# Patient Record
Sex: FEMALE | Race: WHITE | NOT HISPANIC OR LATINO | Employment: OTHER | URBAN - METROPOLITAN AREA
[De-identification: names, ages, dates, MRNs, and addresses within clinical notes are randomized per-mention and may not be internally consistent; named-entity substitution may affect disease eponyms.]

---

## 2017-02-12 DIAGNOSIS — E78.5 HYPERLIPIDEMIA: ICD-10-CM

## 2017-02-13 RX ORDER — SIMVASTATIN 10 MG/1
TABLET, FILM COATED ORAL
Qty: 90 TABLET | Refills: 3 | Status: SHIPPED | OUTPATIENT
Start: 2017-02-13 | End: 2018-01-29 | Stop reason: SDUPTHER

## 2017-02-17 ENCOUNTER — HOSPITAL ENCOUNTER (OUTPATIENT)
Dept: RADIOLOGY | Facility: HOSPITAL | Age: 77
Discharge: HOME | End: 2017-02-17
Attending: FAMILY MEDICINE
Payer: COMMERCIAL

## 2017-02-17 DIAGNOSIS — Z12.31 ENCOUNTER FOR SCREENING MAMMOGRAM FOR MALIGNANT NEOPLASM OF BREAST: ICD-10-CM

## 2017-02-17 PROCEDURE — 77067 SCR MAMMO BI INCL CAD: CPT | Mod: 26,,, | Performed by: RADIOLOGY

## 2017-02-17 PROCEDURE — 77067 SCR MAMMO BI INCL CAD: CPT | Mod: TC

## 2017-02-17 PROCEDURE — 77063 BREAST TOMOSYNTHESIS BI: CPT | Mod: 26,,, | Performed by: RADIOLOGY

## 2017-02-20 DIAGNOSIS — I10 ESSENTIAL HYPERTENSION: ICD-10-CM

## 2017-02-20 RX ORDER — LISINOPRIL 40 MG/1
TABLET ORAL
Qty: 90 TABLET | Refills: 3 | Status: SHIPPED | OUTPATIENT
Start: 2017-02-20 | End: 2018-04-23 | Stop reason: SDUPTHER

## 2017-02-20 RX ORDER — HYDROCHLOROTHIAZIDE 25 MG/1
TABLET ORAL
Qty: 90 TABLET | Refills: 3 | Status: SHIPPED | OUTPATIENT
Start: 2017-02-20 | End: 2018-04-23 | Stop reason: SDUPTHER

## 2017-02-20 RX ORDER — NIFEDIPINE 60 MG/1
TABLET, EXTENDED RELEASE ORAL
Qty: 90 TABLET | Refills: 3 | Status: ON HOLD | OUTPATIENT
Start: 2017-02-20 | End: 2018-04-14 | Stop reason: HOSPADM

## 2017-03-30 ENCOUNTER — TELEPHONE (OUTPATIENT)
Dept: FAMILY MEDICINE | Facility: CLINIC | Age: 77
End: 2017-03-30

## 2017-03-30 NOTE — TELEPHONE ENCOUNTER
Scheduled appt for preop clearance for back surgery on 4/6/17 and may need CT scan to rule out an anuerysm? Scheduled for tomorrow at 3 PM. Pt voiced understanding for appt. CLC

## 2017-03-30 NOTE — TELEPHONE ENCOUNTER
----- Message from Luis Jansen sent at 3/30/2017 11:52 AM CDT -----  Contact: self   [phone]  Patient needs an earlier appointment for clearance to rule out poss aneurysm. Patient will need a Ctscan. Patient is schedule for surgery 04/06/2017.  Thanks!

## 2017-03-31 ENCOUNTER — HOSPITAL ENCOUNTER (OUTPATIENT)
Dept: RADIOLOGY | Facility: HOSPITAL | Age: 77
Discharge: HOME | End: 2017-03-31
Attending: FAMILY MEDICINE
Payer: MEDICARE

## 2017-03-31 ENCOUNTER — OFFICE VISIT (OUTPATIENT)
Dept: FAMILY MEDICINE | Facility: CLINIC | Age: 77
End: 2017-03-31
Payer: MEDICARE

## 2017-03-31 VITALS
DIASTOLIC BLOOD PRESSURE: 70 MMHG | TEMPERATURE: 99 F | WEIGHT: 219.81 LBS | BODY MASS INDEX: 36.62 KG/M2 | HEIGHT: 65 IN | HEART RATE: 80 BPM | SYSTOLIC BLOOD PRESSURE: 140 MMHG

## 2017-03-31 DIAGNOSIS — M47.816 SPONDYLOSIS OF LUMBAR REGION WITHOUT MYELOPATHY OR RADICULOPATHY: ICD-10-CM

## 2017-03-31 DIAGNOSIS — R93.89 ABNORMAL CHEST X-RAY: ICD-10-CM

## 2017-03-31 DIAGNOSIS — I77.819 ECTATIC AORTA: ICD-10-CM

## 2017-03-31 DIAGNOSIS — E78.5 HYPERLIPIDEMIA, UNSPECIFIED HYPERLIPIDEMIA TYPE: ICD-10-CM

## 2017-03-31 DIAGNOSIS — Z01.818 PREOP EXAMINATION: Primary | ICD-10-CM

## 2017-03-31 DIAGNOSIS — I10 ESSENTIAL HYPERTENSION: ICD-10-CM

## 2017-03-31 PROCEDURE — 71250 CT THORAX DX C-: CPT | Mod: TC,PO

## 2017-03-31 PROCEDURE — 99214 OFFICE O/P EST MOD 30 MIN: CPT | Mod: S$GLB,,, | Performed by: FAMILY MEDICINE

## 2017-03-31 PROCEDURE — 71250 CT THORAX DX C-: CPT | Mod: 26,,, | Performed by: RADIOLOGY

## 2017-03-31 PROCEDURE — 99999 PR PBB SHADOW E&M-EST. PATIENT-LVL III: CPT | Mod: PBBFAC,,, | Performed by: FAMILY MEDICINE

## 2017-03-31 NOTE — PROGRESS NOTES
Subjective:       Patient ID: Shandra Velez is a 76 y.o. female.    Chief Complaint: Pre-op Exam (Back Surg Dr Marin Malloy, 4/6/17)    HPI Comments: Here today to f/u on chronic health issues  Here for preop clearanc for upcoming L4-L5 laminectomy with Dr. Malloy.  She is overall feeling well.  Recent chest xray had concern for possible aortic anerysm, and CT chest was recommended.  HTN - toleratign lisinopril 40mg daily, HCTZ 25mg daily and Adalat 60mg daily  HLD - tolerating Zocor 10mg daily  Allergies: taking Zyrtec 10mg daily    Past Medical History:    Hypertension                                                  Pulmonary nodule                                              Smoker                                                        Osteopenia                                                    Hypertension                                                  Hypercholesterolemia                                          Breast cancer                                                 Lumbar spondylosis                                            Past Surgical History:    MASTECTOMY                                       1985            Comment:right    CHOLECYSTECTOMY                                                APPENDECTOMY                                                   UMBILICAL HERNIA REPAIR                                        TUBAL LIGATION                                                 BREAST RECONSTRUCTION                                            Comment:right    Allergies:   -- No Known Drug Allergies     Social History    Marital Status:              Spouse Name:                       Years of Education:                 Number of children: 3             Occupational History  Occupation          Employer            Comment               retired marketing *                         Social History Main Topics    Smoking Status: Current Every Day Smoker        Packs/Day: 0.50  Years: 53          Types: Cigarettes    Smokeless Status: Never Used                        Alcohol Use: Yes             Drug Use: No              Sexual Activity: Not Currently        Other Topics            Concern    None on file    Social History Narrative    Lives alone      Hobbies: skyler      From [place]    Current Outpatient Prescriptions on File Prior to Visit:  aspirin 81 mg Tab, Every day, Disp: , Rfl:   cetirizine (ZYRTEC) 10 MG tablet, Every day, Disp: , Rfl:   DOCOSAHEXANOIC ACID/EPA (FISH OIL ORAL), 1,000 mg. Twice a day, Disp: , Rfl:   hydrochlorothiazide (HYDRODIURIL) 25 MG tablet, TAKE 1 TABLET ONCE DAILY, Disp: 90 tablet, Rfl: 3  lisinopril (PRINIVIL,ZESTRIL) 40 MG tablet, TAKE 1 TABLET ONCE DAILY, Disp: 90 tablet, Rfl: 3  naproxen (NAPROSYN) 500 MG tablet, Take 1 tablet (500 mg total) by mouth 2 (two) times daily with meals., Disp: 30 tablet, Rfl: 2  nifedipine (ADALAT CC) 60 MG TbSR, TAKE 1 TABLET ONCE DAILY, Disp: 90 tablet, Rfl: 3  simvastatin (ZOCOR) 10 MG tablet, TAKE 1 TABLET EVERY EVENING, Disp: 90 tablet, Rfl: 3  calcium citrate-vitamin D3 315-250 mg-unit Tab, Take by mouth., Disp: , Rfl:   temazepam (RESTORIL) 15 mg Cap, One tablet At bedtime as needed for insomnia, Disp: 90 capsule, Rfl: 1    No current facility-administered medications on file prior to visit.         Review of patient's family history indicates:    Cancer                         Sister                      Comment: uterine    Diabetes                       Brother                       Hyperlipidemia   Pertinent negatives include no chest pain or shortness of breath.     Review of Systems   Constitutional: Negative for appetite change, chills, fever and unexpected weight change.   HENT: Negative for sore throat and trouble swallowing.    Eyes: Negative for pain and visual disturbance.   Respiratory: Negative for cough, shortness of breath and wheezing.    Cardiovascular: Negative for chest pain and palpitations.  "  Gastrointestinal: Negative for abdominal pain, blood in stool, diarrhea, nausea and vomiting.   Genitourinary: Negative for difficulty urinating, dysuria and hematuria.   Musculoskeletal: Positive for back pain (intermittent left flank pain). Negative for arthralgias, gait problem and neck pain.   Skin: Negative for rash and wound.   Neurological: Positive for tremors (left hand). Negative for dizziness, weakness, numbness and headaches.   Hematological: Negative for adenopathy.   Psychiatric/Behavioral: Negative for dysphoric mood.       Objective:       BP (!) 140/70 (BP Location: Left arm, Patient Position: Sitting, BP Method: Manual)  Pulse 80  Temp 98.7 °F (37.1 °C) (Oral)   Ht 5' 5" (1.651 m)  Wt 99.7 kg (219 lb 12.8 oz)  BMI 36.58 kg/m2    Physical Exam   Constitutional: She is oriented to person, place, and time. She appears well-developed and well-nourished.   HENT:   Head: Normocephalic.   Mouth/Throat: Oropharynx is clear and moist. No oropharyngeal exudate or posterior oropharyngeal erythema.   Eyes: Conjunctivae and EOM are normal. Pupils are equal, round, and reactive to light.   Neck: Normal range of motion. Neck supple. No thyromegaly present.   Cardiovascular: Normal rate, regular rhythm, S1 normal, S2 normal, normal heart sounds and intact distal pulses.  Exam reveals no gallop and no friction rub.    No murmur heard.  Pulmonary/Chest: Effort normal and breath sounds normal. She has no wheezes. She has no rales.   Abdominal: Normal appearance.   Musculoskeletal:        Lumbar back: She exhibits decreased range of motion and pain. She exhibits no tenderness and no deformity.        Right lower leg: She exhibits no edema.        Left lower leg: She exhibits no edema.   Lymphadenopathy:     She has no cervical adenopathy.   Neurological: She is alert and oriented to person, place, and time. No cranial nerve deficit. Gait normal.   Skin: Skin is warm and intact. No rash noted.   Psychiatric: She " has a normal mood and affect.       Outside CXR and EKG and labs reviewed    CT chest today:  Atherosclerotic changes are noted at the aortic arch    Assessment:       1. Preop examination    2. Hyperlipidemia, unspecified hyperlipidemia type    3. Essential hypertension    4. Abnormal chest x-ray    5. Ectatic aorta    6. Spondylosis of lumbar region without myelopathy or radiculopathy        Plan:       Preop examination    Hyperlipidemia, unspecified hyperlipidemia type    Essential hypertension    Abnormal chest x-ray  -     CT Chest Without Contrast; Future; Expected date: 3/31/17    Ectatic aorta  -     CT Chest Without Contrast; Future; Expected date: 3/31/17    Spondylosis of lumbar region without myelopathy or radiculopathy          Medically cleared to proceed with back surgery  continue present meds  Counseled on basic back stretching and lifting precautions  Counseled on regular exercise, maintenance of a healthy weight, balanced diet rich in fruits/vegetables and lean protein, and avoidance of unhealthy habits like smoking and excessive alcohol intake.  F/u annually or PRN

## 2017-04-03 ENCOUNTER — TELEPHONE (OUTPATIENT)
Dept: FAMILY MEDICINE | Facility: CLINIC | Age: 77
End: 2017-04-03

## 2017-04-03 NOTE — TELEPHONE ENCOUNTER
----- Message from Alberto Guzman sent at 4/3/2017  8:58 AM CDT -----  Contact: pt  Pt is requesting results from her CT Scan  Call Back#[phone]  Thanks

## 2017-07-20 ENCOUNTER — OFFICE VISIT (OUTPATIENT)
Dept: FAMILY MEDICINE | Facility: CLINIC | Age: 77
End: 2017-07-20
Payer: MEDICARE

## 2017-07-20 VITALS
BODY MASS INDEX: 36.48 KG/M2 | SYSTOLIC BLOOD PRESSURE: 120 MMHG | HEIGHT: 65 IN | WEIGHT: 218.94 LBS | DIASTOLIC BLOOD PRESSURE: 80 MMHG | TEMPERATURE: 98 F | HEART RATE: 85 BPM | RESPIRATION RATE: 18 BRPM

## 2017-07-20 DIAGNOSIS — M76.899 TENDONITIS OF KNEE: ICD-10-CM

## 2017-07-20 DIAGNOSIS — M54.41 ACUTE RIGHT-SIDED LOW BACK PAIN WITH RIGHT-SIDED SCIATICA: Primary | ICD-10-CM

## 2017-07-20 DIAGNOSIS — M25.551 PAIN OF RIGHT HIP JOINT: ICD-10-CM

## 2017-07-20 DIAGNOSIS — M76.31 IT BAND SYNDROME, RIGHT: ICD-10-CM

## 2017-07-20 PROCEDURE — 1125F AMNT PAIN NOTED PAIN PRSNT: CPT | Mod: S$GLB,,, | Performed by: NURSE PRACTITIONER

## 2017-07-20 PROCEDURE — 99213 OFFICE O/P EST LOW 20 MIN: CPT | Mod: S$GLB,,, | Performed by: NURSE PRACTITIONER

## 2017-07-20 PROCEDURE — 1159F MED LIST DOCD IN RCRD: CPT | Mod: S$GLB,,, | Performed by: NURSE PRACTITIONER

## 2017-07-20 PROCEDURE — 99999 PR PBB SHADOW E&M-EST. PATIENT-LVL IV: CPT | Mod: PBBFAC,,, | Performed by: NURSE PRACTITIONER

## 2017-07-20 RX ORDER — METHYLPREDNISOLONE 4 MG/1
TABLET ORAL
Qty: 1 PACKAGE | Refills: 0 | Status: SHIPPED | OUTPATIENT
Start: 2017-07-20 | End: 2017-08-10

## 2017-07-20 NOTE — PROGRESS NOTES
Subjective:       Patient ID: Shandra Velez is a 76 y.o. female.    Chief Complaint: Leg Pain (right leg/knee pain that radiates to back of buttocks and down calf. Pain for a month. )    Xray knee - ortho - Dr nichols   Blood work - that were normal -   Thought it was tendonitis- and told her to take advil, OTC meds   Went there 2 weeks ago - still with pain in knee and upper leg     Had lumbar surgery may 2017 lumbar laminectomy L4-5 - got immediate relief   Stops with advil and motrin relief     Back Pain   This is a new problem. The current episode started in the past 7 days. The problem occurs every several days. The problem has been rapidly worsening since onset. The pain is present in the lumbar spine. The quality of the pain is described as aching. The pain radiates to the right thigh and right knee. The pain is at a severity of 7/10. The pain is moderate. The pain is the same all the time. The symptoms are aggravated by sitting. Associated symptoms include leg pain. Pertinent negatives include no abdominal pain, bladder incontinence, bowel incontinence, chest pain, dysuria, fever, headaches, numbness, paresis, paresthesias, pelvic pain, perianal numbness, tingling, weakness or weight loss. She has tried NSAIDs for the symptoms. The treatment provided no relief.     Vitals:    07/20/17 1347   BP: 120/80   Pulse: 85   Resp: 18   Temp: 97.8 °F (36.6 °C)     Review of Systems   Constitutional: Negative.  Negative for diaphoresis, fatigue, fever and weight loss.   HENT: Negative.    Eyes: Negative.    Respiratory: Negative.  Negative for cough, shortness of breath and wheezing.    Cardiovascular: Negative.  Negative for chest pain.   Gastrointestinal: Negative.  Negative for abdominal pain, bowel incontinence, diarrhea and nausea.   Endocrine: Negative.    Genitourinary: Negative.  Negative for bladder incontinence, dysuria, hematuria and pelvic pain.   Musculoskeletal: Positive for arthralgias, back pain,  gait problem and myalgias. Negative for joint swelling.   Skin: Negative.  Negative for color change and rash.   Allergic/Immunologic: Negative.    Neurological: Negative for tingling, speech difficulty, weakness, numbness, headaches and paresthesias.   Hematological: Negative.    Psychiatric/Behavioral: Negative.        Past Medical History:   Diagnosis Date    Breast cancer     Hypercholesterolemia     Hypertension     Hypertension     Lumbar spondylosis     Osteopenia     Pulmonary nodule     Smoker      Objective:      Physical Exam   Constitutional: She is oriented to person, place, and time. She appears well-developed and well-nourished.   HENT:   Head: Normocephalic and atraumatic.   Mouth/Throat: Oropharynx is clear and moist.   Eyes: Conjunctivae and EOM are normal. Pupils are equal, round, and reactive to light.   Neck: Neck supple.   Cardiovascular: Normal rate, regular rhythm and normal heart sounds.  Exam reveals no friction rub.    No murmur heard.  Pulmonary/Chest: Effort normal.   Abdominal: Soft. Bowel sounds are normal.   Musculoskeletal:        Lumbar back: She exhibits decreased range of motion, tenderness, pain and spasm. She exhibits no bony tenderness.        Right upper leg: She exhibits tenderness. She exhibits no edema.        Legs:  Neurological: She is alert and oriented to person, place, and time.   Skin: Skin is warm and dry.   Psychiatric: She has a normal mood and affect. Her behavior is normal.   Nursing note and vitals reviewed.      Assessment:       1. Acute right-sided low back pain with right-sided sciatica    2. Tendonitis of knee    3. Pain of right hip joint    4. IT band syndrome, right        Plan:       Acute right-sided low back pain with right-sided sciatica  -     methylPREDNISolone (MEDROL DOSEPACK) 4 mg tablet; use as directed  Dispense: 1 Package; Refill: 0    Tendonitis of knee  -     methylPREDNISolone (MEDROL DOSEPACK) 4 mg tablet; use as directed   Dispense: 1 Package; Refill: 0    Pain of right hip joint    IT band syndrome, right            Discussed would recommend therapy and stretching and heat -   She will try the medrol dose pack and call back if she wants to add physical therapy

## 2017-08-14 ENCOUNTER — PATIENT OUTREACH (OUTPATIENT)
Dept: ADMINISTRATIVE | Facility: HOSPITAL | Age: 77
End: 2017-08-14

## 2017-08-18 DIAGNOSIS — Z12.11 COLON CANCER SCREENING: ICD-10-CM

## 2017-08-29 ENCOUNTER — OFFICE VISIT (OUTPATIENT)
Dept: FAMILY MEDICINE | Facility: CLINIC | Age: 77
End: 2017-08-29
Payer: MEDICARE

## 2017-08-29 VITALS
OXYGEN SATURATION: 93 % | TEMPERATURE: 97 F | HEART RATE: 88 BPM | BODY MASS INDEX: 36.73 KG/M2 | RESPIRATION RATE: 20 BRPM | DIASTOLIC BLOOD PRESSURE: 70 MMHG | WEIGHT: 220.44 LBS | HEIGHT: 65 IN | SYSTOLIC BLOOD PRESSURE: 126 MMHG

## 2017-08-29 DIAGNOSIS — Z00.00 PREVENTATIVE HEALTH CARE: Primary | ICD-10-CM

## 2017-08-29 DIAGNOSIS — M54.31 SCIATICA, RIGHT SIDE: ICD-10-CM

## 2017-08-29 DIAGNOSIS — G47.00 INSOMNIA, UNSPECIFIED TYPE: ICD-10-CM

## 2017-08-29 DIAGNOSIS — E78.5 HYPERLIPIDEMIA, UNSPECIFIED HYPERLIPIDEMIA TYPE: ICD-10-CM

## 2017-08-29 DIAGNOSIS — R25.1 TREMOR: ICD-10-CM

## 2017-08-29 PROCEDURE — 99999 PR PBB SHADOW E&M-EST. PATIENT-LVL III: CPT | Mod: PBBFAC,,, | Performed by: FAMILY MEDICINE

## 2017-08-29 PROCEDURE — 99397 PER PM REEVAL EST PAT 65+ YR: CPT | Mod: S$GLB,,, | Performed by: FAMILY MEDICINE

## 2017-08-29 RX ORDER — TEMAZEPAM 15 MG/1
CAPSULE ORAL
Qty: 90 CAPSULE | Refills: 1 | Status: SHIPPED | OUTPATIENT
Start: 2017-08-29 | End: 2019-11-06 | Stop reason: SDUPTHER

## 2017-08-29 NOTE — PROGRESS NOTES
Subjective:       Patient ID: Shandra Velez is a 76 y.o. female.    Chief Complaint: Preventative Health Care (Physical)    Here today for annual exam to f/u on chronic health issues    She is overall feeling well.  C/o 2 month h/o persistent right buttock pain with radiation to the right lateral knee.  It is worse in the AM and improves as the day progresses.  steroid pack helped some.  Symptoms have been unchanged    HTN - tolerating lisinopril 40mg daily, HCTZ 25mg daily and Adalat 60mg daily  HLD - tolerating Zocor 10mg daily  Allergies: taking Zyrtec 10mg daily    C/o persistent left-hand tremor that is worse with writing.    Past Medical History:    Hypertension                                                  Pulmonary nodule                                              Smoker                                                        Osteopenia                                                    Hypertension                                                  Hypercholesterolemia                                          Breast cancer                                                 Lumbar spondylosis                                            Past Surgical History:    MASTECTOMY                                       1985            Comment:right    CHOLECYSTECTOMY                                                APPENDECTOMY                                                   UMBILICAL HERNIA REPAIR                                        TUBAL LIGATION                                                 BREAST RECONSTRUCTION                                            Comment:right    Allergies:   -- No Known Drug Allergies     Social History    Marital Status:              Spouse Name:                       Years of Education:                 Number of children: 3             Occupational History  Occupation          Employer            Comment               retired marketing *                         Social History  Main Topics    Smoking Status: Current Every Day Smoker        Packs/Day: 0.50  Years: 53         Types: Cigarettes    Smokeless Status: Never Used                        Alcohol Use: Yes             Drug Use: No              Sexual Activity: Not Currently        Other Topics            Concern    None on file    Social History Narrative    Lives alone      Hobbies: skyler      From [place]    Current Outpatient Prescriptions on File Prior to Visit:  aspirin 81 mg Tab, Every day, Disp: , Rfl:   cetirizine (ZYRTEC) 10 MG tablet, Every day, Disp: , Rfl:   DOCOSAHEXANOIC ACID/EPA (FISH OIL ORAL), 1,000 mg. Twice a day, Disp: , Rfl:   hydrochlorothiazide (HYDRODIURIL) 25 MG tablet, TAKE 1 TABLET ONCE DAILY, Disp: 90 tablet, Rfl: 3  lisinopril (PRINIVIL,ZESTRIL) 40 MG tablet, TAKE 1 TABLET ONCE DAILY, Disp: 90 tablet, Rfl: 3  naproxen (NAPROSYN) 500 MG tablet, Take 1 tablet (500 mg total) by mouth 2 (two) times daily with meals., Disp: 30 tablet, Rfl: 2  nifedipine (ADALAT CC) 60 MG TbSR, TAKE 1 TABLET ONCE DAILY, Disp: 90 tablet, Rfl: 3  simvastatin (ZOCOR) 10 MG tablet, TAKE 1 TABLET EVERY EVENING, Disp: 90 tablet, Rfl: 3  calcium citrate-vitamin D3 315-250 mg-unit Tab, Take by mouth., Disp: , Rfl:   temazepam (RESTORIL) 15 mg Cap, One tablet At bedtime as needed for insomnia, Disp: 90 capsule, Rfl: 1    No current facility-administered medications on file prior to visit.         Review of patient's family history indicates:    Cancer                         Sister                      Comment: uterine    Diabetes                       Brother                         Hyperlipidemia   Pertinent negatives include no chest pain or shortness of breath.     Review of Systems   Constitutional: Negative for appetite change, chills, fever and unexpected weight change.   HENT: Negative for sore throat and trouble swallowing.    Eyes: Negative for pain and visual disturbance.   Respiratory: Negative for cough, shortness of  "breath and wheezing.    Cardiovascular: Negative for chest pain and palpitations.   Gastrointestinal: Negative for abdominal pain, blood in stool, diarrhea, nausea and vomiting.   Genitourinary: Negative for difficulty urinating, dysuria and hematuria.   Musculoskeletal: Positive for back pain (intermittent left flank pain). Negative for arthralgias, gait problem and neck pain.   Skin: Negative for rash and wound.   Neurological: Positive for tremors (left hand). Negative for dizziness, weakness, numbness and headaches.   Hematological: Negative for adenopathy.   Psychiatric/Behavioral: Negative for dysphoric mood.       Objective:       /70   Pulse 88   Temp 97.3 °F (36.3 °C) (Oral)   Resp 20   Ht 5' 5" (1.651 m)   Wt 100 kg (220 lb 7.4 oz)   SpO2 (!) 93%   BMI 36.69 kg/m²     Physical Exam   Constitutional: She is oriented to person, place, and time. She appears well-developed and well-nourished.   HENT:   Head: Normocephalic.   Mouth/Throat: Oropharynx is clear and moist. No oropharyngeal exudate or posterior oropharyngeal erythema.   Eyes: Conjunctivae and EOM are normal. Pupils are equal, round, and reactive to light.   Neck: Normal range of motion. Neck supple. No thyromegaly present.   Cardiovascular: Normal rate, regular rhythm, S1 normal, S2 normal, normal heart sounds and intact distal pulses.  Exam reveals no gallop and no friction rub.    No murmur heard.  Pulmonary/Chest: Effort normal and breath sounds normal. She has no wheezes. She has no rales.   Abdominal: Normal appearance.   Musculoskeletal:        Lumbar back: She exhibits pain. She exhibits normal range of motion, no tenderness and no deformity.        Back:         Right lower leg: She exhibits no edema.        Left lower leg: She exhibits no edema.   Lymphadenopathy:     She has no cervical adenopathy.   Neurological: She is alert and oriented to person, place, and time. She displays tremor. No cranial nerve deficit. Gait normal. "   Skin: Skin is warm and intact. No rash noted.   Psychiatric: She has a normal mood and affect.       Results for orders placed or performed in visit on 06/26/17   Sedimentation rate, manual   Result Value Ref Range    Sed Rate 8 0 - 29 mm/Hr   C-reactive protein   Result Value Ref Range    CRP 0.90 0.00 - 0.90 mg/dL         Assessment:       1. Preventative health care    2. Insomnia, unspecified type    3. Hyperlipidemia, unspecified hyperlipidemia type    4. Sciatica, right side    5. Tremor        Plan:       Preventative health care  -     Comprehensive metabolic panel; Future; Expected date: 08/29/2017  -     Lipid panel; Future; Expected date: 08/29/2017    Insomnia, unspecified type  -     temazepam (RESTORIL) 15 mg Cap; One tablet At bedtime as needed for insomnia  Dispense: 90 capsule; Refill: 1    Hyperlipidemia, unspecified hyperlipidemia type    Sciatica, right side  -     Ambulatory Referral to Physical/Occupational Therapy    Tremor             continue present meds  Counseled on basic back stretching and lifting precautions  Reassurance regarding essential tremor  Counseled on regular exercise, maintenance of a healthy weight, balanced diet rich in fruits/vegetables and lean protein, and avoidance of unhealthy habits like smoking and excessive alcohol intake.  F/u annually or PRN

## 2017-09-11 ENCOUNTER — LAB VISIT (OUTPATIENT)
Dept: LAB | Facility: HOSPITAL | Age: 77
End: 2017-09-11
Attending: FAMILY MEDICINE
Payer: MEDICARE

## 2017-09-11 DIAGNOSIS — Z00.00 PREVENTATIVE HEALTH CARE: ICD-10-CM

## 2017-09-11 LAB
ALBUMIN SERPL BCP-MCNC: 3.5 G/DL
ALP SERPL-CCNC: 68 U/L
ALT SERPL W/O P-5'-P-CCNC: 11 U/L
ANION GAP SERPL CALC-SCNC: 9 MMOL/L
AST SERPL-CCNC: 20 U/L
BILIRUB SERPL-MCNC: 0.4 MG/DL
BUN SERPL-MCNC: 25 MG/DL
CALCIUM SERPL-MCNC: 9.6 MG/DL
CHLORIDE SERPL-SCNC: 106 MMOL/L
CHOLEST SERPL-MCNC: 208 MG/DL
CHOLEST/HDLC SERPL: 4.4 {RATIO}
CO2 SERPL-SCNC: 29 MMOL/L
CREAT SERPL-MCNC: 1.1 MG/DL
EST. GFR  (AFRICAN AMERICAN): 57.2 ML/MIN/1.73 M^2
EST. GFR  (NON AFRICAN AMERICAN): 49.6 ML/MIN/1.73 M^2
GLUCOSE SERPL-MCNC: 103 MG/DL
HDLC SERPL-MCNC: 47 MG/DL
HDLC SERPL: 22.6 %
LDLC SERPL CALC-MCNC: 127 MG/DL
NONHDLC SERPL-MCNC: 161 MG/DL
POTASSIUM SERPL-SCNC: 4.1 MMOL/L
PROT SERPL-MCNC: 7 G/DL
SODIUM SERPL-SCNC: 144 MMOL/L
TRIGL SERPL-MCNC: 170 MG/DL

## 2017-09-11 PROCEDURE — 80053 COMPREHEN METABOLIC PANEL: CPT

## 2017-09-11 PROCEDURE — 36415 COLL VENOUS BLD VENIPUNCTURE: CPT | Mod: PO

## 2017-09-11 PROCEDURE — 80061 LIPID PANEL: CPT

## 2017-10-16 ENCOUNTER — OFFICE VISIT (OUTPATIENT)
Dept: SPINE | Facility: CLINIC | Age: 77
End: 2017-10-16
Payer: MEDICARE

## 2017-10-16 VITALS
BODY MASS INDEX: 35.19 KG/M2 | SYSTOLIC BLOOD PRESSURE: 141 MMHG | HEIGHT: 66 IN | DIASTOLIC BLOOD PRESSURE: 86 MMHG | WEIGHT: 218.94 LBS | HEART RATE: 75 BPM

## 2017-10-16 DIAGNOSIS — Z98.890 HISTORY OF LUMBAR SURGERY: Primary | ICD-10-CM

## 2017-10-16 DIAGNOSIS — M54.41 ACUTE RIGHT-SIDED LOW BACK PAIN WITH RIGHT-SIDED SCIATICA: ICD-10-CM

## 2017-10-16 PROCEDURE — 99999 PR PBB SHADOW E&M-EST. PATIENT-LVL IV: CPT | Mod: PBBFAC,,, | Performed by: PHYSICIAN ASSISTANT

## 2017-10-16 PROCEDURE — 99203 OFFICE O/P NEW LOW 30 MIN: CPT | Mod: S$GLB,,, | Performed by: PHYSICIAN ASSISTANT

## 2017-10-17 RX ORDER — DIAZEPAM 10 MG/1
TABLET ORAL
Qty: 1 TABLET | Refills: 0 | Status: SHIPPED | OUTPATIENT
Start: 2017-10-17 | End: 2017-11-15

## 2017-10-17 NOTE — PROGRESS NOTES
Neurosurgery History & Physical    Patient ID: Shandra Velez is a 77 y.o. female.    Chief Complaint   Patient presents with    Low-back Pain    Sciatica     right side       Review of Systems   Constitutional: Negative for activity change, appetite change, chills, fever and unexpected weight change.   HENT: Negative for tinnitus, trouble swallowing and voice change.    Respiratory: Negative for apnea, cough, chest tightness and shortness of breath.    Cardiovascular: Negative for chest pain and palpitations.   Gastrointestinal: Negative for constipation, diarrhea, nausea and vomiting.   Genitourinary: Negative for difficulty urinating, dysuria, frequency and urgency.   Musculoskeletal: Positive for arthralgias and back pain. Negative for gait problem, neck pain and neck stiffness.   Skin: Negative for wound.   Neurological: Positive for numbness. Negative for dizziness, tremors, seizures, facial asymmetry, speech difficulty, weakness, light-headedness and headaches.   Psychiatric/Behavioral: Negative for confusion and decreased concentration.       Past Medical History:   Diagnosis Date    Breast cancer     Hypercholesterolemia     Hypertension     Hypertension     Lumbar spondylosis     Osteopenia     Pulmonary nodule     Smoker      Social History     Social History    Marital status:      Spouse name: N/A    Number of children: 3    Years of education: N/A     Occupational History    retired  for [employer]      Social History Main Topics    Smoking status: Current Every Day Smoker     Packs/day: 0.50     Years: 53.00     Types: Cigarettes    Smokeless tobacco: Never Used    Alcohol use Yes    Drug use: No    Sexual activity: Not Currently     Other Topics Concern    Not on file     Social History Narrative    Lives alone        Hobbies: karthikeyanino        From [place]             Family History   Problem Relation Age of Onset    Cancer Sister      uterine    Diabetes  "Brother     Cancer Sister      liver     Review of patient's allergies indicates:   Allergen Reactions    No known drug allergies        Current Outpatient Prescriptions:     aspirin 81 mg Tab, Every day, Disp: , Rfl:     cetirizine (ZYRTEC) 10 MG tablet, Every day, Disp: , Rfl:     DOCOSAHEXANOIC ACID/EPA (FISH OIL ORAL), 1,000 mg. Twice a day, Disp: , Rfl:     hydrochlorothiazide (HYDRODIURIL) 25 MG tablet, TAKE 1 TABLET ONCE DAILY, Disp: 90 tablet, Rfl: 3    lisinopril (PRINIVIL,ZESTRIL) 40 MG tablet, TAKE 1 TABLET ONCE DAILY, Disp: 90 tablet, Rfl: 3    nifedipine (ADALAT CC) 60 MG TbSR, TAKE 1 TABLET ONCE DAILY, Disp: 90 tablet, Rfl: 3    simvastatin (ZOCOR) 10 MG tablet, TAKE 1 TABLET EVERY EVENING, Disp: 90 tablet, Rfl: 3    temazepam (RESTORIL) 15 mg Cap, One tablet At bedtime as needed for insomnia, Disp: 90 capsule, Rfl: 1    diazePAM (VALIUM) 10 MG Tab, Take 1 tablet by mouth 30 minutes prior to MRI.  Must have someone drive you to and from the facility., Disp: 1 tablet, Rfl: 0    Vitals:    10/16/17 1134   BP: (!) 141/86   BP Location: Left arm   Patient Position: Sitting   BP Method: Medium (Automatic)   Pulse: 75   Weight: 99.3 kg (218 lb 14.7 oz)   Height: 5' 6" (1.676 m)       Physical Exam   Constitutional: She is oriented to person, place, and time. She appears well-developed and well-nourished.   HENT:   Head: Normocephalic and atraumatic.   Eyes: Pupils are equal, round, and reactive to light.   Neck: Normal range of motion. Neck supple.   Cardiovascular: Normal rate.    Pulmonary/Chest: Effort normal.   Musculoskeletal: Normal range of motion. She exhibits no edema.   Neurological: She is alert and oriented to person, place, and time. She has a normal Finger-Nose-Finger Test, a normal Heel to Shin Test, a normal Romberg Test and a normal Tandem Gait Test. Gait normal.   Reflex Scores:       Tricep reflexes are 2+ on the right side and 2+ on the left side.       Bicep reflexes are 2+ on " the right side and 2+ on the left side.       Brachioradialis reflexes are 2+ on the right side and 2+ on the left side.       Patellar reflexes are 2+ on the right side and 2+ on the left side.       Achilles reflexes are 2+ on the right side and 2+ on the left side.  Skin: Skin is warm, dry and intact.   Psychiatric: She has a normal mood and affect. Her speech is normal and behavior is normal. Judgment and thought content normal.   Nursing note and vitals reviewed.      Neurologic Exam     Mental Status   Oriented to person, place, and time.   Oriented to person.   Oriented to place.   Oriented to time.   Follows 3 step commands.   Attention: normal. Concentration: normal.   Speech: speech is normal   Level of consciousness: alert  Knowledge: consistent with education.   Able to name object. Able to read. Able to repeat. Able to write. Normal comprehension.     Cranial Nerves     CN II   Visual acuity: normal  Right visual field deficit: none  Left visual field deficit: none     CN III, IV, VI   Pupils are equal, round, and reactive to light.  Right pupil: Size: 3 mm. Shape: regular. Reactivity: brisk. Consensual response: intact.   Left pupil: Size: 3 mm. Shape: regular. Reactivity: brisk. Consensual response: intact.   CN III: no CN III palsy  CN VI: no CN VI palsy  Nystagmus: none   Diplopia: none  Ophthalmoparesis: none  Conjugate gaze: present    CN V   Right facial sensation deficit: none  Left facial sensation deficit: none    CN VII   Right facial weakness: none  Left facial weakness: none    CN VIII   Hearing: intact    CN IX, X   CN IX normal.   CN X normal.     CN XI   Right sternocleidomastoid strength: normal  Left sternocleidomastoid strength: normal  Right trapezius strength: normal  Left trapezius strength: normal    CN XII   Fasciculations: absent  Tongue deviation: none    Motor Exam   Muscle bulk: normal  Overall muscle tone: normal  Right arm pronator drift: absent  Left arm pronator drift:  absent    Strength   Right neck flexion: 5/5  Left neck flexion: 5/5  Right neck extension: 5/5  Left neck extension: 5/5  Right deltoid: 5/5  Left deltoid: 5/5  Right biceps: 5/5  Left biceps: 5/5  Right triceps: 5/5  Left triceps: 5/5  Right wrist flexion: 5/5  Left wrist flexion: 5/5  Right wrist extension: 5/5  Left wrist extension: 5/5  Right interossei: 5/5  Left interossei: 5/5  Right abdominals: 5/5  Left abdominals: 5/5  Right iliopsoas: 5/5  Left iliopsoas: 5/5  Right quadriceps: 5/5  Left quadriceps: 5/5  Right hamstrin/5  Left hamstrin/5  Right glutei: 5/5  Left glutei: 5/5  Right anterior tibial: 5/5  Left anterior tibial: 5/5  Right posterior tibial: 5/5  Left posterior tibial: 5/5  Right peroneal: 5/5  Left peroneal: 5/5  Right gastroc: 5/5  Left gastroc: 5/5    Sensory Exam   Right arm light touch: normal  Left arm light touch: normal  Right leg light touch: normal  Left leg light touch: normal  Right arm vibration: normal  Left arm vibration: normal  Right arm pinprick: normal  Left arm pinprick: normal    Gait, Coordination, and Reflexes     Gait  Gait: normal    Coordination   Romberg: negative  Finger to nose coordination: normal  Heel to shin coordination: normal  Tandem walking coordination: normal    Tremor   Resting tremor: absent  Intention tremor: absent  Action tremor: absent    Reflexes   Right brachioradialis: 2+  Left brachioradialis: 2+  Right biceps: 2+  Left biceps: 2+  Right triceps: 2+  Left triceps: 2+  Right patellar: 2+  Left patellar: 2+  Right achilles: 2+  Left achilles: 2+  Right Melendez: absent  Left Melendez: absent  Right ankle clonus: absent  Left ankle clonus: absent      Provider dictation:  77 year old with history of lumbar surgery is self referred for a second opinion and further evaluation of back pain.  She has knee arthralgia with right knee arthoplasty 10 years ago.  She was recently evaluated by orthopedics who found no significant right knee pathology.   She underwent May 2017 lumbar surgery by Dr. Malloy with some improvement of lowe back pain.  She does continue to have some lower back pain.  The symptoms that bring her in today is 4-6 week onset of right sided lower back radiating to the right buttock and posterolateral right thigh to the knee.  She has tingling in the right lateral leg to the foot.  She has tried ibuprofen and PT, but has not had SILVERIO after the surgical intervention.  Oswestry score: 35%.  PHQ:  1.    She is neurologically intact on exam.    I have reviewed a pre-operative MRI from 6-4-15 demonstrating multilevel degenerative changes.  The most significant changes are at L4/5 with severe central canal narrowing.      She has not had any post-operative imaging.    Ms. Velez has had lumbar surgery for L4/5 lumbar stenosis with improved, but continued lower back pain and acute onset right lower back and right leg pain in an L5 type pattern.  We will start evaluating her by obtaining an MRI and xrays of the lumbar spine.  She has requested valium to help with anxiety associated with the MRI.  Follow up in clinic after imaging is complete.      Visit Diagnosis:  History of lumbar surgery  -     MRI Lumbar Spine Without Contrast; Future; Expected date: 10/16/2017  -     X-Ray Lumbar Complete With Flex And Ext; Future; Expected date: 10/16/2017  -     diazePAM (VALIUM) 10 MG Tab; Take 1 tablet by mouth 30 minutes prior to MRI.  Must have someone drive you to and from the facility.  Dispense: 1 tablet; Refill: 0    Acute right-sided low back pain with right-sided sciatica  -     MRI Lumbar Spine Without Contrast; Future; Expected date: 10/16/2017  -     X-Ray Lumbar Complete With Flex And Ext; Future; Expected date: 10/16/2017  -     diazePAM (VALIUM) 10 MG Tab; Take 1 tablet by mouth 30 minutes prior to MRI.  Must have someone drive you to and from the facility.  Dispense: 1 tablet; Refill: 0        Total time spent counseling greater than fifty percent  of total visit time.  Counseling included discussion regarding imaging findings, diagnosis possibilities, treatment options, risks and benefits.   The patient had many questions regarding the options and long-term effects.

## 2017-10-19 ENCOUNTER — HOSPITAL ENCOUNTER (OUTPATIENT)
Dept: RADIOLOGY | Facility: HOSPITAL | Age: 77
Discharge: HOME | End: 2017-10-19
Attending: PHYSICIAN ASSISTANT
Payer: MEDICARE

## 2017-10-19 DIAGNOSIS — Z98.890 HISTORY OF LUMBAR SURGERY: ICD-10-CM

## 2017-10-19 DIAGNOSIS — M54.41 ACUTE RIGHT-SIDED LOW BACK PAIN WITH RIGHT-SIDED SCIATICA: ICD-10-CM

## 2017-10-19 PROCEDURE — 72114 X-RAY EXAM L-S SPINE BENDING: CPT | Mod: TC,PO

## 2017-10-19 PROCEDURE — 72148 MRI LUMBAR SPINE W/O DYE: CPT | Mod: TC,PO

## 2017-10-19 PROCEDURE — 72148 MRI LUMBAR SPINE W/O DYE: CPT | Mod: 26,,, | Performed by: RADIOLOGY

## 2017-10-19 PROCEDURE — 72114 X-RAY EXAM L-S SPINE BENDING: CPT | Mod: 26,,, | Performed by: RADIOLOGY

## 2017-10-23 ENCOUNTER — OFFICE VISIT (OUTPATIENT)
Dept: SPINE | Facility: CLINIC | Age: 77
End: 2017-10-23
Payer: MEDICARE

## 2017-10-23 VITALS
BODY MASS INDEX: 35.47 KG/M2 | SYSTOLIC BLOOD PRESSURE: 173 MMHG | HEIGHT: 66 IN | WEIGHT: 220.69 LBS | DIASTOLIC BLOOD PRESSURE: 95 MMHG | HEART RATE: 74 BPM

## 2017-10-23 DIAGNOSIS — M48.062 SPINAL STENOSIS OF LUMBAR REGION WITH NEUROGENIC CLAUDICATION: ICD-10-CM

## 2017-10-23 DIAGNOSIS — M54.41 ACUTE RIGHT-SIDED LOW BACK PAIN WITH RIGHT-SIDED SCIATICA: ICD-10-CM

## 2017-10-23 DIAGNOSIS — Z98.890 HISTORY OF LUMBAR SURGERY: Primary | ICD-10-CM

## 2017-10-23 PROCEDURE — 99214 OFFICE O/P EST MOD 30 MIN: CPT | Mod: S$GLB,,, | Performed by: PHYSICIAN ASSISTANT

## 2017-10-23 PROCEDURE — 99999 PR PBB SHADOW E&M-EST. PATIENT-LVL III: CPT | Mod: PBBFAC,,, | Performed by: PHYSICIAN ASSISTANT

## 2017-10-23 NOTE — PROGRESS NOTES
Neurosurgery History & Physical    Patient ID: Shandra Velez is a 77 y.o. female.    Chief Complaint   Patient presents with    Low-back Pain    Leg Pain     right       Review of Systems   Constitutional: Negative for activity change, appetite change, chills, fever and unexpected weight change.   HENT: Negative for tinnitus, trouble swallowing and voice change.    Respiratory: Negative for apnea, cough, chest tightness and shortness of breath.    Cardiovascular: Negative for chest pain and palpitations.   Gastrointestinal: Negative for constipation, diarrhea, nausea and vomiting.   Genitourinary: Negative for difficulty urinating, dysuria, frequency and urgency.   Musculoskeletal: Positive for arthralgias and back pain. Negative for gait problem, neck pain and neck stiffness.   Skin: Negative for wound.   Neurological: Positive for numbness. Negative for dizziness, tremors, seizures, facial asymmetry, speech difficulty, weakness, light-headedness and headaches.   Psychiatric/Behavioral: Negative for confusion and decreased concentration.       Past Medical History:   Diagnosis Date    Breast cancer     Hypercholesterolemia     Hypertension     Hypertension     Lumbar spondylosis     Osteopenia     Pulmonary nodule     Smoker      Social History     Social History    Marital status:      Spouse name: N/A    Number of children: 3    Years of education: N/A     Occupational History    retired  for [employer]      Social History Main Topics    Smoking status: Current Every Day Smoker     Packs/day: 0.50     Years: 53.00     Types: Cigarettes    Smokeless tobacco: Never Used    Alcohol use Yes    Drug use: No    Sexual activity: Not Currently     Other Topics Concern    Not on file     Social History Narrative    Lives alone        Hobbies: skyler        From [place]             Family History   Problem Relation Age of Onset    Cancer Sister      uterine    Diabetes Brother   "   Cancer Sister      liver     Review of patient's allergies indicates:   Allergen Reactions    No known drug allergies        Current Outpatient Prescriptions:     aspirin 81 mg Tab, Every day, Disp: , Rfl:     cetirizine (ZYRTEC) 10 MG tablet, Every day, Disp: , Rfl:     diazePAM (VALIUM) 10 MG Tab, Take 1 tablet by mouth 30 minutes prior to MRI.  Must have someone drive you to and from the facility., Disp: 1 tablet, Rfl: 0    DOCOSAHEXANOIC ACID/EPA (FISH OIL ORAL), 1,000 mg. Twice a day, Disp: , Rfl:     hydrochlorothiazide (HYDRODIURIL) 25 MG tablet, TAKE 1 TABLET ONCE DAILY, Disp: 90 tablet, Rfl: 3    lisinopril (PRINIVIL,ZESTRIL) 40 MG tablet, TAKE 1 TABLET ONCE DAILY, Disp: 90 tablet, Rfl: 3    nifedipine (ADALAT CC) 60 MG TbSR, TAKE 1 TABLET ONCE DAILY, Disp: 90 tablet, Rfl: 3    simvastatin (ZOCOR) 10 MG tablet, TAKE 1 TABLET EVERY EVENING, Disp: 90 tablet, Rfl: 3    temazepam (RESTORIL) 15 mg Cap, One tablet At bedtime as needed for insomnia, Disp: 90 capsule, Rfl: 1    Vitals:    10/23/17 1259   BP: (!) 173/95   BP Location: Left arm   Patient Position: Sitting   BP Method: Medium (Automatic)   Pulse: 74   Weight: 100.1 kg (220 lb 10.9 oz)   Height: 5' 6" (1.676 m)       Physical Exam   Constitutional: She is oriented to person, place, and time. She appears well-developed and well-nourished.   HENT:   Head: Normocephalic and atraumatic.   Eyes: Pupils are equal, round, and reactive to light.   Neck: Normal range of motion. Neck supple.   Cardiovascular: Normal rate.    Pulmonary/Chest: Effort normal.   Musculoskeletal: Normal range of motion. She exhibits no edema.   Neurological: She is alert and oriented to person, place, and time. She has a normal Finger-Nose-Finger Test, a normal Heel to Shin Test, a normal Romberg Test and a normal Tandem Gait Test. Gait normal.   Reflex Scores:       Tricep reflexes are 2+ on the right side and 2+ on the left side.       Bicep reflexes are 2+ on the " right side and 2+ on the left side.       Brachioradialis reflexes are 2+ on the right side and 2+ on the left side.       Patellar reflexes are 2+ on the right side and 2+ on the left side.       Achilles reflexes are 2+ on the right side and 2+ on the left side.  Skin: Skin is warm, dry and intact.   Psychiatric: She has a normal mood and affect. Her speech is normal and behavior is normal. Judgment and thought content normal.   Nursing note and vitals reviewed.      Neurologic Exam     Mental Status   Oriented to person, place, and time.   Oriented to person.   Oriented to place.   Oriented to time.   Follows 3 step commands.   Attention: normal. Concentration: normal.   Speech: speech is normal   Level of consciousness: alert  Knowledge: consistent with education.   Able to name object. Able to read. Able to repeat. Able to write. Normal comprehension.     Cranial Nerves     CN II   Visual acuity: normal  Right visual field deficit: none  Left visual field deficit: none     CN III, IV, VI   Pupils are equal, round, and reactive to light.  Right pupil: Size: 3 mm. Shape: regular. Reactivity: brisk. Consensual response: intact.   Left pupil: Size: 3 mm. Shape: regular. Reactivity: brisk. Consensual response: intact.   CN III: no CN III palsy  CN VI: no CN VI palsy  Nystagmus: none   Diplopia: none  Ophthalmoparesis: none  Conjugate gaze: present    CN V   Right facial sensation deficit: none  Left facial sensation deficit: none    CN VII   Right facial weakness: none  Left facial weakness: none    CN VIII   Hearing: intact    CN IX, X   CN IX normal.   CN X normal.     CN XI   Right sternocleidomastoid strength: normal  Left sternocleidomastoid strength: normal  Right trapezius strength: normal  Left trapezius strength: normal    CN XII   Fasciculations: absent  Tongue deviation: none    Motor Exam   Muscle bulk: normal  Overall muscle tone: normal  Right arm pronator drift: absent  Left arm pronator drift:  absent    Strength   Right neck flexion: 5/5  Left neck flexion: 5/5  Right neck extension: 5/5  Left neck extension: 5/5  Right deltoid: 5/5  Left deltoid: 5/5  Right biceps: 5/5  Left biceps: 5/5  Right triceps: 5/5  Left triceps: 5/5  Right wrist flexion: 5/5  Left wrist flexion: 5/5  Right wrist extension: 5/5  Left wrist extension: 5/5  Right interossei: 5/5  Left interossei: 5/5  Right abdominals: 5/5  Left abdominals: 5/5  Right iliopsoas: 5/5  Left iliopsoas: 5/5  Right quadriceps: 5/5  Left quadriceps: 5/5  Right hamstrin/5  Left hamstrin/5  Right glutei: 5/5  Left glutei: 5/5  Right anterior tibial: 5/5  Left anterior tibial: 5/5  Right posterior tibial: 5/5  Left posterior tibial: 5/5  Right peroneal: 5/5  Left peroneal: 5/5  Right gastroc: 5/5  Left gastroc: 5/5    Sensory Exam   Right arm light touch: normal  Left arm light touch: normal  Right leg light touch: normal  Left leg light touch: normal  Right arm vibration: normal  Left arm vibration: normal  Right arm pinprick: normal  Left arm pinprick: normal    Gait, Coordination, and Reflexes     Gait  Gait: normal    Coordination   Romberg: negative  Finger to nose coordination: normal  Heel to shin coordination: normal  Tandem walking coordination: normal    Tremor   Resting tremor: absent  Intention tremor: absent  Action tremor: absent    Reflexes   Right brachioradialis: 2+  Left brachioradialis: 2+  Right biceps: 2+  Left biceps: 2+  Right triceps: 2+  Left triceps: 2+  Right patellar: 2+  Left patellar: 2+  Right achilles: 2+  Left achilles: 2+  Right Melendez: absent  Left Melendez: absent  Right ankle clonus: absent  Left ankle clonus: absent      Provider dictation:  77 year old with history of lumbar surgery is self referred for a second opinion and further evaluation of back pain.  She has knee arthralgia with right knee arthoplasty 10 years ago.  She was recently evaluated by orthopedics who found no significant right knee pathology.   She underwent May 2017 left L4/5 laminectomy by Dr. Malloy with some improvement of lower back pain; she has no leg pain prior to surgery.  She does continue to have some lower back pain.  The symptoms that bring her in today is 6 week onset of right sided lower back radiating to the right buttock and posterolateral right thigh to the knee.  She has tingling in the right lateral leg to the foot.  Symptoms are present with walking/ standing and resolve with sitting.  She has tried ibuprofen and PT, but has not had SILVERIO after the surgical intervention.  Oswestry score: 35%.  PHQ:  1.    She is neurologically intact on exam.    I have reviewed a recent MRI lumbar spine and lumbar xrays demonstrating lumbar dextroscoliosis.  The most significant finding is L4 anterolisthesis on L5 with  Minimal movement on flexion only.  There is a left laminectomy defect from her surgery.  In comparing pre and post op films it appears that there is increased right facet effusion on post operative imaging than pre-operative.  She still has moderate-severe lumbar stenosis at L4/5.  There is a left paracentral disk at L5/S1.    Ms. Velez has had left L4/5 laminectomy with improved, but continued lower back pain and acute onset right lower back and right leg pain in an L5 type pattern.  She is experiencing right L5 neurogenic claudication in the setting of L4/5 lumbar spondylosis/ stenosis.  PT has not helped.  I have referred her to pain management to try SILVERIO.  If no improvement, she will call to schedule to see Dr. Miller in clinic to discuss further surgical consideration at L4/5 of decompression and possibly fusion as she has facet inflammation and L4/5 spondylolisthesis.    Visit Diagnosis:  History of lumbar surgery    Acute right-sided low back pain with right-sided sciatica    Spinal stenosis of lumbar region with neurogenic claudication        Total time spent counseling greater than fifty percent of total visit time.  Counseling  included discussion regarding imaging findings, diagnosis possibilities, treatment options, risks and benefits.   The patient had many questions regarding the options and long-term effects.

## 2017-10-27 ENCOUNTER — TELEPHONE (OUTPATIENT)
Dept: PAIN MEDICINE | Facility: CLINIC | Age: 77
End: 2017-10-27

## 2017-10-27 NOTE — TELEPHONE ENCOUNTER
----- Message from Ronnell Baeza MD sent at 10/23/2017  4:08 PM CDT -----  Regarding: FW: L4/5 injection  Please set up right L4/5 TFESI    ----- Message -----  From: Lucita Gonzalez PA-C  Sent: 10/23/2017   3:58 PM  To: Ronnell Baeza MD  Subject: RE: L4/5 injection                               Transforaminal.      Christa.    Lucita  ----- Message -----  From: Ronnell Baeza MD  Sent: 10/23/2017   3:41 PM  To: Lucita Gonzalez PA-C  Subject: RE: L4/5 injection                               Yes,  Transforaminal or intralaminar? I find that people best tolerate transforaminal at that level or intralaminar from the level just below.  john  ----- Message -----  From: Lucita Gonzalez PA-C  Sent: 10/23/2017   3:22 PM  To: Ronnell Baeza MD  Subject: L4/5 injection                                   Dr. Baeza  -     Can you arrange for her to have an injection at L4/5 to the right for lumbar stenosis with with L5 radiculopathy/ neurogenic claudication?    Lucita Gonzalez

## 2017-10-30 DIAGNOSIS — M54.16 LUMBAR RADICULOPATHY: Primary | ICD-10-CM

## 2017-10-30 RX ORDER — ALPRAZOLAM 0.5 MG/1
1 TABLET, ORALLY DISINTEGRATING ORAL ONCE AS NEEDED
Status: CANCELLED | OUTPATIENT
Start: 2017-11-17 | End: 2017-11-17

## 2017-10-30 RX ORDER — LIDOCAINE HYDROCHLORIDE 10 MG/ML
1 INJECTION, SOLUTION EPIDURAL; INFILTRATION; INTRACAUDAL; PERINEURAL ONCE
Status: DISCONTINUED | OUTPATIENT
Start: 2017-10-30 | End: 2018-04-14 | Stop reason: HOSPADM

## 2017-11-14 DIAGNOSIS — M51.36 DDD (DEGENERATIVE DISC DISEASE), LUMBAR: Primary | ICD-10-CM

## 2017-11-17 ENCOUNTER — HOSPITAL ENCOUNTER (OUTPATIENT)
Facility: HOSPITAL | Age: 77
Discharge: HOME | End: 2017-11-17
Attending: ANESTHESIOLOGY | Admitting: ANESTHESIOLOGY
Payer: MEDICARE

## 2017-11-17 ENCOUNTER — HOSPITAL ENCOUNTER (OUTPATIENT)
Dept: RADIOLOGY | Facility: HOSPITAL | Age: 77
Discharge: HOME | End: 2017-11-17
Attending: ANESTHESIOLOGY | Admitting: ANESTHESIOLOGY
Payer: MEDICARE

## 2017-11-17 ENCOUNTER — SURGERY (OUTPATIENT)
Age: 77
End: 2017-11-17

## 2017-11-17 VITALS
BODY MASS INDEX: 32.14 KG/M2 | DIASTOLIC BLOOD PRESSURE: 70 MMHG | HEIGHT: 66 IN | OXYGEN SATURATION: 100 % | HEART RATE: 75 BPM | RESPIRATION RATE: 16 BRPM | SYSTOLIC BLOOD PRESSURE: 110 MMHG | TEMPERATURE: 98 F | WEIGHT: 200 LBS

## 2017-11-17 DIAGNOSIS — M51.36 DDD (DEGENERATIVE DISC DISEASE), LUMBAR: ICD-10-CM

## 2017-11-17 DIAGNOSIS — M54.16 LUMBAR RADICULOPATHY: Primary | ICD-10-CM

## 2017-11-17 PROCEDURE — 64483 NJX AA&/STRD TFRM EPI L/S 1: CPT | Mod: RT,,, | Performed by: ANESTHESIOLOGY

## 2017-11-17 PROCEDURE — 76000 FLUOROSCOPY <1 HR PHYS/QHP: CPT | Mod: TC,PO

## 2017-11-17 PROCEDURE — 25000003 PHARM REV CODE 250: Mod: PO | Performed by: ANESTHESIOLOGY

## 2017-11-17 PROCEDURE — 25500020 PHARM REV CODE 255: Mod: PO | Performed by: ANESTHESIOLOGY

## 2017-11-17 PROCEDURE — 64483 NJX AA&/STRD TFRM EPI L/S 1: CPT | Mod: PO | Performed by: ANESTHESIOLOGY

## 2017-11-17 PROCEDURE — 63600175 PHARM REV CODE 636 W HCPCS: Mod: PO | Performed by: ANESTHESIOLOGY

## 2017-11-17 RX ORDER — LIDOCAINE HYDROCHLORIDE 10 MG/ML
INJECTION, SOLUTION EPIDURAL; INFILTRATION; INTRACAUDAL; PERINEURAL
Status: DISCONTINUED | OUTPATIENT
Start: 2017-11-17 | End: 2017-11-17 | Stop reason: HOSPADM

## 2017-11-17 RX ORDER — BUPIVACAINE HYDROCHLORIDE 2.5 MG/ML
INJECTION, SOLUTION EPIDURAL; INFILTRATION; INTRACAUDAL
Status: DISCONTINUED | OUTPATIENT
Start: 2017-11-17 | End: 2017-11-17 | Stop reason: HOSPADM

## 2017-11-17 RX ORDER — ALPRAZOLAM 0.5 MG/1
1 TABLET, ORALLY DISINTEGRATING ORAL ONCE AS NEEDED
Status: COMPLETED | OUTPATIENT
Start: 2017-11-17 | End: 2017-11-17

## 2017-11-17 RX ORDER — METHYLPREDNISOLONE ACETATE 40 MG/ML
INJECTION, SUSPENSION INTRA-ARTICULAR; INTRALESIONAL; INTRAMUSCULAR; SOFT TISSUE
Status: DISCONTINUED | OUTPATIENT
Start: 2017-11-17 | End: 2017-11-17 | Stop reason: HOSPADM

## 2017-11-17 RX ADMIN — BUPIVACAINE HYDROCHLORIDE 1 ML: 2.5 INJECTION, SOLUTION EPIDURAL; INFILTRATION; INTRACAUDAL; PERINEURAL at 01:11

## 2017-11-17 RX ADMIN — IOHEXOL 3 ML: 300 INJECTION, SOLUTION INTRAVENOUS at 01:11

## 2017-11-17 RX ADMIN — LIDOCAINE HYDROCHLORIDE 10 ML: 10 INJECTION, SOLUTION EPIDURAL; INFILTRATION; INTRACAUDAL; PERINEURAL at 01:11

## 2017-11-17 RX ADMIN — ALPRAZOLAM 0.5 MG: 0.5 TABLET, ORALLY DISINTEGRATING ORAL at 12:11

## 2017-11-17 RX ADMIN — METHYLPREDNISOLONE ACETATE 40 MG: 40 INJECTION, SUSPENSION INTRA-ARTICULAR; INTRALESIONAL; INTRAMUSCULAR; SOFT TISSUE at 01:11

## 2017-11-17 NOTE — H&P (VIEW-ONLY)
Neurosurgery History & Physical    Patient ID: Shandra Velez is a 77 y.o. female.    Chief Complaint   Patient presents with    Low-back Pain    Leg Pain     right       Review of Systems   Constitutional: Negative for activity change, appetite change, chills, fever and unexpected weight change.   HENT: Negative for tinnitus, trouble swallowing and voice change.    Respiratory: Negative for apnea, cough, chest tightness and shortness of breath.    Cardiovascular: Negative for chest pain and palpitations.   Gastrointestinal: Negative for constipation, diarrhea, nausea and vomiting.   Genitourinary: Negative for difficulty urinating, dysuria, frequency and urgency.   Musculoskeletal: Positive for arthralgias and back pain. Negative for gait problem, neck pain and neck stiffness.   Skin: Negative for wound.   Neurological: Positive for numbness. Negative for dizziness, tremors, seizures, facial asymmetry, speech difficulty, weakness, light-headedness and headaches.   Psychiatric/Behavioral: Negative for confusion and decreased concentration.       Past Medical History:   Diagnosis Date    Breast cancer     Hypercholesterolemia     Hypertension     Hypertension     Lumbar spondylosis     Osteopenia     Pulmonary nodule     Smoker      Social History     Social History    Marital status:      Spouse name: N/A    Number of children: 3    Years of education: N/A     Occupational History    retired  for [employer]      Social History Main Topics    Smoking status: Current Every Day Smoker     Packs/day: 0.50     Years: 53.00     Types: Cigarettes    Smokeless tobacco: Never Used    Alcohol use Yes    Drug use: No    Sexual activity: Not Currently     Other Topics Concern    Not on file     Social History Narrative    Lives alone        Hobbies: skyler        From [place]             Family History   Problem Relation Age of Onset    Cancer Sister      uterine    Diabetes Brother   "   Cancer Sister      liver     Review of patient's allergies indicates:   Allergen Reactions    No known drug allergies        Current Outpatient Prescriptions:     aspirin 81 mg Tab, Every day, Disp: , Rfl:     cetirizine (ZYRTEC) 10 MG tablet, Every day, Disp: , Rfl:     diazePAM (VALIUM) 10 MG Tab, Take 1 tablet by mouth 30 minutes prior to MRI.  Must have someone drive you to and from the facility., Disp: 1 tablet, Rfl: 0    DOCOSAHEXANOIC ACID/EPA (FISH OIL ORAL), 1,000 mg. Twice a day, Disp: , Rfl:     hydrochlorothiazide (HYDRODIURIL) 25 MG tablet, TAKE 1 TABLET ONCE DAILY, Disp: 90 tablet, Rfl: 3    lisinopril (PRINIVIL,ZESTRIL) 40 MG tablet, TAKE 1 TABLET ONCE DAILY, Disp: 90 tablet, Rfl: 3    nifedipine (ADALAT CC) 60 MG TbSR, TAKE 1 TABLET ONCE DAILY, Disp: 90 tablet, Rfl: 3    simvastatin (ZOCOR) 10 MG tablet, TAKE 1 TABLET EVERY EVENING, Disp: 90 tablet, Rfl: 3    temazepam (RESTORIL) 15 mg Cap, One tablet At bedtime as needed for insomnia, Disp: 90 capsule, Rfl: 1    Vitals:    10/23/17 1259   BP: (!) 173/95   BP Location: Left arm   Patient Position: Sitting   BP Method: Medium (Automatic)   Pulse: 74   Weight: 100.1 kg (220 lb 10.9 oz)   Height: 5' 6" (1.676 m)       Physical Exam   Constitutional: She is oriented to person, place, and time. She appears well-developed and well-nourished.   HENT:   Head: Normocephalic and atraumatic.   Eyes: Pupils are equal, round, and reactive to light.   Neck: Normal range of motion. Neck supple.   Cardiovascular: Normal rate.    Pulmonary/Chest: Effort normal.   Musculoskeletal: Normal range of motion. She exhibits no edema.   Neurological: She is alert and oriented to person, place, and time. She has a normal Finger-Nose-Finger Test, a normal Heel to Shin Test, a normal Romberg Test and a normal Tandem Gait Test. Gait normal.   Reflex Scores:       Tricep reflexes are 2+ on the right side and 2+ on the left side.       Bicep reflexes are 2+ on the " right side and 2+ on the left side.       Brachioradialis reflexes are 2+ on the right side and 2+ on the left side.       Patellar reflexes are 2+ on the right side and 2+ on the left side.       Achilles reflexes are 2+ on the right side and 2+ on the left side.  Skin: Skin is warm, dry and intact.   Psychiatric: She has a normal mood and affect. Her speech is normal and behavior is normal. Judgment and thought content normal.   Nursing note and vitals reviewed.      Neurologic Exam     Mental Status   Oriented to person, place, and time.   Oriented to person.   Oriented to place.   Oriented to time.   Follows 3 step commands.   Attention: normal. Concentration: normal.   Speech: speech is normal   Level of consciousness: alert  Knowledge: consistent with education.   Able to name object. Able to read. Able to repeat. Able to write. Normal comprehension.     Cranial Nerves     CN II   Visual acuity: normal  Right visual field deficit: none  Left visual field deficit: none     CN III, IV, VI   Pupils are equal, round, and reactive to light.  Right pupil: Size: 3 mm. Shape: regular. Reactivity: brisk. Consensual response: intact.   Left pupil: Size: 3 mm. Shape: regular. Reactivity: brisk. Consensual response: intact.   CN III: no CN III palsy  CN VI: no CN VI palsy  Nystagmus: none   Diplopia: none  Ophthalmoparesis: none  Conjugate gaze: present    CN V   Right facial sensation deficit: none  Left facial sensation deficit: none    CN VII   Right facial weakness: none  Left facial weakness: none    CN VIII   Hearing: intact    CN IX, X   CN IX normal.   CN X normal.     CN XI   Right sternocleidomastoid strength: normal  Left sternocleidomastoid strength: normal  Right trapezius strength: normal  Left trapezius strength: normal    CN XII   Fasciculations: absent  Tongue deviation: none    Motor Exam   Muscle bulk: normal  Overall muscle tone: normal  Right arm pronator drift: absent  Left arm pronator drift:  absent    Strength   Right neck flexion: 5/5  Left neck flexion: 5/5  Right neck extension: 5/5  Left neck extension: 5/5  Right deltoid: 5/5  Left deltoid: 5/5  Right biceps: 5/5  Left biceps: 5/5  Right triceps: 5/5  Left triceps: 5/5  Right wrist flexion: 5/5  Left wrist flexion: 5/5  Right wrist extension: 5/5  Left wrist extension: 5/5  Right interossei: 5/5  Left interossei: 5/5  Right abdominals: 5/5  Left abdominals: 5/5  Right iliopsoas: 5/5  Left iliopsoas: 5/5  Right quadriceps: 5/5  Left quadriceps: 5/5  Right hamstrin/5  Left hamstrin/5  Right glutei: 5/5  Left glutei: 5/5  Right anterior tibial: 5/5  Left anterior tibial: 5/5  Right posterior tibial: 5/5  Left posterior tibial: 5/5  Right peroneal: 5/5  Left peroneal: 5/5  Right gastroc: 5/5  Left gastroc: 5/5    Sensory Exam   Right arm light touch: normal  Left arm light touch: normal  Right leg light touch: normal  Left leg light touch: normal  Right arm vibration: normal  Left arm vibration: normal  Right arm pinprick: normal  Left arm pinprick: normal    Gait, Coordination, and Reflexes     Gait  Gait: normal    Coordination   Romberg: negative  Finger to nose coordination: normal  Heel to shin coordination: normal  Tandem walking coordination: normal    Tremor   Resting tremor: absent  Intention tremor: absent  Action tremor: absent    Reflexes   Right brachioradialis: 2+  Left brachioradialis: 2+  Right biceps: 2+  Left biceps: 2+  Right triceps: 2+  Left triceps: 2+  Right patellar: 2+  Left patellar: 2+  Right achilles: 2+  Left achilles: 2+  Right Melendez: absent  Left Melendez: absent  Right ankle clonus: absent  Left ankle clonus: absent      Provider dictation:  77 year old with history of lumbar surgery is self referred for a second opinion and further evaluation of back pain.  She has knee arthralgia with right knee arthoplasty 10 years ago.  She was recently evaluated by orthopedics who found no significant right knee pathology.   She underwent May 2017 left L4/5 laminectomy by Dr. Malloy with some improvement of lower back pain; she has no leg pain prior to surgery.  She does continue to have some lower back pain.  The symptoms that bring her in today is 6 week onset of right sided lower back radiating to the right buttock and posterolateral right thigh to the knee.  She has tingling in the right lateral leg to the foot.  Symptoms are present with walking/ standing and resolve with sitting.  She has tried ibuprofen and PT, but has not had SILVERIO after the surgical intervention.  Oswestry score: 35%.  PHQ:  1.    She is neurologically intact on exam.    I have reviewed a recent MRI lumbar spine and lumbar xrays demonstrating lumbar dextroscoliosis.  The most significant finding is L4 anterolisthesis on L5 with  Minimal movement on flexion only.  There is a left laminectomy defect from her surgery.  In comparing pre and post op films it appears that there is increased right facet effusion on post operative imaging than pre-operative.  She still has moderate-severe lumbar stenosis at L4/5.  There is a left paracentral disk at L5/S1.    Ms. Velez has had left L4/5 laminectomy with improved, but continued lower back pain and acute onset right lower back and right leg pain in an L5 type pattern.  She is experiencing right L5 neurogenic claudication in the setting of L4/5 lumbar spondylosis/ stenosis.  PT has not helped.  I have referred her to pain management to try SILVERIO.  If no improvement, she will call to schedule to see Dr. Miller in clinic to discuss further surgical consideration at L4/5 of decompression and possibly fusion as she has facet inflammation and L4/5 spondylolisthesis.    Visit Diagnosis:  History of lumbar surgery    Acute right-sided low back pain with right-sided sciatica    Spinal stenosis of lumbar region with neurogenic claudication        Total time spent counseling greater than fifty percent of total visit time.  Counseling  included discussion regarding imaging findings, diagnosis possibilities, treatment options, risks and benefits.   The patient had many questions regarding the options and long-term effects.

## 2017-11-17 NOTE — DISCHARGE SUMMARY
[facility]  Discharge Note  Short Stay    Admit Date: 11/17/2017    Discharge Date: 11/17/2017    Attending Physician: Ronnell Baeza MD     Discharge Provider: Ronnell Baeza    Diagnoses:  Active Hospital Problems    Diagnosis  POA    *Lumbar radiculopathy [M54.16]  Yes      Resolved Hospital Problems    Diagnosis Date Resolved POA   No resolved problems to display.       Discharged Condition: good    Final Diagnoses: Lumbar radiculopathy [M54.16]    Disposition: Home or Self Care    Hospital Course: no complications, uneventful    Outcome of Hospitalization, Treatment, Procedure, or Surgery:  Patient was admitted for outpatient procedure. The patient underwent procedure without complications and are discharged home    Follow up/Patient Instructions:  Follow up as scheduled/Patient has received instructions and follow up date    Medications:  Continue previous medications      Discharge Procedure Orders  Diet general     Activity as tolerated     Call MD for:  temperature >100.4     Call MD for:  severe uncontrolled pain     Call MD for:  redness, tenderness, or signs of infection (pain, swelling, redness, odor or green/yellow discharge around incision site)     Call MD for:  severe persistent headache     No dressing needed           Discharge Procedure Orders (must include Diet, Follow-up, Activity):    Discharge Procedure Orders (must include Diet, Follow-up, Activity)  Diet general     Activity as tolerated     Call MD for:  temperature >100.4     Call MD for:  severe uncontrolled pain     Call MD for:  redness, tenderness, or signs of infection (pain, swelling, redness, odor or green/yellow discharge around incision site)     Call MD for:  severe persistent headache     No dressing needed

## 2017-11-17 NOTE — OP NOTE
PROCEDURE DATE: 11/17/2017    PROCEDURE: Right L4/5 transforaminal epidural steroid injection under fluoroscopy    DIAGNOSIS: Lumbar  Radiculopathy    Post op diagnosis: Same    PHYSICIAN: Ronnell Baeza MD    MEDICATIONS INJECTED:  Methylprednisolone 40mg (0.5ml) and 1.5ml 0.25% bupivicaine at each nerve root.     LOCAL ANESTHETIC INJECTED:  Lidocaine 1%. 4 ml per site.    SEDATION MEDICATIONS: none    ESTIMATED BLOOD LOSS:  none    COMPLICATIONS:  none    TECHNIQUE:   A time-out was taken to identify patient and procedure side prior to starting the procedure. The patient was placed in a prone position, prepped and draped in the usual sterile fashion using ChloraPrep and sterile towels.  The area to be injected was determined under fluoroscopic guidance in AP and oblique view.  Local anesthetic was given by raising a wheal and going down to the hub of a 25-gauge 1.5 inch needle.  In oblique view, a 5 inch 22-gauge bent-tip spinal needle was introduced towards 6 oclock position of the pedicle of each above named nerve root level.  The needle was walked medially then hinged into the neural foramen and position was confirmed in AP and lateral views.  Omnipaque contrast dye was injected to confirm appropriate placement and that there was no vascular uptake.  After negative aspiration for blood or CSF, the medication was then injected. This was performed at the right L4/5 level(s). The patient tolerated the procedure well.    The patient was monitored after the procedure.  Patient was given post procedure and discharge instructions to follow at home. The patient was discharged in a stable condition.

## 2017-11-22 ENCOUNTER — TELEPHONE (OUTPATIENT)
Dept: PAIN MEDICINE | Facility: CLINIC | Age: 77
End: 2017-11-22

## 2017-11-22 NOTE — TELEPHONE ENCOUNTER
----- Message from Luis Jansen sent at 11/22/2017 10:45 AM CST -----  Contact: self  [phone]  Patient needs to need an earlier appointment to see the doctor for 2 week follow up.   The next available appointment is January 2018. Thanks!

## 2017-12-18 ENCOUNTER — TELEPHONE (OUTPATIENT)
Dept: PAIN MEDICINE | Facility: CLINIC | Age: 77
End: 2017-12-18

## 2017-12-18 ENCOUNTER — OFFICE VISIT (OUTPATIENT)
Dept: PAIN MEDICINE | Facility: CLINIC | Age: 77
End: 2017-12-18
Payer: MEDICARE

## 2017-12-18 VITALS
TEMPERATURE: 98 F | SYSTOLIC BLOOD PRESSURE: 132 MMHG | HEART RATE: 80 BPM | RESPIRATION RATE: 18 BRPM | DIASTOLIC BLOOD PRESSURE: 74 MMHG | BODY MASS INDEX: 34.96 KG/M2 | WEIGHT: 216.63 LBS

## 2017-12-18 DIAGNOSIS — M54.16 LUMBAR RADICULOPATHY: Primary | ICD-10-CM

## 2017-12-18 DIAGNOSIS — M51.36 DDD (DEGENERATIVE DISC DISEASE), LUMBAR: ICD-10-CM

## 2017-12-18 PROCEDURE — 99999 PR PBB SHADOW E&M-EST. PATIENT-LVL IV: CPT | Mod: PBBFAC,,, | Performed by: PHYSICIAN ASSISTANT

## 2017-12-18 PROCEDURE — 99204 OFFICE O/P NEW MOD 45 MIN: CPT | Mod: S$GLB,,, | Performed by: PHYSICIAN ASSISTANT

## 2017-12-18 RX ORDER — ALPRAZOLAM 0.5 MG/1
1 TABLET, ORALLY DISINTEGRATING ORAL ONCE AS NEEDED
Status: CANCELLED | OUTPATIENT
Start: 2018-01-08 | End: 2018-01-08

## 2017-12-18 NOTE — PROGRESS NOTES
This note was completed with dictation software and grammatical errors may exist.    CC:  Low back and right leg pain    HPI: The patient is a 77-year-old female with past medical history significant for hypertension and hyperlipidemia who presents in referral from Lucita Gonzalez PA-C for low back and right leg pain.  She is status post right L4/5 transforaminal epidural steroid injection on 11/17/17 with mild relief lasting a week, now reporting 0% relief.  She complains of right low back pain radiating to the right buttock, right lateral thigh and lateral shin.  She describes this as aching, burning and tingling.  It is worse with standing, bending and walking.  She has relief with rest, sitting and laying down.  She denies weakness or numbness.  She reports bladder incontinence only if she laughs or coughs.    Pain intervention history: She underwent left L4/5 laminectomy for severe canal stenosis with Dr. Malloy in May 2017.  She is status post right L4/5 transforaminal epidural steroid injection on 11/17/17 with mild relief lasting a week, now reporting 0% relief.     ROS:  The patient reports back pain only.  Balance of review of systems is negative.    Past Medical History:   Diagnosis Date    Breast cancer     Hypercholesterolemia     Hypertension     Hypertension     Lumbar spondylosis     Osteopenia     Pulmonary nodule     Smoker        Past Surgical History:   Procedure Laterality Date    APPENDECTOMY      BREAST RECONSTRUCTION      right    CHOLECYSTECTOMY      LUMBAR LAMINECTOMY      MASTECTOMY  1985    right    TOTAL KNEE ARTHROPLASTY Right     TUBAL LIGATION      UMBILICAL HERNIA REPAIR         Social History     Social History    Marital status:      Spouse name: N/A    Number of children: 3    Years of education: N/A     Occupational History    retired  for [employer]      Social History Main Topics    Smoking status: Current Every Day Smoker     Packs/day:  0.50     Years: 53.00     Types: Cigarettes    Smokeless tobacco: Never Used    Alcohol use No    Drug use: No    Sexual activity: Not Currently     Other Topics Concern    None     Social History Narrative    Lives alone        Hobbies: skyler        From [place]                 Medications/Allergies: See med card    Vitals:    12/18/17 1106   BP: 132/74   Pulse: 80   Resp: 18   Temp: 97.8 °F (36.6 °C)   TempSrc: Oral   Weight: 98.2 kg (216 lb 9.6 oz)   PainSc:   6   PainLoc: Back         Physical exam:  Gen: A and O x3, pleasant, well-groomed  Skin: No rashes or obvious lesions  HEENT: PERRLA, no obvious deformities on ears or in canals. Trachea midline.  CVS: Regular rate and rhythm, normal palpable pulses.  Resp:No increased work of breathing, symmetrical chest rise.  Abdomen: Soft, NT/ND.  Musculoskeletal: Able to heel walk, toe walk. No antalgic gait.     Neuro:  Lower extremities: 5/5 strength bilaterally  Reflexes: Patellar 2+ left side, 0+ right side, Achilles 2+ bilaterally.  Sensory:  Intact and symmetrical to light touch and pinprick in L2-S1 dermatomes bilaterally.    Lumbar spine:  Lumbar spine: ROM is moderately limited with flexion, extension and  oblique extension with increased right low back pain during extension and right oblique extension.  Flexion causes increased right lateral thigh pain.  Clifton's test causes no increased pain on either side.    Supine straight leg raise causes right lateral thigh pain.  Internal and external rotation of the hip causes no increased pain on either side.  Myofascial exam: No tenderness to palpation across lumbar paraspinous muscles.      Imaging:  10/19/17 MRI lumbar spine  On the sagittal images, there is apparent right paracentral disc extrusion with cephalad extension at the T11-12 level.  Axial images were not obtained through this level.  There is also a proximal right foraminal disc protrusion in addition to facet joint arthropathy and  ligamentum flavum thickening contributing to at least moderate right foraminal stenosis.  This level is suboptimally evaluated.  T12-L1: There is no spinal canal or foraminal stenosis.  L1-2: There is no spinal canal or foraminal stenosis.  L2-3: There is disc space narrowing and trace retrolisthesis.  There is left greater than right facet joint arthropathy with ligamentum flavum thickening.  There is a diffuse disc bulge with superimposed moderately broad left paracentral and foraminal disc protrusion.  There is also a very small left paracentral disc extrusion with slight caudad extension.  There is moderate crowding of the left lateral recess and moderate left foraminal stenosis.  There is mild prominent dorsal epidural fat.  AP measurement of the dural sac is approximately 9 mm, mild spinal stenosis.  L3-4: There is a mild-to-moderate diffuse disc bulge with moderate facet joint arthropathy and ligamentum flavum thickening.  There are bilateral, left greater than right, facet joint effusions.  There is mildly prominent dorsal epidural fat.  There is borderline to mild spinal stenosis and mild bilateral superior foraminal recess stenosis.  L4-5:There is grade 1 spondylolisthesis with 2.5 mm anterolisthesis of L4 on L5.  There is marked facet joint arthropathy with right-sided ligamentum flavum thickening and moderately large bilateral facet joint effusions.  There are postoperative changes of left laminectomy.  There is unroofing of a moderate diffuse disc bulge.  There is moderate central spinal stenosis with AP measurement of the dural sac measuring 7.7 mm.  There is mild crowding of the left lateral recess.  There is mild foraminal stenosis.  L5-S1: There is disc space narrowing.  There is moderate to marked facet joint arthropathy with ligamentum flavum thickening.  There is a moderate diffuse disc bulge with osteophytic ridging and superimposed broad central and left paracentral disc protrusion with annular  fissure.  This results in flattening of the ventral dural sac as well as compression of the left S1 nerve root.  There is no significant spinal stenosis.  There is mild to moderate bilateral foraminal stenosis.      Assessment: The patient is a 77-year-old female with past medical history significant for hypertension and hyperlipidemia who presents in referral from Lucita Gonzalez PA-C for low back and right leg pain.    1. Lumbar radiculopathy  Place in Outpatient    Vital signs    Activity as tolerated    Verify informed consent    Notify physician     Notify physician     Notify physician (specify)    Diet NPO    alprazolam ODT dissolvable tablet 1 mg    Case Request Operating Room: INJECTION-STEROID-EPIDURAL-CAUDAL   2. DDD (degenerative disc disease), lumbar           Plan:  1.  Unfortunately the patient did not have lasting relief following the right L4/5 transforaminal epidural steroid injection.  We discussed trying a different approach and I will schedule her for a caudal SILVERIO to see if this provides her with any relief.  We discussed following up with neurosurgery for more definitive treatment if she is not having lasting results.  2.  Follow-up in 4 weeks post procedure or sooner as needed.    Greater than 50% of this 30 minute visit was spent on counseling the patient.

## 2017-12-22 DIAGNOSIS — M51.36 DDD (DEGENERATIVE DISC DISEASE), LUMBAR: Primary | ICD-10-CM

## 2018-01-08 ENCOUNTER — HOSPITAL ENCOUNTER (OUTPATIENT)
Dept: RADIOLOGY | Facility: HOSPITAL | Age: 78
Discharge: HOME | End: 2018-01-08
Attending: ANESTHESIOLOGY
Payer: MEDICARE

## 2018-01-08 ENCOUNTER — SURGERY (OUTPATIENT)
Age: 78
End: 2018-01-08

## 2018-01-08 ENCOUNTER — HOSPITAL ENCOUNTER (OUTPATIENT)
Facility: HOSPITAL | Age: 78
Discharge: HOME | End: 2018-01-08
Attending: ANESTHESIOLOGY | Admitting: ANESTHESIOLOGY
Payer: MEDICARE

## 2018-01-08 DIAGNOSIS — M54.16 LUMBAR RADICULOPATHY: Primary | ICD-10-CM

## 2018-01-08 DIAGNOSIS — M51.36 DDD (DEGENERATIVE DISC DISEASE), LUMBAR: ICD-10-CM

## 2018-01-08 PROCEDURE — 62323 NJX INTERLAMINAR LMBR/SAC: CPT | Mod: PO | Performed by: ANESTHESIOLOGY

## 2018-01-08 PROCEDURE — 63600175 PHARM REV CODE 636 W HCPCS: Mod: PO | Performed by: ANESTHESIOLOGY

## 2018-01-08 PROCEDURE — A4216 STERILE WATER/SALINE, 10 ML: HCPCS | Mod: PO | Performed by: ANESTHESIOLOGY

## 2018-01-08 PROCEDURE — 25000003 PHARM REV CODE 250: Mod: PO | Performed by: ANESTHESIOLOGY

## 2018-01-08 PROCEDURE — 62323 NJX INTERLAMINAR LMBR/SAC: CPT | Mod: ,,, | Performed by: ANESTHESIOLOGY

## 2018-01-08 PROCEDURE — 25500020 PHARM REV CODE 255: Mod: PO | Performed by: ANESTHESIOLOGY

## 2018-01-08 PROCEDURE — 76000 FLUOROSCOPY <1 HR PHYS/QHP: CPT | Mod: TC,PO

## 2018-01-08 PROCEDURE — 62322 NJX INTERLAMINAR LMBR/SAC: CPT | Mod: PO | Performed by: ANESTHESIOLOGY

## 2018-01-08 RX ORDER — SODIUM CHLORIDE 9 MG/ML
INJECTION, SOLUTION INTRAMUSCULAR; INTRAVENOUS; SUBCUTANEOUS
Status: DISCONTINUED | OUTPATIENT
Start: 2018-01-08 | End: 2018-01-08 | Stop reason: HOSPADM

## 2018-01-08 RX ORDER — LIDOCAINE HYDROCHLORIDE 10 MG/ML
INJECTION, SOLUTION EPIDURAL; INFILTRATION; INTRACAUDAL; PERINEURAL
Status: DISCONTINUED | OUTPATIENT
Start: 2018-01-08 | End: 2018-01-08 | Stop reason: HOSPADM

## 2018-01-08 RX ORDER — ALPRAZOLAM 0.5 MG/1
1 TABLET, ORALLY DISINTEGRATING ORAL ONCE AS NEEDED
Status: COMPLETED | OUTPATIENT
Start: 2018-01-08 | End: 2018-01-08

## 2018-01-08 RX ORDER — METHYLPREDNISOLONE ACETATE 80 MG/ML
INJECTION, SUSPENSION INTRA-ARTICULAR; INTRALESIONAL; INTRAMUSCULAR; SOFT TISSUE
Status: DISCONTINUED | OUTPATIENT
Start: 2018-01-08 | End: 2018-01-08 | Stop reason: HOSPADM

## 2018-01-08 RX ADMIN — LIDOCAINE HYDROCHLORIDE 2 ML: 10 INJECTION, SOLUTION EPIDURAL; INFILTRATION; INTRACAUDAL; PERINEURAL at 02:01

## 2018-01-08 RX ADMIN — SODIUM CHLORIDE 4 ML: 9 INJECTION INTRAMUSCULAR; INTRAVENOUS; SUBCUTANEOUS at 02:01

## 2018-01-08 RX ADMIN — METHYLPREDNISOLONE ACETATE 80 MG: 80 INJECTION, SUSPENSION INTRA-ARTICULAR; INTRALESIONAL; INTRAMUSCULAR; SOFT TISSUE at 02:01

## 2018-01-08 RX ADMIN — ALPRAZOLAM 1 MG: 0.5 TABLET, ORALLY DISINTEGRATING ORAL at 01:01

## 2018-01-08 RX ADMIN — IOHEXOL 1 ML: 300 INJECTION, SOLUTION INTRAVENOUS at 02:01

## 2018-01-08 NOTE — H&P (VIEW-ONLY)
This note was completed with dictation software and grammatical errors may exist.    CC:  Low back and right leg pain    HPI: The patient is a 77-year-old female with past medical history significant for hypertension and hyperlipidemia who presents in referral from Lucita Gonzalez PA-C for low back and right leg pain.  She is status post right L4/5 transforaminal epidural steroid injection on 11/17/17 with mild relief lasting a week, now reporting 0% relief.  She complains of right low back pain radiating to the right buttock, right lateral thigh and lateral shin.  She describes this as aching, burning and tingling.  It is worse with standing, bending and walking.  She has relief with rest, sitting and laying down.  She denies weakness or numbness.  She reports bladder incontinence only if she laughs or coughs.    Pain intervention history: She underwent left L4/5 laminectomy for severe canal stenosis with Dr. Malloy in May 2017.  She is status post right L4/5 transforaminal epidural steroid injection on 11/17/17 with mild relief lasting a week, now reporting 0% relief.     ROS:  The patient reports back pain only.  Balance of review of systems is negative.    Past Medical History:   Diagnosis Date    Breast cancer     Hypercholesterolemia     Hypertension     Hypertension     Lumbar spondylosis     Osteopenia     Pulmonary nodule     Smoker        Past Surgical History:   Procedure Laterality Date    APPENDECTOMY      BREAST RECONSTRUCTION      right    CHOLECYSTECTOMY      LUMBAR LAMINECTOMY      MASTECTOMY  1985    right    TOTAL KNEE ARTHROPLASTY Right     TUBAL LIGATION      UMBILICAL HERNIA REPAIR         Social History     Social History    Marital status:      Spouse name: N/A    Number of children: 3    Years of education: N/A     Occupational History    retired  for [employer]      Social History Main Topics    Smoking status: Current Every Day Smoker     Packs/day:  0.50     Years: 53.00     Types: Cigarettes    Smokeless tobacco: Never Used    Alcohol use No    Drug use: No    Sexual activity: Not Currently     Other Topics Concern    None     Social History Narrative    Lives alone        Hobbies: skyler        From [place]                 Medications/Allergies: See med card    Vitals:    12/18/17 1106   BP: 132/74   Pulse: 80   Resp: 18   Temp: 97.8 °F (36.6 °C)   TempSrc: Oral   Weight: 98.2 kg (216 lb 9.6 oz)   PainSc:   6   PainLoc: Back         Physical exam:  Gen: A and O x3, pleasant, well-groomed  Skin: No rashes or obvious lesions  HEENT: PERRLA, no obvious deformities on ears or in canals. Trachea midline.  CVS: Regular rate and rhythm, normal palpable pulses.  Resp:No increased work of breathing, symmetrical chest rise.  Abdomen: Soft, NT/ND.  Musculoskeletal: Able to heel walk, toe walk. No antalgic gait.     Neuro:  Lower extremities: 5/5 strength bilaterally  Reflexes: Patellar 2+ left side, 0+ right side, Achilles 2+ bilaterally.  Sensory:  Intact and symmetrical to light touch and pinprick in L2-S1 dermatomes bilaterally.    Lumbar spine:  Lumbar spine: ROM is moderately limited with flexion, extension and  oblique extension with increased right low back pain during extension and right oblique extension.  Flexion causes increased right lateral thigh pain.  Clifton's test causes no increased pain on either side.    Supine straight leg raise causes right lateral thigh pain.  Internal and external rotation of the hip causes no increased pain on either side.  Myofascial exam: No tenderness to palpation across lumbar paraspinous muscles.      Imaging:  10/19/17 MRI lumbar spine  On the sagittal images, there is apparent right paracentral disc extrusion with cephalad extension at the T11-12 level.  Axial images were not obtained through this level.  There is also a proximal right foraminal disc protrusion in addition to facet joint arthropathy and  ligamentum flavum thickening contributing to at least moderate right foraminal stenosis.  This level is suboptimally evaluated.  T12-L1: There is no spinal canal or foraminal stenosis.  L1-2: There is no spinal canal or foraminal stenosis.  L2-3: There is disc space narrowing and trace retrolisthesis.  There is left greater than right facet joint arthropathy with ligamentum flavum thickening.  There is a diffuse disc bulge with superimposed moderately broad left paracentral and foraminal disc protrusion.  There is also a very small left paracentral disc extrusion with slight caudad extension.  There is moderate crowding of the left lateral recess and moderate left foraminal stenosis.  There is mild prominent dorsal epidural fat.  AP measurement of the dural sac is approximately 9 mm, mild spinal stenosis.  L3-4: There is a mild-to-moderate diffuse disc bulge with moderate facet joint arthropathy and ligamentum flavum thickening.  There are bilateral, left greater than right, facet joint effusions.  There is mildly prominent dorsal epidural fat.  There is borderline to mild spinal stenosis and mild bilateral superior foraminal recess stenosis.  L4-5:There is grade 1 spondylolisthesis with 2.5 mm anterolisthesis of L4 on L5.  There is marked facet joint arthropathy with right-sided ligamentum flavum thickening and moderately large bilateral facet joint effusions.  There are postoperative changes of left laminectomy.  There is unroofing of a moderate diffuse disc bulge.  There is moderate central spinal stenosis with AP measurement of the dural sac measuring 7.7 mm.  There is mild crowding of the left lateral recess.  There is mild foraminal stenosis.  L5-S1: There is disc space narrowing.  There is moderate to marked facet joint arthropathy with ligamentum flavum thickening.  There is a moderate diffuse disc bulge with osteophytic ridging and superimposed broad central and left paracentral disc protrusion with annular  fissure.  This results in flattening of the ventral dural sac as well as compression of the left S1 nerve root.  There is no significant spinal stenosis.  There is mild to moderate bilateral foraminal stenosis.      Assessment: The patient is a 77-year-old female with past medical history significant for hypertension and hyperlipidemia who presents in referral from Lucita Gonzalez PA-C for low back and right leg pain.    1. Lumbar radiculopathy  Place in Outpatient    Vital signs    Activity as tolerated    Verify informed consent    Notify physician     Notify physician     Notify physician (specify)    Diet NPO    alprazolam ODT dissolvable tablet 1 mg    Case Request Operating Room: INJECTION-STEROID-EPIDURAL-CAUDAL   2. DDD (degenerative disc disease), lumbar           Plan:  1.  Unfortunately the patient did not have lasting relief following the right L4/5 transforaminal epidural steroid injection.  We discussed trying a different approach and I will schedule her for a caudal SILVERIO to see if this provides her with any relief.  We discussed following up with neurosurgery for more definitive treatment if she is not having lasting results.  2.  Follow-up in 4 weeks post procedure or sooner as needed.    Greater than 50% of this 30 minute visit was spent on counseling the patient.

## 2018-01-08 NOTE — OP NOTE
PROCEDURE DATE: 1/8/2018    PROCEDURE:  Caudal epidural steroid injection under fluoroscopy.    Diagnosis: Lumbar radiculopathy    Post Op diagnosis: Same    PHYSICIAN: Ronnell Baeza M.D.    MEDICATIONS INJECTED:  80 mg of methylprednisone and 4 ml of sterile, preservative-free NaCl.    LOCAL ANESTHETIC GIVEN:  Lidocaine 1%, 4 ml total    SEDATION MEDICATIONS: none    ESTIMATED BLOOD LOSS:  none    COMPLICATIONS:  none    TECHNIQUE:   After the patient was placed in prone position, the patient was prepped and draped in the usual sterile fashion using ChloraPrep and sterile towels.  Appropriate anatomic landmarks were determined by identifying the sacral hiatus in the lateral fluoroscopic view.  Local anesthetic was given via a 25g 1.5 inch needle by raising a wheal and infiltrating down to the periosteum.  A 3.5 inch 22 gauge needle was introduced thru the sacral hiatus.  Omnipaque was injected to confirm placement in the appropriate area and that there was no vascular uptake.  The medication was then injected slowly.  The patient tolerated the procedure well.    The patient was monitored after the procedure.  Patient was given post procedure and discharge instructions to follow at home.  The patient was discharged in a stable condition

## 2018-01-08 NOTE — DISCHARGE SUMMARY
[facility]  Discharge Note  Short Stay    Admit Date: 1/8/2018    Discharge Date: 1/8/2018    Attending Physician: Ronnell Baeza MD     Discharge Provider: Ronnell Baeza    Diagnoses:  Active Hospital Problems    Diagnosis  POA    *Lumbar radiculopathy [M54.16]  Yes      Resolved Hospital Problems    Diagnosis Date Resolved POA   No resolved problems to display.       Discharged Condition: good    Final Diagnoses: Lumbar radiculopathy [M54.16]    Disposition: Home or Self Care    Hospital Course: no complications, uneventful    Outcome of Hospitalization, Treatment, Procedure, or Surgery:  Patient was admitted for outpatient procedure. The patient underwent procedure without complications and are discharged home    Follow up/Patient Instructions:  Follow up as scheduled/Patient has received instructions and follow up date    Medications:  Continue previous medications      Discharge Procedure Orders  Call MD for:  temperature >100.4     Call MD for:  severe uncontrolled pain     Call MD for:  redness, tenderness, or signs of infection (pain, swelling, redness, odor or green/yellow discharge around incision site)     Call MD for:  severe persistent headache     No dressing needed           Discharge Procedure Orders (must include Diet, Follow-up, Activity):    Discharge Procedure Orders (must include Diet, Follow-up, Activity)  Call MD for:  temperature >100.4     Call MD for:  severe uncontrolled pain     Call MD for:  redness, tenderness, or signs of infection (pain, swelling, redness, odor or green/yellow discharge around incision site)     Call MD for:  severe persistent headache     No dressing needed

## 2018-01-08 NOTE — DISCHARGE INSTRUCTIONS
Recovery After Procedural Sedation (Adult)  You have been given medicine by vein to make you sleep during your surgery. This may have included both a pain medicine and sleeping medicine. Most of the effects have worn off. But you may still have some drowsiness for the next 6 to 8 hours.  Home care  Follow these guidelines when you get home:  · For the next 8 hours, you should be watched by a responsible adult. This person should make sure your condition is not getting worse.  · Don't drink any alcohol for the next 24 hours.  · Don't drive, operate dangerous machinery, or make important business or personal decisions during the next 24 hours.  Note: Your healthcare provider may tell you not to take any medicine by mouth for pain or sleep in the next 4 hours. These medicines may react with the medicines you were given in the hospital. This could cause a much stronger response than usual.  Follow-up care  Follow up with your healthcare provider if you are not alert and back to your usual level of activity within 12 hours.  When to seek medical advice  Call your healthcare provider right away if any of these occur:  · Drowsiness gets worse  · Weakness or dizziness gets worse  · Repeated vomiting  · You can't be awakened   Date Last Reviewed: 10/18/2016  © 8507-6787 [employer]. [address] [place] [address], [place] [address]. All rights reserved. This information is not intended as a substitute for professional medical care. Always follow your healthcare professional's instructions.    Home care instructions  Apply ice pack to the injection site for 20 minutes periods for the first 24 hrs for soreness/discomfort at injection site DO NOT USE HEAT FOR 24 HOURS  Keep site clean and dry for 24 hours, remove bandaid when desired  Do not drive until tomorrow  Take care when walking after a lumbar injection  Avoid strenuous activities for 2 days  Make take 2 weeks to feel the full effects   Resume home medication as  prescribed today  Resume Aspirin, Plavix, or Coumadin the day after the procedure unless otherwise instructed.    SEE IMMEDIATE MEDICAL HELP FOR:  Severe increase in your usual pain or appearance of new pain  Prolonged or increasing weakness or numbness in the legs or arms  Drainage, redness, active bleeding, or increased swelling at the injection site  Temperature over 100.0 degrees F.  Headache that increases when your head is upright and decreases when you lie flat    CALL 911 OR GO DIRECTLY TO EMERGENCY DEPARTMENT FOR:  Shortness of breath, chest pain, or problems breathing

## 2018-01-09 VITALS
DIASTOLIC BLOOD PRESSURE: 65 MMHG | HEART RATE: 70 BPM | BODY MASS INDEX: 32.14 KG/M2 | SYSTOLIC BLOOD PRESSURE: 121 MMHG | HEIGHT: 66 IN | TEMPERATURE: 98 F | OXYGEN SATURATION: 99 % | RESPIRATION RATE: 18 BRPM | WEIGHT: 200 LBS

## 2018-01-29 ENCOUNTER — OFFICE VISIT (OUTPATIENT)
Dept: PAIN MEDICINE | Facility: CLINIC | Age: 78
End: 2018-01-29
Payer: MEDICARE

## 2018-01-29 VITALS
HEART RATE: 76 BPM | SYSTOLIC BLOOD PRESSURE: 130 MMHG | TEMPERATURE: 98 F | RESPIRATION RATE: 20 BRPM | DIASTOLIC BLOOD PRESSURE: 82 MMHG | BODY MASS INDEX: 34.64 KG/M2 | WEIGHT: 214.63 LBS

## 2018-01-29 DIAGNOSIS — M54.16 LUMBAR RADICULOPATHY: Primary | ICD-10-CM

## 2018-01-29 DIAGNOSIS — M51.36 DDD (DEGENERATIVE DISC DISEASE), LUMBAR: ICD-10-CM

## 2018-01-29 DIAGNOSIS — E78.5 HYPERLIPIDEMIA: ICD-10-CM

## 2018-01-29 PROCEDURE — 99999 PR PBB SHADOW E&M-EST. PATIENT-LVL III: CPT | Mod: PBBFAC,,, | Performed by: PHYSICIAN ASSISTANT

## 2018-01-29 PROCEDURE — 99213 OFFICE O/P EST LOW 20 MIN: CPT | Mod: S$GLB,,, | Performed by: PHYSICIAN ASSISTANT

## 2018-01-29 PROCEDURE — 1125F AMNT PAIN NOTED PAIN PRSNT: CPT | Mod: S$GLB,,, | Performed by: PHYSICIAN ASSISTANT

## 2018-01-29 PROCEDURE — 1159F MED LIST DOCD IN RCRD: CPT | Mod: S$GLB,,, | Performed by: PHYSICIAN ASSISTANT

## 2018-01-29 PROCEDURE — 3008F BODY MASS INDEX DOCD: CPT | Mod: S$GLB,,, | Performed by: PHYSICIAN ASSISTANT

## 2018-01-29 RX ORDER — SIMVASTATIN 10 MG/1
TABLET, FILM COATED ORAL
Qty: 90 TABLET | Refills: 3 | Status: SHIPPED | OUTPATIENT
Start: 2018-01-29 | End: 2018-04-23 | Stop reason: SDUPTHER

## 2018-01-30 NOTE — PROGRESS NOTES
This note was completed with dictation software and grammatical errors may exist.    CC:  Low back and right leg pain    HPI: The patient is a 77-year-old female with past medical history significant for hypertension and hyperlipidemia who presents in referral from Lucita Gonzalez PA-C for low back and right leg pain.  She is status post caudal epidural steroid injection on 1/8/18 with 40% relief.  She continues to have pain in the right low back that radiates right buttock, lateral thigh and right lateral shin.  She describes this as burning, aching, worse with standing and walking.  She has relief with sitting and laying down.  She has some stress bladder incontinence.  She denies weakness, numbness or bowel incontinence.    Pain intervention history: She underwent left L4/5 laminectomy for severe canal stenosis with Dr. Malloy in May 2017.  She is status post right L4/5 transforaminal epidural steroid injection on 11/17/17 with mild relief lasting a week, now reporting 0% relief.   She is status post caudal epidural steroid injection on 1/8/18 with 40% relief.     ROS:  The patient reports back pain only.  Balance of review of systems is negative.    Past Medical History:   Diagnosis Date    Breast cancer     Hypercholesterolemia     Hypertension     Hypertension     Lumbar spondylosis     Osteopenia     Pulmonary nodule     Smoker        Past Surgical History:   Procedure Laterality Date    APPENDECTOMY      BREAST RECONSTRUCTION      right    CHOLECYSTECTOMY      LUMBAR LAMINECTOMY      MASTECTOMY  1985    right    TOTAL KNEE ARTHROPLASTY Right     TUBAL LIGATION      UMBILICAL HERNIA REPAIR         Social History     Social History    Marital status:      Spouse name: N/A    Number of children: 3    Years of education: N/A     Occupational History    retired  for [employer]      Social History Main Topics    Smoking status: Current Every Day Smoker     Packs/day: 0.50      Years: 53.00     Types: Cigarettes    Smokeless tobacco: Never Used    Alcohol use No    Drug use: No    Sexual activity: Not Currently     Other Topics Concern    None     Social History Narrative    Lives alone        Hobbies: skyler        From [place]                 Medications/Allergies: See med card    Vitals:    01/29/18 1020   BP: 130/82   Pulse: 76   Resp: 20   Temp: 97.8 °F (36.6 °C)   TempSrc: Oral   Weight: 97.3 kg (214 lb 9.9 oz)   PainSc:   4   PainLoc: Back         Physical exam:  Gen: A and O x3, pleasant, well-groomed  Skin: No rashes or obvious lesions  HEENT: PERRLA, no obvious deformities on ears or in canals. Trachea midline.  CVS: Regular rate and rhythm, normal palpable pulses.  Resp:No increased work of breathing, symmetrical chest rise.  Abdomen: Soft, NT/ND.  Musculoskeletal: Able to heel walk, toe walk. No antalgic gait.     Neuro:  Lower extremities: 5/5 strength bilaterally  Reflexes: Patellar 2+ left side, 0+ right side, Achilles 2+ bilaterally.  Sensory:  Intact and symmetrical to light touch and pinprick in L2-S1 dermatomes bilaterally.    Lumbar spine:  Lumbar spine: ROM is moderately limited with flexion, extension and  oblique extension with increased right low back pain during extension and right oblique extension.  Flexion causes increased right lateral thigh pain.  Clifton's test causes no increased pain on either side.    Supine straight leg raise causes right lateral thigh pain.  Internal and external rotation of the hip causes no increased pain on either side.  Myofascial exam: No tenderness to palpation across lumbar paraspinous muscles.      Imaging:  10/19/17 MRI lumbar spine  On the sagittal images, there is apparent right paracentral disc extrusion with cephalad extension at the T11-12 level.  Axial images were not obtained through this level.  There is also a proximal right foraminal disc protrusion in addition to facet joint arthropathy and ligamentum flavum  thickening contributing to at least moderate right foraminal stenosis.  This level is suboptimally evaluated.  T12-L1: There is no spinal canal or foraminal stenosis.  L1-2: There is no spinal canal or foraminal stenosis.  L2-3: There is disc space narrowing and trace retrolisthesis.  There is left greater than right facet joint arthropathy with ligamentum flavum thickening.  There is a diffuse disc bulge with superimposed moderately broad left paracentral and foraminal disc protrusion.  There is also a very small left paracentral disc extrusion with slight caudad extension.  There is moderate crowding of the left lateral recess and moderate left foraminal stenosis.  There is mild prominent dorsal epidural fat.  AP measurement of the dural sac is approximately 9 mm, mild spinal stenosis.  L3-4: There is a mild-to-moderate diffuse disc bulge with moderate facet joint arthropathy and ligamentum flavum thickening.  There are bilateral, left greater than right, facet joint effusions.  There is mildly prominent dorsal epidural fat.  There is borderline to mild spinal stenosis and mild bilateral superior foraminal recess stenosis.  L4-5:There is grade 1 spondylolisthesis with 2.5 mm anterolisthesis of L4 on L5.  There is marked facet joint arthropathy with right-sided ligamentum flavum thickening and moderately large bilateral facet joint effusions.  There are postoperative changes of left laminectomy.  There is unroofing of a moderate diffuse disc bulge.  There is moderate central spinal stenosis with AP measurement of the dural sac measuring 7.7 mm.  There is mild crowding of the left lateral recess.  There is mild foraminal stenosis.  L5-S1: There is disc space narrowing.  There is moderate to marked facet joint arthropathy with ligamentum flavum thickening.  There is a moderate diffuse disc bulge with osteophytic ridging and superimposed broad central and left paracentral disc protrusion with annular fissure.  This  results in flattening of the ventral dural sac as well as compression of the left S1 nerve root.  There is no significant spinal stenosis.  There is mild to moderate bilateral foraminal stenosis.      Assessment: The patient is a 77-year-old female with past medical history significant for hypertension and hyperlipidemia who presents in referral from Lucita Gonzalez PA-C for low back and right leg pain.    1. Lumbar radiculopathy     2. DDD (degenerative disc disease), lumbar           Plan:  1.  She did well following the caudal SILVERIO with some relief but states that she would rather follow-up with neurosurgery and have this fixed.  She is going to make a follow-up appointment with Dr. Miller to discuss surgical options and follow-up here as needed.    Greater than 50% of this 15 minute visit was spent on counseling the patient.

## 2018-02-15 ENCOUNTER — OFFICE VISIT (OUTPATIENT)
Dept: NEUROSURGERY | Facility: CLINIC | Age: 78
End: 2018-02-15
Payer: MEDICARE

## 2018-02-15 VITALS
HEIGHT: 66 IN | HEART RATE: 82 BPM | WEIGHT: 214 LBS | BODY MASS INDEX: 34.39 KG/M2 | DIASTOLIC BLOOD PRESSURE: 71 MMHG | SYSTOLIC BLOOD PRESSURE: 139 MMHG

## 2018-02-15 DIAGNOSIS — M48.061 SPINAL STENOSIS OF LUMBAR REGION WITHOUT NEUROGENIC CLAUDICATION: ICD-10-CM

## 2018-02-15 DIAGNOSIS — M47.816 ARTHRITIS OF FACET JOINT OF LUMBAR SPINE: ICD-10-CM

## 2018-02-15 DIAGNOSIS — M85.80 OSTEOPENIA, UNSPECIFIED LOCATION: Primary | ICD-10-CM

## 2018-02-15 DIAGNOSIS — M54.16 LUMBAR RADICULOPATHY: ICD-10-CM

## 2018-02-15 PROCEDURE — 1125F AMNT PAIN NOTED PAIN PRSNT: CPT | Mod: S$GLB,,, | Performed by: NEUROLOGICAL SURGERY

## 2018-02-15 PROCEDURE — 3008F BODY MASS INDEX DOCD: CPT | Mod: S$GLB,,, | Performed by: NEUROLOGICAL SURGERY

## 2018-02-15 PROCEDURE — 99999 PR PBB SHADOW E&M-EST. PATIENT-LVL III: CPT | Mod: PBBFAC,,, | Performed by: NEUROLOGICAL SURGERY

## 2018-02-15 PROCEDURE — 1159F MED LIST DOCD IN RCRD: CPT | Mod: S$GLB,,, | Performed by: NEUROLOGICAL SURGERY

## 2018-02-15 PROCEDURE — 99214 OFFICE O/P EST MOD 30 MIN: CPT | Mod: S$GLB,,, | Performed by: NEUROLOGICAL SURGERY

## 2018-02-15 NOTE — LETTER
February 15, 2018      Lucita Gonzalez PA-C  [address] [facility] [address]  [address]nd [address]  [facility] [address]           [place] [facility]  [address] [facility] [address] [place] [address]  Phone: [phone]  Fax: [phone]          Patient: Shandra Velez   MR Number: [id number]   YOB: 1940   Date of Visit: 2/15/2018       Dear Lucita Gonzalez:    Thank you for referring Shandra Velez to me for evaluation. Attached you will find relevant portions of my assessment and plan of care.    If you have questions, please do not hesitate to call me. I look forward to following Shandra Velez along with you.    Sincerely,    Alton Miller MD    Enclosure  CC:  No Recipients    If you would like to receive this communication electronically, please contact [email or web address] or [phone] to request more information on [employer] access.    For providers and/or their staff who would like to refer a patient to [facility], please contact us through our one-stop-shop provider referral line, [facility], at [phone].    If you feel you have received this communication in error or would no longer like to receive these types of communications, please e-mail [email or web address]

## 2018-02-15 NOTE — PROGRESS NOTES
Neurosurgery Outpatient Follow Up    Patient ID: Shandra Velez is a 77 y.o. female.    Chief Complaint   Patient presents with    Lumbar Spine Pain (L-Spine)     Patient has a long hx of low back pain. She had a left sided L4/5 laminectomy. States she has severe RLE to her calf. Denies bladder or bowel dysfunction. Pain is increased by walking and slightly alleviated with NSAIDS. She did not do PT post-operatively but has done injections without improvement.            Review of Systems   Constitutional: Negative for activity change, appetite change, chills, fever and unexpected weight change.   HENT: Negative for tinnitus, trouble swallowing and voice change.    Respiratory: Negative for apnea, cough, chest tightness and shortness of breath.    Cardiovascular: Negative for chest pain and palpitations.   Gastrointestinal: Negative for constipation, diarrhea, nausea and vomiting.   Genitourinary: Negative for difficulty urinating, dysuria, frequency and urgency.   Musculoskeletal: Positive for arthralgias, back pain and gait problem. Negative for neck pain and neck stiffness.   Skin: Negative for wound.   Neurological: Negative for dizziness, tremors, seizures, facial asymmetry, speech difficulty, weakness, light-headedness, numbness and headaches.   Psychiatric/Behavioral: Negative for confusion and decreased concentration.       Past Medical History:   Diagnosis Date    Breast cancer     Hypercholesterolemia     Hypertension     Hypertension     Lumbar spondylosis     Osteopenia     Pulmonary nodule     Smoker      Social History     Social History    Marital status:      Spouse name: N/A    Number of children: 3    Years of education: N/A     Occupational History    retired  for [employer]      Social History Main Topics    Smoking status: Current Every Day Smoker     Packs/day: 0.50     Years: 53.00     Types: Cigarettes    Smokeless tobacco: Never Used    Alcohol use No    Drug  "use: No    Sexual activity: Not Currently     Other Topics Concern    Not on file     Social History Narrative    Lives alone        Hobbies: skyler        From [place]             Family History   Problem Relation Age of Onset    Cancer Sister      uterine    Diabetes Brother     Cancer Sister      liver     Review of patient's allergies indicates:   Allergen Reactions    No known drug allergies        Current Outpatient Prescriptions:     aspirin 81 mg Tab, Every day, Disp: , Rfl:     cetirizine (ZYRTEC) 10 MG tablet, Every day, Disp: , Rfl:     DOCOSAHEXANOIC ACID/EPA (FISH OIL ORAL), 1,000 mg. Twice a day, Disp: , Rfl:     hydrochlorothiazide (HYDRODIURIL) 25 MG tablet, TAKE 1 TABLET ONCE DAILY, Disp: 90 tablet, Rfl: 3    lisinopril (PRINIVIL,ZESTRIL) 40 MG tablet, TAKE 1 TABLET ONCE DAILY, Disp: 90 tablet, Rfl: 3    multivitamin capsule, Take 1 capsule by mouth once daily., Disp: , Rfl:     nifedipine (ADALAT CC) 60 MG TbSR, TAKE 1 TABLET ONCE DAILY, Disp: 90 tablet, Rfl: 3    simvastatin (ZOCOR) 10 MG tablet, TAKE 1 TABLET EVERY EVENING, Disp: 90 tablet, Rfl: 3    temazepam (RESTORIL) 15 mg Cap, One tablet At bedtime as needed for insomnia, Disp: 90 capsule, Rfl: 1    Current Facility-Administered Medications:     lidocaine (PF) 10 mg/ml (1%) injection 10 mg, 1 mL, Intradermal, Once, Ronnell Baeza MD  Blood pressure 139/71, pulse 82, height 5' 6" (1.676 m), weight 97.1 kg (214 lb).      Neurologic Exam     Mental Status   Oriented to person, place, and time.   Speech: speech is normal     Cranial Nerves     CN III, IV, VI   Pupils are equal, round, and reactive to light.  Extraocular motions are normal.     Motor Exam     Strength   Strength 5/5 throughout.     Gait, Coordination, and Reflexes     Reflexes   Right brachioradialis: 1+  Left brachioradialis: 1+  Right biceps: 1+  Left biceps: 1+  Right triceps: 1+  Left triceps: 1+  Left patellar: 1+  Right achilles: 1+  Left achilles: " 1+      Physical Exam   Constitutional: She is oriented to person, place, and time. She appears well-developed and well-nourished.   HENT:   Head: Normocephalic and atraumatic.   Eyes: EOM are normal. Pupils are equal, round, and reactive to light.   Neck: Normal range of motion. Neck supple.   Cardiovascular: Normal rate and intact distal pulses.    Pulmonary/Chest: Effort normal. No respiratory distress.   Abdominal: Soft. She exhibits no distension.   Musculoskeletal: Normal range of motion. She exhibits no edema or deformity.   Neurological: She is oriented to person, place, and time. She has normal strength. She displays no atrophy, no tremor and normal reflexes. A sensory deficit is present. No cranial nerve deficit. She exhibits normal muscle tone. She displays no seizure activity. Coordination and gait normal. GCS eye subscore is 4. GCS verbal subscore is 5. GCS motor subscore is 6.   Reflex Scores:       Tricep reflexes are 1+ on the right side and 1+ on the left side.       Bicep reflexes are 1+ on the right side and 1+ on the left side.       Brachioradialis reflexes are 1+ on the right side and 1+ on the left side.       Patellar reflexes are 1+ on the left side.       Achilles reflexes are 1+ on the right side and 1+ on the left side.  Skin: Skin is warm and dry.   Psychiatric: She has a normal mood and affect. Her speech is normal and behavior is normal. Judgment and thought content normal.   Nursing note and vitals reviewed.      Provider dictation:  The patient is a 78-year-old  female with a previous L4/5 left hemilaminectomy by Dr Malloy, and recurrent low back pain radiating the right leg. Her back pain did not improved post-op but her left leg improved. She has had several SILVERIO, to which she failed to respond, and she has tried PT without success. She underwent a right knee replacement and has residual tenderness.      She reports severe low back pain, radiating down her thighs and into  the mid-calves worse on the right. She has no lorenza weakness or bladder retention. She complains of axial pain interfering with her sleeping and severe low back pain with walking or standing, radiating to both legs. The pain leg pain is alleviated by rest, sitting, and bending forward. The axial pain is not alleviated by narcotics. Her pain increases throughout the day with activity.     Her Oswestry Disability Index score is 28% and her PHQ is 6.     On examination she is neurologically intact, except for hypoesthesia in L4 distributions bilaterally. She pain-limited reduction in lumbar ROM particularly with extension. She has 5/5 strength, likely pain limited, proximally weaker than distally. Her coordination and station appear intact. Her gait is antalgic with a stooped posture in lumbar flexion.    I have reviewed her lumbar MRI which demonstrates persistent disease at L4/5.. There is severe  facet hypertrophy with lateral recess and foramina stenosis. The facet joints are agepe and fluid filled at this level and the laminotomy defect is too small to provide any neural decompression.There is a minimal disc hernation.     In conclusion this female with chronic back pain and a previous decompression has typical findings of lumbar stenosis with with axial pain from facet arthropathy and right sided L4 radiculopathy.I have had an ample discussion with patient regarding the assessment and we have agreed that she will undergo a lumbar decompression and  fusion spanning L4 to L5. She has been counseled about weight loss and exercise in the preoperative period.  She wishes to have surgery in April after flu season.    Visit Diagnosis:  Osteopenia, unspecified location    Spinal stenosis of lumbar region without neurogenic claudication    Arthritis of facet joint of lumbar spine    Lumbar radiculopathy

## 2018-03-07 ENCOUNTER — TELEPHONE (OUTPATIENT)
Dept: FAMILY MEDICINE | Facility: CLINIC | Age: 78
End: 2018-03-07

## 2018-03-07 DIAGNOSIS — Z12.31 ENCOUNTER FOR SCREENING MAMMOGRAM FOR BREAST CANCER: Primary | ICD-10-CM

## 2018-03-07 NOTE — TELEPHONE ENCOUNTER
----- Message from Emily Ponce sent at 3/7/2018 10:46 AM CST -----  Please call patient in regards to her needing a mammo order & scheduling, [phone] (home)

## 2018-03-14 ENCOUNTER — HOSPITAL ENCOUNTER (OUTPATIENT)
Dept: RADIOLOGY | Facility: HOSPITAL | Age: 78
Discharge: HOME | End: 2018-03-14
Attending: FAMILY MEDICINE
Payer: MEDICARE

## 2018-03-14 DIAGNOSIS — Z12.31 ENCOUNTER FOR SCREENING MAMMOGRAM FOR BREAST CANCER: ICD-10-CM

## 2018-03-14 PROCEDURE — 77067 SCR MAMMO BI INCL CAD: CPT | Mod: TC,PO

## 2018-03-14 PROCEDURE — 77063 BREAST TOMOSYNTHESIS BI: CPT | Mod: 26,,, | Performed by: RADIOLOGY

## 2018-03-14 PROCEDURE — 77067 SCR MAMMO BI INCL CAD: CPT | Mod: 26,,, | Performed by: RADIOLOGY

## 2018-03-15 DIAGNOSIS — M47.816 ARTHRITIS OF FACET JOINT OF LUMBAR SPINE: ICD-10-CM

## 2018-03-15 DIAGNOSIS — M54.16 LUMBAR RADICULOPATHY: ICD-10-CM

## 2018-03-15 DIAGNOSIS — M48.061 SPINAL STENOSIS OF LUMBAR REGION WITHOUT NEUROGENIC CLAUDICATION: Primary | ICD-10-CM

## 2018-03-15 RX ORDER — LIDOCAINE HYDROCHLORIDE 10 MG/ML
1 INJECTION, SOLUTION EPIDURAL; INFILTRATION; INTRACAUDAL; PERINEURAL ONCE
Status: CANCELLED | OUTPATIENT
Start: 2018-03-15 | End: 2018-03-15

## 2018-03-15 RX ORDER — SODIUM CHLORIDE, SODIUM LACTATE, POTASSIUM CHLORIDE, CALCIUM CHLORIDE 600; 310; 30; 20 MG/100ML; MG/100ML; MG/100ML; MG/100ML
INJECTION, SOLUTION INTRAVENOUS CONTINUOUS
Status: CANCELLED | OUTPATIENT
Start: 2018-03-15

## 2018-03-16 ENCOUNTER — TELEPHONE (OUTPATIENT)
Dept: NEUROSURGERY | Facility: CLINIC | Age: 78
End: 2018-03-16

## 2018-03-16 NOTE — TELEPHONE ENCOUNTER
Scheduled patient's preop appt. Would you like to order labs or any other test before appt on 3/27/2018? Please advise.

## 2018-03-16 NOTE — TELEPHONE ENCOUNTER
Shandra Velez will be having surgery on 4/3/18 with Dr. Miller, Neurosurgeon, at [facility]. We are reaching out to obtain a surgical clearance from Dr. Chavez to ensure the safety of our patient. The surgery is a lumbar fusion and will be under general anesthesia. This procedure typically lasts approximately 2 hours. We request that the patient hold all anticoagulant/antiinflammatory medications for 5-7 days prior to surgery. Please let us know if our office can be of further assistance.

## 2018-03-27 ENCOUNTER — TELEPHONE (OUTPATIENT)
Dept: NEUROSURGERY | Facility: CLINIC | Age: 78
End: 2018-03-27

## 2018-03-27 ENCOUNTER — OFFICE VISIT (OUTPATIENT)
Dept: FAMILY MEDICINE | Facility: CLINIC | Age: 78
End: 2018-03-27
Payer: MEDICARE

## 2018-03-27 VITALS
SYSTOLIC BLOOD PRESSURE: 128 MMHG | WEIGHT: 216.06 LBS | HEART RATE: 76 BPM | OXYGEN SATURATION: 95 % | TEMPERATURE: 98 F | BODY MASS INDEX: 36 KG/M2 | HEIGHT: 65 IN | DIASTOLIC BLOOD PRESSURE: 78 MMHG

## 2018-03-27 DIAGNOSIS — M54.16 LUMBAR RADICULOPATHY: ICD-10-CM

## 2018-03-27 DIAGNOSIS — Z01.818 PREOP EXAMINATION: Primary | ICD-10-CM

## 2018-03-27 DIAGNOSIS — I10 ESSENTIAL HYPERTENSION: ICD-10-CM

## 2018-03-27 DIAGNOSIS — E78.5 HYPERLIPIDEMIA, UNSPECIFIED HYPERLIPIDEMIA TYPE: ICD-10-CM

## 2018-03-27 DIAGNOSIS — M47.816 ARTHRITIS OF FACET JOINT OF LUMBAR SPINE: ICD-10-CM

## 2018-03-27 PROCEDURE — 99999 PR PBB SHADOW E&M-EST. PATIENT-LVL III: CPT | Mod: PBBFAC,,, | Performed by: FAMILY MEDICINE

## 2018-03-27 PROCEDURE — 3078F DIAST BP <80 MM HG: CPT | Mod: CPTII,S$GLB,, | Performed by: FAMILY MEDICINE

## 2018-03-27 PROCEDURE — 99214 OFFICE O/P EST MOD 30 MIN: CPT | Mod: S$GLB,,, | Performed by: FAMILY MEDICINE

## 2018-03-27 PROCEDURE — 3074F SYST BP LT 130 MM HG: CPT | Mod: CPTII,S$GLB,, | Performed by: FAMILY MEDICINE

## 2018-03-27 NOTE — PROGRESS NOTES
Subjective:       Patient ID: Shandra Velez is a 77 y.o. female.    Chief Complaint: Pre-op, Back surgery on 4/3/2018    Here today for preop for lumbar decompression and fusion with Dr. Miller on 4/3/18.    She is overall feeling well.  Having persistent persistent right buttock pain with radiation to the right lateral knee.  Using ibuprofen as needed    HTN - tolerating lisinopril 40mg daily, HCTZ 25mg daily and Adalat 60mg daily  HLD - tolerating Zocor 10mg daily  Allergies: taking Zyrtec 10mg daily    Past Medical History:    Hypertension                                                  Pulmonary nodule                                              Smoker                                                        Osteopenia                                                    Hypertension                                                  Hypercholesterolemia                                          Breast cancer                                                 Lumbar spondylosis                                            Past Surgical History:    MASTECTOMY                                       1985            Comment:right    CHOLECYSTECTOMY                                                APPENDECTOMY                                                   UMBILICAL HERNIA REPAIR                                        TUBAL LIGATION                                                 BREAST RECONSTRUCTION                                            Comment:right    Allergies:   -- No Known Drug Allergies     Social History    Marital Status:              Spouse Name:                       Years of Education:                 Number of children: 3             Occupational History  Occupation          Employer            Comment               retired marketing *                         Social History Main Topics    Smoking Status: Current Every Day Smoker        Packs/Day: 0.50  Years: 53         Types: Cigarettes    Smokeless  Status: Never Used                        Alcohol Use: Yes             Drug Use: No              Sexual Activity: Not Currently        Other Topics            Concern    None on file    Social History Narrative    Lives alone      Hobbies: skyler      From [place]    Current Outpatient Prescriptions on File Prior to Visit:  aspirin 81 mg Tab, Every day, Disp: , Rfl:   cetirizine (ZYRTEC) 10 MG tablet, Every day, Disp: , Rfl:   DOCOSAHEXANOIC ACID/EPA (FISH OIL ORAL), 1,000 mg. Twice a day, Disp: , Rfl:   hydrochlorothiazide (HYDRODIURIL) 25 MG tablet, TAKE 1 TABLET ONCE DAILY, Disp: 90 tablet, Rfl: 3  lisinopril (PRINIVIL,ZESTRIL) 40 MG tablet, TAKE 1 TABLET ONCE DAILY, Disp: 90 tablet, Rfl: 3  naproxen (NAPROSYN) 500 MG tablet, Take 1 tablet (500 mg total) by mouth 2 (two) times daily with meals., Disp: 30 tablet, Rfl: 2  nifedipine (ADALAT CC) 60 MG TbSR, TAKE 1 TABLET ONCE DAILY, Disp: 90 tablet, Rfl: 3  simvastatin (ZOCOR) 10 MG tablet, TAKE 1 TABLET EVERY EVENING, Disp: 90 tablet, Rfl: 3  calcium citrate-vitamin D3 315-250 mg-unit Tab, Take by mouth., Disp: , Rfl:   temazepam (RESTORIL) 15 mg Cap, One tablet At bedtime as needed for insomnia, Disp: 90 capsule, Rfl: 1    No current facility-administered medications on file prior to visit.         Review of patient's family history indicates:    Cancer                         Sister                      Comment: uterine    Diabetes                       Brother                         Hyperlipidemia   Pertinent negatives include no chest pain or shortness of breath.     Review of Systems   Constitutional: Negative for appetite change, chills, fever and unexpected weight change.   HENT: Negative for sore throat and trouble swallowing.    Eyes: Negative for pain and visual disturbance.   Respiratory: Negative for cough, shortness of breath and wheezing.    Cardiovascular: Negative for chest pain and palpitations.   Gastrointestinal: Negative for abdominal  "pain, blood in stool, diarrhea, nausea and vomiting.   Genitourinary: Negative for difficulty urinating, dysuria and hematuria.   Musculoskeletal: Positive for back pain (intermittent left flank pain). Negative for arthralgias, gait problem and neck pain.   Skin: Negative for rash and wound.   Neurological: Positive for tremors (left hand). Negative for dizziness, weakness, numbness and headaches.   Hematological: Negative for adenopathy.   Psychiatric/Behavioral: Negative for dysphoric mood.       Objective:       /78   Pulse 76   Temp 97.7 °F (36.5 °C) (Oral)   Ht 5' 4.5" (1.638 m)   Wt 98 kg (216 lb 0.8 oz)   SpO2 95%   BMI 36.51 kg/m²     Physical Exam   Constitutional: She is oriented to person, place, and time. She appears well-developed and well-nourished.   HENT:   Head: Normocephalic.   Mouth/Throat: Oropharynx is clear and moist. No oropharyngeal exudate or posterior oropharyngeal erythema.   Eyes: Conjunctivae and EOM are normal. Pupils are equal, round, and reactive to light.   Neck: Normal range of motion. Neck supple. No thyromegaly present.   Cardiovascular: Normal rate, regular rhythm, S1 normal, S2 normal, normal heart sounds and intact distal pulses.  Exam reveals no gallop and no friction rub.    No murmur heard.  Pulmonary/Chest: Effort normal and breath sounds normal. She has no wheezes. She has no rales.   Abdominal: Normal appearance.   Musculoskeletal:        Lumbar back: She exhibits pain. She exhibits normal range of motion, no tenderness and no deformity.        Back:         Right lower leg: She exhibits no edema.        Left lower leg: She exhibits no edema.   Lymphadenopathy:     She has no cervical adenopathy.   Neurological: She is alert and oriented to person, place, and time. She displays tremor. No cranial nerve deficit. Gait normal.   Skin: Skin is warm and intact. No rash noted.   Psychiatric: She has a normal mood and affect.       Results for orders placed or " performed during the hospital encounter of 03/23/18   URINE CULTURE   Result Value Ref Range    Urine Culture, Routine No growth    APTT   Result Value Ref Range    aPTT 28.1 24.6 - 36.7 sec   Basic metabolic panel   Result Value Ref Range    Sodium 142 136 - 145 mmol/L    Potassium 3.6 3.5 - 5.1 mmol/L    Chloride 103 95 - 110 mmol/L    CO2 26 22 - 31 mmol/L    Glucose 98 70 - 110 mg/dL    BUN, Bld 32 (H) 7 - 18 mg/dL    Creatinine 1.14 0.50 - 1.40 mg/dL    Calcium 9.7 8.4 - 10.2 mg/dL    Anion Gap 13 8 - 16 mmol/L    eGFR if African American 54 (A) >60 mL/min/1.73 m^2    eGFR if non African American 47 (A) >60 mL/min/1.73 m^2   CBC auto differential   Result Value Ref Range    WBC 8.16 3.90 - 12.70 K/uL    RBC 4.73 4.00 - 5.40 M/uL    Hemoglobin 14.2 12.0 - 16.0 g/dL    Hematocrit 43.4 37.0 - 48.5 %    MCV 92 82 - 98 fL    MCH 30.0 27.0 - 31.0 pg    MCHC 32.7 32.0 - 36.0 g/dL    RDW 14.0 11.5 - 14.5 %    Platelets 164 150 - 350 K/uL    MPV 12.4 9.2 - 12.9 fL    Gran # (ANC) 5.7 1.8 - 7.7 K/uL    Lymph # 1.6 1.0 - 4.8 K/uL    Mono # 0.5 0.3 - 1.0 K/uL    Eos # 0.2 0.0 - 0.5 K/uL    Baso # 0.07 0.00 - 0.20 K/uL    nRBC 0 0 /100 WBC    Gran% 70.4 38.0 - 73.0 %    Lymph% 20.0 18.0 - 48.0 %    Mono% 6.1 4.0 - 15.0 %    Eosinophil% 2.6 0.0 - 8.0 %    Basophil% 0.9 0.0 - 1.9 %    Differential Method Automated    Protime-INR   Result Value Ref Range    PT 12.8 11.8 - 14.7 sec    INR 1.0    Urinalysis   Result Value Ref Range    Specimen UA Urine, Clean Catch     Color, UA Yellow Yellow, Straw, Caty    Appearance, UA Clear Clear    pH, UA 7.0 5.0 - 8.0    Specific Gravity, UA 1.015 1.005 - 1.030    Protein, UA Negative Negative    Glucose, UA Negative Negative    Ketones, UA Negative Negative    Bilirubin (UA) Negative Negative    Occult Blood UA Trace (A) Negative    Nitrite, UA Negative Negative    Urobilinogen, UA 0.2 <2.0 EU/dL    Leukocytes, UA 3+ (A) Negative   Hemoglobin A1c   Result Value Ref Range    Hemoglobin  A1C 5.7 (H) 0.0 - 5.6 %    Estimated Avg Glucose 117 68 - 131 mg/dL   RBC, UA   Result Value Ref Range    RBC, UA 7 (H) 0 - 4 /hpf   WBC, UA   Result Value Ref Range    WBC, UA 32 (H) 0 - 5 /hpf   Squamous Epithelial, UA   Result Value Ref Range    Squam Epithel, UA 2 /hpf   Hyaline Casts, UA   Result Value Ref Range    Hyaline Casts, UA 5 (A) 0 - 1 /lpf   Bacteria, UA   Result Value Ref Range    Bacteria, UA Negative None-Occ /hpf     Labs, CXR and EKG reviewed    Assessment:       1. Preop examination    2. Lumbar radiculopathy    3. Arthritis of facet joint of lumbar spine    4. Essential hypertension    5. Hyperlipidemia, unspecified hyperlipidemia type        Plan:       Preop examination    Lumbar radiculopathy    Arthritis of facet joint of lumbar spine    Essential hypertension    Hyperlipidemia, unspecified hyperlipidemia type           Medically cleared to proceed with back surgery  continue present meds  Counseled on basic back stretching and lifting precautions  Reassurance regarding essential tremor  Counseled on regular exercise, maintenance of a healthy weight, balanced diet rich in fruits/vegetables and lean protein, and avoidance of unhealthy habits like smoking and excessive alcohol intake.  F/u annually or PRN

## 2018-03-29 ENCOUNTER — TELEPHONE (OUTPATIENT)
Dept: NEUROSURGERY | Facility: CLINIC | Age: 78
End: 2018-03-29

## 2018-03-29 NOTE — TELEPHONE ENCOUNTER
Noted  ----- Message from Akil Chavez MD sent at 3/29/2018  5:36 AM CDT -----  Medically cleared to proceed with back surgery as planned.

## 2018-04-06 PROBLEM — E87.6 HYPOKALEMIA: Status: ACTIVE | Noted: 2018-04-06

## 2018-04-06 PROBLEM — E83.51 HYPOCALCEMIA: Status: ACTIVE | Noted: 2018-04-06

## 2018-04-09 PROBLEM — Z98.1 S/P LUMBAR SPINAL FUSION: Status: ACTIVE | Noted: 2018-04-09

## 2018-04-11 PROBLEM — I10 ESSENTIAL HYPERTENSION: Status: ACTIVE | Noted: 2018-04-11

## 2018-04-13 DIAGNOSIS — Z98.1 S/P LUMBAR SPINAL FUSION: ICD-10-CM

## 2018-04-18 ENCOUNTER — TELEPHONE (OUTPATIENT)
Dept: FAMILY MEDICINE | Facility: CLINIC | Age: 78
End: 2018-04-18

## 2018-04-18 NOTE — TELEPHONE ENCOUNTER
----- Message from RT Ju sent at 4/18/2018  8:49 AM CDT -----  Contact: pt    pt , requesting a [facility] F/U appt much sooner than the next available of 05/18/2018, thanks.

## 2018-04-23 ENCOUNTER — OFFICE VISIT (OUTPATIENT)
Dept: FAMILY MEDICINE | Facility: CLINIC | Age: 78
End: 2018-04-23
Payer: MEDICARE

## 2018-04-23 VITALS
WEIGHT: 211.63 LBS | HEART RATE: 80 BPM | SYSTOLIC BLOOD PRESSURE: 138 MMHG | BODY MASS INDEX: 36.13 KG/M2 | DIASTOLIC BLOOD PRESSURE: 92 MMHG | HEIGHT: 64 IN

## 2018-04-23 DIAGNOSIS — E78.5 HYPERLIPIDEMIA, UNSPECIFIED HYPERLIPIDEMIA TYPE: ICD-10-CM

## 2018-04-23 DIAGNOSIS — I10 ESSENTIAL HYPERTENSION: ICD-10-CM

## 2018-04-23 DIAGNOSIS — Z98.1 S/P LUMBAR SPINAL FUSION: Primary | ICD-10-CM

## 2018-04-23 PROCEDURE — 3080F DIAST BP >= 90 MM HG: CPT | Mod: CPTII,S$GLB,, | Performed by: FAMILY MEDICINE

## 2018-04-23 PROCEDURE — 99999 PR PBB SHADOW E&M-EST. PATIENT-LVL III: CPT | Mod: PBBFAC,,, | Performed by: FAMILY MEDICINE

## 2018-04-23 PROCEDURE — 3075F SYST BP GE 130 - 139MM HG: CPT | Mod: CPTII,S$GLB,, | Performed by: FAMILY MEDICINE

## 2018-04-23 PROCEDURE — 99214 OFFICE O/P EST MOD 30 MIN: CPT | Mod: S$GLB,,, | Performed by: FAMILY MEDICINE

## 2018-04-23 RX ORDER — SIMVASTATIN 10 MG/1
10 TABLET, FILM COATED ORAL NIGHTLY
Qty: 90 TABLET | Refills: 3 | Status: SHIPPED | OUTPATIENT
Start: 2018-04-23 | End: 2019-05-31 | Stop reason: SDUPTHER

## 2018-04-23 RX ORDER — LISINOPRIL 40 MG/1
40 TABLET ORAL DAILY
Qty: 90 TABLET | Refills: 3 | Status: SHIPPED | OUTPATIENT
Start: 2018-04-23 | End: 2019-06-13 | Stop reason: SDUPTHER

## 2018-04-23 RX ORDER — NIFEDIPINE 60 MG/1
60 TABLET, EXTENDED RELEASE ORAL DAILY
Qty: 90 TABLET | Refills: 3 | Status: SHIPPED | OUTPATIENT
Start: 2018-04-23 | End: 2019-05-31 | Stop reason: SDUPTHER

## 2018-04-23 RX ORDER — HYDROCHLOROTHIAZIDE 25 MG/1
25 TABLET ORAL DAILY
Qty: 90 TABLET | Refills: 3 | Status: SHIPPED | OUTPATIENT
Start: 2018-04-23 | End: 2019-05-31 | Stop reason: SDUPTHER

## 2018-04-23 NOTE — PROGRESS NOTES
Subjective:       Patient ID: Shandra Velez is a 77 y.o. female.    Chief Complaint: Follow-up (lumbar spinal fusion) and Fatigue    Here today for f/u after recent discharge from rehab following lumbar decompression and fusion with Dr. Miller on 4/3/18.    Admission Date: 4/9/2018  Hospital Length of Stay: 7 days  Discharge Date and Time:  04/16/2018 12:46 PM  Attending Physician: Bassem Skelton MD  Discharging Provider: Bassem Skelton MD  Primary Care Physician: Akil Chavez MD  Admit Diagnosis:  S/P lumbar spinal fusion (Z98.1)  Discharge Diagnosis:  Status post lumbar decompression fusion secondary to lumbar spinal stenosis.     Secondary Diagnosis:   Active Hospital Problems    Diagnosis   POA   *S/P lumbar spinal fusion (Z98.1)   Not Applicable   Essential hypertension (I10)   Yes   Hyperlipidemia (E78.5)   Yes     HPI: 78-year-old female with history of  lumbar spinal stenosis over the past few years failed  laser back decompression, and multiple conservative management including epidural steroid shots has been having neurogenic claudication with difficulty walking even a block without  bilateral lower extremity pain and rest. Patient finally decided to go ahead and have  posterior lumbar decompression fusion done date of surgery was 4/3/2018 by Dr. Miller.    Hospital Course: 77-year-old female with  history of chronic low back pain failed laser decompression surgery, has been going through epidural steroid shots with not much relief. And did undergo elective posterior lumbar decompression fusion done and has done extremely well. Patient's blood pressure where elevated had to adjust her medication. Patient was hypokalemic had to give some supplements. Pain control was better.  Therapy wise patient has done well and has been independent with feeding, grooming, upper extremity dressing, toileting, comprehension, expression, social interaction, problem solving, memory, modified independent with  bathing, lower extremity dressing, tub transfers, bed mobility, ambulation with the help of a rolling walker, going up and down stairs. Patient is discharged from today to continue on a home program exercises. Patient to have follow-up appointment with  PCP and the neurosurgeon a period of 2-4 weeks.    She is overall feeling well. She is ambulating with a cane and walker.  Using ibuprofen as needed  Procardia was lowered to 30mg daily and she is taking potassium 20meq daily.    HTN - tolerating lisinopril 40mg daily, HCTZ 25mg daily and procardia Xl 30mg daily  HLD - tolerating Zocor 10mg daily  Allergies: taking Zyrtec 10mg daily    Past Medical History:    Hypertension                                                  Pulmonary nodule                                              Smoker                                                        Osteopenia                                                    Hypertension                                                  Hypercholesterolemia                                          Breast cancer                                                 Lumbar spondylosis                                            Past Surgical History:    MASTECTOMY                                       1985            Comment:right    CHOLECYSTECTOMY                                                APPENDECTOMY                                                   UMBILICAL HERNIA REPAIR                                        TUBAL LIGATION                                                 BREAST RECONSTRUCTION                                            Comment:right    Allergies:   -- No Known Drug Allergies     Social History    Marital Status:              Spouse Name:                       Years of Education:                 Number of children: 3             Occupational History  Occupation          Employer            Comment               retired marketing *                         Social  History Main Topics    Smoking Status: Current Every Day Smoker        Packs/Day: 0.50  Years: 53         Types: Cigarettes    Smokeless Status: Never Used                        Alcohol Use: Yes             Drug Use: No              Sexual Activity: Not Currently        Other Topics            Concern    None on file    Social History Narrative    Lives alone      Hobbies: skyler      From [place]    Current Outpatient Prescriptions on File Prior to Visit:  acetaminophen (TYLENOL) 325 MG tablet, Take 2 tablets (650 mg total) by mouth every 4 (four) hours as needed (pain score 1-2/10)., Disp: , Rfl: 0  aspirin 81 mg Tab, Take 81 mg by mouth once. Every day, Disp: , Rfl:   cetirizine (ZYRTEC) 10 MG tablet, Take 10 mg by mouth once daily. Every day, Disp: , Rfl:   DOCOSAHEXANOIC ACID/EPA (FISH OIL ORAL), Take 1,000 mg by mouth once. Twice a day, Disp: , Rfl:   hydrochlorothiazide (HYDRODIURIL) 25 MG tablet, TAKE 1 TABLET ONCE DAILY, Disp: 90 tablet, Rfl: 3  hydrocodone-acetaminophen 5-325mg (NORCO) 5-325 mg per tablet, Take 1 tablet by mouth every 6 (six) hours as needed for Pain., Disp: 30 tablet, Rfl: 0  lisinopril (PRINIVIL,ZESTRIL) 40 MG tablet, TAKE 1 TABLET ONCE DAILY, Disp: 90 tablet, Rfl: 3  multivitamin capsule, Take 1 capsule by mouth once daily., Disp: , Rfl:   NIFEdipine (PROCARDIA-XL) 30 MG (OSM) 24 hr tablet, Take 1 tablet (30 mg total) by mouth once daily., Disp: 30 tablet, Rfl: 0  potassium chloride (MICRO-K) 10 MEQ CpSR, Take 2 capsules (20 mEq total) by mouth once daily., Disp: 30 capsule, Rfl: 0  simvastatin (ZOCOR) 10 MG tablet, TAKE 1 TABLET EVERY EVENING, Disp: 90 tablet, Rfl: 3  temazepam (RESTORIL) 15 mg Cap, One tablet At bedtime as needed for insomnia, Disp: 90 capsule, Rfl: 1    No current facility-administered medications on file prior to visit.             Review of patient's family history indicates:    Cancer                         Sister                      Comment: uterine     "Diabetes                       Brother                         Hyperlipidemia   Pertinent negatives include no chest pain or shortness of breath.     Review of Systems   Constitutional: Negative for appetite change, chills, fever and unexpected weight change.   HENT: Negative for sore throat and trouble swallowing.    Eyes: Negative for pain and visual disturbance.   Respiratory: Negative for cough, shortness of breath and wheezing.    Cardiovascular: Negative for chest pain and palpitations.   Gastrointestinal: Negative for abdominal pain, blood in stool, diarrhea, nausea and vomiting.   Genitourinary: Negative for difficulty urinating, dysuria and hematuria.   Musculoskeletal: Negative for arthralgias, back pain, gait problem and neck pain.   Skin: Negative for rash and wound.   Neurological: Positive for tremors (left hand). Negative for dizziness, weakness, numbness and headaches.   Hematological: Negative for adenopathy.   Psychiatric/Behavioral: Negative for dysphoric mood.       Objective:       BP (!) 138/92   Pulse 80   Ht 5' 4" (1.626 m)   Wt 96 kg (211 lb 10.3 oz)   BMI 36.33 kg/m²     Physical Exam   Constitutional: She is oriented to person, place, and time. She appears well-developed and well-nourished.   HENT:   Head: Normocephalic.   Mouth/Throat: Oropharynx is clear and moist. No oropharyngeal exudate or posterior oropharyngeal erythema.   Eyes: Conjunctivae and EOM are normal. Pupils are equal, round, and reactive to light.   Neck: Normal range of motion. Neck supple. No thyromegaly present.   Cardiovascular: Normal rate, regular rhythm, S1 normal, S2 normal, normal heart sounds and intact distal pulses.  Exam reveals no gallop and no friction rub.    No murmur heard.  Pulmonary/Chest: Effort normal and breath sounds normal. She has no wheezes. She has no rales.   Abdominal: Normal appearance.   Musculoskeletal:        Lumbar back: She exhibits normal range of motion, no tenderness, no " deformity and no pain.        Right lower leg: She exhibits no edema.        Left lower leg: She exhibits no edema.   Lymphadenopathy:     She has no cervical adenopathy.   Neurological: She is alert and oriented to person, place, and time. She displays tremor. No cranial nerve deficit. Gait normal.   Skin: Skin is warm and intact. No rash noted.   Psychiatric: She has a normal mood and affect.       Results for orders placed or performed during the hospital encounter of 04/09/18   CBC auto differential   Result Value Ref Range    WBC 7.44 3.90 - 12.70 K/uL    RBC 3.38 (L) 4.00 - 5.40 M/uL    Hemoglobin 9.8 (L) 12.0 - 16.0 g/dL    Hematocrit 30.9 (L) 37.0 - 48.5 %    MCV 91 82 - 98 fL    MCH 29.0 27.0 - 31.0 pg    MCHC 31.7 (L) 32.0 - 36.0 g/dL    RDW 13.6 11.5 - 14.5 %    Platelets 194 150 - 350 K/uL    MPV 10.7 9.2 - 12.9 fL    Gran # (ANC) 5.4 1.8 - 7.7 K/uL    Lymph # 1.1 1.0 - 4.8 K/uL    Mono # 0.7 0.3 - 1.0 K/uL    Eos # 0.3 0.0 - 0.5 K/uL    Baso # 0.03 0.00 - 0.20 K/uL    nRBC 0 0 /100 WBC    Gran% 72.2 38.0 - 73.0 %    Lymph% 15.1 (L) 18.0 - 48.0 %    Mono% 8.9 4.0 - 15.0 %    Eosinophil% 3.4 0.0 - 8.0 %    Basophil% 0.4 0.0 - 1.9 %    Differential Method Automated    Comprehensive metabolic panel   Result Value Ref Range    Sodium 139 136 - 145 mmol/L    Potassium 3.5 3.5 - 5.1 mmol/L    Chloride 102 95 - 110 mmol/L    CO2 28 22 - 31 mmol/L    Glucose 100 70 - 110 mg/dL    BUN, Bld 18 7 - 18 mg/dL    Creatinine 0.89 0.50 - 1.40 mg/dL    Calcium 8.7 8.4 - 10.2 mg/dL    Total Protein 5.8 (L) 6.0 - 8.4 g/dL    Albumin 3.1 (L) 3.5 - 5.2 g/dL    Total Bilirubin 0.4 0.2 - 1.3 mg/dL    Alkaline Phosphatase 73 38 - 145 U/L    AST 25 14 - 36 U/L    ALT 24 10 - 44 U/L    Anion Gap 9 8 - 16 mmol/L    eGFR if African American >60 >60 mL/min/1.73 m^2    eGFR if non African American >60 >60 mL/min/1.73 m^2   POCT glucose   Result Value Ref Range    POCT Glucose 130 (H) 70 - 110 mg/dL   POCT glucose   Result Value Ref  Range    POCT Glucose 115 (H) 70 - 110 mg/dL   POCT glucose   Result Value Ref Range    POCT Glucose 107 70 - 110 mg/dL   POCT glucose   Result Value Ref Range    POCT Glucose 105 70 - 110 mg/dL   POCT glucose   Result Value Ref Range    POCT Glucose 104 70 - 110 mg/dL   POCT glucose   Result Value Ref Range    POCT Glucose 100 70 - 110 mg/dL   POCT glucose   Result Value Ref Range    POCT Glucose 103 70 - 110 mg/dL     Hospital records reviewed    Assessment:       1. S/P lumbar spinal fusion    2. Essential hypertension    3. Hyperlipidemia, unspecified hyperlipidemia type        Plan:       S/P lumbar spinal fusion    Essential hypertension  -     Basic metabolic panel; Future; Expected date: 04/23/2018  -     NIFEdipine (ADALAT CC) 60 MG TbSR; Take 1 tablet (60 mg total) by mouth once daily.  Dispense: 90 tablet; Refill: 3  -     lisinopril (PRINIVIL,ZESTRIL) 40 MG tablet; Take 1 tablet (40 mg total) by mouth once daily.  Dispense: 90 tablet; Refill: 3  -     hydroCHLOROthiazide (HYDRODIURIL) 25 MG tablet; Take 1 tablet (25 mg total) by mouth once daily.  Dispense: 90 tablet; Refill: 3    Hyperlipidemia, unspecified hyperlipidemia type  -     simvastatin (ZOCOR) 10 MG tablet; Take 1 tablet (10 mg total) by mouth every evening.  Dispense: 90 tablet; Refill: 3          Stop potassium now that diet back to normal.  resume previous meds  F/u with neurosurgery as planned  Counseled on regular exercise, maintenance of a healthy weight, balanced diet rich in fruits/vegetables and lean protein, and avoidance of unhealthy habits like smoking and excessive alcohol intake.  F/u 1 month with labs or PRN

## 2018-05-02 ENCOUNTER — HOSPITAL ENCOUNTER (OUTPATIENT)
Dept: RADIOLOGY | Facility: HOSPITAL | Age: 78
Discharge: HOME | End: 2018-05-02
Attending: PHYSICIAN ASSISTANT
Payer: MEDICARE

## 2018-05-02 ENCOUNTER — OFFICE VISIT (OUTPATIENT)
Dept: NEUROSURGERY | Facility: CLINIC | Age: 78
End: 2018-05-02
Payer: MEDICARE

## 2018-05-02 VITALS
HEIGHT: 64 IN | SYSTOLIC BLOOD PRESSURE: 129 MMHG | DIASTOLIC BLOOD PRESSURE: 78 MMHG | WEIGHT: 209.69 LBS | HEART RATE: 100 BPM | BODY MASS INDEX: 35.8 KG/M2

## 2018-05-02 DIAGNOSIS — Z98.1 S/P LUMBAR SPINAL FUSION: Primary | ICD-10-CM

## 2018-05-02 DIAGNOSIS — M48.061 SPINAL STENOSIS OF LUMBAR REGION WITHOUT NEUROGENIC CLAUDICATION: ICD-10-CM

## 2018-05-02 DIAGNOSIS — Z98.1 S/P LUMBAR SPINAL FUSION: ICD-10-CM

## 2018-05-02 PROCEDURE — 72100 X-RAY EXAM L-S SPINE 2/3 VWS: CPT | Mod: TC,FY,PO

## 2018-05-02 PROCEDURE — 99024 POSTOP FOLLOW-UP VISIT: CPT | Mod: S$GLB,,, | Performed by: PHYSICIAN ASSISTANT

## 2018-05-02 PROCEDURE — 72100 X-RAY EXAM L-S SPINE 2/3 VWS: CPT | Mod: 26,,, | Performed by: RADIOLOGY

## 2018-05-02 PROCEDURE — 99999 PR PBB SHADOW E&M-EST. PATIENT-LVL III: CPT | Mod: PBBFAC,,, | Performed by: PHYSICIAN ASSISTANT

## 2018-05-02 NOTE — PROGRESS NOTES
Neurosurgery Outpatient Follow Up    Patient ID: Shandra Velez is a 77 y.o. female.    Chief Complaint   Patient presents with    Follow-up     4 weels post op L4-5 PSIF decompression           Review of Systems   Constitutional: Negative for activity change, appetite change, chills, fever and unexpected weight change.   HENT: Negative for hearing loss, tinnitus, trouble swallowing and voice change.    Eyes: Negative.  Negative for visual disturbance.   Respiratory: Negative for apnea, cough, chest tightness and shortness of breath.    Cardiovascular: Negative.  Negative for chest pain and palpitations.   Gastrointestinal: Negative.  Negative for abdominal pain, constipation, diarrhea, nausea and vomiting.   Genitourinary: Negative.  Negative for difficulty urinating, dysuria, frequency and urgency.   Musculoskeletal: Negative for back pain, gait problem, neck pain and neck stiffness.   Skin: Negative for wound.   Allergic/Immunologic: Negative for immunocompromised state.   Neurological: Negative for dizziness, tremors, seizures, facial asymmetry, speech difficulty, weakness, light-headedness, numbness and headaches.   Psychiatric/Behavioral: Negative for confusion and decreased concentration.       Past Medical History:   Diagnosis Date    Breast cancer 1985    right mastectomy    Encounter for blood transfusion     Hypercholesterolemia     Hypertension     Hypertension     Lumbar spondylosis     Osteopenia     Pulmonary nodule     Smoker      Social History     Social History    Marital status:      Spouse name: N/A    Number of children: 3    Years of education: N/A     Occupational History    retired  for [employer]      Social History Main Topics    Smoking status: Current Every Day Smoker     Packs/day: 0.50     Years: 53.00     Types: Cigarettes    Smokeless tobacco: Never Used    Alcohol use No    Drug use: No    Sexual activity: Not Currently     Other Topics Concern  "   Not on file     Social History Narrative    Lives alone        Hobbies: skyler        From [place]             Family History   Problem Relation Age of Onset    Cancer Sister      uterine    Diabetes Brother     Breast cancer Other 42    Alcohol abuse Sister      Review of patient's allergies indicates:   Allergen Reactions    No known drug allergies        Current Outpatient Prescriptions:     aspirin 81 mg Tab, Take 81 mg by mouth once. Every day, Disp: , Rfl:     cetirizine (ZYRTEC) 10 MG tablet, Take 10 mg by mouth once daily. Every day, Disp: , Rfl:     DOCOSAHEXANOIC ACID/EPA (FISH OIL ORAL), Take 1,000 mg by mouth once. Twice a day, Disp: , Rfl:     hydroCHLOROthiazide (HYDRODIURIL) 25 MG tablet, Take 1 tablet (25 mg total) by mouth once daily., Disp: 90 tablet, Rfl: 3    lisinopril (PRINIVIL,ZESTRIL) 40 MG tablet, Take 1 tablet (40 mg total) by mouth once daily., Disp: 90 tablet, Rfl: 3    multivitamin capsule, Take 1 capsule by mouth once daily., Disp: , Rfl:     NIFEdipine (ADALAT CC) 60 MG TbSR, Take 1 tablet (60 mg total) by mouth once daily., Disp: 90 tablet, Rfl: 3    simvastatin (ZOCOR) 10 MG tablet, Take 1 tablet (10 mg total) by mouth every evening., Disp: 90 tablet, Rfl: 3    temazepam (RESTORIL) 15 mg Cap, One tablet At bedtime as needed for insomnia, Disp: 90 capsule, Rfl: 1    acetaminophen (TYLENOL) 325 MG tablet, Take 2 tablets (650 mg total) by mouth every 4 (four) hours as needed (pain score 1-2/10)., Disp: , Rfl: 0    hydrocodone-acetaminophen 5-325mg (NORCO) 5-325 mg per tablet, Take 1 tablet by mouth every 6 (six) hours as needed for Pain., Disp: 30 tablet, Rfl: 0  Blood pressure 129/78, pulse 100, height 5' 4" (1.626 m), weight 95.1 kg (209 lb 10.5 oz).      Neurologic Exam     Mental Status   Oriented to person, place, and time.   Follows 3 step commands.   Attention: normal. Concentration: normal.   Speech: speech is normal   Level of consciousness: " alert  Knowledge: good.   Able to name object.     Cranial Nerves     CN II   Visual fields full to confrontation.   Visual acuity: normal  Right visual field deficit: none  Left visual field deficit: none     CN III, IV, VI   Pupils are equal, round, and reactive to light.  Extraocular motions are normal.   CN III: no CN III palsy  CN VI: no CN VI palsy    CN V   Facial sensation intact.   Right facial sensation deficit: none  Left facial sensation deficit: none    CN VII   Facial expression full, symmetric.   Right facial weakness: none  Left facial weakness: none    CN VIII   CN VIII normal.   Hearing: intact    CN IX, X   CN IX normal.   CN X normal.     CN XI   CN XI normal.     CN XII   CN XII normal.     Motor Exam   Muscle bulk: normal  Overall muscle tone: normal  Right arm tone: normal  Left arm tone: normal  Right arm pronator drift: absent  Left arm pronator drift: absent  Right leg tone: normal  Left leg tone: normal    Strength   Strength 5/5 throughout.   Right neck flexion: 5/5  Left neck flexion: 5/5  Right neck extension: 5/5  Left neck extension: 5/5  Right deltoid: 5/5  Left deltoid: 5/5  Right biceps: 5/5  Left biceps: 5/5  Right triceps: 5/5  Left triceps: 5/5  Right wrist flexion: 5/5  Left wrist flexion: 5/5  Right wrist extension: 5/5  Left wrist extension: 5/5  Right interossei: 5/5  Left interossei: 5/5  Right abdominals: 5/5  Left abdominals: 5/5  Right iliopsoas: 5/5  Left iliopsoas: 5/5  Right quadriceps: 5/5  Left quadriceps: 5/5  Right hamstrin/5  Left hamstrin/5  Right glutei: 5/5  Left glutei: 5/5  Right anterior tibial: 5/5  Left anterior tibial: 5/5  Right posterior tibial: 5/5  Left posterior tibial: 5/5  Right peroneal: 5/5  Left peroneal: 5/5  Right gastroc: 5/5  Left gastroc: 5/5    Sensory Exam   Light touch normal.   Right arm light touch: normal  Left arm light touch: normal  Right leg light touch: normal  Left leg light touch: normal  Vibration normal.     Gait,  Coordination, and Reflexes     Gait  Gait: normal    Coordination   Romberg: negative  Finger to nose coordination: normal  Heel to shin coordination: normal  Tandem walking coordination: normal    Tremor   Resting tremor: absent  Intention tremor: absent  Action tremor: absent    Reflexes   Right brachioradialis: 1+  Left brachioradialis: 1+  Right biceps: 1+  Left biceps: 1+  Right triceps: 1+  Left triceps: 1+  Right patellar: 0  Left patellar: 1+  Right achilles: 1+  Left achilles: 1+  Right : 2+  Left : 2+  Right plantar: normal  Left plantar: normal  Right Melendez: absent  Left Melendez: absent  Right ankle clonus: absent  Left ankle clonus: absent      Physical Exam   Constitutional: She is oriented to person, place, and time. She appears well-developed and well-nourished.   HENT:   Head: Normocephalic and atraumatic.   Eyes: EOM are normal. Pupils are equal, round, and reactive to light.   Neck: Normal range of motion. Neck supple.   Cardiovascular: Normal rate and intact distal pulses.    Pulmonary/Chest: Effort normal. No respiratory distress.   Abdominal: Soft. She exhibits no distension.   Musculoskeletal: Normal range of motion. She exhibits no edema or deformity.   Neurological: She is oriented to person, place, and time. She has normal strength. She displays no atrophy, no tremor and normal reflexes. A sensory deficit is present. No cranial nerve deficit. She exhibits normal muscle tone. She has a normal Finger-Nose-Finger Test, a normal Heel to Shin Test, a normal Romberg Test and a normal Tandem Gait Test. She displays no seizure activity. Gait normal. Coordination and gait normal. GCS eye subscore is 4. GCS verbal subscore is 5. GCS motor subscore is 6.   Reflex Scores:       Tricep reflexes are 1+ on the right side and 1+ on the left side.       Bicep reflexes are 1+ on the right side and 1+ on the left side.       Brachioradialis reflexes are 1+ on the right side and 1+ on the left side.        Patellar reflexes are 0 on the right side and 1+ on the left side.       Achilles reflexes are 1+ on the right side and 1+ on the left side.  Skin: Skin is warm and dry.   Psychiatric: She has a normal mood and affect. Her speech is normal and behavior is normal. Judgment and thought content normal.   Nursing note and vitals reviewed.      Provider dictation:  Ms. Velez presents today for a postoperative visit.  She is 4 weeks status post L4-S1 PSIS by Dr. Miller on April 3, 2018.  She reports that she is doing exceptionally well.  She is a 1 out of 10 pain today.  Leg pain has completely resolved.  She denies nausea vomiting fever or chills.  She denies bowel or bladder incontinence.  She has no numbness or tingling or weakness in the legs.  She does have some tightness in the posterior calves.  It is intermittent.  She is very happy with the results of her surgery.  She has been compliant with her back brace.    On physical examination the incision is clean dry and intact.  There is no evidence of infection.  She has good range of motion of the lumbar spine.  She has full range of motion of the lower extremity.  Her gait is normal.  Strength is intact.  She has 0 patellar reflex on the right 1+ on the left.  She has 0 Achilles reflexes bilaterally.  Sensation is maintained in all dermatomes.  There is no clonus.    X-rays reviewed today show no evidence of hardware failure or complication.  There is intact hardware from L4-S1.    At this time the patient has done exceptionally well she has no pain she is active.  I have recommended continuing the brace for activity.  She will follow-up with me at 12 weeks with x-rays.  Patient is very happy with her surgical outcome.    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

## 2018-05-08 ENCOUNTER — PATIENT OUTREACH (OUTPATIENT)
Dept: ADMINISTRATIVE | Facility: HOSPITAL | Age: 78
End: 2018-05-08

## 2018-05-08 NOTE — LETTER
May 8, 2018    Shandra Velez  [address] [place] [address]             [facility]  [address]  [place] [address]  Phone: [phone] Dear Ms. Velez:    [facility] is committed to your overall health.  To help you get the most out of each of your visits, we will review your information to make sure you are up to date on all of your recommended tests and/or procedures.      Dr. Chavez   has found that your chart shows you may be due for the following:    Colon cancer screening    If you have had any of the above done at another facility, please bring the records or information with you so that your record at [facility] will be complete.  If you would like to schedule any of these, please contact me.    If you are currently taking medication, please bring it with you to your appointment for review.    If you have any questions or concerns, please don't hesitate to call.    Sincerely,    Pily Oscar  Clinical Care Coordinator  [facility] [address] [facility] [address]  [place] [address]  Phone: [phone]   Fax: [phone]

## 2018-05-08 NOTE — PROGRESS NOTES
Health Maintenance Due   Topic Date Due    Colonoscopy  10/15/1992     Pre-visit outreach via mail

## 2018-05-21 ENCOUNTER — LAB VISIT (OUTPATIENT)
Dept: LAB | Facility: HOSPITAL | Age: 78
End: 2018-05-21
Attending: FAMILY MEDICINE
Payer: MEDICARE

## 2018-05-21 DIAGNOSIS — I10 ESSENTIAL HYPERTENSION: ICD-10-CM

## 2018-05-21 LAB
ANION GAP SERPL CALC-SCNC: 9 MMOL/L
BUN SERPL-MCNC: 35 MG/DL
CALCIUM SERPL-MCNC: 10.3 MG/DL
CHLORIDE SERPL-SCNC: 104 MMOL/L
CO2 SERPL-SCNC: 24 MMOL/L
CREAT SERPL-MCNC: 1.2 MG/DL
EST. GFR  (AFRICAN AMERICAN): 51.1 ML/MIN/1.73 M^2
EST. GFR  (NON AFRICAN AMERICAN): 44.3 ML/MIN/1.73 M^2
GLUCOSE SERPL-MCNC: 114 MG/DL
POTASSIUM SERPL-SCNC: 3.7 MMOL/L
SODIUM SERPL-SCNC: 137 MMOL/L

## 2018-05-21 PROCEDURE — 80048 BASIC METABOLIC PNL TOTAL CA: CPT

## 2018-05-21 PROCEDURE — 36415 COLL VENOUS BLD VENIPUNCTURE: CPT | Mod: PO

## 2018-05-22 ENCOUNTER — OFFICE VISIT (OUTPATIENT)
Dept: FAMILY MEDICINE | Facility: CLINIC | Age: 78
End: 2018-05-22
Payer: MEDICARE

## 2018-05-22 VITALS
OXYGEN SATURATION: 96 % | DIASTOLIC BLOOD PRESSURE: 64 MMHG | SYSTOLIC BLOOD PRESSURE: 114 MMHG | BODY MASS INDEX: 35.68 KG/M2 | HEIGHT: 64 IN | WEIGHT: 209 LBS | HEART RATE: 85 BPM | RESPIRATION RATE: 18 BRPM

## 2018-05-22 DIAGNOSIS — I10 ESSENTIAL HYPERTENSION: Primary | ICD-10-CM

## 2018-05-22 PROCEDURE — 99213 OFFICE O/P EST LOW 20 MIN: CPT | Mod: S$GLB,,, | Performed by: FAMILY MEDICINE

## 2018-05-22 PROCEDURE — 3074F SYST BP LT 130 MM HG: CPT | Mod: CPTII,S$GLB,, | Performed by: FAMILY MEDICINE

## 2018-05-22 PROCEDURE — 3078F DIAST BP <80 MM HG: CPT | Mod: CPTII,S$GLB,, | Performed by: FAMILY MEDICINE

## 2018-05-22 PROCEDURE — 99999 PR PBB SHADOW E&M-EST. PATIENT-LVL III: CPT | Mod: PBBFAC,,, | Performed by: FAMILY MEDICINE

## 2018-05-22 NOTE — PROGRESS NOTES
Subjective:       Patient ID: Shandra Velez is a 77 y.o. female.    Chief Complaint: Follow-up (4 weeks)    Here today for f/u on HTn and low potassium    She is overall feeling well.   Using ibuprofen as needed  Stopped potassium at last appt  HTN - tolerating lisinopril 40mg daily, HCTZ 25mg daily and procardia Xl 60mg daily  HLD - tolerating Zocor 10mg daily  Allergies: taking Zyrtec 10mg daily    Past Medical History:    Hypertension                                                  Pulmonary nodule                                              Smoker                                                        Osteopenia                                                    Hypertension                                                  Hypercholesterolemia                                          Breast cancer                                                 Lumbar spondylosis                                            Past Surgical History:    MASTECTOMY                                       1985            Comment:right    CHOLECYSTECTOMY                                                APPENDECTOMY                                                   UMBILICAL HERNIA REPAIR                                        TUBAL LIGATION                                                 BREAST RECONSTRUCTION                                            Comment:right    Allergies:   -- No Known Drug Allergies     Social History    Marital Status:              Spouse Name:                       Years of Education:                 Number of children: 3             Occupational History  Occupation          Employer            Comment               retired marketing *                         Social History Main Topics    Smoking Status: Current Every Day Smoker        Packs/Day: 0.50  Years: 53         Types: Cigarettes    Smokeless Status: Never Used                        Alcohol Use: Yes             Drug Use: No              Sexual  Activity: Not Currently        Other Topics            Concern    None on file    Social History Narrative    Lives alone      Hobbies: skyler      From [place]    Current Outpatient Prescriptions on File Prior to Visit:  acetaminophen (TYLENOL) 325 MG tablet, Take 2 tablets (650 mg total) by mouth every 4 (four) hours as needed (pain score 1-2/10)., Disp: , Rfl: 0  aspirin 81 mg Tab, Take 81 mg by mouth once. Every day, Disp: , Rfl:   cetirizine (ZYRTEC) 10 MG tablet, Take 10 mg by mouth once daily. Every day, Disp: , Rfl:   DOCOSAHEXANOIC ACID/EPA (FISH OIL ORAL), Take 1,000 mg by mouth once. Twice a day, Disp: , Rfl:   hydroCHLOROthiazide (HYDRODIURIL) 25 MG tablet, Take 1 tablet (25 mg total) by mouth once daily., Disp: 90 tablet, Rfl: 3  hydrocodone-acetaminophen 5-325mg (NORCO) 5-325 mg per tablet, Take 1 tablet by mouth every 6 (six) hours as needed for Pain., Disp: 30 tablet, Rfl: 0  lisinopril (PRINIVIL,ZESTRIL) 40 MG tablet, Take 1 tablet (40 mg total) by mouth once daily., Disp: 90 tablet, Rfl: 3  multivitamin capsule, Take 1 capsule by mouth once daily., Disp: , Rfl:   NIFEdipine (ADALAT CC) 60 MG TbSR, Take 1 tablet (60 mg total) by mouth once daily., Disp: 90 tablet, Rfl: 3  simvastatin (ZOCOR) 10 MG tablet, Take 1 tablet (10 mg total) by mouth every evening., Disp: 90 tablet, Rfl: 3  temazepam (RESTORIL) 15 mg Cap, One tablet At bedtime as needed for insomnia, Disp: 90 capsule, Rfl: 1    No current facility-administered medications on file prior to visit.           Review of patient's family history indicates:    Cancer                         Sister                      Comment: uterine    Diabetes                       Brother                         Hyperlipidemia   Pertinent negatives include no chest pain or shortness of breath.     Review of Systems   Constitutional: Negative for appetite change, chills, fever and unexpected weight change.   HENT: Negative for sore throat and trouble  "swallowing.    Eyes: Negative for pain and visual disturbance.   Respiratory: Negative for cough, shortness of breath and wheezing.    Cardiovascular: Negative for chest pain and palpitations.   Gastrointestinal: Negative for abdominal pain, blood in stool, diarrhea, nausea and vomiting.   Genitourinary: Negative for difficulty urinating, dysuria and hematuria.   Musculoskeletal: Negative for arthralgias, back pain, gait problem and neck pain.   Skin: Negative for rash and wound.   Neurological: Positive for tremors (left hand). Negative for dizziness, weakness, numbness and headaches.   Hematological: Negative for adenopathy.   Psychiatric/Behavioral: Negative for dysphoric mood.       Objective:       /64   Pulse 85   Resp 18   Ht 5' 4" (1.626 m)   Wt 94.8 kg (208 lb 15.9 oz)   SpO2 96%   BMI 35.87 kg/m²     Physical Exam   Constitutional: She is oriented to person, place, and time. She appears well-developed and well-nourished.   HENT:   Head: Normocephalic.   Mouth/Throat: Oropharynx is clear and moist. No oropharyngeal exudate or posterior oropharyngeal erythema.   Eyes: Conjunctivae and EOM are normal. Pupils are equal, round, and reactive to light.   Neck: Normal range of motion. Neck supple. No thyromegaly present.   Cardiovascular: Normal rate, regular rhythm, S1 normal, S2 normal, normal heart sounds and intact distal pulses.  Exam reveals no gallop and no friction rub.    No murmur heard.  Pulmonary/Chest: Effort normal and breath sounds normal. She has no wheezes. She has no rales.   Abdominal: Normal appearance.   Musculoskeletal:        Lumbar back: She exhibits normal range of motion, no tenderness, no deformity and no pain.        Right lower leg: She exhibits no edema.        Left lower leg: She exhibits no edema.   Lymphadenopathy:     She has no cervical adenopathy.   Neurological: She is alert and oriented to person, place, and time. She displays tremor. No cranial nerve deficit. Gait " normal.   Skin: Skin is warm and intact. No rash noted.   Psychiatric: She has a normal mood and affect.       Results for orders placed or performed in visit on 05/21/18   Basic metabolic panel   Result Value Ref Range    Sodium 137 136 - 145 mmol/L    Potassium 3.7 3.5 - 5.1 mmol/L    Chloride 104 95 - 110 mmol/L    CO2 24 23 - 29 mmol/L    Glucose 114 (H) 70 - 110 mg/dL    BUN, Bld 35 (H) 8 - 23 mg/dL    Creatinine 1.2 0.5 - 1.4 mg/dL    Calcium 10.3 8.7 - 10.5 mg/dL    Anion Gap 9 8 - 16 mmol/L    eGFR if African American 51.1 (A) >60 mL/min/1.73 m^2    eGFR if non  44.3 (A) >60 mL/min/1.73 m^2         Assessment:       1. Essential hypertension        Plan:       Essential hypertension  -     Comprehensive metabolic panel; Future; Expected date: 11/18/2018  -     Lipid panel; Future; Expected date: 11/18/2018          Overall doing well  BP controlled  Continue present meds  Counseled on regular exercise, maintenance of a healthy weight, balanced diet rich in fruits/vegetables and lean protein, and avoidance of unhealthy habits like smoking and excessive alcohol intake.  F/u 6 month with labs or PRN

## 2018-06-27 ENCOUNTER — OFFICE VISIT (OUTPATIENT)
Dept: NEUROSURGERY | Facility: CLINIC | Age: 78
End: 2018-06-27
Payer: MEDICARE

## 2018-06-27 ENCOUNTER — HOSPITAL ENCOUNTER (OUTPATIENT)
Dept: RADIOLOGY | Facility: HOSPITAL | Age: 78
Discharge: HOME | End: 2018-06-27
Attending: PHYSICIAN ASSISTANT
Payer: MEDICARE

## 2018-06-27 VITALS
HEART RATE: 78 BPM | DIASTOLIC BLOOD PRESSURE: 70 MMHG | HEIGHT: 64 IN | WEIGHT: 208.56 LBS | BODY MASS INDEX: 35.61 KG/M2 | SYSTOLIC BLOOD PRESSURE: 132 MMHG

## 2018-06-27 DIAGNOSIS — M48.061 SPINAL STENOSIS OF LUMBAR REGION WITHOUT NEUROGENIC CLAUDICATION: ICD-10-CM

## 2018-06-27 DIAGNOSIS — Z98.1 S/P LUMBAR SPINAL FUSION: Primary | ICD-10-CM

## 2018-06-27 DIAGNOSIS — Z98.1 S/P LUMBAR SPINAL FUSION: ICD-10-CM

## 2018-06-27 PROCEDURE — 99024 POSTOP FOLLOW-UP VISIT: CPT | Mod: S$GLB,,, | Performed by: PHYSICIAN ASSISTANT

## 2018-06-27 PROCEDURE — 72114 X-RAY EXAM L-S SPINE BENDING: CPT | Mod: 26,,, | Performed by: RADIOLOGY

## 2018-06-27 PROCEDURE — 72114 X-RAY EXAM L-S SPINE BENDING: CPT | Mod: TC,FY,PO

## 2018-06-27 PROCEDURE — 99999 PR PBB SHADOW E&M-EST. PATIENT-LVL III: CPT | Mod: PBBFAC,,, | Performed by: PHYSICIAN ASSISTANT

## 2018-06-27 NOTE — PROGRESS NOTES
Neurosurgery Outpatient Follow Up    Patient ID: Shandra Velez is a 77 y.o. female.    Chief Complaint   Patient presents with    Post-op Evaluation     3 months s/p L4-5 PSIF. Patient states she has had a complete resolution of both back and lower extremity pain.            Review of Systems   Constitutional: Negative for activity change, appetite change, chills, fever and unexpected weight change.   HENT: Negative for hearing loss, tinnitus, trouble swallowing and voice change.    Eyes: Negative.  Negative for visual disturbance.   Respiratory: Negative for apnea, cough, chest tightness and shortness of breath.    Cardiovascular: Negative.  Negative for chest pain and palpitations.   Gastrointestinal: Negative.  Negative for abdominal pain, constipation, diarrhea, nausea and vomiting.   Genitourinary: Negative.  Negative for difficulty urinating, dysuria, frequency and urgency.   Musculoskeletal: Negative for back pain, gait problem, neck pain and neck stiffness.   Skin: Negative for wound.   Allergic/Immunologic: Negative for immunocompromised state.   Neurological: Negative for dizziness, tremors, seizures, facial asymmetry, speech difficulty, weakness, light-headedness, numbness and headaches.   Psychiatric/Behavioral: Negative for confusion and decreased concentration.       Past Medical History:   Diagnosis Date    Breast cancer 1985    right mastectomy    Encounter for blood transfusion     Hypercholesterolemia     Hypertension     Hypertension     Lumbar spondylosis     Osteopenia     Pulmonary nodule     Smoker      Social History     Social History    Marital status:      Spouse name: N/A    Number of children: 3    Years of education: N/A     Occupational History    retired  for [employer]      Social History Main Topics    Smoking status: Current Every Day Smoker     Packs/day: 0.50     Years: 53.00     Types: Cigarettes    Smokeless tobacco: Never Used    Alcohol use  "No    Drug use: No    Sexual activity: Not Currently     Other Topics Concern    Not on file     Social History Narrative    Lives alone        Hobbies: skyler        From [place]             Family History   Problem Relation Age of Onset    Cancer Sister         uterine    Diabetes Brother     Breast cancer Other 42    Alcohol abuse Sister      Review of patient's allergies indicates:   Allergen Reactions    No known drug allergies        Current Outpatient Prescriptions:     acetaminophen (TYLENOL) 325 MG tablet, Take 2 tablets (650 mg total) by mouth every 4 (four) hours as needed (pain score 1-2/10)., Disp: , Rfl: 0    aspirin 81 mg Tab, Take 81 mg by mouth once. Every day, Disp: , Rfl:     cetirizine (ZYRTEC) 10 MG tablet, Take 10 mg by mouth once daily. Every day, Disp: , Rfl:     DOCOSAHEXANOIC ACID/EPA (FISH OIL ORAL), Take 1,000 mg by mouth once. Twice a day, Disp: , Rfl:     hydroCHLOROthiazide (HYDRODIURIL) 25 MG tablet, Take 1 tablet (25 mg total) by mouth once daily., Disp: 90 tablet, Rfl: 3    lisinopril (PRINIVIL,ZESTRIL) 40 MG tablet, Take 1 tablet (40 mg total) by mouth once daily., Disp: 90 tablet, Rfl: 3    multivitamin capsule, Take 1 capsule by mouth once daily., Disp: , Rfl:     NIFEdipine (ADALAT CC) 60 MG TbSR, Take 1 tablet (60 mg total) by mouth once daily., Disp: 90 tablet, Rfl: 3    simvastatin (ZOCOR) 10 MG tablet, Take 1 tablet (10 mg total) by mouth every evening., Disp: 90 tablet, Rfl: 3    temazepam (RESTORIL) 15 mg Cap, One tablet At bedtime as needed for insomnia, Disp: 90 capsule, Rfl: 1  Blood pressure 132/70, pulse 78, height 5' 4" (1.626 m), weight 94.6 kg (208 lb 8.9 oz).      Neurologic Exam     Mental Status   Oriented to person, place, and time.   Follows 3 step commands.   Attention: normal. Concentration: normal.   Speech: speech is normal   Level of consciousness: alert  Knowledge: good.   Able to name object.     Cranial Nerves     CN II   Visual " fields full to confrontation.   Visual acuity: normal  Right visual field deficit: none  Left visual field deficit: none     CN III, IV, VI   Pupils are equal, round, and reactive to light.  Extraocular motions are normal.   CN III: no CN III palsy  CN VI: no CN VI palsy    CN V   Facial sensation intact.   Right facial sensation deficit: none  Left facial sensation deficit: none    CN VII   Facial expression full, symmetric.   Right facial weakness: none  Left facial weakness: none    CN VIII   CN VIII normal.   Hearing: intact    CN IX, X   CN IX normal.   CN X normal.     CN XI   CN XI normal.     CN XII   CN XII normal.     Motor Exam   Muscle bulk: normal  Overall muscle tone: normal  Right arm tone: normal  Left arm tone: normal  Right arm pronator drift: absent  Left arm pronator drift: absent  Right leg tone: normal  Left leg tone: normal    Strength   Strength 5/5 throughout.   Right neck flexion: 5/5  Left neck flexion: 5/5  Right neck extension: 5/5  Left neck extension: 5/5  Right deltoid: 5/5  Left deltoid: 5/5  Right biceps: 5/5  Left biceps: 5/5  Right triceps: 5/5  Left triceps: 5/5  Right wrist flexion: 5/5  Left wrist flexion: 5/5  Right wrist extension: 5/5  Left wrist extension: 5/5  Right interossei: 5/5  Left interossei: 5/5  Right abdominals: 5/5  Left abdominals: 5/5  Right iliopsoas: 5/5  Left iliopsoas: 5/5  Right quadriceps: 5/5  Left quadriceps: 5/5  Right hamstrin/5  Left hamstrin/5  Right glutei: 5/5  Left glutei: 5/5  Right anterior tibial: 5/5  Left anterior tibial: 5/5  Right posterior tibial: 5/5  Left posterior tibial: 5/5  Right peroneal: 5/5  Left peroneal: 5/5  Right gastroc: 5/5  Left gastroc: 5/5    Sensory Exam   Light touch normal.   Right arm light touch: normal  Left arm light touch: normal  Right leg light touch: normal  Left leg light touch: normal  Vibration normal.     Gait, Coordination, and Reflexes     Gait  Gait: normal    Coordination   Romberg:  negative  Finger to nose coordination: normal  Heel to shin coordination: normal  Tandem walking coordination: normal    Tremor   Resting tremor: absent  Intention tremor: absent  Action tremor: absent    Reflexes   Right brachioradialis: 1+  Left brachioradialis: 1+  Right biceps: 1+  Left biceps: 1+  Right triceps: 1+  Left triceps: 1+  Right patellar: 0  Left patellar: 1+  Right achilles: 1+  Left achilles: 1+  Right : 2+  Left : 2+  Right plantar: normal  Left plantar: normal  Right Melendez: absent  Left Melendez: absent  Right ankle clonus: absent  Left ankle clonus: absent      Physical Exam   Constitutional: She is oriented to person, place, and time. She appears well-developed and well-nourished.   HENT:   Head: Normocephalic and atraumatic.   Eyes: EOM are normal. Pupils are equal, round, and reactive to light.   Neck: Normal range of motion. Neck supple.   Cardiovascular: Normal rate and intact distal pulses.    Pulmonary/Chest: Effort normal. No respiratory distress.   Abdominal: Soft. She exhibits no distension.   Musculoskeletal: Normal range of motion. She exhibits no edema or deformity.   Neurological: She is oriented to person, place, and time. She has normal strength. She displays no atrophy, no tremor and normal reflexes. A sensory deficit is present. No cranial nerve deficit. She exhibits normal muscle tone. She has a normal Finger-Nose-Finger Test, a normal Heel to Shin Test, a normal Romberg Test and a normal Tandem Gait Test. She displays no seizure activity. Gait normal. Coordination and gait normal. GCS eye subscore is 4. GCS verbal subscore is 5. GCS motor subscore is 6.   Reflex Scores:       Tricep reflexes are 1+ on the right side and 1+ on the left side.       Bicep reflexes are 1+ on the right side and 1+ on the left side.       Brachioradialis reflexes are 1+ on the right side and 1+ on the left side.       Patellar reflexes are 0 on the right side and 1+ on the left side.        Achilles reflexes are 1+ on the right side and 1+ on the left side.  Skin: Skin is warm and dry.   Psychiatric: She has a normal mood and affect. Her speech is normal and behavior is normal. Judgment and thought content normal.   Nursing note and vitals reviewed.      Provider dictation:  Ms. Velez presents today for a postoperative visit.  She is 12 weeks status post L4-S1 PSIS by Dr. Miller on April 3, 2018.  She reports that she has no back pain at all in that she is doing exceptionally well.  The left leg pain that she was having preoperatively has completely resolved.  She denies any pain at all today.  She reports full strength in her lower extremities.  She denies weakness.  She denies bowel or bladder dysfunction.  Preoperative Oswestry score 28%, PHQ 6 , postoperative Oswestry score is 6 and PHQ is 1    On physical examination the incision is clean dry and intact.  There is no evidence of infection.  She has good range of motion of the lumbar spine.  She has full range of motion of the lower extremity.  Her gait is normal.  Strength is intact.  She has 0 patellar reflex on the right 1+ on the left.  She has 0 Achilles reflexes bilaterally.  Sensation is maintained in all dermatomes.  There is no clonus.    X-rays reviewed today show no evidence of hardware failure or complication.  There is intact hardware from L4-S1.  There is evidence of early osseous fusion.    The patient has done exceptionally well postoperatively.  She has no pain and has significant improvement in her quality of life.  She has no residual radicular symptoms.  At this point she may follow up as needed.  We discussed healthy back precautions indefinitely.  She wishes to express her gratitude Dr. Miller and the staff for her speedy recovery and restoring her quality of life.    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

## 2018-08-14 ENCOUNTER — OFFICE VISIT (OUTPATIENT)
Dept: SURGERY | Facility: CLINIC | Age: 78
End: 2018-08-14
Payer: MEDICARE

## 2018-08-14 DIAGNOSIS — Z80.3 FAMILY HISTORY OF BREAST CANCER: ICD-10-CM

## 2018-08-14 DIAGNOSIS — Z71.83 ENCOUNTER FOR NONPROCREATIVE GENETIC COUNSELING: Primary | ICD-10-CM

## 2018-08-14 DIAGNOSIS — Z85.3 PERSONAL HISTORY OF BREAST CANCER: ICD-10-CM

## 2018-08-14 DIAGNOSIS — Z80.41 FAMILY HISTORY OF OVARIAN CANCER: ICD-10-CM

## 2018-08-14 PROCEDURE — 99203 OFFICE O/P NEW LOW 30 MIN: CPT | Mod: S$GLB,,, | Performed by: NURSE PRACTITIONER

## 2018-08-14 NOTE — PROGRESS NOTES
Mrs Velez presents for genetic counseling, previously seen by me in 2012 with negative BRACAnalysis. She presents with her daughter, Nancy Carpio who was also seen by me. She has a personal history of breast cancer diagnosed at age 45. Her family history includes a sister with ovarian cancer at 50, brother with melanoma at 64 niece with breast cancer at 34, and another sister with liver cancer.       We reviewed her medical and family history and discussed the genetics of breast cancer, cancer risks associated with a hereditary predisposition to cancer, and the benefits, risks, and limitations of genetic testing according to current NCCN guidelines.  Discussed sporadic verses family clustering verses hereditary cancer. The patients history raises the possibility of a genetic susceptibility to breast cancer. She tested negative for BRCA1/2 mutations in 2012. However, additional cancer susceptibility genes are now available for testing and this was discussed with her and her daughter today.     Possible results of genetic testing: positive result would mean that a mutation known to be associated with a higher risk of cancer was identified; negative result would mean that no mutation known to increase the risk of cancer was identified; variant of uncertain significance (VUS) in which a genetic change was identified in a gene, but it is unclear if this change causes an increase in cancer risk normally associated with mutations in the gene. There is an estimated 1-2% chance of a reported variant of uncertain significance in BRCA1 and BRCA2 and up to a 30% with newer genes included in multigene panels. Due to the clinical uncertainty of this type of change, VUSs are not used to make medical management decisions for a patient. Finally, I advised the patient that her medical management may change based on these results and may include increased surveillance, use of chemoprevention, or prophylactic surgery. A tailored  cancer prevention plan and implications of the patients test results for relatives will be reviewed once results are available.        Discussed the cost of testing, various labs which can conduct this additional genetic testing and potential insurance coverage. She desires to proceed with testing, expressed her understanding of the information discussed today and provided informed consent for testing.            RECOMMENDATIONS:                                                                1. Updated myRisk through [employer], results expected in approximately 2-3 weeks.   2. Post test counseling will be provided once results are available with healthcare management per results, including testing of any additional family members if pathogenic mutation is identified.    Time in counseling today 30 min, total time 30 min (entire time spent in face to face counseling)

## 2018-08-24 DIAGNOSIS — Z12.11 COLON CANCER SCREENING: ICD-10-CM

## 2018-09-05 ENCOUNTER — TELEPHONE (OUTPATIENT)
Dept: SURGERY | Facility: CLINIC | Age: 78
End: 2018-09-05

## 2018-09-05 NOTE — TELEPHONE ENCOUNTER
Contacted the patient regarding genetic DNA sample.  Stated to the patient that we need to collect another DNA (Blood) due to lack of DNA in 1st sample submitted.  The patient stated she will contact our office to schedule appointment for recollection of DNA sample.  Number to [facility] and my direct line to my desk given to patient.  The patient voiced understanding of the numbers.

## 2018-09-05 NOTE — TELEPHONE ENCOUNTER
Returned the patient call.  The patient is scheduled to be seen on Tuesday 9/11/18 at [facility] for DNA Sample re-collection.  The patient voiced understanding of appointment date, time, and location.  Reminder letter mailed to the patient.

## 2018-09-05 NOTE — TELEPHONE ENCOUNTER
----- Message from Sharri Baeza sent at 9/5/2018 12:02 PM CDT -----  Patient Returning Call from [facility]    Who Left Message for Patient:Betty Gonzalez , Please return the phone call to the pt, she says that she is sorry she missed your call  Communication Preference:[phone]  Additional Information:

## 2018-09-05 NOTE — TELEPHONE ENCOUNTER
----- Message from Lucita Hanks sent at 9/5/2018 12:20 PM CDT -----  Contact: Pt.Self   Pt. States she is returning call from nurse  please call Pt. Back @ [phone] Thank You

## 2018-09-05 NOTE — TELEPHONE ENCOUNTER
Returned the patient call regarding the message below.  The patient did not answer, message left for the patient to contact our office.

## 2018-09-06 ENCOUNTER — TELEPHONE (OUTPATIENT)
Dept: SURGERY | Facility: CLINIC | Age: 78
End: 2018-09-06

## 2018-09-06 NOTE — TELEPHONE ENCOUNTER
Contacted the patient regarding voicemail I received from the patient to reschedule genetic testing re-collection appointment.  The patient is rescheduled to be seen on Tuesday 9/18/18 at 1:30 pm.  The patient voiced understanding of appointment date, time ,aan location.  Reminder letter mailed to the patient.

## 2018-09-18 ENCOUNTER — CLINICAL SUPPORT (OUTPATIENT)
Dept: SURGERY | Facility: CLINIC | Age: 78
End: 2018-09-18
Payer: MEDICARE

## 2018-09-18 ENCOUNTER — LAB VISIT (OUTPATIENT)
Dept: LAB | Facility: OTHER | Age: 78
End: 2018-09-18
Payer: MEDICARE

## 2018-09-18 DIAGNOSIS — Z80.3 FAMILY HISTORY OF BREAST CANCER: ICD-10-CM

## 2018-09-18 DIAGNOSIS — Z85.3 PERSONAL HISTORY OF BREAST CANCER: Primary | ICD-10-CM

## 2018-09-18 DIAGNOSIS — Z80.41 FAMILY HISTORY OF OVARIAN CANCER: ICD-10-CM

## 2018-09-18 DIAGNOSIS — Z85.3 PERSONAL HISTORY OF BREAST CANCER: ICD-10-CM

## 2018-09-18 PROCEDURE — 36415 COLL VENOUS BLD VENIPUNCTURE: CPT

## 2018-09-18 NOTE — PROGRESS NOTES
Patient presented for DNA recollection, blood sample will be collected by the lab and sent to [facility] for updated myRisk

## 2018-09-26 ENCOUNTER — DOCUMENTATION ONLY (OUTPATIENT)
Dept: SURGERY | Facility: CLINIC | Age: 78
End: 2018-09-26

## 2018-09-26 NOTE — PROGRESS NOTES
Results received from [facility], negative updated myRisk, patient was phoned and results discussed.

## 2018-09-27 LAB — INTEGRATED BRAC ANALYSIS: NORMAL

## 2018-10-16 ENCOUNTER — IMMUNIZATION (OUTPATIENT)
Dept: FAMILY MEDICINE | Facility: CLINIC | Age: 78
End: 2018-10-16
Payer: MEDICARE

## 2018-10-16 PROCEDURE — 90662 IIV NO PRSV INCREASED AG IM: CPT | Mod: PBBFAC,PO

## 2018-11-20 ENCOUNTER — LAB VISIT (OUTPATIENT)
Dept: LAB | Facility: HOSPITAL | Age: 78
End: 2018-11-20
Attending: FAMILY MEDICINE
Payer: MEDICARE

## 2018-11-20 DIAGNOSIS — I10 ESSENTIAL HYPERTENSION: ICD-10-CM

## 2018-11-20 LAB
ALBUMIN SERPL BCP-MCNC: 3.5 G/DL
ALP SERPL-CCNC: 68 U/L
ALT SERPL W/O P-5'-P-CCNC: 7 U/L
ANION GAP SERPL CALC-SCNC: 9 MMOL/L
AST SERPL-CCNC: 21 U/L
BILIRUB SERPL-MCNC: 0.4 MG/DL
BUN SERPL-MCNC: 27 MG/DL
CALCIUM SERPL-MCNC: 9.7 MG/DL
CHLORIDE SERPL-SCNC: 106 MMOL/L
CHOLEST SERPL-MCNC: 170 MG/DL
CHOLEST/HDLC SERPL: 3.2 {RATIO}
CO2 SERPL-SCNC: 25 MMOL/L
CREAT SERPL-MCNC: 1 MG/DL
EST. GFR  (AFRICAN AMERICAN): >60 ML/MIN/1.73 M^2
EST. GFR  (NON AFRICAN AMERICAN): 54.9 ML/MIN/1.73 M^2
GLUCOSE SERPL-MCNC: 110 MG/DL
HDLC SERPL-MCNC: 53 MG/DL
HDLC SERPL: 31.2 %
LDLC SERPL CALC-MCNC: 86.6 MG/DL
NONHDLC SERPL-MCNC: 117 MG/DL
POTASSIUM SERPL-SCNC: 3.7 MMOL/L
PROT SERPL-MCNC: 7 G/DL
SODIUM SERPL-SCNC: 140 MMOL/L
TRIGL SERPL-MCNC: 152 MG/DL

## 2018-11-20 PROCEDURE — 80061 LIPID PANEL: CPT

## 2018-11-20 PROCEDURE — 80053 COMPREHEN METABOLIC PANEL: CPT

## 2018-11-20 PROCEDURE — 36415 COLL VENOUS BLD VENIPUNCTURE: CPT | Mod: PO

## 2018-11-27 ENCOUNTER — OFFICE VISIT (OUTPATIENT)
Dept: FAMILY MEDICINE | Facility: CLINIC | Age: 78
End: 2018-11-27
Payer: MEDICARE

## 2018-11-27 VITALS
OXYGEN SATURATION: 95 % | DIASTOLIC BLOOD PRESSURE: 70 MMHG | BODY MASS INDEX: 35.8 KG/M2 | SYSTOLIC BLOOD PRESSURE: 110 MMHG | WEIGHT: 209.69 LBS | HEART RATE: 77 BPM | HEIGHT: 64 IN | TEMPERATURE: 98 F

## 2018-11-27 DIAGNOSIS — E78.5 HYPERLIPIDEMIA, UNSPECIFIED HYPERLIPIDEMIA TYPE: ICD-10-CM

## 2018-11-27 DIAGNOSIS — Z00.00 PREVENTATIVE HEALTH CARE: Primary | ICD-10-CM

## 2018-11-27 DIAGNOSIS — I10 ESSENTIAL HYPERTENSION: ICD-10-CM

## 2018-11-27 PROCEDURE — 3078F DIAST BP <80 MM HG: CPT | Mod: CPTII,S$GLB,, | Performed by: FAMILY MEDICINE

## 2018-11-27 PROCEDURE — 99999 PR PBB SHADOW E&M-EST. PATIENT-LVL IV: CPT | Mod: PBBFAC,,, | Performed by: FAMILY MEDICINE

## 2018-11-27 PROCEDURE — 99397 PER PM REEVAL EST PAT 65+ YR: CPT | Mod: S$GLB,,, | Performed by: FAMILY MEDICINE

## 2018-11-27 PROCEDURE — 3074F SYST BP LT 130 MM HG: CPT | Mod: CPTII,S$GLB,, | Performed by: FAMILY MEDICINE

## 2018-11-27 NOTE — PROGRESS NOTES
Subjective:       Patient ID: Shandra Velez is a 78 y.o. female.    Chief Complaint: Annual Exam    Here today for annual exam and f/u on HTN     She is overall feeling well.    HTN - tolerating lisinopril 40mg daily, HCTZ 25mg daily and nifedipine Xl 60mg daily  HLD - tolerating Zocor 10mg daily  Allergies: taking Zyrtec 10mg daily  Insomnia - using restoril 15mg rarely     Past Medical History:    Hypertension                                                  Pulmonary nodule                                              Smoker                                                        Osteopenia                                                    Hypertension                                                  Hypercholesterolemia                                          Breast cancer                                                 Lumbar spondylosis                                            Past Surgical History:    MASTECTOMY                                       1985            Comment:right    CHOLECYSTECTOMY                                                APPENDECTOMY                                                   UMBILICAL HERNIA REPAIR                                        TUBAL LIGATION                                                 BREAST RECONSTRUCTION                                            Comment:right    Allergies:   -- No Known Drug Allergies     Social History    Marital Status:              Spouse Name:                       Years of Education:                 Number of children: 3             Occupational History  Occupation          Employer            Comment               retired marketing *                         Social History Main Topics    Smoking Status: Current Every Day Smoker        Packs/Day: 0.50  Years: 53         Types: Cigarettes    Smokeless Status: Never Used                        Alcohol Use: Yes             Drug Use: No              Sexual Activity: Not Currently         Other Topics            Concern    None on file    Social History Narrative    Lives alone      Hobbies: skyler      From [place]    Current Outpatient Medications on File Prior to Visit:  acetaminophen (TYLENOL) 325 MG tablet, Take 2 tablets (650 mg total) by mouth every 4 (four) hours as needed (pain score 1-2/10)., Disp: , Rfl: 0  aspirin 81 mg Tab, Take 81 mg by mouth once. Every day, Disp: , Rfl:   cetirizine (ZYRTEC) 10 MG tablet, Take 10 mg by mouth once daily. Every day, Disp: , Rfl:   DOCOSAHEXANOIC ACID/EPA (FISH OIL ORAL), Take 1,000 mg by mouth once. Twice a day, Disp: , Rfl:   hydroCHLOROthiazide (HYDRODIURIL) 25 MG tablet, Take 1 tablet (25 mg total) by mouth once daily., Disp: 90 tablet, Rfl: 3  lisinopril (PRINIVIL,ZESTRIL) 40 MG tablet, Take 1 tablet (40 mg total) by mouth once daily., Disp: 90 tablet, Rfl: 3  multivitamin capsule, Take 1 capsule by mouth once daily., Disp: , Rfl:   NIFEdipine (ADALAT CC) 60 MG TbSR, Take 1 tablet (60 mg total) by mouth once daily., Disp: 90 tablet, Rfl: 3  simvastatin (ZOCOR) 10 MG tablet, Take 1 tablet (10 mg total) by mouth every evening., Disp: 90 tablet, Rfl: 3  temazepam (RESTORIL) 15 mg Cap, One tablet At bedtime as needed for insomnia, Disp: 90 capsule, Rfl: 1    No current facility-administered medications on file prior to visit.     Review of patient's family history indicates:    Cancer                         Sister                      Comment: uterine    Diabetes                       Brother                         Hyperlipidemia   Pertinent negatives include no chest pain or shortness of breath.     Review of Systems   Constitutional: Negative for appetite change, chills, fever and unexpected weight change.   HENT: Negative for sore throat and trouble swallowing.    Eyes: Negative for pain and visual disturbance.   Respiratory: Negative for cough, shortness of breath and wheezing.    Cardiovascular: Negative for chest pain and palpitations.  "  Gastrointestinal: Negative for abdominal pain, blood in stool, diarrhea, nausea and vomiting.   Genitourinary: Negative for difficulty urinating, dysuria and hematuria.   Musculoskeletal: Negative for arthralgias, back pain, gait problem and neck pain.   Skin: Negative for rash and wound.   Neurological: Positive for tremors (left hand). Negative for dizziness, weakness, numbness and headaches.   Hematological: Negative for adenopathy.   Psychiatric/Behavioral: Negative for dysphoric mood.       Objective:       /70   Pulse 77   Temp 97.7 °F (36.5 °C) (Oral)   Ht 5' 4" (1.626 m)   Wt 95.1 kg (209 lb 10.5 oz)   SpO2 95%   BMI 35.99 kg/m²     Physical Exam   Constitutional: She is oriented to person, place, and time. She appears well-developed and well-nourished.   HENT:   Head: Normocephalic.   Mouth/Throat: Oropharynx is clear and moist. No oropharyngeal exudate or posterior oropharyngeal erythema.   Eyes: Conjunctivae and EOM are normal. Pupils are equal, round, and reactive to light.   Neck: Normal range of motion. Neck supple. No thyromegaly present.   Cardiovascular: Normal rate, regular rhythm, S1 normal, S2 normal, normal heart sounds and intact distal pulses. Exam reveals no gallop and no friction rub.   No murmur heard.  Pulmonary/Chest: Effort normal and breath sounds normal. She has no wheezes. She has no rales.   Abdominal: Soft. Normal appearance and bowel sounds are normal.   Musculoskeletal:        Lumbar back: She exhibits normal range of motion, no tenderness, no deformity and no pain.        Right lower leg: She exhibits no edema.        Left lower leg: She exhibits no edema.   Lymphadenopathy:     She has no cervical adenopathy.   Neurological: She is alert and oriented to person, place, and time. She displays tremor. No cranial nerve deficit. Gait normal.   Skin: Skin is warm and intact. No rash noted.   Psychiatric: She has a normal mood and affect.       Results for orders placed " or performed in visit on 11/20/18   Comprehensive metabolic panel   Result Value Ref Range    Sodium 140 136 - 145 mmol/L    Potassium 3.7 3.5 - 5.1 mmol/L    Chloride 106 95 - 110 mmol/L    CO2 25 23 - 29 mmol/L    Glucose 110 70 - 110 mg/dL    BUN, Bld 27 (H) 8 - 23 mg/dL    Creatinine 1.0 0.5 - 1.4 mg/dL    Calcium 9.7 8.7 - 10.5 mg/dL    Total Protein 7.0 6.0 - 8.4 g/dL    Albumin 3.5 3.5 - 5.2 g/dL    Total Bilirubin 0.4 0.1 - 1.0 mg/dL    Alkaline Phosphatase 68 55 - 135 U/L    AST 21 10 - 40 U/L    ALT 7 (L) 10 - 44 U/L    Anion Gap 9 8 - 16 mmol/L    eGFR if African American >60.0 >60 mL/min/1.73 m^2    eGFR if non African American 54.9 (A) >60 mL/min/1.73 m^2   Lipid panel   Result Value Ref Range    Cholesterol 170 120 - 199 mg/dL    Triglycerides 152 (H) 30 - 150 mg/dL    HDL 53 40 - 75 mg/dL    LDL Cholesterol 86.6 63.0 - 159.0 mg/dL    HDL/Chol Ratio 31.2 20.0 - 50.0 %    Total Cholesterol/HDL Ratio 3.2 2.0 - 5.0    Non-HDL Cholesterol 117 mg/dL         Assessment:       1. Preventative health care    2. Essential hypertension    3. Hyperlipidemia, unspecified hyperlipidemia type        Plan:       Preventative health care    Essential hypertension    Hyperlipidemia, unspecified hyperlipidemia type          Overall doing well  BP controlled  Continue present meds  Counseled on regular exercise, maintenance of a healthy weight, balanced diet rich in fruits/vegetables and lean protein, and avoidance of unhealthy habits like smoking and excessive alcohol intake.  F/u 12 month with labs or PRN

## 2019-02-20 DIAGNOSIS — E78.5 HYPERLIPIDEMIA: ICD-10-CM

## 2019-02-21 RX ORDER — SIMVASTATIN 10 MG/1
TABLET, FILM COATED ORAL
Qty: 90 TABLET | Refills: 3 | OUTPATIENT
Start: 2019-02-21

## 2019-02-21 NOTE — PROGRESS NOTES
Refill Authorization Note     is requesting a refill authorization.    Brief assessment and rationale for refill: Quick DC; rts (4/19)  Name and strength of medication: simvastatin (ZOCOR) 10 MG tablet       Medication Therapy Plan: last escripted to [facility] on 04/23/18 for 12 month supply; refill too soon quick DC    Medication reconciliation completed: No                         Comments:

## 2019-03-20 ENCOUNTER — HOSPITAL ENCOUNTER (OUTPATIENT)
Dept: RADIOLOGY | Facility: HOSPITAL | Age: 79
Discharge: HOME | End: 2019-03-20
Attending: FAMILY MEDICINE
Payer: MEDICARE

## 2019-03-20 VITALS — HEIGHT: 64 IN | BODY MASS INDEX: 35.8 KG/M2 | WEIGHT: 209.69 LBS

## 2019-03-20 DIAGNOSIS — Z12.39 ENCOUNTER FOR SPECIAL SCREENING EXAMINATION FOR NEOPLASM OF BREAST: ICD-10-CM

## 2019-03-20 PROCEDURE — 77063 BREAST TOMOSYNTHESIS BI: CPT | Mod: 26,,, | Performed by: RADIOLOGY

## 2019-03-20 PROCEDURE — 77067 MAMMO DIGITAL SCREENING LEFT WITH TOMOSYNTHESIS_CAD: ICD-10-PCS | Mod: 26,,, | Performed by: RADIOLOGY

## 2019-03-20 PROCEDURE — 77063 MAMMO DIGITAL SCREENING LEFT WITH TOMOSYNTHESIS_CAD: ICD-10-PCS | Mod: 26,,, | Performed by: RADIOLOGY

## 2019-03-20 PROCEDURE — 77067 SCR MAMMO BI INCL CAD: CPT | Mod: TC,PO

## 2019-03-20 PROCEDURE — 77067 SCR MAMMO BI INCL CAD: CPT | Mod: 26,,, | Performed by: RADIOLOGY

## 2019-05-31 DIAGNOSIS — E78.5 HYPERLIPIDEMIA, UNSPECIFIED HYPERLIPIDEMIA TYPE: ICD-10-CM

## 2019-05-31 DIAGNOSIS — I10 ESSENTIAL HYPERTENSION: ICD-10-CM

## 2019-05-31 RX ORDER — SIMVASTATIN 10 MG/1
TABLET, FILM COATED ORAL
Qty: 90 TABLET | Refills: 3 | Status: SHIPPED | OUTPATIENT
Start: 2019-05-31 | End: 2020-04-30

## 2019-05-31 RX ORDER — HYDROCHLOROTHIAZIDE 25 MG/1
TABLET ORAL
Qty: 90 TABLET | Refills: 3 | Status: SHIPPED | OUTPATIENT
Start: 2019-05-31 | End: 2020-04-30

## 2019-05-31 RX ORDER — NIFEDIPINE 60 MG/1
TABLET, EXTENDED RELEASE ORAL
Qty: 90 TABLET | Refills: 3 | Status: SHIPPED | OUTPATIENT
Start: 2019-05-31 | End: 2020-05-04

## 2019-06-13 DIAGNOSIS — I10 ESSENTIAL HYPERTENSION: ICD-10-CM

## 2019-06-13 RX ORDER — LISINOPRIL 40 MG/1
40 TABLET ORAL DAILY
Qty: 90 TABLET | Refills: 3 | Status: SHIPPED | OUTPATIENT
Start: 2019-06-13 | End: 2020-04-30

## 2019-06-13 NOTE — TELEPHONE ENCOUNTER
"----- Message from Ja Morley sent at 6/13/2019  8:14 AM CDT -----  Contact: Alisa with [facility]  Type:  RX Refill Request    Who Called:  Alisa  Refill or New Rx:  refill  RX Name and Strength:  lisinopril (PRINIVIL,ZESTRIL) 40 MG tablet  How is the patient currently taking it? (ex. 1XDay):  1 x day  Is this a 30 day or 90 day RX:  90  Preferred Pharmacy with phone number:      [facility] - [place], [place] - [address] [facility]  [address]  [facility] [address]  Phone: [phone] Fax: [phone]    Local or Mail Order:    Ordering Provider:    Shawn Call Back Number:  [phone]  Additional Information:  [facility] has faxed a refill request for this recently but could not confirm the date sent. Pt "should be out or almost out".    Reference number: [id number]    "

## 2019-06-25 ENCOUNTER — OFFICE VISIT (OUTPATIENT)
Dept: FAMILY MEDICINE | Facility: CLINIC | Age: 79
End: 2019-06-25
Payer: MEDICARE

## 2019-06-25 VITALS
BODY MASS INDEX: 36.84 KG/M2 | TEMPERATURE: 98 F | OXYGEN SATURATION: 94 % | HEART RATE: 78 BPM | HEIGHT: 64 IN | WEIGHT: 215.81 LBS | DIASTOLIC BLOOD PRESSURE: 76 MMHG | SYSTOLIC BLOOD PRESSURE: 118 MMHG

## 2019-06-25 DIAGNOSIS — R60.0 PEDAL EDEMA: Primary | ICD-10-CM

## 2019-06-25 DIAGNOSIS — E78.5 HYPERLIPIDEMIA, UNSPECIFIED HYPERLIPIDEMIA TYPE: ICD-10-CM

## 2019-06-25 PROCEDURE — 1101F PT FALLS ASSESS-DOCD LE1/YR: CPT | Mod: CPTII,S$GLB,, | Performed by: FAMILY MEDICINE

## 2019-06-25 PROCEDURE — 99999 PR PBB SHADOW E&M-EST. PATIENT-LVL IV: ICD-10-PCS | Mod: PBBFAC,,, | Performed by: FAMILY MEDICINE

## 2019-06-25 PROCEDURE — 99999 PR PBB SHADOW E&M-EST. PATIENT-LVL IV: CPT | Mod: PBBFAC,,, | Performed by: FAMILY MEDICINE

## 2019-06-25 PROCEDURE — 99213 PR OFFICE/OUTPT VISIT, EST, LEVL III, 20-29 MIN: ICD-10-PCS | Mod: S$GLB,,, | Performed by: FAMILY MEDICINE

## 2019-06-25 PROCEDURE — 3078F PR MOST RECENT DIASTOLIC BLOOD PRESSURE < 80 MM HG: ICD-10-PCS | Mod: CPTII,S$GLB,, | Performed by: FAMILY MEDICINE

## 2019-06-25 PROCEDURE — 3074F SYST BP LT 130 MM HG: CPT | Mod: CPTII,S$GLB,, | Performed by: FAMILY MEDICINE

## 2019-06-25 PROCEDURE — 3074F PR MOST RECENT SYSTOLIC BLOOD PRESSURE < 130 MM HG: ICD-10-PCS | Mod: CPTII,S$GLB,, | Performed by: FAMILY MEDICINE

## 2019-06-25 PROCEDURE — 99213 OFFICE O/P EST LOW 20 MIN: CPT | Mod: S$GLB,,, | Performed by: FAMILY MEDICINE

## 2019-06-25 PROCEDURE — 1101F PR PT FALLS ASSESS DOC 0-1 FALLS W/OUT INJ PAST YR: ICD-10-PCS | Mod: CPTII,S$GLB,, | Performed by: FAMILY MEDICINE

## 2019-06-25 PROCEDURE — 3078F DIAST BP <80 MM HG: CPT | Mod: CPTII,S$GLB,, | Performed by: FAMILY MEDICINE

## 2019-06-25 NOTE — PROGRESS NOTES
Subjective:       Patient ID: Shandra Velez is a 78 y.o. female.    Chief Complaint: Edema (Ankles)    C/o some swelling in bilateral feet for the last month.  Worse at the end of the day.  No SOB or weight gain.  Similar symptoms last summer.  Symptoms worse in heat  Elevation helps  No swelling in AM.  Shoes tight at night.      Past Medical History:   Diagnosis Date    Breast cancer 1985    right mastectomy    Encounter for blood transfusion     Hypercholesterolemia     Hypertension     Hypertension     Lumbar spondylosis     Osteopenia     Pulmonary nodule     Smoker        Past Surgical History:   Procedure Laterality Date    APPENDECTOMY      BREAST RECONSTRUCTION  1990    right    CATARACT EXTRACTION W/  INTRAOCULAR LENS IMPLANT Bilateral     CHOLECYSTECTOMY      SILVERIO-TRANSFORAMINAL L4/5 Right 11/17/2017    [facility]    EYE SURGERY      FUSION-POSTERIOR LUMBAR INTERBODY FUSION  L4-5 PSIF Decompression N/A 4/3/2018    [facility] OR    INJECTION-STEROID-EPIDURAL-CAUDAL N/A 1/8/2018    [facility] OR    JOINT REPLACEMENT  2008    right knee     LUMBAR LAMINECTOMY      MASTECTOMY Right 1985    TOTAL KNEE ARTHROPLASTY Right     TUBAL LIGATION      UMBILICAL HERNIA REPAIR         Review of patient's allergies indicates:   Allergen Reactions    No known drug allergies        Social History     Socioeconomic History    Marital status:      Spouse name: Not on file    Number of children: 3    Years of education: Not on file    Highest education level: Not on file   Occupational History    Occupation: retired  for [employer]   Social Needs    Financial resource strain: Not on file    Food insecurity:     Worry: Not on file     Inability: Not on file    Transportation needs:     Medical: Not on file     Non-medical: Not on file   Tobacco Use    Smoking status: Current  Every Day Smoker     Packs/day: 0.50     Years: 53.00     Pack years: 26.50     Types: Cigarettes    Smokeless tobacco: Never Used   Substance and Sexual Activity    Alcohol use: No    Drug use: No    Sexual activity: Not Currently   Lifestyle    Physical activity:     Days per week: Not on file     Minutes per session: Not on file    Stress: Not on file   Relationships    Social connections:     Talks on phone: Not on file     Gets together: Not on file     Attends Advent service: Not on file     Active member of club or organization: Not on file     Attends meetings of clubs or organizations: Not on file     Relationship status: Not on file   Other Topics Concern    Not on file   Social History Narrative    Lives alone        Hobbies: skyler        From [place]           Current Outpatient Medications on File Prior to Visit   Medication Sig Dispense Refill    acetaminophen (TYLENOL) 325 MG tablet Take 2 tablets (650 mg total) by mouth every 4 (four) hours as needed (pain score 1-2/10).  0    aspirin 81 mg Tab Take 81 mg by mouth once. Every day      cetirizine (ZYRTEC) 10 MG tablet Take 10 mg by mouth once daily. Every day      DOCOSAHEXANOIC ACID/EPA (FISH OIL ORAL) Take 1,000 mg by mouth once. Twice a day      hydroCHLOROthiazide (HYDRODIURIL) 25 MG tablet TAKE 1 TABLET ONCE DAILY 90 tablet 3    lisinopril (PRINIVIL,ZESTRIL) 40 MG tablet Take 1 tablet (40 mg total) by mouth once daily. 90 tablet 3    multivitamin capsule Take 1 capsule by mouth once daily.      NIFEdipine (ADALAT CC) 60 MG TbSR TAKE 1 TABLET ONCE DAILY 90 tablet 3    simvastatin (ZOCOR) 10 MG tablet TAKE 1 TABLET EVERY EVENING 90 tablet 3    temazepam (RESTORIL) 15 mg Cap One tablet At bedtime as needed for insomnia 90 capsule 1     No current facility-administered medications on file prior to visit.        Family History   Problem Relation Age of Onset    Cancer Sister         uterine    Diabetes Brother     Breast  "cancer Other 42    Alcohol abuse Sister        Review of Systems   Constitutional: Negative for appetite change, chills, fever and unexpected weight change.   HENT: Negative for sore throat and trouble swallowing.    Eyes: Negative for pain and visual disturbance.   Respiratory: Negative for cough, shortness of breath and wheezing.    Cardiovascular: Positive for leg swelling. Negative for chest pain and palpitations.   Gastrointestinal: Negative for abdominal pain, blood in stool, diarrhea, nausea and vomiting.   Genitourinary: Negative for difficulty urinating, dysuria and hematuria.   Musculoskeletal: Negative for arthralgias, gait problem and neck pain.   Skin: Negative for rash and wound.   Neurological: Negative for dizziness, weakness, numbness and headaches.   Hematological: Negative for adenopathy.   Psychiatric/Behavioral: Negative for dysphoric mood.       Objective:      /76   Pulse 78   Temp 97.6 °F (36.4 °C)   Ht 5' 4" (1.626 m)   Wt 97.9 kg (215 lb 13.3 oz)   SpO2 (!) 94%   BMI 37.05 kg/m²   Physical Exam   Constitutional: She is oriented to person, place, and time. She appears well-developed and well-nourished.   HENT:   Head: Normocephalic.   Mouth/Throat: Oropharynx is clear and moist. No oropharyngeal exudate or posterior oropharyngeal erythema.   Eyes: Pupils are equal, round, and reactive to light. Conjunctivae and EOM are normal.   Neck: Normal range of motion. Neck supple. No thyromegaly present.   Cardiovascular: Normal rate, regular rhythm, S1 normal, S2 normal, normal heart sounds and intact distal pulses. Exam reveals no gallop and no friction rub.   No murmur heard.  Pulmonary/Chest: Effort normal and breath sounds normal. She has no wheezes. She has no rales.   Abdominal: Normal appearance.   Musculoskeletal:        Right lower leg: She exhibits no edema.        Left lower leg: She exhibits no edema.   1+ bilateral pedal edema   Lymphadenopathy:     She has no cervical " adenopathy.   Neurological: She is alert and oriented to person, place, and time. No cranial nerve deficit. Gait normal.   Skin: Skin is warm and intact. No rash noted.   Psychiatric: She has a normal mood and affect.       Assessment:       1. Pedal edema    2. Hyperlipidemia, unspecified hyperlipidemia type        Plan:       Pedal edema    Hyperlipidemia, unspecified hyperlipidemia type  -     Comprehensive metabolic panel; Future; Expected date: 12/22/2019  -     Lipid panel; Future; Expected date: 12/22/2019      reassurance  increse HCTZ for 3 days  Elevated legs  Work on weight loss  Counseled on regular exercise, maintenance of a healthy weight, balanced diet rich in fruits/vegetables and lean protein, and avoidance of unhealthy habits like smoking and excessive alcohol intake.

## 2019-11-06 ENCOUNTER — OFFICE VISIT (OUTPATIENT)
Dept: FAMILY MEDICINE | Facility: CLINIC | Age: 79
End: 2019-11-06
Payer: MEDICARE

## 2019-11-06 VITALS
BODY MASS INDEX: 36.7 KG/M2 | SYSTOLIC BLOOD PRESSURE: 132 MMHG | DIASTOLIC BLOOD PRESSURE: 66 MMHG | WEIGHT: 214.94 LBS | OXYGEN SATURATION: 98 % | HEIGHT: 64 IN | HEART RATE: 82 BPM | TEMPERATURE: 98 F

## 2019-11-06 DIAGNOSIS — G47.00 INSOMNIA, UNSPECIFIED TYPE: ICD-10-CM

## 2019-11-06 DIAGNOSIS — E66.01 MORBID (SEVERE) OBESITY DUE TO EXCESS CALORIES: ICD-10-CM

## 2019-11-06 DIAGNOSIS — M46.96 UNSPECIFIED INFLAMMATORY SPONDYLOPATHY, LUMBAR REGION: ICD-10-CM

## 2019-11-06 DIAGNOSIS — I77.819 ECTATIC AORTA: ICD-10-CM

## 2019-11-06 DIAGNOSIS — L82.1 SEBORRHEIC KERATOSES: ICD-10-CM

## 2019-11-06 DIAGNOSIS — D49.89: Primary | ICD-10-CM

## 2019-11-06 PROCEDURE — 3075F PR MOST RECENT SYSTOLIC BLOOD PRESS GE 130-139MM HG: ICD-10-PCS | Mod: CPTII,S$GLB,, | Performed by: FAMILY MEDICINE

## 2019-11-06 PROCEDURE — 3078F PR MOST RECENT DIASTOLIC BLOOD PRESSURE < 80 MM HG: ICD-10-PCS | Mod: CPTII,S$GLB,, | Performed by: FAMILY MEDICINE

## 2019-11-06 PROCEDURE — 90662 FLU VACCINE - HIGH DOSE (65+) PRESERVATIVE FREE IM: ICD-10-PCS | Mod: S$GLB,,, | Performed by: FAMILY MEDICINE

## 2019-11-06 PROCEDURE — 99999 PR PBB SHADOW E&M-EST. PATIENT-LVL IV: CPT | Mod: PBBFAC,,, | Performed by: FAMILY MEDICINE

## 2019-11-06 PROCEDURE — G0008 FLU VACCINE - HIGH DOSE (65+) PRESERVATIVE FREE IM: ICD-10-PCS | Mod: S$GLB,,, | Performed by: FAMILY MEDICINE

## 2019-11-06 PROCEDURE — 99999 PR PBB SHADOW E&M-EST. PATIENT-LVL IV: ICD-10-PCS | Mod: PBBFAC,,, | Performed by: FAMILY MEDICINE

## 2019-11-06 PROCEDURE — 3078F DIAST BP <80 MM HG: CPT | Mod: CPTII,S$GLB,, | Performed by: FAMILY MEDICINE

## 2019-11-06 PROCEDURE — G0008 ADMIN INFLUENZA VIRUS VAC: HCPCS | Mod: S$GLB,,, | Performed by: FAMILY MEDICINE

## 2019-11-06 PROCEDURE — 3075F SYST BP GE 130 - 139MM HG: CPT | Mod: CPTII,S$GLB,, | Performed by: FAMILY MEDICINE

## 2019-11-06 PROCEDURE — 1101F PT FALLS ASSESS-DOCD LE1/YR: CPT | Mod: CPTII,S$GLB,, | Performed by: FAMILY MEDICINE

## 2019-11-06 PROCEDURE — 1101F PR PT FALLS ASSESS DOC 0-1 FALLS W/OUT INJ PAST YR: ICD-10-PCS | Mod: CPTII,S$GLB,, | Performed by: FAMILY MEDICINE

## 2019-11-06 PROCEDURE — 99213 PR OFFICE/OUTPT VISIT, EST, LEVL III, 20-29 MIN: ICD-10-PCS | Mod: 25,S$GLB,, | Performed by: FAMILY MEDICINE

## 2019-11-06 PROCEDURE — 90662 IIV NO PRSV INCREASED AG IM: CPT | Mod: S$GLB,,, | Performed by: FAMILY MEDICINE

## 2019-11-06 PROCEDURE — 99213 OFFICE O/P EST LOW 20 MIN: CPT | Mod: 25,S$GLB,, | Performed by: FAMILY MEDICINE

## 2019-11-06 RX ORDER — TEMAZEPAM 15 MG/1
CAPSULE ORAL
Qty: 90 CAPSULE | Refills: 1 | Status: SHIPPED | OUTPATIENT
Start: 2019-11-06 | End: 2020-12-10

## 2019-11-06 RX ORDER — IBUPROFEN 600 MG/1
600 TABLET ORAL EVERY 6 HOURS PRN
Qty: 90 TABLET | Refills: 1 | Status: SHIPPED | OUTPATIENT
Start: 2019-11-06 | End: 2020-04-30

## 2019-11-06 NOTE — PROGRESS NOTES
Subjective:       Patient ID: Shandra Velez is a 79 y.o. female.    Chief Complaint: Mole (dark spot on back)    C/o itchy dark spot that she noticed a few days ago.      Past Medical History:   Diagnosis Date    Breast cancer 1985    right mastectomy    Encounter for blood transfusion     Hypercholesterolemia     Hypertension     Hypertension     Lumbar spondylosis     Osteopenia     Pulmonary nodule     Smoker        Past Surgical History:   Procedure Laterality Date    APPENDECTOMY      BREAST RECONSTRUCTION  1990    right    CATARACT EXTRACTION W/  INTRAOCULAR LENS IMPLANT Bilateral     CHOLECYSTECTOMY      EYE SURGERY      JOINT REPLACEMENT  2008    right knee     LUMBAR LAMINECTOMY      MASTECTOMY Right 1985    TOTAL KNEE ARTHROPLASTY Right     TUBAL LIGATION      UMBILICAL HERNIA REPAIR         Review of patient's allergies indicates:   Allergen Reactions    No known drug allergies        Social History     Socioeconomic History    Marital status:      Spouse name: Not on file    Number of children: 3    Years of education: Not on file    Highest education level: Not on file   Occupational History    Occupation: retired  for [employer]   Social Needs    Financial resource strain: Not on file    Food insecurity:     Worry: Not on file     Inability: Not on file    Transportation needs:     Medical: Not on file     Non-medical: Not on file   Tobacco Use    Smoking status: Current Every Day Smoker     Packs/day: 0.50     Years: 53.00     Pack years: 26.50     Types: Cigarettes    Smokeless tobacco: Never Used   Substance and Sexual Activity    Alcohol use: No    Drug use: No    Sexual activity: Not Currently   Lifestyle    Physical activity:     Days per week: Not on file     Minutes per session: Not on file    Stress: Not on file   Relationships    Social connections:     Talks on phone: Not on file     Gets together: Not on file     Attends Christian  service: Not on file     Active member of club or organization: Not on file     Attends meetings of clubs or organizations: Not on file     Relationship status: Not on file   Other Topics Concern    Are you pregnant or think you may be? Not Asked    Breast-feeding Not Asked   Social History Narrative    Lives alone        Hobbies: skyler        From [place]           Current Outpatient Medications on File Prior to Visit   Medication Sig Dispense Refill    acetaminophen (TYLENOL) 325 MG tablet Take 2 tablets (650 mg total) by mouth every 4 (four) hours as needed (pain score 1-2/10).  0    aspirin 81 mg Tab Take 81 mg by mouth once. Every day      cetirizine (ZYRTEC) 10 MG tablet Take 10 mg by mouth once daily. Every day      DOCOSAHEXANOIC ACID/EPA (FISH OIL ORAL) Take 1,000 mg by mouth once. Twice a day      hydroCHLOROthiazide (HYDRODIURIL) 25 MG tablet TAKE 1 TABLET ONCE DAILY 90 tablet 3    lisinopril (PRINIVIL,ZESTRIL) 40 MG tablet Take 1 tablet (40 mg total) by mouth once daily. 90 tablet 3    multivitamin capsule Take 1 capsule by mouth once daily.      NIFEdipine (ADALAT CC) 60 MG TbSR TAKE 1 TABLET ONCE DAILY 90 tablet 3    simvastatin (ZOCOR) 10 MG tablet TAKE 1 TABLET EVERY EVENING 90 tablet 3     No current facility-administered medications on file prior to visit.        Family History   Problem Relation Age of Onset    Cancer Sister         uterine    Diabetes Brother     Breast cancer Other 42    Alcohol abuse Sister        Review of Systems   Constitutional: Negative for appetite change, chills, fever and unexpected weight change.   HENT: Negative for sore throat and trouble swallowing.    Eyes: Negative for pain and visual disturbance.   Respiratory: Negative for cough, shortness of breath and wheezing.    Cardiovascular: Negative for chest pain and palpitations.   Gastrointestinal: Negative for abdominal pain, blood in stool, diarrhea, nausea and vomiting.   Genitourinary:  "Negative for difficulty urinating, dysuria and hematuria.   Musculoskeletal: Negative for arthralgias, gait problem and neck pain.   Skin: Negative for rash and wound.   Neurological: Negative for dizziness, weakness, numbness and headaches.   Hematological: Negative for adenopathy.   Psychiatric/Behavioral: Negative for dysphoric mood.       Objective:      /66 (BP Location: Left arm, Patient Position: Sitting)   Pulse 82   Temp 97.7 °F (36.5 °C) (Oral)   Ht 5' 4" (1.626 m)   Wt 97.5 kg (214 lb 15.2 oz)   SpO2 98%   BMI 36.90 kg/m²   Physical Exam   Constitutional: She is oriented to person, place, and time. She appears well-developed and well-nourished.   HENT:   Head: Normocephalic.   Mouth/Throat: Oropharynx is clear and moist. No oropharyngeal exudate or posterior oropharyngeal erythema.   Eyes: Pupils are equal, round, and reactive to light. Conjunctivae and EOM are normal.   Neck: Normal range of motion. Neck supple. No thyromegaly present.   Cardiovascular: Normal rate, regular rhythm, S1 normal, S2 normal, normal heart sounds and intact distal pulses. Exam reveals no gallop and no friction rub.   No murmur heard.  Pulmonary/Chest: Effort normal and breath sounds normal. She has no wheezes. She has no rales.   Abdominal: Normal appearance.   Musculoskeletal:        Right lower leg: She exhibits no edema.        Left lower leg: She exhibits no edema.   Lymphadenopathy:     She has no cervical adenopathy.   Neurological: She is alert and oriented to person, place, and time. No cranial nerve deficit. Gait normal.   Skin: Skin is warm and intact. No rash noted.   Psychiatric: She has a normal mood and affect.         1.5cm SK on back  Multiple small other SKs on back              Assessment:       1. Neoplasm of right upper arm    2. Seborrheic keratoses    3. Insomnia, unspecified type    4. Morbid (severe) obesity due to excess calories    5. Unspecified inflammatory spondylopathy, lumbar region  "   6. Ectatic aorta        Plan:       Neoplasm of right upper arm  -     Ambulatory referral to Dermatology    Seborrheic keratoses    Insomnia, unspecified type  -     temazepam (RESTORIL) 15 mg Cap; One tablet At bedtime as needed for insomnia  Dispense: 90 capsule; Refill: 1    Morbid (severe) obesity due to excess calories    Unspecified inflammatory spondylopathy, lumbar region    Ectatic aorta    Other orders  -     ibuprofen (ADVIL,MOTRIN) 600 MG tablet; Take 1 tablet (600 mg total) by mouth every 6 (six) hours as needed for Pain.  Dispense: 90 tablet; Refill: 1  -     Influenza - High Dose (65+) (PF) (IM)        Low back lesion benign  Will refer to derm due to suspicious lesion on right arm

## 2019-11-07 ENCOUNTER — INITIAL CONSULT (OUTPATIENT)
Dept: DERMATOLOGY | Facility: CLINIC | Age: 79
End: 2019-11-07
Payer: MEDICARE

## 2019-11-07 VITALS — HEIGHT: 64 IN | RESPIRATION RATE: 18 BRPM | BODY MASS INDEX: 36.7 KG/M2 | WEIGHT: 214.94 LBS

## 2019-11-07 DIAGNOSIS — Z12.83 SCREENING EXAM FOR SKIN CANCER: ICD-10-CM

## 2019-11-07 DIAGNOSIS — L82.1 SEBORRHEIC KERATOSES: ICD-10-CM

## 2019-11-07 DIAGNOSIS — D48.9 NEOPLASM OF UNCERTAIN BEHAVIOR: Primary | ICD-10-CM

## 2019-11-07 DIAGNOSIS — D22.9 MULTIPLE BENIGN NEVI: ICD-10-CM

## 2019-11-07 DIAGNOSIS — L81.4 LENTIGINES: ICD-10-CM

## 2019-11-07 DIAGNOSIS — D18.01 ANGIOMA OF SKIN: ICD-10-CM

## 2019-11-07 PROCEDURE — 99999 PR PBB SHADOW E&M-EST. PATIENT-LVL III: CPT | Mod: PBBFAC,,, | Performed by: DERMATOLOGY

## 2019-11-07 PROCEDURE — 88342 IMHCHEM/IMCYTCHM 1ST ANTB: CPT | Mod: 26,,, | Performed by: PATHOLOGY

## 2019-11-07 PROCEDURE — 11102 PR TANGENTIAL BIOPSY, SKIN, SINGLE LESION: ICD-10-PCS | Mod: S$GLB,,, | Performed by: DERMATOLOGY

## 2019-11-07 PROCEDURE — 99202 PR OFFICE/OUTPT VISIT, NEW, LEVL II, 15-29 MIN: ICD-10-PCS | Mod: 25,S$GLB,, | Performed by: DERMATOLOGY

## 2019-11-07 PROCEDURE — 88341 PR IHC OR ICC EACH ADD'L SINGLE ANTIBODY  STAINPR: ICD-10-PCS | Mod: 26,,, | Performed by: PATHOLOGY

## 2019-11-07 PROCEDURE — 88305 TISSUE EXAM BY PATHOLOGIST: CPT | Performed by: PATHOLOGY

## 2019-11-07 PROCEDURE — 1101F PR PT FALLS ASSESS DOC 0-1 FALLS W/OUT INJ PAST YR: ICD-10-PCS | Mod: CPTII,S$GLB,, | Performed by: DERMATOLOGY

## 2019-11-07 PROCEDURE — 99202 OFFICE O/P NEW SF 15 MIN: CPT | Mod: 25,S$GLB,, | Performed by: DERMATOLOGY

## 2019-11-07 PROCEDURE — 1101F PT FALLS ASSESS-DOCD LE1/YR: CPT | Mod: CPTII,S$GLB,, | Performed by: DERMATOLOGY

## 2019-11-07 PROCEDURE — 88305 TISSUE SPECIMEN TO PATHOLOGY, DERMATOLOGY: ICD-10-PCS | Mod: 26,,, | Performed by: PATHOLOGY

## 2019-11-07 PROCEDURE — 99999 PR PBB SHADOW E&M-EST. PATIENT-LVL III: ICD-10-PCS | Mod: PBBFAC,,, | Performed by: DERMATOLOGY

## 2019-11-07 PROCEDURE — 88341 IMHCHEM/IMCYTCHM EA ADD ANTB: CPT | Mod: 26,,, | Performed by: PATHOLOGY

## 2019-11-07 PROCEDURE — 88342 TISSUE SPECIMEN TO PATHOLOGY, DERMATOLOGY: ICD-10-PCS | Mod: 26,,, | Performed by: PATHOLOGY

## 2019-11-07 PROCEDURE — 11102 TANGNTL BX SKIN SINGLE LES: CPT | Mod: S$GLB,,, | Performed by: DERMATOLOGY

## 2019-11-07 NOTE — LETTER
November 7, 2019      Akil Chavez MD  [address] [facility] [address] [place] [address]           [facility]  [address] [place] [address]  Phone: [phone]  Fax: [phone]          Patient: Shandra Velez   MR Number: [id number]   YOB: 1940   Date of Visit: 11/7/2019       Dear Dr. Akil Chavez:    Thank you for referring Shandra Velez to me for evaluation. Attached you will find relevant portions of my assessment and plan of care.    If you have questions, please do not hesitate to call me. I look forward to following Shandra Velez along with you.    Sincerely,    Jeana Arreguin MD    Enclosure  CC:  No Recipients    If you would like to receive this communication electronically, please contact [email or web address] or [phone] to request more information on [employer] access.    For providers and/or their staff who would like to refer a patient to [facility], please contact us through our one-stop-shop provider referral line, [facility], at [phone].    If you feel you have received this communication in error or would no longer like to receive these types of communications, please e-mail [email or web address]

## 2019-11-07 NOTE — PROGRESS NOTES
Subjective:       Patient ID:  Shandra Velez is a 79 y.o. female who presents for   Chief Complaint   Patient presents with    Lesion     Present for initial visit.    C/o lesion to R upper arm x unknown timeframe (lesion noted by PCP yesterday). The patient denies any change, including change in color, increase in size, or spontaneous bleeding, associated with this lesion.  Referred by PCP to have lesion examined. Not treating.     No phx of NMSC  No fhx of melanoma    Past Medical History:  1985: Breast cancer      Comment:  right mastectomy  No date: Encounter for blood transfusion  No date: Hypercholesterolemia  No date: Hypertension  No date: Hypertension  No date: Lumbar spondylosis  No date: Osteopenia  No date: Pulmonary nodule  No date: Smoker          Review of Systems   Constitutional: Negative for fever, chills and fatigue.   HENT: Negative for sore throat.    Respiratory: Negative for cough.    Gastrointestinal: Negative for nausea and vomiting.   Skin: Positive for activity-related sunscreen use. Negative for itching, rash, dry skin, daily sunscreen use and wears hat.        Objective:    Physical Exam   Constitutional: She appears well-developed and well-nourished. No distress.   Neurological: She is alert and oriented to person, place, and time. She is not disoriented.   Psychiatric: She has a normal mood and affect.   Skin:   Areas Examined (abnormalities noted in diagram):   Scalp / Hair Palpated and Inspected  Head / Face Inspection Performed  Neck Inspection Performed  Chest / Axilla Inspection Performed  Abdomen Inspection Performed  Back Inspection Performed  RUE Inspected  LUE Inspection Performed                       Diagram Legend     Erythematous scaling macule/papule c/w actinic keratosis       Vascular papule c/w angioma      Pigmented verrucoid papule/plaque c/w seborrheic keratosis      Yellow umbilicated papule c/w sebaceous hyperplasia      Irregularly shaped tan macule c/w  lentigo     1-2 mm smooth white papules consistent with Milia      Movable subcutaneous cyst with punctum c/w epidermal inclusion cyst      Subcutaneous movable cyst c/w pilar cyst      Firm pink to brown papule c/w dermatofibroma      Pedunculated fleshy papule(s) c/w skin tag(s)      Evenly pigmented macule c/w junctional nevus     Mildly variegated pigmented, slightly irregular-bordered macule c/w mildly atypical nevus      Flesh colored to evenly pigmented papule c/w intradermal nevus       Pink pearly papule/plaque c/w basal cell carcinoma      Erythematous hyperkeratotic cursted plaque c/w SCC      Surgical scar with no sign of skin cancer recurrence      Open and closed comedones      Inflammatory papules and pustules      Verrucoid papule consistent consistent with wart     Erythematous eczematous patches and plaques     Dystrophic onycholytic nail with subungual debris c/w onychomycosis     Umbilicated papule    Erythematous-base heme-crusted tan verrucoid plaque consistent with inflamed seborrheic keratosis     Erythematous Silvery Scaling Plaque c/w Psoriasis     See annotation              Assessment / Plan:      Pathology Orders:     Normal Orders This Visit    Tissue Specimen To Pathology, Dermatology     Questions:    Directional Terms:  Other(comment)    Clinical Information:  SK vs atypical nevus    Specific Site:  Right posterior upper arm        Neoplasm of uncertain behavior  -     Tissue Specimen To Pathology, Dermatology    Shave biopsy procedure note:    Shave biopsy performed after verbal consent including risk of infection, scar, recurrence, need for additional treatment of site. Area prepped with alcohol, anesthetized with approximately 1.0cc of 1% lidocaine with epinephrine. Lesional tissue shaved with razor blade. Hemostasis achieved with application of aluminum chloride followed by hyfrecation. No complications. Dressing applied. Wound care explained.        Seborrheic keratoses  These  are benign inherited growths without a malignant potential. Reassurance given to patient. No treatment is necessary.       Multiple benign nevi  upper body skin examination performed today including at least 6 points as noted in physical examination. No lesions suspicious for malignancy noted.  Reassurance provided.  Instructed patient to observe lesion(s) for changes and follow up in clinic if changes are noted. Discussed ABCDE's of moles and brochure provided.      Lentigines  This is a benign hyperpigmented sun induced lesion. Daily sun protection will reduce the number of new lesions. Treatment of these benign lesions are considered cosmetic.      Angioma of skin  This is a benign vascular lesion. Reassurance given. No treatment required.     Screening exam for skin cancer  Upper body skin examination performed today including at least 6 points as noted in physical examination. Suspicious lesions noted.             Follow up in about 1 year (around 11/7/2020) for skin check.     ADDENDUM:  1. Skin, right posterior upper arm, shave biopsy:  - Malignant melanoma, invasive  - Histologic type: Superficial spreading melanoma  - Breslow depth: 0.4 mm.  - Matthias's level: III  - Ulceration: Not identified  - Peripheral margins: Negative  - Deep margin: Negative  - Mitotic index: <1/mm2.  - Lymphovascular invasion: Not identified  - Perineural invasion: Not identified  - Tumor-infiltrating lymphocytes: Present, brisk  - Tumor regression: Not identified  - Growth Phase: Radial  - Pathologic staging: pT1aNx  MICROSCOPIC DESCRIPTION: The sections show an apparent shave biopsy of skin extending to the level of the  deep reticular dermis. Within the epidermis is an atypical melanocytic proliferation consisting of variably sized,  haphazardly arranged nests and interspersed single cells that extend in focal contiguous fashion along the  dermoepidermal junction. Pagetoid spread is noted. The melanocytes are large, epithelioid,  and cytologically  atypical with pleomorphic nuclei. There is a dermal component to this melanocytic proliferation that consists of  nests of similarly atypical melanocytes. Mart 1 and HMB 45 immunohistochemical stains were examined with  appropriately reactive controls and used in evaluation of this lesion.  Diagnosed by: Tsering Angel M.D.    Recommend excision with 1cm margin with me. Will mail pt pre op information  Pt with a history of breast cancer (BRCA negative)   Denies family history of pancreatic cancer

## 2019-11-22 ENCOUNTER — TELEPHONE (OUTPATIENT)
Dept: DERMATOLOGY | Facility: CLINIC | Age: 79
End: 2019-11-22

## 2019-11-22 NOTE — TELEPHONE ENCOUNTER
----- Message from Jeana Arreguin MD sent at 11/14/2019  4:47 PM CST -----  Pt inform of diagnosis. Please schedule excision with me in 4 weeks. Please send below pre-op information to patient via mail.     Surgery Information:  One week before surgery:  Unless a doctor has prescribed them, stop aspirin and ibuprofen-containing or similar medicines (Advil, Motrin, Aleve, Orudis, Naproxen, Ketoprofen, etc.).  These medicines can cause an increase in bleeding during and after surgery and should be avoided for the week prior to surgery UNLESS a physician has instructed you to take these.  If a physician instructed you to take these, please continue, knowing that you may have a slightly higher risk of bleeding during the procedure.  It would also be helpful to discontinue vitamin or herbal supplements, as many of these (including Vitamin E, omega 3 fatty acids, and others) can also thin your blood and lead to more bleeding.  Alcoholic beverages can have the same effect.   You should continue all prescribed medications. If you are on Plavix or Coumadin, or other prescription blood-thinners, please make sure we are aware.    Antibiotics:  If you were prescribed pre-op antibiotics, please start them as directed. If you believe you need pre-op antibiotics and were not prescribed them, please call us at [phone].    Day before surgery  Cleanse, bathe or shampoo the area to be operated on prior to coming to the office.   Do not apply makeup or hairspray if surgery is to be done on the face or scalp. Try not to wear anything white.    The day of surgery:  If possible, bring someone with you.  Please arrive 15 minutes before your scheduled appointment time. We strive to stay on schedule. However, surgical procedures can sometimes take longer than expected, so you may wish to bring something to read if we are running late.  Wear comfortable clothing.  Eat normally the day of your surgery. You do not need to fast.  Take  your regular medications unless directed otherwise.  Expect to have a large pressure dressing placed over your surgical site. This should remain in place for 24-48 hours. If your surgery involves the scalp it is possible that you may have a dressing wrapped around your entire head (to achieve pressure to the surgical wound).  If possible, take the remainder of the day off work to minimize post-operative bleeding.  Following your procedure (while you have stitches in place, usually 2 weeks):  Avoid activities that may place strain on your incision. This is to help prevent bleeding, pulling of sutures, and poor wound healing. If you are in doubt about what is acceptable, please call us before your appointment or clarify with the doctor before you leave.  Use an ice pack to help prevent swelling and discomfort.  You may also take acetaminophen (Tylenol) if you wish.  You will be given written instructions on how to care for your incision.  No repeated lifting or bending.  No activity that raises your heart rate such as exercise, excessive stair climbing, lawn mowing, leaf raking, exercise walks, etc.  For surgery on the head and neck, no activity that repetitively puts your head below heart level such as golfing, bowling, gardening, etc.  No swimming while stitches are in place (usually 2 weeks).    Wound care supplies you may wish to purchase before your procedure:  Acetaminophen (Tylenol) ·  Vaseline petroleum jelly  Q-tips  Ice pack  Non-stick bandages (for example, Telfa)  Bandage tape  These items can be purchased at any store that stocks medical supplies (e.g., [employer]).     Call the clinic at [phone] should you develop a fever, rash, or other illness prior to the scheduled surgery. Please call no later than the day before your surgery and ask to speak to a nurse.   We hope that this information helps you to be well-prepared for your surgery. We look forward to seeing you in the near future.  Thank you!

## 2019-12-09 ENCOUNTER — LAB VISIT (OUTPATIENT)
Dept: LAB | Facility: HOSPITAL | Age: 79
End: 2019-12-09
Attending: FAMILY MEDICINE
Payer: MEDICARE

## 2019-12-09 DIAGNOSIS — E78.5 HYPERLIPIDEMIA, UNSPECIFIED HYPERLIPIDEMIA TYPE: ICD-10-CM

## 2019-12-09 LAB
ALBUMIN SERPL BCP-MCNC: 3.6 G/DL (ref 3.5–5.2)
ALP SERPL-CCNC: 69 U/L (ref 55–135)
ALT SERPL W/O P-5'-P-CCNC: 8 U/L (ref 10–44)
ANION GAP SERPL CALC-SCNC: 11 MMOL/L (ref 8–16)
AST SERPL-CCNC: 20 U/L (ref 10–40)
BILIRUB SERPL-MCNC: 0.4 MG/DL (ref 0.1–1)
BUN SERPL-MCNC: 28 MG/DL (ref 8–23)
CALCIUM SERPL-MCNC: 10 MG/DL (ref 8.7–10.5)
CHLORIDE SERPL-SCNC: 107 MMOL/L (ref 95–110)
CHOLEST SERPL-MCNC: 195 MG/DL (ref 120–199)
CHOLEST/HDLC SERPL: 3.7 {RATIO} (ref 2–5)
CO2 SERPL-SCNC: 24 MMOL/L (ref 23–29)
CREAT SERPL-MCNC: 1.2 MG/DL (ref 0.5–1.4)
EST. GFR  (AFRICAN AMERICAN): 50.4 ML/MIN/1.73 M^2
EST. GFR  (NON AFRICAN AMERICAN): 43.7 ML/MIN/1.73 M^2
GLUCOSE SERPL-MCNC: 101 MG/DL (ref 70–110)
HDLC SERPL-MCNC: 53 MG/DL (ref 40–75)
HDLC SERPL: 27.2 % (ref 20–50)
LDLC SERPL CALC-MCNC: 112.8 MG/DL (ref 63–159)
NONHDLC SERPL-MCNC: 142 MG/DL
POTASSIUM SERPL-SCNC: 3.9 MMOL/L (ref 3.5–5.1)
PROT SERPL-MCNC: 7.1 G/DL (ref 6–8.4)
SODIUM SERPL-SCNC: 142 MMOL/L (ref 136–145)
TRIGL SERPL-MCNC: 146 MG/DL (ref 30–150)

## 2019-12-09 PROCEDURE — 80061 LIPID PANEL: CPT

## 2019-12-09 PROCEDURE — 80053 COMPREHEN METABOLIC PANEL: CPT

## 2019-12-09 PROCEDURE — 36415 COLL VENOUS BLD VENIPUNCTURE: CPT | Mod: PO

## 2019-12-16 ENCOUNTER — OFFICE VISIT (OUTPATIENT)
Dept: FAMILY MEDICINE | Facility: CLINIC | Age: 79
End: 2019-12-16
Payer: MEDICARE

## 2019-12-16 VITALS
WEIGHT: 213.88 LBS | TEMPERATURE: 98 F | HEART RATE: 73 BPM | DIASTOLIC BLOOD PRESSURE: 80 MMHG | BODY MASS INDEX: 36.51 KG/M2 | HEIGHT: 64 IN | OXYGEN SATURATION: 96 % | SYSTOLIC BLOOD PRESSURE: 128 MMHG

## 2019-12-16 DIAGNOSIS — I10 ESSENTIAL HYPERTENSION: ICD-10-CM

## 2019-12-16 DIAGNOSIS — Z78.0 MENOPAUSE: ICD-10-CM

## 2019-12-16 DIAGNOSIS — E78.5 HYPERLIPIDEMIA, UNSPECIFIED HYPERLIPIDEMIA TYPE: ICD-10-CM

## 2019-12-16 DIAGNOSIS — Z00.00 PREVENTATIVE HEALTH CARE: Primary | ICD-10-CM

## 2019-12-16 PROCEDURE — 99397 PR PREVENTIVE VISIT,EST,65 & OVER: ICD-10-PCS | Mod: S$GLB,,, | Performed by: FAMILY MEDICINE

## 2019-12-16 PROCEDURE — 99397 PER PM REEVAL EST PAT 65+ YR: CPT | Mod: S$GLB,,, | Performed by: FAMILY MEDICINE

## 2019-12-16 PROCEDURE — 99999 PR PBB SHADOW E&M-EST. PATIENT-LVL IV: ICD-10-PCS | Mod: PBBFAC,,, | Performed by: FAMILY MEDICINE

## 2019-12-16 PROCEDURE — 99999 PR PBB SHADOW E&M-EST. PATIENT-LVL IV: CPT | Mod: PBBFAC,,, | Performed by: FAMILY MEDICINE

## 2019-12-16 NOTE — PROGRESS NOTES
Subjective:       Patient ID: Shandra Velez is a 79 y.o. female.    Chief Complaint: Follow-up    Here today for annual exam and f/u on HTN     She is overall feeling well.    HTN - tolerating lisinopril 40mg daily, HCTZ 25mg daily and nifedipine Xl 60mg daily  HLD - tolerating Zocor 10mg daily  Allergies: taking Zyrtec 10mg daily  Insomnia - using restoril 15mg rarely at bedtime    Past Medical History:    Hypertension                                                  Pulmonary nodule                                              Smoker                                                        Osteopenia                                                    Hypertension                                                  Hypercholesterolemia                                          Breast cancer                                                 Lumbar spondylosis                                            Past Surgical History:    MASTECTOMY                                       1985            Comment:right    CHOLECYSTECTOMY                                                APPENDECTOMY                                                   UMBILICAL HERNIA REPAIR                                        TUBAL LIGATION                                                 BREAST RECONSTRUCTION                                            Comment:right    Allergies:   -- No Known Drug Allergies     Social History    Marital Status:              Spouse Name:                       Years of Education:                 Number of children: 3             Occupational History  Occupation          Employer            Comment               retired marketing *                         Social History Main Topics    Smoking Status: Current Every Day Smoker        Packs/Day: 0.50  Years: 53         Types: Cigarettes    Smokeless Status: Never Used                        Alcohol Use: Yes             Drug Use: No              Sexual Activity: Not  Currently        Other Topics            Concern    None on file    Social History Narrative    Lives alone      Hobbies: skyler      From [place]    Current Outpatient Medications on File Prior to Visit:  acetaminophen (TYLENOL) 325 MG tablet, Take 2 tablets (650 mg total) by mouth every 4 (four) hours as needed (pain score 1-2/10)., Disp: , Rfl: 0  aspirin 81 mg Tab, Take 81 mg by mouth once. Every day, Disp: , Rfl:   cetirizine (ZYRTEC) 10 MG tablet, Take 10 mg by mouth once daily. Every day, Disp: , Rfl:   DOCOSAHEXANOIC ACID/EPA (FISH OIL ORAL), Take 1,000 mg by mouth once. Twice a day, Disp: , Rfl:   hydroCHLOROthiazide (HYDRODIURIL) 25 MG tablet, TAKE 1 TABLET ONCE DAILY, Disp: 90 tablet, Rfl: 3  ibuprofen (ADVIL,MOTRIN) 600 MG tablet, Take 1 tablet (600 mg total) by mouth every 6 (six) hours as needed for Pain., Disp: 90 tablet, Rfl: 1  lisinopril (PRINIVIL,ZESTRIL) 40 MG tablet, Take 1 tablet (40 mg total) by mouth once daily., Disp: 90 tablet, Rfl: 3  multivitamin capsule, Take 1 capsule by mouth once daily., Disp: , Rfl:   NIFEdipine (ADALAT CC) 60 MG TbSR, TAKE 1 TABLET ONCE DAILY, Disp: 90 tablet, Rfl: 3  simvastatin (ZOCOR) 10 MG tablet, TAKE 1 TABLET EVERY EVENING, Disp: 90 tablet, Rfl: 3  temazepam (RESTORIL) 15 mg Cap, One tablet At bedtime as needed for insomnia, Disp: 90 capsule, Rfl: 1    No current facility-administered medications on file prior to visit.     Review of patient's family history indicates:    Cancer                         Sister                      Comment: uterine    Diabetes                       Brother                       Hyperlipidemia   Pertinent negatives include no chest pain or shortness of breath.   Follow-up   Pertinent negatives include no abdominal pain, arthralgias, chest pain, chills, coughing, fever, headaches, nausea, neck pain, numbness, rash, sore throat, vomiting or weakness.     Review of Systems   Constitutional: Negative for appetite change, chills,  "fever and unexpected weight change.   HENT: Negative for sore throat and trouble swallowing.    Eyes: Negative for pain and visual disturbance.   Respiratory: Negative for cough, shortness of breath and wheezing.    Cardiovascular: Negative for chest pain and palpitations.   Gastrointestinal: Negative for abdominal pain, blood in stool, diarrhea, nausea and vomiting.   Genitourinary: Negative for difficulty urinating, dysuria and hematuria.   Musculoskeletal: Negative for arthralgias, back pain, gait problem and neck pain.   Skin: Negative for rash and wound.   Neurological: Positive for tremors (left hand). Negative for dizziness, weakness, numbness and headaches.   Hematological: Negative for adenopathy.   Psychiatric/Behavioral: Negative for dysphoric mood.       Objective:       /80 (BP Location: Left arm, Patient Position: Sitting)   Pulse 73   Temp 97.7 °F (36.5 °C) (Oral)   Ht 5' 4" (1.626 m)   Wt 97 kg (213 lb 13.5 oz)   SpO2 96%   BMI 36.71 kg/m²     Physical Exam   Constitutional: She is oriented to person, place, and time. She appears well-developed and well-nourished.   HENT:   Head: Normocephalic.   Mouth/Throat: Oropharynx is clear and moist. No oropharyngeal exudate or posterior oropharyngeal erythema.   Eyes: Pupils are equal, round, and reactive to light. Conjunctivae and EOM are normal.   Neck: Normal range of motion. Neck supple. No thyromegaly present.   Cardiovascular: Normal rate, regular rhythm, S1 normal, S2 normal, normal heart sounds and intact distal pulses. Exam reveals no gallop and no friction rub.   No murmur heard.  Pulmonary/Chest: Effort normal and breath sounds normal. She has no wheezes. She has no rales.   Abdominal: Soft. Normal appearance and bowel sounds are normal.   Musculoskeletal:        Lumbar back: She exhibits normal range of motion, no tenderness, no deformity and no pain.        Right lower leg: She exhibits no edema.        Left lower leg: She exhibits no " edema.   Lymphadenopathy:     She has no cervical adenopathy.   Neurological: She is alert and oriented to person, place, and time. She displays tremor. No cranial nerve deficit. Gait normal.   Skin: Skin is warm and intact. No rash noted.   Psychiatric: She has a normal mood and affect.       Results for orders placed or performed in visit on 12/09/19   Comprehensive metabolic panel   Result Value Ref Range    Sodium 142 136 - 145 mmol/L    Potassium 3.9 3.5 - 5.1 mmol/L    Chloride 107 95 - 110 mmol/L    CO2 24 23 - 29 mmol/L    Glucose 101 70 - 110 mg/dL    BUN, Bld 28 (H) 8 - 23 mg/dL    Creatinine 1.2 0.5 - 1.4 mg/dL    Calcium 10.0 8.7 - 10.5 mg/dL    Total Protein 7.1 6.0 - 8.4 g/dL    Albumin 3.6 3.5 - 5.2 g/dL    Total Bilirubin 0.4 0.1 - 1.0 mg/dL    Alkaline Phosphatase 69 55 - 135 U/L    AST 20 10 - 40 U/L    ALT 8 (L) 10 - 44 U/L    Anion Gap 11 8 - 16 mmol/L    eGFR if African American 50.4 (A) >60 mL/min/1.73 m^2    eGFR if non  43.7 (A) >60 mL/min/1.73 m^2   Lipid panel   Result Value Ref Range    Cholesterol 195 120 - 199 mg/dL    Triglycerides 146 30 - 150 mg/dL    HDL 53 40 - 75 mg/dL    LDL Cholesterol 112.8 63.0 - 159.0 mg/dL    Hdl/Cholesterol Ratio 27.2 20.0 - 50.0 %    Total Cholesterol/HDL Ratio 3.7 2.0 - 5.0    Non-HDL Cholesterol 142 mg/dL         Assessment:       1. Preventative health care    2. Menopause    3. Essential hypertension    4. Hyperlipidemia, unspecified hyperlipidemia type        Plan:       Preventative health care    Menopause  -     DXA Bone Density Spine And Hip; Future; Expected date: 12/16/2019    Essential hypertension    Hyperlipidemia, unspecified hyperlipidemia type          Overall doing well  F/u with dermatology as planned  BP controlled  Continue present meds  Counseled on regular exercise, maintenance of a healthy weight, balanced diet rich in fruits/vegetables and lean protein, and avoidance of unhealthy habits like smoking and excessive  alcohol intake.  F/u 12 month with labs or PRN

## 2020-01-02 ENCOUNTER — PROCEDURE VISIT (OUTPATIENT)
Dept: DERMATOLOGY | Facility: CLINIC | Age: 80
End: 2020-01-02
Payer: MEDICARE

## 2020-01-02 DIAGNOSIS — C43.61 MALIGNANT MELANOMA OF RIGHT UPPER EXTREMITY: Primary | ICD-10-CM

## 2020-01-02 PROCEDURE — 88305 TISSUE EXAM BY PATHOLOGIST: CPT | Performed by: PATHOLOGY

## 2020-01-02 PROCEDURE — 11603 EXC TR-EXT MAL+MARG 2.1-3 CM: CPT | Mod: S$GLB,,, | Performed by: DERMATOLOGY

## 2020-01-02 PROCEDURE — 11603 PR EXC SKIN MALIG 2.1-3 CM TRUNK,ARM,LEG: ICD-10-PCS | Mod: S$GLB,,, | Performed by: DERMATOLOGY

## 2020-01-02 PROCEDURE — 88305 TISSUE EXAM BY PATHOLOGIST: CPT | Mod: 26,,, | Performed by: PATHOLOGY

## 2020-01-02 PROCEDURE — 88305 TISSUE EXAM BY PATHOLOGIST: ICD-10-PCS | Mod: 26,,, | Performed by: PATHOLOGY

## 2020-01-02 PROCEDURE — 12032 PR LAYR CLOS WND TRUNK,ARM,LEG 2.6-7.5 CM: ICD-10-PCS | Mod: 59,S$GLB,, | Performed by: DERMATOLOGY

## 2020-01-02 PROCEDURE — 99499 UNLISTED E&M SERVICE: CPT | Mod: S$GLB,,, | Performed by: DERMATOLOGY

## 2020-01-02 PROCEDURE — 99499 NO LOS: ICD-10-PCS | Mod: S$GLB,,, | Performed by: DERMATOLOGY

## 2020-01-02 PROCEDURE — 12032 INTMD RPR S/A/T/EXT 2.6-7.5: CPT | Mod: 59,S$GLB,, | Performed by: DERMATOLOGY

## 2020-01-02 NOTE — PROGRESS NOTES
PREOPERATIVE DIAGNOSIS: invasive melanoma  LOCATION: right posterior upper arm    POSTOPERATIVE DIAGNOSIS: Same.    OPERATION PERFORMED: Excision with intermediate repair.    SURGEON(S):   Jeana Arreguin MD    ANESTHESIA: Local.    INDICATIONS FOR PROCEDURE: This patient presents with the lesion noted above. History of melanoma.  Excision with 1cm margins with complex repair is indicated. Risks of pain, bleeding, scarring, infection, and wound dehiscence have been discussed and written informed consent obtained.    A timeout was completed, verifying correct patient, procedure, supplies, site, positioning, and implant(s) or special equipment.    PROCEDURE AND FINDINGS: The patient was placed on the operating room table. The lesion site was outlined and shown to the patient, who agreed that this was the correct site. The area was anesthetized with 1% lidocaine with epinephrine, washed with Exidine, rinsed with saline and draped with sterile towels. The scar measuring 0.9 cm in greatest diameter was excised with 1cm margins to the underlying fascia. The wound edges were extensively undermined. Meticulous hemostasis was obtained. Subcutaneous, dermal and epidermal closure was performed using 3-0 vicryl and 4-0 Ethilon sutures. The final wound length was 7.2 cm. A pressure dressing was applied. A wound care and pain management handout with emergency phone numbers was discussed and given to the patient. The patient was discharged ambulatory with stable vital signs.    ESTIMATED BLOOD LOSS: Minimal    COMPLICATIONS: None.

## 2020-01-13 ENCOUNTER — HOSPITAL ENCOUNTER (OUTPATIENT)
Dept: RADIOLOGY | Facility: HOSPITAL | Age: 80
Discharge: HOME | End: 2020-01-13
Attending: FAMILY MEDICINE
Payer: MEDICARE

## 2020-01-13 DIAGNOSIS — Z78.0 MENOPAUSE: ICD-10-CM

## 2020-01-13 PROCEDURE — 77080 DXA BONE DENSITY AXIAL: CPT | Mod: 26,,, | Performed by: RADIOLOGY

## 2020-01-13 PROCEDURE — 77080 DEXA BONE DENSITY SPINE HIP: ICD-10-PCS | Mod: 26,,, | Performed by: RADIOLOGY

## 2020-01-13 PROCEDURE — 77080 DXA BONE DENSITY AXIAL: CPT | Mod: TC,PO

## 2020-01-16 ENCOUNTER — CLINICAL SUPPORT (OUTPATIENT)
Dept: DERMATOLOGY | Facility: CLINIC | Age: 80
End: 2020-01-16
Payer: MEDICARE

## 2020-01-16 ENCOUNTER — TELEPHONE (OUTPATIENT)
Dept: DERMATOLOGY | Facility: CLINIC | Age: 80
End: 2020-01-16

## 2020-01-16 DIAGNOSIS — Z48.02 VISIT FOR SUTURE REMOVAL: Primary | ICD-10-CM

## 2020-01-16 LAB
FINAL PATHOLOGIC DIAGNOSIS: NORMAL
GROSS: NORMAL
MICROSCOPIC EXAM: NORMAL

## 2020-01-16 PROCEDURE — 99024 POSTOP FOLLOW-UP VISIT: CPT | Mod: S$GLB,,, | Performed by: DERMATOLOGY

## 2020-01-16 PROCEDURE — 99024 PR POST-OP FOLLOW-UP VISIT: ICD-10-PCS | Mod: S$GLB,,, | Performed by: DERMATOLOGY

## 2020-01-16 NOTE — TELEPHONE ENCOUNTER
Spoke w/ pt. Informed pt about results and recommendations per provider. pt verbalized understanding.    appt 5/1/20

## 2020-01-16 NOTE — TELEPHONE ENCOUNTER
----- Message from Jeana Arreguin MD sent at 1/16/2020 11:25 AM CST -----  Please call pt and inform her surgery results showed a scar. No residual skin cancer was seen. Keep f/u as previously discussed.     Thanks  TLP    Skin, right posterior upper arm, excision:   -SCAR (POST-SURGICAL)   -NO RESIDUAL MALIGNANT MELANOMA IDENTIFIED   -ANGIOLIPOMA, INCIDENTAL   Comment: Interpreted by: Poncho Lara M.D., Signed on 01/16/2020 at 10:51

## 2020-03-13 ENCOUNTER — TELEPHONE (OUTPATIENT)
Dept: FAMILY MEDICINE | Facility: CLINIC | Age: 80
End: 2020-03-13

## 2020-03-13 NOTE — TELEPHONE ENCOUNTER
----- Message from Luther Cortes sent at 3/12/2020  5:44 PM CDT -----  Contact: Pt [phone]  Pt states she need a call back    Pt states its private    Please advise pt at [phone]

## 2020-03-13 NOTE — TELEPHONE ENCOUNTER
----- Message from Viviana Hearn sent at 3/13/2020  4:21 PM CDT -----  Contact: Patient  Type:  Patient Returning Call    Who Called:  Patient  Who Left Message for Patient:  Belia  Does the patient know what this is regarding?:    Best Call Back Number:  [phone]  Additional Information:

## 2020-03-13 NOTE — TELEPHONE ENCOUNTER
Pt booked flight for next week, canceled it. Airline does not want to give money back without a doctor note stating unable to fly due to medical conditions. Please advise.

## 2020-03-16 NOTE — TELEPHONE ENCOUNTER
I understand her frustration, but she will have to work that out with the airline. She does not have any qualifying health conditions that would preclude air travel.

## 2020-04-17 ENCOUNTER — OFFICE VISIT (OUTPATIENT)
Dept: FAMILY MEDICINE | Facility: CLINIC | Age: 80
End: 2020-04-17
Attending: FAMILY MEDICINE
Payer: MEDICARE

## 2020-04-17 ENCOUNTER — PATIENT MESSAGE (OUTPATIENT)
Dept: FAMILY MEDICINE | Facility: CLINIC | Age: 80
End: 2020-04-17

## 2020-04-17 ENCOUNTER — TELEPHONE (OUTPATIENT)
Dept: FAMILY MEDICINE | Facility: CLINIC | Age: 80
End: 2020-04-17

## 2020-04-17 DIAGNOSIS — M54.32 SCIATICA OF LEFT SIDE: Primary | ICD-10-CM

## 2020-04-17 PROCEDURE — 1159F MED LIST DOCD IN RCRD: CPT | Mod: ,,, | Performed by: FAMILY MEDICINE

## 2020-04-17 PROCEDURE — 1101F PR PT FALLS ASSESS DOC 0-1 FALLS W/OUT INJ PAST YR: ICD-10-PCS | Mod: CPTII,,, | Performed by: FAMILY MEDICINE

## 2020-04-17 PROCEDURE — 99213 OFFICE O/P EST LOW 20 MIN: CPT | Mod: 95,,, | Performed by: FAMILY MEDICINE

## 2020-04-17 PROCEDURE — 1159F PR MEDICATION LIST DOCUMENTED IN MEDICAL RECORD: ICD-10-PCS | Mod: ,,, | Performed by: FAMILY MEDICINE

## 2020-04-17 PROCEDURE — 1101F PT FALLS ASSESS-DOCD LE1/YR: CPT | Mod: CPTII,,, | Performed by: FAMILY MEDICINE

## 2020-04-17 PROCEDURE — 99213 PR OFFICE/OUTPT VISIT, EST, LEVL III, 20-29 MIN: ICD-10-PCS | Mod: 95,,, | Performed by: FAMILY MEDICINE

## 2020-04-17 RX ORDER — METHYLPREDNISOLONE 4 MG/1
TABLET ORAL
Qty: 1 PACKAGE | Refills: 0 | Status: SHIPPED | OUTPATIENT
Start: 2020-04-17 | End: 2020-05-08

## 2020-04-17 NOTE — PROGRESS NOTES
Subjective:       Patient ID: Shandra Velez is a 79 y.o. female.    Chief Complaint: No chief complaint on file.    The patient location is: [place], [place]  The chief complaint leading to consultation is: sciatica  Visit type: TELE AUDIOVISUAL  Total time spent with patient: 10 minutes  Each patient to whom he or she provides medical services by telemedicine is:  (1) informed of the relationship between the physician and patient and the respective role of any other health care provider with respect to management of the patient; and (2) notified that he or she may decline to receive medical services by telemedicine and may withdraw from such care at any time.    Notes: This pt is new to me.   The pt reports scrubbed her back porch about 2 weeks ago.  The following day she awoke with pain in her left buttock that burns down her left leg.  She has been taking ibuprofen and ASA --she gets only partial relief.  She has no weakness in the left leg.  She has a hx of this many years ago.  Her pain is worst at night.    Review of Systems   Constitutional: Negative for activity change, appetite change, fatigue and unexpected weight change.   Eyes: Negative for visual disturbance.   Respiratory: Negative for cough, chest tightness and shortness of breath.    Cardiovascular: Negative for chest pain, palpitations and leg swelling.   Gastrointestinal: Negative for abdominal pain, constipation, diarrhea, nausea and vomiting.   Endocrine: Negative for cold intolerance, heat intolerance and polyuria.   Genitourinary: Negative for decreased urine volume and dysuria.   Musculoskeletal: Positive for back pain. Negative for arthralgias.   Skin: Negative for rash.   Neurological: Negative for weakness, numbness and headaches.       Objective:      Physical Exam   Constitutional: She is oriented to person, place, and time. She appears well-developed and well-nourished. No distress.   Pulmonary/Chest: Effort normal.   Neurological: She  is alert and oriented to person, place, and time.   Psychiatric: She has a normal mood and affect. Her behavior is normal. Judgment and thought content normal.       Assessment:       1. Sciatica of left side        Plan:       Diagnoses and all orders for this visit:    Sciatica of left side  -     methylPREDNISolone (MEDROL DOSEPACK) 4 mg tablet; use as directed      During this visit, I reviewed the pt's history, medications, allergies, and problem list.    Pt to notify office in 3 days of her symptoms.  She may eventually require PT when feasible after shelter in place order is relaxed.

## 2020-04-17 NOTE — TELEPHONE ENCOUNTER
C/o sciatic pain, left buttock radiated into left knee, burning that started 2 weeks ago. Pain getting worse.    Virtual visit scheduled with Dr. Barnes

## 2020-04-17 NOTE — TELEPHONE ENCOUNTER
----- Message from Olga Samayoa MA sent at 4/17/2020 11:10 AM CDT -----  Contact: patient  Type: Needs Medical Advice  Who Called:  Shandra Escobar Call Back Number:   Additional Information: patient states she is having lower back pain that radiates down her left leg and is causing left knee pain.  She feels this is sciatic pain. Please call to advise

## 2020-04-20 ENCOUNTER — PATIENT MESSAGE (OUTPATIENT)
Dept: FAMILY MEDICINE | Facility: CLINIC | Age: 80
End: 2020-04-20

## 2020-04-21 ENCOUNTER — PATIENT MESSAGE (OUTPATIENT)
Dept: FAMILY MEDICINE | Facility: CLINIC | Age: 80
End: 2020-04-21

## 2020-04-21 ENCOUNTER — OFFICE VISIT (OUTPATIENT)
Dept: FAMILY MEDICINE | Facility: CLINIC | Age: 80
End: 2020-04-21
Payer: MEDICARE

## 2020-04-21 DIAGNOSIS — M54.32 SCIATICA, LEFT SIDE: Primary | ICD-10-CM

## 2020-04-21 PROCEDURE — 1159F PR MEDICATION LIST DOCUMENTED IN MEDICAL RECORD: ICD-10-PCS | Mod: ,,, | Performed by: FAMILY MEDICINE

## 2020-04-21 PROCEDURE — 99213 OFFICE O/P EST LOW 20 MIN: CPT | Mod: 95,,, | Performed by: FAMILY MEDICINE

## 2020-04-21 PROCEDURE — 99213 PR OFFICE/OUTPT VISIT, EST, LEVL III, 20-29 MIN: ICD-10-PCS | Mod: 95,,, | Performed by: FAMILY MEDICINE

## 2020-04-21 PROCEDURE — 1101F PT FALLS ASSESS-DOCD LE1/YR: CPT | Mod: CPTII,,, | Performed by: FAMILY MEDICINE

## 2020-04-21 PROCEDURE — 1101F PR PT FALLS ASSESS DOC 0-1 FALLS W/OUT INJ PAST YR: ICD-10-PCS | Mod: CPTII,,, | Performed by: FAMILY MEDICINE

## 2020-04-21 PROCEDURE — 1159F MED LIST DOCD IN RCRD: CPT | Mod: ,,, | Performed by: FAMILY MEDICINE

## 2020-04-21 RX ORDER — HYDROCODONE BITARTRATE AND ACETAMINOPHEN 5; 325 MG/1; MG/1
1 TABLET ORAL EVERY 6 HOURS PRN
Qty: 28 TABLET | Refills: 0 | Status: SHIPPED | OUTPATIENT
Start: 2020-04-21 | End: 2020-04-29 | Stop reason: SDUPTHER

## 2020-04-21 RX ORDER — GABAPENTIN 300 MG/1
300 CAPSULE ORAL 3 TIMES DAILY
Qty: 30 CAPSULE | Refills: 2 | Status: SHIPPED | OUTPATIENT
Start: 2020-04-21 | End: 2020-06-09 | Stop reason: SDUPTHER

## 2020-04-21 NOTE — PROGRESS NOTES
The patient location is: home  The chief complaint leading to consultation is: sciatica  Visit type: audiovisual  Total time spent with patient: 15 minutes  Each patient to whom he or she provides medical services by telemedicine is:  (1) informed of the relationship between the physician and patient and the respective role of any other health care provider with respect to management of the patient; and (2) notified that he or she may decline to receive medical services by telemedicine and may withdraw from such care at any time.    C/o persistent left sciatic pain  It is in the left buttock and radiates into the left knee  Pain is 10/10, burning  Thinks it may have been aggravated by housework.  She is taking a Medrol pack after  No comfortable position    Past Medical History:   Diagnosis Date    Breast cancer 1985    right mastectomy    Encounter for blood transfusion     Hypercholesterolemia     Hypertension     Hypertension     Lumbar spondylosis     Osteopenia     Pulmonary nodule     Smoker        Past Surgical History:   Procedure Laterality Date    APPENDECTOMY      BREAST RECONSTRUCTION  1990    right    CATARACT EXTRACTION W/  INTRAOCULAR LENS IMPLANT Bilateral     CHOLECYSTECTOMY      EYE SURGERY      JOINT REPLACEMENT  2008    right knee     LUMBAR LAMINECTOMY      MASTECTOMY Right 1985    TOTAL KNEE ARTHROPLASTY Right     TUBAL LIGATION      UMBILICAL HERNIA REPAIR         Review of patient's allergies indicates:   Allergen Reactions    No known drug allergies        Social History     Socioeconomic History    Marital status:      Spouse name: Not on file    Number of children: 3    Years of education: Not on file    Highest education level: Not on file   Occupational History    Occupation: retired  for [employer]   Social Needs    Financial resource strain: Not on file    Food insecurity:     Worry: Not on file     Inability: Not on file    Transportation  needs:     Medical: Not on file     Non-medical: Not on file   Tobacco Use    Smoking status: Current Every Day Smoker     Packs/day: 0.50     Years: 53.00     Pack years: 26.50     Types: Cigarettes    Smokeless tobacco: Never Used   Substance and Sexual Activity    Alcohol use: No    Drug use: No    Sexual activity: Not Currently   Lifestyle    Physical activity:     Days per week: Not on file     Minutes per session: Not on file    Stress: Not on file   Relationships    Social connections:     Talks on phone: Not on file     Gets together: Not on file     Attends [employer] service: Not on file     Active member of club or organization: Not on file     Attends meetings of clubs or organizations: Not on file     Relationship status: Not on file   Other Topics Concern    Are you pregnant or think you may be? Not Asked    Breast-feeding Not Asked   Social History Narrative    Lives alone        Hobbies: skyler        From [place]           Current Outpatient Medications on File Prior to Visit   Medication Sig Dispense Refill    acetaminophen (TYLENOL) 325 MG tablet Take 2 tablets (650 mg total) by mouth every 4 (four) hours as needed (pain score 1-2/10).  0    aspirin 81 mg Tab Take 81 mg by mouth once. Every day      cetirizine (ZYRTEC) 10 MG tablet Take 10 mg by mouth once daily. Every day      DOCOSAHEXANOIC ACID/EPA (FISH OIL ORAL) Take 1,000 mg by mouth once. Twice a day      hydroCHLOROthiazide (HYDRODIURIL) 25 MG tablet TAKE 1 TABLET ONCE DAILY 90 tablet 3    ibuprofen (ADVIL,MOTRIN) 600 MG tablet Take 1 tablet (600 mg total) by mouth every 6 (six) hours as needed for Pain. 90 tablet 1    lisinopril (PRINIVIL,ZESTRIL) 40 MG tablet Take 1 tablet (40 mg total) by mouth once daily. 90 tablet 3    methylPREDNISolone (MEDROL DOSEPACK) 4 mg tablet use as directed 1 Package 0    multivitamin capsule Take 1 capsule by mouth once daily.      NIFEdipine (ADALAT CC) 60 MG TbSR TAKE 1 TABLET ONCE  DAILY 90 tablet 3    simvastatin (ZOCOR) 10 MG tablet TAKE 1 TABLET EVERY EVENING 90 tablet 3    temazepam (RESTORIL) 15 mg Cap One tablet At bedtime as needed for insomnia 90 capsule 1     No current facility-administered medications on file prior to visit.        Family History   Problem Relation Age of Onset    Cancer Sister         uterine    Diabetes Brother     Breast cancer Other 44    Alcohol abuse Sister         Review of Systems   Constitutional: Negative for chills and fever.   Respiratory: Negative for shortness of breath.    Cardiovascular: Negative for chest pain.   Musculoskeletal: Positive for back pain.   Neurological: Positive for sensory change. Negative for dizziness, weakness and headaches.       Physical Exam   Constitutional: She is oriented to person, place, and time. She appears well-developed and well-nourished.   HENT:   Head: Normocephalic and atraumatic.   Eyes: Pupils are equal, round, and reactive to light. EOM are normal.   Neck: Normal range of motion.   Pulmonary/Chest: Effort normal.   Neurological: She is alert and oriented to person, place, and time.   Psychiatric: She has a normal mood and affect. Her behavior is normal. Judgment and thought content normal.        Sciatica, left side  -     gabapentin (NEURONTIN) 300 MG capsule; Take 1 capsule (300 mg total) by mouth 3 (three) times daily.  Dispense: 30 capsule; Refill: 2  -     HYDROcodone-acetaminophen (NORCO) 5-325 mg per tablet; Take 1 tablet by mouth every 6 (six) hours as needed for Pain.  Dispense: 28 tablet; Refill: 0       Discussed sciatic stretches twice a day  Finish medrol pack  Gabapentin QHS  Hydrocdone every 6 hours PRN pain  F/u PRN  Consider MRI is symptoms persist or developing any weakness

## 2020-04-25 ENCOUNTER — PATIENT MESSAGE (OUTPATIENT)
Dept: FAMILY MEDICINE | Facility: CLINIC | Age: 80
End: 2020-04-25

## 2020-04-25 DIAGNOSIS — M51.36 DDD (DEGENERATIVE DISC DISEASE), LUMBAR: Primary | ICD-10-CM

## 2020-04-25 DIAGNOSIS — M54.32 LEFT SIDED SCIATICA: ICD-10-CM

## 2020-04-29 ENCOUNTER — PATIENT MESSAGE (OUTPATIENT)
Dept: FAMILY MEDICINE | Facility: CLINIC | Age: 80
End: 2020-04-29

## 2020-04-29 DIAGNOSIS — M54.32 SCIATICA, LEFT SIDE: ICD-10-CM

## 2020-04-29 DIAGNOSIS — E78.5 HYPERLIPIDEMIA, UNSPECIFIED HYPERLIPIDEMIA TYPE: ICD-10-CM

## 2020-04-29 DIAGNOSIS — I10 ESSENTIAL HYPERTENSION: ICD-10-CM

## 2020-04-29 RX ORDER — HYDROCODONE BITARTRATE AND ACETAMINOPHEN 5; 325 MG/1; MG/1
1 TABLET ORAL EVERY 6 HOURS PRN
Qty: 28 TABLET | Refills: 0 | Status: SHIPPED | OUTPATIENT
Start: 2020-04-29 | End: 2020-05-07 | Stop reason: SDUPTHER

## 2020-04-29 NOTE — TELEPHONE ENCOUNTER
Spoke to pt. Pt states she was seen last week for sciatica and states that she was prescribed gabapentin and hydrocodone, but states that she is still having a lot of pain and would like to know what to do. She is also requesting a refill of hydrocodone. Please advise.

## 2020-04-29 NOTE — TELEPHONE ENCOUNTER
----- Message from Fransisca Krueger sent at 4/29/2020 11:29 AM CDT -----     Type:  RX Refill Request    Who Called: pt  Refill   RX Name and Strength: oxycodone  5 mg  How is the patient currently taking it? (ex. 1XDay):    3  Times  Daily Is this a 30 day or 90 day RX:  28  day  Preferred Pharmacy with phone number:    [address] [address]  Phone: [phone] Fax: [phone]  best Call Back Number:   [phone]  Additional Information:   Please call   Pt  Back   Pt has  Questions  also

## 2020-04-30 ENCOUNTER — PATIENT MESSAGE (OUTPATIENT)
Dept: FAMILY MEDICINE | Facility: CLINIC | Age: 80
End: 2020-04-30

## 2020-04-30 DIAGNOSIS — I10 ESSENTIAL HYPERTENSION: ICD-10-CM

## 2020-04-30 RX ORDER — HYDROCHLOROTHIAZIDE 25 MG/1
TABLET ORAL
Qty: 90 TABLET | Refills: 3 | Status: SHIPPED | OUTPATIENT
Start: 2020-04-30 | End: 2021-01-12 | Stop reason: SDUPTHER

## 2020-04-30 RX ORDER — HYDROCODONE BITARTRATE AND ACETAMINOPHEN 5; 325 MG/1; MG/1
1 TABLET ORAL EVERY 6 HOURS PRN
Qty: 28 TABLET | Refills: 0 | Status: CANCELLED | OUTPATIENT
Start: 2020-04-30 | End: 2020-05-07

## 2020-04-30 RX ORDER — IBUPROFEN 600 MG/1
TABLET ORAL
Qty: 90 TABLET | Refills: 1 | Status: SHIPPED | OUTPATIENT
Start: 2020-04-30 | End: 2020-05-22

## 2020-04-30 RX ORDER — SIMVASTATIN 10 MG/1
TABLET, FILM COATED ORAL
Qty: 90 TABLET | Refills: 3 | Status: SHIPPED | OUTPATIENT
Start: 2020-04-30 | End: 2021-01-12 | Stop reason: SDUPTHER

## 2020-04-30 RX ORDER — LISINOPRIL 40 MG/1
TABLET ORAL
Qty: 90 TABLET | Refills: 3 | Status: SHIPPED | OUTPATIENT
Start: 2020-04-30 | End: 2021-01-12 | Stop reason: SDUPTHER

## 2020-04-30 NOTE — PROGRESS NOTES
Refill Routing Note    Medication(s) are not appropriate for processing by [facility]:       Outside of protocol           Medication reconciliation completed: No      Appointments [id number] or future 3m with PCP    Date Provider   Last Visit   4/21/2020 Akil Chavez MD   Next Visit   Visit date not found Akil Chavez MD     Automatic Epic Protocol Generated Data:    Requested Prescriptions   Pending Prescriptions Disp Refills    ibuprofen (ADVIL,MOTRIN) 600 MG tablet [Pharmacy Med Name: IBUPROFEN TAB 600MG] 90 tablet 1     Sig: TAKE 1 TABLET EVERY 6 HOURSAS NEEDED FOR PAIN       NSAIDs Protocol Failed - 4/29/2020  7:08 PM        Failed - HGB greater than 10 or HCT greater than 30 in past 12 months        Passed - Patient not currently pregnant        Passed - No positive pregnancy test in past 12 months         Passed - Serum Creatinine less than 1.4 on file in the past 12 months     Lab Results   Component Value Date    CREATININE 1.2 12/09/2019    CREATININE 1.0 11/20/2018    CREATININE 1.2 05/21/2018     Lab Results   Component Value Date    EGFRNONAA 43.7 (A) 12/09/2019    EGFRNONAA 54.9 (A) 11/20/2018    EGFRNONAA 44.3 (A) 05/21/2018               Passed - Visit with authorizing provider in past 12 months or upcoming 90 days        Passed - AST in past 12 months      Lab Results   Component Value Date    AST 20 12/09/2019    AST 21 11/20/2018    AST 25 04/14/2018              Passed - Serum Potassium less than 5.2 on file in the past 12 months     Lab Results   Component Value Date    K 3.9 12/09/2019    K 3.7 11/20/2018    K 3.7 05/21/2018                  Passed - Blood Pressure below 139/89 on file in past 12 months      BP Readings from Last 3 Encounters:   12/16/19 128/80   11/06/19 132/66   06/25/19 118/76             Passed - ALT less than 95 in past 12 months     Lab Results   Component Value Date    ALT 8 (L) 12/09/2019    ALT 7 (L) 11/20/2018    ALT 24 04/14/2018                  Note composed:8:12 PM 04/29/2020

## 2020-05-04 ENCOUNTER — TELEPHONE (OUTPATIENT)
Dept: NEUROSURGERY | Facility: CLINIC | Age: 80
End: 2020-05-04

## 2020-05-04 RX ORDER — NIFEDIPINE 60 MG/1
TABLET, EXTENDED RELEASE ORAL
Qty: 90 TABLET | Refills: 3 | Status: SHIPPED | OUTPATIENT
Start: 2020-05-04 | End: 2021-01-12 | Stop reason: SDUPTHER

## 2020-05-04 NOTE — TELEPHONE ENCOUNTER
Refill Authorization Note    is requesting a refill authorization.    Brief assessment and rationale for refill: APPROVE: prr               Medication reconciliation completed: No                         Comments:      Requested Prescriptions   Signed Prescriptions Disp Refills    NIFEdipine (ADALAT CC) 60 MG TbSR 90 tablet 3     Sig: TAKE 1 TABLET ONCE DAILY       Cardiovascular:  Calcium Channel Blockers Passed - 5/1/2020  8:27 AM        Passed - Patient is at least 18 years old        Passed - Last BP in normal range within 360 days.     BP Readings from Last 3 Encounters:   12/16/19 128/80   11/06/19 132/66   06/25/19 118/76              Passed - Office visit in past 12 months or future 90 days.     Recent Outpatient Visits            1 week ago Sciatica, left side    [facility] Akil Chavez MD    2 weeks ago Sciatica of left side    [facility] MIKKI Barnes MD    4 months ago Preventative health care    [facility] Akil Chavez MD    6 months ago Neoplasm of right upper arm    [facility] Akil Chavez MD    10 months ago Pedal edema    [facility] Akil Chavez MD          Future Appointments              In 2 days [facility] MRI[facility]    In 1 month [facility]    In 7 months Akil Chavez MD [facility]                 Appointments  past 12m or future 3m with PCP    Date Provider   Last Visit   4/21/2020 Akil Chavez MD   Next Visit   Visit date not found Akil Chavez MD   ED visits in past 90 days: [unfilled]     Note composed:11:40 AM 05/04/2020

## 2020-05-04 NOTE — TELEPHONE ENCOUNTER
Patient is s/p lumbar fusion with Dr. Miller 4/3/2018. She called today saying she has had burning pain to the left buttocks that travels down the left leg. This has been ongoing for 3 weeks and has been worsening. She was given some exercises as well as pain medication from PCP with no relief. She is scheduled for a repeat MRI on Wednesday morning. She is asking about seeing one of the other surgeons. I advised her that I would put in a reminder for you to review her updated MRI and let me know what is recommended for her based on those results. She was ok with this and said she will wait to hear back from us later this week. Thanks!

## 2020-05-05 ENCOUNTER — PATIENT MESSAGE (OUTPATIENT)
Dept: ADMINISTRATIVE | Facility: HOSPITAL | Age: 80
End: 2020-05-05

## 2020-05-06 ENCOUNTER — PATIENT MESSAGE (OUTPATIENT)
Dept: FAMILY MEDICINE | Facility: CLINIC | Age: 80
End: 2020-05-06

## 2020-05-06 ENCOUNTER — HOSPITAL ENCOUNTER (OUTPATIENT)
Dept: RADIOLOGY | Facility: HOSPITAL | Age: 80
Discharge: HOME | End: 2020-05-06
Attending: FAMILY MEDICINE
Payer: MEDICARE

## 2020-05-06 DIAGNOSIS — M54.32 SCIATICA, LEFT SIDE: ICD-10-CM

## 2020-05-06 DIAGNOSIS — M54.32 LEFT SIDED SCIATICA: ICD-10-CM

## 2020-05-06 DIAGNOSIS — M51.36 DDD (DEGENERATIVE DISC DISEASE), LUMBAR: ICD-10-CM

## 2020-05-06 PROCEDURE — 72148 MRI LUMBAR SPINE W/O DYE: CPT | Mod: TC,PO

## 2020-05-06 PROCEDURE — 72148 MRI LUMBAR SPINE W/O DYE: CPT | Mod: 26,,, | Performed by: RADIOLOGY

## 2020-05-06 PROCEDURE — 72148 MRI LUMBAR SPINE WITHOUT CONTRAST: ICD-10-PCS | Mod: 26,,, | Performed by: RADIOLOGY

## 2020-05-07 ENCOUNTER — TELEPHONE (OUTPATIENT)
Dept: PAIN MEDICINE | Facility: CLINIC | Age: 80
End: 2020-05-07

## 2020-05-07 DIAGNOSIS — R59.0 ABDOMINAL LYMPHADENOPATHY: ICD-10-CM

## 2020-05-07 DIAGNOSIS — M54.16 LUMBAR RADICULOPATHY: Primary | ICD-10-CM

## 2020-05-07 DIAGNOSIS — Z11.9 SCREENING EXAMINATION FOR INFECTIOUS DISEASE: Primary | ICD-10-CM

## 2020-05-07 RX ORDER — ALPRAZOLAM 0.5 MG/1
1 TABLET, ORALLY DISINTEGRATING ORAL ONCE AS NEEDED
Status: CANCELLED | OUTPATIENT
Start: 2020-05-14 | End: 2031-10-10

## 2020-05-07 RX ORDER — HYDROCODONE BITARTRATE AND ACETAMINOPHEN 5; 325 MG/1; MG/1
1 TABLET ORAL EVERY 6 HOURS PRN
Qty: 28 TABLET | Refills: 0 | Status: SHIPPED | OUTPATIENT
Start: 2020-05-07 | End: 2020-05-14

## 2020-05-07 NOTE — TELEPHONE ENCOUNTER
MRI showed mainly at L3-4 there is a broad left paracentral disc protrusion in addition to a superimposed left-sided disc extrusion. This is the source of her pain.  She has already been contacted by pain management and has an injection scheduled.

## 2020-05-08 ENCOUNTER — PATIENT MESSAGE (OUTPATIENT)
Dept: FAMILY MEDICINE | Facility: CLINIC | Age: 80
End: 2020-05-08

## 2020-05-11 ENCOUNTER — PATIENT MESSAGE (OUTPATIENT)
Dept: FAMILY MEDICINE | Facility: CLINIC | Age: 80
End: 2020-05-11

## 2020-05-12 ENCOUNTER — OFFICE VISIT (OUTPATIENT)
Dept: FAMILY MEDICINE | Facility: CLINIC | Age: 80
End: 2020-05-12
Payer: MEDICARE

## 2020-05-12 ENCOUNTER — TELEPHONE (OUTPATIENT)
Dept: NEUROSURGERY | Facility: CLINIC | Age: 80
End: 2020-05-12

## 2020-05-12 ENCOUNTER — PATIENT MESSAGE (OUTPATIENT)
Dept: FAMILY MEDICINE | Facility: CLINIC | Age: 80
End: 2020-05-12

## 2020-05-12 ENCOUNTER — TELEPHONE (OUTPATIENT)
Dept: FAMILY MEDICINE | Facility: CLINIC | Age: 80
End: 2020-05-12

## 2020-05-12 ENCOUNTER — LAB VISIT (OUTPATIENT)
Dept: FAMILY MEDICINE | Facility: CLINIC | Age: 80
End: 2020-05-12
Payer: COMMERCIAL

## 2020-05-12 DIAGNOSIS — N28.89 RENAL MASS, RIGHT: ICD-10-CM

## 2020-05-12 DIAGNOSIS — R59.0 ABDOMINAL LYMPHADENOPATHY: Primary | ICD-10-CM

## 2020-05-12 DIAGNOSIS — R59.0 INGUINAL LYMPHADENOPATHY: ICD-10-CM

## 2020-05-12 DIAGNOSIS — Z11.9 SCREENING EXAMINATION FOR INFECTIOUS DISEASE: ICD-10-CM

## 2020-05-12 PROCEDURE — 99213 OFFICE O/P EST LOW 20 MIN: CPT | Mod: 95,,, | Performed by: FAMILY MEDICINE

## 2020-05-12 PROCEDURE — U0002 COVID-19 LAB TEST NON-CDC: HCPCS

## 2020-05-12 PROCEDURE — 99213 PR OFFICE/OUTPT VISIT, EST, LEVL III, 20-29 MIN: ICD-10-PCS | Mod: 95,,, | Performed by: FAMILY MEDICINE

## 2020-05-12 NOTE — TELEPHONE ENCOUNTER
----- Message from Josy Bergman PA-C sent at 5/11/2020  2:13 PM CDT -----  Hi Dr. Chavez,    This nice lady is s/p lumbar fusion with us. We are trying to manage her pain conservatively, however, she may need extension of her fusion. However, on imaging, she had a concern for retroperitoneal lymphadenopathy. I ordered a CT ab/pelvis as recommended by the radiologists. Looks like she may have a renal mass/possible lymphoma based on the report.     Can we refer her to you for continued workup. She had an abnormal GFR and so I don't want to continue giving her contrast if not necessary. Typically Heme/Onc will not see patients without formal pathology. I will explain the report to the patient and inform her of continued workup with you.     Thank you!  EDMAR Rivera

## 2020-05-12 NOTE — PROGRESS NOTES
The patient location is: home  The chief complaint leading to consultation is: abnormal CT  Visit type: Virtual visit with synchronous audio and video  Total time spent with patient: 15 minutes  Each patient to whom he or she provides medical services by telemedicine is:  (1) informed of the relationship between the physician and patient and the respective role of any other health care provider with respect to management of the patient; and (2) notified that he or she may decline to receive medical services by telemedicine and may withdraw from such care at any time.    HPI:    Here to discuss recent CT results.  It showed some intrabdominal lymph nodes and right renal mass.  Actively working with pain management with upcoming SILVERIO    Past Medical History:   Diagnosis Date    Breast cancer 1985    right mastectomy    Encounter for blood transfusion     Hypercholesterolemia     Hypertension     Hypertension     Lumbar spondylosis     Osteopenia     Pulmonary nodule     Smoker        Past Surgical History:   Procedure Laterality Date    APPENDECTOMY      BREAST RECONSTRUCTION  1990    right    CATARACT EXTRACTION W/  INTRAOCULAR LENS IMPLANT Bilateral     CHOLECYSTECTOMY      EYE SURGERY      JOINT REPLACEMENT  2008    right knee     LUMBAR LAMINECTOMY      MASTECTOMY Right 1985    TOTAL KNEE ARTHROPLASTY Right     TRANSFORAMINAL EPIDURAL INJECTION OF STEROID Left 5/14/2020    Procedure: Injection,steroid,epidural,transforaminal approach, l3/4;  Surgeon: Ronnell Baeza MD;  Location: [facility];  Service: Pain Management;  Laterality: Left;    TUBAL LIGATION      UMBILICAL HERNIA REPAIR         Review of patient's allergies indicates:   Allergen Reactions    No known drug allergies        Social History     Socioeconomic History    Marital status:      Spouse name: Not on file    Number of children: 3    Years of education: Not on file    Highest education level: Not on file    Occupational History    Occupation: retired  for [employer]   Social Needs    Financial resource strain: Not on file    Food insecurity:     Worry: Not on file     Inability: Not on file    Transportation needs:     Medical: Not on file     Non-medical: Not on file   Tobacco Use    Smoking status: Current Every Day Smoker     Packs/day: 1.00     Years: 53.00     Pack years: 53.00     Types: Cigarettes    Smokeless tobacco: Never Used   Substance and Sexual Activity    Alcohol use: No    Drug use: No    Sexual activity: Not Currently   Lifestyle    Physical activity:     Days per week: Not on file     Minutes per session: Not on file    Stress: Not on file   Relationships    Social connections:     Talks on phone: Not on file     Gets together: Not on file     Attends Sikh service: Not on file     Active member of club or organization: Not on file     Attends meetings of clubs or organizations: Not on file     Relationship status: Not on file   Other Topics Concern    Are you pregnant or think you may be? Not Asked    Breast-feeding Not Asked   Social History Narrative    Lives alone        Hobbies: skyler        From [place]           Current Outpatient Medications on File Prior to Visit   Medication Sig Dispense Refill    acetaminophen (TYLENOL) 325 MG tablet Take 2 tablets (650 mg total) by mouth every 4 (four) hours as needed (pain score 1-2/10).  0    aspirin 81 mg Tab Take 81 mg by mouth once. Every day      cetirizine (ZYRTEC) 10 MG tablet Take 10 mg by mouth once daily. Every day      DOCOSAHEXANOIC ACID/EPA (FISH OIL ORAL) Take 1,000 mg by mouth once. Twice a day      gabapentin (NEURONTIN) 300 MG capsule Take 1 capsule (300 mg total) by mouth 3 (three) times daily. 30 capsule 2    hydroCHLOROthiazide (HYDRODIURIL) 25 MG tablet TAKE 1 TABLET ONCE DAILY 90 tablet 3    ibuprofen (ADVIL,MOTRIN) 600 MG tablet TAKE 1 TABLET EVERY 6 HOURSAS NEEDED FOR PAIN 90 tablet 1     lisinopriL (PRINIVIL,ZESTRIL) 40 MG tablet TAKE 1 TABLET ONCE DAILY 90 tablet 3    multivitamin capsule Take 1 capsule by mouth once daily.      NIFEdipine (ADALAT CC) 60 MG TbSR TAKE 1 TABLET ONCE DAILY 90 tablet 3    simvastatin (ZOCOR) 10 MG tablet TAKE 1 TABLET EVERY EVENING 90 tablet 3    temazepam (RESTORIL) 15 mg Cap One tablet At bedtime as needed for insomnia 90 capsule 1     No current facility-administered medications on file prior to visit.        Family History   Problem Relation Age of Onset    Cancer Sister         uterine    Diabetes Brother     Breast cancer Other 42    Alcohol abuse Sister         Review of Systems   HENT: Negative for hearing loss.    Eyes: Negative for discharge.   Respiratory: Negative for wheezing.    Cardiovascular: Negative for chest pain and palpitations.   Gastrointestinal: Negative for blood in stool, constipation, diarrhea and vomiting.   Genitourinary: Negative for dysuria and hematuria.   Musculoskeletal: Negative for neck pain.   Neurological: Negative for weakness and headaches.   Endo/Heme/Allergies: Negative for polydipsia.       Physical Exam   Constitutional: She is oriented to person, place, and time. She appears well-developed and well-nourished.   HENT:   Head: Normocephalic and atraumatic.   Eyes: Pupils are equal, round, and reactive to light. EOM are normal.   Neck: Neck supple.   Pulmonary/Chest: Effort normal.   Neurological: She is alert and oriented to person, place, and time.   Psychiatric: She has a normal mood and affect. Her behavior is normal. Judgment and thought content normal.        CT abd/pelvis reviewed    Abdominal lymphadenopathy    Inguinal lymphadenopathy  -     Ambulatory referral/consult to Interventional Radiology; Future; Expected date: 05/19/2020    Renal mass, right     suspect likely lyphoma or renal cancer  Most accessible site for biopsy seems inguinal lymph node  Will refer to interventional radiology for arrangement of  guided inguinal lymph node biopsy    Answers for HPI/ROS submitted by the patient on 5/12/2020   activity change: No  unexpected weight change: No  rhinorrhea: No  trouble swallowing: No  visual disturbance: No  chest tightness: No  polyuria: No  difficulty urinating: No  menstrual problem: No  joint swelling: No  arthralgias: No  confusion: No  dysphoric mood: No

## 2020-05-12 NOTE — TELEPHONE ENCOUNTER
----- Message from Chasity Meza LPN sent at 5/7/2020 10:30 AM CDT -----  Please call patient with CT Abd results. Thanks!

## 2020-05-13 ENCOUNTER — PATIENT MESSAGE (OUTPATIENT)
Dept: FAMILY MEDICINE | Facility: CLINIC | Age: 80
End: 2020-05-13

## 2020-05-13 DIAGNOSIS — M54.16 LUMBAR RADICULOPATHY: Primary | ICD-10-CM

## 2020-05-13 DIAGNOSIS — R59.0 ABDOMINAL LYMPHADENOPATHY: ICD-10-CM

## 2020-05-13 LAB — SARS-COV-2 RNA RESP QL NAA+PROBE: NOT DETECTED

## 2020-05-14 ENCOUNTER — HOSPITAL ENCOUNTER (OUTPATIENT)
Facility: HOSPITAL | Age: 80
Discharge: HOME | End: 2020-05-14
Attending: ANESTHESIOLOGY | Admitting: ANESTHESIOLOGY
Payer: MEDICARE

## 2020-05-14 ENCOUNTER — HOSPITAL ENCOUNTER (OUTPATIENT)
Dept: RADIOLOGY | Facility: HOSPITAL | Age: 80
Discharge: HOME | End: 2020-05-14
Attending: ANESTHESIOLOGY | Admitting: ANESTHESIOLOGY
Payer: MEDICARE

## 2020-05-14 VITALS
HEART RATE: 83 BPM | DIASTOLIC BLOOD PRESSURE: 75 MMHG | TEMPERATURE: 97 F | RESPIRATION RATE: 16 BRPM | SYSTOLIC BLOOD PRESSURE: 123 MMHG | OXYGEN SATURATION: 95 %

## 2020-05-14 DIAGNOSIS — M54.16 LUMBAR RADICULOPATHY: Primary | ICD-10-CM

## 2020-05-14 DIAGNOSIS — M54.16 LUMBAR RADICULOPATHY: ICD-10-CM

## 2020-05-14 PROCEDURE — 64483 NJX AA&/STRD TFRM EPI L/S 1: CPT | Mod: PO | Performed by: ANESTHESIOLOGY

## 2020-05-14 PROCEDURE — 25500020 PHARM REV CODE 255: Mod: PO | Performed by: ANESTHESIOLOGY

## 2020-05-14 PROCEDURE — 76000 FLUOROSCOPY <1 HR PHYS/QHP: CPT | Mod: TC,PO

## 2020-05-14 PROCEDURE — 64483 NJX AA&/STRD TFRM EPI L/S 1: CPT | Mod: LT,,, | Performed by: ANESTHESIOLOGY

## 2020-05-14 PROCEDURE — 25000003 PHARM REV CODE 250: Mod: PO | Performed by: ANESTHESIOLOGY

## 2020-05-14 PROCEDURE — 64483 PR EPIDURAL INJ, ANES/STEROID, TRANSFORAMINAL, LUMB/SACR, SNGL LEVL: ICD-10-PCS | Mod: LT,,, | Performed by: ANESTHESIOLOGY

## 2020-05-14 PROCEDURE — 63600175 PHARM REV CODE 636 W HCPCS: Mod: PO | Performed by: ANESTHESIOLOGY

## 2020-05-14 RX ORDER — ALPRAZOLAM 0.5 MG/1
1 TABLET, ORALLY DISINTEGRATING ORAL ONCE AS NEEDED
Status: COMPLETED | OUTPATIENT
Start: 2020-05-14 | End: 2020-05-14

## 2020-05-14 RX ORDER — BUPIVACAINE HYDROCHLORIDE 2.5 MG/ML
INJECTION, SOLUTION EPIDURAL; INFILTRATION; INTRACAUDAL
Status: DISCONTINUED | OUTPATIENT
Start: 2020-05-14 | End: 2020-05-14 | Stop reason: HOSPADM

## 2020-05-14 RX ORDER — LIDOCAINE HYDROCHLORIDE 10 MG/ML
INJECTION, SOLUTION EPIDURAL; INFILTRATION; INTRACAUDAL; PERINEURAL
Status: DISCONTINUED | OUTPATIENT
Start: 2020-05-14 | End: 2020-05-14 | Stop reason: HOSPADM

## 2020-05-14 RX ORDER — METHYLPREDNISOLONE ACETATE 40 MG/ML
INJECTION, SUSPENSION INTRA-ARTICULAR; INTRALESIONAL; INTRAMUSCULAR; SOFT TISSUE
Status: DISCONTINUED | OUTPATIENT
Start: 2020-05-14 | End: 2020-05-14 | Stop reason: HOSPADM

## 2020-05-14 RX ADMIN — ALPRAZOLAM 0.5 MG: 0.5 TABLET, ORALLY DISINTEGRATING ORAL at 01:05

## 2020-05-14 NOTE — DISCHARGE SUMMARY
[facility]  Discharge Note  Short Stay    Admit Date: 5/14/2020    Discharge Date: 5/14/2020    Attending Physician: Ronnell Baeza MD     Discharge Provider: Ronnell Baeza    Diagnoses:  Active Hospital Problems    Diagnosis  POA    *Lumbar radiculopathy [M54.16]  Yes      Resolved Hospital Problems   No resolved problems to display.       Discharged Condition: good    Final Diagnoses: Lumbar radiculopathy [M54.16]    Disposition: Home or Self Care    Hospital Course: no complications, uneventful    Outcome of Hospitalization, Treatment, Procedure, or Surgery:  Patient was admitted for outpatient procedure. The patient underwent procedure without complications and are discharged home    Follow up/Patient Instructions:  Follow up as scheduled in Pain Management clinic in 3-4 weeks/Patient has received instructions and follow up date and time    Medications:  Continue previous medications    Discharge Procedure Orders   Call MD for:  temperature >100.4     Call MD for:  severe uncontrolled pain     Call MD for:  redness, tenderness, or signs of infection (pain, swelling, redness, odor or green/yellow discharge around incision site)     Call MD for:  severe persistent headache     No dressing needed         Discharge Procedure Orders (must include Diet, Follow-up, Activity):   Discharge Procedure Orders (must include Diet, Follow-up, Activity)   Call MD for:  temperature >100.4     Call MD for:  severe uncontrolled pain     Call MD for:  redness, tenderness, or signs of infection (pain, swelling, redness, odor or green/yellow discharge around incision site)     Call MD for:  severe persistent headache     No dressing needed

## 2020-05-14 NOTE — DISCHARGE INSTRUCTIONS
Home Care Instructions    Apply ice pack to injection site for 20 minute periods for the first 24 hours for soreness/discomfort at injection site  DO NOT USE HEAT FOR 24 HOURS  Keep site clean and dry for 24 hours. If Band-Aid present remove when desired.  Do not drive until tomorrow  Take care when walking after a lumbar injection    STEROIDS or RADIOFREQUENCY  Make take 10-14 days for full affects  Avoid strenuous exercises for 2 days    BLOCKS  Resume regular activities today  Pain office will call in next 2 days  Resume home medications as prescribes today  Resume Aspirin, Plavix or Coumadin the day after the procedure unless otherwise intructed    SEE IMMEDIATE MEDICAL HELP FOR:  Severe increase in your usual pain or appearance of new pain  Prolonged or increasing weakness or numbness in the legs or arms  Drainage, redness, active bleeding, or increased swelling at the injection site  Temperature over 100.0 degrees F.  Headache that increases when your head is upright and decrease when you lie flat    CALL 911 OR GO DIRECTLY TO EMERGENCY DEPARTMENT FOR:  Shortness of breath, chest pain, or problems breathing

## 2020-05-14 NOTE — H&P
CC: Back pain    HPI: The patient is a 80yo woman with a history of lumbar radiculopathy here for left L3/4 TFESI. There are no major changes in history and physical from 4/21/20 by Dr. Chavez.    Past Medical History:   Diagnosis Date    Breast cancer 1985    right mastectomy    Encounter for blood transfusion     Hypercholesterolemia     Hypertension     Hypertension     Lumbar spondylosis     Osteopenia     Pulmonary nodule     Smoker        Past Surgical History:   Procedure Laterality Date    APPENDECTOMY      BREAST RECONSTRUCTION  1990    right    CATARACT EXTRACTION W/  INTRAOCULAR LENS IMPLANT Bilateral     CHOLECYSTECTOMY      EYE SURGERY      JOINT REPLACEMENT  2008    right knee     LUMBAR LAMINECTOMY      MASTECTOMY Right 1985    TOTAL KNEE ARTHROPLASTY Right     TUBAL LIGATION      UMBILICAL HERNIA REPAIR         Family History   Problem Relation Age of Onset    Cancer Sister         uterine    Diabetes Brother     Breast cancer Other 44    Alcohol abuse Sister        Social History     Socioeconomic History    Marital status:      Spouse name: Not on file    Number of children: 3    Years of education: Not on file    Highest education level: Not on file   Occupational History    Occupation: retired  for [employer]   Social Needs    Financial resource strain: Not on file    Food insecurity:     Worry: Not on file     Inability: Not on file    Transportation needs:     Medical: Not on file     Non-medical: Not on file   Tobacco Use    Smoking status: Current Every Day Smoker     Packs/day: 1.00     Years: 53.00     Pack years: 53.00     Types: Cigarettes    Smokeless tobacco: Never Used   Substance and Sexual Activity    Alcohol use: No    Drug use: No    Sexual activity: Not Currently   Lifestyle    Physical activity:     Days per week: Not on file     Minutes per session: Not on file    Stress: Not on file   Relationships    Social  connections:     Talks on phone: Not on file     Gets together: Not on file     Attends Quaker service: Not on file     Active member of club or organization: Not on file     Attends meetings of clubs or organizations: Not on file     Relationship status: Not on file   Other Topics Concern    Are you pregnant or think you may be? Not Asked    Breast-feeding Not Asked   Social History Narrative    Lives alone        Hobbies: skyler        From [place]           Current Facility-Administered Medications   Medication Dose Route Frequency Provider Last Rate Last Dose    alprazolam ODT dissolvable tablet 1 mg  1 mg Oral Once PRN Ronnell Baeza MD           Review of patient's allergies indicates:   Allergen Reactions    No known drug allergies        Vitals:    05/14/20 1308   BP: 131/77   Pulse: 85   Resp: 18   Temp: 96.8 °F (36 °C)   TempSrc: Skin   SpO2: 97%       REVIEW OF SYSTEMS:     GENERAL: No weight loss, malaise or fevers.  HEENT:  No recent changes in vision or hearing  NECK: Negative for lumps, no difficulty with swallowing.  RESPIRATORY: Negative for cough, wheezing or shortness of breath, patient denies any recent URI.  CARDIOVASCULAR: Negative for chest pain, leg swelling or palpitations.  GI: Negative for abdominal discomfort, blood in stools or black stools or change in bowel habits.  MUSCULOSKELETAL: See HPI.  SKIN: Negative for lesions, rash, and itching.  PSYCH: No suicidal or homicidal ideations, no current mood disturbances.  HEMATOLOGY/LYMPHOLOGY: Negative for prolonged bleeding, bruising easily or swollen nodes. Patient is not currently taking any anti-coagulants  ENDO: No history of diabetes or thyroid dysfunction  NEURO: No history of syncope, paralysis, seizures or tremors.All other reviewed and negative other than HPI.    Physical exam:  Gen: A and O x3, pleasant, well-groomed  Skin: No rashes or obvious lesions  HEENT: PERRLA, no obvious deformities on ears or in canals. No  thyroid masses, trachea midline, no palpable lymph nodes in neck, axilla.  CVS: Regular rate and rhythm, normal S1 and S2, no murmurs.  Resp: Clear to auscultation bilaterally.  Abdomen: Soft, NT/ND, normal bowel sounds present.  Musculoskeletal/Neuro: Moving all extremities    Assessment:  Lumbar radiculopathy  -     Case Request Operating Room: Injection,steroid,epidural,transforaminal approach, l3/4  -     Place in Outpatient; Standing  -     Diet NPO; Standing  -     alprazolam ODT dissolvable tablet 1 mg  -     Notify physician ; Standing  -     Notify physician ; Standing  -     Notify physician (specify); Standing  -     Verify informed consent; Standing  -     Vital signs; Standing  -     Place sequential compression device; Standing    Other orders  -     Progressive Mobility Protocol (mobilize patient to their highest level of functioning at least twice daily); Standing  -     IP VTE HIGH RISK PATIENT; Standing

## 2020-05-14 NOTE — OP NOTE
PROCEDURE DATE: 5/14/2020    PROCEDURE: Left L3/4 transforaminal epidural steroid injection under fluoroscopy    DIAGNOSIS: Lumbar  Radiculopathy    Post op diagnosis: Same    PHYSICIAN: Ronnell Baeza MD    MEDICATIONS INJECTED:  Methylprednisolone 40mg (0.5ml) and 1.5ml 0.25% bupivicaine at each nerve root.     LOCAL ANESTHETIC INJECTED:  Lidocaine 1%. 4 ml per site.    SEDATION MEDICATIONS: none    ESTIMATED BLOOD LOSS:  none    COMPLICATIONS:  none    TECHNIQUE:   A time-out was taken to identify patient and procedure side prior to starting the procedure. The patient was placed in a prone position, prepped and draped in the usual sterile fashion using ChloraPrep and sterile towels.  The area to be injected was determined under fluoroscopic guidance in AP and oblique view.  Local anesthetic was given by raising a wheal and going down to the hub of a 25-gauge 1.5 inch needle.  In oblique view, a 3.5 inch 22-gauge bent-tip spinal needle was introduced towards 6 oclock position of the pedicle of each above named nerve root level.  The needle was walked medially then hinged into the neural foramen and position was confirmed in AP and lateral views.  Omnipaque contrast dye was injected to confirm appropriate placement and that there was no vascular uptake.  After negative aspiration for blood or CSF, the medication was then injected. This was performed at the left L3/4 level(s). The patient tolerated the procedure well.    The patient was monitored after the procedure.  Patient was given post procedure and discharge instructions to follow at home. The patient was discharged in a stable condition.

## 2020-05-18 ENCOUNTER — PATIENT MESSAGE (OUTPATIENT)
Dept: FAMILY MEDICINE | Facility: CLINIC | Age: 80
End: 2020-05-18

## 2020-05-19 ENCOUNTER — PATIENT MESSAGE (OUTPATIENT)
Dept: FAMILY MEDICINE | Facility: CLINIC | Age: 80
End: 2020-05-19

## 2020-05-19 NOTE — TELEPHONE ENCOUNTER
Please handle in Dr. Chavez absence?  Spoke with patient and then Debbi at [facility] ([phone]). They need an actual order for CT Needle placement and Covid screening

## 2020-05-19 NOTE — TELEPHONE ENCOUNTER
----- Message from Zhang Carpio sent at 5/19/2020 10:14 AM CDT -----  Contact: [facility], nancy Mercedes want to speak with a nurse regarding putting some orders in please call back at [phone]

## 2020-05-20 ENCOUNTER — TELEPHONE (OUTPATIENT)
Dept: FAMILY MEDICINE | Facility: CLINIC | Age: 80
End: 2020-05-20

## 2020-05-20 ENCOUNTER — PATIENT MESSAGE (OUTPATIENT)
Dept: FAMILY MEDICINE | Facility: CLINIC | Age: 80
End: 2020-05-20

## 2020-05-20 NOTE — TELEPHONE ENCOUNTER
Spoke with nancy at [facility] in  Radiology. She stated she has referral but needs an order of a CT guided needle biopsy for bilateral inguinal lymph nodes. Please advise.

## 2020-05-20 NOTE — TELEPHONE ENCOUNTER
Please let her know that I'm trying to contact Dr. Chavez and Radiology to make sure I order the correct test.

## 2020-05-20 NOTE — TELEPHONE ENCOUNTER
Notified patient. Verbalized understanding. She stated she needed another covid test 3 days prior to the procedure.

## 2020-05-20 NOTE — TELEPHONE ENCOUNTER
Spoke with Mago at [facility] radiology notified her of new orders. She stated she will call patient to schedule.

## 2020-05-22 ENCOUNTER — LAB VISIT (OUTPATIENT)
Dept: FAMILY MEDICINE | Facility: CLINIC | Age: 80
End: 2020-05-22
Payer: MEDICARE

## 2020-05-22 DIAGNOSIS — R59.0 ABDOMINAL LYMPHADENOPATHY: ICD-10-CM

## 2020-05-22 PROCEDURE — U0003 INFECTIOUS AGENT DETECTION BY NUCLEIC ACID (DNA OR RNA); SEVERE ACUTE RESPIRATORY SYNDROME CORONAVIRUS 2 (SARS-COV-2) (CORONAVIRUS DISEASE [COVID-19]), AMPLIFIED PROBE TECHNIQUE, MAKING USE OF HIGH THROUGHPUT TECHNOLOGIES AS DESCRIBED BY CMS-2020-01-R: HCPCS

## 2020-05-23 LAB — SARS-COV-2 RNA RESP QL NAA+PROBE: NOT DETECTED

## 2020-05-25 ENCOUNTER — TELEPHONE (OUTPATIENT)
Dept: FAMILY MEDICINE | Facility: CLINIC | Age: 80
End: 2020-05-25

## 2020-05-25 PROBLEM — R59.0 ABDOMINAL LYMPHADENOPATHY: Status: ACTIVE | Noted: 2020-05-25

## 2020-05-25 NOTE — TELEPHONE ENCOUNTER
----- Message from Ny Zavala sent at 5/25/2020  2:47 PM CDT -----  Type:  Patient Returning Call    Who Called:  Patient  Who Left Message for Patient:  n/a  Does the patient know what this is regarding?:  Covid 19 test results  Best Call Back Number:  [phone] (home) or [phone]

## 2020-05-29 ENCOUNTER — TELEPHONE (OUTPATIENT)
Dept: FAMILY MEDICINE | Facility: CLINIC | Age: 80
End: 2020-05-29

## 2020-05-29 ENCOUNTER — PATIENT MESSAGE (OUTPATIENT)
Dept: FAMILY MEDICINE | Facility: CLINIC | Age: 80
End: 2020-05-29

## 2020-05-29 DIAGNOSIS — C82.90 FOLLICULAR LYMPHOMA, UNSPECIFIED FOLLICULAR LYMPHOMA TYPE, UNSPECIFIED BODY REGION: Primary | ICD-10-CM

## 2020-05-29 NOTE — TELEPHONE ENCOUNTER
Spoke with pt schedule appointment with Oncologist for Monday. Pt verbalized understanding phone call ended .

## 2020-05-29 NOTE — TELEPHONE ENCOUNTER
Lymph node biopsy confirmed FOLLICULAR LYMPHOMA. Discussed results with patient.  Please arrange appt with oncology, Dr. Montero.

## 2020-05-29 NOTE — TELEPHONE ENCOUNTER
"Spoke with pt informed her that pcp doesn't have availably today. Pt  states " my lab results doesn't look good and I really need to see him this is concerning cancer." Offered pt a visit with another provider pt declined. Verbalized understanding informed pt I will get a message to Dr. Chavez and will contact him once I hear back. Pt verbalized understanding phone call ended.  "

## 2020-05-29 NOTE — TELEPHONE ENCOUNTER
"Pt is requesting to speak with you today regarding test results. She states " My results doesn't look good and I can't sit through the weekend not knowing what they truly mean. This is about cancer and I really would like to speak with Dr. Chavez." Pt is okay with you calling her over the phone when you have time today at [phone]. Please advise.  "

## 2020-06-04 ENCOUNTER — OFFICE VISIT (OUTPATIENT)
Dept: HEMATOLOGY/ONCOLOGY | Facility: CLINIC | Age: 80
End: 2020-06-04
Payer: MEDICARE

## 2020-06-04 VITALS
DIASTOLIC BLOOD PRESSURE: 78 MMHG | BODY MASS INDEX: 34.24 KG/M2 | RESPIRATION RATE: 17 BRPM | HEIGHT: 65 IN | WEIGHT: 205.5 LBS | SYSTOLIC BLOOD PRESSURE: 148 MMHG | HEART RATE: 81 BPM | TEMPERATURE: 97 F | OXYGEN SATURATION: 97 %

## 2020-06-04 DIAGNOSIS — C82.90 FOLLICULAR LYMPHOMA, UNSPECIFIED FOLLICULAR LYMPHOMA TYPE, UNSPECIFIED BODY REGION: Primary | ICD-10-CM

## 2020-06-04 DIAGNOSIS — N28.89 LEFT RENAL MASS: Primary | ICD-10-CM

## 2020-06-04 DIAGNOSIS — C82.90 FOLLICULAR LYMPHOMA, UNSPECIFIED FOLLICULAR LYMPHOMA TYPE, UNSPECIFIED BODY REGION: ICD-10-CM

## 2020-06-04 DIAGNOSIS — Z03.818 ENCNTR FOR OBS FOR SUSP EXPSR TO OTH BIOLG AGENTS RULED OUT: Primary | ICD-10-CM

## 2020-06-04 PROCEDURE — 99999 PR PBB SHADOW E&M-EST. PATIENT-LVL IV: CPT | Mod: PBBFAC,,, | Performed by: INTERNAL MEDICINE

## 2020-06-04 PROCEDURE — 99205 OFFICE O/P NEW HI 60 MIN: CPT | Mod: S$GLB,,, | Performed by: INTERNAL MEDICINE

## 2020-06-04 PROCEDURE — 1159F PR MEDICATION LIST DOCUMENTED IN MEDICAL RECORD: ICD-10-PCS | Mod: S$GLB,,, | Performed by: INTERNAL MEDICINE

## 2020-06-04 PROCEDURE — 3078F PR MOST RECENT DIASTOLIC BLOOD PRESSURE < 80 MM HG: ICD-10-PCS | Mod: CPTII,S$GLB,, | Performed by: INTERNAL MEDICINE

## 2020-06-04 PROCEDURE — 99999 PR PBB SHADOW E&M-EST. PATIENT-LVL IV: ICD-10-PCS | Mod: PBBFAC,,, | Performed by: INTERNAL MEDICINE

## 2020-06-04 PROCEDURE — 1159F MED LIST DOCD IN RCRD: CPT | Mod: S$GLB,,, | Performed by: INTERNAL MEDICINE

## 2020-06-04 PROCEDURE — 1101F PR PT FALLS ASSESS DOC 0-1 FALLS W/OUT INJ PAST YR: ICD-10-PCS | Mod: CPTII,S$GLB,, | Performed by: INTERNAL MEDICINE

## 2020-06-04 PROCEDURE — 3077F SYST BP >= 140 MM HG: CPT | Mod: CPTII,S$GLB,, | Performed by: INTERNAL MEDICINE

## 2020-06-04 PROCEDURE — 3077F PR MOST RECENT SYSTOLIC BLOOD PRESSURE >= 140 MM HG: ICD-10-PCS | Mod: CPTII,S$GLB,, | Performed by: INTERNAL MEDICINE

## 2020-06-04 PROCEDURE — 99205 PR OFFICE/OUTPT VISIT, NEW, LEVL V, 60-74 MIN: ICD-10-PCS | Mod: S$GLB,,, | Performed by: INTERNAL MEDICINE

## 2020-06-04 PROCEDURE — 1101F PT FALLS ASSESS-DOCD LE1/YR: CPT | Mod: CPTII,S$GLB,, | Performed by: INTERNAL MEDICINE

## 2020-06-04 PROCEDURE — 1126F PR PAIN SEVERITY QUANTIFIED, NO PAIN PRESENT: ICD-10-PCS | Mod: S$GLB,,, | Performed by: INTERNAL MEDICINE

## 2020-06-04 PROCEDURE — 3078F DIAST BP <80 MM HG: CPT | Mod: CPTII,S$GLB,, | Performed by: INTERNAL MEDICINE

## 2020-06-04 PROCEDURE — 1126F AMNT PAIN NOTED NONE PRSNT: CPT | Mod: S$GLB,,, | Performed by: INTERNAL MEDICINE

## 2020-06-04 NOTE — Clinical Note
LDH, beta 2 microglobulin, vitamin-D level, CT PET, bilateral bone marrow aspiration and biopsy with flow, cytogenetics, and lab 1990 at earliest convenience.Return to clinic to review results.

## 2020-06-04 NOTE — LETTER
June 4, 2020        Akil Chavez MD  [address] [facility] [address]  [facility] [address]             [facility] [place] [id number] [facility] [address]  [facility] [address]  Phone: [phone]  Fax: [phone]   Patient: Shandra Velez   MR Number: [id number]   YOB: 1940   Date of Visit: 6/4/2020       Dear Dr. Chavez:    Thank you for referring Shandra Velez to me for evaluation of follicular lymphoma and complex left renal mass. Below are the relevant portions of my assessment and plan of care.  If you have questions, please do not hesitate to call me. I look forward to following Shandra along with you.    Sincerely,      MD JUAN CARLOS Medina MD Katherine B. Alleyne, PA-C Nathan J. Harrison, MD

## 2020-06-04 NOTE — PROGRESS NOTES
Chief complaint:  Follicular lymphoma    History of present illness:  The patient is a 79-year-old white female who presents in consultation from Dr. Chavez regarding newly diagnosed non-Hodgkin's lymphoma.  Patient was in her usual state of health but developed severe/debilitating low pain and lumbar radiculopathy after scrubbing her screened porch.  An MRI was obtained and intra-abdominal lymphadenopathy as well as a complex left renal was noted.  Patient subsequently underwent dedicated CT scan of the abdomen pelvis as well as image guided needle biopsy of a right inguinal lymph node.  Patient was found have grade 1 follicular lymphoma.  Consultation requested regarding and management.    Clinically, patient is not experience difficulty with fevers, chills, painful lymphadenopathy, or unexplained weight loss.  No other complaints for pertinent findings on a 14 point review systems.    Past medical history:  1.  Hypertension  2.  Hyperlipidemia  3.  History of breast carcinoma-status post right-sided mastectomy  4.  Morbid obesity  5.  Osteopenia  6.  Lumbar spinal stenosis/sciatica-status post SILVERIO  7.  Pulmonary nodule  8.  Status post cataract repair  9.  Status post lumbar laminectomy  10.  Status post total knee arthroplasty  11.  Status post umbilical hernia repair  12.  Status post tubal ligation  13.  Status post cholecystectomy  14.  Status post appendectomy    Allergies:  None known    Medications:  1.  Tylenol 650 mg every 4 hr as needed  2.  Aspirin 81 mg daily  3.  Zyrtec 10 mg daily  4.  Fish oil 1 g daily  5.  Neurontin 300 mg 3 times daily  6.  HydroDIURIL 25 mg daily  7.  Lisinopril 40 mg daily  8.  Adalat 60 mg daily  9.  Zocor 10 mg daily  10.  Restoril 15 mg as needed for insomnia    Family/social history:  One pack per day smoker.  No alcohol use.  Patient is cared for 2 adult sons.  Family history remarkable for breast and uterine cancer as well as diabetes mellitus.    Physical  examination:  Well-developed, obese, elderly, white female, in no acute distress, who has a weight of 205.5 lb  VITAL SIGNS: Documented  and reviewed this visit.  HEENT: Normocephalic, atraumatic. Oral mucosa pink and moist. Lips without   lesions. Tongue midline. Oropharynx clear. Nonicteric sclerae.   NECK: Supple, no adenopathy. No carotid bruits, thyromegaly or thyroid nodule.   HEART: Regular rate and rhythm without murmur, gallop or rub.   LUNGS: Clear to auscultation bilaterally. Normal respiratory effort.   ABDOMEN: Soft, nontender, nondistended with positive normoactive bowel sounds,   no hepatosplenomegaly.   EXTREMITIES: No cyanosis, clubbing or edema. Distal pulses are intact.   AXILLAE AND GROIN: No palpable pathologic lymphadenopathy is appreciated.   SKIN: Intact/turgor normal   NEUROLOGIC: Cranial nerves II-XII grossly intact. Motor: Good muscle bulk and   tone. Strength/sensory 5/5 throughout. Gait stable.     Laboratory:  A recent study was obtained 05/25/2020.  White count 10.9, hemoglobin 13.9, hematocrit 42.2, platelets 166.  Prothrombin time 12.3, INR 1.0, partial thromboplastin time 26.5.  Sodium 142, potassium 3.9, chloride 107, CO2 24, BUN 28, creatinine 1.2, glucose 101, calcium 10, liver function test within normal limits, GFR is 43.7.    CT abdomen/pelvis:  1. Lymphadenopathy throughout the abdomen and pelvis may be due to lymphoma or metastatic disease.  This was not present on the MR lumbar spine study from 2015.  2. Complex cortical lesion on the anterior right mid kidney is indeterminate but concerning for neoplasm such as renal cell carcinoma.  Consider dedicated renal mass CT or MRI (with and without contrast) for further evaluation.  3. Small infrarenal abdominal aortic aneurysm.  4. Findings suggestive of a cystocele.  5. Fat containing left periumbilical ventral wall hernia.    Surgical pathology:  Obtained from right inguinal lymph node via core biopsy on 05/25/2020.  LYMPH  NODE, RIGHT INGUINAL, NEEDLE CORE BIOPSY:  --FOLLICULAR LYMPHOMA (CD19+, CD20+, CD22+, PAX5+, CD10+, BCL6+, CD23+   [PARTIAL/SUBSET], KAPPA+), GRADE 1     TO 2.  --NO MORPHOLOGIC EVIDENCE OF METASTATIC MALIGNANCY.    Impression:  1.  Grade 1 follicular non-Hodgkin's lymphoma.  2.  Complex left renal mass.    Plan:  1.  Obtain additional lab to include LDH, beta 2, vitamin-D level.  2.  Total body CT PET scan.  3.  Bilateral bone marrow aspiration biopsy with flow, cytogenetics, and FISH panel.  4.  Informed consent for bone marrow aspiration and biopsy was obtained during the course of today's office evaluation.  5.  Return to clinic in earliest convenience to review results.    This note was created using voice recognition software and may contain grammatical errors.

## 2020-06-05 ENCOUNTER — PATIENT OUTREACH (OUTPATIENT)
Dept: ADMINISTRATIVE | Facility: OTHER | Age: 80
End: 2020-06-05

## 2020-06-09 ENCOUNTER — OFFICE VISIT (OUTPATIENT)
Dept: NEUROSURGERY | Facility: CLINIC | Age: 80
End: 2020-06-09
Payer: MEDICARE

## 2020-06-09 VITALS
HEIGHT: 65 IN | SYSTOLIC BLOOD PRESSURE: 126 MMHG | BODY MASS INDEX: 34.24 KG/M2 | DIASTOLIC BLOOD PRESSURE: 86 MMHG | WEIGHT: 205.5 LBS | TEMPERATURE: 98 F

## 2020-06-09 DIAGNOSIS — M54.32 SCIATICA, LEFT SIDE: ICD-10-CM

## 2020-06-09 DIAGNOSIS — M54.16 LUMBAR RADICULOPATHY: Primary | ICD-10-CM

## 2020-06-09 PROCEDURE — 1126F PR PAIN SEVERITY QUANTIFIED, NO PAIN PRESENT: ICD-10-PCS | Mod: S$GLB,,, | Performed by: NEUROLOGICAL SURGERY

## 2020-06-09 PROCEDURE — 3079F DIAST BP 80-89 MM HG: CPT | Mod: CPTII,S$GLB,, | Performed by: NEUROLOGICAL SURGERY

## 2020-06-09 PROCEDURE — 1159F MED LIST DOCD IN RCRD: CPT | Mod: S$GLB,,, | Performed by: NEUROLOGICAL SURGERY

## 2020-06-09 PROCEDURE — 99214 PR OFFICE/OUTPT VISIT, EST, LEVL IV, 30-39 MIN: ICD-10-PCS | Mod: S$GLB,,, | Performed by: NEUROLOGICAL SURGERY

## 2020-06-09 PROCEDURE — 3079F PR MOST RECENT DIASTOLIC BLOOD PRESSURE 80-89 MM HG: ICD-10-PCS | Mod: CPTII,S$GLB,, | Performed by: NEUROLOGICAL SURGERY

## 2020-06-09 PROCEDURE — 99214 OFFICE O/P EST MOD 30 MIN: CPT | Mod: S$GLB,,, | Performed by: NEUROLOGICAL SURGERY

## 2020-06-09 PROCEDURE — 3074F PR MOST RECENT SYSTOLIC BLOOD PRESSURE < 130 MM HG: ICD-10-PCS | Mod: CPTII,S$GLB,, | Performed by: NEUROLOGICAL SURGERY

## 2020-06-09 PROCEDURE — 3074F SYST BP LT 130 MM HG: CPT | Mod: CPTII,S$GLB,, | Performed by: NEUROLOGICAL SURGERY

## 2020-06-09 PROCEDURE — 1126F AMNT PAIN NOTED NONE PRSNT: CPT | Mod: S$GLB,,, | Performed by: NEUROLOGICAL SURGERY

## 2020-06-09 PROCEDURE — 99999 PR PBB SHADOW E&M-EST. PATIENT-LVL III: ICD-10-PCS | Mod: PBBFAC,,, | Performed by: NEUROLOGICAL SURGERY

## 2020-06-09 PROCEDURE — 99999 PR PBB SHADOW E&M-EST. PATIENT-LVL III: CPT | Mod: PBBFAC,,, | Performed by: NEUROLOGICAL SURGERY

## 2020-06-09 PROCEDURE — 1101F PT FALLS ASSESS-DOCD LE1/YR: CPT | Mod: CPTII,S$GLB,, | Performed by: NEUROLOGICAL SURGERY

## 2020-06-09 PROCEDURE — 1159F PR MEDICATION LIST DOCUMENTED IN MEDICAL RECORD: ICD-10-PCS | Mod: S$GLB,,, | Performed by: NEUROLOGICAL SURGERY

## 2020-06-09 PROCEDURE — 1101F PR PT FALLS ASSESS DOC 0-1 FALLS W/OUT INJ PAST YR: ICD-10-PCS | Mod: CPTII,S$GLB,, | Performed by: NEUROLOGICAL SURGERY

## 2020-06-09 RX ORDER — GABAPENTIN 300 MG/1
300 CAPSULE ORAL 2 TIMES DAILY
Qty: 60 CAPSULE | Refills: 2 | Status: SHIPPED | OUTPATIENT
Start: 2020-06-09 | End: 2020-08-05

## 2020-06-09 NOTE — PROGRESS NOTES
Neurosurgery History and Physical    Patient ID: Shandra Velez is a 79 y.o. female.    Chief Complaint   Patient presents with    Lumbar Spine Pain (L-Spine)     Patient here for 1 month follow-up SILVERIO with Dr. Baeza. Patient reports great pain relief since SILVERIO.       Review of Systems   Constitutional: Positive for activity change.   HENT: Negative.    Eyes: Negative.    Respiratory: Negative.    Cardiovascular: Negative.    Gastrointestinal: Negative.    Endocrine: Negative.    Genitourinary: Negative.    Musculoskeletal: Positive for back pain. Negative for gait problem.   Skin: Negative.    Allergic/Immunologic: Negative.    Neurological: Positive for weakness. Negative for numbness.   Hematological: Negative.    Psychiatric/Behavioral: Negative.        Past Medical History:   Diagnosis Date    Breast cancer 1985    right mastectomy    Encounter for blood transfusion     Hypercholesterolemia     Hypertension     Hypertension     Lumbar spondylosis     Osteopenia     Pulmonary nodule     Smoker     Current      Social History     Socioeconomic History    Marital status:      Spouse name: Not on file    Number of children: 3    Years of education: Not on file    Highest education level: Not on file   Occupational History    Occupation: retired  for [employer]   Social Needs    Financial resource strain: Not on file    Food insecurity:     Worry: Not on file     Inability: Not on file    Transportation needs:     Medical: Not on file     Non-medical: Not on file   Tobacco Use    Smoking status: Current Every Day Smoker     Packs/day: 1.00     Years: 53.00     Pack years: 53.00     Types: Cigarettes    Smokeless tobacco: Never Used   Substance and Sexual Activity    Alcohol use: No    Drug use: No    Sexual activity: Not Currently   Lifestyle    Physical activity:     Days per week: Not on file     Minutes per session: Not on file    Stress: Not on file   Relationships  "   Social connections:     Talks on phone: Not on file     Gets together: Not on file     Attends Catholic service: Not on file     Active member of club or organization: Not on file     Attends meetings of clubs or organizations: Not on file     Relationship status: Not on file   Other Topics Concern    Are you pregnant or think you may be? Not Asked    Breast-feeding Not Asked   Social History Narrative    Lives alone        Hobbies: skyler        From [place]         Family History   Problem Relation Age of Onset    Cancer Sister         uterine    Diabetes Brother     Cancer Sister         Uterine cancer     Breast cancer Other 44     Review of patient's allergies indicates:   Allergen Reactions    No known drug allergies        Current Outpatient Medications:     acetaminophen (TYLENOL) 325 MG tablet, Take 2 tablets (650 mg total) by mouth every 4 (four) hours as needed (pain score 1-2/10)., Disp: , Rfl: 0    aspirin 81 mg Tab, Take 81 mg by mouth once. Every day, Disp: , Rfl:     cetirizine (ZYRTEC) 10 MG tablet, Take 10 mg by mouth once daily. Every day, Disp: , Rfl:     DOCOSAHEXANOIC ACID/EPA (FISH OIL ORAL), Take 1,000 mg by mouth once. Twice a day, Disp: , Rfl:     gabapentin (NEURONTIN) 300 MG capsule, Take 1 capsule (300 mg total) by mouth 2 (two) times daily., Disp: 60 capsule, Rfl: 2    hydroCHLOROthiazide (HYDRODIURIL) 25 MG tablet, TAKE 1 TABLET ONCE DAILY, Disp: 90 tablet, Rfl: 3    lisinopriL (PRINIVIL,ZESTRIL) 40 MG tablet, TAKE 1 TABLET ONCE DAILY, Disp: 90 tablet, Rfl: 3    NIFEdipine (ADALAT CC) 60 MG TbSR, TAKE 1 TABLET ONCE DAILY, Disp: 90 tablet, Rfl: 3    simvastatin (ZOCOR) 10 MG tablet, TAKE 1 TABLET EVERY EVENING, Disp: 90 tablet, Rfl: 3    temazepam (RESTORIL) 15 mg Cap, One tablet At bedtime as needed for insomnia, Disp: 90 capsule, Rfl: 1  Blood pressure 126/86, temperature 97.8 °F (36.6 °C), height 5' 5" (1.651 m), weight 93.2 kg (205 lb 7.5 " oz).      Neurologic Exam     Mental Status   Oriented to person, place, and time.   Attention: normal. Concentration: normal.   Speech: speech is normal   Level of consciousness: alert  Knowledge: good.     Cranial Nerves     CN II   Visual acuity: normal    CN III, IV, VI   Pupils are equal, round, and reactive to light.  Extraocular motions are normal.     CN V   Facial sensation intact.     CN VII   Facial expression full, symmetric.     CN VIII   Hearing: intact    CN IX, X   Palate: symmetric    CN XI   CN XI normal.     CN XII   CN XII normal.     Motor Exam   Muscle bulk: normal  Overall muscle tone: normal  Right arm pronator drift: absent  Left arm pronator drift: absent    Strength   Right deltoid: 5/5  Left deltoid: 5/5  Right biceps: 5/5  Left biceps: 5/5  Right triceps: 5/5  Left triceps: 5/5  Right wrist flexion: 5/5  Left wrist flexion: 5/5  Right wrist extension: 5/5  Left wrist extension: 5/5  Right interossei: 5/5  Left interossei: 5/5  Right iliopsoas: 5/5  Left iliopsoas: 4/5  Right quadriceps: 5/5  Left quadriceps: 5/5  Right hamstrin/5  Left hamstrin/5  Right anterior tibial: 5/5  Left anterior tibial: 5/5  Right posterior tibial: 5/5  Left posterior tibial: 5/5  Right peroneal: 5/5  Left peroneal: 5/5  Right gastroc: 5/5  Left gastroc: 5/5    Sensory Exam   Light touch normal.     Gait, Coordination, and Reflexes     Gait  Gait: normal    Coordination   Romberg: negative  Finger to nose coordination: normal    Tremor   Resting tremor: absent    Reflexes   Right brachioradialis: 2+  Left brachioradialis: 2+  Right biceps: 2+  Left biceps: 2+  Right triceps: 2+  Left triceps: 2+  Right patellar: 2+  Left patellar: 2+  Right achilles: 1+  Left achilles: 1+  Right plantar: normal  Left plantar: normal  Right Melendez: absent  Left Melendez: absent  Right ankle clonus: absent  Left ankle clonus: absent      Physical Exam   Constitutional: She is oriented to person, place, and time.   Eyes:  "Pupils are equal, round, and reactive to light. EOM are normal.   Neurological: She is oriented to person, place, and time. She has a normal Finger-Nose-Finger Test and a normal Romberg Test. Gait normal.   Reflex Scores:       Tricep reflexes are 2+ on the right side and 2+ on the left side.       Bicep reflexes are 2+ on the right side and 2+ on the left side.       Brachioradialis reflexes are 2+ on the right side and 2+ on the left side.       Patellar reflexes are 2+ on the right side and 2+ on the left side.       Achilles reflexes are 1+ on the right side and 1+ on the left side.  Psychiatric: Her speech is normal.       Vital Signs  Temp: 97.8 °F (36.6 °C)  BP: 126/86  Pain Score: 0-No pain  Height and Weight  Height: 5' 5" (165.1 cm)  Weight: 93.2 kg (205 lb 7.5 oz)  BSA (Calculated - sq m): 2.07 sq meters  BMI (Calculated): 34.2  Weight in (lb) to have BMI = 25: 149.9]    Provider dictation:  I reviewed the imaging.  There is a large left L3-L4 extruded disc herniation.    The patient experienced sudden onset severe low back pain radiating to her left hip and lateral and anterior thigh approximately 2 months ago.  This started after she cleaned her porch aggressively.  He subsequently underwent a SILVERIO with excellent relief of her pain.  She no longer has leg pain.  She continues to have low back pain after walking for some time.  She states that she has been walking increasingly longer distances every day.  She does report some weakness with left hip flexion.  Overall her symptoms are dramatically improved.  She also was recently diagnosed with lymphoma and is currently under going additional workup with Oncology.    On exam, she has 4/5 left hip flexion weakness.  Her gait is normal.  She does not appear in any distress.  She has no numbness.  Reflexes are normal.    Given her significant improvement with conservative measures, non operative management remains preferred at this point.  The patient does " understand that her left hip flexion weakness is secondary to nerve root dysfunction at L3-L4 and I did discuss the option of surgical decompression with her.  However, given her other health issues and her minimal impairment at this point with ongoing improvement I do feel that a conservative strategy is preferable.  I offered physical therapy but at this point the patient would like to take care of her new lymphoma diagnosis first and she will call the office if she wishes to schedule physical therapy.  I did stress that should her weakness worsen or her leg pain return, she should call the office for further evaluation.  However, this point the patient is not interested in any surgery.  I did prescribed gabapentin which she states was prescribed by her primary care physician and helped but she ran out.    Visit Diagnosis:  Lumbar radiculopathy    Sciatica, left side  -     gabapentin (NEURONTIN) 300 MG capsule; Take 1 capsule (300 mg total) by mouth 2 (two) times daily.  Dispense: 60 capsule; Refill: 2

## 2020-06-11 ENCOUNTER — OFFICE VISIT (OUTPATIENT)
Dept: PAIN MEDICINE | Facility: CLINIC | Age: 80
End: 2020-06-11
Payer: MEDICARE

## 2020-06-11 VITALS
WEIGHT: 208.31 LBS | TEMPERATURE: 98 F | HEART RATE: 79 BPM | OXYGEN SATURATION: 95 % | DIASTOLIC BLOOD PRESSURE: 74 MMHG | SYSTOLIC BLOOD PRESSURE: 127 MMHG | RESPIRATION RATE: 18 BRPM | BODY MASS INDEX: 34.67 KG/M2

## 2020-06-11 DIAGNOSIS — Z98.1 S/P LUMBAR SPINAL FUSION: ICD-10-CM

## 2020-06-11 DIAGNOSIS — M48.061 SPINAL STENOSIS OF LUMBAR REGION WITHOUT NEUROGENIC CLAUDICATION: Primary | ICD-10-CM

## 2020-06-11 DIAGNOSIS — M51.26 HERNIATION OF LUMBAR INTERVERTEBRAL DISC: ICD-10-CM

## 2020-06-11 PROCEDURE — 1101F PR PT FALLS ASSESS DOC 0-1 FALLS W/OUT INJ PAST YR: ICD-10-PCS | Mod: CPTII,S$GLB,, | Performed by: ANESTHESIOLOGY

## 2020-06-11 PROCEDURE — 99213 OFFICE O/P EST LOW 20 MIN: CPT | Mod: S$GLB,,, | Performed by: ANESTHESIOLOGY

## 2020-06-11 PROCEDURE — 3078F PR MOST RECENT DIASTOLIC BLOOD PRESSURE < 80 MM HG: ICD-10-PCS | Mod: CPTII,S$GLB,, | Performed by: ANESTHESIOLOGY

## 2020-06-11 PROCEDURE — 99213 PR OFFICE/OUTPT VISIT, EST, LEVL III, 20-29 MIN: ICD-10-PCS | Mod: S$GLB,,, | Performed by: ANESTHESIOLOGY

## 2020-06-11 PROCEDURE — 1159F PR MEDICATION LIST DOCUMENTED IN MEDICAL RECORD: ICD-10-PCS | Mod: S$GLB,,, | Performed by: ANESTHESIOLOGY

## 2020-06-11 PROCEDURE — 3074F SYST BP LT 130 MM HG: CPT | Mod: CPTII,S$GLB,, | Performed by: ANESTHESIOLOGY

## 2020-06-11 PROCEDURE — 99999 PR PBB SHADOW E&M-EST. PATIENT-LVL IV: CPT | Mod: PBBFAC,,, | Performed by: ANESTHESIOLOGY

## 2020-06-11 PROCEDURE — 3074F PR MOST RECENT SYSTOLIC BLOOD PRESSURE < 130 MM HG: ICD-10-PCS | Mod: CPTII,S$GLB,, | Performed by: ANESTHESIOLOGY

## 2020-06-11 PROCEDURE — 1159F MED LIST DOCD IN RCRD: CPT | Mod: S$GLB,,, | Performed by: ANESTHESIOLOGY

## 2020-06-11 PROCEDURE — 1126F AMNT PAIN NOTED NONE PRSNT: CPT | Mod: S$GLB,,, | Performed by: ANESTHESIOLOGY

## 2020-06-11 PROCEDURE — 99999 PR PBB SHADOW E&M-EST. PATIENT-LVL IV: ICD-10-PCS | Mod: PBBFAC,,, | Performed by: ANESTHESIOLOGY

## 2020-06-11 PROCEDURE — 3078F DIAST BP <80 MM HG: CPT | Mod: CPTII,S$GLB,, | Performed by: ANESTHESIOLOGY

## 2020-06-11 PROCEDURE — 1126F PR PAIN SEVERITY QUANTIFIED, NO PAIN PRESENT: ICD-10-PCS | Mod: S$GLB,,, | Performed by: ANESTHESIOLOGY

## 2020-06-11 PROCEDURE — 1101F PT FALLS ASSESS-DOCD LE1/YR: CPT | Mod: CPTII,S$GLB,, | Performed by: ANESTHESIOLOGY

## 2020-06-11 NOTE — LETTER
June 11, 2020      Josy Bergman PA-C  [address] [facility] [address]           [facility] [facility]  [address] [place] [address]  Phone: [phone]  Fax: [phone]          Patient: Shandra Velez   MR Number: [id number]   YOB: 1940   Date of Visit: 6/11/2020       Dear Josy Bergman:    Thank you for referring Shandra Velez to me for evaluation. Attached you will find relevant portions of my assessment and plan of care.    If you have questions, please do not hesitate to call me. I look forward to following Shandra Velez along with you.    Sincerely,    Ronnell Baeza MD    Enclosure  CC:  No Recipients    If you would like to receive this communication electronically, please contact [email or web address] or [phone] to request more information on [employer] [facility] access.    For providers and/or their staff who would like to refer a patient to [facility], please contact us through our one-stop-shop provider referral line, [facility], at [phone].    If you feel you have received this communication in error or would no longer like to receive these types of communications, please e-mail [email or web address]

## 2020-06-11 NOTE — PROGRESS NOTES
This note was completed with dictation software and grammatical errors may exist.    CC:  Low back and right leg pain    HPI: The patient is a 79-year-old female with past medical history significant for hypertension and hyperlipidemia who presents in referral from Lucita Gonzalez PA-C for low back and right leg pain.  Since I had last seen the patient several years ago, she had gone to see Dr. Miller and underwent an L4 through S1 posterior fusion and was doing very well.  She began having pain this last spring in the left buttock and throughout the leg, a new MRI was performed that showed some narrowing above the fusion at L3/4 and so she was referred for an epidural steroid injection. She is status post left L3/4 transforaminal injection on 05/14/2020 with almost complete relief of her pain.  She still reports having some mild weakness in the left leg.  She states that if she walks long distance she starts to get back pain, sits down it goes away and she is able to start walking again.  She is trying to walk on a regular basis.  During the interim, her MRI had also noted intra-abdominal lymphadenopathy and a left renal mass.  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  She recently has seen Dr. Monetro regarding this.  She is going to be undergoing a PET scan in the future, she has no other major symptoms related to this.    Pain intervention history: She underwent left L4/5 laminectomy for severe canal stenosis with Dr. Malloy in May 2017.  She is status post right L4/5 transforaminal epidural steroid injection on 11/17/17 with mild relief lasting a week, now reporting 0% relief.   She is status post caudal epidural steroid injection on 1/8/18 with 40% relief. She is status post left L3/4 transforaminal injection on 05/14/2020 with almost complete relief of her pain.     ROS:  The patient reports back pain only.  Balance of review of systems is  negative.    Past Medical History:   Diagnosis Date    Breast cancer 1985    right mastectomy    Encounter for blood transfusion     Hypercholesterolemia     Hypertension     Hypertension     Lumbar spondylosis     Osteopenia     Pulmonary nodule     Smoker     Current        Past Surgical History:   Procedure Laterality Date    APPENDECTOMY      BREAST RECONSTRUCTION  1990    right    CATARACT EXTRACTION W/  INTRAOCULAR LENS IMPLANT Bilateral     CHOLECYSTECTOMY      EYE SURGERY      JOINT REPLACEMENT  2008    right knee     LUMBAR LAMINECTOMY      MASTECTOMY Right 1985    NEEDLE LOCALIZATION N/A 5/25/2020    Procedure: NEEDLE LOCALIZATION - lymph node bx;  Surgeon: Steve Weaver MD;  Location: [facility];  Service: Interventional Radiology;  Laterality: N/A;    TOTAL KNEE ARTHROPLASTY Right     TRANSFORAMINAL EPIDURAL INJECTION OF STEROID Left 5/14/2020    Procedure: Injection,steroid,epidural,transforaminal approach, l3/4;  Surgeon: Ronnell Baeza MD;  Location: [facility];  Service: Pain Management;  Laterality: Left;    TUBAL LIGATION      UMBILICAL HERNIA REPAIR         Social History     Socioeconomic History    Marital status:      Spouse name: Not on file    Number of children: 3    Years of education: Not on file    Highest education level: Not on file   Occupational History    Occupation: retired  for [employer]   Social Needs    Financial resource strain: Not on file    Food insecurity:     Worry: Not on file     Inability: Not on file    Transportation needs:     Medical: Not on file     Non-medical: Not on file   Tobacco Use    Smoking status: Current Every Day Smoker     Packs/day: 1.00     Years: 53.00     Pack years: 53.00     Types: Cigarettes    Smokeless tobacco: Never Used   Substance and Sexual Activity    Alcohol use: No    Drug use: No    Sexual activity: Not Currently   Lifestyle    Physical activity:     Days per week: Not on file      Minutes per session: Not on file    Stress: Not on file   Relationships    Social connections:     Talks on phone: Not on file     Gets together: Not on file     Attends Sabianism service: Not on file     Active member of club or organization: Not on file     Attends meetings of clubs or organizations: Not on file     Relationship status: Not on file   Other Topics Concern    Are you pregnant or think you may be? Not Asked    Breast-feeding Not Asked   Social History Narrative    Lives alone        Hobbies: skyler        From [place]             Medications/Allergies: See med card    Vitals:    20 1009   BP: 127/74   Pulse: 79   Resp: 18   Temp: 97.9 °F (36.6 °C)   TempSrc: Temporal   SpO2: 95%   Weight: 94.5 kg (208 lb 5.4 oz)   PainSc: 0-No pain         Physical exam:  Gen: A and O x3, pleasant, well-groomed  Skin: No rashes or obvious lesions  HEENT: PERRLA, no obvious deformities on ears or in canals. Trachea midline.  CVS: Regular rate and rhythm, normal palpable pulses.  Resp:No increased work of breathing, symmetrical chest rise.  Abdomen: Soft, NT/ND.  Musculoskeletal: Able to heel walk, toe walk. No antalgic gait.     Neuro:  Lower extremities: 5/5 strength bilaterally, 4/5 left hip flexion  Reflexes: Patellar 2+ left side, 0+ right side, Achilles 2+ bilaterally.  Sensory:  Intact and symmetrical to light touch and pinprick in L2-S1 dermatomes bilaterally.    Lumbar spine:  Lumbar spine: ROM is moderately limited with flexion, extension and  oblique extension with mild low back pain with extension.  Clifton's test causes no increased pain on either side.    Supine straight leg raise causes right lateral thigh pain.  Internal and external rotation of the hip causes no increased pain on either side.  Myofascial exam: No tenderness to palpation across lumbar paraspinous muscles.      Imagin20 MRI L-spine:  There is extensive multilevel degenerative change in addition to postsurgical  changes.  There are postsurgical changes of posterior instrumented fusion and decompression of L4 through S1.  There is some degree of disc space narrowing, disc bulge with osteophytic ridging with a without superimposed disc protrusion or disc extrusion in addition to facet joint arthropathy.  Also, there is 5 mm anterolisthesis of L4 on L5 which is without definite change compared to plain films dated 06/27/2018 but has progressed compared to prior preoperative MRI.  The pertinent findings are summarized below.  2. There is retroperitoneal lymphadenopathy which is incompletely included and evaluated.  Further evaluation with CT abdomen and pelvis is recommended.  3. At the L3-4 level there is a disc bulge with superimposed left-sided disc extrusion with cephalad extension contributing to severe left lateral recess and foraminal stenosis, severe spinal stenosis and mild right foraminal stenosis.  The findings have progressed.  4. At the T11-12 level there is moderate-to-marked right foraminal stenosis without spinal stenosis.  5. At the L2-3 level there has been interval progression of degenerative change resulting in moderate left lateral recess stenosis and severe left foraminal stenosis.  6. At the postoperative L4-5 level there is mild-to-moderate right foraminal stenosis without spinal stenosis status post decompression.  7. At the L5-S1 level there is mild crowding of the left lateral recess with mild interval improvement.  There is moderate left and mild-to-moderate right foraminal stenosis without spinal stenosis.        Assessment: The patient is a 79-year-old female with past medical history significant for hypertension and hyperlipidemia who presents in referral from Lucita Gonzalez PA-C for low back and right leg pain.    1. Spinal stenosis of lumbar region without neurogenic claudication     2. Herniation of lumbar intervertebral disc  Ambulatory referral/consult to Pain Clinic   3. S/P lumbar spinal  fusion           Plan:  1.  Fortunately she is doing very well now after the left L3/4 epidural steroid injection.  We discussed that she still has stenosis at that level and if this pain should worsen in the future we could always repeat the injection.  We discussed that if her weakness worsens she is going to need to return to see Dr. Trinh.  For now she will follow up with me as needed.  We discussed exercise, walking.

## 2020-06-12 ENCOUNTER — ANESTHESIA EVENT (OUTPATIENT)
Dept: SURGERY | Facility: HOSPITAL | Age: 80
End: 2020-06-12
Payer: MEDICARE

## 2020-06-13 ENCOUNTER — LAB VISIT (OUTPATIENT)
Dept: FAMILY MEDICINE | Facility: CLINIC | Age: 80
End: 2020-06-13
Payer: MEDICARE

## 2020-06-13 DIAGNOSIS — Z03.818 ENCNTR FOR OBS FOR SUSP EXPSR TO OTH BIOLG AGENTS RULED OUT: ICD-10-CM

## 2020-06-13 PROCEDURE — U0003 INFECTIOUS AGENT DETECTION BY NUCLEIC ACID (DNA OR RNA); SEVERE ACUTE RESPIRATORY SYNDROME CORONAVIRUS 2 (SARS-COV-2) (CORONAVIRUS DISEASE [COVID-19]), AMPLIFIED PROBE TECHNIQUE, MAKING USE OF HIGH THROUGHPUT TECHNOLOGIES AS DESCRIBED BY CMS-2020-01-R: HCPCS

## 2020-06-15 ENCOUNTER — ANESTHESIA (OUTPATIENT)
Dept: SURGERY | Facility: HOSPITAL | Age: 80
End: 2020-06-15
Payer: MEDICARE

## 2020-06-15 ENCOUNTER — TELEPHONE (OUTPATIENT)
Dept: SURGERY | Facility: HOSPITAL | Age: 80
End: 2020-06-15

## 2020-06-15 LAB — SARS-COV-2 RNA RESP QL NAA+PROBE: NOT DETECTED

## 2020-06-15 NOTE — H&P
Chief complaint:  Follicular lymphoma     History of present illness:  The patient is a 79-year-old white female who presents in consultation from Dr. Chavez regarding newly diagnosed non-Hodgkin's lymphoma.  Patient was in her usual state of health but developed severe/debilitating low pain and lumbar radiculopathy after scrubbing her screened porch.  An MRI was obtained and intra-abdominal lymphadenopathy as well as a complex left renal was noted.  Patient subsequently underwent dedicated CT scan of the abdomen pelvis as well as image guided needle biopsy of a right inguinal lymph node.  Patient was found have grade 1 follicular lymphoma.  Consultation requested regarding and management.     Clinically, patient is not experience difficulty with fevers, chills, painful lymphadenopathy, or unexplained weight loss.  No other complaints for pertinent findings on a 14 point review systems.     Past medical history:  1.  Hypertension  2.  Hyperlipidemia  3.  History of breast carcinoma-status post right-sided mastectomy  4.  Morbid obesity  5.  Osteopenia  6.  Lumbar spinal stenosis/sciatica-status post SILVERIO  7.  Pulmonary nodule  8.  Status post cataract repair  9.  Status post lumbar laminectomy  10.  Status post total knee arthroplasty  11.  Status post umbilical hernia repair  12.  Status post tubal ligation  13.  Status post cholecystectomy  14.  Status post appendectomy     Allergies:  None known     Medications:  1.  Tylenol 650 mg every 4 hr as needed  2.  Aspirin 81 mg daily  3.  Zyrtec 10 mg daily  4.  Fish oil 1 g daily  5.  Neurontin 300 mg 3 times daily  6.  HydroDIURIL 25 mg daily  7.  Lisinopril 40 mg daily  8.  Adalat 60 mg daily  9.  Zocor 10 mg daily  10.  Restoril 15 mg as needed for insomnia     Family/social history:  One pack per day smoker.  No alcohol use.  Patient is cared for 2 adult sons.  Family history remarkable for breast and uterine cancer as well as diabetes mellitus.     Physical  examination:  Well-developed, obese, elderly, white female, in no acute distress, who has a weight of 205.5 lb  VITAL SIGNS: Documented  and reviewed this visit.  HEENT: Normocephalic, atraumatic. Oral mucosa pink and moist. Lips without   lesions. Tongue midline. Oropharynx clear. Nonicteric sclerae.   NECK: Supple, no adenopathy. No carotid bruits, thyromegaly or thyroid nodule.   HEART: Regular rate and rhythm without murmur, gallop or rub.   LUNGS: Clear to auscultation bilaterally. Normal respiratory effort.   ABDOMEN: Soft, nontender, nondistended with positive normoactive bowel sounds,   no hepatosplenomegaly.   EXTREMITIES: No cyanosis, clubbing or edema. Distal pulses are intact.   AXILLAE AND GROIN: No palpable pathologic lymphadenopathy is appreciated.   SKIN: Intact/turgor normal   NEUROLOGIC: Cranial nerves II-XII grossly intact. Motor: Good muscle bulk and   tone. Strength/sensory 5/5 throughout. Gait stable.      Laboratory:  A recent study was obtained 05/25/2020.  White count 10.9, hemoglobin 13.9, hematocrit 42.2, platelets 166.  Prothrombin time 12.3, INR 1.0, partial thromboplastin time 26.5.  Sodium 142, potassium 3.9, chloride 107, CO2 24, BUN 28, creatinine 1.2, glucose 101, calcium 10, liver function test within normal limits, GFR is 43.7.     CT abdomen/pelvis:  1. Lymphadenopathy throughout the abdomen and pelvis may be due to lymphoma or metastatic disease.  This was not present on the MR lumbar spine study from 2015.  2. Complex cortical lesion on the anterior right mid kidney is indeterminate but concerning for neoplasm such as renal cell carcinoma.  Consider dedicated renal mass CT or MRI (with and without contrast) for further evaluation.  3. Small infrarenal abdominal aortic aneurysm.  4. Findings suggestive of a cystocele.  5. Fat containing left periumbilical ventral wall hernia.     Surgical pathology:  Obtained from right inguinal lymph node via core biopsy on 05/25/2020.  LYMPH  NODE, RIGHT INGUINAL, NEEDLE CORE BIOPSY:  --FOLLICULAR LYMPHOMA (CD19+, CD20+, CD22+, PAX5+, CD10+, BCL6+, CD23+   [PARTIAL/SUBSET], KAPPA+), GRADE 1     TO 2.  --NO MORPHOLOGIC EVIDENCE OF METASTATIC MALIGNANCY.     Impression:  1.  Grade 1 follicular non-Hodgkin's lymphoma.  2.  Complex left renal mass.     Plan:  1.  Obtain additional lab to include LDH, beta 2, vitamin-D level.  2.  Total body CT PET scan.  3.  Bilateral bone marrow aspiration biopsy with flow, cytogenetics, and FISH panel.  4.  Informed consent for bone marrow aspiration and biopsy was obtained during the course of today's office evaluation.  5.  Return to clinic in earliest convenience to review results.     This note was created using voice recognition software and may contain grammatical errors.          Electronically signed by Rod Montero MD at 6/4/2020 10:30 AM     Office Visit on 6/4/2020  There have been no significant changes to the patient's history or physical examination since my evaluation as above.

## 2020-06-15 NOTE — TELEPHONE ENCOUNTER
Patient would like to reschedule procedure with Dr Montero. Patient covid swab results not back and patient does not want to wait. Please contact patient.

## 2020-06-16 ENCOUNTER — TELEPHONE (OUTPATIENT)
Dept: HEMATOLOGY/ONCOLOGY | Facility: CLINIC | Age: 80
End: 2020-06-16

## 2020-06-16 NOTE — TELEPHONE ENCOUNTER
Returned call to patient, patient anxious to reschedule 6/22/20 bone marrow biopsy. On 6/22/20 Covid 19 screening test was not resulted when she arrived at [facility]. Patient was upset that she was not notified prior to arriving at [facility] at 630 am that the test was not resulted. Patient did not want to wait for the result that day.   I explained to patient that I will need to coordinate rescheduling the procedure with [facility] time availability, Dr. Montero's clinic schedule and her personal schedule. Patient stated that she is available any day, any time. I will get patient rescheduled and call her with appts, patient voiced understanding and appreciation

## 2020-06-16 NOTE — TELEPHONE ENCOUNTER
----- Message from Odalys Antoine sent at 6/16/2020  8:27 AM CDT -----  Regarding: bone marrow procedure  Type: Needs Medical Advice  Who Called:  patient  Best Call Back Number: [phone]  Additional Information: Patient states she was at facility at 6:30 yesterday morning and there was some sort of mix up and she could got get her bone marrow procedure.  Patient is asking for this to be rescheduled and to speak with the nurse.  Please call to advise.  Thanks!

## 2020-06-19 ENCOUNTER — TELEPHONE (OUTPATIENT)
Dept: HEMATOLOGY/ONCOLOGY | Facility: CLINIC | Age: 80
End: 2020-06-19

## 2020-06-19 DIAGNOSIS — Z03.818 ENCNTR FOR OBS FOR SUSP EXPSR TO OTH BIOLG AGENTS RULED OUT: Primary | ICD-10-CM

## 2020-06-19 NOTE — TELEPHONE ENCOUNTER
Spoke with patient, rescheduled bone marrow biopsy orginally scheduled for 6/15/20 to Wednesday 6/24/20 at 7 am for procedure. Patient understands that pre op nurse will call with the exact time of arrival  Covid screening scheduled for Monday 6/22/20 and lab scheduled for 6/22/20.  Patient has PET on 7/7/20 and f/u on 7/8/20.  Patient has all paperwork from previously.  Patient voiced understanding and appreciation

## 2020-06-22 ENCOUNTER — LAB VISIT (OUTPATIENT)
Dept: LAB | Facility: HOSPITAL | Age: 80
End: 2020-06-22
Attending: INTERNAL MEDICINE
Payer: MEDICARE

## 2020-06-22 DIAGNOSIS — C82.90 FOLLICULAR LYMPHOMA, UNSPECIFIED FOLLICULAR LYMPHOMA TYPE, UNSPECIFIED BODY REGION: ICD-10-CM

## 2020-06-22 DIAGNOSIS — N28.89 LEFT RENAL MASS: ICD-10-CM

## 2020-06-22 LAB
25(OH)D3+25(OH)D2 SERPL-MCNC: 34 NG/ML (ref 30–96)
B2 MICROGLOB SERPL-MCNC: 5.3 UG/ML (ref 0–2.5)
LDH SERPL L TO P-CCNC: 612 U/L (ref 313–618)

## 2020-06-22 PROCEDURE — 36415 COLL VENOUS BLD VENIPUNCTURE: CPT | Mod: PN

## 2020-06-22 PROCEDURE — 82306 VITAMIN D 25 HYDROXY: CPT

## 2020-06-22 PROCEDURE — 83615 LACTATE (LD) (LDH) ENZYME: CPT

## 2020-06-22 PROCEDURE — 83615 LACTATE (LD) (LDH) ENZYME: CPT | Mod: PN

## 2020-06-22 PROCEDURE — 82306 VITAMIN D 25 HYDROXY: CPT | Mod: PN

## 2020-06-22 PROCEDURE — 82232 ASSAY OF BETA-2 PROTEIN: CPT

## 2020-06-24 ENCOUNTER — HOSPITAL ENCOUNTER (OUTPATIENT)
Facility: HOSPITAL | Age: 80
Discharge: HOME | End: 2020-06-24
Attending: INTERNAL MEDICINE | Admitting: INTERNAL MEDICINE
Payer: MEDICARE

## 2020-06-24 DIAGNOSIS — C85.90 NHL (NON-HODGKIN'S LYMPHOMA): ICD-10-CM

## 2020-06-24 DIAGNOSIS — C82.00 FOLLICULAR LYMPHOMA GRADE I, UNSPECIFIED BODY REGION: Primary | ICD-10-CM

## 2020-06-24 PROCEDURE — 88311 DECALCIFY TISSUE: CPT | Mod: 26,,, | Performed by: PATHOLOGY

## 2020-06-24 PROCEDURE — 36000704 HC OR TIME LEV I 1ST 15 MIN: Mod: PO | Performed by: INTERNAL MEDICINE

## 2020-06-24 PROCEDURE — 88311 PR  DECALCIFY TISSUE: ICD-10-PCS | Mod: 26,,, | Performed by: PATHOLOGY

## 2020-06-24 PROCEDURE — 38222 PR BONE MARROW BIOPSY(IES) W/ASPIRATION(S); DIAGNOSTIC: ICD-10-PCS | Mod: 50,,, | Performed by: INTERNAL MEDICINE

## 2020-06-24 PROCEDURE — 88305 TISSUE EXAM BY PATHOLOGIST: CPT | Mod: 59 | Performed by: PATHOLOGY

## 2020-06-24 PROCEDURE — 36000705 HC OR TIME LEV I EA ADD 15 MIN: Mod: PO | Performed by: INTERNAL MEDICINE

## 2020-06-24 PROCEDURE — 88305 TISSUE EXAM BY PATHOLOGIST: CPT | Mod: 26,,, | Performed by: PATHOLOGY

## 2020-06-24 PROCEDURE — 88342 IMHCHEM/IMCYTCHM 1ST ANTB: CPT | Mod: 26,59,, | Performed by: PATHOLOGY

## 2020-06-24 PROCEDURE — 88185 FLOWCYTOMETRY/TC ADD-ON: CPT | Performed by: PATHOLOGY

## 2020-06-24 PROCEDURE — 88341 PR IHC OR ICC EACH ADD'L SINGLE ANTIBODY  STAINPR: ICD-10-PCS | Mod: 26,59,, | Performed by: PATHOLOGY

## 2020-06-24 PROCEDURE — D9220A PRA ANESTHESIA: Mod: CRNA,,, | Performed by: NURSE ANESTHETIST, CERTIFIED REGISTERED

## 2020-06-24 PROCEDURE — D9220A PRA ANESTHESIA: Mod: ANES,,, | Performed by: ANESTHESIOLOGY

## 2020-06-24 PROCEDURE — 63600175 PHARM REV CODE 636 W HCPCS: Mod: PO | Performed by: NURSE ANESTHETIST, CERTIFIED REGISTERED

## 2020-06-24 PROCEDURE — 37000008 HC ANESTHESIA 1ST 15 MINUTES: Mod: PO | Performed by: INTERNAL MEDICINE

## 2020-06-24 PROCEDURE — 88184 FLOWCYTOMETRY/ TC 1 MARKER: CPT | Performed by: PATHOLOGY

## 2020-06-24 PROCEDURE — 88342 CHG IMMUNOCYTOCHEMISTRY: ICD-10-PCS | Mod: 26,59,, | Performed by: PATHOLOGY

## 2020-06-24 PROCEDURE — 88311 DECALCIFY TISSUE: CPT | Performed by: PATHOLOGY

## 2020-06-24 PROCEDURE — 88341 IMHCHEM/IMCYTCHM EA ADD ANTB: CPT | Mod: 59 | Performed by: PATHOLOGY

## 2020-06-24 PROCEDURE — 88237 TISSUE CULTURE BONE MARROW: CPT

## 2020-06-24 PROCEDURE — 88341 IMHCHEM/IMCYTCHM EA ADD ANTB: CPT | Mod: 26,59,, | Performed by: PATHOLOGY

## 2020-06-24 PROCEDURE — 88342 IMHCHEM/IMCYTCHM 1ST ANTB: CPT | Mod: 59 | Performed by: PATHOLOGY

## 2020-06-24 PROCEDURE — 85097 PR  BONE MARROW,SMEAR INTERPRETATION: ICD-10-PCS | Mod: ,,, | Performed by: PATHOLOGY

## 2020-06-24 PROCEDURE — 25000003 PHARM REV CODE 250: Mod: PO | Performed by: NURSE ANESTHETIST, CERTIFIED REGISTERED

## 2020-06-24 PROCEDURE — 88313 PR  SPECIAL STAINS,GROUP II: ICD-10-PCS | Mod: 26,,, | Performed by: PATHOLOGY

## 2020-06-24 PROCEDURE — 88313 SPECIAL STAINS GROUP 2: CPT | Mod: 26,,, | Performed by: PATHOLOGY

## 2020-06-24 PROCEDURE — 88305 TISSUE EXAM BY PATHOLOGIST: ICD-10-PCS | Mod: 26,,, | Performed by: PATHOLOGY

## 2020-06-24 PROCEDURE — 38222 DX BONE MARROW BX & ASPIR: CPT | Mod: 50,,, | Performed by: INTERNAL MEDICINE

## 2020-06-24 PROCEDURE — D9220A PRA ANESTHESIA: ICD-10-PCS | Mod: ANES,,, | Performed by: ANESTHESIOLOGY

## 2020-06-24 PROCEDURE — 63600175 PHARM REV CODE 636 W HCPCS: Mod: PO | Performed by: ANESTHESIOLOGY

## 2020-06-24 PROCEDURE — 25000003 PHARM REV CODE 250: Mod: PO | Performed by: INTERNAL MEDICINE

## 2020-06-24 PROCEDURE — 71000015 HC POSTOP RECOV 1ST HR: Mod: PO | Performed by: INTERNAL MEDICINE

## 2020-06-24 PROCEDURE — 88189 PR  FLOWCYTOMETRY/READ, 16 & > MARKERS: ICD-10-PCS | Mod: ,,, | Performed by: PATHOLOGY

## 2020-06-24 PROCEDURE — 88313 SPECIAL STAINS GROUP 2: CPT | Mod: 59 | Performed by: PATHOLOGY

## 2020-06-24 PROCEDURE — 71000033 HC RECOVERY, INTIAL HOUR: Mod: PO | Performed by: INTERNAL MEDICINE

## 2020-06-24 PROCEDURE — 37000009 HC ANESTHESIA EA ADD 15 MINS: Mod: PO | Performed by: INTERNAL MEDICINE

## 2020-06-24 PROCEDURE — D9220A PRA ANESTHESIA: ICD-10-PCS | Mod: CRNA,,, | Performed by: NURSE ANESTHETIST, CERTIFIED REGISTERED

## 2020-06-24 PROCEDURE — 88189 FLOWCYTOMETRY/READ 16 & >: CPT | Mod: ,,, | Performed by: PATHOLOGY

## 2020-06-24 PROCEDURE — 85097 BONE MARROW INTERPRETATION: CPT | Mod: ,,, | Performed by: PATHOLOGY

## 2020-06-24 RX ORDER — LIDOCAINE HYDROCHLORIDE 10 MG/ML
1 INJECTION, SOLUTION EPIDURAL; INFILTRATION; INTRACAUDAL; PERINEURAL ONCE
Status: DISCONTINUED | OUTPATIENT
Start: 2020-06-24 | End: 2020-06-24 | Stop reason: HOSPADM

## 2020-06-24 RX ORDER — KETAMINE HYDROCHLORIDE 100 MG/ML
INJECTION, SOLUTION INTRAMUSCULAR; INTRAVENOUS
Status: DISCONTINUED | OUTPATIENT
Start: 2020-06-24 | End: 2020-06-24

## 2020-06-24 RX ORDER — LIDOCAINE HYDROCHLORIDE 20 MG/ML
INJECTION INTRAVENOUS
Status: DISCONTINUED | OUTPATIENT
Start: 2020-06-24 | End: 2020-06-24

## 2020-06-24 RX ORDER — PROPOFOL 10 MG/ML
VIAL (ML) INTRAVENOUS
Status: DISCONTINUED | OUTPATIENT
Start: 2020-06-24 | End: 2020-06-24

## 2020-06-24 RX ORDER — PROPOFOL 10 MG/ML
VIAL (ML) INTRAVENOUS CONTINUOUS PRN
Status: DISCONTINUED | OUTPATIENT
Start: 2020-06-24 | End: 2020-06-24

## 2020-06-24 RX ORDER — OXYCODONE HYDROCHLORIDE 5 MG/1
5 TABLET ORAL
Status: CANCELLED | OUTPATIENT
Start: 2020-06-24

## 2020-06-24 RX ORDER — FENTANYL CITRATE 50 UG/ML
25 INJECTION, SOLUTION INTRAMUSCULAR; INTRAVENOUS EVERY 5 MIN PRN
Status: CANCELLED | OUTPATIENT
Start: 2020-06-24

## 2020-06-24 RX ORDER — LIDOCAINE HYDROCHLORIDE 10 MG/ML
1 INJECTION, SOLUTION EPIDURAL; INFILTRATION; INTRACAUDAL; PERINEURAL ONCE
Status: DISCONTINUED | OUTPATIENT
Start: 2020-06-24 | End: 2020-06-24

## 2020-06-24 RX ORDER — SODIUM CHLORIDE, SODIUM LACTATE, POTASSIUM CHLORIDE, CALCIUM CHLORIDE 600; 310; 30; 20 MG/100ML; MG/100ML; MG/100ML; MG/100ML
INJECTION, SOLUTION INTRAVENOUS CONTINUOUS
Status: DISCONTINUED | OUTPATIENT
Start: 2020-06-24 | End: 2020-06-24 | Stop reason: HOSPADM

## 2020-06-24 RX ORDER — MIDAZOLAM HYDROCHLORIDE 1 MG/ML
INJECTION, SOLUTION INTRAMUSCULAR; INTRAVENOUS
Status: DISCONTINUED | OUTPATIENT
Start: 2020-06-24 | End: 2020-06-24

## 2020-06-24 RX ORDER — SODIUM CHLORIDE, SODIUM LACTATE, POTASSIUM CHLORIDE, CALCIUM CHLORIDE 600; 310; 30; 20 MG/100ML; MG/100ML; MG/100ML; MG/100ML
INJECTION, SOLUTION INTRAVENOUS CONTINUOUS
Status: DISCONTINUED | OUTPATIENT
Start: 2020-06-24 | End: 2020-06-24

## 2020-06-24 RX ORDER — LIDOCAINE HYDROCHLORIDE 10 MG/ML
INJECTION, SOLUTION EPIDURAL; INFILTRATION; INTRACAUDAL; PERINEURAL
Status: DISCONTINUED | OUTPATIENT
Start: 2020-06-24 | End: 2020-06-24 | Stop reason: HOSPADM

## 2020-06-24 RX ADMIN — LIDOCAINE HYDROCHLORIDE 100 MG: 20 INJECTION, SOLUTION INTRAVENOUS at 07:06

## 2020-06-24 RX ADMIN — SODIUM CHLORIDE, SODIUM LACTATE, POTASSIUM CHLORIDE, AND CALCIUM CHLORIDE: .6; .31; .03; .02 INJECTION, SOLUTION INTRAVENOUS at 06:06

## 2020-06-24 RX ADMIN — KETAMINE HYDROCHLORIDE 10 MG: 100 INJECTION, SOLUTION, CONCENTRATE INTRAMUSCULAR; INTRAVENOUS at 07:06

## 2020-06-24 RX ADMIN — PROPOFOL 20 MG: 10 INJECTION, EMULSION INTRAVENOUS at 07:06

## 2020-06-24 RX ADMIN — MIDAZOLAM HYDROCHLORIDE 1 MG: 1 INJECTION, SOLUTION INTRAMUSCULAR; INTRAVENOUS at 06:06

## 2020-06-24 RX ADMIN — PROPOFOL 100 MCG/KG/MIN: 10 INJECTION, EMULSION INTRAVENOUS at 07:06

## 2020-06-24 NOTE — ANESTHESIA POSTPROCEDURE EVALUATION
Anesthesia Post Evaluation    Patient: Shandra Velez    Procedure(s) Performed: Procedure(s) (LRB):  Biopsy-bone marrow (Bilateral)    Final Anesthesia Type: general    Patient location during evaluation: PACU  Patient participation: Yes- Able to Participate  Level of consciousness: sedated and awake  Post-procedure vital signs: reviewed and stable  Pain management: adequate  Airway patency: patent    PONV status at discharge: No PONV  Anesthetic complications: no      Cardiovascular status: blood pressure returned to baseline  Respiratory status: spontaneous ventilation  Hydration status: euvolemic  Follow-up not needed.          Vitals Value Taken Time   BP 95/61 06/24/20 0730   Temp 36.4 °C (97.5 °F) 06/24/20 0730   Pulse 76 06/24/20 0730   Resp 17 06/24/20 0730   SpO2 95 % 06/24/20 0730         No case tracking events are documented in the log.      Pain/Roem Score: No data recorded

## 2020-06-24 NOTE — ANESTHESIA PREPROCEDURE EVALUATION
06/24/2020  Shandra Velez is a 79 y.o., female.    Anesthesia Evaluation    I have reviewed the Patient Summary Reports.    I have reviewed the Nursing Notes. I have reviewed the NPO Status.   I have reviewed the Medications.     Review of Systems  Social:  Smoker    Hematology/Oncology:        Oncology Comments: lymphoma   Cardiovascular:   Hypertension    Pulmonary:  Pulmonary Normal    Renal/:   Renal mass   Hepatic/GI:  Hepatic/GI Normal    Musculoskeletal:   Arthritis     Neurological:  Neurology Normal    Endocrine:  Endocrine Normal        Physical Exam  General:  Obesity    Airway/Jaw/Neck:  Airway Findings: Mouth Opening: Normal Tongue: Normal  General Airway Assessment: Adult  Mallampati: III  Improves to II with phonation.      Dental:  Dental Findings: Upper partial dentures, Lower partial dentures   Chest/Lungs:  Chest/Lungs Findings: Clear to auscultation, Normal Respiratory Rate         Mental Status:  Mental Status Findings:  Cooperative, Alert and Oriented         Anesthesia Plan  Type of Anesthesia, risks & benefits discussed:  Anesthesia Type:  general  Patient's Preference:   Intra-op Monitoring Plan: standard ASA monitors  Intra-op Monitoring Plan Comments:   Post Op Pain Control Plan:   Post Op Pain Control Plan Comments:   Induction:   IV  Beta Blocker:  Patient is not currently on a Beta-Blocker (No further documentation required).       Informed Consent: Patient understands risks and agrees with Anesthesia plan.  Questions answered. Anesthesia consent signed with patient.  ASA Score: 2     Day of Surgery Review of History & Physical:            Ready For Surgery From Anesthesia Perspective.

## 2020-06-24 NOTE — H&P
Chief complaint:  Follicular lymphoma     History of present illness:  The patient is a 79-year-old white female who presents in consultation from Dr. Chavez regarding newly diagnosed non-Hodgkin's lymphoma.  Patient was in her usual state of health but developed severe/debilitating low pain and lumbar radiculopathy after scrubbing her screened porch.  An MRI was obtained and intra-abdominal lymphadenopathy as well as a complex left renal was noted.  Patient subsequently underwent dedicated CT scan of the abdomen pelvis as well as image guided needle biopsy of a right inguinal lymph node.  Patient was found have grade 1 follicular lymphoma.  Consultation requested regarding and management.     Clinically, patient is not experience difficulty with fevers, chills, painful lymphadenopathy, or unexplained weight loss.  No other complaints for pertinent findings on a 14 point review systems.     Past medical history:  1.  Hypertension  2.  Hyperlipidemia  3.  History of breast carcinoma-status post right-sided mastectomy  4.  Morbid obesity  5.  Osteopenia  6.  Lumbar spinal stenosis/sciatica-status post SILVERIO  7.  Pulmonary nodule  8.  Status post cataract repair  9.  Status post lumbar laminectomy  10.  Status post total knee arthroplasty  11.  Status post umbilical hernia repair  12.  Status post tubal ligation  13.  Status post cholecystectomy  14.  Status post appendectomy     Allergies:  None known     Medications:  1.  Tylenol 650 mg every 4 hr as needed  2.  Aspirin 81 mg daily  3.  Zyrtec 10 mg daily  4.  Fish oil 1 g daily  5.  Neurontin 300 mg 3 times daily  6.  HydroDIURIL 25 mg daily  7.  Lisinopril 40 mg daily  8.  Adalat 60 mg daily  9.  Zocor 10 mg daily  10.  Restoril 15 mg as needed for insomnia     Family/social history:  One pack per day smoker.  No alcohol use.  Patient is cared for 2 adult sons.  Family history remarkable for breast and uterine cancer as well as diabetes mellitus.     Physical  examination:  Well-developed, obese, elderly, white female, in no acute distress, who has a weight of 205.5 lb  VITAL SIGNS: Documented  and reviewed this visit.  HEENT: Normocephalic, atraumatic. Oral mucosa pink and moist. Lips without   lesions. Tongue midline. Oropharynx clear. Nonicteric sclerae.   NECK: Supple, no adenopathy. No carotid bruits, thyromegaly or thyroid nodule.   HEART: Regular rate and rhythm without murmur, gallop or rub.   LUNGS: Clear to auscultation bilaterally. Normal respiratory effort.   ABDOMEN: Soft, nontender, nondistended with positive normoactive bowel sounds,   no hepatosplenomegaly.   EXTREMITIES: No cyanosis, clubbing or edema. Distal pulses are intact.   AXILLAE AND GROIN: No palpable pathologic lymphadenopathy is appreciated.   SKIN: Intact/turgor normal   NEUROLOGIC: Cranial nerves II-XII grossly intact. Motor: Good muscle bulk and   tone. Strength/sensory 5/5 throughout. Gait stable.      Laboratory:  A recent study was obtained 05/25/2020.  White count 10.9, hemoglobin 13.9, hematocrit 42.2, platelets 166.  Prothrombin time 12.3, INR 1.0, partial thromboplastin time 26.5.  Sodium 142, potassium 3.9, chloride 107, CO2 24, BUN 28, creatinine 1.2, glucose 101, calcium 10, liver function test within normal limits, GFR is 43.7.     CT abdomen/pelvis:  1. Lymphadenopathy throughout the abdomen and pelvis may be due to lymphoma or metastatic disease.  This was not present on the MR lumbar spine study from 2015.  2. Complex cortical lesion on the anterior right mid kidney is indeterminate but concerning for neoplasm such as renal cell carcinoma.  Consider dedicated renal mass CT or MRI (with and without contrast) for further evaluation.  3. Small infrarenal abdominal aortic aneurysm.  4. Findings suggestive of a cystocele.  5. Fat containing left periumbilical ventral wall hernia.     Surgical pathology:  Obtained from right inguinal lymph node via core biopsy on 05/25/2020.  LYMPH  NODE, RIGHT INGUINAL, NEEDLE CORE BIOPSY:  --FOLLICULAR LYMPHOMA (CD19+, CD20+, CD22+, PAX5+, CD10+, BCL6+, CD23+   [PARTIAL/SUBSET], KAPPA+), GRADE 1     TO 2.  --NO MORPHOLOGIC EVIDENCE OF METASTATIC MALIGNANCY.     Impression:  1.  Grade 1 follicular non-Hodgkin's lymphoma.  2.  Complex left renal mass.     Plan:  1.  Obtain additional lab to include LDH, beta 2, vitamin-D level.  2.  Total body CT PET scan.  3.  Bilateral bone marrow aspiration biopsy with flow, cytogenetics, and FISH panel.  4.  Informed consent for bone marrow aspiration and biopsy was obtained during the course of today's office evaluation.  5.  Return to clinic in earliest convenience to review results.     This note was created using voice recognition software and may contain grammatical errors.          Electronically signed by Rod Montero MD at 6/4/2020 10:30 AM  There have been no significant changes to the patient's history or physical examination as detailed in my note above.

## 2020-06-24 NOTE — DISCHARGE INSTRUCTIONS
BONE MARROW ASPIRATION    DO'S   Minimal activity and light meals for 24 hours.   Keep operative site clean and dry for 24 hours.   May remove dressing then shower in 24 hours.   May take Tylenol for discomfort.    Resume all home medications.    DON'T   No driving for 24 hours or while taking narcotic pain medication.     CALL PHYSICIAN FOR:   Bleeding or any signs of infection (redness, draining pus or warm to touch).   Fever greater than 101.   Persistent pain not relieved by pain medications.    RETURN APPOINTMENT AS INSTRUCTED.  CALL [phone]  TO CONTACT DOCTOR

## 2020-06-24 NOTE — PROCEDURES
BILATERAL BONE MARROW ASPIRATION AND BIOPSY    PREOP DIAGNOSIS:  FOLLICULAR NON-HODGKIN'S LYMPHOMA    POSTOP DIAGNOSIS:  SAME    SURGEON:  DIANA    SUMMARY:  Time-out was obtained in the room.  After obtaining informed consent, the patient was taken to the Ambulatory Surgical Care unit, placed under MAC, prepped and draped in the usual sterile fashion, and anesthetized with 1% xylocaine.  Jam Shidi needles were subsequently advanced into the left and then the right posterior, superior, iliac crests.  Aspirates, biopsies, and separate aliquot for flow and cytogenetic analysis as well as FISH panel were obtained on the 1st passes.  The needles were withdrawn and the wounds dressed with 4x4s and Elastoplast applied as a pressure dressing.  The patient tolerated the procedure well.  There were no immediate complications.  The patient transported to recovery in stable condition.  Estimated blood loss is less than 10 cc.    This note was created using voice recognition software and may contain grammatical errors.

## 2020-06-24 NOTE — DISCHARGE SUMMARY
The patient is a 79-year-old white female well known to me for follicular non-Hodgkin's lymphoma presented to the Ambulatory Surgical Care unit for staging, bilateral, bone marrow aspiration and biopsy.  The procedure was carried out per protocol without complication and the patient recovered without event.  She is discharged home in stable condition with final diagnosis of follicular non-Hodgkin's lymphoma and is to keep previously scheduled office follow-up to review results of this test and further plan care.    This note was created using voice recognition software and may contain grammatical errors.

## 2020-06-24 NOTE — TRANSFER OF CARE
"Anesthesia Transfer of Care Note    Patient: Shandra Velez    Procedure(s) Performed: Procedure(s) (LRB):  Biopsy-bone marrow (Bilateral)    Patient location: PACU    Anesthesia Type: MAC    Transport from OR: Transported from OR on room air with adequate spontaneous ventilation    Post pain: adequate analgesia    Post assessment: no apparent anesthetic complications    Post vital signs: stable    Level of consciousness: awake    Nausea/Vomiting: no nausea/vomiting    Complications: none    Transfer of care protocol was followed      Last vitals:   Visit Vitals  /70 (BP Location: Left arm, Patient Position: Lying)   Pulse 84   Temp 36.4 °C (97.5 °F) (Skin)   Resp 16   Ht 5' 5" (1.651 m)   Wt 95.3 kg (210 lb)   SpO2 95%   Breastfeeding No   BMI 34.95 kg/m²     "

## 2020-06-25 VITALS
TEMPERATURE: 98 F | WEIGHT: 210 LBS | DIASTOLIC BLOOD PRESSURE: 62 MMHG | SYSTOLIC BLOOD PRESSURE: 104 MMHG | OXYGEN SATURATION: 96 % | HEART RATE: 74 BPM | HEIGHT: 65 IN | BODY MASS INDEX: 34.99 KG/M2 | RESPIRATION RATE: 17 BRPM

## 2020-06-29 LAB
BODY SITE - BONE MARROW: NORMAL
CLINICAL DIAGNOSIS - BONE MARROW: NORMAL
FLOW CYTOMETRY ANTIBODIES ANALYZED - BONE MARROW: NORMAL
FLOW CYTOMETRY COMMENT - BONE MARROW: NORMAL
FLOW CYTOMETRY INTERPRETATION - BONE MARROW: NORMAL

## 2020-07-07 ENCOUNTER — HOSPITAL ENCOUNTER (OUTPATIENT)
Dept: RADIOLOGY | Facility: HOSPITAL | Age: 80
Discharge: HOME | End: 2020-07-07
Attending: INTERNAL MEDICINE
Payer: MEDICARE

## 2020-07-07 DIAGNOSIS — C82.90 FOLLICULAR LYMPHOMA, UNSPECIFIED FOLLICULAR LYMPHOMA TYPE, UNSPECIFIED BODY REGION: ICD-10-CM

## 2020-07-07 DIAGNOSIS — N28.89 LEFT RENAL MASS: ICD-10-CM

## 2020-07-07 LAB
CHROM BANDING METHOD: NORMAL
CHROMOSOME ANALYSIS BM ADDITIONAL INFORMATION: NORMAL
CHROMOSOME ANALYSIS BM RELEASED BY: NORMAL
CHROMOSOME ANALYSIS BM RESULT SUMMARY: NORMAL
CLINICAL CYTOGENETICIST REVIEW: NORMAL
KARYOTYP MAR: NORMAL
REASON FOR REFERRAL (NARRATIVE): NORMAL
REF LAB TEST METHOD: NORMAL
SPECIMEN SOURCE: NORMAL
SPECIMEN: NORMAL

## 2020-07-07 PROCEDURE — A9552 F18 FDG: HCPCS | Mod: PO

## 2020-07-07 PROCEDURE — 78815 NM PET CT ROUTINE: ICD-10-PCS | Mod: 26,PI,, | Performed by: RADIOLOGY

## 2020-07-07 PROCEDURE — 78815 PET IMAGE W/CT SKULL-THIGH: CPT | Mod: 26,PI,, | Performed by: RADIOLOGY

## 2020-07-07 PROCEDURE — 78815 PET IMAGE W/CT SKULL-THIGH: CPT | Mod: TC,PO

## 2020-07-08 ENCOUNTER — OFFICE VISIT (OUTPATIENT)
Dept: HEMATOLOGY/ONCOLOGY | Facility: CLINIC | Age: 80
End: 2020-07-08
Payer: MEDICARE

## 2020-07-08 VITALS
HEART RATE: 96 BPM | BODY MASS INDEX: 34.85 KG/M2 | TEMPERATURE: 98 F | OXYGEN SATURATION: 96 % | SYSTOLIC BLOOD PRESSURE: 125 MMHG | WEIGHT: 209.44 LBS | DIASTOLIC BLOOD PRESSURE: 77 MMHG | RESPIRATION RATE: 20 BRPM

## 2020-07-08 DIAGNOSIS — C82.90 FOLLICULAR LYMPHOMA, UNSPECIFIED FOLLICULAR LYMPHOMA TYPE, UNSPECIFIED BODY REGION: Primary | ICD-10-CM

## 2020-07-08 PROCEDURE — 99999 PR PBB SHADOW E&M-EST. PATIENT-LVL IV: CPT | Mod: PBBFAC,,, | Performed by: INTERNAL MEDICINE

## 2020-07-08 PROCEDURE — 3078F DIAST BP <80 MM HG: CPT | Mod: CPTII,S$GLB,, | Performed by: INTERNAL MEDICINE

## 2020-07-08 PROCEDURE — 1126F AMNT PAIN NOTED NONE PRSNT: CPT | Mod: S$GLB,,, | Performed by: INTERNAL MEDICINE

## 2020-07-08 PROCEDURE — 99215 OFFICE O/P EST HI 40 MIN: CPT | Mod: S$GLB,,, | Performed by: INTERNAL MEDICINE

## 2020-07-08 PROCEDURE — 1101F PR PT FALLS ASSESS DOC 0-1 FALLS W/OUT INJ PAST YR: ICD-10-PCS | Mod: CPTII,S$GLB,, | Performed by: INTERNAL MEDICINE

## 2020-07-08 PROCEDURE — 3074F SYST BP LT 130 MM HG: CPT | Mod: CPTII,S$GLB,, | Performed by: INTERNAL MEDICINE

## 2020-07-08 PROCEDURE — 99999 PR PBB SHADOW E&M-EST. PATIENT-LVL IV: ICD-10-PCS | Mod: PBBFAC,,, | Performed by: INTERNAL MEDICINE

## 2020-07-08 PROCEDURE — 1101F PT FALLS ASSESS-DOCD LE1/YR: CPT | Mod: CPTII,S$GLB,, | Performed by: INTERNAL MEDICINE

## 2020-07-08 PROCEDURE — 1159F MED LIST DOCD IN RCRD: CPT | Mod: S$GLB,,, | Performed by: INTERNAL MEDICINE

## 2020-07-08 PROCEDURE — 1126F PR PAIN SEVERITY QUANTIFIED, NO PAIN PRESENT: ICD-10-PCS | Mod: S$GLB,,, | Performed by: INTERNAL MEDICINE

## 2020-07-08 PROCEDURE — 3078F PR MOST RECENT DIASTOLIC BLOOD PRESSURE < 80 MM HG: ICD-10-PCS | Mod: CPTII,S$GLB,, | Performed by: INTERNAL MEDICINE

## 2020-07-08 PROCEDURE — 1159F PR MEDICATION LIST DOCUMENTED IN MEDICAL RECORD: ICD-10-PCS | Mod: S$GLB,,, | Performed by: INTERNAL MEDICINE

## 2020-07-08 PROCEDURE — 99215 PR OFFICE/OUTPT VISIT, EST, LEVL V, 40-54 MIN: ICD-10-PCS | Mod: S$GLB,,, | Performed by: INTERNAL MEDICINE

## 2020-07-08 PROCEDURE — 3074F PR MOST RECENT SYSTOLIC BLOOD PRESSURE < 130 MM HG: ICD-10-PCS | Mod: CPTII,S$GLB,, | Performed by: INTERNAL MEDICINE

## 2020-07-08 NOTE — PROGRESS NOTES
History of present illness:  The patient is a 79-year-old white female who presents in consultation from Dr. Chavez regarding newly diagnosed non-Hodgkin's lymphoma.  Patient was in her usual state of health but developed severe/debilitating low pain and lumbar radiculopathy after scrubbing her screened porch.  An MRI was obtained and intra-abdominal lymphadenopathy as well as a complex left renal was noted.  Patient subsequently underwent dedicated CT scan of the abdomen pelvis as well as image guided needle biopsy of a right inguinal lymph node.  Patient was found have grade 1 follicular lymphoma.  Consultation requested regarding and management.    Clinically, patient is not experiencing difficulty with fevers, chills, painful lymphadenopathy, or unexplained weight loss.  She returns to clinic to review results of interval lab, imaging, as well as bone marrow aspiration and biopsy.  No other complaints for pertinent findings on a 14 point review systems.    Physical examination:  Well-developed, obese, elderly, white female, in no acute distress, who has a weight of 205.5 lb  VITAL SIGNS: Documented  and reviewed this visit.  HEENT: Normocephalic, atraumatic. Oral mucosa pink and moist. Lips without   lesions. Tongue midline. Oropharynx clear. Nonicteric sclerae.   NECK: Supple, no adenopathy. No carotid bruits, thyromegaly or thyroid nodule.   HEART: Regular rate and rhythm without murmur, gallop or rub.   LUNGS: Clear to auscultation bilaterally. Normal respiratory effort.   ABDOMEN: Soft, nontender, nondistended with positive normoactive bowel sounds,   no hepatosplenomegaly.   EXTREMITIES: No cyanosis, clubbing or edema. Distal pulses are intact.   AXILLAE AND GROIN: No palpable pathologic lymphadenopathy is appreciated.   SKIN: Intact/turgor normal   NEUROLOGIC: Cranial nerves II-XII grossly intact. Motor: Good muscle bulk and   tone. Strength/sensory 5/5 throughout. Gait stable.      Laboratory:  Vitamin-D level 34.  Beta 2 microglobulin 5.3.  .    CT PET:  Neck: There is extensive hypermetabolic adenopathy in the bilateral posterior cervical triangles and in the internal jugular chains at all stations throughout the neck.  Reference nodes include right posterior cervical triangle node series 3, image 24 1.6 x 1.6 cm, SUV max 8.9.  Left supraclavicular lymph node series 3, image 47, 1.4 x 2.1 cm, SUV max 5.37.  Chest: There is extensive hypermetabolic mediastinal, hilar and axillary adenopathy bilaterally.  There is left subpectoral and bilateral internal mammary adenopathy.  There are enlarged epiphrenic lymph nodes.  Reference measurements include the following:  Left axillary lymph node 2.6 x 2.4 cm series 3, image 72 SUV max 9.1.  Precarinal lymph node 2.2 x 2.5 cm series 3, image 66 SUV max 7.5.  There is a hypermetabolic node or subcutaneous nodule in the fat of the left superior chest wall superficial to the head of the clavicle measuring 1.6 x 1.4 cm series 3, image 51, with an SUV max of 4.62.  There is a new solid hypermetabolic lung nodule in the posterior right lung apex measuring 1.5 x 1.8 cm image 53, SUV max 6.82.  There are apically bulla.  There is a stable 7 mm right upper lobe lung nodule series 3, image 62, unchanged since 2017 and likely benign.  There are surgical clips in the right axilla.  The patient appears to be status post right mastectomy and flap reconstruction.  There is extensive calcific plaque in the aorta.  No pleural effusion.  There are enlarged, hypermetabolic subcutaneous nodules or lymph nodes in the soft tissues of the patient's upper arms on the margins of the field of view.  Abdomen/pelvis: There is diffusely increased uptake throughout the spleen compared to the liver, a sign of diffuse lymphomatous infiltration.  SUV max is 4.4 measured on PET image 110.   There are hypermetabolic portacaval, periportal lymph nodes.  There are extensive  hypermetabolic retroperitoneal lymph nodes in the abdomen as well as extensive bilateral hypermetabolic, enlarged common iliac, internal and external iliac and inguinal lymph nodes.  Reference measurements are as follows:  Left periaortic eri conglomerate series 2, image 144, 3.8 x 4.6 cm, previously 3.2 x 4.0 cm.  The SUV max is 12.0.  Left common iliac lymph node image 163 3.0 x 4.7 cm, previously 2.4 x 4.2 cm.  SUV max 11.9  Right inguinal lymph node image 191 4.6 x 2.1 cm, previously 3.8 x 1.7 cm when remeasured at the same level.  SUV max 11.8.  Left inguinal lymph node image 215 2.8 x 2.3 cm, previously 2.5 x 2.1 cm.  SUV max 9.25.  The 1.3 x 1.4 cm anterior midpole right renal mass evident on the previous contrast-enhanced CT is difficult to visualize well on the current noncontrast CT images, series 2, image 131.  There is no abnormal FDG uptake in this location, but FDG PET is relatively insensitive for the diagnosis of renal cell carcinoma, and further characterization with a CT urogram, MRI would be recommended.  Limited noncontrast CT images of the liver, adrenal glands, pancreas are grossly normal.  There is a 2.4 cm simple cyst of the left kidney at the midpole.  There is an infrarenal abdominal aortic aneurysm measuring 3.7 cm AP x 3.6 cm transverse.  Extensive atherosclerosis is noted.  There is a probable cystocele.  Uterus is atrophic.  Numerous colonic diverticula are present.  Fat containing left lower quadrant ventral hernia noted.  Postsurgical changes from breast reconstruction are present in the anterior abdominal wall.   Bones: There is no abnormal FDG uptake in the bones.  No suspicious lytic or sclerotic bone lesions are noted on the CT portion of the study.  There are extensive degenerative changes in the spine as well as postsurgical changes with hardware in the lumbar spine.    Surgical pathology:  Obtained from right inguinal lymph node via core biopsy on 05/25/2020.  LYMPH NODE,  RIGHT INGUINAL, NEEDLE CORE BIOPSY:  --FOLLICULAR LYMPHOMA (CD19+, CD20+, CD22+, PAX5+, CD10+, BCL6+, CD23+   [PARTIAL/SUBSET], KAPPA+), GRADE 1     TO 2.  --NO MORPHOLOGIC EVIDENCE OF METASTATIC MALIGNANCY.    Bone marrow aspiration and biopsy:  BONE MARROW ASPIRATE, CLOT, AND DECALCIFIED NEEDLE CORE BIOPSY x 2:   BILATERAL POSTEROSUPERIOR ILIAC CREST - POSITIVE INVOLVEMENT BY KAPPA LIGHT   CHAIN RESTRICTED LOW GRADE SMALL B-CELL LYMPHOMA WITH        LOW PROLIFERATION INDEX (20-30% by Ki-67)     - Hypercellular marrow (30-50% total cellularity) with about 5-15%   involvement by lymphoma     - Trilineage hematopoiesis with mildly left-shifted granulocytic   hyperplasia and mild erythroid hyperplasia     - Mildly decreased to adequate stainable histiocytic iron stores (2-3+)    BONE MARROW ASPIRATE ([id number]), RIGHT POSTEROSUPERIOR ILIAC CREST, FLOW   CYTOMETRY: - Kappa-light chain restricted, CD10-positive (variable, dim)   mature small B-cell population (7.8% of total cellularity) identified     - Reversed CD4:CD8 ratio (1:1.1) with no aberrant T cell antigen expression     - No increase in blasts   COMMENT: Please correlate the immunophenotyping results with morphologic,   clinical, and cytogenetic findings for final diagnosis.    ALL INTERPRETATION (BM) SEE BELOW    Comment: The result indicates approximately 10.5% of nuclei had an   IGH gene rearrangement which is consistent with the   IGH/BCL2 rearrangement identified in concurrent lymphoma   FISH studies (reported separately). The additional FISH   studies also demonstrated a BCL6 rearrangement.   The combination of BCL2 and BCL6 translocations is most   commonly associated with follicular lymphoma, diffuse large   B-cell lymphoma, and high-grade B-cell lymphoma (Swerdlow   et al., WHO Classification of Tumours of Haematopoietic and   Lymphoid Tissues, [employer]:[place], 2017).   Chromosome studies are normal (reported separately).   Clinical and pathologic  correlation is recommended.      Impression:  1.  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  2.  Complex left renal mass.    Plan:  1.  As patient is asymptomatic, I have recommended that we embark upon a course of expectant observation.  2.  I plan to re-evaluate her 3 months from now with interval CBC, CMP, LDH, beta 2, and vitamin-D level.  3.  I plan to reimage the patient 6 months from now earlier should her symptoms worsen/change.  4.  40 min of contact time was spent counseling the patient regarding her diagnosis, stage of disease, rationale for watch/wait strategy an indolent lymphoma, etcetera.  Reassurance provided.  Patient voices understanding wishes to proceed as above.    This note was created using voice recognition software and may contain grammatical errors.

## 2020-07-09 LAB
COMMENT: NORMAL
FINAL PATHOLOGIC DIAGNOSIS: NORMAL
GROSS: NORMAL
Lab: NORMAL
MICROSCOPIC EXAM: NORMAL
SUPPLEMENTAL DIAGNOSIS: NORMAL

## 2020-08-05 ENCOUNTER — OFFICE VISIT (OUTPATIENT)
Dept: FAMILY MEDICINE | Facility: CLINIC | Age: 80
End: 2020-08-05
Payer: MEDICARE

## 2020-08-05 ENCOUNTER — LAB VISIT (OUTPATIENT)
Dept: LAB | Facility: HOSPITAL | Age: 80
End: 2020-08-05
Attending: FAMILY MEDICINE
Payer: MEDICARE

## 2020-08-05 VITALS
DIASTOLIC BLOOD PRESSURE: 78 MMHG | SYSTOLIC BLOOD PRESSURE: 120 MMHG | BODY MASS INDEX: 33.83 KG/M2 | OXYGEN SATURATION: 97 % | HEART RATE: 79 BPM | TEMPERATURE: 98 F | WEIGHT: 203.06 LBS | HEIGHT: 65 IN

## 2020-08-05 DIAGNOSIS — C96.9 MALIGNANT NEOPLASM OF LYMPHOID, HEMATOPOIETIC AND RELATED TISSUE, UNSPECIFIED: ICD-10-CM

## 2020-08-05 DIAGNOSIS — R22.2 ABDOMINAL WALL MASS: ICD-10-CM

## 2020-08-05 DIAGNOSIS — C82.90 FOLLICULAR LYMPHOMA, UNSPECIFIED FOLLICULAR LYMPHOMA TYPE, UNSPECIFIED BODY REGION: Primary | ICD-10-CM

## 2020-08-05 DIAGNOSIS — R11.0 NAUSEA: ICD-10-CM

## 2020-08-05 DIAGNOSIS — N28.89 RENAL MASS, RIGHT: ICD-10-CM

## 2020-08-05 LAB
ALBUMIN SERPL BCP-MCNC: 3.9 G/DL (ref 3.5–5.2)
ALP SERPL-CCNC: 85 U/L (ref 55–135)
ALT SERPL W/O P-5'-P-CCNC: 8 U/L (ref 10–44)
ANION GAP SERPL CALC-SCNC: 9 MMOL/L (ref 8–16)
AST SERPL-CCNC: 26 U/L (ref 10–40)
BASOPHILS # BLD AUTO: 0.07 K/UL (ref 0–0.2)
BASOPHILS NFR BLD: 0.7 % (ref 0–1.9)
BILIRUB SERPL-MCNC: 0.3 MG/DL (ref 0.1–1)
BUN SERPL-MCNC: 50 MG/DL (ref 8–23)
CALCIUM SERPL-MCNC: 10.1 MG/DL (ref 8.7–10.5)
CHLORIDE SERPL-SCNC: 104 MMOL/L (ref 95–110)
CO2 SERPL-SCNC: 22 MMOL/L (ref 23–29)
CREAT SERPL-MCNC: 1.4 MG/DL (ref 0.5–1.4)
DIFFERENTIAL METHOD: ABNORMAL
EOSINOPHIL # BLD AUTO: 0.1 K/UL (ref 0–0.5)
EOSINOPHIL NFR BLD: 1.3 % (ref 0–8)
ERYTHROCYTE [DISTWIDTH] IN BLOOD BY AUTOMATED COUNT: 14.8 % (ref 11.5–14.5)
EST. GFR  (AFRICAN AMERICAN): 41.8 ML/MIN/1.73 M^2
EST. GFR  (NON AFRICAN AMERICAN): 36.3 ML/MIN/1.73 M^2
GLUCOSE SERPL-MCNC: 122 MG/DL (ref 70–110)
HCT VFR BLD AUTO: 42.4 % (ref 37–48.5)
HGB BLD-MCNC: 13.6 G/DL (ref 12–16)
IMM GRANULOCYTES # BLD AUTO: 0.05 K/UL (ref 0–0.04)
IMM GRANULOCYTES NFR BLD AUTO: 0.5 % (ref 0–0.5)
LYMPHOCYTES # BLD AUTO: 3.1 K/UL (ref 1–4.8)
LYMPHOCYTES NFR BLD: 31.5 % (ref 18–48)
MCH RBC QN AUTO: 29.4 PG (ref 27–31)
MCHC RBC AUTO-ENTMCNC: 32.1 G/DL (ref 32–36)
MCV RBC AUTO: 92 FL (ref 82–98)
MONOCYTES # BLD AUTO: 0.9 K/UL (ref 0.3–1)
MONOCYTES NFR BLD: 8.8 % (ref 4–15)
NEUTROPHILS # BLD AUTO: 5.7 K/UL (ref 1.8–7.7)
NEUTROPHILS NFR BLD: 57.2 % (ref 38–73)
NRBC BLD-RTO: 0 /100 WBC
PLATELET # BLD AUTO: 128 K/UL (ref 150–350)
PMV BLD AUTO: 12.1 FL (ref 9.2–12.9)
POTASSIUM SERPL-SCNC: 4.1 MMOL/L (ref 3.5–5.1)
PROT SERPL-MCNC: 7.6 G/DL (ref 6–8.4)
RBC # BLD AUTO: 4.62 M/UL (ref 4–5.4)
SODIUM SERPL-SCNC: 135 MMOL/L (ref 136–145)
WBC # BLD AUTO: 9.92 K/UL (ref 3.9–12.7)

## 2020-08-05 PROCEDURE — 1101F PR PT FALLS ASSESS DOC 0-1 FALLS W/OUT INJ PAST YR: ICD-10-PCS | Mod: CPTII,S$GLB,, | Performed by: FAMILY MEDICINE

## 2020-08-05 PROCEDURE — 85025 COMPLETE CBC W/AUTO DIFF WBC: CPT

## 2020-08-05 PROCEDURE — 3074F PR MOST RECENT SYSTOLIC BLOOD PRESSURE < 130 MM HG: ICD-10-PCS | Mod: CPTII,S$GLB,, | Performed by: FAMILY MEDICINE

## 2020-08-05 PROCEDURE — 99213 OFFICE O/P EST LOW 20 MIN: CPT | Mod: S$GLB,,, | Performed by: FAMILY MEDICINE

## 2020-08-05 PROCEDURE — 3074F SYST BP LT 130 MM HG: CPT | Mod: CPTII,S$GLB,, | Performed by: FAMILY MEDICINE

## 2020-08-05 PROCEDURE — 36415 COLL VENOUS BLD VENIPUNCTURE: CPT | Mod: PO

## 2020-08-05 PROCEDURE — 99999 PR PBB SHADOW E&M-EST. PATIENT-LVL IV: ICD-10-PCS | Mod: PBBFAC,,, | Performed by: FAMILY MEDICINE

## 2020-08-05 PROCEDURE — 1126F PR PAIN SEVERITY QUANTIFIED, NO PAIN PRESENT: ICD-10-PCS | Mod: S$GLB,,, | Performed by: FAMILY MEDICINE

## 2020-08-05 PROCEDURE — 3078F PR MOST RECENT DIASTOLIC BLOOD PRESSURE < 80 MM HG: ICD-10-PCS | Mod: CPTII,S$GLB,, | Performed by: FAMILY MEDICINE

## 2020-08-05 PROCEDURE — 3078F DIAST BP <80 MM HG: CPT | Mod: CPTII,S$GLB,, | Performed by: FAMILY MEDICINE

## 2020-08-05 PROCEDURE — 80053 COMPREHEN METABOLIC PANEL: CPT

## 2020-08-05 PROCEDURE — 1159F MED LIST DOCD IN RCRD: CPT | Mod: S$GLB,,, | Performed by: FAMILY MEDICINE

## 2020-08-05 PROCEDURE — 1101F PT FALLS ASSESS-DOCD LE1/YR: CPT | Mod: CPTII,S$GLB,, | Performed by: FAMILY MEDICINE

## 2020-08-05 PROCEDURE — 99213 PR OFFICE/OUTPT VISIT, EST, LEVL III, 20-29 MIN: ICD-10-PCS | Mod: S$GLB,,, | Performed by: FAMILY MEDICINE

## 2020-08-05 PROCEDURE — 1126F AMNT PAIN NOTED NONE PRSNT: CPT | Mod: S$GLB,,, | Performed by: FAMILY MEDICINE

## 2020-08-05 PROCEDURE — 99999 PR PBB SHADOW E&M-EST. PATIENT-LVL IV: CPT | Mod: PBBFAC,,, | Performed by: FAMILY MEDICINE

## 2020-08-05 PROCEDURE — 1159F PR MEDICATION LIST DOCUMENTED IN MEDICAL RECORD: ICD-10-PCS | Mod: S$GLB,,, | Performed by: FAMILY MEDICINE

## 2020-08-05 RX ORDER — ONDANSETRON HYDROCHLORIDE 8 MG/1
8 TABLET, FILM COATED ORAL EVERY 8 HOURS PRN
Qty: 30 TABLET | Refills: 1 | Status: SHIPPED | OUTPATIENT
Start: 2020-08-05 | End: 2020-11-06 | Stop reason: SDUPTHER

## 2020-08-05 RX ORDER — MULTIVITAMIN
1 TABLET ORAL DAILY
COMMUNITY

## 2020-08-05 NOTE — PROGRESS NOTES
Subjective:       Patient ID: Shandra Velez is a 79 y.o. female.    Chief Complaint: Fatigue (Stomach upset all the time)    C/o vague and constant abdominal discomfort over the last 3 weeks.  Feels nausea and reduced appetite.  Some blaoting.  having BM daily.  No vomiting.  Lost 10 pounds in the last month  Also with some palpable anterior abdomen nodules which are new    Trying IB Guard OTC after she thought she had some IBS    Non-Hodgkins lymphoma, renal mass - following with oncology  HTN - tolerating lisinopril 40mg daily, HCTZ 25mg daily and nifedipine Xl 60mg daily  HLD - tolerating Zocor 10mg daily  Allergies: taking Zyrtec 10mg daily  Insomnia - using restoril 15mg rarely at bedtime    Past Medical History:    Hypertension                                                  Pulmonary nodule                                              Smoker                                                        Osteopenia                                                    Hypertension                                                  Hypercholesterolemia                                          Breast cancer                                                 Lumbar spondylosis                                            Past Surgical History:    MASTECTOMY                                       1985            Comment:right    CHOLECYSTECTOMY                                                APPENDECTOMY                                                   UMBILICAL HERNIA REPAIR                                        TUBAL LIGATION                                                 BREAST RECONSTRUCTION                                            Comment:right    Allergies:   -- No Known Drug Allergies     Social History    Marital Status:              Spouse Name:                       Years of Education:                 Number of children: 3             Occupational History  Occupation          Employer            Comment                retired marketing *                         Social History Main Topics    Smoking Status: Current Every Day Smoker        Packs/Day: 0.50  Years: 53         Types: Cigarettes    Smokeless Status: Never Used                        Alcohol Use: Yes             Drug Use: No              Sexual Activity: Not Currently        Other Topics            Concern    None on file    Social History Narrative    Lives alone      Hobbies: skyler      From [place]    Current Outpatient Medications on File Prior to Visit:  acetaminophen (TYLENOL) 325 MG tablet, Take 2 tablets (650 mg total) by mouth every 4 (four) hours as needed (pain score 1-2/10)., Disp: , Rfl: 0  aspirin 81 mg Tab, Take 81 mg by mouth once. Every day, Disp: , Rfl:   cetirizine (ZYRTEC) 10 MG tablet, Take 10 mg by mouth once daily. Every day, Disp: , Rfl:   DOCOSAHEXANOIC ACID/EPA (FISH OIL ORAL), Take 1,000 mg by mouth once. Twice a day, Disp: , Rfl:   hydroCHLOROthiazide (HYDRODIURIL) 25 MG tablet, TAKE 1 TABLET ONCE DAILY, Disp: 90 tablet, Rfl: 3  ibuprofen (ADVIL,MOTRIN) 600 MG tablet, Take 1 tablet (600 mg total) by mouth every 6 (six) hours as needed for Pain., Disp: 90 tablet, Rfl: 1  lisinopril (PRINIVIL,ZESTRIL) 40 MG tablet, Take 1 tablet (40 mg total) by mouth once daily., Disp: 90 tablet, Rfl: 3  multivitamin capsule, Take 1 capsule by mouth once daily., Disp: , Rfl:   NIFEdipine (ADALAT CC) 60 MG TbSR, TAKE 1 TABLET ONCE DAILY, Disp: 90 tablet, Rfl: 3  simvastatin (ZOCOR) 10 MG tablet, TAKE 1 TABLET EVERY EVENING, Disp: 90 tablet, Rfl: 3  temazepam (RESTORIL) 15 mg Cap, One tablet At bedtime as needed for insomnia, Disp: 90 capsule, Rfl: 1    No current facility-administered medications on file prior to visit.     Review of patient's family history indicates:    Cancer                         Sister                      Comment: uterine    Diabetes                       Brother                       Follow-up  Associated symptoms include  "abdominal pain, fatigue and nausea. Pertinent negatives include no arthralgias, chest pain, chills, coughing, fever, headaches, neck pain, numbness, rash, sore throat, vomiting or weakness.   Hyperlipidemia  Pertinent negatives include no chest pain or shortness of breath.   Fatigue  Associated symptoms include abdominal pain, fatigue and nausea. Pertinent negatives include no arthralgias, chest pain, chills, coughing, fever, headaches, neck pain, numbness, rash, sore throat, vomiting or weakness.     Review of Systems   Constitutional: Positive for appetite change, fatigue and unexpected weight change. Negative for chills and fever.   HENT: Negative for sore throat and trouble swallowing.    Eyes: Negative for pain and visual disturbance.   Respiratory: Negative for cough, shortness of breath and wheezing.    Cardiovascular: Negative for chest pain and palpitations.   Gastrointestinal: Positive for abdominal distention, abdominal pain and nausea. Negative for blood in stool, diarrhea and vomiting.   Genitourinary: Negative for difficulty urinating, dysuria and hematuria.   Musculoskeletal: Negative for arthralgias, back pain, gait problem and neck pain.   Skin: Negative for rash and wound.   Neurological: Positive for tremors (left hand). Negative for dizziness, weakness, numbness and headaches.   Hematological: Negative for adenopathy.   Psychiatric/Behavioral: Negative for dysphoric mood.       Objective:       /78 (BP Location: Left arm, Patient Position: Sitting)   Pulse 79   Temp 97.7 °F (36.5 °C) (Oral)   Ht 5' 5" (1.651 m)   Wt 92.1 kg (203 lb 0.7 oz)   SpO2 97%   BMI 33.79 kg/m²     Physical Exam  Constitutional:       Appearance: Normal appearance. She is well-developed.   HENT:      Head: Normocephalic.      Mouth/Throat:      Pharynx: No oropharyngeal exudate or posterior oropharyngeal erythema.   Eyes:      Conjunctiva/sclera: Conjunctivae normal.      Pupils: Pupils are equal, round, and " reactive to light.   Neck:      Musculoskeletal: Normal range of motion and neck supple.      Thyroid: No thyromegaly.   Cardiovascular:      Rate and Rhythm: Normal rate and regular rhythm.      Heart sounds: Normal heart sounds, S1 normal and S2 normal. No murmur. No friction rub. No gallop.    Pulmonary:      Effort: Pulmonary effort is normal.      Breath sounds: Normal breath sounds. No wheezing or rales.   Abdominal:      General: Bowel sounds are normal. There is no distension.      Palpations: Abdomen is soft.      Tenderness: There is abdominal tenderness in the epigastric area.       Musculoskeletal:      Lumbar back: She exhibits normal range of motion, no tenderness, no deformity and no pain.      Right lower leg: No edema.      Left lower leg: No edema.   Lymphadenopathy:      Cervical: No cervical adenopathy.   Skin:     General: Skin is warm.      Findings: No rash.   Neurological:      Mental Status: She is alert and oriented to person, place, and time.      Cranial Nerves: No cranial nerve deficit.      Motor: Tremor present.      Gait: Gait normal.         Results for orders placed or performed during the hospital encounter of 06/24/20   Leukemia/Lymphoma Screen - Bone Marrow Left Posterior Iliac Crest   Result Value Ref Range    Clinical Diagnosis - Bone Marrow UNK     Body Site - Bone Marrow LPI     Bone Marrow Interpretation       BONE MARROW ASPIRATE ([id number]), RIGHT POSTEROSUPERIOR ILIAC CREST, FLOW  CYTOMETRY: -  Kappa-light chain restricted, CD10-positive (variable, dim)  mature small B-cell population (7.8% of total cellularity) identified    -  Reversed CD4:CD8 ratio (1:1.1) with no aberrant T cell antigen expression    -  No increase in blasts  COMMENT: Please correlate the immunophenotyping results with morphologic,  clinical, and cytogenetic findings for final diagnosis.      Bone Marrow Antibodies Analyzed       All analyzed: CD2, CD3, CD4, CD5, CD7, CD8, CD10, CD13, CD19, CD20,  CD34,  , KAPPA, LAMBDA,CD45 and 7AAD.      Bone Marrow Comment       ABNORMAL MATURE SMALL B-CELL POPULATION IDENTIFIED (7.8% of total cellularity)  Forward scatter: Low  Side scatter: Low  Positive: CD45, CD19, CD20 (bright), CD10 (variable, dim), sKappa  Negative: CD5, , sLambda, CD2, CD3, CD4, CD7, CD8  The following additional cell populations are identified:  81.9% Granulocytes  2.4% Monocytes  2.7% T cells, CD4:CD8 is 1:1.1. No loss of pan-T lymphocyte antigens detected.  2.3% Immature B cell precursors  1.8% Blasts  Viability by 7-AAD: 95.8%  The remaining events analyzed represent nonviable cells, non-hematolymphoid  cells, and debris .     Chromosome Analysis, Bone Marrow Left Posterior Iliac Crest   Result Value Ref Range    Chromosome analysis BM Result Summary See Interpretation     Interpretation SEE BELOW     Results 46,XX[20]     Reason for Referral SEE BELOW     Specimen Bone Marrow     Source Test Not Performed     Method Culture without mitogens     Banding Methods, BM Chromosome SEE BELOW     Chromosome analysis BM Additional Information Test Not Performed     Chromosome analysis BM Released By Flakito Mancini M.D.    Specimen to Pathology, Bone Marrow Aspiration/Biopsy   Result Value Ref Range    Final Pathologic Diagnosis       BONE MARROW ASPIRATE, CLOT, AND DECALCIFIED NEEDLE CORE BIOPSY x 2:  BILATERAL POSTEROSUPERIOR ILIAC CREST -  POSITIVE INVOLVEMENT BY KAPPA LIGHT  CHAIN RESTRICTED LOW GRADE SMALL B-CELL LYMPHOMA WITH       LOW PROLIFERATION INDEX (20-30% by Ki-67)    -  Hypercellular marrow (30-50% total cellularity) with about 5-15%  involvement by lymphoma    -  Trilineage hematopoiesis with mildly left-shifted granulocytic  hyperplasia and mild erythroid hyperplasia    -  Mildly decreased to adequate stainable histiocytic iron stores (2-3+)      Supplemental Diagnosis       B-Cell Lymphoma FISH Panel, Bone Marrow Aspirate  ([facility] ¿  [facility]  [place], [address] [place] [id number]; reported  7/7/20): POSITIVE.  nuc genaro(BCL6x2)(3'BCL6 sep 5'BCL6x1)[27/200],(IGH,BCL2)x4(IGH con  BCL2x2)[66/500]/(IGHx5,BCL2x4)  (IGH con BCL2x3)[9/500]  The result is abnormal and indicates approximately 14% of nuclei had IGH/BCL2  fusion and a BCL6 rearrangement. This combination of translocations is most  commonly associated with follicular lymphoma, diffuse large B-cell lymphoma,  and highgrade B-cell lymphoma (Swerdlow et al., WHO Classification of Tumours  of Haematopoietic and Lymphoid Tissues, [employer]:[place], 2017).  ALL FISH Panel, Bone Marrow Aspirate  ([facility] ¿ [facility], [address] [place] [address]; reported 7/7/20):  POSITIVE.  nuc genaro(RASM7a2)[25/200],(I1GX0r9)[18/200],(IGHx2)(3'IGH sep 5'IGHx1)[22/200]  The result indicates approximately 10.5% of nuclei had an IGH gene  rearrangement which i s consistent with the IGH/BCL2 rearrangement identified  in concurrent lymphoma FISH studies (reported separately). The additional  FISH studies also demonstrated a BCL6 rearrangement. The combination of BCL2  and BCL6 translocations is most commonly associated with follicular lymphoma,  diffuse large B-cell lymphoma, and high-grade B-cell lymphoma (Swerdlow et  al., WHO Classification of Tumours of Haematopoietic and Lymphoid Tissues,  [employer]:[place], 2017). Chromosome studies are normal (reported separately).  Clinical and pathologic correlation is recommended.  Chromosomal Analysis, Bone Marrow Aspirate  ([facility] ¿  [facility], [address] [place] [address]; reported  7/7/20): NORMAL 46,XX[20].  While no clonal abnormality was apparent, concurrent FISH studies  demonstrated IGH/BCL2 fusion and a BCL6 rearrangement (reported separately).  This supplemental report represents a summary of the ancillary test results.  Diagnosis remains unchanged. A c omplete report will be available in  "this  patient's electronic medical record.      Gross       1:   Container label:  Clinic number/AP number:  [id number] and "A  Iliac Right".  Specimen is received in formalin in a properly labeled container identified  with the patient's name and consists of an 1.2 x 1.0 x 0.3 cm aggregate of  soft dark red blood clot that is entirely submitted in cassette  [id number].  Grossed by EDMAR Mitchell ([facility]) cm  2:   Container label:  Clinic number/AP number:  [id number] and "A  Iliac Right".  Specimen is received in formalin in a container identified by the patient's  name and consists of an elongated brown-tan bone core segment that measures  2.9 cm in length and up to 0.2 cm in diameter.  The specimen is entirely  submitted in cassette [id number] for decalcification and then for  microscopic examination.  Grossed by EDMAR Mitchell ([facility]) cm  5:   Container label:  Clinic number/AP number:  [id number] and "Iliac Left".  Specimen is received in formalin in a properly labeled container identified  by the patient's name and consists of a 2.2  x 2.2 x 0.9 cm aggregate soft  dark blood clot that is entirely submitted in cassettes [id number] and  [id number].  Grossed by EDMAR Mitchell ([facility]) cm  6:   Container label:  Clinic number/AP number:  [id number] and "Iliac Left".  Specimen is received in formalin in a properly labeled container identified  by patient's name and consists of 2 elongated brown-gray bone core segments  that measure 0.7 and 1.5 cm in length and both measure up to 0 1 cm in  diameter.  Specimen is entirely submitted in cassette [id number].    Grossed by EDMAR Mitchell ([facility])       Microscopic Exam       BONE MARROW ADEQUACY:  Aspirate (6; 3 per laterality): Adequate with few cellular spicules  Clot (3: 1A and 5A, 5B): 1A adequate and 5A, 5B non-diagnostic (blood clot  only)  Biopsy (2; 1 per laterality): Adequate; 30-50% total cellularity (higher  cellularity in areas with " large lymphoid aggregates)  BONE MARROW ASPIRATE:  GRANULOPOIESIS: Increased number, mildly left-shifted maturation, no  significant dysplasia, no increased blasts, no Lisa rods  ERYTHROPOIESIS: Increased number, adequate maturation, minimal dysplasia  including irregular nuclear contours, binucleation, and rare karyorrhexis  MEGAKARYOPOIESIS: Present with no significant atypical features; increased  numbers of immature forms  OTHER CELL LINEAGES: No significant increase in plasma cells; 1% atypical  small lymphocytes with cleaved nucleus and rare mast cells seen; focal areas  of the aspirate show increased lymphocytes (likely correspond to lymphoid  aggregates seen in tissue sections) whereas other well-spread area s show an  adequate lymphocyte number  BONE MARROW ASPIRATE MANUAL DIFFERENTIAL (cell count = 200):  MYELOID SERIES  3% Blasts, 1% Promyelocytes, 9.5% Myelocytes, 9.5% Metamyelocytes, 5.5% Band  granulocytes, 10% Segmented granulocytes, 2% Eosinophils and precursors, 0.5%  Basophils and precursors  ERYTHROID SERIES  0.5% Proerythroblasts, 9% Basophilic erythroblasts, 23.5% Polychromatophilic  erythroblasts, 14% Orthochromatic erythroblasts  OTHER SERIES  1% Atypical lymphocytes (cleaved nuclei)  9.5% Lymphocytes and precursors (up to 35% lymphocytes in area of aggregated  lymphocytes)  1% Monocytes and precursors, 0.5% Plasmacytes and precursors  M:E Ratio: 1:1.1 = 0.9  Iron Stain: Stainable histiocytic iron stores are mildly decreased to  adequate (2-3+). No ring sideroblasts are identified.  BONE MARROW CLOT & CORE BIOPSY:  Tissue sections show hypercellular marrow  with multiple large lymphoid aggregates, located both paratrabecularly and  interstitially, and few with large size and  mildly infiltrative borders;  multiple similar-appearing lymphoid aggregates are also seen in the clot  section (1A) and aggregates are composed primarily of small/medium-sized  lymphocytes with angulated nuclei.  Granulocytic and erythroid precursors  appear increased in number with mildly left-shifted granulopoiesis and  adequate maturation of erythroid precursors which are predominantly present  as erythroid islands. Megakaryocytes appear adequate to mildly increased in  number and show no significant atypical features or clustering. No  significant increase in plasma cells or non-perivascular plasma cell clusters  are appreciated.  Tissue sections show no morphologic evidence of metastatic carcinoma  infiltration, granulomata, fibrosis, or necrosis. Bony trabeculae are  unremarkable.  Immunohistochemical study with stains for CD3, CD20, CD10, and BCL-2 was  performed on blocks 2A and 6A (core biopsy x 2) with appropriate controls for  increased sensitivity and cellular localization . Immunostain findings were  the same for both core biopsies: CD3 stains numerous scattered small T cells  and also some within the lymphoid aggregates. CD20 stains numerous scattered  B cells as well as the majority of cells within the lymphoid aggregates which  also co-stain with BCL-2. CD10 shows variable, weak staining within the  lymphoid aggregates.  CD5, CD23, BCL-6, and Ki-67 immunostains were also performed on block 2A with  appropriate controls. CD5 co-stains the CD3+ T cells only. CD23 stains  numerous cells and disrupted follicular dendritic meshworks within the  lymphoid aggregates. BCL-6 (nuclear) is positive in the cells within the  lymphoid aggregates. Ki-67 (nuclear) shows low proliferation index (20-30%)  within the lymphoid follicles.  Special stain for iron was performed on blocks 1A, 5A, 5B (clots) and 2A and  6A (core biopsy x 2) with appropriate controls and shows no stainable  histiocytic iron stores.      Comment       Corresponding flow cytometry (FLW-) detects 7.8% kappa light chain  restricted CD10 (variable, dim) mature B cells but no aberrant T-cell antigen  expression or increase in blasts. Cytogenetic and  any other additional  ancillary test results will be reported in a separate supplemental report.      Disclaimer       CD20 (L26) immunohistochemical staining (close L26, DAB detection method) is  performed on formalin-fixed (10% neutral buffered formalin), paraffin  embedded tissues sections. The prescence of an appropriately colored reaction  product within the target cells is indicative of positive reactivity.  Positive staining intensity should be assessed within the context of any  background staining of the negative reagent control. This test was developed  and performance characteristics determined by [facility], Section  of Anatomic Pathology. It has been cleared by the U.S. Food and Drug  Administration.  CD20 (L26) immunohistochemical staining (close L26, DAB detection method) is  performed on formalin-fixed (10% neutral buffered formalin), paraffin  embedded tissues sections. The prescence of an appropriately colored reaction  product within the target cells is indicative of positive reactivity.  Positive staining intensity should be assessed within the context of any  background  staining of the negative reagent control. This test was developed  and performance characteristics determined by [facility], Section  of Anatomic Pathology. It has been cleared by the U.S. Food and Drug  Administration.           Assessment:       1. Follicular lymphoma, unspecified follicular lymphoma type, unspecified body region    2. Malignant neoplasm of lymphoid, hematopoietic and related tissue, unspecified    3. Renal mass, right    4. Abdominal wall mass    5. Nausea        Plan:       Follicular lymphoma, unspecified follicular lymphoma type, unspecified body region    Malignant neoplasm of lymphoid, hematopoietic and related tissue, unspecified  -     MRI Abdomen-Pelvis w w/o Contrast (XPD); Future; Expected date: 08/05/2020  -     Comprehensive metabolic panel; Future; Expected date:  08/05/2020  -     CBC auto differential; Future; Expected date: 08/05/2020    Renal mass, right    Abdominal wall mass    Nausea  -     ondansetron (ZOFRAN) 8 MG tablet; Take 1 tablet (8 mg total) by mouth every 8 (eight) hours as needed for Nausea.  Dispense: 30 tablet; Refill: 1          Will get MRI abdomen and contact with results; eval for new abdominal masses, right renal mass and status of lymphoma  zofran for nausea for now  BP controlled  Continue present meds  Counseled on regular exercise, maintenance of a healthy weight, balanced diet rich in fruits/vegetables and lean protein, and avoidance of unhealthy habits like smoking and excessive alcohol intake.

## 2020-08-07 ENCOUNTER — TELEPHONE (OUTPATIENT)
Dept: FAMILY MEDICINE | Facility: CLINIC | Age: 80
End: 2020-08-07

## 2020-08-17 ENCOUNTER — HOSPITAL ENCOUNTER (OUTPATIENT)
Dept: RADIOLOGY | Facility: HOSPITAL | Age: 80
Discharge: HOME | End: 2020-08-17
Attending: FAMILY MEDICINE
Payer: MEDICARE

## 2020-08-17 DIAGNOSIS — C96.9 MALIGNANT NEOPLASM OF LYMPHOID, HEMATOPOIETIC AND RELATED TISSUE, UNSPECIFIED: ICD-10-CM

## 2020-08-17 PROCEDURE — 74181 MRI ABDOMEN W/O CONTRAST: CPT | Mod: 26,,, | Performed by: RADIOLOGY

## 2020-08-17 PROCEDURE — 74181 MRI ABDOMEN W/O CONTRAST: CPT | Mod: TC,PO

## 2020-08-17 PROCEDURE — 74181 MRI ABDOMEN WITHOUT CONTRAST: ICD-10-PCS | Mod: 26,,, | Performed by: RADIOLOGY

## 2020-08-19 ENCOUNTER — OFFICE VISIT (OUTPATIENT)
Dept: FAMILY MEDICINE | Facility: CLINIC | Age: 80
End: 2020-08-19
Payer: MEDICARE

## 2020-08-19 VITALS
HEART RATE: 85 BPM | SYSTOLIC BLOOD PRESSURE: 128 MMHG | WEIGHT: 203.5 LBS | TEMPERATURE: 98 F | DIASTOLIC BLOOD PRESSURE: 64 MMHG | BODY MASS INDEX: 33.86 KG/M2 | OXYGEN SATURATION: 95 %

## 2020-08-19 DIAGNOSIS — C85.93 NON-HODGKIN LYMPHOMA OF INTRA-ABDOMINAL LYMPH NODES, UNSPECIFIED NON-HODGKIN LYMPHOMA TYPE: Primary | ICD-10-CM

## 2020-08-19 DIAGNOSIS — I10 ESSENTIAL HYPERTENSION: ICD-10-CM

## 2020-08-19 PROCEDURE — 99213 OFFICE O/P EST LOW 20 MIN: CPT | Mod: S$GLB,,, | Performed by: FAMILY MEDICINE

## 2020-08-19 PROCEDURE — 3074F SYST BP LT 130 MM HG: CPT | Mod: CPTII,S$GLB,, | Performed by: FAMILY MEDICINE

## 2020-08-19 PROCEDURE — 3078F PR MOST RECENT DIASTOLIC BLOOD PRESSURE < 80 MM HG: ICD-10-PCS | Mod: CPTII,S$GLB,, | Performed by: FAMILY MEDICINE

## 2020-08-19 PROCEDURE — 1126F PR PAIN SEVERITY QUANTIFIED, NO PAIN PRESENT: ICD-10-PCS | Mod: S$GLB,,, | Performed by: FAMILY MEDICINE

## 2020-08-19 PROCEDURE — 1159F MED LIST DOCD IN RCRD: CPT | Mod: S$GLB,,, | Performed by: FAMILY MEDICINE

## 2020-08-19 PROCEDURE — 3078F DIAST BP <80 MM HG: CPT | Mod: CPTII,S$GLB,, | Performed by: FAMILY MEDICINE

## 2020-08-19 PROCEDURE — 99213 PR OFFICE/OUTPT VISIT, EST, LEVL III, 20-29 MIN: ICD-10-PCS | Mod: S$GLB,,, | Performed by: FAMILY MEDICINE

## 2020-08-19 PROCEDURE — 1101F PR PT FALLS ASSESS DOC 0-1 FALLS W/OUT INJ PAST YR: ICD-10-PCS | Mod: CPTII,S$GLB,, | Performed by: FAMILY MEDICINE

## 2020-08-19 PROCEDURE — 99999 PR PBB SHADOW E&M-EST. PATIENT-LVL IV: ICD-10-PCS | Mod: PBBFAC,,, | Performed by: FAMILY MEDICINE

## 2020-08-19 PROCEDURE — 1159F PR MEDICATION LIST DOCUMENTED IN MEDICAL RECORD: ICD-10-PCS | Mod: S$GLB,,, | Performed by: FAMILY MEDICINE

## 2020-08-19 PROCEDURE — 99999 PR PBB SHADOW E&M-EST. PATIENT-LVL IV: CPT | Mod: PBBFAC,,, | Performed by: FAMILY MEDICINE

## 2020-08-19 PROCEDURE — 3074F PR MOST RECENT SYSTOLIC BLOOD PRESSURE < 130 MM HG: ICD-10-PCS | Mod: CPTII,S$GLB,, | Performed by: FAMILY MEDICINE

## 2020-08-19 PROCEDURE — 1101F PT FALLS ASSESS-DOCD LE1/YR: CPT | Mod: CPTII,S$GLB,, | Performed by: FAMILY MEDICINE

## 2020-08-19 PROCEDURE — 1126F AMNT PAIN NOTED NONE PRSNT: CPT | Mod: S$GLB,,, | Performed by: FAMILY MEDICINE

## 2020-08-19 NOTE — PROGRESS NOTES
Subjective:       Patient ID: Shandra Velez is a 79 y.o. female.    Chief Complaint: Results (follow up after MRI)    vague and constant abdominal discomfort has resolved  Still with some intermittent nausea  having BM daily.  Weight stable  Also with some palpable anterior abdomen nodules which are new - therse appeat to be lipomas    Trying IB Guard OTC after she thought she had some IBS    Non-Hodgkins lymphoma, renal mass - following with oncology  HTN - tolerating lisinopril 40mg daily, HCTZ 25mg daily and nifedipine Xl 60mg daily  HLD - tolerating Zocor 10mg daily  Allergies: taking Zyrtec 10mg daily  Insomnia - using restoril 15mg  at bedtime    Past Medical History:    Hypertension                                                  Pulmonary nodule                                              Smoker                                                        Osteopenia                                                    Hypertension                                                  Hypercholesterolemia                                          Breast cancer                                                 Lumbar spondylosis                                            Past Surgical History:    MASTECTOMY                                       1985            Comment:right    CHOLECYSTECTOMY                                                APPENDECTOMY                                                   UMBILICAL HERNIA REPAIR                                        TUBAL LIGATION                                                 BREAST RECONSTRUCTION                                            Comment:right    Allergies:   -- No Known Drug Allergies     Social History    Marital Status:              Spouse Name:                       Years of Education:                 Number of children: 3             Occupational History  Occupation          Employer            Comment               retired marketing *                          Social History Main Topics    Smoking Status: Current Every Day Smoker        Packs/Day: 0.50  Years: 53         Types: Cigarettes    Smokeless Status: Never Used                        Alcohol Use: Yes             Drug Use: No              Sexual Activity: Not Currently        Other Topics            Concern    None on file    Social History Narrative    Lives alone      Hobbies: skyler      From [place]    Current Outpatient Medications on File Prior to Visit:  acetaminophen (TYLENOL) 325 MG tablet, Take 2 tablets (650 mg total) by mouth every 4 (four) hours as needed (pain score 1-2/10)., Disp: , Rfl: 0  aspirin 81 mg Tab, Take 81 mg by mouth once. Every day, Disp: , Rfl:   cetirizine (ZYRTEC) 10 MG tablet, Take 10 mg by mouth once daily. Every day, Disp: , Rfl:   DOCOSAHEXANOIC ACID/EPA (FISH OIL ORAL), Take 1,000 mg by mouth once. Twice a day, Disp: , Rfl:   hydroCHLOROthiazide (HYDRODIURIL) 25 MG tablet, TAKE 1 TABLET ONCE DAILY, Disp: 90 tablet, Rfl: 3  ibuprofen (ADVIL,MOTRIN) 600 MG tablet, Take 1 tablet (600 mg total) by mouth every 6 (six) hours as needed for Pain., Disp: 90 tablet, Rfl: 1  lisinopril (PRINIVIL,ZESTRIL) 40 MG tablet, Take 1 tablet (40 mg total) by mouth once daily., Disp: 90 tablet, Rfl: 3  multivitamin capsule, Take 1 capsule by mouth once daily., Disp: , Rfl:   NIFEdipine (ADALAT CC) 60 MG TbSR, TAKE 1 TABLET ONCE DAILY, Disp: 90 tablet, Rfl: 3  simvastatin (ZOCOR) 10 MG tablet, TAKE 1 TABLET EVERY EVENING, Disp: 90 tablet, Rfl: 3  temazepam (RESTORIL) 15 mg Cap, One tablet At bedtime as needed for insomnia, Disp: 90 capsule, Rfl: 1    No current facility-administered medications on file prior to visit.     Review of patient's family history indicates:    Cancer                         Sister                      Comment: uterine    Diabetes                       Brother                       Fatigue  Associated symptoms include abdominal pain, fatigue and nausea. Pertinent  negatives include no arthralgias, chest pain, chills, coughing, fever, headaches, neck pain, numbness, rash, sore throat, vomiting or weakness.   Follow-up  Associated symptoms include abdominal pain, fatigue and nausea. Pertinent negatives include no arthralgias, chest pain, chills, coughing, fever, headaches, neck pain, numbness, rash, sore throat, vomiting or weakness.   Hyperlipidemia  Pertinent negatives include no chest pain or shortness of breath.     Review of Systems   Constitutional: Positive for appetite change, fatigue and unexpected weight change. Negative for chills and fever.   HENT: Negative for sore throat and trouble swallowing.    Eyes: Negative for pain and visual disturbance.   Respiratory: Negative for cough, shortness of breath and wheezing.    Cardiovascular: Negative for chest pain and palpitations.   Gastrointestinal: Positive for abdominal distention, abdominal pain and nausea. Negative for blood in stool, diarrhea and vomiting.   Genitourinary: Negative for difficulty urinating, dysuria and hematuria.   Musculoskeletal: Negative for arthralgias, back pain, gait problem and neck pain.   Skin: Negative for rash and wound.   Neurological: Positive for tremors (left hand). Negative for dizziness, weakness, numbness and headaches.   Hematological: Negative for adenopathy.   Psychiatric/Behavioral: Negative for dysphoric mood.       Objective:       /64 (BP Location: Left arm, Patient Position: Sitting)   Pulse 85   Temp 98 °F (36.7 °C) (Oral)   Wt 92.3 kg (203 lb 7.8 oz)   SpO2 95%   BMI 33.86 kg/m²     Physical Exam  Constitutional:       Appearance: Normal appearance. She is well-developed.   HENT:      Head: Normocephalic.      Mouth/Throat:      Pharynx: No oropharyngeal exudate or posterior oropharyngeal erythema.   Eyes:      Conjunctiva/sclera: Conjunctivae normal.      Pupils: Pupils are equal, round, and reactive to light.   Neck:      Musculoskeletal: Normal range of  motion and neck supple.      Thyroid: No thyromegaly.   Cardiovascular:      Rate and Rhythm: Normal rate and regular rhythm.      Heart sounds: Normal heart sounds, S1 normal and S2 normal. No murmur. No friction rub. No gallop.    Pulmonary:      Effort: Pulmonary effort is normal.      Breath sounds: Normal breath sounds. No wheezing or rales.   Abdominal:      General: Bowel sounds are normal. There is no distension.      Palpations: Abdomen is soft.      Tenderness: There is abdominal tenderness in the epigastric area.       Musculoskeletal:      Lumbar back: She exhibits normal range of motion, no tenderness, no deformity and no pain.      Right lower leg: No edema.      Left lower leg: No edema.   Lymphadenopathy:      Cervical: No cervical adenopathy.   Skin:     General: Skin is warm.      Findings: No rash.   Neurological:      Mental Status: She is alert and oriented to person, place, and time.      Cranial Nerves: No cranial nerve deficit.      Motor: Tremor present.      Gait: Gait normal.         Results for orders placed or performed in visit on 08/05/20   Comprehensive metabolic panel   Result Value Ref Range    Sodium 135 (L) 136 - 145 mmol/L    Potassium 4.1 3.5 - 5.1 mmol/L    Chloride 104 95 - 110 mmol/L    CO2 22 (L) 23 - 29 mmol/L    Glucose 122 (H) 70 - 110 mg/dL    BUN, Bld 50 (H) 8 - 23 mg/dL    Creatinine 1.4 0.5 - 1.4 mg/dL    Calcium 10.1 8.7 - 10.5 mg/dL    Total Protein 7.6 6.0 - 8.4 g/dL    Albumin 3.9 3.5 - 5.2 g/dL    Total Bilirubin 0.3 0.1 - 1.0 mg/dL    Alkaline Phosphatase 85 55 - 135 U/L    AST 26 10 - 40 U/L    ALT 8 (L) 10 - 44 U/L    Anion Gap 9 8 - 16 mmol/L    eGFR if African American 41.8 (A) >60 mL/min/1.73 m^2    eGFR if non  36.3 (A) >60 mL/min/1.73 m^2   CBC auto differential   Result Value Ref Range    WBC 9.92 3.90 - 12.70 K/uL    RBC 4.62 4.00 - 5.40 M/uL    Hemoglobin 13.6 12.0 - 16.0 g/dL    Hematocrit 42.4 37.0 - 48.5 %    Mean Corpuscular Volume  92 82 - 98 fL    Mean Corpuscular Hemoglobin 29.4 27.0 - 31.0 pg    Mean Corpuscular Hemoglobin Conc 32.1 32.0 - 36.0 g/dL    RDW 14.8 (H) 11.5 - 14.5 %    Platelets 128 (L) 150 - 350 K/uL    MPV 12.1 9.2 - 12.9 fL    Immature Granulocytes 0.5 0.0 - 0.5 %    Gran # (ANC) 5.7 1.8 - 7.7 K/uL    Immature Grans (Abs) 0.05 (H) 0.00 - 0.04 K/uL    Lymph # 3.1 1.0 - 4.8 K/uL    Mono # 0.9 0.3 - 1.0 K/uL    Eos # 0.1 0.0 - 0.5 K/uL    Baso # 0.07 0.00 - 0.20 K/uL    nRBC 0 0 /100 WBC    Gran% 57.2 38.0 - 73.0 %    Lymph% 31.5 18.0 - 48.0 %    Mono% 8.8 4.0 - 15.0 %    Eosinophil% 1.3 0.0 - 8.0 %    Basophil% 0.7 0.0 - 1.9 %    Differential Method Automated      MRI reviewed    Assessment:       1. Non-Hodgkin lymphoma of intra-abdominal lymph nodes, unspecified non-Hodgkin lymphoma type    2. Essential hypertension        Plan:       Non-Hodgkin lymphoma of intra-abdominal lymph nodes, unspecified non-Hodgkin lymphoma type    Essential hypertension          MRI abdomen reviewed  Ressuarnce; no acute changes  BP controlled  Continue present meds  Counseled on regular exercise, maintenance of a healthy weight, balanced diet rich in fruits/vegetables and lean protein, and avoidance of unhealthy habits like smoking and excessive alcohol intake.

## 2020-09-09 ENCOUNTER — PATIENT MESSAGE (OUTPATIENT)
Dept: FAMILY MEDICINE | Facility: CLINIC | Age: 80
End: 2020-09-09

## 2020-09-21 ENCOUNTER — PATIENT MESSAGE (OUTPATIENT)
Dept: FAMILY MEDICINE | Facility: CLINIC | Age: 80
End: 2020-09-21

## 2020-09-23 ENCOUNTER — IMMUNIZATION (OUTPATIENT)
Dept: PHARMACY | Facility: CLINIC | Age: 80
End: 2020-09-23
Payer: MEDICARE

## 2020-10-02 ENCOUNTER — LAB VISIT (OUTPATIENT)
Dept: LAB | Facility: HOSPITAL | Age: 80
End: 2020-10-02
Attending: INTERNAL MEDICINE
Payer: MEDICARE

## 2020-10-02 DIAGNOSIS — C82.90 FOLLICULAR LYMPHOMA, UNSPECIFIED FOLLICULAR LYMPHOMA TYPE, UNSPECIFIED BODY REGION: ICD-10-CM

## 2020-10-02 LAB
25(OH)D3+25(OH)D2 SERPL-MCNC: 25 NG/ML (ref 30–96)
ALBUMIN SERPL BCP-MCNC: 3.8 G/DL (ref 3.5–5.2)
ALP SERPL-CCNC: 93 U/L (ref 38–145)
ALT SERPL W/O P-5'-P-CCNC: 10 U/L (ref 0–35)
ANION GAP SERPL CALC-SCNC: 6 MMOL/L (ref 8–16)
ANISOCYTOSIS BLD QL SMEAR: SLIGHT
AST SERPL-CCNC: 36 U/L (ref 14–36)
B2 MICROGLOB SERPL-MCNC: 8.8 UG/ML (ref 0–2.5)
BASOPHILS # BLD AUTO: 0.05 K/UL (ref 0–0.2)
BASOPHILS NFR BLD: 0.5 % (ref 0–1.9)
BILIRUB SERPL-MCNC: 0.5 MG/DL (ref 0.2–1.3)
BUN SERPL-MCNC: 30 MG/DL (ref 7–18)
CALCIUM SERPL-MCNC: 9.9 MG/DL (ref 8.4–10.2)
CHLORIDE SERPL-SCNC: 106 MMOL/L (ref 95–110)
CO2 SERPL-SCNC: 26 MMOL/L (ref 22–31)
CREAT SERPL-MCNC: 1.3 MG/DL (ref 0.5–1.4)
DIFFERENTIAL METHOD: ABNORMAL
EOSINOPHIL # BLD AUTO: 0.2 K/UL (ref 0–0.5)
EOSINOPHIL NFR BLD: 1.9 % (ref 0–8)
ERYTHROCYTE [DISTWIDTH] IN BLOOD BY AUTOMATED COUNT: 14.6 % (ref 11.5–14.5)
EST. GFR  (AFRICAN AMERICAN): 46 ML/MIN/1.73 M^2
EST. GFR  (NON AFRICAN AMERICAN): 40 ML/MIN/1.73 M^2
GLUCOSE SERPL-MCNC: 101 MG/DL (ref 70–110)
HCT VFR BLD AUTO: 39.2 % (ref 37–48.5)
HGB BLD-MCNC: 12.7 G/DL (ref 12–16)
IMM GRANULOCYTES # BLD AUTO: 0.12 K/UL (ref 0–0.04)
IMM GRANULOCYTES NFR BLD AUTO: 1.2 % (ref 0–0.5)
LDH SERPL L TO P-CCNC: 549 U/L (ref 313–618)
LYMPHOCYTES # BLD AUTO: 4.7 K/UL (ref 1–4.8)
LYMPHOCYTES NFR BLD: 47 % (ref 18–48)
MCH RBC QN AUTO: 29.3 PG (ref 27–31)
MCHC RBC AUTO-ENTMCNC: 32.4 G/DL (ref 32–36)
MCV RBC AUTO: 91 FL (ref 82–98)
MONOCYTES # BLD AUTO: 0.7 K/UL (ref 0.3–1)
MONOCYTES NFR BLD: 7.3 % (ref 4–15)
NEUTROPHILS # BLD AUTO: 4.2 K/UL (ref 1.8–7.7)
NEUTROPHILS NFR BLD: 42.1 % (ref 38–73)
NRBC BLD-RTO: 0 /100 WBC
PLATELET # BLD AUTO: 94 K/UL (ref 150–350)
PLATELET BLD QL SMEAR: ABNORMAL
PMV BLD AUTO: 11.5 FL (ref 9.2–12.9)
POTASSIUM SERPL-SCNC: 4.2 MMOL/L (ref 3.5–5.1)
PROT SERPL-MCNC: 7 G/DL (ref 6–8.4)
RBC # BLD AUTO: 4.33 M/UL (ref 4–5.4)
SODIUM SERPL-SCNC: 138 MMOL/L (ref 136–145)
WBC # BLD AUTO: 10.01 K/UL (ref 3.9–12.7)

## 2020-10-02 PROCEDURE — 80053 COMPREHEN METABOLIC PANEL: CPT

## 2020-10-02 PROCEDURE — 83615 LACTATE (LD) (LDH) ENZYME: CPT

## 2020-10-02 PROCEDURE — 82232 ASSAY OF BETA-2 PROTEIN: CPT

## 2020-10-02 PROCEDURE — 80053 COMPREHEN METABOLIC PANEL: CPT | Mod: PN

## 2020-10-02 PROCEDURE — 85025 COMPLETE CBC W/AUTO DIFF WBC: CPT

## 2020-10-02 PROCEDURE — 85025 COMPLETE CBC W/AUTO DIFF WBC: CPT | Mod: PN

## 2020-10-02 PROCEDURE — 82306 VITAMIN D 25 HYDROXY: CPT | Mod: PN

## 2020-10-02 PROCEDURE — 82306 VITAMIN D 25 HYDROXY: CPT

## 2020-10-02 PROCEDURE — 83615 LACTATE (LD) (LDH) ENZYME: CPT | Mod: PN

## 2020-10-02 PROCEDURE — 36415 COLL VENOUS BLD VENIPUNCTURE: CPT | Mod: PN

## 2020-10-07 ENCOUNTER — LAB VISIT (OUTPATIENT)
Dept: LAB | Facility: HOSPITAL | Age: 80
End: 2020-10-07
Attending: INTERNAL MEDICINE
Payer: MEDICARE

## 2020-10-07 ENCOUNTER — OFFICE VISIT (OUTPATIENT)
Dept: HEMATOLOGY/ONCOLOGY | Facility: CLINIC | Age: 80
End: 2020-10-07
Payer: MEDICARE

## 2020-10-07 VITALS
DIASTOLIC BLOOD PRESSURE: 63 MMHG | HEART RATE: 95 BPM | SYSTOLIC BLOOD PRESSURE: 98 MMHG | TEMPERATURE: 96 F | HEIGHT: 65 IN | RESPIRATION RATE: 18 BRPM | WEIGHT: 203.94 LBS | BODY MASS INDEX: 33.98 KG/M2 | OXYGEN SATURATION: 95 %

## 2020-10-07 DIAGNOSIS — C82.90 FOLLICULAR LYMPHOMA, UNSPECIFIED FOLLICULAR LYMPHOMA TYPE, UNSPECIFIED BODY REGION: ICD-10-CM

## 2020-10-07 DIAGNOSIS — C82.90 FOLLICULAR LYMPHOMA, UNSPECIFIED FOLLICULAR LYMPHOMA TYPE, UNSPECIFIED BODY REGION: Primary | ICD-10-CM

## 2020-10-07 DIAGNOSIS — R61 NIGHT SWEAT: ICD-10-CM

## 2020-10-07 DIAGNOSIS — R63.0 LOSS OF APPETITE: ICD-10-CM

## 2020-10-07 DIAGNOSIS — R60.0 PEDAL EDEMA: ICD-10-CM

## 2020-10-07 DIAGNOSIS — N28.89 LEFT RENAL MASS: ICD-10-CM

## 2020-10-07 DIAGNOSIS — R53.83 FATIGUE, UNSPECIFIED TYPE: ICD-10-CM

## 2020-10-07 LAB
NT-PROBNP: 736 PG/ML (ref 5–1800)
T4 FREE SP9 P CHAL SERPL-SCNC: 1.43 NG/DL (ref 0.78–2.19)
TSH SERPL DL<=0.005 MIU/L-ACNC: 5.33 UIU/ML (ref 0.4–4)

## 2020-10-07 PROCEDURE — 84443 ASSAY THYROID STIM HORMONE: CPT | Mod: PN

## 2020-10-07 PROCEDURE — 1126F AMNT PAIN NOTED NONE PRSNT: CPT | Mod: S$GLB,,, | Performed by: INTERNAL MEDICINE

## 2020-10-07 PROCEDURE — 36415 COLL VENOUS BLD VENIPUNCTURE: CPT | Mod: PN

## 2020-10-07 PROCEDURE — 1126F PR PAIN SEVERITY QUANTIFIED, NO PAIN PRESENT: ICD-10-PCS | Mod: S$GLB,,, | Performed by: INTERNAL MEDICINE

## 2020-10-07 PROCEDURE — 84439 ASSAY OF FREE THYROXINE: CPT | Mod: PN

## 2020-10-07 PROCEDURE — 99999 PR PBB SHADOW E&M-EST. PATIENT-LVL IV: CPT | Mod: PBBFAC,,, | Performed by: INTERNAL MEDICINE

## 2020-10-07 PROCEDURE — 83880 ASSAY OF NATRIURETIC PEPTIDE: CPT

## 2020-10-07 PROCEDURE — 1159F PR MEDICATION LIST DOCUMENTED IN MEDICAL RECORD: ICD-10-PCS | Mod: S$GLB,,, | Performed by: INTERNAL MEDICINE

## 2020-10-07 PROCEDURE — 1159F MED LIST DOCD IN RCRD: CPT | Mod: S$GLB,,, | Performed by: INTERNAL MEDICINE

## 2020-10-07 PROCEDURE — 83880 ASSAY OF NATRIURETIC PEPTIDE: CPT | Mod: PN

## 2020-10-07 PROCEDURE — 3078F DIAST BP <80 MM HG: CPT | Mod: CPTII,S$GLB,, | Performed by: INTERNAL MEDICINE

## 2020-10-07 PROCEDURE — 84443 ASSAY THYROID STIM HORMONE: CPT

## 2020-10-07 PROCEDURE — 3074F SYST BP LT 130 MM HG: CPT | Mod: CPTII,S$GLB,, | Performed by: INTERNAL MEDICINE

## 2020-10-07 PROCEDURE — 1101F PR PT FALLS ASSESS DOC 0-1 FALLS W/OUT INJ PAST YR: ICD-10-PCS | Mod: CPTII,S$GLB,, | Performed by: INTERNAL MEDICINE

## 2020-10-07 PROCEDURE — 84439 ASSAY OF FREE THYROXINE: CPT

## 2020-10-07 PROCEDURE — 3078F PR MOST RECENT DIASTOLIC BLOOD PRESSURE < 80 MM HG: ICD-10-PCS | Mod: CPTII,S$GLB,, | Performed by: INTERNAL MEDICINE

## 2020-10-07 PROCEDURE — 99999 PR PBB SHADOW E&M-EST. PATIENT-LVL IV: ICD-10-PCS | Mod: PBBFAC,,, | Performed by: INTERNAL MEDICINE

## 2020-10-07 PROCEDURE — 99215 PR OFFICE/OUTPT VISIT, EST, LEVL V, 40-54 MIN: ICD-10-PCS | Mod: S$GLB,,, | Performed by: INTERNAL MEDICINE

## 2020-10-07 PROCEDURE — 1101F PT FALLS ASSESS-DOCD LE1/YR: CPT | Mod: CPTII,S$GLB,, | Performed by: INTERNAL MEDICINE

## 2020-10-07 PROCEDURE — 3074F PR MOST RECENT SYSTOLIC BLOOD PRESSURE < 130 MM HG: ICD-10-PCS | Mod: CPTII,S$GLB,, | Performed by: INTERNAL MEDICINE

## 2020-10-07 PROCEDURE — 99215 OFFICE O/P EST HI 40 MIN: CPT | Mod: S$GLB,,, | Performed by: INTERNAL MEDICINE

## 2020-10-07 NOTE — PROGRESS NOTES
History of present illness:  The patient is a 79-year-old white female who presents in consultation from Dr. Chavez regarding newly diagnosed non-Hodgkin's lymphoma.  Patient was in her usual state of health but developed severe/debilitating low pain and lumbar radiculopathy after scrubbing her screened porch.  An MRI was obtained and intra-abdominal lymphadenopathy as well as a complex left renal was noted.  Patient subsequently underwent dedicated CT scan of the abdomen pelvis as well as image guided needle biopsy of a right inguinal lymph node.  Patient was found have grade 1 follicular lymphoma.  As the patient was asymptomatic, she was placed on a watch wait strategy.    Patient returns to clinic for a 3 month re-evaluation.  Patient has been feeling poorly the last 2 months.  She began to experience dyspepsia and loss appetite.  She discontinued her vitamin-D supplement without improvement in symptoms.  She reports increase in night sweats/hot flashes.  This occurs no less than 5 times per night and causes her to awaken sleep.  She has episodes of the daytime as well, but these are less frequent.  Patient's weight is relatively stable.  She denies painful lymphadenopathy.  Patient is experiencing some central back pain but denies trauma or any palpable lesion in the area.  No other complaints for pertinent findings on a 14 point review systems.    Physical examination:  Well-developed, obese, elderly, white female, in no acute distress, who has a weight of 203.5 lb (decreased by 2 lb)  VITAL SIGNS: Documented  and reviewed this visit.  HEENT: Normocephalic, atraumatic. Oral mucosa pink and moist. Lips without   lesions. Tongue midline. Oropharynx clear. Nonicteric sclerae.   NECK: Supple, no adenopathy. No carotid bruits, thyromegaly or thyroid nodule.   HEART: Regular rate and rhythm without murmur, gallop or rub.   LUNGS:  Mild, bibasilar crackles bilaterally. Normal respiratory effort.   ABDOMEN: Soft,  nontender, nondistended with positive normoactive bowel sounds,   no hepatosplenomegaly.   EXTREMITIES: No cyanosis or clubbing appreciated.  Patient has 1+, bilateral ankle edema.  Distal pulses are intact.   AXILLAE AND GROIN:  Small, palpable left inguinal lymphadenopathy is appreciated.   SKIN: Intact/turgor normal   NEUROLOGIC: Cranial nerves II-XII grossly intact. Motor: Good muscle bulk and   tone. Strength/sensory 5/5 throughout. Gait stable.     Laboratory:  White count 10, hemoglobin 12.7, hematocrit 39.2, platelets 94, absolute neutrophil count 4200.  Sodium 138, potassium 4.2, chloride 106, CO2 26, BUN 30, creatinine 1.3, glucose 101, calcium 9.9, liver function test within normal limits, LDH is 549, GFR is 40.  Vitamin-D is depressed at 25.  Beta 2 microglobulin has risen to 8.8.    Impression:  1.  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  2.  Complex left renal mass.  3.  Vitamin-D deficiency.  4.  Worsening thrombocytopenia.  5.  Increasing hot flashes/night sweats.  6.  Dyspepsia/loss of appetite.  7.  Fatigue/malaise.  8.  Bilateral ankle edema.    Plan:  1.  Obtain additional laboratory to include TSH, free T4, and BNP.  2.  Obtain echocardiogram.  3.  Resume vitamin-D supplementation.  4.  Obtain CT PET scan for restaging of lymphoma.  5.  Return to clinic in earliest convenience to review results.    This note was created using voice recognition software and may contain grammatical errors.

## 2020-10-12 ENCOUNTER — TELEPHONE (OUTPATIENT)
Dept: HEMATOLOGY/ONCOLOGY | Facility: CLINIC | Age: 80
End: 2020-10-12

## 2020-10-12 ENCOUNTER — PATIENT MESSAGE (OUTPATIENT)
Dept: HEMATOLOGY/ONCOLOGY | Facility: CLINIC | Age: 80
End: 2020-10-12

## 2020-10-12 NOTE — TELEPHONE ENCOUNTER
Called patient per her request. Patient stated last time she  Had a PET scan she was very nervous and anxious when she got back to her car. Wanted to know if there was anything she can take for anxiety. Suggested Benadryl 25 mg 1 hour prior. Patient stated her son will be driving her so drowsiness from the Benadryl will not be a problem

## 2020-10-13 ENCOUNTER — HOSPITAL ENCOUNTER (OUTPATIENT)
Dept: RADIOLOGY | Facility: HOSPITAL | Age: 80
Discharge: HOME | End: 2020-10-13
Attending: INTERNAL MEDICINE
Payer: MEDICARE

## 2020-10-13 DIAGNOSIS — C82.90 FOLLICULAR LYMPHOMA, UNSPECIFIED FOLLICULAR LYMPHOMA TYPE, UNSPECIFIED BODY REGION: ICD-10-CM

## 2020-10-13 DIAGNOSIS — R60.0 PEDAL EDEMA: ICD-10-CM

## 2020-10-13 DIAGNOSIS — R63.0 LOSS OF APPETITE: ICD-10-CM

## 2020-10-13 DIAGNOSIS — N28.89 LEFT RENAL MASS: ICD-10-CM

## 2020-10-13 DIAGNOSIS — R53.83 FATIGUE, UNSPECIFIED TYPE: ICD-10-CM

## 2020-10-13 DIAGNOSIS — R61 NIGHT SWEAT: ICD-10-CM

## 2020-10-13 PROCEDURE — 78815 PET IMAGE W/CT SKULL-THIGH: CPT | Mod: TC,PO

## 2020-10-13 PROCEDURE — A9552 F18 FDG: HCPCS | Mod: PO

## 2020-10-13 PROCEDURE — 78815 NM PET CT ROUTINE: ICD-10-PCS | Mod: 26,PS,, | Performed by: RADIOLOGY

## 2020-10-13 PROCEDURE — 78815 PET IMAGE W/CT SKULL-THIGH: CPT | Mod: 26,PS,, | Performed by: RADIOLOGY

## 2020-10-14 ENCOUNTER — CLINICAL SUPPORT (OUTPATIENT)
Dept: CARDIOLOGY | Facility: CLINIC | Age: 80
End: 2020-10-14
Attending: INTERNAL MEDICINE
Payer: MEDICARE

## 2020-10-14 VITALS — HEIGHT: 65 IN | WEIGHT: 203 LBS | BODY MASS INDEX: 33.82 KG/M2

## 2020-10-14 DIAGNOSIS — R63.0 LOSS OF APPETITE: ICD-10-CM

## 2020-10-14 DIAGNOSIS — C82.90 FOLLICULAR LYMPHOMA, UNSPECIFIED FOLLICULAR LYMPHOMA TYPE, UNSPECIFIED BODY REGION: ICD-10-CM

## 2020-10-14 DIAGNOSIS — R60.0 PEDAL EDEMA: ICD-10-CM

## 2020-10-14 DIAGNOSIS — R61 NIGHT SWEAT: ICD-10-CM

## 2020-10-14 DIAGNOSIS — N28.89 LEFT RENAL MASS: ICD-10-CM

## 2020-10-14 DIAGNOSIS — R53.83 FATIGUE, UNSPECIFIED TYPE: ICD-10-CM

## 2020-10-14 PROCEDURE — 93356 ECHO (CUPID ONLY): ICD-10-PCS | Mod: S$GLB,,, | Performed by: INTERNAL MEDICINE

## 2020-10-14 PROCEDURE — 99999 PR PBB SHADOW E&M-EST. PATIENT-LVL I: ICD-10-PCS | Mod: PBBFAC,,,

## 2020-10-14 PROCEDURE — 93306 ECHO (CUPID ONLY): ICD-10-PCS | Mod: S$GLB,,, | Performed by: INTERNAL MEDICINE

## 2020-10-14 PROCEDURE — 93356 MYOCRD STRAIN IMG SPCKL TRCK: CPT | Mod: S$GLB,,, | Performed by: INTERNAL MEDICINE

## 2020-10-14 PROCEDURE — 99999 PR PBB SHADOW E&M-EST. PATIENT-LVL I: CPT | Mod: PBBFAC,,,

## 2020-10-14 PROCEDURE — 93306 TTE W/DOPPLER COMPLETE: CPT | Mod: S$GLB,,, | Performed by: INTERNAL MEDICINE

## 2020-10-15 LAB
ASCENDING AORTA: 3.16 CM
AV INDEX (PROSTH): 0.69
AV MEAN GRADIENT: 6 MMHG
AV PEAK GRADIENT: 12 MMHG
AV VALVE AREA: 2.43 CM2
AV VELOCITY RATIO: 0.65
BSA FOR ECHO PROCEDURE: 2.05 M2
CV ECHO LV RWT: 0.5 CM
DOP CALC AO PEAK VEL: 1.76 M/S
DOP CALC AO VTI: 35.84 CM
DOP CALC LVOT AREA: 3.5 CM2
DOP CALC LVOT DIAMETER: 2.11 CM
DOP CALC LVOT PEAK VEL: 1.15 M/S
DOP CALC LVOT STROKE VOLUME: 86.99 CM3
DOP CALCLVOT PEAK VEL VTI: 24.89 CM
E WAVE DECELERATION TIME: 240.44 MSEC
E/A RATIO: 0.8
E/E' RATIO: 13.2 M/S
ECHO LV POSTERIOR WALL: 1.12 CM (ref 0.6–1.1)
FRACTIONAL SHORTENING: 38 % (ref 28–44)
INTERVENTRICULAR SEPTUM: 1.12 CM (ref 0.6–1.1)
IVRT: 72.31 MSEC
LA MAJOR: 4.13 CM
LA MINOR: 4.38 CM
LA WIDTH: 4.39 CM
LEFT ATRIUM SIZE: 3.56 CM
LEFT ATRIUM VOLUME INDEX: 28.4 ML/M2
LEFT ATRIUM VOLUME: 56.48 CM3
LEFT INTERNAL DIMENSION IN SYSTOLE: 2.79 CM (ref 2.1–4)
LEFT VENTRICLE DIASTOLIC VOLUME INDEX: 45.92 ML/M2
LEFT VENTRICLE DIASTOLIC VOLUME: 91.41 ML
LEFT VENTRICLE MASS INDEX: 90 G/M2
LEFT VENTRICLE SYSTOLIC VOLUME INDEX: 14.7 ML/M2
LEFT VENTRICLE SYSTOLIC VOLUME: 29.34 ML
LEFT VENTRICULAR INTERNAL DIMENSION IN DIASTOLE: 4.48 CM (ref 3.5–6)
LEFT VENTRICULAR MASS: 178.27 G
LV LATERAL E/E' RATIO: 12.38 M/S
LV SEPTAL E/E' RATIO: 14.14 M/S
MV PEAK A VEL: 1.24 M/S
MV PEAK E VEL: 0.99 M/S
PISA MRMAX VEL: 0.05 M/S
PISA TR MAX VEL: 3.07 M/S
PULM VEIN S/D RATIO: 1.61
PV PEAK D VEL: 0.41 M/S
PV PEAK S VEL: 0.66 M/S
RA MAJOR: 4.31 CM
RA PRESSURE: 3 MMHG
RA WIDTH: 3.2 CM
RIGHT VENTRICULAR END-DIASTOLIC DIMENSION: 3.06 CM
SINUS: 2.75 CM
STJ: 2.24 CM
TDI LATERAL: 0.08 M/S
TDI SEPTAL: 0.07 M/S
TDI: 0.08 M/S
TR MAX PG: 38 MMHG
TRICUSPID ANNULAR PLANE SYSTOLIC EXCURSION: 2.48 CM
TV REST PULMONARY ARTERY PRESSURE: 41 MMHG

## 2020-10-19 ENCOUNTER — PATIENT MESSAGE (OUTPATIENT)
Dept: HEMATOLOGY/ONCOLOGY | Facility: CLINIC | Age: 80
End: 2020-10-19

## 2020-10-19 ENCOUNTER — TELEPHONE (OUTPATIENT)
Dept: HEMATOLOGY/ONCOLOGY | Facility: CLINIC | Age: 80
End: 2020-10-19

## 2020-10-19 NOTE — TELEPHONE ENCOUNTER
Called patient, Dr. Montero  Called out this afternoon on a family emergency. I will call patient to reschedule as soon as I know he will be back in the office.  Patient voiced understanding and appreciation

## 2020-10-20 ENCOUNTER — PATIENT MESSAGE (OUTPATIENT)
Dept: HEMATOLOGY/ONCOLOGY | Facility: CLINIC | Age: 80
End: 2020-10-20

## 2020-10-20 ENCOUNTER — OFFICE VISIT (OUTPATIENT)
Dept: HEMATOLOGY/ONCOLOGY | Facility: CLINIC | Age: 80
End: 2020-10-20
Payer: MEDICARE

## 2020-10-20 VITALS
BODY MASS INDEX: 31.99 KG/M2 | HEART RATE: 85 BPM | TEMPERATURE: 97 F | RESPIRATION RATE: 18 BRPM | SYSTOLIC BLOOD PRESSURE: 155 MMHG | HEIGHT: 65 IN | DIASTOLIC BLOOD PRESSURE: 74 MMHG | OXYGEN SATURATION: 96 % | WEIGHT: 192 LBS

## 2020-10-20 DIAGNOSIS — E55.9 VITAMIN D DEFICIENCY: ICD-10-CM

## 2020-10-20 DIAGNOSIS — R53.83 FATIGUE, UNSPECIFIED TYPE: ICD-10-CM

## 2020-10-20 DIAGNOSIS — R61 NIGHT SWEAT: ICD-10-CM

## 2020-10-20 DIAGNOSIS — C82.90 FOLLICULAR LYMPHOMA, UNSPECIFIED FOLLICULAR LYMPHOMA TYPE, UNSPECIFIED BODY REGION: Primary | ICD-10-CM

## 2020-10-20 DIAGNOSIS — N28.89 LEFT RENAL MASS: ICD-10-CM

## 2020-10-20 DIAGNOSIS — R63.0 LOSS OF APPETITE: ICD-10-CM

## 2020-10-20 PROCEDURE — 3078F DIAST BP <80 MM HG: CPT | Mod: CPTII,S$GLB,, | Performed by: INTERNAL MEDICINE

## 2020-10-20 PROCEDURE — 1159F MED LIST DOCD IN RCRD: CPT | Mod: S$GLB,,, | Performed by: INTERNAL MEDICINE

## 2020-10-20 PROCEDURE — 1101F PT FALLS ASSESS-DOCD LE1/YR: CPT | Mod: CPTII,S$GLB,, | Performed by: INTERNAL MEDICINE

## 2020-10-20 PROCEDURE — 99999 PR PBB SHADOW E&M-EST. PATIENT-LVL IV: CPT | Mod: PBBFAC,,, | Performed by: INTERNAL MEDICINE

## 2020-10-20 PROCEDURE — 1159F PR MEDICATION LIST DOCUMENTED IN MEDICAL RECORD: ICD-10-PCS | Mod: S$GLB,,, | Performed by: INTERNAL MEDICINE

## 2020-10-20 PROCEDURE — 99215 PR OFFICE/OUTPT VISIT, EST, LEVL V, 40-54 MIN: ICD-10-PCS | Mod: S$GLB,,, | Performed by: INTERNAL MEDICINE

## 2020-10-20 PROCEDURE — 99999 PR PBB SHADOW E&M-EST. PATIENT-LVL IV: ICD-10-PCS | Mod: PBBFAC,,, | Performed by: INTERNAL MEDICINE

## 2020-10-20 PROCEDURE — 3077F PR MOST RECENT SYSTOLIC BLOOD PRESSURE >= 140 MM HG: ICD-10-PCS | Mod: CPTII,S$GLB,, | Performed by: INTERNAL MEDICINE

## 2020-10-20 PROCEDURE — 1101F PR PT FALLS ASSESS DOC 0-1 FALLS W/OUT INJ PAST YR: ICD-10-PCS | Mod: CPTII,S$GLB,, | Performed by: INTERNAL MEDICINE

## 2020-10-20 PROCEDURE — 99215 OFFICE O/P EST HI 40 MIN: CPT | Mod: S$GLB,,, | Performed by: INTERNAL MEDICINE

## 2020-10-20 PROCEDURE — 1126F AMNT PAIN NOTED NONE PRSNT: CPT | Mod: S$GLB,,, | Performed by: INTERNAL MEDICINE

## 2020-10-20 PROCEDURE — 3077F SYST BP >= 140 MM HG: CPT | Mod: CPTII,S$GLB,, | Performed by: INTERNAL MEDICINE

## 2020-10-20 PROCEDURE — 1126F PR PAIN SEVERITY QUANTIFIED, NO PAIN PRESENT: ICD-10-PCS | Mod: S$GLB,,, | Performed by: INTERNAL MEDICINE

## 2020-10-20 PROCEDURE — 3078F PR MOST RECENT DIASTOLIC BLOOD PRESSURE < 80 MM HG: ICD-10-PCS | Mod: CPTII,S$GLB,, | Performed by: INTERNAL MEDICINE

## 2020-10-20 RX ORDER — SODIUM CHLORIDE 0.9 % (FLUSH) 0.9 %
10 SYRINGE (ML) INJECTION
Status: CANCELLED | OUTPATIENT
Start: 2020-11-05

## 2020-10-20 RX ORDER — CHOLECALCIFEROL (VITAMIN D3) 1250 MCG
1 TABLET ORAL
Qty: 12 TABLET | Refills: 3 | Status: SHIPPED | OUTPATIENT
Start: 2020-10-20 | End: 2021-12-10 | Stop reason: SDUPTHER

## 2020-10-20 RX ORDER — SODIUM CHLORIDE 9 MG/ML
INJECTION, SOLUTION INTRAVENOUS ONCE
Status: CANCELLED
Start: 2020-11-05

## 2020-10-20 RX ORDER — PROCHLORPERAZINE MALEATE 10 MG
10 TABLET ORAL EVERY 6 HOURS PRN
Qty: 60 TABLET | Refills: 5 | Status: SHIPPED | OUTPATIENT
Start: 2020-10-20 | End: 2021-07-07 | Stop reason: CLARIF

## 2020-10-20 RX ORDER — MEPERIDINE HYDROCHLORIDE 50 MG/ML
25 INJECTION INTRAMUSCULAR; INTRAVENOUS; SUBCUTANEOUS EVERY 30 MIN PRN
Status: CANCELLED | OUTPATIENT
Start: 2020-11-05

## 2020-10-20 RX ORDER — FAMOTIDINE 10 MG/ML
20 INJECTION INTRAVENOUS
Status: CANCELLED | OUTPATIENT
Start: 2020-11-05

## 2020-10-20 RX ORDER — ACETAMINOPHEN 500 MG
1000 TABLET ORAL
Status: CANCELLED | OUTPATIENT
Start: 2020-11-05

## 2020-10-20 RX ORDER — HEPARIN 100 UNIT/ML
500 SYRINGE INTRAVENOUS
Status: CANCELLED | OUTPATIENT
Start: 2020-11-05

## 2020-10-20 NOTE — PLAN OF CARE
START OFF PATHWAY REGIMEN - Lymphoma and CLL            Rituximab-xxxx       Rituximab and hyaluronidase human (Rituxan Hycela)       Cyclophosphamide (Cytoxan)       Doxorubicin (Adriamycin)       Vincristine (Oncovin)       Prednisone           Additional Orders: Hepatitis B and C screening recommended prior to   initiation of rituximab. Patients must successfully receive one full dose of IV   rituximab prior to subcutaneous dosing. LVEF assessment prior to initiation of   doxorubicin and as clinically indicated. Max recommended cumulative dose of   doxorubicin = 450 mg/m2. Ref: Sheree M et al. Blood. 2004;104(3):626-633   and 516-641.    **Always confirm dose/schedule in your pharmacy ordering system**    Patient Characteristics:  Follicular Lymphoma, Grades 1, 2, and 3A, First Line, Stage III / IV,   Symptomatic or Bulky Disease  Disease Type: Follicular Lymphoma, Grade 1, 2, or 3A  Disease Type: Not Applicable  Disease Type: Not Applicable  [place] Stage: IV  Line of Therapy: First Line  Disease Characteristics: Symptomatic or Bulky Disease  Intent of Therapy:  Non-Curative / Palliative Intent, Discussed with Patient

## 2020-10-20 NOTE — PROGRESS NOTES
History of present illness:  The patient is a 79-year-old white female who presents in consultation from Dr. Chavez regarding newly diagnosed non-Hodgkin's lymphoma.  Patient was in her usual state of health but developed severe/debilitating low pain and lumbar radiculopathy after scrubbing her screened porch.  An MRI was obtained and intra-abdominal lymphadenopathy as well as a complex left renal was noted.  Patient subsequently underwent dedicated CT scan of the abdomen pelvis as well as image guided needle biopsy of a right inguinal lymph node.  Patient was found have grade 1 follicular lymphoma.  As the patient was asymptomatic, she was placed on a watch wait strategy.    Patient returns to clinic for a 3 month re-evaluation.  Patient has been feeling poorly the last 2 months.  She began to experience dyspepsia and loss appetite.  She discontinued her vitamin-D supplement without improvement in symptoms.  She reports increase in night sweats/hot flashes.  This occurs no less than 5 times per night and causes her to awaken sleep.  She has episodes of the daytime as well, but these are less frequent.  Patient's weight is relatively stable.  She denies painful lymphadenopathy.  Patient is experiencing some central back pain but denies trauma or any palpable lesion in the area.      Returns to review results of interval workup.  Sweats are increasing and patient continues to lose weight.  No other complaints for pertinent findings on a 14 point review systems.    Physical examination:  Well-developed, obese, elderly, white female, in no acute distress, who has a weight of 203.5 lb (decreased by 2 lb)  VITAL SIGNS: Documented  and reviewed this visit.  HEENT: Normocephalic, atraumatic. Oral mucosa pink and moist. Lips without   lesions. Tongue midline. Oropharynx clear. Nonicteric sclerae.   NECK: Supple, bilateral supraclavicular adenopathy right internal jugular chain lymphadenopathy.   HEART: Regular rate and  rhythm without murmur, gallop or rub.   LUNGS:  Mild, bibasilar crackles bilaterally. Normal respiratory effort.   ABDOMEN: Soft, nontender, nondistended with positive normoactive bowel sounds,   no hepatosplenomegaly.   EXTREMITIES: No cyanosis or clubbing appreciated.  Patient has 1+, bilateral ankle edema.  Distal pulses are intact.   AXILLAE AND GROIN:  Small, palpable left inguinal lymphadenopathy is appreciated.   SKIN: Intact/turgor normal.  Palpable subcutaneous nodules about the abdominal wall consistent with lymph node involvement.   NEUROLOGIC: Cranial nerves II-XII grossly intact. Motor: Good muscle bulk and   tone. Strength/sensory 5/5 throughout. Gait stable.     Laboratory:  White count 10, hemoglobin 12.7, hematocrit 39.2, platelets 94, absolute neutrophil count 4200.  Sodium 138, potassium 4.2, chloride 106, CO2 26, BUN 30, creatinine 1.3, glucose 101, calcium 9.9, liver function test within normal limits, LDH is 549, GFR is 40.  Vitamin-D is depressed at 25.  Beta 2 microglobulin has risen to 8.8.  .  TSH 5.330, free T4 1.43.    Echocardiogram:  Preserved left ventricular ejection fraction of 60%    CT PET:  Neck: Extensive hypermetabolic adenopathy is again noted throughout the neck in the internal jugular, submandibular, posterior cervical triangle eri basins.  All nodes have increased in size, and the level of FDG uptake has increased in some and decreased in others compared to the prior PET-CT examination.  Please note that the level of background activity in mediastinal blood pool is lower on this examination than the previous, which may influence the SUV values.     Previously reported reference lesions are as follows:     The right posterior cervical triangle lymph node on series 3, image 16 that previously had a short axis diameter of 16 mm currently has a short axis diameter of 18 mm.  The SUV max currently is 7.28, and previously was 8.9.     The left supraclavicular lymph node  on image 39 has a short axis diameter of 17 mm, previously 14 mm.  Current SUV max is 6.75, previously 5.37.     Chest: There is again extensive hypermetabolic mediastinal, hilar, axillary adenopathy.  There is again left subpectoral and bilateral internal mammary adenopathy as well as enlarged epiphrenic lymph nodes.     Left axillary lymph node measures 3.0 x 3.0 cm series 3, image 61, previously 2.6 x 2.4 cm.  Current SUV max is 8.1, previously 9.1.     Precarinal lymph node conglomerate measures 2.0 x 2.9 cm image 61, previously 2.2 x 2.5 cm.  Current SUV max is 6.81, was 7.5.     Subcutaneous nodule or node anterior to left sternoclavicular joint is unchanged measuring 16 x 14 mm series 3, image 45, SUV max 4.4, unchanged.     Mild aneurysmal dilatation of the thoracic aorta is noted with extensive calcific plaque, unchanged.  Coronary artery calcifications are noted.  There are no pleural effusions.     The nodule in the posterior right lung apex measures 18 x 20 mm on image 50, previously 15 x 18 mm.  SUV max is 7.3, previously 6.8.     8 mm right upper lobe nodule image 56 previously measured 7 mm.  No significant FDG uptake noted.  This has been stable on multiple prior exams and is likely benign.     There are emphysematous bulla in the lungs.  No new lung nodules are present.     Hypermetabolic nodule subcutaneous fat right upper arm again noted on the margin of the field of view, similar to prior exam.     Surgical clips noted right axilla, as well as postsurgical changes of possible mastectomy and flap reconstruction right breast.     Abdomen/pelvis: The spleen is again noted to be enlarged and demonstrates diffusely increased FDG uptake, SUV max 4.0, unchanged.     There is extensive hypermetabolic abdominal and pelvic adenopathy extending from the diaphragm into the inguinal eri basins.  Sizes of the lymph nodes have increased compared to the previous PET-CT.  Reference measurements include the  following:     Left inguinal lymph node 31 x 27 mm, previously 28 x 23 mm image 210.  SUV max 8.3, previously 9.2.     Right inguinal lymph node image 184 52 x 24 mm, previously 46 x 21 mm.  Current SUV max 8.67, previously 11.8.     Left common iliac eri conglomerate image 164, 57 x 38 mm, previously 47 x 30 mm.  SUV max 9.03, previously 11.9.     The individual lymph nodes in the retroperitoneum and have coalesced to form a mantle of retroperitoneal adenopathy encasing the aorta and IVC, measuring up to 10.1 cm in diameter on image 138 with an SUV max of 9.35.     There is diffuse aneurysmal dilatation of the aorta which is heavily calcified but unchanged.  Maximum diameter is 3.2 x 3.3 cm distally.     Urinary bladder unremarkable.  Numerous colonic diverticula again noted.  Gallbladder surgically absent.  Nonobstructing 7 mm left lower pole renal calculus noted.  2.1 cm left lateral midpole renal cyst noted.  Ill-defined area of decreased attenuation again noted at the anterior midpole of the right kidney image 125.  The level FDG uptake in this location is similar to normal renal cortex.  Possible renal mass has been described in this location on previous cross-sectional imaging.     Liver, adrenal glands, pancreas grossly normal.  Fat containing left lower quadrant ventral hernia again seen.     There is no ascites.     3.5 cm relatively hyperattenuating mass noted on the perineum to the right of the vaginal introitus without FDG uptake, image 217.  This may be a large Bartholin cyst.     Bones: Bone marrow activity appears slightly heterogeneous, but no discrete focus of increased activity is noted.  There are extensive degenerative changes in the spine.  In addition, there is evidence of previous discectomy and posterior fusion in the lumbar spine.    Impression:  1.  Grade 1 follicular non-Hodgkin's lymphoma - Stage Riley-progressive/symptomatic.  2.  Complex left renal mass.  3.  Vitamin-D deficiency.  4.   Worsening thrombocytopenia.  5.  Increasing hot flashes/night sweats.  6.  Dyspepsia/loss of appetite.  7.  Fatigue/malaise.  8.  Bilateral ankle edema.    Plan:  1.  Consult Dr. Pretty for implantation of MediPort catheter.  2.  Arrange consultations with financial counselor, , and dietitian.  3.  Start renal dose allopurinol 100 mg p.o. daily.  4.  Obtain hepatitis panel  5.  Have patient return to clinic to initiate systemic chemotherapy consisting of CVP-R with a planned duration of 6 months therapy.  6.  Patient will receive DP 10/0.25 and as needed Compazine for control acute and delayed emesis around the cycle of therapy.  7.  Patient will receive typical Tylenol/Benadryl/Pepcid/Demerol supportive medications around Rituxan administration.  8.  Patient is to return to clinic 3 weeks from initiation of therapy with interval CBC, CMP, LDH, magnesium, and beta 2 microglobulin and which time will proceed with cycle 2 therapy.  9.  Patient is to continue weekly cholecalciferol for correction of vitamin-D deficiency.  10.  40 min of contact time have been spent counseling the patient regarding progressive nature of her lymphoma, rationale for use of systemic therapy, antineoplastic/supportive care meds to be employed in therapy, aspects of their administration, potential side effects associated with therapy, interventions for such side effects, etcetera.  She voices understanding wishes to proceed as above.    This note was created using voice recognition software and may contain grammatical errors.

## 2020-10-20 NOTE — Clinical Note
Needs hepatitis panel.  Refer to Dr. nolasco for implantation of a MediPort catheter.  Arrange consultations with , dietitian, and financial counselor.  Schedule chemotherapy teaching  Start allopurinol 100 mg p.o. daily now.  Script has been written.  Return to clinic for cycle 1 of chemotherapy at earliest convenience.  I will re-evaluate the patient prior to cycle 2 with interval CBC, CMP, LDH, magnesium, and beta 2 microglobulin.

## 2020-10-21 ENCOUNTER — PATIENT MESSAGE (OUTPATIENT)
Dept: HEMATOLOGY/ONCOLOGY | Facility: CLINIC | Age: 80
End: 2020-10-21

## 2020-10-21 ENCOUNTER — LAB VISIT (OUTPATIENT)
Dept: LAB | Facility: HOSPITAL | Age: 80
End: 2020-10-21
Attending: INTERNAL MEDICINE
Payer: MEDICARE

## 2020-10-21 DIAGNOSIS — C82.90 FOLLICULAR LYMPHOMA, UNSPECIFIED FOLLICULAR LYMPHOMA TYPE, UNSPECIFIED BODY REGION: ICD-10-CM

## 2020-10-21 DIAGNOSIS — R63.0 LOSS OF APPETITE: ICD-10-CM

## 2020-10-21 DIAGNOSIS — R53.83 FATIGUE, UNSPECIFIED TYPE: ICD-10-CM

## 2020-10-21 DIAGNOSIS — R61 NIGHT SWEAT: ICD-10-CM

## 2020-10-21 DIAGNOSIS — N28.89 LEFT RENAL MASS: ICD-10-CM

## 2020-10-21 DIAGNOSIS — E55.9 VITAMIN D DEFICIENCY: ICD-10-CM

## 2020-10-21 LAB
HAV IGM SERPL QL IA: NEGATIVE
HBV CORE IGM SERPL QL IA: NEGATIVE
HBV SURFACE AG SERPL QL IA: NEGATIVE
HCV AB SERPL QL IA: NEGATIVE

## 2020-10-21 PROCEDURE — 80074 ACUTE HEPATITIS PANEL: CPT

## 2020-10-21 PROCEDURE — 36415 COLL VENOUS BLD VENIPUNCTURE: CPT | Mod: PN

## 2020-10-21 PROCEDURE — 80074 ACUTE HEPATITIS PANEL: CPT | Mod: PN

## 2020-10-22 ENCOUNTER — PATIENT MESSAGE (OUTPATIENT)
Dept: HEMATOLOGY/ONCOLOGY | Facility: CLINIC | Age: 80
End: 2020-10-22

## 2020-10-23 ENCOUNTER — TELEPHONE (OUTPATIENT)
Dept: SURGERY | Facility: CLINIC | Age: 80
End: 2020-10-23

## 2020-10-23 ENCOUNTER — TELEPHONE (OUTPATIENT)
Dept: HEMATOLOGY/ONCOLOGY | Facility: CLINIC | Age: 80
End: 2020-10-23

## 2020-10-23 DIAGNOSIS — C82.00 FOLLICULAR LYMPHOMA GRADE I, UNSPECIFIED BODY REGION: Primary | ICD-10-CM

## 2020-10-23 RX ORDER — OMEPRAZOLE 40 MG/1
40 CAPSULE, DELAYED RELEASE ORAL DAILY
Qty: 30 CAPSULE | Refills: 11 | Status: SHIPPED | OUTPATIENT
Start: 2020-10-23 | End: 2021-09-15 | Stop reason: SDUPTHER

## 2020-10-23 RX ORDER — ALLOPURINOL 100 MG/1
100 TABLET ORAL DAILY
Qty: 90 TABLET | Refills: 0 | Status: SHIPPED | OUTPATIENT
Start: 2020-10-23 | End: 2020-12-09

## 2020-10-23 NOTE — TELEPHONE ENCOUNTER
----- Message from Karli Granger sent at 10/23/2020  1:57 PM CDT -----  Contact: [phone]  Type:  Needs Medical Advice    Who Called: PT  Reason for Call: pt would like to know directions to the Dr Office and what building he is in  Would the patient rather a call back or a response via [facility]?  Call back  Best Call Back Number: [phone]

## 2020-10-23 NOTE — TELEPHONE ENCOUNTER
Spoke with Lynne, clarified Prednisone 50 mg, 2 tab daily on days 1-5 of chemotherapy, #10, refill 5    ----- Message from Darek Yeh sent at 10/23/2020  1:34 PM CDT -----  Type:  Pharmacy Calling to Clarify an RX    Name of Caller:  Jessica  Pharmacy Name:        [facility] [place] [id number] [facility] [address]  Phone: [phone] Fax: [phone]      Prescription Name:  Prednisone 50 MG  What do they need to clarify?:  Illegible and VOID written everywhere  Best Call Back Number:   [phone]  Additional Information:

## 2020-10-23 NOTE — TELEPHONE ENCOUNTER
"----- Message from Neha Caro RN sent at 10/23/2020 10:44 AM CDT -----  Regarding: port placemetn  Dr. Montero would like a port placed?  Could you call the patient to schedule?    Thank you for choosing [facility], Pamela "Kaye" ANGELES Caro, MSN, RN  [facility] RN Navigator  [email or web address]   [phone] (phone)  [phone] (fax)            "

## 2020-10-24 ENCOUNTER — PATIENT MESSAGE (OUTPATIENT)
Dept: PAIN MEDICINE | Facility: CLINIC | Age: 80
End: 2020-10-24

## 2020-10-26 ENCOUNTER — PATIENT MESSAGE (OUTPATIENT)
Dept: PAIN MEDICINE | Facility: CLINIC | Age: 80
End: 2020-10-26

## 2020-10-26 ENCOUNTER — PATIENT MESSAGE (OUTPATIENT)
Dept: HEMATOLOGY/ONCOLOGY | Facility: CLINIC | Age: 80
End: 2020-10-26

## 2020-10-26 DIAGNOSIS — C82.01 GRADE 1 FOLLICULAR LYMPHOMA OF LYMPH NODES OF NECK: Primary | ICD-10-CM

## 2020-10-27 ENCOUNTER — LAB VISIT (OUTPATIENT)
Dept: LAB | Facility: HOSPITAL | Age: 80
End: 2020-10-27
Attending: INTERNAL MEDICINE
Payer: MEDICARE

## 2020-10-27 ENCOUNTER — OFFICE VISIT (OUTPATIENT)
Dept: PAIN MEDICINE | Facility: CLINIC | Age: 80
End: 2020-10-27
Payer: MEDICARE

## 2020-10-27 VITALS
OXYGEN SATURATION: 96 % | TEMPERATURE: 98 F | DIASTOLIC BLOOD PRESSURE: 62 MMHG | RESPIRATION RATE: 20 BRPM | BODY MASS INDEX: 31.92 KG/M2 | HEART RATE: 80 BPM | SYSTOLIC BLOOD PRESSURE: 136 MMHG | WEIGHT: 191.81 LBS

## 2020-10-27 DIAGNOSIS — C82.90 FOLLICULAR LYMPHOMA, UNSPECIFIED FOLLICULAR LYMPHOMA TYPE, UNSPECIFIED BODY REGION: ICD-10-CM

## 2020-10-27 DIAGNOSIS — E55.9 VITAMIN D DEFICIENCY: ICD-10-CM

## 2020-10-27 DIAGNOSIS — N28.89 LEFT RENAL MASS: ICD-10-CM

## 2020-10-27 DIAGNOSIS — R63.0 LOSS OF APPETITE: ICD-10-CM

## 2020-10-27 DIAGNOSIS — R53.83 FATIGUE, UNSPECIFIED TYPE: ICD-10-CM

## 2020-10-27 DIAGNOSIS — M48.061 SPINAL STENOSIS OF LUMBAR REGION WITHOUT NEUROGENIC CLAUDICATION: ICD-10-CM

## 2020-10-27 DIAGNOSIS — M54.16 LUMBAR RADICULOPATHY: Primary | ICD-10-CM

## 2020-10-27 DIAGNOSIS — R61 NIGHT SWEAT: ICD-10-CM

## 2020-10-27 LAB
ANISOCYTOSIS BLD QL SMEAR: SLIGHT
BASOPHILS # BLD AUTO: 0.07 K/UL (ref 0–0.2)
BASOPHILS NFR BLD: 0.6 % (ref 0–1.9)
DIFFERENTIAL METHOD: ABNORMAL
EOSINOPHIL # BLD AUTO: 0.2 K/UL (ref 0–0.5)
EOSINOPHIL NFR BLD: 2 % (ref 0–8)
ERYTHROCYTE [DISTWIDTH] IN BLOOD BY AUTOMATED COUNT: 15.2 % (ref 11.5–14.5)
HCT VFR BLD AUTO: 35.8 % (ref 37–48.5)
HGB BLD-MCNC: 11.9 G/DL (ref 12–16)
IMM GRANULOCYTES # BLD AUTO: 0.18 K/UL (ref 0–0.04)
IMM GRANULOCYTES NFR BLD AUTO: 1.5 % (ref 0–0.5)
LYMPHOCYTES # BLD AUTO: 5.3 K/UL (ref 1–4.8)
LYMPHOCYTES NFR BLD: 45.1 % (ref 18–48)
MCH RBC QN AUTO: 29.8 PG (ref 27–31)
MCHC RBC AUTO-ENTMCNC: 33.2 G/DL (ref 32–36)
MCV RBC AUTO: 90 FL (ref 82–98)
MONOCYTES # BLD AUTO: 0.8 K/UL (ref 0.3–1)
MONOCYTES NFR BLD: 6.9 % (ref 4–15)
NEUTROPHILS # BLD AUTO: 5.2 K/UL (ref 1.8–7.7)
NEUTROPHILS NFR BLD: 43.9 % (ref 38–73)
NRBC BLD-RTO: 0 /100 WBC
PLATELET # BLD AUTO: 101 K/UL (ref 150–350)
PLATELET BLD QL SMEAR: ABNORMAL
PMV BLD AUTO: 10.6 FL (ref 9.2–12.9)
RBC # BLD AUTO: 4 M/UL (ref 4–5.4)
WBC # BLD AUTO: 11.84 K/UL (ref 3.9–12.7)

## 2020-10-27 PROCEDURE — 99999 PR PBB SHADOW E&M-EST. PATIENT-LVL IV: ICD-10-PCS | Mod: PBBFAC,,, | Performed by: PHYSICIAN ASSISTANT

## 2020-10-27 PROCEDURE — 3288F FALL RISK ASSESSMENT DOCD: CPT | Mod: CPTII,S$GLB,, | Performed by: PHYSICIAN ASSISTANT

## 2020-10-27 PROCEDURE — 99999 PR PBB SHADOW E&M-EST. PATIENT-LVL IV: CPT | Mod: PBBFAC,,, | Performed by: PHYSICIAN ASSISTANT

## 2020-10-27 PROCEDURE — 3075F SYST BP GE 130 - 139MM HG: CPT | Mod: CPTII,S$GLB,, | Performed by: PHYSICIAN ASSISTANT

## 2020-10-27 PROCEDURE — 1159F PR MEDICATION LIST DOCUMENTED IN MEDICAL RECORD: ICD-10-PCS | Mod: S$GLB,,, | Performed by: PHYSICIAN ASSISTANT

## 2020-10-27 PROCEDURE — 1125F PR PAIN SEVERITY QUANTIFIED, PAIN PRESENT: ICD-10-PCS | Mod: S$GLB,,, | Performed by: PHYSICIAN ASSISTANT

## 2020-10-27 PROCEDURE — 99214 PR OFFICE/OUTPT VISIT, EST, LEVL IV, 30-39 MIN: ICD-10-PCS | Mod: S$GLB,,, | Performed by: PHYSICIAN ASSISTANT

## 2020-10-27 PROCEDURE — 1100F PTFALLS ASSESS-DOCD GE2>/YR: CPT | Mod: CPTII,S$GLB,, | Performed by: PHYSICIAN ASSISTANT

## 2020-10-27 PROCEDURE — 3075F PR MOST RECENT SYSTOLIC BLOOD PRESS GE 130-139MM HG: ICD-10-PCS | Mod: CPTII,S$GLB,, | Performed by: PHYSICIAN ASSISTANT

## 2020-10-27 PROCEDURE — 3078F DIAST BP <80 MM HG: CPT | Mod: CPTII,S$GLB,, | Performed by: PHYSICIAN ASSISTANT

## 2020-10-27 PROCEDURE — 1125F AMNT PAIN NOTED PAIN PRSNT: CPT | Mod: S$GLB,,, | Performed by: PHYSICIAN ASSISTANT

## 2020-10-27 PROCEDURE — 1159F MED LIST DOCD IN RCRD: CPT | Mod: S$GLB,,, | Performed by: PHYSICIAN ASSISTANT

## 2020-10-27 PROCEDURE — 99214 OFFICE O/P EST MOD 30 MIN: CPT | Mod: S$GLB,,, | Performed by: PHYSICIAN ASSISTANT

## 2020-10-27 PROCEDURE — 3078F PR MOST RECENT DIASTOLIC BLOOD PRESSURE < 80 MM HG: ICD-10-PCS | Mod: CPTII,S$GLB,, | Performed by: PHYSICIAN ASSISTANT

## 2020-10-27 PROCEDURE — 1100F PR PT FALLS ASSESS DOC 2+ FALLS/FALL W/INJURY/YR: ICD-10-PCS | Mod: CPTII,S$GLB,, | Performed by: PHYSICIAN ASSISTANT

## 2020-10-27 PROCEDURE — 36415 COLL VENOUS BLD VENIPUNCTURE: CPT | Mod: PO

## 2020-10-27 PROCEDURE — 3288F PR FALLS RISK ASSESSMENT DOCUMENTED: ICD-10-PCS | Mod: CPTII,S$GLB,, | Performed by: PHYSICIAN ASSISTANT

## 2020-10-27 PROCEDURE — 85025 COMPLETE CBC W/AUTO DIFF WBC: CPT | Mod: PO

## 2020-10-28 ENCOUNTER — TELEPHONE (OUTPATIENT)
Dept: PAIN MEDICINE | Facility: CLINIC | Age: 80
End: 2020-10-28

## 2020-10-28 DIAGNOSIS — Z13.9 SCREENING PROCEDURE: ICD-10-CM

## 2020-10-28 DIAGNOSIS — M54.16 LUMBAR RADICULOPATHY: Primary | ICD-10-CM

## 2020-10-28 RX ORDER — ALPRAZOLAM 0.5 MG/1
1 TABLET, ORALLY DISINTEGRATING ORAL ONCE AS NEEDED
Status: CANCELLED | OUTPATIENT
Start: 2020-11-10 | End: 2032-04-07

## 2020-10-28 NOTE — TELEPHONE ENCOUNTER
Spoke with patient and the lumbar injection has been scheduled for 11/10. Pre-op instruction reviewed with patient.

## 2020-10-28 NOTE — TELEPHONE ENCOUNTER
Please let the patient know that I received her blood work and her platelets have improved to the point where we can schedule an injection for her.  I have reviewed her case with Dr. Baeza and please schedule a left L3/4 transforaminal epidural steroid injection as soon as possible.

## 2020-10-29 ENCOUNTER — PATIENT MESSAGE (OUTPATIENT)
Dept: HEMATOLOGY/ONCOLOGY | Facility: CLINIC | Age: 80
End: 2020-10-29

## 2020-10-29 ENCOUNTER — TELEPHONE (OUTPATIENT)
Dept: HEMATOLOGY/ONCOLOGY | Facility: CLINIC | Age: 80
End: 2020-10-29

## 2020-10-29 ENCOUNTER — OFFICE VISIT (OUTPATIENT)
Dept: SURGERY | Facility: CLINIC | Age: 80
End: 2020-10-29
Payer: MEDICARE

## 2020-10-29 ENCOUNTER — LAB VISIT (OUTPATIENT)
Dept: LAB | Facility: HOSPITAL | Age: 80
End: 2020-10-29
Attending: NURSE PRACTITIONER
Payer: MEDICARE

## 2020-10-29 VITALS
WEIGHT: 201.94 LBS | DIASTOLIC BLOOD PRESSURE: 65 MMHG | BODY MASS INDEX: 33.65 KG/M2 | SYSTOLIC BLOOD PRESSURE: 129 MMHG | HEIGHT: 65 IN | HEART RATE: 81 BPM | TEMPERATURE: 98 F

## 2020-10-29 DIAGNOSIS — Z01.818 PREOP EXAMINATION: ICD-10-CM

## 2020-10-29 DIAGNOSIS — C82.01 GRADE 1 FOLLICULAR LYMPHOMA OF LYMPH NODES OF NECK: Primary | ICD-10-CM

## 2020-10-29 DIAGNOSIS — C82.01 GRADE 1 FOLLICULAR LYMPHOMA OF LYMPH NODES OF NECK: ICD-10-CM

## 2020-10-29 PROCEDURE — 99213 PR OFFICE/OUTPT VISIT, EST, LEVL III, 20-29 MIN: ICD-10-PCS | Mod: S$GLB,,, | Performed by: SURGERY

## 2020-10-29 PROCEDURE — 99999 PR PBB SHADOW E&M-EST. PATIENT-LVL V: ICD-10-PCS | Mod: PBBFAC,,, | Performed by: SURGERY

## 2020-10-29 PROCEDURE — 99213 OFFICE O/P EST LOW 20 MIN: CPT | Mod: S$GLB,,, | Performed by: SURGERY

## 2020-10-29 PROCEDURE — 36415 COLL VENOUS BLD VENIPUNCTURE: CPT | Mod: PO

## 2020-10-29 PROCEDURE — 86704 HEP B CORE ANTIBODY TOTAL: CPT

## 2020-10-29 PROCEDURE — 3078F DIAST BP <80 MM HG: CPT | Mod: CPTII,S$GLB,, | Performed by: SURGERY

## 2020-10-29 PROCEDURE — 3074F PR MOST RECENT SYSTOLIC BLOOD PRESSURE < 130 MM HG: ICD-10-PCS | Mod: CPTII,S$GLB,, | Performed by: SURGERY

## 2020-10-29 PROCEDURE — 3078F PR MOST RECENT DIASTOLIC BLOOD PRESSURE < 80 MM HG: ICD-10-PCS | Mod: CPTII,S$GLB,, | Performed by: SURGERY

## 2020-10-29 PROCEDURE — 3074F SYST BP LT 130 MM HG: CPT | Mod: CPTII,S$GLB,, | Performed by: SURGERY

## 2020-10-29 PROCEDURE — 99999 PR PBB SHADOW E&M-EST. PATIENT-LVL V: CPT | Mod: PBBFAC,,, | Performed by: SURGERY

## 2020-10-29 RX ORDER — SODIUM CHLORIDE 9 MG/ML
INJECTION, SOLUTION INTRAVENOUS CONTINUOUS
Status: CANCELLED | OUTPATIENT
Start: 2020-10-29

## 2020-10-29 RX ORDER — ONDANSETRON 4 MG/1
8 TABLET, ORALLY DISINTEGRATING ORAL EVERY 8 HOURS PRN
Status: CANCELLED | OUTPATIENT
Start: 2020-10-29

## 2020-10-29 NOTE — H&P (VIEW-ONLY)
Subjective:       Patient ID: Shandra Velez is a 80 y.o. female.    Chief Complaint: Consult (Port placement)    HPI  Pleasant 79 yo F referred to me in consultation from Dr Montero for evaluation of port placement. Pt notes that she was recently diagnosed with lymphoma.  She will need chemotherapy to treat and has been advised to have port.  She notes history of R sided breast cancer and is s/p mastectomy.  Denies eri dissection.  Pt has never had dialysis.  Never had other surgery of her chest.  PT has PMhx significant for HTN.  No o  Review of Systems   Constitutional: Negative for activity change, appetite change, fever and unexpected weight change.   Respiratory: Negative for chest tightness, shortness of breath and wheezing.    Cardiovascular: Negative for chest pain.   Gastrointestinal: Negative for abdominal distention, abdominal pain, anal bleeding, blood in stool, constipation, diarrhea, nausea, rectal pain and vomiting.   Genitourinary: Negative for difficulty urinating, dysuria and frequency.   Integumentary:  Negative for wound.   Neurological: Negative for dizziness.   Hematological: Negative for adenopathy.   Psychiatric/Behavioral: Negative for agitation.         Objective:      Physical Exam  Vitals signs reviewed.   Constitutional:       Appearance: She is well-developed.   HENT:      Head: Normocephalic and atraumatic.   Eyes:      Pupils: Pupils are equal, round, and reactive to light.   Neck:      Musculoskeletal: Normal range of motion and neck supple.      Thyroid: No thyromegaly.      Trachea: No tracheal deviation.   Cardiovascular:      Rate and Rhythm: Normal rate and regular rhythm.   Pulmonary:      Effort: Pulmonary effort is normal. No respiratory distress.   Chest:      Chest wall: No tenderness.   Abdominal:      General: Bowel sounds are normal. There is no distension or abdominal bruit.      Palpations: Abdomen is soft. Abdomen is not rigid. There is no mass or pulsatile mass.       Tenderness: There is no abdominal tenderness. There is no guarding or rebound. Negative signs include Del Cid's sign and McBurney's sign.      Hernia: No hernia is present. There is no hernia in the ventral area.   Genitourinary:     Rectum: Normal.   Musculoskeletal: Normal range of motion.   Skin:     General: Skin is warm.      Findings: No erythema or rash.   Neurological:      Mental Status: She is alert and oriented to person, place, and time.         Assessment:       1. 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 Preop examination        Plan:         D/w pt.  I have explained to her the risks of port placement.  She understands risks and desires to proceed with port placement.  This has been scheduled for next week.  informed consent obtained.  S\

## 2020-10-29 NOTE — TELEPHONE ENCOUNTER
S/w pt re: need to reschedule teaching due to no power at the cancer center.  Verb agreement.  States she is getting port placed next Wednesday 11/4.

## 2020-10-29 NOTE — LETTER
October 30, 2020      Rod Montero MD  [address] [facility] [address]  [facility] [address]           [facility]  [address] [place] [address]  Phone: [phone]          Patient: Shandra Velez   MR Number: [id number]   YOB: 1940   Date of Visit: 10/29/2020       Dear Dr. Rod Montero:    Thank you for referring Shandra Velez to me for evaluation. Attached you will find relevant portions of my assessment and plan of care.    If you have questions, please do not hesitate to call me. I look forward to following Shandra Velez along with you.    Sincerely,    Kody Pretty MD    Enclosure  CC:  No Recipients    If you would like to receive this communication electronically, please contact [email or web address] or [phone] to request more information on "[employer]" Link access.    For providers and/or their staff who would like to refer a patient to [facility], please contact us through our one-stop-shop provider referral line, [facility] , at [phone].    If you feel you have received this communication in error or would no longer like to receive these types of communications, please e-mail [email or web address]

## 2020-10-30 ENCOUNTER — DOCUMENTATION ONLY (OUTPATIENT)
Dept: INFUSION THERAPY | Facility: HOSPITAL | Age: 80
End: 2020-10-30

## 2020-10-30 DIAGNOSIS — C82.90 FOLLICULAR LYMPHOMA, UNSPECIFIED FOLLICULAR LYMPHOMA TYPE, UNSPECIFIED BODY REGION: Primary | ICD-10-CM

## 2020-10-30 LAB — HBV CORE AB SERPL QL IA: NEGATIVE

## 2020-10-30 NOTE — PROGRESS NOTES
[12:48 PM] Hannah butts--can you put a new start on for wed 11/4?  she is getting her port that Am--MRN  [id number]  ?[12:48 PM] Hannah mcdonough  ?[12:51 PM] Hannah Michaels      or Thur 11/5  ?[12:57 PM] Hannah Michaels      I will let pt know at her teaching on Monday  ?[1:01 PM] Emmie Obrien      i can put her on for 830 on 11/5  ?[1:01 PM] Hannah Michaels      ok---thanks   ?[1:03 PM] Emmie gomez  ?[1:07 PM] Emmie mcdonough cycle 2 on the day before or after thanksgiving thanks  ?  [1:21 PM] Hannah Michaels      she can get C2D1 on Wednesday 11/25  ?[1:21 PM] Emmie silver

## 2020-10-30 NOTE — PROGRESS NOTES
This note was completed with dictation software and grammatical errors may exist.    CC:  Low back and left leg pain    HPI: The patient is a 80-year-old female with past medical history significant for hypertension and hyperlipidemia who presents in referral from Lucita Gonzalez PA-C for low back and right leg pain.  She returns in follow-up today with low back and left leg pain.  She describes sharp pain in the left low back radiating into the left lateral buttock and left posterior thigh.  This is worse with standing and improved with sitting.  She states that she has lymphoma and her oncologist as cleared her to have an injection.  She denies weakness or numbness, no bladder or bowel incontinence.    Pain intervention history: She underwent left L4/5 laminectomy for severe canal stenosis with Dr. Malloy in May 2017.  She is status post right L4/5 transforaminal epidural steroid injection on 11/17/17 with mild relief lasting a week, now reporting 0% relief.   She is status post caudal epidural steroid injection on 1/8/18 with 40% relief. She is status post left L3/4 transforaminal injection on 05/14/2020 with almost complete relief of her pain.     ROS:  The patient reports back pain only.  Balance of review of systems is negative.    Past Medical History:   Diagnosis Date    Breast cancer 1985    right mastectomy    Encounter for blood transfusion     Hypercholesterolemia     Hypertension     Hypertension     Lumbar spondylosis     Osteopenia     Pulmonary nodule     Smoker     Current        Past Surgical History:   Procedure Laterality Date    APPENDECTOMY      BONE MARROW BIOPSY Bilateral 6/24/2020    Procedure: Biopsy-bone marrow;  Surgeon: Rod Montero MD;  [facility];  Service: Oncology;  Laterality: Bilateral;    BREAST RECONSTRUCTION  1990    right    CATARACT EXTRACTION W/  INTRAOCULAR LENS IMPLANT Bilateral     CHOLECYSTECTOMY      EYE SURGERY      JOINT REPLACEMENT  2008     right knee     LUMBAR LAMINECTOMY      LYMPH NODE BIOPSY      MASTECTOMY Right 1985    NEEDLE LOCALIZATION N/A 5/25/2020    Procedure: NEEDLE LOCALIZATION - lymph node bx;  Surgeon: Steve Weaver MD;  Location[facility];  Service: Interventional Radiology;  Laterality: N/A;    TOTAL KNEE ARTHROPLASTY Right     TRANSFORAMINAL EPIDURAL INJECTION OF STEROID Left 5/14/2020    Procedure: Injection,steroid,epidural,transforaminal approach, l3/4;  Surgeon: Ronnell Baeza MD;  Location: [facility];  Service: Pain Management;  Laterality: Left;    TUBAL LIGATION      UMBILICAL HERNIA REPAIR         Social History     Socioeconomic History    Marital status:      Spouse name: Not on file    Number of children: 3    Years of education: Not on file    Highest education level: Not on file   Occupational History    Occupation: retired  for [employer]   Social Needs    Financial resource strain: Not on file    Food insecurity     Worry: Not on file     Inability: Not on file    Transportation needs     Medical: Not on file     Non-medical: Not on file   Tobacco Use    Smoking status: Current Every Day Smoker     Packs/day: 0.50     Years: 53.00     Pack years: 26.50     Types: Cigarettes    Smokeless tobacco: Never Used   Substance and Sexual Activity    Alcohol use: No    Drug use: No    Sexual activity: Not Currently   Lifestyle    Physical activity     Days per week: Not on file     Minutes per session: Not on file    Stress: Not on file   Relationships    Social connections     Talks on phone: Not on file     Gets together: Not on file     Attends Latter-day service: Not on file     Active member of club or organization: Not on file     Attends meetings of clubs or organizations: Not on file     Relationship status: Not on file   Other Topics Concern    Are you pregnant or think you may be? Not Asked    Breast-feeding Not Asked   Social History Narrative    Lives alone         Hobbies: skyler        From [place]             Medications/Allergies: See med card    Vitals:    10/27/20 0909   BP: 136/62   Pulse: 80   Resp: 20   Temp: 97.6 °F (36.4 °C)   TempSrc: Temporal   SpO2: 96%   Weight: 87 kg (191 lb 12.8 oz)   PainSc: 10-Worst pain ever   PainLoc: Back         Physical exam:  Gen: A and O x3, pleasant, well-groomed  Skin: No rashes or obvious lesions  HEENT: PERRLA, no obvious deformities on ears or in canals. Trachea midline.  CVS: Regular rate and rhythm, normal palpable pulses.  Resp:No increased work of breathing, symmetrical chest rise.  Abdomen: Soft, NT/ND.  Musculoskeletal:  Slow cautious gait.    Neuro:  Lower extremities: 5/5 strength bilaterally, 4/5 left hip flexion  Reflexes: Patellar 2+ left side, 0+ right side, Achilles 2+ bilaterally.  Sensory:  Intact and symmetrical to light touch and pinprick in L2-S1 dermatomes bilaterally.    Lumbar spine:  Lumbar spine: ROM is moderately limited with flexion, extension and  oblique extension with mild low back pain with extension.    Clifton's test causes no increased pain on either side.    Supine straight leg raise causes left posterior thigh pain.  Internal and external rotation of the hip causes no increased pain on either side.  Myofascial exam: No tenderness to palpation across lumbar paraspinous muscles.      Imagin20 MRI L-spine:  There is extensive multilevel degenerative change in addition to postsurgical changes.  There are postsurgical changes of posterior instrumented fusion and decompression of L4 through S1.  There is some degree of disc space narrowing, disc bulge with osteophytic ridging with a without superimposed disc protrusion or disc extrusion in addition to facet joint arthropathy.  Also, there is 5 mm anterolisthesis of L4 on L5 which is without definite change compared to plain films dated 2018 but has progressed compared to prior preoperative MRI.  The pertinent findings are summarized  below.  2. There is retroperitoneal lymphadenopathy which is incompletely included and evaluated.  Further evaluation with CT abdomen and pelvis is recommended.  3. At the L3-4 level there is a disc bulge with superimposed left-sided disc extrusion with cephalad extension contributing to severe left lateral recess and foraminal stenosis, severe spinal stenosis and mild right foraminal stenosis.  The findings have progressed.  4. At the T11-12 level there is moderate-to-marked right foraminal stenosis without spinal stenosis.  5. At the L2-3 level there has been interval progression of degenerative change resulting in moderate left lateral recess stenosis and severe left foraminal stenosis.  6. At the postoperative L4-5 level there is mild-to-moderate right foraminal stenosis without spinal stenosis status post decompression.  7. At the L5-S1 level there is mild crowding of the left lateral recess with mild interval improvement.  There is moderate left and mild-to-moderate right foraminal stenosis without spinal stenosis.        Assessment: The patient is a 80-year-old female with past medical history significant for hypertension and hyperlipidemia who presents in referral from Lucita Gonzalez PA-C for low back and right leg pain.    1. Lumbar radiculopathy     2. Spinal stenosis of lumbar region without neurogenic claudication           Plan:  1.  The patient would likely benefit from a left L3/4 transforaminal epidural steroid injection.  However she is a high risk patient with thrombocytopenia.  I am going to check her platelets and  review her case with Dr. Baeza.  We will contact her with the results.

## 2020-11-01 ENCOUNTER — LAB VISIT (OUTPATIENT)
Dept: URGENT CARE | Facility: CLINIC | Age: 80
End: 2020-11-01
Payer: MEDICARE

## 2020-11-01 DIAGNOSIS — Z01.818 PREOP EXAMINATION: ICD-10-CM

## 2020-11-01 PROCEDURE — U0003 INFECTIOUS AGENT DETECTION BY NUCLEIC ACID (DNA OR RNA); SEVERE ACUTE RESPIRATORY SYNDROME CORONAVIRUS 2 (SARS-COV-2) (CORONAVIRUS DISEASE [COVID-19]), AMPLIFIED PROBE TECHNIQUE, MAKING USE OF HIGH THROUGHPUT TECHNOLOGIES AS DESCRIBED BY CMS-2020-01-R: HCPCS

## 2020-11-01 NOTE — PROGRESS NOTES
Requests COVID testing for Pre Op. VSS, no symptoms. Will call with results.     Patient Instructions   Instructions for Patients Awaiting COVID-19 Test Results    You will either be called with your test result or it will be released to the patient portal.  If you have any questions about your test, please visit [email or web address] or call our COVID-19 information line at [phone].    Prevention steps for patients with confirmed or suspected COVID-19     Stay home except to get medical care.   Separate yourself from other people and animals in your home   Call ahead before visiting your doctor.   Wear a facemask.   Cover your coughs and sneezes.   Clean your hands often.   Avoid sharing personal household items.   Clean all high-touch surfaces every day.   Monitor your symptoms. Seek prompt medical attention if your illness is worsening (e.g., difficulty breathing). Before seeking care, call your healthcare provider.   If you have a medical emergency and need to call 911, notify the dispatch personnel that you have, or are being evaluated for COVID-19. If possible, put on a facemask before emergency medical services arrive.   Discontinuing home isolation. Call your provider about guidance to discontinue home isolation.    Recommended precautions for household members, intimate partners, and caregivers in a nonhealthcare setting of a patient with symptomatic laboratory-confirmed COVID-19 or a patient under investigation.  Household members, intimate partners, and caregivers in a nonhealthcare setting may have close contact with a person with symptomatic, laboratory-confirmed COVID-19 or a person under investigation. Close contacts should monitor their health; they should call their healthcare provider right away if they develop symptoms suggestive of COVID-19 (e.g., fever, cough, shortness of breath).    Close contacts should also follow these recommendations:   Make sure that you  understand and can help the patient follow their healthcare provider's instructions for medication(s) and care. You should help the patient with basic needs in the home and provide support for getting groceries, prescriptions, and other personal needs.   Monitor the patient's symptoms. If the patient is getting sicker, call his or her healthcare provider and tell them that the patient has laboratory-confirmed COVID-19. This will help the healthcare provider's office take steps to keep other people in the office or waiting room from getting infected. Ask the healthcare provider to call the local or [facility] health department for additional guidance. If the patient has a medical emergency and you need to call 911, notify the dispatch personnel that the patient has, or is being evaluated for COVID-19.   Household members should stay in another room or be  from the patient as much as possible. Household members should use a separate bedroom and bathroom, if available.   Prohibit visitors who do not have an essential need to be in the home.   Household members should care for any pets in the home. Do not handle pets or other animals while sick.   Make sure that shared spaces in the home have good air flow, such as by an air conditioner or an opened window, weather permitting.   Perform hand hygiene frequently. Wash your hands often with soap and water for at least 20 seconds or use an alcohol-based hand  that contains 60 to 95% alcohol, covering all surfaces of your hands and rubbing them together until they feel dry. Soap and water should be used preferentially if hands are visibly dirty.   Avoid touching your eyes, nose, and mouth with unwashed hands.   The patient should wear a facemask when you are around other people. If the patient is not able to wear a facemask (for example, because it causes trouble breathing), you, as the caregiver should wear a mask when you are in the same room as the  patient.   Wear a disposable facemask and gloves when you touch or have contact with the patient's blood, stool, or body fluids, such as saliva, sputum, nasal mucus, vomit, urine.  o Throw out disposable facemasks and gloves after using them. Do not reuse.  o When removing personal protective equipment, first remove and dispose of gloves. Then, immediately clean your hands with soap and water or alcohol-based hand . Next, remove and dispose of facemask, and immediately clean your hands again with soap and water or alcohol-based hand .   Avoid sharing household items with the patient. You should not share dishes, drinking glasses, cups, eating utensils, towels, bedding, or other items. After the patient uses these items, you should wash them thoroughly (see below Wash laundry thoroughly).   Clean all high-touch surfaces, such as counters, tabletops, doorknobs, bathroom fixtures, toilets, phones, keyboards, tablets, and bedside tables, every day. Also, clean any surfaces that may have blood, stool, or body fluids on them.   Use a household cleaning spray or wipe, according to the label instructions. Labels contain instructions for safe and effective use of the cleaning product including precautions you should take when applying the product, such as wearing gloves and making sure you have good ventilation during use of the product.   Wash laundry thoroughly.  o Immediately remove and wash clothes or bedding that have blood, stool, or body fluids on them.  o Wear disposable gloves while handling soiled items and keep soiled items away from your body. Clean your hands (with soap and water or an alcohol-based hand ) immediately after removing your gloves.  o Read and follow directions on labels of laundry or clothing items and detergent. In general, using a normal laundry detergent according to washing machine instructions and dry thoroughly using the warmest temperatures recommended on  the clothing label.   Place all used disposable gloves, facemasks, and other contaminated items in a lined container before disposing of them with other household waste. Clean your hands (with soap and water or an alcohol-based hand ) immediately after handling these items. Soap and water should be used preferentially if hands are visibly dirty.   Discuss any additional questions with your [facility] or local health department or healthcare provider. Check available hours when contacting your local health department.    For more information see CDC link below.      https://www.cdc.gov/coronavirus/2019-ncov/hcp/guidance-prevent-spread.html#precautions        Sources:  [facility], [place] Department of Health and [facility]        If not allergic,take tylenol (acetominophen) for fever control, chills, or body aches every 4 hours. Do not exceed 4000 mg/ day.If not allergic, take Motrin (Ibuprofen) every 4 hours for fever, chills, pain or inflammation. Do not exceed 2400 mg/day. You can alternate taking tylenol and motrin.    If you were prescribed a narcotic medication, do not drive or operate heavy equipment or machinery while taking these medications.  You must understand that you've received an Urgent Care treatment only and that you may be released before all your medical problems are known or treated. You, the patient, will arrange for follow up care as instructed.  Follow up with your PCP or specialty clinic as directed in the next 1-2 weeks if not improved or as needed.  You can call [phone] to schedule an appointment with the appropriate provider.  If your condition worsens we recommend that you receive another evaluation at the emergency room immediately or contact your primary medical clinics after hours call service to discuss your concerns.    Please return here or go to the Emergency Department for any concerns or worsening of condition.    If you have been referred to another provider and wish to  call to check on the status of your referral, please call [facility] at [phone]

## 2020-11-02 ENCOUNTER — CLINICAL SUPPORT (OUTPATIENT)
Dept: HEMATOLOGY/ONCOLOGY | Facility: CLINIC | Age: 80
End: 2020-11-02
Payer: MEDICARE

## 2020-11-02 ENCOUNTER — PATIENT MESSAGE (OUTPATIENT)
Dept: SURGERY | Facility: HOSPITAL | Age: 80
End: 2020-11-02

## 2020-11-02 ENCOUNTER — PATIENT MESSAGE (OUTPATIENT)
Dept: HEMATOLOGY/ONCOLOGY | Facility: CLINIC | Age: 80
End: 2020-11-02

## 2020-11-02 VITALS
OXYGEN SATURATION: 93 % | SYSTOLIC BLOOD PRESSURE: 112 MMHG | DIASTOLIC BLOOD PRESSURE: 66 MMHG | HEART RATE: 60 BPM | TEMPERATURE: 98 F | RESPIRATION RATE: 18 BRPM | HEIGHT: 65 IN | BODY MASS INDEX: 33.09 KG/M2 | WEIGHT: 198.63 LBS

## 2020-11-02 DIAGNOSIS — C82.01 GRADE 1 FOLLICULAR LYMPHOMA OF LYMPH NODES OF NECK: Primary | ICD-10-CM

## 2020-11-02 LAB — SARS-COV-2 RNA RESP QL NAA+PROBE: NOT DETECTED

## 2020-11-02 PROCEDURE — 99999 PR PBB SHADOW E&M-EST. PATIENT-LVL IV: ICD-10-PCS | Mod: PBBFAC,,, | Performed by: NURSE PRACTITIONER

## 2020-11-02 PROCEDURE — 99214 OFFICE O/P EST MOD 30 MIN: CPT | Mod: S$GLB,,, | Performed by: NURSE PRACTITIONER

## 2020-11-02 PROCEDURE — 99999 PR PBB SHADOW E&M-EST. PATIENT-LVL IV: CPT | Mod: PBBFAC,,, | Performed by: NURSE PRACTITIONER

## 2020-11-02 PROCEDURE — 99214 PR OFFICE/OUTPT VISIT, EST, LEVL IV, 30-39 MIN: ICD-10-PCS | Mod: S$GLB,,, | Performed by: NURSE PRACTITIONER

## 2020-11-02 NOTE — PROGRESS NOTES
"Subjective:       Patient ID: Shandra Velez is a 80 y.o. female.    Chief Complaint: class (EDUCATION WITH MONO)    HPI  This is a 80 year old  female known to Dr. Montero for a recent diagnosis of Grade 1 Follicular non-Hodgkin's lymphoma.  The patient presents to the clinic with her son for chemotherapy education.    Oncology History   Follicular lymphoma   10/7/2020 Initial Diagnosis    Follicular lymphoma     10/30/2020 -  Chemotherapy    Treatment Summary   Plan Name: OP CVP-R Q3W  Treatment Goal: Palliative  Status: Active  Start Date: 10/30/2020 (Planned)  End Date: 2/12/2021 (Planned)  Provider: Rod Montero MD  Chemotherapy: vinCRIStine (ONCOVIN) 2 mg in sodium chloride 0.9% 50 mL chemo infusion, 2 mg (100 % of original dose 2 mg), Intravenous, Clinic/HOD 1 time, 0 of 6 cycles  Dose modification: 2 mg (original dose 2 mg, Cycle 1)  cyclophosphamide (CYTOXAN) 750 mg/m2 = 1,500 mg in sodium chloride 0.9% 250 mL chemo infusion, 750 mg/m2 = 1,500 mg, Intravenous, Clinic/HOD 1 time, 0 of 6 cycles         Review of Systems   Constitutional: Positive for appetite change and diaphoresis.        Lymphadenopathy   All other systems reviewed and are negative.        Objective:       Vitals:    11/02/20 0853   BP: 112/66   BP Location: Right arm   Patient Position: Sitting   BP Method: Medium (Automatic)   Pulse: 60   Resp: 18   Temp: 97.5 °F (36.4 °C)   TempSrc: Temporal   SpO2: (!) 93%   Weight: 90.1 kg (198 lb 10.2 oz)   Height: 5' 5" (1.651 m)       Physical Exam  Vitals signs reviewed.   Constitutional:       Appearance: Normal appearance.   HENT:      Head: Normocephalic and atraumatic.      Nose: Nose normal.      Mouth/Throat:      Mouth: Mucous membranes are moist.      Pharynx: Oropharynx is clear.   Eyes:      Conjunctiva/sclera: Conjunctivae normal.      Pupils: Pupils are equal, round, and reactive to light.   Neck:      Musculoskeletal: Normal range of motion.   Cardiovascular:      Rate " and Rhythm: Normal rate and regular rhythm.      Pulses: Normal pulses.      Heart sounds: Normal heart sounds.   Pulmonary:      Effort: Pulmonary effort is normal.      Breath sounds: Normal breath sounds.   Abdominal:      General: Bowel sounds are normal.      Palpations: Abdomen is soft.   Musculoskeletal: Normal range of motion.   Lymphadenopathy:      Cervical: Cervical adenopathy present.   Skin:     General: Skin is warm and dry.      Capillary Refill: Capillary refill takes less than 2 seconds.   Neurological:      Mental Status: She is alert and oriented to person, place, and time.   Psychiatric:         Mood and Affect: Mood normal.         Behavior: Behavior normal.         Thought Content: Thought content normal.         Judgment: Judgment normal.         Assessment:       1. 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  Treatment plan of CVP-R every 3 weeks discussed with patient and son.  2.  Handouts provided from [email or web address] on chemotherapy agents.    3.  Discussed the mechanism of action, potential side effects of this treatment as well as ways to manage them at home. Some of these side effects include but not limited to fever, nausea, vomiting, decreased appetite, fatigue, weakness, cytopenias, myalgia/arthralgia, constipation, diarrhea, bleeding, headache, nail changes, taste change, hair thinning/loss, peripheral neuropathy, bladder irritability.  4.  Dietary modifications discussed.  Detail instructions to be provided by dietician.   5.  Chemotherapy portfolio supplied with contact information.   6.  Discussed follow-up with the physician for toxicity monitoring throughout treatment.    7.  Scripts were escribed previously (Prilosec, Allopurinol, Compazine, Cholecalciferol, Prednisone, Magic mouthwash) and explained for home use.    8.  The patient and son verbalized understanding. All questions were answered and an informed consent was obtained.      Assessment/plan reviewed and approved by Dr. Montero.

## 2020-11-03 ENCOUNTER — ANESTHESIA EVENT (OUTPATIENT)
Dept: SURGERY | Facility: HOSPITAL | Age: 80
End: 2020-11-03
Payer: MEDICARE

## 2020-11-03 ENCOUNTER — DOCUMENTATION ONLY (OUTPATIENT)
Dept: INFUSION THERAPY | Facility: HOSPITAL | Age: 80
End: 2020-11-03

## 2020-11-03 NOTE — PROGRESS NOTES
Pharmacy Treatment Plan Review    Patient  Shandra Velez, 80 y.o.  1940    Indication: Lymphoma     History:  -Grade 1 follicular non-Hodgkin's lymphoma - Stage Riley    Labs:  CBC  Lab Results   Component Value Date    WBC 11.84 10/27/2020    HGB 11.9 (L) 10/27/2020    HCT 35.8 (L) 10/27/2020    MCV 90 10/27/2020     (L) 10/27/2020       BMP  Lab Results   Component Value Date     10/02/2020    K 4.2 10/02/2020     10/02/2020    CO2 26 10/02/2020    BUN 30 (H) 10/02/2020    CREATININE 1.30 10/02/2020    CALCIUM 9.9 10/02/2020    ANIONGAP 6 (L) 10/02/2020    ESTGFRAFRICA 46 (A) 10/02/2020    EGFRNONAA 40 (A) 10/02/2020       LFTs  Lab Results   Component Value Date    ALT 10 10/02/2020    AST 36 10/02/2020    ALKPHOS 93 10/02/2020    BILITOT 0.5 10/02/2020       The patient is scheduled to start the following infusion:   OP CVP-R Q3W     21-day cycle for 6 cycles of:  OP CVP-R Q3W    Chemotherapy:   -Rituximab-pvvr 375 mg/m2 in sodium chloride 0.9% infusion (concentration 1 mg/ml) on day 1    -vinCRIStine (ONCOVIN) 1.4 mg/m2 in sodium chloride 0.9% 50 mL chemo infusion,intravenous, on day 1    -cyclophosphamide (CYTOXAN) 750 mg/m2  in sodium chloride 0.9% 250 mL chemo infusion, Intravenous, on day 1    -Prednisone 100 mg PO days 1-5    Premeds  -Acetaminophen 1000 mg PO  -Diphenhydramine 50 mg IVPB  -Famotidine 20 mg IV  -Palonosetron 0.25 mg/Dexamethasone 10 mg     The treatment plan per [facility] template     Gerard Mcclendon  PharmD

## 2020-11-04 ENCOUNTER — HOSPITAL ENCOUNTER (OUTPATIENT)
Dept: RADIOLOGY | Facility: HOSPITAL | Age: 80
Discharge: HOME | End: 2020-11-04
Attending: SURGERY | Admitting: SURGERY
Payer: MEDICARE

## 2020-11-04 ENCOUNTER — TELEPHONE (OUTPATIENT)
Dept: SURGERY | Facility: CLINIC | Age: 80
End: 2020-11-04

## 2020-11-04 ENCOUNTER — ANESTHESIA (OUTPATIENT)
Dept: SURGERY | Facility: HOSPITAL | Age: 80
End: 2020-11-04
Payer: MEDICARE

## 2020-11-04 ENCOUNTER — HOSPITAL ENCOUNTER (OUTPATIENT)
Facility: HOSPITAL | Age: 80
Discharge: HOME | End: 2020-11-04
Attending: SURGERY | Admitting: SURGERY
Payer: MEDICARE

## 2020-11-04 ENCOUNTER — HOSPITAL ENCOUNTER (OUTPATIENT)
Dept: RADIOLOGY | Facility: HOSPITAL | Age: 80
Discharge: HOME | End: 2020-11-04
Attending: SURGERY | Admitting: ANESTHESIOLOGY
Payer: MEDICARE

## 2020-11-04 VITALS
DIASTOLIC BLOOD PRESSURE: 74 MMHG | SYSTOLIC BLOOD PRESSURE: 125 MMHG | OXYGEN SATURATION: 95 % | WEIGHT: 185 LBS | RESPIRATION RATE: 18 BRPM | TEMPERATURE: 98 F | HEART RATE: 90 BPM | HEIGHT: 65 IN | BODY MASS INDEX: 30.82 KG/M2

## 2020-11-04 DIAGNOSIS — C82.01 GRADE 1 FOLLICULAR LYMPHOMA OF LYMPH NODES OF NECK: ICD-10-CM

## 2020-11-04 DIAGNOSIS — Z95.828 PORT-A-CATH IN PLACE: ICD-10-CM

## 2020-11-04 LAB
CREAT SERPL-MCNC: 1.2 MG/DL (ref 0.5–1.4)
EST. GFR  (AFRICAN AMERICAN): 50 ML/MIN/1.73 M^2
EST. GFR  (NON AFRICAN AMERICAN): 43 ML/MIN/1.73 M^2

## 2020-11-04 PROCEDURE — 37000008 HC ANESTHESIA 1ST 15 MINUTES: Mod: PO | Performed by: SURGERY

## 2020-11-04 PROCEDURE — 82565 ASSAY OF CREATININE: CPT | Mod: PO

## 2020-11-04 PROCEDURE — 71260 CT THORAX DX C+: CPT | Mod: TC,PO

## 2020-11-04 PROCEDURE — 36000707: Mod: PO | Performed by: SURGERY

## 2020-11-04 PROCEDURE — D9220A PRA ANESTHESIA: Mod: ANES,,, | Performed by: ANESTHESIOLOGY

## 2020-11-04 PROCEDURE — D9220A PRA ANESTHESIA: Mod: CRNA,,, | Performed by: NURSE ANESTHETIST, CERTIFIED REGISTERED

## 2020-11-04 PROCEDURE — 71000015 HC POSTOP RECOV 1ST HR: Mod: PO | Performed by: SURGERY

## 2020-11-04 PROCEDURE — 77001 FLUOROGUIDE FOR VEIN DEVICE: CPT | Mod: 26,,, | Performed by: SURGERY

## 2020-11-04 PROCEDURE — 71045 X-RAY EXAM CHEST 1 VIEW: CPT | Mod: 26,,, | Performed by: RADIOLOGY

## 2020-11-04 PROCEDURE — 71045 X-RAY EXAM CHEST 1 VIEW: CPT | Mod: TC,FY,PO

## 2020-11-04 PROCEDURE — 25500020 PHARM REV CODE 255: Mod: PO | Performed by: SURGERY

## 2020-11-04 PROCEDURE — 71045 XR CHEST AP PORTABLE: ICD-10-PCS | Mod: 26,,, | Performed by: RADIOLOGY

## 2020-11-04 PROCEDURE — 71260 CT THORAX DX C+: CPT | Mod: 26,,, | Performed by: RADIOLOGY

## 2020-11-04 PROCEDURE — 25000003 PHARM REV CODE 250: Mod: PO | Performed by: SURGERY

## 2020-11-04 PROCEDURE — 36415 COLL VENOUS BLD VENIPUNCTURE: CPT | Mod: PO

## 2020-11-04 PROCEDURE — 71000039 HC RECOVERY, EACH ADD'L HOUR: Mod: PO | Performed by: SURGERY

## 2020-11-04 PROCEDURE — C1788 PORT, INDWELLING, IMP: HCPCS | Mod: PO | Performed by: SURGERY

## 2020-11-04 PROCEDURE — 37000009 HC ANESTHESIA EA ADD 15 MINS: Mod: PO | Performed by: SURGERY

## 2020-11-04 PROCEDURE — 36000706: Mod: PO | Performed by: SURGERY

## 2020-11-04 PROCEDURE — 71260 CT CHEST WITH CONTRAST: ICD-10-PCS | Mod: 26,,, | Performed by: RADIOLOGY

## 2020-11-04 PROCEDURE — 63600175 PHARM REV CODE 636 W HCPCS: Mod: PO | Performed by: NURSE ANESTHETIST, CERTIFIED REGISTERED

## 2020-11-04 PROCEDURE — 76000 FLUOROSCOPY <1 HR PHYS/QHP: CPT | Mod: TC,PO

## 2020-11-04 PROCEDURE — 25000003 PHARM REV CODE 250: Mod: PO | Performed by: NURSE ANESTHETIST, CERTIFIED REGISTERED

## 2020-11-04 PROCEDURE — 71000033 HC RECOVERY, INTIAL HOUR: Mod: PO | Performed by: SURGERY

## 2020-11-04 PROCEDURE — 77001 CHG FLUOROGUIDE CNTRL VEN ACCESS,PLACE,REPLACE,REMOVE: ICD-10-PCS | Mod: 26,,, | Performed by: SURGERY

## 2020-11-04 PROCEDURE — 63600175 PHARM REV CODE 636 W HCPCS: Mod: PO | Performed by: SURGERY

## 2020-11-04 PROCEDURE — 36561 PR INSERT TUNNELED CV CATH WITH PORT: ICD-10-PCS | Mod: LT,,, | Performed by: SURGERY

## 2020-11-04 PROCEDURE — D9220A PRA ANESTHESIA: ICD-10-PCS | Mod: ANES,,, | Performed by: ANESTHESIOLOGY

## 2020-11-04 PROCEDURE — 27200651 HC AIRWAY, LMA: Mod: PO | Performed by: ANESTHESIOLOGY

## 2020-11-04 PROCEDURE — 36561 INSERT TUNNELED CV CATH: CPT | Mod: LT,,, | Performed by: SURGERY

## 2020-11-04 PROCEDURE — D9220A PRA ANESTHESIA: ICD-10-PCS | Mod: CRNA,,, | Performed by: NURSE ANESTHETIST, CERTIFIED REGISTERED

## 2020-11-04 DEVICE — KIT POWERPORT SINGLE 8FR: Type: IMPLANTABLE DEVICE | Site: CHEST | Status: FUNCTIONAL

## 2020-11-04 RX ORDER — SODIUM CHLORIDE 9 MG/ML
INJECTION, SOLUTION INTRAVENOUS CONTINUOUS
Status: DISCONTINUED | OUTPATIENT
Start: 2020-11-04 | End: 2020-11-04 | Stop reason: HOSPADM

## 2020-11-04 RX ORDER — CEFAZOLIN SODIUM 2 G/50ML
2 SOLUTION INTRAVENOUS
Status: COMPLETED | OUTPATIENT
Start: 2020-11-04 | End: 2020-11-04

## 2020-11-04 RX ORDER — KETOROLAC TROMETHAMINE 30 MG/ML
INJECTION, SOLUTION INTRAMUSCULAR; INTRAVENOUS
Status: DISCONTINUED | OUTPATIENT
Start: 2020-11-04 | End: 2020-11-04

## 2020-11-04 RX ORDER — LIDOCAINE HCL/PF 100 MG/5ML
SYRINGE (ML) INTRAVENOUS
Status: DISCONTINUED | OUTPATIENT
Start: 2020-11-04 | End: 2020-11-04

## 2020-11-04 RX ORDER — OXYCODONE HYDROCHLORIDE 5 MG/1
5 TABLET ORAL
Status: DISCONTINUED | OUTPATIENT
Start: 2020-11-04 | End: 2020-11-04 | Stop reason: HOSPADM

## 2020-11-04 RX ORDER — SODIUM CHLORIDE, SODIUM LACTATE, POTASSIUM CHLORIDE, CALCIUM CHLORIDE 600; 310; 30; 20 MG/100ML; MG/100ML; MG/100ML; MG/100ML
INJECTION, SOLUTION INTRAVENOUS CONTINUOUS
Status: DISCONTINUED | OUTPATIENT
Start: 2020-11-04 | End: 2020-11-04 | Stop reason: HOSPADM

## 2020-11-04 RX ORDER — PROPOFOL 10 MG/ML
VIAL (ML) INTRAVENOUS
Status: DISCONTINUED | OUTPATIENT
Start: 2020-11-04 | End: 2020-11-04

## 2020-11-04 RX ORDER — MIDAZOLAM HYDROCHLORIDE 1 MG/ML
INJECTION, SOLUTION INTRAMUSCULAR; INTRAVENOUS
Status: DISCONTINUED | OUTPATIENT
Start: 2020-11-04 | End: 2020-11-04

## 2020-11-04 RX ORDER — ONDANSETRON 8 MG/1
8 TABLET, ORALLY DISINTEGRATING ORAL EVERY 8 HOURS PRN
Status: DISCONTINUED | OUTPATIENT
Start: 2020-11-04 | End: 2020-11-04 | Stop reason: HOSPADM

## 2020-11-04 RX ORDER — ONDANSETRON 2 MG/ML
4 INJECTION INTRAMUSCULAR; INTRAVENOUS EVERY 12 HOURS PRN
Status: DISCONTINUED | OUTPATIENT
Start: 2020-11-04 | End: 2020-11-04 | Stop reason: HOSPADM

## 2020-11-04 RX ORDER — ONDANSETRON 2 MG/ML
4 INJECTION INTRAMUSCULAR; INTRAVENOUS DAILY PRN
Status: DISCONTINUED | OUTPATIENT
Start: 2020-11-04 | End: 2020-11-04 | Stop reason: HOSPADM

## 2020-11-04 RX ORDER — HYDROMORPHONE HYDROCHLORIDE 2 MG/ML
0.2 INJECTION, SOLUTION INTRAMUSCULAR; INTRAVENOUS; SUBCUTANEOUS EVERY 5 MIN PRN
Status: DISCONTINUED | OUTPATIENT
Start: 2020-11-04 | End: 2020-11-04 | Stop reason: HOSPADM

## 2020-11-04 RX ORDER — ONDANSETRON 2 MG/ML
INJECTION INTRAMUSCULAR; INTRAVENOUS
Status: DISCONTINUED | OUTPATIENT
Start: 2020-11-04 | End: 2020-11-04

## 2020-11-04 RX ORDER — EPHEDRINE SULFATE 50 MG/ML
INJECTION, SOLUTION INTRAVENOUS
Status: DISCONTINUED | OUTPATIENT
Start: 2020-11-04 | End: 2020-11-04

## 2020-11-04 RX ORDER — ACETAMINOPHEN 10 MG/ML
INJECTION, SOLUTION INTRAVENOUS
Status: DISCONTINUED | OUTPATIENT
Start: 2020-11-04 | End: 2020-11-04

## 2020-11-04 RX ORDER — PHENYLEPHRINE HYDROCHLORIDE 10 MG/ML
INJECTION INTRAVENOUS
Status: DISCONTINUED | OUTPATIENT
Start: 2020-11-04 | End: 2020-11-04

## 2020-11-04 RX ORDER — MEPERIDINE HYDROCHLORIDE 50 MG/ML
12.5 INJECTION INTRAMUSCULAR; INTRAVENOUS; SUBCUTANEOUS ONCE AS NEEDED
Status: DISCONTINUED | OUTPATIENT
Start: 2020-11-04 | End: 2020-11-04 | Stop reason: HOSPADM

## 2020-11-04 RX ORDER — HYDROCODONE BITARTRATE AND ACETAMINOPHEN 5; 325 MG/1; MG/1
1 TABLET ORAL EVERY 6 HOURS PRN
Qty: 20 TABLET | Refills: 0 | Status: SHIPPED | OUTPATIENT
Start: 2020-11-04 | End: 2021-03-31 | Stop reason: ALTCHOICE

## 2020-11-04 RX ORDER — FENTANYL CITRATE 50 UG/ML
INJECTION, SOLUTION INTRAMUSCULAR; INTRAVENOUS
Status: DISCONTINUED | OUTPATIENT
Start: 2020-11-04 | End: 2020-11-04

## 2020-11-04 RX ORDER — BUPIVACAINE HCL/EPINEPHRINE 0.25-.0005
VIAL (ML) INJECTION
Status: DISCONTINUED | OUTPATIENT
Start: 2020-11-04 | End: 2020-11-04 | Stop reason: HOSPADM

## 2020-11-04 RX ORDER — DIPHENHYDRAMINE HYDROCHLORIDE 50 MG/ML
25 INJECTION INTRAMUSCULAR; INTRAVENOUS EVERY 6 HOURS PRN
Status: DISCONTINUED | OUTPATIENT
Start: 2020-11-04 | End: 2020-11-04 | Stop reason: HOSPADM

## 2020-11-04 RX ORDER — KETAMINE HCL IN 0.9 % NACL 50 MG/5 ML
SYRINGE (ML) INTRAVENOUS
Status: DISCONTINUED | OUTPATIENT
Start: 2020-11-04 | End: 2020-11-04

## 2020-11-04 RX ORDER — FENTANYL CITRATE 50 UG/ML
25 INJECTION, SOLUTION INTRAMUSCULAR; INTRAVENOUS EVERY 5 MIN PRN
Status: DISCONTINUED | OUTPATIENT
Start: 2020-11-04 | End: 2020-11-04 | Stop reason: HOSPADM

## 2020-11-04 RX ORDER — DEXAMETHASONE SODIUM PHOSPHATE 4 MG/ML
INJECTION, SOLUTION INTRA-ARTICULAR; INTRALESIONAL; INTRAMUSCULAR; INTRAVENOUS; SOFT TISSUE
Status: DISCONTINUED | OUTPATIENT
Start: 2020-11-04 | End: 2020-11-04

## 2020-11-04 RX ADMIN — EPHEDRINE SULFATE 10 MG: 50 INJECTION INTRAVENOUS at 08:11

## 2020-11-04 RX ADMIN — PHENYLEPHRINE HYDROCHLORIDE 200 MCG: 10 INJECTION, SOLUTION INTRAMUSCULAR; INTRAVENOUS; SUBCUTANEOUS at 08:11

## 2020-11-04 RX ADMIN — MIDAZOLAM 1 MG: 1 INJECTION INTRAMUSCULAR; INTRAVENOUS at 08:11

## 2020-11-04 RX ADMIN — KETOROLAC TROMETHAMINE 30 MG: 30 INJECTION, SOLUTION INTRAMUSCULAR; INTRAVENOUS at 08:11

## 2020-11-04 RX ADMIN — CEFAZOLIN SODIUM 2 G: 2 SOLUTION INTRAVENOUS at 08:11

## 2020-11-04 RX ADMIN — IOHEXOL 75 ML: 350 INJECTION, SOLUTION INTRAVENOUS at 11:11

## 2020-11-04 RX ADMIN — PROPOFOL 50 MG: 10 INJECTION, EMULSION INTRAVENOUS at 08:11

## 2020-11-04 RX ADMIN — Medication 20 MG: at 08:11

## 2020-11-04 RX ADMIN — DEXAMETHASONE SODIUM PHOSPHATE 8 MG: 4 INJECTION, SOLUTION INTRAMUSCULAR; INTRAVENOUS at 08:11

## 2020-11-04 RX ADMIN — SODIUM CHLORIDE, SODIUM LACTATE, POTASSIUM CHLORIDE, AND CALCIUM CHLORIDE: .6; .31; .03; .02 INJECTION, SOLUTION INTRAVENOUS at 07:11

## 2020-11-04 RX ADMIN — ACETAMINOPHEN 1000 MG: 10 INJECTION, SOLUTION INTRAVENOUS at 08:11

## 2020-11-04 RX ADMIN — ONDANSETRON 8 MG: 8 TABLET, ORALLY DISINTEGRATING ORAL at 07:11

## 2020-11-04 RX ADMIN — PROPOFOL 150 MG: 10 INJECTION, EMULSION INTRAVENOUS at 08:11

## 2020-11-04 RX ADMIN — LIDOCAINE HYDROCHLORIDE 100 MG: 20 INJECTION PARENTERAL at 08:11

## 2020-11-04 RX ADMIN — ONDANSETRON 4 MG: 2 INJECTION, SOLUTION INTRAMUSCULAR; INTRAVENOUS at 08:11

## 2020-11-04 RX ADMIN — FENTANYL CITRATE 100 MCG: 50 INJECTION, SOLUTION INTRAMUSCULAR; INTRAVENOUS at 08:11

## 2020-11-04 NOTE — CARE UPDATE
CT scan normal. Patient sitting up in wheelchair in no apparent distress. Tolerating po without difficluty

## 2020-11-04 NOTE — TELEPHONE ENCOUNTER
----- Message from Shireen Fink sent at 11/4/2020 11:15 AM CST -----  Contact: son  Type: Needs Medical Advice  Who Called:  son-quigley    Best Call Back Number: [phone]    Additional Information: caller would like an update on his mother progress over at the surgery center on [address]

## 2020-11-04 NOTE — TELEPHONE ENCOUNTER
----- Message from Shireen Fink sent at 11/4/2020 11:15 AM CST -----  Contact: son  Type: Needs Medical Advice  Who Called:  son-quigley    Best Call Back Number: [phone]    Additional Information: caller would like an update on his mother progress over at the surgery center on [address]

## 2020-11-04 NOTE — CARE UPDATE
Labs ok. Patient transported to ct via wheelchair    1130 back from ct.tolerated well. Without complaints. In no apparent distress.Results pending.

## 2020-11-04 NOTE — OP NOTE
Date of surgery:  November 4, 2020    Staff surgeon:  Dr. Kody Pretty    Preoperative diagnosis:  Lymphoma    Postoperative diagnosis:  The same    Procedure:  Placement of left subclavian Port-A-Cath    Anesthesia:  General via LMA    Indication for procedure:  Pleasant 80-year-old female referred to my office for consideration of port placement.  She is scheduled for the procedure on the above-mentioned date.    Description of procedure:  Following signing of informed consent patient was taken to the operating room and placed in supine position.  SCDs were placed. Monitored anesthesia was administered without event.  Both arms were tucked and shoulder roll was placed. The patient was prepped and draped in standard fashion. An appropriate time-out procedure was then performed. Patient is placed in Trendelenburg. I obtained access to the L subclavian vein with one percutaneous stick.  Guidewire was placed and position confirmed with fluoroscopy.  Next I make an incision incorporating the insertion site into my incision. I next took the tunneling sheath and dilator passed over the guidewire under fluoroscopic visualization. I removed the guidewire and dilator.  I next passed the catheter through the tunneling sheath and removed the tunneling sheath.  I used fluoroscopy to position the tip of the catheter such that the proximal tip is at the confluence of the subclavian vein. I then cut the distal tip of the catheter.  I secured the catheter to the port. I secure the port down to the chest wall with 2 Vicryl suture. Skin incision closed with 3 0 Vicryl and 4 0 Monocryl.  Patient was awakened taken recovery room stable condition there were no immediate complications blood loss 10 cc.  Port was aspirated and flushed easily.  POrt is ok to use if CxR is without complication.

## 2020-11-04 NOTE — PROGRESS NOTES
Made aware of radiology results and d/w radiology.  Pt remains comfortable per d/w nursing.    GIven interpretation of cxr will proceed with ct scan of chest to evaluate

## 2020-11-04 NOTE — BRIEF OP NOTE
[facility]  Brief Operative Note    Surgery Date: 11/4/2020     Surgeon(s) and Role:     * Kody Pretty MD - Primary    Assisting Surgeon: None    Pre-op Diagnosis:  Grade 1 follicular lymphoma of lymph nodes of neck [C82.01]    Post-op Diagnosis:  Post-Op Diagnosis Codes:     * Grade 1 follicular lymphoma of lymph nodes of neck [C82.01]    Procedure(s) (LRB):  [id number] (Left)    Anesthesia: General/MAC    Description of the findings of the procedure(s): L subclavian port a cath placed with one percutaneous stick.     Estimated Blood Loss: 25 c c's       Specimens:   Specimen (12h ago, onward)    None            Discharge Note    OUTCOME: Patient tolerated treatment/procedure well without complication and is now ready for discharge.    DISPOSITION: Home or Self Care    FINAL DIAGNOSIS:  Grade 1 follicular lymphoma of lymph nodes of neck    FOLLOWUP: In clinic    DISCHARGE INSTRUCTIONS:    Discharge Procedure Orders   Diet general     Call MD for:  extreme fatigue     Call MD for:  persistent dizziness or light-headedness     Call MD for:  hives     Call MD for:  redness, tenderness, or signs of infection (pain, swelling, redness, odor or green/yellow discharge around incision site)     Call MD for:  difficulty breathing, headache or visual disturbances     Call MD for:  severe uncontrolled pain     Call MD for:  persistent nausea and vomiting     Call MD for:  temperature >100.4     Remove dressing in 48 hours     Activity as tolerated

## 2020-11-04 NOTE — ANESTHESIA POSTPROCEDURE EVALUATION
Anesthesia Post Evaluation    Patient: Shandra Velez    Procedure(s) Performed: Procedure(s) (LRB):  [id number] (Left)    Final Anesthesia Type: general    Patient location during evaluation: PACU  Patient participation: Yes- Able to Participate  Level of consciousness: sedated and awake  Post-procedure vital signs: reviewed and stable  Pain management: adequate  Airway patency: patent    PONV status at discharge: No PONV  Anesthetic complications: no      Cardiovascular status: blood pressure returned to baseline  Respiratory status: spontaneous ventilation  Hydration status: euvolemic  Follow-up not needed.          Vitals Value Taken Time   /56 11/04/20 0911   Temp 36.8 °C (98.2 °F) 11/04/20 0856   Pulse 80 11/04/20 0911   Resp 16 11/04/20 0911   SpO2 100 % 11/04/20 0911         No case tracking events are documented in the log.      Pain/Rome Score: Rome Score: 8 (11/4/2020  9:11 AM)

## 2020-11-04 NOTE — PLAN OF CARE
Patient awake, alert and oriented. Tolerating PO. Denies complaints of pain. Incision without redness swelling or drainage.Dressing clean, dry and intact Discharge instructions reviewed with patient. Patient verbalized understanding. Patient discharged home with family.

## 2020-11-04 NOTE — TELEPHONE ENCOUNTER
----- Message from Lavon Dotson LPN sent at 11/4/2020 11:38 AM CST -----  Contact: son    ----- Message -----  From: Shireen Fink  Sent: 11/4/2020  11:15 AM CST  To: Larry Khoury, [facility] Staff, #    Type: Needs Medical Advice  Who Called:  phil    Best Call Back Number: [phone]    Additional Information: caller would like an update on his mother progress over at the surgery center on [address]

## 2020-11-04 NOTE — TRANSFER OF CARE
"Anesthesia Transfer of Care Note    Patient: Shandra Velez    Procedure(s) Performed: Procedure(s) (LRB):  [id number]-CATH (Left)    Patient location: PACU    Anesthesia Type: general    Transport from OR: Transported from OR on room air with adequate spontaneous ventilation    Post pain: adequate analgesia    Post assessment: no apparent anesthetic complications and tolerated procedure well    Post vital signs: stable    Level of consciousness: sedated and responds to stimulation    Nausea/Vomiting: no nausea/vomiting    Complications: none    Transfer of care protocol was followed      Last vitals:   Visit Vitals  /67 (BP Location: Right arm, Patient Position: Lying)   Pulse 78   Temp 36.5 °C (97.7 °F) (Skin)   Resp 18   Ht 5' 5" (1.651 m)   Wt 83.9 kg (185 lb)   SpO2 96%   Breastfeeding No   BMI 30.79 kg/m²     "

## 2020-11-04 NOTE — CARE UPDATE
Ct and Creatinine ordered per dr Pretty. Labs drawn. Patient without complaints. BBS clear. Son notified of need for CT and delay in dc.

## 2020-11-04 NOTE — INTERVAL H&P NOTE
The patient has been examined and the H&P has been reviewed:    I concur with the findings and no changes have occurred since H&P was written.    Surgery risks, benefits and alternative options discussed and understood by patient/family.          Active Hospital Problems    Diagnosis  POA    Grade 1 follicular lymphoma of lymph nodes of neck [C82.01]  Yes      Resolved Hospital Problems   No resolved problems to display.

## 2020-11-04 NOTE — ANESTHESIA PREPROCEDURE EVALUATION
11/04/2020  Shandra Velez is a 80 y.o., female.    Anesthesia Evaluation    I have reviewed the Patient Summary Reports.    I have reviewed the Nursing Notes. I have reviewed the NPO Status.   I have reviewed the Medications.     Review of Systems  Social:  Smoker Smoking Status: Current Every Day Smoker - 26.5 pack years  Smokeless Tobacco Status: Never Used  Alcohol use: No  Drug use: No       Hematology/Oncology:        Hematology Comments: lymphoma   Cardiovascular:   Hypertension    Pulmonary:  Pulmonary Normal    Hepatic/GI:  Hepatic/GI Normal    Musculoskeletal:   Arthritis     Neurological:   Neuromuscular Disease,    Endocrine:  Endocrine Normal    Psych:  Psychiatric Normal                                                                                                                    11/04/2020  Shandra Velez is a 80 y.o., female.    Anesthesia Evaluation    I have reviewed the Patient Summary Reports.    I have reviewed the Nursing Notes. I have reviewed the NPO Status.   I have reviewed the Medications.     Review of Systems  Social:  Smoker    Hematology/Oncology:        Oncology Comments: lymphoma   Cardiovascular:   Hypertension    Pulmonary:  Pulmonary Normal    Renal/:   Renal mass   Hepatic/GI:  Hepatic/GI Normal    Musculoskeletal:   Arthritis     Neurological:  Neurology Normal    Endocrine:  Endocrine Normal        Physical Exam  General:  Obesity    Airway/Jaw/Neck:  Airway Findings: Mouth Opening: Normal Tongue: Normal  General Airway Assessment: Adult  Mallampati: III  Improves to II with phonation.      Dental:  Dental Findings: Upper partial dentures, Lower partial dentures   Chest/Lungs:  Chest/Lungs Findings: Clear to auscultation, Normal Respiratory Rate         Mental Status:  Mental Status Findings:  Cooperative, Alert and Oriented         Anesthesia Plan  Type of  Anesthesia, risks & benefits discussed:  Anesthesia Type:  general  Patient's Preference:   Intra-op Monitoring Plan: standard ASA monitors  Intra-op Monitoring Plan Comments:   Post Op Pain Control Plan:   Post Op Pain Control Plan Comments:   Induction:   IV  Beta Blocker:  Patient is not currently on a Beta-Blocker (No further documentation required).       Informed Consent: Patient understands risks and agrees with Anesthesia plan.  Questions answered. Anesthesia consent signed with patient.  ASA Score: 2     Day of Surgery Review of History & Physical:            Ready For Surgery From Anesthesia Perspective.         Physical Exam  General:  Obesity    Airway/Jaw/Neck:  Airway Findings: Mouth Opening: Normal Tongue: Normal  General Airway Assessment: Adult, Good  Mallampati: II  Improves to II with phonation.  TM Distance: 4-6 cm      Dental:  Dental Findings: In tact   Chest/Lungs:  Chest/Lungs Findings: Clear to auscultation, Normal Respiratory Rate     Heart/Vascular:  Heart Findings: Rate: Normal  Rhythm: Regular Rhythm  Sounds: Normal  Heart murmur: negative       Mental Status:  Mental Status Findings:  Cooperative, Alert and Oriented         Anesthesia Plan  Type of Anesthesia, risks & benefits discussed:  Anesthesia Type:  general  Patient's Preference:   Intra-op Monitoring Plan: standard ASA monitors  Intra-op Monitoring Plan Comments:   Post Op Pain Control Plan:   Post Op Pain Control Plan Comments:   Induction:   IV  Beta Blocker:  Patient is on a Beta-Blocker and has received one dose within the past 24 hours (No further documentation required).       Informed Consent: Patient understands risks and agrees with Anesthesia plan.  Questions answered. Anesthesia consent signed with patient.  ASA Score: 3     Day of Surgery Review of History & Physical: I have interviewed and examined the patient. I have reviewed the patient's H&P dated:  There are no significant changes.   H&P completed by  Anesthesiologist.       Ready For Surgery From Anesthesia Perspective.

## 2020-11-04 NOTE — DISCHARGE INSTRUCTIONS
WOUND CARE    After Surgery    DOS:   May shower in 24 hours   May remove outer dressing in 48 hours. Leave steri-strips in place. If steri-strips get wet, pat dry. If they fall off, do not worry.   Minimal activity for 48 hours.   Advance diet as tolerated.   Resume home medications as prescribed    DONT:   Do not soak in the tub   No driving for 24 hours or while taking narcotic pain medication   DO NOT TAKE ADDITIONAL TYLENOL/ACETAMINOPHEN WHILE TAKING NARCOTIC PAIN MEDICATION THAT CONTAINS TYLENOL/ACETAMINOPHEN.    CALL PHYSICIAN FOR:   Redness, swelling or bleeding.   Fever greater then 101.   Drainage from the incision site that appears infected.   Persistent pain not relieved by pain medication.    RETURN APPOINTMENT: Call to schedule appointment in 10-14 days    FOR EMERGENCIES: Contact your doctor at [phone]

## 2020-11-05 ENCOUNTER — DOCUMENTATION ONLY (OUTPATIENT)
Dept: INFUSION THERAPY | Facility: HOSPITAL | Age: 80
End: 2020-11-05

## 2020-11-05 ENCOUNTER — NUTRITION (OUTPATIENT)
Dept: INFUSION THERAPY | Facility: HOSPITAL | Age: 80
End: 2020-11-05
Attending: INTERNAL MEDICINE
Payer: MEDICARE

## 2020-11-05 VITALS
OXYGEN SATURATION: 96 % | TEMPERATURE: 34 F | BODY MASS INDEX: 33.38 KG/M2 | DIASTOLIC BLOOD PRESSURE: 77 MMHG | RESPIRATION RATE: 16 BRPM | HEART RATE: 73 BPM | SYSTOLIC BLOOD PRESSURE: 156 MMHG | WEIGHT: 200.38 LBS | HEIGHT: 65 IN

## 2020-11-05 DIAGNOSIS — R11.0 NAUSEA: ICD-10-CM

## 2020-11-05 DIAGNOSIS — C82.00 FOLLICULAR LYMPHOMA GRADE I, UNSPECIFIED BODY REGION: Primary | ICD-10-CM

## 2020-11-05 DIAGNOSIS — C82.01 GRADE 1 FOLLICULAR LYMPHOMA OF LYMPH NODES OF NECK: Primary | ICD-10-CM

## 2020-11-05 DIAGNOSIS — E86.0 DEHYDRATION: ICD-10-CM

## 2020-11-05 PROCEDURE — 96375 TX/PRO/DX INJ NEW DRUG ADDON: CPT | Mod: PN

## 2020-11-05 PROCEDURE — 25000003 PHARM REV CODE 250: Mod: PN | Performed by: INTERNAL MEDICINE

## 2020-11-05 PROCEDURE — 96413 CHEMO IV INFUSION 1 HR: CPT | Mod: PN

## 2020-11-05 PROCEDURE — 96376 TX/PRO/DX INJ SAME DRUG ADON: CPT | Mod: PN

## 2020-11-05 PROCEDURE — 96367 TX/PROPH/DG ADDL SEQ IV INF: CPT | Mod: PN

## 2020-11-05 PROCEDURE — 63600175 PHARM REV CODE 636 W HCPCS: Mod: PN | Performed by: INTERNAL MEDICINE

## 2020-11-05 PROCEDURE — 96415 CHEMO IV INFUSION ADDL HR: CPT | Mod: PN

## 2020-11-05 PROCEDURE — 96361 HYDRATE IV INFUSION ADD-ON: CPT | Mod: PN

## 2020-11-05 PROCEDURE — 96411 CHEMO IV PUSH ADDL DRUG: CPT | Mod: PN

## 2020-11-05 PROCEDURE — 96417 CHEMO IV INFUS EACH ADDL SEQ: CPT | Mod: PN

## 2020-11-05 PROCEDURE — A4216 STERILE WATER/SALINE, 10 ML: HCPCS | Mod: PN | Performed by: INTERNAL MEDICINE

## 2020-11-05 RX ORDER — HEPARIN 100 UNIT/ML
500 SYRINGE INTRAVENOUS
Status: DISCONTINUED | OUTPATIENT
Start: 2020-11-05 | End: 2020-11-05 | Stop reason: HOSPADM

## 2020-11-05 RX ORDER — SODIUM CHLORIDE 0.9 % (FLUSH) 0.9 %
10 SYRINGE (ML) INJECTION
Status: DISCONTINUED | OUTPATIENT
Start: 2020-11-05 | End: 2020-11-05 | Stop reason: HOSPADM

## 2020-11-05 RX ORDER — FAMOTIDINE 10 MG/ML
20 INJECTION INTRAVENOUS
Status: COMPLETED | OUTPATIENT
Start: 2020-11-05 | End: 2020-11-05

## 2020-11-05 RX ORDER — SODIUM CHLORIDE 9 MG/ML
INJECTION, SOLUTION INTRAVENOUS ONCE
Status: COMPLETED | OUTPATIENT
Start: 2020-11-05 | End: 2020-11-05

## 2020-11-05 RX ORDER — ACETAMINOPHEN 500 MG
1000 TABLET ORAL
Status: COMPLETED | OUTPATIENT
Start: 2020-11-05 | End: 2020-11-05

## 2020-11-05 RX ORDER — METHYLPREDNISOLONE SOD SUCC 125 MG
125 VIAL (EA) INJECTION ONCE AS NEEDED
Status: COMPLETED | OUTPATIENT
Start: 2020-11-05 | End: 2020-11-05

## 2020-11-05 RX ORDER — PREDNISONE 50 MG/1
100 TABLET ORAL DAILY
Qty: 10 TABLET | Refills: 5 | Status: SHIPPED | OUTPATIENT
Start: 2020-11-05 | End: 2020-11-10

## 2020-11-05 RX ORDER — ONDANSETRON 2 MG/ML
8 INJECTION INTRAMUSCULAR; INTRAVENOUS ONCE
Status: DISCONTINUED | OUTPATIENT
Start: 2020-11-05 | End: 2020-11-05 | Stop reason: HOSPADM

## 2020-11-05 RX ORDER — FAMOTIDINE 10 MG/ML
20 INJECTION INTRAVENOUS ONCE AS NEEDED
Status: COMPLETED | OUTPATIENT
Start: 2020-11-05 | End: 2020-11-05

## 2020-11-05 RX ORDER — MEPERIDINE HYDROCHLORIDE 25 MG/ML
25 INJECTION INTRAMUSCULAR; INTRAVENOUS; SUBCUTANEOUS EVERY 30 MIN PRN
Status: DISCONTINUED | OUTPATIENT
Start: 2020-11-05 | End: 2020-11-05 | Stop reason: HOSPADM

## 2020-11-05 RX ORDER — DIPHENHYDRAMINE HYDROCHLORIDE 50 MG/ML
50 INJECTION INTRAMUSCULAR; INTRAVENOUS ONCE AS NEEDED
Status: COMPLETED | OUTPATIENT
Start: 2020-11-05 | End: 2020-11-05

## 2020-11-05 RX ADMIN — SODIUM CHLORIDE: 9 INJECTION, SOLUTION INTRAVENOUS at 09:11

## 2020-11-05 RX ADMIN — FAMOTIDINE 20 MG: 10 INJECTION INTRAVENOUS at 11:11

## 2020-11-05 RX ADMIN — Medication 10 ML: at 08:11

## 2020-11-05 RX ADMIN — HEPARIN 500 UNITS: 100 SYRINGE at 08:11

## 2020-11-05 RX ADMIN — SODIUM CHLORIDE 2000 ML: 9 INJECTION, SOLUTION INTRAVENOUS at 12:11

## 2020-11-05 RX ADMIN — METHYLPREDNISOLONE SODIUM SUCCINATE 125 MG: 125 INJECTION, POWDER, FOR SOLUTION INTRAMUSCULAR; INTRAVENOUS at 12:11

## 2020-11-05 RX ADMIN — SODIUM CHLORIDE 700 MG: 0.9 INJECTION, SOLUTION INTRAVENOUS at 11:11

## 2020-11-05 RX ADMIN — ACETAMINOPHEN 1000 MG: 500 TABLET, FILM COATED ORAL at 11:11

## 2020-11-05 RX ADMIN — DEXAMETHASONE SODIUM PHOSPHATE: 4 INJECTION, SOLUTION INTRA-ARTICULAR; INTRALESIONAL; INTRAMUSCULAR; INTRAVENOUS; SOFT TISSUE at 06:11

## 2020-11-05 RX ADMIN — DIPHENHYDRAMINE HYDROCHLORIDE 50 MG: 50 INJECTION INTRAMUSCULAR; INTRAVENOUS at 12:11

## 2020-11-05 RX ADMIN — VINCRISTINE SULFATE 2 MG: 1 INJECTION, SOLUTION INTRAVENOUS at 06:11

## 2020-11-05 RX ADMIN — DIPHENHYDRAMINE HYDROCHLORIDE 50 MG: 50 INJECTION, SOLUTION INTRAMUSCULAR; INTRAVENOUS at 11:11

## 2020-11-05 RX ADMIN — FAMOTIDINE 20 MG: 10 INJECTION, SOLUTION INTRAVENOUS at 12:11

## 2020-11-05 NOTE — PROGRESS NOTES
Oncology Nutrition   Chemotherapy School Education  Shandra Velez   1940    Nutrition Education   This is a 80 y.o.female with a medical diagnosis of NHL, follicular lymphoma of lymph nodes of neck. Met with pt today during cycle one of infusion for new patient education. Reviewed role of Nutrition during cancer treatment. Educated on food safety and common nutrition impact symptoms associated with chemotherapy treatment. Reinforced the importance of good hydration.     Answered all nutrition related questions.     Patient provided with dietitian contact number and advised to call with questions or make future appointment if further intervention is needed. RD to follow up throughout tx.    YARA RIOS MA, RD, LDN  11/05/2020  2:40 PM

## 2020-11-05 NOTE — PROGRESS NOTES
"  SW met with pt to introduce self and explain role. Pt visibly anxious regarding start of tx but pleasant. Pt did ask SW about concerns regarding hair loss. SW explained wigs are available. Pt requesting SW "prepare" her for what to expect for the rest of tx. SW explained that each person's course is different but explained that the care team will be with her to help with any issues or needs she may be having. Pt lives at home alone since the passing of her spouse, Shaquille, but is supported by her children and grandchildren. Pt has 3 children, 9 grandchildren, and 9 great grandchildren. She lives [place]door to her brother and MONSTER who check on her often. She denies any DME or ADL needs at this time. SW discussed with pt her previous distress screen which was listed as a "6." Pt explains that that was when she didn't know what to expect but now that she is in the chair her distress is a "0." SW discussed how the uncertainty of treatment can be anxiety inducing but again encouraged pt to lean on care team. SW provided support to pt and encouraged her to reach out with any needs. IBAN to follow.     1430: Pt did experience reaction to chemo tx. SW sat with pt to confirm she was OK after initial fluids started. SW offered to call pt's son  But she declined. Pt verbalized feeling much better and discussed no further needs. SW to follow.   "

## 2020-11-05 NOTE — PROGRESS NOTES
Pt 15 minutes into ruxience infusion, c/o dizziness, hot, nauseated, denies SOB or CP.  BP 66/32 HR 75.  Called MD samano, spoke with Nancy, message relayed to Dr. Montero, ordered 2L fluid, 8mg zofran, give hypersensitivity kit.  BP improved to 90s/50s.  Dr. Montero came to chairside to eval patient, advised continue fluid until BP 130s then can rechallenge.  Pt and nurse aware and agreeable with plan.    1400 Called and spoke with Hannah, pt remains 120s systolic for last 45 minutes, per Dr. Montero ok to proceed with ruxience at half rate per protocol.

## 2020-11-06 DIAGNOSIS — R11.0 NAUSEA: ICD-10-CM

## 2020-11-06 DIAGNOSIS — T45.1X5A CINV (CHEMOTHERAPY-INDUCED NAUSEA AND VOMITING): Primary | ICD-10-CM

## 2020-11-06 DIAGNOSIS — R11.2 CINV (CHEMOTHERAPY-INDUCED NAUSEA AND VOMITING): Primary | ICD-10-CM

## 2020-11-06 RX ORDER — ONDANSETRON HYDROCHLORIDE 8 MG/1
8 TABLET, FILM COATED ORAL EVERY 8 HOURS PRN
Qty: 30 TABLET | Refills: 1 | Status: ON HOLD | OUTPATIENT
Start: 2020-11-06 | End: 2023-04-03 | Stop reason: HOSPADM

## 2020-11-06 NOTE — TELEPHONE ENCOUNTER
----- Message from Joslyn Neely sent at 11/6/2020  8:10 AM CST -----  Regarding: refill  Contact: [phone]  Type:  RX Refill Request    Who Called: pharmacy   Refill or New Rx:refill  RX Name and Strength:ondansetron (ZOFRAN) 8 MG tablet 30 tablet   Preferred Pharmacy with phone number:[address] [phone] (Phone)  [phone] (Fax)  Local or Mail Order:local   Ordering Provider:  Would the patient rather a call back or a response via [facility]? Call back   Best Call Back Number:[phone]  Additional Information: Pharmacy called to request that the RX be changed to a dissolvable tablet. Pharmacy call yesterday

## 2020-11-06 NOTE — PLAN OF CARE
See nurses note regarding Rituxan infusion reaction.  Pt tolerated remainder of Rituxan infusion well.  Reviewed management of side effects of treatment regimen.  Encouraged pt to increase oral fluid intake for the next several days as tolerated.  Reminded pt to take her Prednisone daily for the next 4 days and to take her Allopurinol.  Instructed to call MD with any problems.

## 2020-11-06 NOTE — NURSING
Pt came in for 1st R-CVP.  Pt given IVF's and pre-meds as ordered.  Rituxan started at 50cc/hr.  Approximately 14 minutes after starting Rituxan, pt c/o feeling warm and lightheaded.  Rituxan stopped immediately. B/P 67/39, HR 58, pulse ox 89%.  No SOB, rigors noted.  Oxygen per nasal cannula applied at 2L/min.  Hypersensitivity kit given as ordered and 2L NS at 999cc/hr started.  Pt's B/P slowly started to increase and pulse ox increased to 98% with oxygen.  Dr. Montero at chairside to exam pt.  Order received to resume Rituxan at 25cc/hr and then titrate as ordered as pt tolerates if B/P systolic gets to 120.   Rituxan restarted as ordered and pt tolerated the remainder of Rituxan.     Dressing (No Sutures): dry sterile dressing

## 2020-11-06 NOTE — TELEPHONE ENCOUNTER
----- Message from Joslyn Neely sent at 11/6/2020  8:13 AM CST -----  Regarding: Refill  Contact: [phone]  Type:  RX Refill Request    Who Called: pt  Refill or New Rx: Refill   RX Name and Strength:ondansetron (ZOFRAN) 8 MG tablet 30 tablet   How is the patient currently taking it? (ex. 1XDay):  Is this a 30 day or 90 day RX:  Preferred Pharmacy with phone number:[address] [phone] (Phone)  [phone] (Fax)  Local or Mail Order: local   Would the patient rather a call back or a response via [facility]? Call back   Best Call Back Number: pharmacy is calling to get a dissolvable.    Additional Information

## 2020-11-08 RX ORDER — ONDANSETRON 8 MG/1
8 TABLET, ORALLY DISINTEGRATING ORAL EVERY 8 HOURS PRN
Qty: 30 TABLET | Refills: 1 | Status: SHIPPED | OUTPATIENT
Start: 2020-11-08 | End: 2021-11-08

## 2020-11-11 ENCOUNTER — TELEPHONE (OUTPATIENT)
Dept: HEMATOLOGY/ONCOLOGY | Facility: CLINIC | Age: 80
End: 2020-11-11

## 2020-11-11 NOTE — TELEPHONE ENCOUNTER
Patient called complaining of sinus pain around eyes and headache.  No fever, drainage, or cough.  As per Dr. Montero, patient instructed to start taking Sudafed-D.  Also, encouraged patient to drink fluids, patient verbalized understanding.

## 2020-11-23 ENCOUNTER — LAB VISIT (OUTPATIENT)
Dept: LAB | Facility: HOSPITAL | Age: 80
End: 2020-11-23
Attending: PEDIATRICS
Payer: MEDICARE

## 2020-11-23 DIAGNOSIS — E55.9 VITAMIN D DEFICIENCY: ICD-10-CM

## 2020-11-23 DIAGNOSIS — R53.83 FATIGUE, UNSPECIFIED TYPE: ICD-10-CM

## 2020-11-23 DIAGNOSIS — R63.0 LOSS OF APPETITE: ICD-10-CM

## 2020-11-23 DIAGNOSIS — R61 NIGHT SWEAT: ICD-10-CM

## 2020-11-23 DIAGNOSIS — C82.01 GRADE 1 FOLLICULAR LYMPHOMA OF LYMPH NODES OF NECK: Primary | ICD-10-CM

## 2020-11-23 DIAGNOSIS — C82.90 FOLLICULAR LYMPHOMA, UNSPECIFIED FOLLICULAR LYMPHOMA TYPE, UNSPECIFIED BODY REGION: ICD-10-CM

## 2020-11-23 DIAGNOSIS — N28.89 LEFT RENAL MASS: ICD-10-CM

## 2020-11-23 LAB
ALBUMIN SERPL BCP-MCNC: 3.9 G/DL (ref 3.5–5.2)
ALP SERPL-CCNC: 94 U/L (ref 38–145)
ALT SERPL W/O P-5'-P-CCNC: 10 U/L (ref 0–35)
ANION GAP SERPL CALC-SCNC: 7 MMOL/L (ref 8–16)
AST SERPL-CCNC: 31 U/L (ref 14–36)
BASOPHILS # BLD AUTO: 0.03 K/UL (ref 0–0.2)
BASOPHILS NFR BLD: 0.6 % (ref 0–1.9)
BILIRUB SERPL-MCNC: 0.5 MG/DL (ref 0.2–1.3)
BUN SERPL-MCNC: 30 MG/DL (ref 7–18)
CALCIUM SERPL-MCNC: 9.5 MG/DL (ref 8.4–10.2)
CHLORIDE SERPL-SCNC: 105 MMOL/L (ref 95–110)
CO2 SERPL-SCNC: 25 MMOL/L (ref 22–31)
CREAT SERPL-MCNC: 1.27 MG/DL (ref 0.5–1.4)
DIFFERENTIAL METHOD: ABNORMAL
EOSINOPHIL # BLD AUTO: 0.1 K/UL (ref 0–0.5)
EOSINOPHIL NFR BLD: 2.7 % (ref 0–8)
ERYTHROCYTE [DISTWIDTH] IN BLOOD BY AUTOMATED COUNT: 14.8 % (ref 11.5–14.5)
EST. GFR  (AFRICAN AMERICAN): 47 ML/MIN/1.73 M^2
EST. GFR  (NON AFRICAN AMERICAN): 41 ML/MIN/1.73 M^2
GLUCOSE SERPL-MCNC: 107 MG/DL (ref 70–110)
HCT VFR BLD AUTO: 36 % (ref 37–48.5)
HGB BLD-MCNC: 11.8 G/DL (ref 12–16)
IMM GRANULOCYTES # BLD AUTO: 0.04 K/UL (ref 0–0.04)
IMM GRANULOCYTES NFR BLD AUTO: 0.8 % (ref 0–0.5)
LDH SERPL L TO P-CCNC: 514 U/L (ref 313–618)
LYMPHOCYTES # BLD AUTO: 1.8 K/UL (ref 1–4.8)
LYMPHOCYTES NFR BLD: 36.2 % (ref 18–48)
MAGNESIUM SERPL-MCNC: 1.3 MG/DL (ref 1.6–2.6)
MCH RBC QN AUTO: 29.5 PG (ref 27–31)
MCHC RBC AUTO-ENTMCNC: 32.8 G/DL (ref 32–36)
MCV RBC AUTO: 90 FL (ref 82–98)
MONOCYTES # BLD AUTO: 0.6 K/UL (ref 0.3–1)
MONOCYTES NFR BLD: 11.5 % (ref 4–15)
NEUTROPHILS # BLD AUTO: 2.3 K/UL (ref 1.8–7.7)
NEUTROPHILS NFR BLD: 48.2 % (ref 38–73)
NRBC BLD-RTO: 0 /100 WBC
PLATELET # BLD AUTO: 106 K/UL (ref 150–350)
PMV BLD AUTO: 10 FL (ref 9.2–12.9)
POTASSIUM SERPL-SCNC: 4.7 MMOL/L (ref 3.5–5.1)
PROT SERPL-MCNC: 6.7 G/DL (ref 6–8.4)
RBC # BLD AUTO: 4 M/UL (ref 4–5.4)
SODIUM SERPL-SCNC: 137 MMOL/L (ref 136–145)
WBC # BLD AUTO: 4.86 K/UL (ref 3.9–12.7)

## 2020-11-23 PROCEDURE — 85025 COMPLETE CBC W/AUTO DIFF WBC: CPT | Mod: PN

## 2020-11-23 PROCEDURE — 36415 COLL VENOUS BLD VENIPUNCTURE: CPT | Mod: PN

## 2020-11-23 PROCEDURE — 85025 COMPLETE CBC W/AUTO DIFF WBC: CPT

## 2020-11-23 PROCEDURE — 83735 ASSAY OF MAGNESIUM: CPT

## 2020-11-23 PROCEDURE — 80053 COMPREHEN METABOLIC PANEL: CPT

## 2020-11-23 PROCEDURE — 83735 ASSAY OF MAGNESIUM: CPT | Mod: PN

## 2020-11-23 PROCEDURE — 82232 ASSAY OF BETA-2 PROTEIN: CPT

## 2020-11-23 PROCEDURE — 83615 LACTATE (LD) (LDH) ENZYME: CPT | Mod: PN

## 2020-11-23 PROCEDURE — 83615 LACTATE (LD) (LDH) ENZYME: CPT

## 2020-11-23 PROCEDURE — 80053 COMPREHEN METABOLIC PANEL: CPT | Mod: PN

## 2020-11-23 RX ORDER — PREDNISONE 50 MG/1
100 TABLET ORAL DAILY
Qty: 10 TABLET | Refills: 5 | Status: SHIPPED | OUTPATIENT
Start: 2020-11-23 | End: 2021-07-07 | Stop reason: CLARIF

## 2020-11-24 ENCOUNTER — PATIENT MESSAGE (OUTPATIENT)
Dept: HEMATOLOGY/ONCOLOGY | Facility: CLINIC | Age: 80
End: 2020-11-24

## 2020-11-24 ENCOUNTER — OFFICE VISIT (OUTPATIENT)
Dept: HEMATOLOGY/ONCOLOGY | Facility: CLINIC | Age: 80
End: 2020-11-24
Payer: MEDICARE

## 2020-11-24 VITALS
HEIGHT: 65 IN | HEART RATE: 87 BPM | TEMPERATURE: 98 F | DIASTOLIC BLOOD PRESSURE: 91 MMHG | OXYGEN SATURATION: 99 % | BODY MASS INDEX: 33.38 KG/M2 | WEIGHT: 200.38 LBS | RESPIRATION RATE: 18 BRPM | SYSTOLIC BLOOD PRESSURE: 142 MMHG

## 2020-11-24 DIAGNOSIS — R61 NIGHT SWEAT: ICD-10-CM

## 2020-11-24 DIAGNOSIS — N28.89 LEFT RENAL MASS: ICD-10-CM

## 2020-11-24 DIAGNOSIS — E55.9 VITAMIN D DEFICIENCY: ICD-10-CM

## 2020-11-24 DIAGNOSIS — C82.01 GRADE 1 FOLLICULAR LYMPHOMA OF LYMPH NODES OF NECK: Primary | ICD-10-CM

## 2020-11-24 LAB — B2 MICROGLOB SERPL-MCNC: 7.4 UG/ML (ref 0–2.5)

## 2020-11-24 PROCEDURE — 1159F MED LIST DOCD IN RCRD: CPT | Mod: S$GLB,,, | Performed by: INTERNAL MEDICINE

## 2020-11-24 PROCEDURE — 3078F PR MOST RECENT DIASTOLIC BLOOD PRESSURE < 80 MM HG: ICD-10-PCS | Mod: CPTII,S$GLB,, | Performed by: INTERNAL MEDICINE

## 2020-11-24 PROCEDURE — 99999 PR PBB SHADOW E&M-EST. PATIENT-LVL III: ICD-10-PCS | Mod: PBBFAC,,, | Performed by: INTERNAL MEDICINE

## 2020-11-24 PROCEDURE — 99214 OFFICE O/P EST MOD 30 MIN: CPT | Mod: S$GLB,,, | Performed by: INTERNAL MEDICINE

## 2020-11-24 PROCEDURE — 1126F PR PAIN SEVERITY QUANTIFIED, NO PAIN PRESENT: ICD-10-PCS | Mod: S$GLB,,, | Performed by: INTERNAL MEDICINE

## 2020-11-24 PROCEDURE — 99214 PR OFFICE/OUTPT VISIT, EST, LEVL IV, 30-39 MIN: ICD-10-PCS | Mod: S$GLB,,, | Performed by: INTERNAL MEDICINE

## 2020-11-24 PROCEDURE — 3288F FALL RISK ASSESSMENT DOCD: CPT | Mod: CPTII,S$GLB,, | Performed by: INTERNAL MEDICINE

## 2020-11-24 PROCEDURE — 1101F PR PT FALLS ASSESS DOC 0-1 FALLS W/OUT INJ PAST YR: ICD-10-PCS | Mod: CPTII,S$GLB,, | Performed by: INTERNAL MEDICINE

## 2020-11-24 PROCEDURE — 3077F PR MOST RECENT SYSTOLIC BLOOD PRESSURE >= 140 MM HG: ICD-10-PCS | Mod: CPTII,S$GLB,, | Performed by: INTERNAL MEDICINE

## 2020-11-24 PROCEDURE — 3288F PR FALLS RISK ASSESSMENT DOCUMENTED: ICD-10-PCS | Mod: CPTII,S$GLB,, | Performed by: INTERNAL MEDICINE

## 2020-11-24 PROCEDURE — 99999 PR PBB SHADOW E&M-EST. PATIENT-LVL III: CPT | Mod: PBBFAC,,, | Performed by: INTERNAL MEDICINE

## 2020-11-24 PROCEDURE — 1159F PR MEDICATION LIST DOCUMENTED IN MEDICAL RECORD: ICD-10-PCS | Mod: S$GLB,,, | Performed by: INTERNAL MEDICINE

## 2020-11-24 PROCEDURE — 3077F SYST BP >= 140 MM HG: CPT | Mod: CPTII,S$GLB,, | Performed by: INTERNAL MEDICINE

## 2020-11-24 PROCEDURE — 1101F PT FALLS ASSESS-DOCD LE1/YR: CPT | Mod: CPTII,S$GLB,, | Performed by: INTERNAL MEDICINE

## 2020-11-24 PROCEDURE — 1126F AMNT PAIN NOTED NONE PRSNT: CPT | Mod: S$GLB,,, | Performed by: INTERNAL MEDICINE

## 2020-11-24 PROCEDURE — 3078F DIAST BP <80 MM HG: CPT | Mod: CPTII,S$GLB,, | Performed by: INTERNAL MEDICINE

## 2020-11-24 RX ORDER — SODIUM CHLORIDE 0.9 % (FLUSH) 0.9 %
10 SYRINGE (ML) INJECTION
Status: CANCELLED | OUTPATIENT
Start: 2020-11-25

## 2020-11-24 RX ORDER — ACETAMINOPHEN 500 MG
1000 TABLET ORAL
Status: CANCELLED | OUTPATIENT
Start: 2020-11-25

## 2020-11-24 RX ORDER — HEPARIN 100 UNIT/ML
500 SYRINGE INTRAVENOUS
Status: CANCELLED | OUTPATIENT
Start: 2020-11-25

## 2020-11-24 RX ORDER — FAMOTIDINE 10 MG/ML
20 INJECTION INTRAVENOUS ONCE AS NEEDED
Status: CANCELLED
Start: 2020-11-25

## 2020-11-24 RX ORDER — FAMOTIDINE 10 MG/ML
20 INJECTION INTRAVENOUS
Status: CANCELLED | OUTPATIENT
Start: 2020-11-25

## 2020-11-24 RX ORDER — METHYLPREDNISOLONE SOD SUCC 125 MG
125 VIAL (EA) INJECTION ONCE AS NEEDED
Status: CANCELLED | OUTPATIENT
Start: 2020-11-25

## 2020-11-24 RX ORDER — DIPHENHYDRAMINE HYDROCHLORIDE 50 MG/ML
50 INJECTION INTRAMUSCULAR; INTRAVENOUS ONCE AS NEEDED
Status: CANCELLED | OUTPATIENT
Start: 2020-11-25

## 2020-11-24 RX ORDER — MEPERIDINE HYDROCHLORIDE 50 MG/ML
25 INJECTION INTRAMUSCULAR; INTRAVENOUS; SUBCUTANEOUS EVERY 30 MIN PRN
Status: CANCELLED | OUTPATIENT
Start: 2020-11-25

## 2020-11-24 NOTE — PROGRESS NOTES
History of present illness:  The patient is a 80-year-old white female who presents in consultation from Dr. Chavez regarding newly diagnosed non-Hodgkin's lymphoma.  Patient was in her usual state of health but developed severe/debilitating low pain and lumbar radiculopathy after scrubbing her screened porch.  An MRI was obtained and intra-abdominal lymphadenopathy as well as a complex left renal was noted.  Patient subsequently underwent dedicated CT scan of the abdomen pelvis as well as image guided needle biopsy of a right inguinal lymph node.  Patient was found have grade 1 follicular lymphoma.  As the patient was asymptomatic, she was placed on a watch wait strategy.    Patient returns to clinic for a 3 month re-evaluation.  Patient has been feeling poorly the last 2 months.  She began to experience dyspepsia and loss appetite.  She discontinued her vitamin-D supplement without improvement in symptoms.  She reports increase in night sweats/hot flashes.  This occurs no less than 5 times per night and causes her to awaken sleep.  She has episodes of the daytime as well, but these are less frequent.  Patient's weight is relatively stable.  She denies painful lymphadenopathy.  Patient is experiencing some central back pain but denies trauma or any palpable lesion in the area.      Returns to clinic for re-evaluation prior 2nd cycle of CVP-R.  First cycle was complicated by Rituxan infusion reaction which resolved and not recurred upon rechallenge.  Patient is feeling much better.  Night sweats have ceased.  Hepatitis improved.  No other complaints for pertinent findings on a 14 point review systems.    Physical examination:  Well-developed, obese, elderly, white female, in no acute distress, who has a weight of 200.5 lb (decreased by 3 lb)  VITAL SIGNS: Documented  and reviewed this visit.  HEENT: Normocephalic, atraumatic. Oral mucosa pink and moist. Lips without   lesions. Tongue midline. Oropharynx clear.  Nonicteric sclerae.   NECK: Supple, bilateral supraclavicular adenopathy right internal jugular chain lymphadenopathy.   HEART: Regular rate and rhythm without murmur, gallop or rub.   LUNGS:  Mild, bibasilar crackles bilaterally. Normal respiratory effort.   ABDOMEN: Soft, nontender, nondistended with positive normoactive bowel sounds,   no hepatosplenomegaly.   EXTREMITIES: No cyanosis or clubbing appreciated.  Patient has 1+, bilateral ankle edema.  Distal pulses are intact.   AXILLAE AND GROIN:  Small, palpable left inguinal lymphadenopathy is appreciated.   SKIN: Intact/turgor normal.  Palpable subcutaneous nodules about the abdominal wall consistent with lymph node involvement.   NEUROLOGIC: Cranial nerves II-XII grossly intact. Motor: Good muscle bulk and   tone. Strength/sensory 5/5 throughout. Gait stable.     Laboratory:  White count 4.9, hemoglobin 11.8, hematocrit 36, platelets 106, absolute neutrophil count is 2300.  Sodium 137, potassium 4.7, chloride 105, CO2 25, BUN 30, creatinine 1.3, glucose 107, calcium 9.5, magnesium 1.3, liver function test within normal limits, , GFR 41.  Beta 2 microglobulin 7.4.    Impression:  1.  Grade 1 follicular non-Hodgkin's lymphoma - Stage Rilye-progressive/symptomatic.  2.  Complex left renal mass.  3.  Vitamin-D deficiency.  4.  Thrombocytopenia.  5.  Hypomagnesemia.    Plan:  1.  Proceed with cycle 2 of therapy as scheduled.  2.  Replace magnesium.  3.  Return to clinic in 3 weeks with CBC, CMP, LDH, magnesium, and beta 2.    This note was created using voice recognition software and may contain grammatical errors.

## 2020-11-25 ENCOUNTER — DOCUMENTATION ONLY (OUTPATIENT)
Dept: INFUSION THERAPY | Facility: HOSPITAL | Age: 80
End: 2020-11-25

## 2020-11-25 ENCOUNTER — INFUSION (OUTPATIENT)
Dept: INFUSION THERAPY | Facility: HOSPITAL | Age: 80
End: 2020-11-25
Attending: INTERNAL MEDICINE
Payer: MEDICARE

## 2020-11-25 ENCOUNTER — PATIENT MESSAGE (OUTPATIENT)
Dept: FAMILY MEDICINE | Facility: CLINIC | Age: 80
End: 2020-11-25

## 2020-11-25 VITALS
BODY MASS INDEX: 32.73 KG/M2 | HEIGHT: 65 IN | RESPIRATION RATE: 16 BRPM | WEIGHT: 196.44 LBS | SYSTOLIC BLOOD PRESSURE: 140 MMHG | DIASTOLIC BLOOD PRESSURE: 93 MMHG | TEMPERATURE: 97 F | HEART RATE: 88 BPM

## 2020-11-25 DIAGNOSIS — C82.00 FOLLICULAR LYMPHOMA GRADE I, UNSPECIFIED BODY REGION: Primary | ICD-10-CM

## 2020-11-25 PROCEDURE — 96417 CHEMO IV INFUS EACH ADDL SEQ: CPT | Mod: PN

## 2020-11-25 PROCEDURE — 96367 TX/PROPH/DG ADDL SEQ IV INF: CPT | Mod: PN

## 2020-11-25 PROCEDURE — 96375 TX/PRO/DX INJ NEW DRUG ADDON: CPT | Mod: PN

## 2020-11-25 PROCEDURE — A4216 STERILE WATER/SALINE, 10 ML: HCPCS | Mod: PN | Performed by: INTERNAL MEDICINE

## 2020-11-25 PROCEDURE — 63600175 PHARM REV CODE 636 W HCPCS: Mod: PN | Performed by: INTERNAL MEDICINE

## 2020-11-25 PROCEDURE — 96415 CHEMO IV INFUSION ADDL HR: CPT | Mod: PN

## 2020-11-25 PROCEDURE — 25000003 PHARM REV CODE 250: Mod: PN | Performed by: INTERNAL MEDICINE

## 2020-11-25 PROCEDURE — 96411 CHEMO IV PUSH ADDL DRUG: CPT | Mod: PN

## 2020-11-25 PROCEDURE — 96368 THER/DIAG CONCURRENT INF: CPT | Mod: PN

## 2020-11-25 PROCEDURE — 96413 CHEMO IV INFUSION 1 HR: CPT | Mod: PN

## 2020-11-25 RX ORDER — HEPARIN 100 UNIT/ML
500 SYRINGE INTRAVENOUS
Status: DISCONTINUED | OUTPATIENT
Start: 2020-11-25 | End: 2020-11-25 | Stop reason: HOSPADM

## 2020-11-25 RX ORDER — SODIUM CHLORIDE 0.9 % (FLUSH) 0.9 %
10 SYRINGE (ML) INJECTION
Status: DISCONTINUED | OUTPATIENT
Start: 2020-11-25 | End: 2020-11-25 | Stop reason: HOSPADM

## 2020-11-25 RX ORDER — ACETAMINOPHEN 500 MG
1000 TABLET ORAL
Status: COMPLETED | OUTPATIENT
Start: 2020-11-25 | End: 2020-11-25

## 2020-11-25 RX ORDER — PREDNISONE 50 MG/1
100 TABLET ORAL DAILY
Qty: 10 TABLET | Refills: 5 | Status: SHIPPED | OUTPATIENT
Start: 2020-11-25 | End: 2020-11-30

## 2020-11-25 RX ORDER — FAMOTIDINE 10 MG/ML
20 INJECTION INTRAVENOUS
Status: COMPLETED | OUTPATIENT
Start: 2020-11-25 | End: 2020-11-25

## 2020-11-25 RX ADMIN — SODIUM CHLORIDE 700 MG: 0.9 INJECTION, SOLUTION INTRAVENOUS at 10:11

## 2020-11-25 RX ADMIN — ACETAMINOPHEN 1000 MG: 500 TABLET, FILM COATED ORAL at 10:11

## 2020-11-25 RX ADMIN — SODIUM CHLORIDE: 9 INJECTION, SOLUTION INTRAVENOUS at 10:11

## 2020-11-25 RX ADMIN — DIPHENHYDRAMINE HYDROCHLORIDE 50 MG: 50 INJECTION, SOLUTION INTRAMUSCULAR; INTRAVENOUS at 10:11

## 2020-11-25 RX ADMIN — CYCLOPHOSPHAMIDE 1600 MG: 200 INJECTION, SOLUTION INTRAVENOUS at 02:11

## 2020-11-25 RX ADMIN — DEXAMETHASONE SODIUM PHOSPHATE: 4 INJECTION, SOLUTION INTRA-ARTICULAR; INTRALESIONAL; INTRAMUSCULAR; INTRAVENOUS; SOFT TISSUE at 01:11

## 2020-11-25 RX ADMIN — VINCRISTINE SULFATE 2 MG: 1 INJECTION, SOLUTION INTRAVENOUS at 01:11

## 2020-11-25 RX ADMIN — MAGNESIUM SULFATE HEPTAHYDRATE: 500 INJECTION, SOLUTION INTRAMUSCULAR; INTRAVENOUS at 10:11

## 2020-11-25 RX ADMIN — FAMOTIDINE 20 MG: 10 INJECTION INTRAVENOUS at 10:11

## 2020-11-25 RX ADMIN — HEPARIN 500 UNITS: 100 SYRINGE at 03:11

## 2020-11-25 RX ADMIN — Medication 10 ML: at 03:11

## 2020-11-25 NOTE — PROGRESS NOTES
SW met with pt to check in and assess for social service needs. Pt is requesting assistance with wigs as her previous wigs did not work for her. SW assisted with this and provided bag to bring home. Pt denies any additional needs and states she is looking forward to seeing her family tomorrow. SW provided support and encouraged pt to reach out with any needs. SW to follow.

## 2020-11-25 NOTE — PLAN OF CARE
Pt tolerated R-CVP well.  No s/s of infuson reaction noted.  IV Magnesium given as ordered. Instructed to call MD with any problems.

## 2020-12-08 ENCOUNTER — LAB VISIT (OUTPATIENT)
Dept: LAB | Facility: HOSPITAL | Age: 80
End: 2020-12-08
Attending: INTERNAL MEDICINE
Payer: MEDICARE

## 2020-12-08 DIAGNOSIS — E55.9 VITAMIN D DEFICIENCY: ICD-10-CM

## 2020-12-08 DIAGNOSIS — R61 NIGHT SWEAT: ICD-10-CM

## 2020-12-08 DIAGNOSIS — N28.89 LEFT RENAL MASS: ICD-10-CM

## 2020-12-08 DIAGNOSIS — C82.01 GRADE 1 FOLLICULAR LYMPHOMA OF LYMPH NODES OF NECK: ICD-10-CM

## 2020-12-08 LAB
ALBUMIN SERPL BCP-MCNC: 3.7 G/DL (ref 3.5–5.2)
ALP SERPL-CCNC: 79 U/L (ref 38–145)
ALT SERPL W/O P-5'-P-CCNC: 9 U/L (ref 0–35)
ANION GAP SERPL CALC-SCNC: 7 MMOL/L (ref 8–16)
AST SERPL-CCNC: 26 U/L (ref 14–36)
B2 MICROGLOB SERPL-MCNC: 4.2 UG/ML (ref 0–2.5)
BASOPHILS # BLD AUTO: 0.03 K/UL (ref 0–0.2)
BASOPHILS NFR BLD: 1.2 % (ref 0–1.9)
BILIRUB SERPL-MCNC: 0.3 MG/DL (ref 0.2–1.3)
CALCIUM SERPL-MCNC: 9.4 MG/DL (ref 8.4–10.2)
CHLORIDE SERPL-SCNC: 106 MMOL/L (ref 95–110)
CO2 SERPL-SCNC: 26 MMOL/L (ref 22–31)
CREAT SERPL-MCNC: 1.28 MG/DL (ref 0.5–1.4)
DIFFERENTIAL METHOD: ABNORMAL
EOSINOPHIL # BLD AUTO: 0.1 K/UL (ref 0–0.5)
EOSINOPHIL NFR BLD: 4.8 % (ref 0–8)
ERYTHROCYTE [DISTWIDTH] IN BLOOD BY AUTOMATED COUNT: 14.1 % (ref 11.5–14.5)
EST. GFR  (AFRICAN AMERICAN): 46 ML/MIN/1.73 M^2
EST. GFR  (NON AFRICAN AMERICAN): 40 ML/MIN/1.73 M^2
GLUCOSE SERPL-MCNC: 125 MG/DL (ref 70–110)
HCT VFR BLD AUTO: 35 % (ref 37–48.5)
HGB BLD-MCNC: 11.1 G/DL (ref 12–16)
IMM GRANULOCYTES # BLD AUTO: 0.02 K/UL (ref 0–0.04)
IMM GRANULOCYTES NFR BLD AUTO: 0.8 % (ref 0–0.5)
LDH SERPL L TO P-CCNC: 408 U/L (ref 313–618)
LYMPHOCYTES # BLD AUTO: 1.1 K/UL (ref 1–4.8)
LYMPHOCYTES NFR BLD: 44.6 % (ref 18–48)
MAGNESIUM SERPL-MCNC: 1.2 MG/DL (ref 1.6–2.6)
MCH RBC QN AUTO: 28.7 PG (ref 27–31)
MCHC RBC AUTO-ENTMCNC: 31.7 G/DL (ref 32–36)
MCV RBC AUTO: 90 FL (ref 82–98)
MONOCYTES # BLD AUTO: 0.6 K/UL (ref 0.3–1)
MONOCYTES NFR BLD: 22.5 % (ref 4–15)
NEUTROPHILS # BLD AUTO: 0.6 K/UL (ref 1.8–7.7)
NEUTROPHILS NFR BLD: 26.1 % (ref 38–73)
NRBC BLD-RTO: 0 /100 WBC
PLATELET # BLD AUTO: 144 K/UL (ref 150–350)
PMV BLD AUTO: 10.5 FL (ref 9.2–12.9)
POTASSIUM SERPL-SCNC: 4.3 MMOL/L (ref 3.5–5.1)
PROT SERPL-MCNC: 6.4 G/DL (ref 6–8.4)
RBC # BLD AUTO: 3.87 M/UL (ref 4–5.4)
SODIUM SERPL-SCNC: 139 MMOL/L (ref 136–145)
UUN UR-MCNC: 32 MG/DL (ref 7–18)
WBC # BLD AUTO: 2.49 K/UL (ref 3.9–12.7)

## 2020-12-08 PROCEDURE — 80053 COMPREHEN METABOLIC PANEL: CPT

## 2020-12-08 PROCEDURE — 83735 ASSAY OF MAGNESIUM: CPT

## 2020-12-08 PROCEDURE — 85025 COMPLETE CBC W/AUTO DIFF WBC: CPT

## 2020-12-08 PROCEDURE — 85025 COMPLETE CBC W/AUTO DIFF WBC: CPT | Mod: PN

## 2020-12-08 PROCEDURE — 83615 LACTATE (LD) (LDH) ENZYME: CPT

## 2020-12-08 PROCEDURE — 36415 COLL VENOUS BLD VENIPUNCTURE: CPT | Mod: PN

## 2020-12-08 PROCEDURE — 83735 ASSAY OF MAGNESIUM: CPT | Mod: PN

## 2020-12-08 PROCEDURE — 82232 ASSAY OF BETA-2 PROTEIN: CPT

## 2020-12-08 PROCEDURE — 83615 LACTATE (LD) (LDH) ENZYME: CPT | Mod: PN

## 2020-12-08 PROCEDURE — 80053 COMPREHEN METABOLIC PANEL: CPT | Mod: PN

## 2020-12-09 DIAGNOSIS — G47.00 INSOMNIA, UNSPECIFIED TYPE: ICD-10-CM

## 2020-12-09 NOTE — PROGRESS NOTES
Refill Routing Note   Medication(s) are not appropriate for processing by [facility] for the following reason(s):     - Outside of protocol  [facility] action(s):  Route        Medication reconciliation completed: No   Automatic Epic Generated Protocol Data:        Requested Prescriptions   Pending Prescriptions Disp Refills    temazepam (RESTORIL) 15 mg Cap [Pharmacy Med Name: TEMAZEPAM CAP 15MG] 90 capsule 1     Sig: TAKE 1 CAPSULE AT BEDTIME  AS NEEDED FOR INSOMNIA       [facility] Refill Protocol Failed - 12/9/2020 10:34 AM        Failed - Patient seen within 3 months     Last visit with Akil Chavez MD: 8/19/2020  Last visit in [facility]: 8/19/2020    Patient's next visit in [facility]: Visit date not found           Passed - Patient not pregnant        Passed - Med not refilled within 4 weeks              Appointments  past 12m or future 3m with PCP    Date Provider   Last Visit   8/19/2020 Akil Chavez MD   Next Visit   Visit date not found Akil Chavez MD   ED visits in past 90 days: 0        Note composed:5:01 PM 12/09/2020

## 2020-12-10 RX ORDER — TEMAZEPAM 15 MG/1
CAPSULE ORAL
Qty: 90 CAPSULE | Refills: 1 | Status: SHIPPED | OUTPATIENT
Start: 2020-12-10 | End: 2021-01-12 | Stop reason: SDUPTHER

## 2020-12-14 ENCOUNTER — DOCUMENTATION ONLY (OUTPATIENT)
Dept: INFUSION THERAPY | Facility: HOSPITAL | Age: 80
End: 2020-12-14

## 2020-12-14 ENCOUNTER — LAB VISIT (OUTPATIENT)
Dept: LAB | Facility: HOSPITAL | Age: 80
End: 2020-12-14
Attending: INTERNAL MEDICINE
Payer: MEDICARE

## 2020-12-14 ENCOUNTER — OFFICE VISIT (OUTPATIENT)
Dept: HEMATOLOGY/ONCOLOGY | Facility: CLINIC | Age: 80
End: 2020-12-14
Payer: MEDICARE

## 2020-12-14 VITALS
TEMPERATURE: 98 F | WEIGHT: 193.56 LBS | OXYGEN SATURATION: 99 % | HEIGHT: 65 IN | DIASTOLIC BLOOD PRESSURE: 75 MMHG | SYSTOLIC BLOOD PRESSURE: 127 MMHG | RESPIRATION RATE: 18 BRPM | BODY MASS INDEX: 32.25 KG/M2 | HEART RATE: 88 BPM

## 2020-12-14 DIAGNOSIS — C82.01 GRADE 1 FOLLICULAR LYMPHOMA OF LYMPH NODES OF NECK: ICD-10-CM

## 2020-12-14 DIAGNOSIS — E83.42 HYPOMAGNESEMIA: ICD-10-CM

## 2020-12-14 DIAGNOSIS — E55.9 VITAMIN D DEFICIENCY: ICD-10-CM

## 2020-12-14 DIAGNOSIS — C82.01 GRADE 1 FOLLICULAR LYMPHOMA OF LYMPH NODES OF NECK: Primary | ICD-10-CM

## 2020-12-14 DIAGNOSIS — N28.89 LEFT RENAL MASS: ICD-10-CM

## 2020-12-14 LAB
BASOPHILS # BLD AUTO: 0.08 K/UL (ref 0–0.2)
BASOPHILS NFR BLD: 1.2 % (ref 0–1.9)
DIFFERENTIAL METHOD: ABNORMAL
EOSINOPHIL # BLD AUTO: 0.2 K/UL (ref 0–0.5)
EOSINOPHIL NFR BLD: 2.5 % (ref 0–8)
ERYTHROCYTE [DISTWIDTH] IN BLOOD BY AUTOMATED COUNT: 14.5 % (ref 11.5–14.5)
HCT VFR BLD AUTO: 36.7 % (ref 37–48.5)
HGB BLD-MCNC: 11.7 G/DL (ref 12–16)
IMM GRANULOCYTES # BLD AUTO: 0.08 K/UL (ref 0–0.04)
IMM GRANULOCYTES NFR BLD AUTO: 1.2 % (ref 0–0.5)
LYMPHOCYTES # BLD AUTO: 1.9 K/UL (ref 1–4.8)
LYMPHOCYTES NFR BLD: 29.3 % (ref 18–48)
MCH RBC QN AUTO: 28.7 PG (ref 27–31)
MCHC RBC AUTO-ENTMCNC: 31.9 G/DL (ref 32–36)
MCV RBC AUTO: 90 FL (ref 82–98)
MONOCYTES # BLD AUTO: 0.8 K/UL (ref 0.3–1)
MONOCYTES NFR BLD: 11.8 % (ref 4–15)
NEUTROPHILS # BLD AUTO: 3.5 K/UL (ref 1.8–7.7)
NEUTROPHILS NFR BLD: 54 % (ref 38–73)
NRBC BLD-RTO: 0 /100 WBC
PLATELET # BLD AUTO: 180 K/UL (ref 150–350)
PMV BLD AUTO: 10.5 FL (ref 9.2–12.9)
RBC # BLD AUTO: 4.08 M/UL (ref 4–5.4)
WBC # BLD AUTO: 6.45 K/UL (ref 3.9–12.7)

## 2020-12-14 PROCEDURE — 3074F PR MOST RECENT SYSTOLIC BLOOD PRESSURE < 130 MM HG: ICD-10-PCS | Mod: CPTII,S$GLB,, | Performed by: INTERNAL MEDICINE

## 2020-12-14 PROCEDURE — 85025 COMPLETE CBC W/AUTO DIFF WBC: CPT

## 2020-12-14 PROCEDURE — 1126F PR PAIN SEVERITY QUANTIFIED, NO PAIN PRESENT: ICD-10-PCS | Mod: S$GLB,,, | Performed by: INTERNAL MEDICINE

## 2020-12-14 PROCEDURE — 99999 PR PBB SHADOW E&M-EST. PATIENT-LVL IV: CPT | Mod: PBBFAC,,, | Performed by: INTERNAL MEDICINE

## 2020-12-14 PROCEDURE — 3078F PR MOST RECENT DIASTOLIC BLOOD PRESSURE < 80 MM HG: ICD-10-PCS | Mod: CPTII,S$GLB,, | Performed by: INTERNAL MEDICINE

## 2020-12-14 PROCEDURE — 85025 COMPLETE CBC W/AUTO DIFF WBC: CPT | Mod: PN

## 2020-12-14 PROCEDURE — 1126F AMNT PAIN NOTED NONE PRSNT: CPT | Mod: S$GLB,,, | Performed by: INTERNAL MEDICINE

## 2020-12-14 PROCEDURE — 1101F PR PT FALLS ASSESS DOC 0-1 FALLS W/OUT INJ PAST YR: ICD-10-PCS | Mod: CPTII,S$GLB,, | Performed by: INTERNAL MEDICINE

## 2020-12-14 PROCEDURE — 1101F PT FALLS ASSESS-DOCD LE1/YR: CPT | Mod: CPTII,S$GLB,, | Performed by: INTERNAL MEDICINE

## 2020-12-14 PROCEDURE — 3078F DIAST BP <80 MM HG: CPT | Mod: CPTII,S$GLB,, | Performed by: INTERNAL MEDICINE

## 2020-12-14 PROCEDURE — 99214 OFFICE O/P EST MOD 30 MIN: CPT | Mod: S$GLB,,, | Performed by: INTERNAL MEDICINE

## 2020-12-14 PROCEDURE — 99999 PR PBB SHADOW E&M-EST. PATIENT-LVL IV: ICD-10-PCS | Mod: PBBFAC,,, | Performed by: INTERNAL MEDICINE

## 2020-12-14 PROCEDURE — 3288F PR FALLS RISK ASSESSMENT DOCUMENTED: ICD-10-PCS | Mod: CPTII,S$GLB,, | Performed by: INTERNAL MEDICINE

## 2020-12-14 PROCEDURE — 3074F SYST BP LT 130 MM HG: CPT | Mod: CPTII,S$GLB,, | Performed by: INTERNAL MEDICINE

## 2020-12-14 PROCEDURE — 1159F PR MEDICATION LIST DOCUMENTED IN MEDICAL RECORD: ICD-10-PCS | Mod: S$GLB,,, | Performed by: INTERNAL MEDICINE

## 2020-12-14 PROCEDURE — 1159F MED LIST DOCD IN RCRD: CPT | Mod: S$GLB,,, | Performed by: INTERNAL MEDICINE

## 2020-12-14 PROCEDURE — 3288F FALL RISK ASSESSMENT DOCD: CPT | Mod: CPTII,S$GLB,, | Performed by: INTERNAL MEDICINE

## 2020-12-14 PROCEDURE — 99214 PR OFFICE/OUTPT VISIT, EST, LEVL IV, 30-39 MIN: ICD-10-PCS | Mod: S$GLB,,, | Performed by: INTERNAL MEDICINE

## 2020-12-14 PROCEDURE — 36415 COLL VENOUS BLD VENIPUNCTURE: CPT | Mod: PN

## 2020-12-14 RX ORDER — ACETAMINOPHEN 500 MG
1000 TABLET ORAL
Status: CANCELLED | OUTPATIENT
Start: 2020-12-16

## 2020-12-14 RX ORDER — FAMOTIDINE 10 MG/ML
20 INJECTION INTRAVENOUS ONCE AS NEEDED
Status: CANCELLED
Start: 2020-12-16

## 2020-12-14 RX ORDER — DIPHENHYDRAMINE HYDROCHLORIDE 50 MG/ML
50 INJECTION INTRAMUSCULAR; INTRAVENOUS ONCE AS NEEDED
Status: CANCELLED | OUTPATIENT
Start: 2020-12-16

## 2020-12-14 RX ORDER — MEPERIDINE HYDROCHLORIDE 50 MG/ML
25 INJECTION INTRAMUSCULAR; INTRAVENOUS; SUBCUTANEOUS EVERY 30 MIN PRN
Status: CANCELLED | OUTPATIENT
Start: 2020-12-16

## 2020-12-14 RX ORDER — METHYLPREDNISOLONE SOD SUCC 125 MG
125 VIAL (EA) INJECTION ONCE AS NEEDED
Status: CANCELLED | OUTPATIENT
Start: 2020-12-16

## 2020-12-14 RX ORDER — FAMOTIDINE 10 MG/ML
20 INJECTION INTRAVENOUS
Status: CANCELLED | OUTPATIENT
Start: 2020-12-16

## 2020-12-14 RX ORDER — HEPARIN 100 UNIT/ML
500 SYRINGE INTRAVENOUS
Status: CANCELLED | OUTPATIENT
Start: 2020-12-16

## 2020-12-14 RX ORDER — SODIUM CHLORIDE 0.9 % (FLUSH) 0.9 %
10 SYRINGE (ML) INJECTION
Status: CANCELLED | OUTPATIENT
Start: 2020-12-16

## 2020-12-14 NOTE — PROGRESS NOTES
[11:17 AM] Nancy Montero MD  [facility]    Magnesium rider added to today's chemotherapy.   Return to clinic 3 weeks from interval CBC, CMP, LDH, magnesium, vitamin-D level, and beta 2 microglobulin    Shandra Velez [id number], her treatment is on Thursday and MD knows that.      ?[11:18 AM] Naomi Valverde      awesome thanks

## 2020-12-14 NOTE — PROGRESS NOTES
History of present illness:  The patient is a 80-year-old white female who presents in consultation from Dr. Chavez regarding newly diagnosed non-Hodgkin's lymphoma.  Patient was in her usual state of health but developed severe/debilitating low pain and lumbar radiculopathy after scrubbing her screened porch.  An MRI was obtained and intra-abdominal lymphadenopathy as well as a complex left renal was noted.  Patient subsequently underwent dedicated CT scan of the abdomen pelvis as well as image guided needle biopsy of a right inguinal lymph node.  Patient was found have grade 1 follicular lymphoma.  As the patient was asymptomatic, she was placed on a watch wait strategy.    Patient returns to clinic for a 3 month re-evaluation.  Patient has been feeling poorly the last 2 months.  She began to experience dyspepsia and loss appetite.  She discontinued her vitamin-D supplement without improvement in symptoms.  She reports increase in night sweats/hot flashes.  This occurs no less than 5 times per night and causes her to awaken sleep.  She has episodes of the daytime as well, but these are less frequent.  Patient's weight is relatively stable.  She denies painful lymphadenopathy.  Patient is experiencing some central back pain but denies trauma or any palpable lesion in the area.      Returns to clinic for re-evaluation prior 3rd cycle of CVP-R.  She denies fevers, chills, and night sweats.  Adenopathy previously noted her neck has resolved.  No other complaints for pertinent findings on a 14 point review systems.    Physical examination:  Well-developed, obese, elderly, white female, in no acute distress, who has a weight of 193.5 lb (decreased by 7 lb)  VITAL SIGNS: Documented  and reviewed this visit.  HEENT: Normocephalic, atraumatic. Oral mucosa pink and moist. Lips without   lesions. Tongue midline. Oropharynx clear. Nonicteric sclerae.   NECK: Supple, bilateral supraclavicular adenopathy right internal  jugular chain lymphadenopathy.   HEART: Regular rate and rhythm without murmur, gallop or rub.   LUNGS:  Mild, bibasilar crackles bilaterally. Normal respiratory effort.   ABDOMEN: Soft, nontender, nondistended with positive normoactive bowel sounds,   no hepatosplenomegaly.   EXTREMITIES: No cyanosis or clubbing appreciated.  Patient has 1+, bilateral ankle edema.  Distal pulses are intact.   AXILLAE AND GROIN:  Small, palpable left inguinal lymphadenopathy is appreciated.   SKIN: Intact/turgor normal.  Palpable subcutaneous nodules about the abdominal wall consistent with lymph node involvement.   NEUROLOGIC: Cranial nerves II-XII grossly intact. Motor: Good muscle bulk and   tone. Strength/sensory 5/5 throughout. Gait stable.     Laboratory:  White count 6.5, hemoglobin 11.7, hematocrit 36.7, platelets 180, absolute neutrophil count is 3500.  Sodium 139, potassium 4.3, chloride 106, CO2 26, BUN 32, creatinine 1.3, glucose 125, calcium 9.4, magnesium 1.2, liver function test within normal limits, , GFR is 40.  Beta 2 microglobulin has fallen from 7.4 to 4.2.    Impression:  1.  Grade 1 follicular non-Hodgkin's lymphoma - Stage Riley-progressive/symptomatic.  2.  Complex left renal mass.  3.  Vitamin-D deficiency.  4.  Hypomagnesemia.    Plan:  1.  Proceed with cycle 3 of therapy as scheduled.  2.  Replace magnesium.  3.  Return to clinic in 3 weeks with CBC, CMP, LDH, magnesium, vitamin-D level, and beta 2.    This note was created using voice recognition software and may contain grammatical errors.

## 2020-12-17 ENCOUNTER — INFUSION (OUTPATIENT)
Dept: INFUSION THERAPY | Facility: HOSPITAL | Age: 80
End: 2020-12-17
Attending: INTERNAL MEDICINE
Payer: MEDICARE

## 2020-12-17 VITALS
DIASTOLIC BLOOD PRESSURE: 63 MMHG | WEIGHT: 192.88 LBS | HEART RATE: 93 BPM | RESPIRATION RATE: 16 BRPM | BODY MASS INDEX: 32.14 KG/M2 | TEMPERATURE: 98 F | HEIGHT: 65 IN | SYSTOLIC BLOOD PRESSURE: 115 MMHG

## 2020-12-17 DIAGNOSIS — C82.00 FOLLICULAR LYMPHOMA GRADE I, UNSPECIFIED BODY REGION: Primary | ICD-10-CM

## 2020-12-17 PROCEDURE — A4216 STERILE WATER/SALINE, 10 ML: HCPCS | Mod: PN | Performed by: INTERNAL MEDICINE

## 2020-12-17 PROCEDURE — 96415 CHEMO IV INFUSION ADDL HR: CPT | Mod: PN

## 2020-12-17 PROCEDURE — 96411 CHEMO IV PUSH ADDL DRUG: CPT | Mod: PN

## 2020-12-17 PROCEDURE — 96368 THER/DIAG CONCURRENT INF: CPT | Mod: PN

## 2020-12-17 PROCEDURE — 96413 CHEMO IV INFUSION 1 HR: CPT | Mod: PN

## 2020-12-17 PROCEDURE — 96375 TX/PRO/DX INJ NEW DRUG ADDON: CPT | Mod: PN

## 2020-12-17 PROCEDURE — 96417 CHEMO IV INFUS EACH ADDL SEQ: CPT | Mod: PN

## 2020-12-17 PROCEDURE — 96367 TX/PROPH/DG ADDL SEQ IV INF: CPT | Mod: PN

## 2020-12-17 PROCEDURE — 25000003 PHARM REV CODE 250: Mod: PN | Performed by: INTERNAL MEDICINE

## 2020-12-17 PROCEDURE — 63600175 PHARM REV CODE 636 W HCPCS: Mod: TB,PN | Performed by: INTERNAL MEDICINE

## 2020-12-17 RX ORDER — FAMOTIDINE 10 MG/ML
20 INJECTION INTRAVENOUS
Status: COMPLETED | OUTPATIENT
Start: 2020-12-17 | End: 2020-12-17

## 2020-12-17 RX ORDER — ACETAMINOPHEN 500 MG
1000 TABLET ORAL
Status: COMPLETED | OUTPATIENT
Start: 2020-12-17 | End: 2020-12-17

## 2020-12-17 RX ORDER — MAGNESIUM SULFATE HEPTAHYDRATE 40 MG/ML
2 INJECTION, SOLUTION INTRAVENOUS ONCE
Status: COMPLETED | OUTPATIENT
Start: 2020-12-17 | End: 2020-12-17

## 2020-12-17 RX ORDER — SODIUM CHLORIDE 0.9 % (FLUSH) 0.9 %
10 SYRINGE (ML) INJECTION
Status: DISCONTINUED | OUTPATIENT
Start: 2020-12-17 | End: 2020-12-17 | Stop reason: HOSPADM

## 2020-12-17 RX ORDER — PREDNISONE 50 MG/1
100 TABLET ORAL DAILY
Qty: 10 TABLET | Refills: 5 | Status: SHIPPED | OUTPATIENT
Start: 2020-12-17 | End: 2020-12-22

## 2020-12-17 RX ORDER — HEPARIN 100 UNIT/ML
500 SYRINGE INTRAVENOUS
Status: DISCONTINUED | OUTPATIENT
Start: 2020-12-17 | End: 2020-12-17 | Stop reason: HOSPADM

## 2020-12-17 RX ADMIN — HEPARIN 500 UNITS: 100 SYRINGE at 03:12

## 2020-12-17 RX ADMIN — ACETAMINOPHEN 1000 MG: 500 TABLET, FILM COATED ORAL at 09:12

## 2020-12-17 RX ADMIN — CYCLOPHOSPHAMIDE 1500 MG: 200 INJECTION, SOLUTION INTRAVENOUS at 01:12

## 2020-12-17 RX ADMIN — SODIUM CHLORIDE: 9 INJECTION, SOLUTION INTRAVENOUS at 09:12

## 2020-12-17 RX ADMIN — FAMOTIDINE 20 MG: 10 INJECTION INTRAVENOUS at 10:12

## 2020-12-17 RX ADMIN — VINCRISTINE SULFATE 2 MG: 1 INJECTION, SOLUTION INTRAVENOUS at 01:12

## 2020-12-17 RX ADMIN — SODIUM CHLORIDE 750 MG: 0.9 INJECTION, SOLUTION INTRAVENOUS at 10:12

## 2020-12-17 RX ADMIN — MAGNESIUM SULFATE IN WATER 2 G: 40 INJECTION, SOLUTION INTRAVENOUS at 10:12

## 2020-12-17 RX ADMIN — Medication 10 ML: at 03:12

## 2020-12-17 RX ADMIN — DIPHENHYDRAMINE HYDROCHLORIDE 50 MG: 50 INJECTION, SOLUTION INTRAMUSCULAR; INTRAVENOUS at 09:12

## 2020-12-17 RX ADMIN — DEXAMETHASONE SODIUM PHOSPHATE 0.25 MG: 4 INJECTION, SOLUTION INTRA-ARTICULAR; INTRALESIONAL; INTRAMUSCULAR; INTRAVENOUS; SOFT TISSUE at 01:12

## 2020-12-17 NOTE — PLAN OF CARE
Pt tolerated R-CVP regimen well.  Reminded pt to take her oral Dexamethasone as ordered.  Instructed to call MD with any problems.

## 2020-12-29 DIAGNOSIS — C82.00 FOLLICULAR LYMPHOMA GRADE I, UNSPECIFIED BODY REGION: ICD-10-CM

## 2020-12-30 RX ORDER — ALLOPURINOL 100 MG/1
100 TABLET ORAL DAILY
Qty: 90 TABLET | Refills: 3 | Status: SHIPPED | OUTPATIENT
Start: 2020-12-30 | End: 2021-07-07 | Stop reason: CLARIF

## 2021-01-06 ENCOUNTER — OFFICE VISIT (OUTPATIENT)
Dept: HEMATOLOGY/ONCOLOGY | Facility: CLINIC | Age: 81
End: 2021-01-06
Payer: MEDICARE

## 2021-01-06 ENCOUNTER — LAB VISIT (OUTPATIENT)
Dept: LAB | Facility: HOSPITAL | Age: 81
End: 2021-01-06
Attending: FAMILY MEDICINE
Payer: MEDICARE

## 2021-01-06 VITALS
BODY MASS INDEX: 32.36 KG/M2 | HEART RATE: 93 BPM | WEIGHT: 194.44 LBS | OXYGEN SATURATION: 100 % | DIASTOLIC BLOOD PRESSURE: 64 MMHG | SYSTOLIC BLOOD PRESSURE: 119 MMHG | TEMPERATURE: 98 F

## 2021-01-06 DIAGNOSIS — C82.01 GRADE 1 FOLLICULAR LYMPHOMA OF LYMPH NODES OF NECK: ICD-10-CM

## 2021-01-06 DIAGNOSIS — E55.9 VITAMIN D DEFICIENCY: ICD-10-CM

## 2021-01-06 DIAGNOSIS — E83.42 HYPOMAGNESEMIA: ICD-10-CM

## 2021-01-06 DIAGNOSIS — C82.01 GRADE 1 FOLLICULAR LYMPHOMA OF LYMPH NODES OF NECK: Primary | ICD-10-CM

## 2021-01-06 LAB
25(OH)D3+25(OH)D2 SERPL-MCNC: 36 NG/ML (ref 30–96)
ALBUMIN SERPL BCP-MCNC: 4 G/DL (ref 3.5–5.2)
ALP SERPL-CCNC: 79 U/L (ref 38–145)
ALT SERPL W/O P-5'-P-CCNC: 9 U/L (ref 0–35)
ANION GAP SERPL CALC-SCNC: 7 MMOL/L (ref 8–16)
AST SERPL-CCNC: 31 U/L (ref 14–36)
BASOPHILS # BLD AUTO: 0.04 K/UL (ref 0–0.2)
BASOPHILS NFR BLD: 0.6 % (ref 0–1.9)
BILIRUB SERPL-MCNC: 0.3 MG/DL (ref 0.2–1.3)
CALCIUM SERPL-MCNC: 9.1 MG/DL (ref 8.4–10.2)
CHLORIDE SERPL-SCNC: 105 MMOL/L (ref 95–110)
CO2 SERPL-SCNC: 26 MMOL/L (ref 22–31)
CREAT SERPL-MCNC: 1.28 MG/DL (ref 0.5–1.4)
DIFFERENTIAL METHOD: ABNORMAL
EOSINOPHIL # BLD AUTO: 0.1 K/UL (ref 0–0.5)
EOSINOPHIL NFR BLD: 1.8 % (ref 0–8)
ERYTHROCYTE [DISTWIDTH] IN BLOOD BY AUTOMATED COUNT: 15.2 % (ref 11.5–14.5)
EST. GFR  (AFRICAN AMERICAN): 46 ML/MIN/1.73 M^2
EST. GFR  (NON AFRICAN AMERICAN): 40 ML/MIN/1.73 M^2
GLUCOSE SERPL-MCNC: 112 MG/DL (ref 70–110)
HCT VFR BLD AUTO: 37 % (ref 37–48.5)
HGB BLD-MCNC: 11.7 G/DL (ref 12–16)
IMM GRANULOCYTES # BLD AUTO: 0.1 K/UL (ref 0–0.04)
IMM GRANULOCYTES NFR BLD AUTO: 1.4 % (ref 0–0.5)
LDH SERPL L TO P-CCNC: 531 U/L (ref 313–618)
LYMPHOCYTES # BLD AUTO: 1.3 K/UL (ref 1–4.8)
LYMPHOCYTES NFR BLD: 18.7 % (ref 18–48)
MAGNESIUM SERPL-MCNC: 1.3 MG/DL (ref 1.6–2.6)
MCH RBC QN AUTO: 28.6 PG (ref 27–31)
MCHC RBC AUTO-ENTMCNC: 31.6 G/DL (ref 32–36)
MCV RBC AUTO: 91 FL (ref 82–98)
MONOCYTES # BLD AUTO: 0.5 K/UL (ref 0.3–1)
MONOCYTES NFR BLD: 7.5 % (ref 4–15)
NEUTROPHILS # BLD AUTO: 5 K/UL (ref 1.8–7.7)
NEUTROPHILS NFR BLD: 70 % (ref 38–73)
NRBC BLD-RTO: 0 /100 WBC
PLATELET # BLD AUTO: 172 K/UL (ref 150–350)
PMV BLD AUTO: 10.6 FL (ref 9.2–12.9)
POTASSIUM SERPL-SCNC: 3.8 MMOL/L (ref 3.5–5.1)
PROT SERPL-MCNC: 6.7 G/DL (ref 6–8.4)
RBC # BLD AUTO: 4.09 M/UL (ref 4–5.4)
SODIUM SERPL-SCNC: 138 MMOL/L (ref 136–145)
UUN UR-MCNC: 28 MG/DL (ref 7–18)
WBC # BLD AUTO: 7.1 K/UL (ref 3.9–12.7)

## 2021-01-06 PROCEDURE — 83735 ASSAY OF MAGNESIUM: CPT | Mod: PN

## 2021-01-06 PROCEDURE — 99999 PR PBB SHADOW E&M-EST. PATIENT-LVL IV: ICD-10-PCS | Mod: PBBFAC,,, | Performed by: INTERNAL MEDICINE

## 2021-01-06 PROCEDURE — 1126F PR PAIN SEVERITY QUANTIFIED, NO PAIN PRESENT: ICD-10-PCS | Mod: S$GLB,,, | Performed by: INTERNAL MEDICINE

## 2021-01-06 PROCEDURE — 3078F PR MOST RECENT DIASTOLIC BLOOD PRESSURE < 80 MM HG: ICD-10-PCS | Mod: CPTII,S$GLB,, | Performed by: INTERNAL MEDICINE

## 2021-01-06 PROCEDURE — 83615 LACTATE (LD) (LDH) ENZYME: CPT | Mod: PN

## 2021-01-06 PROCEDURE — 3074F SYST BP LT 130 MM HG: CPT | Mod: CPTII,S$GLB,, | Performed by: INTERNAL MEDICINE

## 2021-01-06 PROCEDURE — 1159F PR MEDICATION LIST DOCUMENTED IN MEDICAL RECORD: ICD-10-PCS | Mod: S$GLB,,, | Performed by: INTERNAL MEDICINE

## 2021-01-06 PROCEDURE — 82232 ASSAY OF BETA-2 PROTEIN: CPT

## 2021-01-06 PROCEDURE — 1101F PR PT FALLS ASSESS DOC 0-1 FALLS W/OUT INJ PAST YR: ICD-10-PCS | Mod: CPTII,S$GLB,, | Performed by: INTERNAL MEDICINE

## 2021-01-06 PROCEDURE — 80053 COMPREHEN METABOLIC PANEL: CPT

## 2021-01-06 PROCEDURE — 83735 ASSAY OF MAGNESIUM: CPT

## 2021-01-06 PROCEDURE — 36415 COLL VENOUS BLD VENIPUNCTURE: CPT | Mod: PN

## 2021-01-06 PROCEDURE — 99214 PR OFFICE/OUTPT VISIT, EST, LEVL IV, 30-39 MIN: ICD-10-PCS | Mod: S$GLB,,, | Performed by: INTERNAL MEDICINE

## 2021-01-06 PROCEDURE — 3074F PR MOST RECENT SYSTOLIC BLOOD PRESSURE < 130 MM HG: ICD-10-PCS | Mod: CPTII,S$GLB,, | Performed by: INTERNAL MEDICINE

## 2021-01-06 PROCEDURE — 99999 PR PBB SHADOW E&M-EST. PATIENT-LVL IV: CPT | Mod: PBBFAC,,, | Performed by: INTERNAL MEDICINE

## 2021-01-06 PROCEDURE — 3288F FALL RISK ASSESSMENT DOCD: CPT | Mod: CPTII,S$GLB,, | Performed by: INTERNAL MEDICINE

## 2021-01-06 PROCEDURE — 3288F PR FALLS RISK ASSESSMENT DOCUMENTED: ICD-10-PCS | Mod: CPTII,S$GLB,, | Performed by: INTERNAL MEDICINE

## 2021-01-06 PROCEDURE — 83615 LACTATE (LD) (LDH) ENZYME: CPT

## 2021-01-06 PROCEDURE — 80053 COMPREHEN METABOLIC PANEL: CPT | Mod: PN

## 2021-01-06 PROCEDURE — 1159F MED LIST DOCD IN RCRD: CPT | Mod: S$GLB,,, | Performed by: INTERNAL MEDICINE

## 2021-01-06 PROCEDURE — 99214 OFFICE O/P EST MOD 30 MIN: CPT | Mod: S$GLB,,, | Performed by: INTERNAL MEDICINE

## 2021-01-06 PROCEDURE — 82306 VITAMIN D 25 HYDROXY: CPT

## 2021-01-06 PROCEDURE — 82306 VITAMIN D 25 HYDROXY: CPT | Mod: PN

## 2021-01-06 PROCEDURE — 1101F PT FALLS ASSESS-DOCD LE1/YR: CPT | Mod: CPTII,S$GLB,, | Performed by: INTERNAL MEDICINE

## 2021-01-06 PROCEDURE — 1126F AMNT PAIN NOTED NONE PRSNT: CPT | Mod: S$GLB,,, | Performed by: INTERNAL MEDICINE

## 2021-01-06 PROCEDURE — 85025 COMPLETE CBC W/AUTO DIFF WBC: CPT

## 2021-01-06 PROCEDURE — 85025 COMPLETE CBC W/AUTO DIFF WBC: CPT | Mod: PN

## 2021-01-06 PROCEDURE — 3078F DIAST BP <80 MM HG: CPT | Mod: CPTII,S$GLB,, | Performed by: INTERNAL MEDICINE

## 2021-01-06 RX ORDER — FAMOTIDINE 10 MG/ML
20 INJECTION INTRAVENOUS
Status: CANCELLED | OUTPATIENT
Start: 2021-01-06

## 2021-01-06 RX ORDER — ACETAMINOPHEN 500 MG
1000 TABLET ORAL
Status: CANCELLED | OUTPATIENT
Start: 2021-01-06

## 2021-01-06 RX ORDER — FAMOTIDINE 10 MG/ML
20 INJECTION INTRAVENOUS ONCE AS NEEDED
Status: CANCELLED
Start: 2021-01-06

## 2021-01-06 RX ORDER — METHYLPREDNISOLONE SOD SUCC 125 MG
125 VIAL (EA) INJECTION ONCE AS NEEDED
Status: CANCELLED | OUTPATIENT
Start: 2021-01-06

## 2021-01-06 RX ORDER — SODIUM CHLORIDE 0.9 % (FLUSH) 0.9 %
10 SYRINGE (ML) INJECTION
Status: CANCELLED | OUTPATIENT
Start: 2021-01-06

## 2021-01-06 RX ORDER — HEPARIN 100 UNIT/ML
500 SYRINGE INTRAVENOUS
Status: CANCELLED | OUTPATIENT
Start: 2021-01-06

## 2021-01-06 RX ORDER — DIPHENHYDRAMINE HYDROCHLORIDE 50 MG/ML
50 INJECTION INTRAMUSCULAR; INTRAVENOUS ONCE AS NEEDED
Status: CANCELLED | OUTPATIENT
Start: 2021-01-06

## 2021-01-06 RX ORDER — MEPERIDINE HYDROCHLORIDE 50 MG/ML
25 INJECTION INTRAMUSCULAR; INTRAVENOUS; SUBCUTANEOUS EVERY 30 MIN PRN
Status: CANCELLED | OUTPATIENT
Start: 2021-01-06

## 2021-01-07 ENCOUNTER — INFUSION (OUTPATIENT)
Dept: INFUSION THERAPY | Facility: HOSPITAL | Age: 81
End: 2021-01-07
Attending: INTERNAL MEDICINE
Payer: MEDICARE

## 2021-01-07 VITALS
RESPIRATION RATE: 16 BRPM | HEART RATE: 96 BPM | SYSTOLIC BLOOD PRESSURE: 106 MMHG | WEIGHT: 197.06 LBS | BODY MASS INDEX: 32.83 KG/M2 | DIASTOLIC BLOOD PRESSURE: 63 MMHG | TEMPERATURE: 97 F | HEIGHT: 65 IN

## 2021-01-07 DIAGNOSIS — C82.00 FOLLICULAR LYMPHOMA GRADE I, UNSPECIFIED BODY REGION: Primary | ICD-10-CM

## 2021-01-07 LAB — B2 MICROGLOB SERPL-MCNC: 4.2 UG/ML (ref 0–2.5)

## 2021-01-07 PROCEDURE — 96417 CHEMO IV INFUS EACH ADDL SEQ: CPT | Mod: PN

## 2021-01-07 PROCEDURE — 25000003 PHARM REV CODE 250: Mod: PN | Performed by: INTERNAL MEDICINE

## 2021-01-07 PROCEDURE — 96375 TX/PRO/DX INJ NEW DRUG ADDON: CPT | Mod: PN

## 2021-01-07 PROCEDURE — 96411 CHEMO IV PUSH ADDL DRUG: CPT | Mod: PN

## 2021-01-07 PROCEDURE — A4216 STERILE WATER/SALINE, 10 ML: HCPCS | Mod: PN | Performed by: INTERNAL MEDICINE

## 2021-01-07 PROCEDURE — 63600175 PHARM REV CODE 636 W HCPCS: Mod: PN | Performed by: INTERNAL MEDICINE

## 2021-01-07 PROCEDURE — 96413 CHEMO IV INFUSION 1 HR: CPT | Mod: PN

## 2021-01-07 PROCEDURE — 96367 TX/PROPH/DG ADDL SEQ IV INF: CPT | Mod: PN

## 2021-01-07 PROCEDURE — 96368 THER/DIAG CONCURRENT INF: CPT | Mod: PN

## 2021-01-07 PROCEDURE — 96415 CHEMO IV INFUSION ADDL HR: CPT | Mod: PN

## 2021-01-07 RX ORDER — PREDNISONE 50 MG/1
100 TABLET ORAL DAILY
Qty: 10 TABLET | Refills: 5 | Status: SHIPPED | OUTPATIENT
Start: 2021-01-07 | End: 2021-01-12

## 2021-01-07 RX ORDER — ACETAMINOPHEN 500 MG
1000 TABLET ORAL
Status: COMPLETED | OUTPATIENT
Start: 2021-01-07 | End: 2021-01-07

## 2021-01-07 RX ORDER — HEPARIN 100 UNIT/ML
500 SYRINGE INTRAVENOUS
Status: DISCONTINUED | OUTPATIENT
Start: 2021-01-07 | End: 2021-01-07 | Stop reason: HOSPADM

## 2021-01-07 RX ORDER — SODIUM CHLORIDE 0.9 % (FLUSH) 0.9 %
10 SYRINGE (ML) INJECTION
Status: DISCONTINUED | OUTPATIENT
Start: 2021-01-07 | End: 2021-01-07 | Stop reason: HOSPADM

## 2021-01-07 RX ORDER — MAGNESIUM SULFATE HEPTAHYDRATE 40 MG/ML
2 INJECTION, SOLUTION INTRAVENOUS ONCE
Status: COMPLETED | OUTPATIENT
Start: 2021-01-07 | End: 2021-01-07

## 2021-01-07 RX ORDER — FAMOTIDINE 10 MG/ML
20 INJECTION INTRAVENOUS
Status: COMPLETED | OUTPATIENT
Start: 2021-01-07 | End: 2021-01-07

## 2021-01-07 RX ADMIN — HEPARIN 500 UNITS: 100 SYRINGE at 03:01

## 2021-01-07 RX ADMIN — ACETAMINOPHEN 1000 MG: 500 TABLET, FILM COATED ORAL at 10:01

## 2021-01-07 RX ADMIN — FAMOTIDINE 20 MG: 10 INJECTION INTRAVENOUS at 10:01

## 2021-01-07 RX ADMIN — SODIUM CHLORIDE 750 MG: 0.9 INJECTION, SOLUTION INTRAVENOUS at 10:01

## 2021-01-07 RX ADMIN — VINCRISTINE SULFATE 2 MG: 1 INJECTION, SOLUTION INTRAVENOUS at 02:01

## 2021-01-07 RX ADMIN — MAGNESIUM SULFATE HEPTAHYDRATE 2 G: 40 INJECTION, SOLUTION INTRAVENOUS at 02:01

## 2021-01-07 RX ADMIN — SODIUM CHLORIDE: 9 INJECTION, SOLUTION INTRAVENOUS at 10:01

## 2021-01-07 RX ADMIN — CYCLOPHOSPHAMIDE 1500 MG: 200 INJECTION, SOLUTION INTRAVENOUS at 02:01

## 2021-01-07 RX ADMIN — DIPHENHYDRAMINE HYDROCHLORIDE 50 MG: 50 INJECTION, SOLUTION INTRAMUSCULAR; INTRAVENOUS at 10:01

## 2021-01-07 RX ADMIN — Medication 10 ML: at 03:01

## 2021-01-07 RX ADMIN — DEXAMETHASONE SODIUM PHOSPHATE 0.25 MG: 4 INJECTION, SOLUTION INTRA-ARTICULAR; INTRALESIONAL; INTRAMUSCULAR; INTRAVENOUS; SOFT TISSUE at 01:01

## 2021-01-12 ENCOUNTER — PATIENT MESSAGE (OUTPATIENT)
Dept: FAMILY MEDICINE | Facility: CLINIC | Age: 81
End: 2021-01-12

## 2021-01-12 DIAGNOSIS — G47.00 INSOMNIA, UNSPECIFIED TYPE: ICD-10-CM

## 2021-01-12 DIAGNOSIS — E78.5 HYPERLIPIDEMIA, UNSPECIFIED HYPERLIPIDEMIA TYPE: ICD-10-CM

## 2021-01-12 DIAGNOSIS — I10 ESSENTIAL HYPERTENSION: ICD-10-CM

## 2021-01-12 RX ORDER — LISINOPRIL 40 MG/1
40 TABLET ORAL DAILY
Qty: 90 TABLET | Refills: 3 | Status: SHIPPED | OUTPATIENT
Start: 2021-01-12 | End: 2021-02-01 | Stop reason: SDUPTHER

## 2021-01-12 RX ORDER — HYDROCHLOROTHIAZIDE 25 MG/1
25 TABLET ORAL DAILY
Qty: 90 TABLET | Refills: 3 | Status: SHIPPED | OUTPATIENT
Start: 2021-01-12 | End: 2021-02-01 | Stop reason: SDUPTHER

## 2021-01-12 RX ORDER — NIFEDIPINE 60 MG/1
60 TABLET, EXTENDED RELEASE ORAL DAILY
Qty: 90 TABLET | Refills: 3 | Status: SHIPPED | OUTPATIENT
Start: 2021-01-12 | End: 2021-02-01 | Stop reason: SDUPTHER

## 2021-01-12 RX ORDER — SIMVASTATIN 10 MG/1
10 TABLET, FILM COATED ORAL NIGHTLY
Qty: 90 TABLET | Refills: 3 | Status: SHIPPED | OUTPATIENT
Start: 2021-01-12 | End: 2021-02-01 | Stop reason: SDUPTHER

## 2021-01-12 RX ORDER — TEMAZEPAM 15 MG/1
CAPSULE ORAL
Qty: 90 CAPSULE | Refills: 1 | Status: SHIPPED | OUTPATIENT
Start: 2021-01-12 | End: 2021-02-01 | Stop reason: SDUPTHER

## 2021-01-19 ENCOUNTER — IMMUNIZATION (OUTPATIENT)
Dept: FAMILY MEDICINE | Facility: CLINIC | Age: 81
End: 2021-01-19
Payer: MEDICARE

## 2021-01-19 DIAGNOSIS — Z23 NEED FOR VACCINATION: Primary | ICD-10-CM

## 2021-01-19 PROCEDURE — 91300 COVID-19, MRNA, LNP-S, PF, 30 MCG/0.3 ML DOSE VACCINE: CPT | Mod: PBBFAC | Performed by: NURSE PRACTITIONER

## 2021-01-26 ENCOUNTER — HOSPITAL ENCOUNTER (OUTPATIENT)
Dept: RADIOLOGY | Facility: HOSPITAL | Age: 81
Discharge: HOME | End: 2021-01-26
Attending: INTERNAL MEDICINE
Payer: MEDICARE

## 2021-01-26 DIAGNOSIS — E55.9 VITAMIN D DEFICIENCY: ICD-10-CM

## 2021-01-26 DIAGNOSIS — C82.01 GRADE 1 FOLLICULAR LYMPHOMA OF LYMPH NODES OF NECK: ICD-10-CM

## 2021-01-26 PROCEDURE — 78815 PET IMAGE W/CT SKULL-THIGH: CPT | Mod: 26,PS,, | Performed by: RADIOLOGY

## 2021-01-26 PROCEDURE — A9552 F18 FDG: HCPCS | Mod: PO

## 2021-01-26 PROCEDURE — 78815 NM PET CT ROUTINE: ICD-10-PCS | Mod: 26,PS,, | Performed by: RADIOLOGY

## 2021-01-26 PROCEDURE — 78815 PET IMAGE W/CT SKULL-THIGH: CPT | Mod: TC,PS,PO

## 2021-01-27 ENCOUNTER — OFFICE VISIT (OUTPATIENT)
Dept: HEMATOLOGY/ONCOLOGY | Facility: CLINIC | Age: 81
End: 2021-01-27
Payer: MEDICARE

## 2021-01-27 VITALS
HEIGHT: 65 IN | DIASTOLIC BLOOD PRESSURE: 87 MMHG | OXYGEN SATURATION: 98 % | HEART RATE: 85 BPM | WEIGHT: 195.56 LBS | SYSTOLIC BLOOD PRESSURE: 129 MMHG | TEMPERATURE: 99 F | BODY MASS INDEX: 32.58 KG/M2 | RESPIRATION RATE: 18 BRPM

## 2021-01-27 DIAGNOSIS — E55.9 VITAMIN D DEFICIENCY: ICD-10-CM

## 2021-01-27 DIAGNOSIS — R91.1 LUNG NODULE: ICD-10-CM

## 2021-01-27 DIAGNOSIS — C82.01 GRADE 1 FOLLICULAR LYMPHOMA OF LYMPH NODES OF NECK: Primary | ICD-10-CM

## 2021-01-27 PROCEDURE — 99999 PR PBB SHADOW E&M-EST. PATIENT-LVL IV: ICD-10-PCS | Mod: PBBFAC,,, | Performed by: INTERNAL MEDICINE

## 2021-01-27 PROCEDURE — 3288F PR FALLS RISK ASSESSMENT DOCUMENTED: ICD-10-PCS | Mod: CPTII,S$GLB,, | Performed by: INTERNAL MEDICINE

## 2021-01-27 PROCEDURE — 1101F PT FALLS ASSESS-DOCD LE1/YR: CPT | Mod: CPTII,S$GLB,, | Performed by: INTERNAL MEDICINE

## 2021-01-27 PROCEDURE — 1159F MED LIST DOCD IN RCRD: CPT | Mod: S$GLB,,, | Performed by: INTERNAL MEDICINE

## 2021-01-27 PROCEDURE — 1126F AMNT PAIN NOTED NONE PRSNT: CPT | Mod: S$GLB,,, | Performed by: INTERNAL MEDICINE

## 2021-01-27 PROCEDURE — 3074F PR MOST RECENT SYSTOLIC BLOOD PRESSURE < 130 MM HG: ICD-10-PCS | Mod: CPTII,S$GLB,, | Performed by: INTERNAL MEDICINE

## 2021-01-27 PROCEDURE — 99214 OFFICE O/P EST MOD 30 MIN: CPT | Mod: S$GLB,,, | Performed by: INTERNAL MEDICINE

## 2021-01-27 PROCEDURE — 99999 PR PBB SHADOW E&M-EST. PATIENT-LVL IV: CPT | Mod: PBBFAC,,, | Performed by: INTERNAL MEDICINE

## 2021-01-27 PROCEDURE — 99214 PR OFFICE/OUTPT VISIT, EST, LEVL IV, 30-39 MIN: ICD-10-PCS | Mod: S$GLB,,, | Performed by: INTERNAL MEDICINE

## 2021-01-27 PROCEDURE — 3079F DIAST BP 80-89 MM HG: CPT | Mod: CPTII,S$GLB,, | Performed by: INTERNAL MEDICINE

## 2021-01-27 PROCEDURE — 1101F PR PT FALLS ASSESS DOC 0-1 FALLS W/OUT INJ PAST YR: ICD-10-PCS | Mod: CPTII,S$GLB,, | Performed by: INTERNAL MEDICINE

## 2021-01-27 PROCEDURE — 3074F SYST BP LT 130 MM HG: CPT | Mod: CPTII,S$GLB,, | Performed by: INTERNAL MEDICINE

## 2021-01-27 PROCEDURE — 3079F PR MOST RECENT DIASTOLIC BLOOD PRESSURE 80-89 MM HG: ICD-10-PCS | Mod: CPTII,S$GLB,, | Performed by: INTERNAL MEDICINE

## 2021-01-27 PROCEDURE — 3288F FALL RISK ASSESSMENT DOCD: CPT | Mod: CPTII,S$GLB,, | Performed by: INTERNAL MEDICINE

## 2021-01-27 PROCEDURE — 1126F PR PAIN SEVERITY QUANTIFIED, NO PAIN PRESENT: ICD-10-PCS | Mod: S$GLB,,, | Performed by: INTERNAL MEDICINE

## 2021-01-27 PROCEDURE — 1159F PR MEDICATION LIST DOCUMENTED IN MEDICAL RECORD: ICD-10-PCS | Mod: S$GLB,,, | Performed by: INTERNAL MEDICINE

## 2021-01-27 RX ORDER — METHYLPREDNISOLONE SOD SUCC 125 MG
125 VIAL (EA) INJECTION ONCE AS NEEDED
Status: CANCELLED | OUTPATIENT
Start: 2021-01-28

## 2021-01-27 RX ORDER — HEPARIN 100 UNIT/ML
500 SYRINGE INTRAVENOUS
Status: CANCELLED | OUTPATIENT
Start: 2021-01-28

## 2021-01-27 RX ORDER — FAMOTIDINE 10 MG/ML
20 INJECTION INTRAVENOUS ONCE AS NEEDED
Status: CANCELLED
Start: 2021-01-28

## 2021-01-27 RX ORDER — DIPHENHYDRAMINE HYDROCHLORIDE 50 MG/ML
50 INJECTION INTRAMUSCULAR; INTRAVENOUS ONCE AS NEEDED
Status: CANCELLED | OUTPATIENT
Start: 2021-01-28

## 2021-01-27 RX ORDER — MEPERIDINE HYDROCHLORIDE 50 MG/ML
25 INJECTION INTRAMUSCULAR; INTRAVENOUS; SUBCUTANEOUS EVERY 30 MIN PRN
Status: CANCELLED | OUTPATIENT
Start: 2021-01-28

## 2021-01-27 RX ORDER — FAMOTIDINE 10 MG/ML
20 INJECTION INTRAVENOUS
Status: CANCELLED | OUTPATIENT
Start: 2021-01-28

## 2021-01-27 RX ORDER — SODIUM CHLORIDE 0.9 % (FLUSH) 0.9 %
10 SYRINGE (ML) INJECTION
Status: CANCELLED | OUTPATIENT
Start: 2021-01-28

## 2021-01-27 RX ORDER — ACETAMINOPHEN 500 MG
1000 TABLET ORAL
Status: CANCELLED | OUTPATIENT
Start: 2021-01-28

## 2021-01-27 RX ORDER — ASPIRIN 325 MG
50000 TABLET, DELAYED RELEASE (ENTERIC COATED) ORAL
COMMUNITY
Start: 2020-11-02 | End: 2021-07-07 | Stop reason: CLARIF

## 2021-01-28 ENCOUNTER — INFUSION (OUTPATIENT)
Dept: INFUSION THERAPY | Facility: HOSPITAL | Age: 81
End: 2021-01-28
Attending: INTERNAL MEDICINE
Payer: MEDICARE

## 2021-01-28 VITALS
HEIGHT: 65 IN | TEMPERATURE: 98 F | DIASTOLIC BLOOD PRESSURE: 67 MMHG | WEIGHT: 195.56 LBS | SYSTOLIC BLOOD PRESSURE: 119 MMHG | RESPIRATION RATE: 18 BRPM | BODY MASS INDEX: 32.58 KG/M2 | HEART RATE: 88 BPM

## 2021-01-28 DIAGNOSIS — C82.00 FOLLICULAR LYMPHOMA GRADE I, UNSPECIFIED BODY REGION: Primary | ICD-10-CM

## 2021-01-28 PROCEDURE — 96415 CHEMO IV INFUSION ADDL HR: CPT | Mod: PN

## 2021-01-28 PROCEDURE — 96417 CHEMO IV INFUS EACH ADDL SEQ: CPT | Mod: PN

## 2021-01-28 PROCEDURE — 63600175 PHARM REV CODE 636 W HCPCS: Mod: JG,PN | Performed by: INTERNAL MEDICINE

## 2021-01-28 PROCEDURE — 96411 CHEMO IV PUSH ADDL DRUG: CPT | Mod: PN

## 2021-01-28 PROCEDURE — 96375 TX/PRO/DX INJ NEW DRUG ADDON: CPT | Mod: PN

## 2021-01-28 PROCEDURE — 96413 CHEMO IV INFUSION 1 HR: CPT | Mod: PN

## 2021-01-28 PROCEDURE — 96367 TX/PROPH/DG ADDL SEQ IV INF: CPT | Mod: PN

## 2021-01-28 PROCEDURE — 25000003 PHARM REV CODE 250: Mod: PN | Performed by: INTERNAL MEDICINE

## 2021-01-28 PROCEDURE — A4216 STERILE WATER/SALINE, 10 ML: HCPCS | Mod: PN | Performed by: INTERNAL MEDICINE

## 2021-01-28 RX ORDER — SODIUM CHLORIDE 0.9 % (FLUSH) 0.9 %
10 SYRINGE (ML) INJECTION
Status: DISCONTINUED | OUTPATIENT
Start: 2021-01-28 | End: 2021-01-28 | Stop reason: HOSPADM

## 2021-01-28 RX ORDER — MEPERIDINE HYDROCHLORIDE 25 MG/ML
25 INJECTION INTRAMUSCULAR; INTRAVENOUS; SUBCUTANEOUS EVERY 30 MIN PRN
Status: DISCONTINUED | OUTPATIENT
Start: 2021-01-28 | End: 2021-01-28 | Stop reason: HOSPADM

## 2021-01-28 RX ORDER — MAGNESIUM SULFATE HEPTAHYDRATE 40 MG/ML
2 INJECTION, SOLUTION INTRAVENOUS ONCE
Status: COMPLETED | OUTPATIENT
Start: 2021-01-28 | End: 2021-01-28

## 2021-01-28 RX ORDER — FAMOTIDINE 10 MG/ML
20 INJECTION INTRAVENOUS
Status: COMPLETED | OUTPATIENT
Start: 2021-01-28 | End: 2021-01-28

## 2021-01-28 RX ORDER — HEPARIN 100 UNIT/ML
500 SYRINGE INTRAVENOUS
Status: DISCONTINUED | OUTPATIENT
Start: 2021-01-28 | End: 2021-01-28 | Stop reason: HOSPADM

## 2021-01-28 RX ORDER — ACETAMINOPHEN 500 MG
1000 TABLET ORAL
Status: COMPLETED | OUTPATIENT
Start: 2021-01-28 | End: 2021-01-28

## 2021-01-28 RX ADMIN — SODIUM CHLORIDE, PRESERVATIVE FREE 500 UNITS: 5 INJECTION INTRAVENOUS at 03:01

## 2021-01-28 RX ADMIN — SODIUM CHLORIDE 700 MG: 0.9 INJECTION, SOLUTION INTRAVENOUS at 10:01

## 2021-01-28 RX ADMIN — DIPHENHYDRAMINE HYDROCHLORIDE 50 MG: 50 INJECTION INTRAMUSCULAR; INTRAVENOUS at 09:01

## 2021-01-28 RX ADMIN — MAGNESIUM SULFATE HEPTAHYDRATE 2 G: 40 INJECTION, SOLUTION INTRAVENOUS at 02:01

## 2021-01-28 RX ADMIN — VINCRISTINE SULFATE 2 MG: 1 INJECTION, SOLUTION INTRAVENOUS at 01:01

## 2021-01-28 RX ADMIN — Medication 10 ML: at 03:01

## 2021-01-28 RX ADMIN — FAMOTIDINE 20 MG: 10 INJECTION INTRAVENOUS at 09:01

## 2021-01-28 RX ADMIN — ACETAMINOPHEN 1000 MG: 500 TABLET, FILM COATED ORAL at 09:01

## 2021-01-28 RX ADMIN — CYCLOPHOSPHAMIDE 1500 MG: 200 INJECTION, SOLUTION INTRAVENOUS at 01:01

## 2021-01-28 RX ADMIN — SODIUM CHLORIDE: 0.9 INJECTION, SOLUTION INTRAVENOUS at 09:01

## 2021-01-28 RX ADMIN — DEXAMETHASONE SODIUM PHOSPHATE 0.25 MG: 4 INJECTION, SOLUTION INTRA-ARTICULAR; INTRALESIONAL; INTRAMUSCULAR; INTRAVENOUS; SOFT TISSUE at 12:01

## 2021-02-01 ENCOUNTER — PATIENT MESSAGE (OUTPATIENT)
Dept: FAMILY MEDICINE | Facility: CLINIC | Age: 81
End: 2021-02-01

## 2021-02-01 DIAGNOSIS — I10 ESSENTIAL HYPERTENSION: ICD-10-CM

## 2021-02-01 DIAGNOSIS — G47.00 INSOMNIA, UNSPECIFIED TYPE: ICD-10-CM

## 2021-02-01 DIAGNOSIS — E78.5 HYPERLIPIDEMIA, UNSPECIFIED HYPERLIPIDEMIA TYPE: ICD-10-CM

## 2021-02-02 RX ORDER — HYDROCHLOROTHIAZIDE 25 MG/1
25 TABLET ORAL DAILY
Qty: 90 TABLET | Refills: 3 | Status: SHIPPED | OUTPATIENT
Start: 2021-02-02 | End: 2022-01-20 | Stop reason: SDUPTHER

## 2021-02-02 RX ORDER — SIMVASTATIN 10 MG/1
10 TABLET, FILM COATED ORAL NIGHTLY
Qty: 90 TABLET | Refills: 3 | Status: SHIPPED | OUTPATIENT
Start: 2021-02-02 | End: 2022-01-20 | Stop reason: SDUPTHER

## 2021-02-02 RX ORDER — NIFEDIPINE 60 MG/1
60 TABLET, EXTENDED RELEASE ORAL DAILY
Qty: 90 TABLET | Refills: 3 | Status: SHIPPED | OUTPATIENT
Start: 2021-02-02 | End: 2022-01-20 | Stop reason: SDUPTHER

## 2021-02-02 RX ORDER — LISINOPRIL 40 MG/1
40 TABLET ORAL DAILY
Qty: 90 TABLET | Refills: 3 | Status: SHIPPED | OUTPATIENT
Start: 2021-02-02 | End: 2022-01-20 | Stop reason: SDUPTHER

## 2021-02-02 RX ORDER — TEMAZEPAM 15 MG/1
CAPSULE ORAL
Qty: 90 CAPSULE | Refills: 1 | Status: SHIPPED | OUTPATIENT
Start: 2021-02-02 | End: 2021-07-16

## 2021-02-11 ENCOUNTER — IMMUNIZATION (OUTPATIENT)
Dept: FAMILY MEDICINE | Facility: CLINIC | Age: 81
End: 2021-02-11
Payer: MEDICARE

## 2021-02-11 DIAGNOSIS — Z23 NEED FOR VACCINATION: Primary | ICD-10-CM

## 2021-02-11 PROCEDURE — 0002A COVID-19, MRNA, LNP-S, PF, 30 MCG/0.3 ML DOSE VACCINE: CPT | Mod: PBBFAC | Performed by: FAMILY MEDICINE

## 2021-02-11 PROCEDURE — 91300 COVID-19, MRNA, LNP-S, PF, 30 MCG/0.3 ML DOSE VACCINE: CPT | Mod: PBBFAC | Performed by: FAMILY MEDICINE

## 2021-02-17 ENCOUNTER — OFFICE VISIT (OUTPATIENT)
Dept: HEMATOLOGY/ONCOLOGY | Facility: CLINIC | Age: 81
End: 2021-02-17
Payer: MEDICARE

## 2021-02-17 ENCOUNTER — LAB VISIT (OUTPATIENT)
Dept: LAB | Facility: HOSPITAL | Age: 81
End: 2021-02-17
Attending: INTERNAL MEDICINE
Payer: MEDICARE

## 2021-02-17 VITALS
OXYGEN SATURATION: 96 % | HEIGHT: 65 IN | WEIGHT: 194.69 LBS | TEMPERATURE: 97 F | SYSTOLIC BLOOD PRESSURE: 114 MMHG | DIASTOLIC BLOOD PRESSURE: 82 MMHG | RESPIRATION RATE: 18 BRPM | BODY MASS INDEX: 32.44 KG/M2 | HEART RATE: 87 BPM

## 2021-02-17 DIAGNOSIS — E55.9 VITAMIN D DEFICIENCY: ICD-10-CM

## 2021-02-17 DIAGNOSIS — C82.01 GRADE 1 FOLLICULAR LYMPHOMA OF LYMPH NODES OF NECK: Primary | ICD-10-CM

## 2021-02-17 DIAGNOSIS — R91.1 LUNG NODULE: ICD-10-CM

## 2021-02-17 DIAGNOSIS — C82.01 GRADE 1 FOLLICULAR LYMPHOMA OF LYMPH NODES OF NECK: ICD-10-CM

## 2021-02-17 LAB
ALBUMIN SERPL BCP-MCNC: 4 G/DL (ref 3.5–5.2)
ALP SERPL-CCNC: 90 U/L (ref 38–145)
ALT SERPL W/O P-5'-P-CCNC: 11 U/L (ref 0–35)
ANION GAP SERPL CALC-SCNC: 9 MMOL/L (ref 8–16)
AST SERPL-CCNC: 32 U/L (ref 14–36)
B2 MICROGLOB SERPL-MCNC: 3.8 UG/ML (ref 0–2.5)
BASOPHILS # BLD AUTO: 0.06 K/UL (ref 0–0.2)
BASOPHILS NFR BLD: 0.9 % (ref 0–1.9)
BILIRUB SERPL-MCNC: 0.4 MG/DL (ref 0.2–1.3)
CALCIUM SERPL-MCNC: 9.2 MG/DL (ref 8.4–10.2)
CHLORIDE SERPL-SCNC: 104 MMOL/L (ref 95–110)
CO2 SERPL-SCNC: 27 MMOL/L (ref 22–31)
CREAT SERPL-MCNC: 0.98 MG/DL (ref 0.5–1.4)
DIFFERENTIAL METHOD: ABNORMAL
EOSINOPHIL # BLD AUTO: 0.1 K/UL (ref 0–0.5)
EOSINOPHIL NFR BLD: 1.7 % (ref 0–8)
ERYTHROCYTE [DISTWIDTH] IN BLOOD BY AUTOMATED COUNT: 15.5 % (ref 11.5–14.5)
EST. GFR  (AFRICAN AMERICAN): >60 ML/MIN/1.73 M^2
EST. GFR  (NON AFRICAN AMERICAN): 55 ML/MIN/1.73 M^2
GLUCOSE SERPL-MCNC: 104 MG/DL (ref 70–110)
HCT VFR BLD AUTO: 36.4 % (ref 37–48.5)
HGB BLD-MCNC: 11.6 G/DL (ref 12–16)
IMM GRANULOCYTES # BLD AUTO: 0.11 K/UL (ref 0–0.04)
IMM GRANULOCYTES NFR BLD AUTO: 1.7 % (ref 0–0.5)
LDH SERPL L TO P-CCNC: 658 U/L (ref 313–618)
LYMPHOCYTES # BLD AUTO: 1.1 K/UL (ref 1–4.8)
LYMPHOCYTES NFR BLD: 17.7 % (ref 18–48)
MAGNESIUM SERPL-MCNC: 1.4 MG/DL (ref 1.6–2.6)
MCH RBC QN AUTO: 28.6 PG (ref 27–31)
MCHC RBC AUTO-ENTMCNC: 31.9 G/DL (ref 32–36)
MCV RBC AUTO: 90 FL (ref 82–98)
MONOCYTES # BLD AUTO: 0.6 K/UL (ref 0.3–1)
MONOCYTES NFR BLD: 9.9 % (ref 4–15)
NEUTROPHILS # BLD AUTO: 4.4 K/UL (ref 1.8–7.7)
NEUTROPHILS NFR BLD: 68.1 % (ref 38–73)
NRBC BLD-RTO: 0 /100 WBC
PLATELET # BLD AUTO: 161 K/UL (ref 150–350)
PMV BLD AUTO: 10.5 FL (ref 9.2–12.9)
POTASSIUM SERPL-SCNC: 3.4 MMOL/L (ref 3.5–5.1)
PROT SERPL-MCNC: 6.8 G/DL (ref 6–8.4)
RBC # BLD AUTO: 4.05 M/UL (ref 4–5.4)
SODIUM SERPL-SCNC: 140 MMOL/L (ref 136–145)
UUN UR-MCNC: 24 MG/DL (ref 7–18)
WBC # BLD AUTO: 6.39 K/UL (ref 3.9–12.7)

## 2021-02-17 PROCEDURE — 1159F PR MEDICATION LIST DOCUMENTED IN MEDICAL RECORD: ICD-10-PCS | Mod: S$GLB,,, | Performed by: INTERNAL MEDICINE

## 2021-02-17 PROCEDURE — 3288F FALL RISK ASSESSMENT DOCD: CPT | Mod: CPTII,S$GLB,, | Performed by: INTERNAL MEDICINE

## 2021-02-17 PROCEDURE — 1101F PR PT FALLS ASSESS DOC 0-1 FALLS W/OUT INJ PAST YR: ICD-10-PCS | Mod: CPTII,S$GLB,, | Performed by: INTERNAL MEDICINE

## 2021-02-17 PROCEDURE — 3079F PR MOST RECENT DIASTOLIC BLOOD PRESSURE 80-89 MM HG: ICD-10-PCS | Mod: CPTII,S$GLB,, | Performed by: INTERNAL MEDICINE

## 2021-02-17 PROCEDURE — 3074F PR MOST RECENT SYSTOLIC BLOOD PRESSURE < 130 MM HG: ICD-10-PCS | Mod: CPTII,S$GLB,, | Performed by: INTERNAL MEDICINE

## 2021-02-17 PROCEDURE — 1101F PT FALLS ASSESS-DOCD LE1/YR: CPT | Mod: CPTII,S$GLB,, | Performed by: INTERNAL MEDICINE

## 2021-02-17 PROCEDURE — 83615 LACTATE (LD) (LDH) ENZYME: CPT

## 2021-02-17 PROCEDURE — 99999 PR PBB SHADOW E&M-EST. PATIENT-LVL V: ICD-10-PCS | Mod: PBBFAC,,, | Performed by: INTERNAL MEDICINE

## 2021-02-17 PROCEDURE — 99999 PR PBB SHADOW E&M-EST. PATIENT-LVL V: CPT | Mod: PBBFAC,,, | Performed by: INTERNAL MEDICINE

## 2021-02-17 PROCEDURE — 82232 ASSAY OF BETA-2 PROTEIN: CPT

## 2021-02-17 PROCEDURE — 80053 COMPREHEN METABOLIC PANEL: CPT | Mod: PN

## 2021-02-17 PROCEDURE — 3074F SYST BP LT 130 MM HG: CPT | Mod: CPTII,S$GLB,, | Performed by: INTERNAL MEDICINE

## 2021-02-17 PROCEDURE — 85025 COMPLETE CBC W/AUTO DIFF WBC: CPT | Mod: PN

## 2021-02-17 PROCEDURE — 1126F AMNT PAIN NOTED NONE PRSNT: CPT | Mod: S$GLB,,, | Performed by: INTERNAL MEDICINE

## 2021-02-17 PROCEDURE — 1126F PR PAIN SEVERITY QUANTIFIED, NO PAIN PRESENT: ICD-10-PCS | Mod: S$GLB,,, | Performed by: INTERNAL MEDICINE

## 2021-02-17 PROCEDURE — 1159F MED LIST DOCD IN RCRD: CPT | Mod: S$GLB,,, | Performed by: INTERNAL MEDICINE

## 2021-02-17 PROCEDURE — 83735 ASSAY OF MAGNESIUM: CPT | Mod: PN

## 2021-02-17 PROCEDURE — 99215 PR OFFICE/OUTPT VISIT, EST, LEVL V, 40-54 MIN: ICD-10-PCS | Mod: S$GLB,,, | Performed by: INTERNAL MEDICINE

## 2021-02-17 PROCEDURE — 3079F DIAST BP 80-89 MM HG: CPT | Mod: CPTII,S$GLB,, | Performed by: INTERNAL MEDICINE

## 2021-02-17 PROCEDURE — 3288F PR FALLS RISK ASSESSMENT DOCUMENTED: ICD-10-PCS | Mod: CPTII,S$GLB,, | Performed by: INTERNAL MEDICINE

## 2021-02-17 PROCEDURE — 36415 COLL VENOUS BLD VENIPUNCTURE: CPT | Mod: PN

## 2021-02-17 PROCEDURE — 80053 COMPREHEN METABOLIC PANEL: CPT

## 2021-02-17 PROCEDURE — 83735 ASSAY OF MAGNESIUM: CPT

## 2021-02-17 PROCEDURE — 85025 COMPLETE CBC W/AUTO DIFF WBC: CPT

## 2021-02-17 PROCEDURE — 99215 OFFICE O/P EST HI 40 MIN: CPT | Mod: S$GLB,,, | Performed by: INTERNAL MEDICINE

## 2021-02-17 PROCEDURE — 83615 LACTATE (LD) (LDH) ENZYME: CPT | Mod: PN

## 2021-02-17 RX ORDER — DIPHENHYDRAMINE HYDROCHLORIDE 50 MG/ML
50 INJECTION INTRAMUSCULAR; INTRAVENOUS ONCE AS NEEDED
Status: CANCELLED | OUTPATIENT
Start: 2021-02-18

## 2021-02-17 RX ORDER — MEPERIDINE HYDROCHLORIDE 50 MG/ML
25 INJECTION INTRAMUSCULAR; INTRAVENOUS; SUBCUTANEOUS EVERY 30 MIN PRN
Status: CANCELLED | OUTPATIENT
Start: 2021-02-18

## 2021-02-17 RX ORDER — HEPARIN 100 UNIT/ML
500 SYRINGE INTRAVENOUS
Status: CANCELLED | OUTPATIENT
Start: 2021-02-18

## 2021-02-17 RX ORDER — ACETAMINOPHEN 500 MG
1000 TABLET ORAL
Status: CANCELLED | OUTPATIENT
Start: 2021-02-18

## 2021-02-17 RX ORDER — METHYLPREDNISOLONE SOD SUCC 125 MG
125 VIAL (EA) INJECTION ONCE AS NEEDED
Status: CANCELLED | OUTPATIENT
Start: 2021-02-18

## 2021-02-17 RX ORDER — FAMOTIDINE 10 MG/ML
20 INJECTION INTRAVENOUS ONCE AS NEEDED
Status: CANCELLED
Start: 2021-02-18

## 2021-02-17 RX ORDER — FAMOTIDINE 10 MG/ML
20 INJECTION INTRAVENOUS
Status: CANCELLED | OUTPATIENT
Start: 2021-02-18

## 2021-02-17 RX ORDER — SODIUM CHLORIDE 0.9 % (FLUSH) 0.9 %
10 SYRINGE (ML) INJECTION
Status: CANCELLED | OUTPATIENT
Start: 2021-02-18

## 2021-02-18 ENCOUNTER — INFUSION (OUTPATIENT)
Dept: INFUSION THERAPY | Facility: HOSPITAL | Age: 81
End: 2021-02-18
Attending: INTERNAL MEDICINE
Payer: MEDICARE

## 2021-02-18 VITALS
WEIGHT: 196 LBS | BODY MASS INDEX: 32.65 KG/M2 | DIASTOLIC BLOOD PRESSURE: 74 MMHG | SYSTOLIC BLOOD PRESSURE: 143 MMHG | HEIGHT: 65 IN | TEMPERATURE: 98 F | HEART RATE: 91 BPM | RESPIRATION RATE: 16 BRPM

## 2021-02-18 DIAGNOSIS — C82.00 FOLLICULAR LYMPHOMA GRADE I, UNSPECIFIED BODY REGION: Primary | ICD-10-CM

## 2021-02-18 PROCEDURE — 96411 CHEMO IV PUSH ADDL DRUG: CPT | Mod: PN

## 2021-02-18 PROCEDURE — 96417 CHEMO IV INFUS EACH ADDL SEQ: CPT | Mod: PN

## 2021-02-18 PROCEDURE — 96368 THER/DIAG CONCURRENT INF: CPT | Mod: PN

## 2021-02-18 PROCEDURE — 25000003 PHARM REV CODE 250: Mod: PN | Performed by: INTERNAL MEDICINE

## 2021-02-18 PROCEDURE — 96367 TX/PROPH/DG ADDL SEQ IV INF: CPT | Mod: PN

## 2021-02-18 PROCEDURE — 96375 TX/PRO/DX INJ NEW DRUG ADDON: CPT | Mod: PN

## 2021-02-18 PROCEDURE — 63600175 PHARM REV CODE 636 W HCPCS: Mod: PN | Performed by: INTERNAL MEDICINE

## 2021-02-18 PROCEDURE — 96415 CHEMO IV INFUSION ADDL HR: CPT | Mod: PN

## 2021-02-18 PROCEDURE — 96413 CHEMO IV INFUSION 1 HR: CPT | Mod: PN

## 2021-02-18 RX ORDER — HEPARIN 100 UNIT/ML
500 SYRINGE INTRAVENOUS
Status: DISCONTINUED | OUTPATIENT
Start: 2021-02-18 | End: 2021-02-18 | Stop reason: HOSPADM

## 2021-02-18 RX ORDER — SODIUM CHLORIDE 0.9 % (FLUSH) 0.9 %
10 SYRINGE (ML) INJECTION
Status: DISCONTINUED | OUTPATIENT
Start: 2021-02-18 | End: 2021-02-18 | Stop reason: HOSPADM

## 2021-02-18 RX ORDER — ACETAMINOPHEN 500 MG
1000 TABLET ORAL
Status: COMPLETED | OUTPATIENT
Start: 2021-02-18 | End: 2021-02-18

## 2021-02-18 RX ORDER — FAMOTIDINE 10 MG/ML
20 INJECTION INTRAVENOUS
Status: COMPLETED | OUTPATIENT
Start: 2021-02-18 | End: 2021-02-18

## 2021-02-18 RX ADMIN — DEXAMETHASONE SODIUM PHOSPHATE 0.25 MG: 4 INJECTION, SOLUTION INTRA-ARTICULAR; INTRALESIONAL; INTRAMUSCULAR; INTRAVENOUS; SOFT TISSUE at 01:02

## 2021-02-18 RX ADMIN — FAMOTIDINE 20 MG: 10 INJECTION INTRAVENOUS at 09:02

## 2021-02-18 RX ADMIN — SODIUM CHLORIDE: 9 INJECTION, SOLUTION INTRAVENOUS at 09:02

## 2021-02-18 RX ADMIN — POTASSIUM CHLORIDE: 2 INJECTION, SOLUTION, CONCENTRATE INTRAVENOUS at 10:02

## 2021-02-18 RX ADMIN — HEPARIN 500 UNITS: 100 SYRINGE at 03:02

## 2021-02-18 RX ADMIN — VINCRISTINE SULFATE 2 MG: 1 INJECTION, SOLUTION INTRAVENOUS at 01:02

## 2021-02-18 RX ADMIN — ACETAMINOPHEN 1000 MG: 500 TABLET, FILM COATED ORAL at 09:02

## 2021-02-18 RX ADMIN — DIPHENHYDRAMINE HYDROCHLORIDE 50 MG: 50 INJECTION INTRAMUSCULAR; INTRAVENOUS at 10:02

## 2021-02-18 RX ADMIN — SODIUM CHLORIDE 700 MG: 0.9 INJECTION, SOLUTION INTRAVENOUS at 10:02

## 2021-02-18 RX ADMIN — CYCLOPHOSPHAMIDE 1500 MG: 200 INJECTION, SOLUTION INTRAVENOUS at 01:02

## 2021-02-19 ENCOUNTER — PATIENT MESSAGE (OUTPATIENT)
Dept: HEMATOLOGY/ONCOLOGY | Facility: CLINIC | Age: 81
End: 2021-02-19

## 2021-03-01 ENCOUNTER — TELEPHONE (OUTPATIENT)
Dept: FAMILY MEDICINE | Facility: CLINIC | Age: 81
End: 2021-03-01

## 2021-03-01 DIAGNOSIS — Z12.31 ENCOUNTER FOR SCREENING MAMMOGRAM FOR MALIGNANT NEOPLASM OF BREAST: Primary | ICD-10-CM

## 2021-03-03 ENCOUNTER — HOSPITAL ENCOUNTER (OUTPATIENT)
Dept: RADIOLOGY | Facility: HOSPITAL | Age: 81
Discharge: HOME | End: 2021-03-03
Attending: FAMILY MEDICINE
Payer: MEDICARE

## 2021-03-03 DIAGNOSIS — Z12.31 ENCOUNTER FOR SCREENING MAMMOGRAM FOR MALIGNANT NEOPLASM OF BREAST: ICD-10-CM

## 2021-03-03 PROCEDURE — 77067 SCR MAMMO BI INCL CAD: CPT | Mod: 26,,, | Performed by: RADIOLOGY

## 2021-03-03 PROCEDURE — 77063 MAMMO DIGITAL SCREENING LEFT WITH TOMO: ICD-10-PCS | Mod: 26,,, | Performed by: RADIOLOGY

## 2021-03-03 PROCEDURE — 77067 MAMMO DIGITAL SCREENING LEFT WITH TOMO: ICD-10-PCS | Mod: 26,,, | Performed by: RADIOLOGY

## 2021-03-03 PROCEDURE — 77067 SCR MAMMO BI INCL CAD: CPT | Mod: TC,PO

## 2021-03-03 PROCEDURE — 77063 BREAST TOMOSYNTHESIS BI: CPT | Mod: 26,,, | Performed by: RADIOLOGY

## 2021-03-31 ENCOUNTER — OFFICE VISIT (OUTPATIENT)
Dept: FAMILY MEDICINE | Facility: CLINIC | Age: 81
End: 2021-03-31
Payer: MEDICARE

## 2021-03-31 VITALS
WEIGHT: 197.31 LBS | SYSTOLIC BLOOD PRESSURE: 110 MMHG | RESPIRATION RATE: 18 BRPM | TEMPERATURE: 98 F | BODY MASS INDEX: 32.87 KG/M2 | DIASTOLIC BLOOD PRESSURE: 70 MMHG | HEIGHT: 65 IN | HEART RATE: 84 BPM

## 2021-03-31 DIAGNOSIS — Z00.00 PREVENTATIVE HEALTH CARE: Primary | ICD-10-CM

## 2021-03-31 DIAGNOSIS — E78.5 HYPERLIPIDEMIA, UNSPECIFIED HYPERLIPIDEMIA TYPE: ICD-10-CM

## 2021-03-31 DIAGNOSIS — I10 ESSENTIAL HYPERTENSION: ICD-10-CM

## 2021-03-31 PROCEDURE — 3074F SYST BP LT 130 MM HG: CPT | Mod: CPTII,S$GLB,, | Performed by: FAMILY MEDICINE

## 2021-03-31 PROCEDURE — 1101F PR PT FALLS ASSESS DOC 0-1 FALLS W/OUT INJ PAST YR: ICD-10-PCS | Mod: CPTII,S$GLB,, | Performed by: FAMILY MEDICINE

## 2021-03-31 PROCEDURE — 99999 PR PBB SHADOW E&M-EST. PATIENT-LVL III: CPT | Mod: PBBFAC,,, | Performed by: FAMILY MEDICINE

## 2021-03-31 PROCEDURE — 99397 PR PREVENTIVE VISIT,EST,65 & OVER: ICD-10-PCS | Mod: S$GLB,,, | Performed by: FAMILY MEDICINE

## 2021-03-31 PROCEDURE — 99397 PER PM REEVAL EST PAT 65+ YR: CPT | Mod: S$GLB,,, | Performed by: FAMILY MEDICINE

## 2021-03-31 PROCEDURE — 3288F PR FALLS RISK ASSESSMENT DOCUMENTED: ICD-10-PCS | Mod: CPTII,S$GLB,, | Performed by: FAMILY MEDICINE

## 2021-03-31 PROCEDURE — 3078F PR MOST RECENT DIASTOLIC BLOOD PRESSURE < 80 MM HG: ICD-10-PCS | Mod: CPTII,S$GLB,, | Performed by: FAMILY MEDICINE

## 2021-03-31 PROCEDURE — 1126F AMNT PAIN NOTED NONE PRSNT: CPT | Mod: S$GLB,,, | Performed by: FAMILY MEDICINE

## 2021-03-31 PROCEDURE — 1101F PT FALLS ASSESS-DOCD LE1/YR: CPT | Mod: CPTII,S$GLB,, | Performed by: FAMILY MEDICINE

## 2021-03-31 PROCEDURE — 99999 PR PBB SHADOW E&M-EST. PATIENT-LVL III: ICD-10-PCS | Mod: PBBFAC,,, | Performed by: FAMILY MEDICINE

## 2021-03-31 PROCEDURE — 3078F DIAST BP <80 MM HG: CPT | Mod: CPTII,S$GLB,, | Performed by: FAMILY MEDICINE

## 2021-03-31 PROCEDURE — 3074F PR MOST RECENT SYSTOLIC BLOOD PRESSURE < 130 MM HG: ICD-10-PCS | Mod: CPTII,S$GLB,, | Performed by: FAMILY MEDICINE

## 2021-03-31 PROCEDURE — 1126F PR PAIN SEVERITY QUANTIFIED, NO PAIN PRESENT: ICD-10-PCS | Mod: S$GLB,,, | Performed by: FAMILY MEDICINE

## 2021-03-31 PROCEDURE — 3288F FALL RISK ASSESSMENT DOCD: CPT | Mod: CPTII,S$GLB,, | Performed by: FAMILY MEDICINE

## 2021-04-13 ENCOUNTER — PATIENT MESSAGE (OUTPATIENT)
Dept: HEMATOLOGY/ONCOLOGY | Facility: CLINIC | Age: 81
End: 2021-04-13

## 2021-04-13 ENCOUNTER — TELEPHONE (OUTPATIENT)
Dept: HEMATOLOGY/ONCOLOGY | Facility: CLINIC | Age: 81
End: 2021-04-13

## 2021-04-14 ENCOUNTER — OFFICE VISIT (OUTPATIENT)
Dept: HEMATOLOGY/ONCOLOGY | Facility: CLINIC | Age: 81
End: 2021-04-14
Payer: MEDICARE

## 2021-04-14 ENCOUNTER — LAB VISIT (OUTPATIENT)
Dept: LAB | Facility: HOSPITAL | Age: 81
End: 2021-04-14
Attending: INTERNAL MEDICINE
Payer: MEDICARE

## 2021-04-14 VITALS
TEMPERATURE: 98 F | SYSTOLIC BLOOD PRESSURE: 120 MMHG | BODY MASS INDEX: 32.65 KG/M2 | DIASTOLIC BLOOD PRESSURE: 83 MMHG | HEIGHT: 65 IN | WEIGHT: 196 LBS | HEART RATE: 82 BPM | RESPIRATION RATE: 18 BRPM | OXYGEN SATURATION: 98 %

## 2021-04-14 DIAGNOSIS — E55.9 VITAMIN D DEFICIENCY: ICD-10-CM

## 2021-04-14 DIAGNOSIS — E87.6 HYPOKALEMIA: ICD-10-CM

## 2021-04-14 DIAGNOSIS — E83.42 HYPOMAGNESEMIA: ICD-10-CM

## 2021-04-14 DIAGNOSIS — C82.01 GRADE 1 FOLLICULAR LYMPHOMA OF LYMPH NODES OF NECK: ICD-10-CM

## 2021-04-14 DIAGNOSIS — E78.5 HYPERLIPIDEMIA, UNSPECIFIED HYPERLIPIDEMIA TYPE: ICD-10-CM

## 2021-04-14 DIAGNOSIS — C82.01 GRADE 1 FOLLICULAR LYMPHOMA OF LYMPH NODES OF NECK: Primary | ICD-10-CM

## 2021-04-14 LAB
25(OH)D3+25(OH)D2 SERPL-MCNC: 36 NG/ML (ref 30–96)
ALBUMIN SERPL BCP-MCNC: 4.1 G/DL (ref 3.5–5.2)
ALP SERPL-CCNC: 77 U/L (ref 38–145)
ALT SERPL W/O P-5'-P-CCNC: 13 U/L (ref 0–35)
ANION GAP SERPL CALC-SCNC: 9 MMOL/L (ref 8–16)
AST SERPL-CCNC: 33 U/L (ref 14–36)
B2 MICROGLOB SERPL-MCNC: 3.6 UG/ML (ref 0–2.5)
BASOPHILS # BLD AUTO: 0.07 K/UL (ref 0–0.2)
BASOPHILS NFR BLD: 1 % (ref 0–1.9)
BILIRUB SERPL-MCNC: 0.4 MG/DL (ref 0.2–1.3)
CALCIUM SERPL-MCNC: 9.1 MG/DL (ref 8.4–10.2)
CHLORIDE SERPL-SCNC: 105 MMOL/L (ref 95–110)
CHOLEST SERPL-MCNC: 214 MG/DL (ref 120–199)
CHOLEST/HDLC SERPL: 4.1 {RATIO} (ref 2–5)
CO2 SERPL-SCNC: 25 MMOL/L (ref 22–31)
CREAT SERPL-MCNC: 1.15 MG/DL (ref 0.5–1.4)
DIFFERENTIAL METHOD: ABNORMAL
EOSINOPHIL # BLD AUTO: 0.2 K/UL (ref 0–0.5)
EOSINOPHIL NFR BLD: 2.7 % (ref 0–8)
ERYTHROCYTE [DISTWIDTH] IN BLOOD BY AUTOMATED COUNT: 15.1 % (ref 11.5–14.5)
EST. GFR  (AFRICAN AMERICAN): 53 ML/MIN/1.73 M^2
EST. GFR  (NON AFRICAN AMERICAN): 46 ML/MIN/1.73 M^2
GLUCOSE SERPL-MCNC: 105 MG/DL (ref 70–110)
HCT VFR BLD AUTO: 39.1 % (ref 37–48.5)
HDLC SERPL-MCNC: 52 MG/DL (ref 40–75)
HDLC SERPL: 24.3 % (ref 20–50)
HGB BLD-MCNC: 12.4 G/DL (ref 12–16)
IMM GRANULOCYTES # BLD AUTO: 0.04 K/UL (ref 0–0.04)
IMM GRANULOCYTES NFR BLD AUTO: 0.6 % (ref 0–0.5)
LDH SERPL L TO P-CCNC: 213 U/L (ref 110–260)
LDLC SERPL CALC-MCNC: 129.8 MG/DL (ref 63–159)
LYMPHOCYTES # BLD AUTO: 1.1 K/UL (ref 1–4.8)
LYMPHOCYTES NFR BLD: 16 % (ref 18–48)
MAGNESIUM SERPL-MCNC: 1.5 MG/DL (ref 1.6–2.6)
MCH RBC QN AUTO: 29.4 PG (ref 27–31)
MCHC RBC AUTO-ENTMCNC: 31.7 G/DL (ref 32–36)
MCV RBC AUTO: 93 FL (ref 82–98)
MONOCYTES # BLD AUTO: 0.6 K/UL (ref 0.3–1)
MONOCYTES NFR BLD: 9.3 % (ref 4–15)
NEUTROPHILS # BLD AUTO: 4.8 K/UL (ref 1.8–7.7)
NEUTROPHILS NFR BLD: 70.4 % (ref 38–73)
NONHDLC SERPL-MCNC: 162 MG/DL
NRBC BLD-RTO: 0 /100 WBC
PLATELET # BLD AUTO: 150 K/UL (ref 150–450)
PMV BLD AUTO: 11.5 FL (ref 9.2–12.9)
POTASSIUM SERPL-SCNC: 3.3 MMOL/L (ref 3.5–5.1)
PROT SERPL-MCNC: 6.8 G/DL (ref 6–8.4)
RBC # BLD AUTO: 4.22 M/UL (ref 4–5.4)
SODIUM SERPL-SCNC: 139 MMOL/L (ref 136–145)
TRIGL SERPL-MCNC: 161 MG/DL (ref 30–150)
UUN UR-MCNC: 27 MG/DL (ref 7–18)
WBC # BLD AUTO: 6.77 K/UL (ref 3.9–12.7)

## 2021-04-14 PROCEDURE — 1101F PT FALLS ASSESS-DOCD LE1/YR: CPT | Mod: CPTII,S$GLB,, | Performed by: INTERNAL MEDICINE

## 2021-04-14 PROCEDURE — 36415 COLL VENOUS BLD VENIPUNCTURE: CPT | Mod: PN | Performed by: INTERNAL MEDICINE

## 2021-04-14 PROCEDURE — 3288F FALL RISK ASSESSMENT DOCD: CPT | Mod: CPTII,S$GLB,, | Performed by: INTERNAL MEDICINE

## 2021-04-14 PROCEDURE — 1159F MED LIST DOCD IN RCRD: CPT | Mod: S$GLB,,, | Performed by: INTERNAL MEDICINE

## 2021-04-14 PROCEDURE — 1126F PR PAIN SEVERITY QUANTIFIED, NO PAIN PRESENT: ICD-10-PCS | Mod: S$GLB,,, | Performed by: INTERNAL MEDICINE

## 2021-04-14 PROCEDURE — 1101F PR PT FALLS ASSESS DOC 0-1 FALLS W/OUT INJ PAST YR: ICD-10-PCS | Mod: CPTII,S$GLB,, | Performed by: INTERNAL MEDICINE

## 2021-04-14 PROCEDURE — 83615 LACTATE (LD) (LDH) ENZYME: CPT | Mod: PN | Performed by: INTERNAL MEDICINE

## 2021-04-14 PROCEDURE — 85025 COMPLETE CBC W/AUTO DIFF WBC: CPT | Mod: PN | Performed by: INTERNAL MEDICINE

## 2021-04-14 PROCEDURE — 99999 PR PBB SHADOW E&M-EST. PATIENT-LVL III: ICD-10-PCS | Mod: PBBFAC,,, | Performed by: INTERNAL MEDICINE

## 2021-04-14 PROCEDURE — 80053 COMPREHEN METABOLIC PANEL: CPT | Performed by: INTERNAL MEDICINE

## 2021-04-14 PROCEDURE — 99214 OFFICE O/P EST MOD 30 MIN: CPT | Mod: S$GLB,,, | Performed by: INTERNAL MEDICINE

## 2021-04-14 PROCEDURE — 85025 COMPLETE CBC W/AUTO DIFF WBC: CPT | Performed by: INTERNAL MEDICINE

## 2021-04-14 PROCEDURE — 3288F PR FALLS RISK ASSESSMENT DOCUMENTED: ICD-10-PCS | Mod: CPTII,S$GLB,, | Performed by: INTERNAL MEDICINE

## 2021-04-14 PROCEDURE — 1126F AMNT PAIN NOTED NONE PRSNT: CPT | Mod: S$GLB,,, | Performed by: INTERNAL MEDICINE

## 2021-04-14 PROCEDURE — 83735 ASSAY OF MAGNESIUM: CPT | Performed by: INTERNAL MEDICINE

## 2021-04-14 PROCEDURE — 82306 VITAMIN D 25 HYDROXY: CPT | Mod: PN | Performed by: INTERNAL MEDICINE

## 2021-04-14 PROCEDURE — 82306 VITAMIN D 25 HYDROXY: CPT | Performed by: INTERNAL MEDICINE

## 2021-04-14 PROCEDURE — 80053 COMPREHEN METABOLIC PANEL: CPT | Mod: PN | Performed by: INTERNAL MEDICINE

## 2021-04-14 PROCEDURE — 99214 PR OFFICE/OUTPT VISIT, EST, LEVL IV, 30-39 MIN: ICD-10-PCS | Mod: S$GLB,,, | Performed by: INTERNAL MEDICINE

## 2021-04-14 PROCEDURE — 83735 ASSAY OF MAGNESIUM: CPT | Mod: PN | Performed by: INTERNAL MEDICINE

## 2021-04-14 PROCEDURE — 80061 LIPID PANEL: CPT | Mod: PN | Performed by: FAMILY MEDICINE

## 2021-04-14 PROCEDURE — 80061 LIPID PANEL: CPT | Performed by: FAMILY MEDICINE

## 2021-04-14 PROCEDURE — 83615 LACTATE (LD) (LDH) ENZYME: CPT | Performed by: INTERNAL MEDICINE

## 2021-04-14 PROCEDURE — 99999 PR PBB SHADOW E&M-EST. PATIENT-LVL III: CPT | Mod: PBBFAC,,, | Performed by: INTERNAL MEDICINE

## 2021-04-14 PROCEDURE — 1159F PR MEDICATION LIST DOCUMENTED IN MEDICAL RECORD: ICD-10-PCS | Mod: S$GLB,,, | Performed by: INTERNAL MEDICINE

## 2021-04-14 PROCEDURE — 82232 ASSAY OF BETA-2 PROTEIN: CPT | Performed by: INTERNAL MEDICINE

## 2021-04-14 RX ORDER — DIPHENHYDRAMINE HYDROCHLORIDE 50 MG/ML
50 INJECTION INTRAMUSCULAR; INTRAVENOUS ONCE AS NEEDED
Status: CANCELLED | OUTPATIENT
Start: 2021-04-15

## 2021-04-14 RX ORDER — ACETAMINOPHEN 500 MG
1000 TABLET ORAL
Status: CANCELLED | OUTPATIENT
Start: 2021-04-15

## 2021-04-14 RX ORDER — SODIUM CHLORIDE 0.9 % (FLUSH) 0.9 %
10 SYRINGE (ML) INJECTION
Status: CANCELLED | OUTPATIENT
Start: 2021-04-15

## 2021-04-14 RX ORDER — HEPARIN 100 UNIT/ML
500 SYRINGE INTRAVENOUS
Status: CANCELLED | OUTPATIENT
Start: 2021-04-15

## 2021-04-14 RX ORDER — POTASSIUM CHLORIDE 750 MG/1
20 CAPSULE, EXTENDED RELEASE ORAL DAILY
Qty: 180 CAPSULE | Refills: 3 | Status: SHIPPED | OUTPATIENT
Start: 2021-04-14 | End: 2022-02-05

## 2021-04-14 RX ORDER — MEPERIDINE HYDROCHLORIDE 50 MG/ML
25 INJECTION INTRAMUSCULAR; INTRAVENOUS; SUBCUTANEOUS EVERY 30 MIN PRN
Status: CANCELLED | OUTPATIENT
Start: 2021-04-15

## 2021-04-14 RX ORDER — METHYLPREDNISOLONE SOD SUCC 125 MG
125 VIAL (EA) INJECTION ONCE AS NEEDED
Status: CANCELLED | OUTPATIENT
Start: 2021-04-15

## 2021-04-14 RX ORDER — FAMOTIDINE 10 MG/ML
20 INJECTION INTRAVENOUS ONCE AS NEEDED
Status: CANCELLED
Start: 2021-04-15

## 2021-04-15 ENCOUNTER — INFUSION (OUTPATIENT)
Dept: INFUSION THERAPY | Facility: HOSPITAL | Age: 81
End: 2021-04-15
Attending: INTERNAL MEDICINE
Payer: MEDICARE

## 2021-04-15 VITALS
BODY MASS INDEX: 32.65 KG/M2 | HEIGHT: 65 IN | DIASTOLIC BLOOD PRESSURE: 75 MMHG | HEART RATE: 74 BPM | TEMPERATURE: 98 F | WEIGHT: 196 LBS | RESPIRATION RATE: 16 BRPM | SYSTOLIC BLOOD PRESSURE: 131 MMHG

## 2021-04-15 DIAGNOSIS — C82.00 FOLLICULAR LYMPHOMA GRADE I, UNSPECIFIED BODY REGION: Primary | ICD-10-CM

## 2021-04-15 PROCEDURE — 25000003 PHARM REV CODE 250: Mod: PN | Performed by: INTERNAL MEDICINE

## 2021-04-15 PROCEDURE — 63600175 PHARM REV CODE 636 W HCPCS: Mod: PN | Performed by: INTERNAL MEDICINE

## 2021-04-15 PROCEDURE — 96413 CHEMO IV INFUSION 1 HR: CPT | Mod: PN

## 2021-04-15 PROCEDURE — A4216 STERILE WATER/SALINE, 10 ML: HCPCS | Mod: PN | Performed by: INTERNAL MEDICINE

## 2021-04-15 PROCEDURE — 96366 THER/PROPH/DIAG IV INF ADDON: CPT | Mod: PN

## 2021-04-15 PROCEDURE — 96367 TX/PROPH/DG ADDL SEQ IV INF: CPT | Mod: PN

## 2021-04-15 PROCEDURE — 96415 CHEMO IV INFUSION ADDL HR: CPT | Mod: PN

## 2021-04-15 RX ORDER — HEPARIN 100 UNIT/ML
500 SYRINGE INTRAVENOUS
Status: DISCONTINUED | OUTPATIENT
Start: 2021-04-15 | End: 2021-04-15 | Stop reason: HOSPADM

## 2021-04-15 RX ORDER — ACETAMINOPHEN 500 MG
1000 TABLET ORAL
Status: COMPLETED | OUTPATIENT
Start: 2021-04-15 | End: 2021-04-15

## 2021-04-15 RX ORDER — SODIUM CHLORIDE 0.9 % (FLUSH) 0.9 %
10 SYRINGE (ML) INJECTION
Status: DISCONTINUED | OUTPATIENT
Start: 2021-04-15 | End: 2021-04-15 | Stop reason: HOSPADM

## 2021-04-15 RX ADMIN — SODIUM CHLORIDE: 0.9 INJECTION, SOLUTION INTRAVENOUS at 11:04

## 2021-04-15 RX ADMIN — POTASSIUM CHLORIDE: 2 INJECTION, SOLUTION, CONCENTRATE INTRAVENOUS at 02:04

## 2021-04-15 RX ADMIN — HEPARIN 500 UNITS: 100 SYRINGE at 04:04

## 2021-04-15 RX ADMIN — DIPHENHYDRAMINE HYDROCHLORIDE 50 MG: 50 INJECTION, SOLUTION INTRAMUSCULAR; INTRAVENOUS at 11:04

## 2021-04-15 RX ADMIN — ACETAMINOPHEN 1000 MG: 500 TABLET, FILM COATED ORAL at 11:04

## 2021-04-15 RX ADMIN — Medication 10 ML: at 04:04

## 2021-04-15 RX ADMIN — SODIUM CHLORIDE 700 MG: 0.9 INJECTION, SOLUTION INTRAVENOUS at 11:04

## 2021-05-04 ENCOUNTER — DOCUMENTATION ONLY (OUTPATIENT)
Dept: HEMATOLOGY/ONCOLOGY | Facility: CLINIC | Age: 81
End: 2021-05-04

## 2021-05-11 ENCOUNTER — DOCUMENTATION ONLY (OUTPATIENT)
Dept: HEMATOLOGY/ONCOLOGY | Facility: CLINIC | Age: 81
End: 2021-05-11

## 2021-05-12 DIAGNOSIS — C82.00 FOLLICULAR LYMPHOMA GRADE I, UNSPECIFIED BODY REGION: Primary | ICD-10-CM

## 2021-05-13 ENCOUNTER — PATIENT MESSAGE (OUTPATIENT)
Dept: HEMATOLOGY/ONCOLOGY | Facility: CLINIC | Age: 81
End: 2021-05-13

## 2021-06-01 ENCOUNTER — HOSPITAL ENCOUNTER (OUTPATIENT)
Dept: RADIOLOGY | Facility: HOSPITAL | Age: 81
Discharge: HOME | End: 2021-06-01
Attending: NURSE PRACTITIONER
Payer: MEDICARE

## 2021-06-01 DIAGNOSIS — C82.01 GRADE 1 FOLLICULAR LYMPHOMA OF LYMPH NODES OF NECK: Primary | ICD-10-CM

## 2021-06-01 DIAGNOSIS — R91.8 PULMONARY MASS: ICD-10-CM

## 2021-06-01 DIAGNOSIS — C82.00 FOLLICULAR LYMPHOMA GRADE I, UNSPECIFIED BODY REGION: ICD-10-CM

## 2021-06-01 PROCEDURE — 71250 CT THORAX DX C-: CPT | Mod: 26,,, | Performed by: RADIOLOGY

## 2021-06-01 PROCEDURE — 25500020 PHARM REV CODE 255: Mod: PO | Performed by: NURSE PRACTITIONER

## 2021-06-01 PROCEDURE — 74176 CT ABD & PELVIS W/O CONTRAST: CPT | Mod: TC,PO

## 2021-06-01 PROCEDURE — A9698 NON-RAD CONTRAST MATERIALNOC: HCPCS | Mod: PO | Performed by: NURSE PRACTITIONER

## 2021-06-01 PROCEDURE — 71250 CT THORAX DX C-: CPT | Mod: TC,PO

## 2021-06-01 PROCEDURE — 74176 CT ABD & PELVIS W/O CONTRAST: CPT | Mod: 26,,, | Performed by: RADIOLOGY

## 2021-06-01 PROCEDURE — 71250 CT CHEST ABDOMEN PELVIS WITHOUT CONTRAST(XPD): ICD-10-PCS | Mod: 26,,, | Performed by: RADIOLOGY

## 2021-06-01 PROCEDURE — 74176 CT CHEST ABDOMEN PELVIS WITHOUT CONTRAST(XPD): ICD-10-PCS | Mod: 26,,, | Performed by: RADIOLOGY

## 2021-06-01 RX ADMIN — IOHEXOL 1000 ML: 9 SOLUTION ORAL at 11:06

## 2021-06-07 ENCOUNTER — LAB VISIT (OUTPATIENT)
Dept: LAB | Facility: HOSPITAL | Age: 81
End: 2021-06-07
Attending: INTERNAL MEDICINE
Payer: MEDICARE

## 2021-06-07 DIAGNOSIS — E55.9 VITAMIN D DEFICIENCY: ICD-10-CM

## 2021-06-07 DIAGNOSIS — C82.01 GRADE 1 FOLLICULAR LYMPHOMA OF LYMPH NODES OF NECK: ICD-10-CM

## 2021-06-07 DIAGNOSIS — E87.6 HYPOKALEMIA: ICD-10-CM

## 2021-06-07 DIAGNOSIS — E83.42 HYPOMAGNESEMIA: ICD-10-CM

## 2021-06-07 LAB
25(OH)D3+25(OH)D2 SERPL-MCNC: 50 NG/ML (ref 30–96)
ALBUMIN SERPL BCP-MCNC: 4.2 G/DL (ref 3.5–5.2)
ALP SERPL-CCNC: 72 U/L (ref 38–145)
ALT SERPL W/O P-5'-P-CCNC: 9 U/L (ref 0–35)
ANION GAP SERPL CALC-SCNC: 9 MMOL/L (ref 8–16)
AST SERPL-CCNC: 33 U/L (ref 14–36)
BASOPHILS # BLD AUTO: 0.05 K/UL (ref 0–0.2)
BASOPHILS NFR BLD: 0.7 % (ref 0–1.9)
BILIRUB SERPL-MCNC: 0.3 MG/DL (ref 0.2–1.3)
CALCIUM SERPL-MCNC: 8.8 MG/DL (ref 8.4–10.2)
CHLORIDE SERPL-SCNC: 107 MMOL/L (ref 95–110)
CO2 SERPL-SCNC: 23 MMOL/L (ref 22–31)
CREAT SERPL-MCNC: 1.13 MG/DL (ref 0.5–1.4)
DIFFERENTIAL METHOD: ABNORMAL
EOSINOPHIL # BLD AUTO: 0.2 K/UL (ref 0–0.5)
EOSINOPHIL NFR BLD: 2.3 % (ref 0–8)
ERYTHROCYTE [DISTWIDTH] IN BLOOD BY AUTOMATED COUNT: 13.9 % (ref 11.5–14.5)
EST. GFR  (AFRICAN AMERICAN): 54 ML/MIN/1.73 M^2
EST. GFR  (NON AFRICAN AMERICAN): 47 ML/MIN/1.73 M^2
GLUCOSE SERPL-MCNC: 104 MG/DL (ref 70–110)
HCT VFR BLD AUTO: 40.4 % (ref 37–48.5)
HGB BLD-MCNC: 12.8 G/DL (ref 12–16)
IMM GRANULOCYTES # BLD AUTO: 0.04 K/UL (ref 0–0.04)
IMM GRANULOCYTES NFR BLD AUTO: 0.6 % (ref 0–0.5)
LDH SERPL L TO P-CCNC: 215 U/L (ref 110–260)
LYMPHOCYTES # BLD AUTO: 1.1 K/UL (ref 1–4.8)
LYMPHOCYTES NFR BLD: 16 % (ref 18–48)
MAGNESIUM SERPL-MCNC: 1.6 MG/DL (ref 1.6–2.6)
MCH RBC QN AUTO: 29 PG (ref 27–31)
MCHC RBC AUTO-ENTMCNC: 31.7 G/DL (ref 32–36)
MCV RBC AUTO: 92 FL (ref 82–98)
MONOCYTES # BLD AUTO: 0.7 K/UL (ref 0.3–1)
MONOCYTES NFR BLD: 9.6 % (ref 4–15)
NEUTROPHILS # BLD AUTO: 5 K/UL (ref 1.8–7.7)
NEUTROPHILS NFR BLD: 70.8 % (ref 38–73)
NRBC BLD-RTO: 0 /100 WBC
PLATELET # BLD AUTO: 132 K/UL (ref 150–450)
PLATELET BLD QL SMEAR: ABNORMAL
PMV BLD AUTO: 11.2 FL (ref 9.2–12.9)
POTASSIUM SERPL-SCNC: 4.4 MMOL/L (ref 3.5–5.1)
PROT SERPL-MCNC: 7 G/DL (ref 6–8.4)
RBC # BLD AUTO: 4.41 M/UL (ref 4–5.4)
SODIUM SERPL-SCNC: 139 MMOL/L (ref 136–145)
UUN UR-MCNC: 24 MG/DL (ref 7–18)
WBC # BLD AUTO: 7 K/UL (ref 3.9–12.7)

## 2021-06-07 PROCEDURE — 83615 LACTATE (LD) (LDH) ENZYME: CPT | Mod: PN | Performed by: INTERNAL MEDICINE

## 2021-06-07 PROCEDURE — 82306 VITAMIN D 25 HYDROXY: CPT | Performed by: INTERNAL MEDICINE

## 2021-06-07 PROCEDURE — 83615 LACTATE (LD) (LDH) ENZYME: CPT | Performed by: INTERNAL MEDICINE

## 2021-06-07 PROCEDURE — 83735 ASSAY OF MAGNESIUM: CPT | Mod: PN | Performed by: INTERNAL MEDICINE

## 2021-06-07 PROCEDURE — 85025 COMPLETE CBC W/AUTO DIFF WBC: CPT | Performed by: INTERNAL MEDICINE

## 2021-06-07 PROCEDURE — 83735 ASSAY OF MAGNESIUM: CPT | Performed by: INTERNAL MEDICINE

## 2021-06-07 PROCEDURE — 82306 VITAMIN D 25 HYDROXY: CPT | Mod: PN | Performed by: INTERNAL MEDICINE

## 2021-06-07 PROCEDURE — 36415 COLL VENOUS BLD VENIPUNCTURE: CPT | Mod: PN | Performed by: INTERNAL MEDICINE

## 2021-06-07 PROCEDURE — 80053 COMPREHEN METABOLIC PANEL: CPT | Mod: PN | Performed by: INTERNAL MEDICINE

## 2021-06-07 PROCEDURE — 85025 COMPLETE CBC W/AUTO DIFF WBC: CPT | Mod: PN | Performed by: INTERNAL MEDICINE

## 2021-06-07 PROCEDURE — 80053 COMPREHEN METABOLIC PANEL: CPT | Performed by: INTERNAL MEDICINE

## 2021-06-07 PROCEDURE — 82232 ASSAY OF BETA-2 PROTEIN: CPT | Performed by: INTERNAL MEDICINE

## 2021-06-08 DIAGNOSIS — R91.1 LUNG NODULE: Primary | ICD-10-CM

## 2021-06-08 LAB — B2 MICROGLOB SERPL-MCNC: 4 UG/ML (ref 0–2.5)

## 2021-06-09 ENCOUNTER — OFFICE VISIT (OUTPATIENT)
Dept: HEMATOLOGY/ONCOLOGY | Facility: CLINIC | Age: 81
End: 2021-06-09
Payer: MEDICARE

## 2021-06-09 VITALS
DIASTOLIC BLOOD PRESSURE: 70 MMHG | HEIGHT: 65 IN | WEIGHT: 196.63 LBS | RESPIRATION RATE: 18 BRPM | SYSTOLIC BLOOD PRESSURE: 146 MMHG | BODY MASS INDEX: 32.76 KG/M2 | OXYGEN SATURATION: 95 % | HEART RATE: 92 BPM

## 2021-06-09 DIAGNOSIS — C82.01 GRADE 1 FOLLICULAR LYMPHOMA OF LYMPH NODES OF NECK: Primary | ICD-10-CM

## 2021-06-09 DIAGNOSIS — R91.8 PULMONARY MASS: ICD-10-CM

## 2021-06-09 DIAGNOSIS — E55.9 VITAMIN D DEFICIENCY: ICD-10-CM

## 2021-06-09 PROCEDURE — 1159F PR MEDICATION LIST DOCUMENTED IN MEDICAL RECORD: ICD-10-PCS | Mod: S$GLB,,, | Performed by: INTERNAL MEDICINE

## 2021-06-09 PROCEDURE — 1159F MED LIST DOCD IN RCRD: CPT | Mod: S$GLB,,, | Performed by: INTERNAL MEDICINE

## 2021-06-09 PROCEDURE — 99215 OFFICE O/P EST HI 40 MIN: CPT | Mod: S$GLB,,, | Performed by: INTERNAL MEDICINE

## 2021-06-09 PROCEDURE — 1126F PR PAIN SEVERITY QUANTIFIED, NO PAIN PRESENT: ICD-10-PCS | Mod: S$GLB,,, | Performed by: INTERNAL MEDICINE

## 2021-06-09 PROCEDURE — 3288F FALL RISK ASSESSMENT DOCD: CPT | Mod: CPTII,S$GLB,, | Performed by: INTERNAL MEDICINE

## 2021-06-09 PROCEDURE — 99215 PR OFFICE/OUTPT VISIT, EST, LEVL V, 40-54 MIN: ICD-10-PCS | Mod: S$GLB,,, | Performed by: INTERNAL MEDICINE

## 2021-06-09 PROCEDURE — 1101F PR PT FALLS ASSESS DOC 0-1 FALLS W/OUT INJ PAST YR: ICD-10-PCS | Mod: CPTII,S$GLB,, | Performed by: INTERNAL MEDICINE

## 2021-06-09 PROCEDURE — 3288F PR FALLS RISK ASSESSMENT DOCUMENTED: ICD-10-PCS | Mod: CPTII,S$GLB,, | Performed by: INTERNAL MEDICINE

## 2021-06-09 PROCEDURE — 1126F AMNT PAIN NOTED NONE PRSNT: CPT | Mod: S$GLB,,, | Performed by: INTERNAL MEDICINE

## 2021-06-09 PROCEDURE — 99999 PR PBB SHADOW E&M-EST. PATIENT-LVL IV: ICD-10-PCS | Mod: PBBFAC,,, | Performed by: INTERNAL MEDICINE

## 2021-06-09 PROCEDURE — 1101F PT FALLS ASSESS-DOCD LE1/YR: CPT | Mod: CPTII,S$GLB,, | Performed by: INTERNAL MEDICINE

## 2021-06-09 PROCEDURE — 99999 PR PBB SHADOW E&M-EST. PATIENT-LVL IV: CPT | Mod: PBBFAC,,, | Performed by: INTERNAL MEDICINE

## 2021-06-09 RX ORDER — MEPERIDINE HYDROCHLORIDE 50 MG/ML
25 INJECTION INTRAMUSCULAR; INTRAVENOUS; SUBCUTANEOUS EVERY 30 MIN PRN
Status: CANCELLED | OUTPATIENT
Start: 2021-06-10

## 2021-06-09 RX ORDER — FAMOTIDINE 10 MG/ML
20 INJECTION INTRAVENOUS ONCE AS NEEDED
Status: CANCELLED
Start: 2021-06-10

## 2021-06-09 RX ORDER — DIPHENHYDRAMINE HYDROCHLORIDE 50 MG/ML
50 INJECTION INTRAMUSCULAR; INTRAVENOUS ONCE AS NEEDED
Status: CANCELLED | OUTPATIENT
Start: 2021-06-10

## 2021-06-09 RX ORDER — ACETAMINOPHEN 500 MG
1000 TABLET ORAL
Status: CANCELLED | OUTPATIENT
Start: 2021-06-10

## 2021-06-09 RX ORDER — SODIUM CHLORIDE 0.9 % (FLUSH) 0.9 %
10 SYRINGE (ML) INJECTION
Status: CANCELLED | OUTPATIENT
Start: 2021-06-10

## 2021-06-09 RX ORDER — HEPARIN 100 UNIT/ML
500 SYRINGE INTRAVENOUS
Status: CANCELLED | OUTPATIENT
Start: 2021-06-10

## 2021-06-09 RX ORDER — METHYLPREDNISOLONE SOD SUCC 125 MG
125 VIAL (EA) INJECTION ONCE AS NEEDED
Status: CANCELLED | OUTPATIENT
Start: 2021-06-10

## 2021-06-10 ENCOUNTER — PATIENT MESSAGE (OUTPATIENT)
Dept: HEMATOLOGY/ONCOLOGY | Facility: CLINIC | Age: 81
End: 2021-06-10

## 2021-06-10 ENCOUNTER — INFUSION (OUTPATIENT)
Dept: INFUSION THERAPY | Facility: HOSPITAL | Age: 81
End: 2021-06-10
Attending: INTERNAL MEDICINE
Payer: MEDICARE

## 2021-06-10 VITALS
BODY MASS INDEX: 32.8 KG/M2 | SYSTOLIC BLOOD PRESSURE: 114 MMHG | RESPIRATION RATE: 16 BRPM | DIASTOLIC BLOOD PRESSURE: 63 MMHG | WEIGHT: 196.88 LBS | HEIGHT: 65 IN | HEART RATE: 76 BPM

## 2021-06-10 DIAGNOSIS — C82.00 FOLLICULAR LYMPHOMA GRADE I, UNSPECIFIED BODY REGION: Primary | ICD-10-CM

## 2021-06-10 PROCEDURE — 25000003 PHARM REV CODE 250: Mod: PN | Performed by: INTERNAL MEDICINE

## 2021-06-10 PROCEDURE — A4216 STERILE WATER/SALINE, 10 ML: HCPCS | Mod: PN | Performed by: INTERNAL MEDICINE

## 2021-06-10 PROCEDURE — 96415 CHEMO IV INFUSION ADDL HR: CPT | Mod: PN

## 2021-06-10 PROCEDURE — 63600175 PHARM REV CODE 636 W HCPCS: Mod: TB,PN | Performed by: INTERNAL MEDICINE

## 2021-06-10 PROCEDURE — 96413 CHEMO IV INFUSION 1 HR: CPT | Mod: PN

## 2021-06-10 PROCEDURE — 96367 TX/PROPH/DG ADDL SEQ IV INF: CPT | Mod: PN

## 2021-06-10 RX ORDER — ACETAMINOPHEN 500 MG
1000 TABLET ORAL
Status: DISCONTINUED | OUTPATIENT
Start: 2021-06-10 | End: 2021-06-10 | Stop reason: HOSPADM

## 2021-06-10 RX ORDER — SODIUM CHLORIDE 0.9 % (FLUSH) 0.9 %
10 SYRINGE (ML) INJECTION
Status: DISCONTINUED | OUTPATIENT
Start: 2021-06-10 | End: 2021-06-10 | Stop reason: HOSPADM

## 2021-06-10 RX ADMIN — SODIUM CHLORIDE 700 MG: 0.9 INJECTION, SOLUTION INTRAVENOUS at 10:06

## 2021-06-10 RX ADMIN — Medication 10 ML: at 01:06

## 2021-06-10 RX ADMIN — SODIUM CHLORIDE: 0.9 INJECTION, SOLUTION INTRAVENOUS at 09:06

## 2021-06-10 RX ADMIN — DIPHENHYDRAMINE HYDROCHLORIDE 50 MG: 50 INJECTION, SOLUTION INTRAMUSCULAR; INTRAVENOUS at 09:06

## 2021-06-18 PROBLEM — R91.8 PULMONARY MASS: Status: ACTIVE | Noted: 2021-06-18

## 2021-06-25 ENCOUNTER — TELEPHONE (OUTPATIENT)
Dept: HEMATOLOGY/ONCOLOGY | Facility: CLINIC | Age: 81
End: 2021-06-25

## 2021-06-25 ENCOUNTER — PATIENT MESSAGE (OUTPATIENT)
Dept: HEMATOLOGY/ONCOLOGY | Facility: CLINIC | Age: 81
End: 2021-06-25

## 2021-06-25 DIAGNOSIS — R91.1 LUNG NODULE: Primary | ICD-10-CM

## 2021-06-28 ENCOUNTER — OFFICE VISIT (OUTPATIENT)
Dept: HEMATOLOGY/ONCOLOGY | Facility: CLINIC | Age: 81
End: 2021-06-28
Payer: MEDICARE

## 2021-06-28 VITALS
SYSTOLIC BLOOD PRESSURE: 138 MMHG | DIASTOLIC BLOOD PRESSURE: 81 MMHG | HEART RATE: 100 BPM | HEIGHT: 65 IN | OXYGEN SATURATION: 96 % | WEIGHT: 196.44 LBS | BODY MASS INDEX: 32.73 KG/M2 | RESPIRATION RATE: 18 BRPM

## 2021-06-28 DIAGNOSIS — C34.01 MALIGNANT NEOPLASM OF HILUS OF RIGHT LUNG: ICD-10-CM

## 2021-06-28 DIAGNOSIS — Z72.0 TOBACCO ABUSE: Primary | ICD-10-CM

## 2021-06-28 DIAGNOSIS — C82.00 FOLLICULAR LYMPHOMA GRADE I, UNSPECIFIED BODY REGION: ICD-10-CM

## 2021-06-28 PROCEDURE — 1159F PR MEDICATION LIST DOCUMENTED IN MEDICAL RECORD: ICD-10-PCS | Mod: S$GLB,,, | Performed by: INTERNAL MEDICINE

## 2021-06-28 PROCEDURE — 99215 PR OFFICE/OUTPT VISIT, EST, LEVL V, 40-54 MIN: ICD-10-PCS | Mod: S$GLB,,, | Performed by: INTERNAL MEDICINE

## 2021-06-28 PROCEDURE — 1101F PT FALLS ASSESS-DOCD LE1/YR: CPT | Mod: CPTII,S$GLB,, | Performed by: INTERNAL MEDICINE

## 2021-06-28 PROCEDURE — 1101F PR PT FALLS ASSESS DOC 0-1 FALLS W/OUT INJ PAST YR: ICD-10-PCS | Mod: CPTII,S$GLB,, | Performed by: INTERNAL MEDICINE

## 2021-06-28 PROCEDURE — 1159F MED LIST DOCD IN RCRD: CPT | Mod: S$GLB,,, | Performed by: INTERNAL MEDICINE

## 2021-06-28 PROCEDURE — 99999 PR PBB SHADOW E&M-EST. PATIENT-LVL IV: ICD-10-PCS | Mod: PBBFAC,,, | Performed by: INTERNAL MEDICINE

## 2021-06-28 PROCEDURE — 1126F AMNT PAIN NOTED NONE PRSNT: CPT | Mod: S$GLB,,, | Performed by: INTERNAL MEDICINE

## 2021-06-28 PROCEDURE — 3288F PR FALLS RISK ASSESSMENT DOCUMENTED: ICD-10-PCS | Mod: CPTII,S$GLB,, | Performed by: INTERNAL MEDICINE

## 2021-06-28 PROCEDURE — 99999 PR PBB SHADOW E&M-EST. PATIENT-LVL IV: CPT | Mod: PBBFAC,,, | Performed by: INTERNAL MEDICINE

## 2021-06-28 PROCEDURE — 1126F PR PAIN SEVERITY QUANTIFIED, NO PAIN PRESENT: ICD-10-PCS | Mod: S$GLB,,, | Performed by: INTERNAL MEDICINE

## 2021-06-28 PROCEDURE — 99215 OFFICE O/P EST HI 40 MIN: CPT | Mod: S$GLB,,, | Performed by: INTERNAL MEDICINE

## 2021-06-28 PROCEDURE — 3288F FALL RISK ASSESSMENT DOCD: CPT | Mod: CPTII,S$GLB,, | Performed by: INTERNAL MEDICINE

## 2021-06-29 DIAGNOSIS — C34.01 MALIGNANT NEOPLASM OF HILUS OF RIGHT LUNG: Primary | ICD-10-CM

## 2021-06-30 PROBLEM — R94.2 ABNORMAL PFT: Status: ACTIVE | Noted: 2021-06-30

## 2021-06-30 PROBLEM — J43.2 CENTRILOBULAR EMPHYSEMA: Status: ACTIVE | Noted: 2021-06-30

## 2021-06-30 PROBLEM — C34.91 SQUAMOUS CELL CARCINOMA OF RIGHT LUNG: Status: ACTIVE | Noted: 2021-06-30

## 2021-07-01 ENCOUNTER — TELEPHONE (OUTPATIENT)
Dept: HEMATOLOGY/ONCOLOGY | Facility: CLINIC | Age: 81
End: 2021-07-01

## 2021-07-08 ENCOUNTER — OFFICE VISIT (OUTPATIENT)
Dept: RADIATION ONCOLOGY | Facility: CLINIC | Age: 81
End: 2021-07-08
Payer: MEDICARE

## 2021-07-08 VITALS
SYSTOLIC BLOOD PRESSURE: 135 MMHG | RESPIRATION RATE: 18 BRPM | HEART RATE: 91 BPM | WEIGHT: 192.88 LBS | OXYGEN SATURATION: 97 % | BODY MASS INDEX: 32.1 KG/M2 | DIASTOLIC BLOOD PRESSURE: 87 MMHG

## 2021-07-08 DIAGNOSIS — C34.11 MALIGNANT NEOPLASM OF RIGHT UPPER LOBE OF LUNG: Primary | ICD-10-CM

## 2021-07-08 PROCEDURE — 99205 OFFICE O/P NEW HI 60 MIN: CPT | Mod: S$GLB,,, | Performed by: RADIOLOGY

## 2021-07-08 PROCEDURE — 3288F PR FALLS RISK ASSESSMENT DOCUMENTED: ICD-10-PCS | Mod: CPTII,S$GLB,, | Performed by: RADIOLOGY

## 2021-07-08 PROCEDURE — 1126F AMNT PAIN NOTED NONE PRSNT: CPT | Mod: S$GLB,,, | Performed by: RADIOLOGY

## 2021-07-08 PROCEDURE — 1101F PT FALLS ASSESS-DOCD LE1/YR: CPT | Mod: CPTII,S$GLB,, | Performed by: RADIOLOGY

## 2021-07-08 PROCEDURE — 3288F FALL RISK ASSESSMENT DOCD: CPT | Mod: CPTII,S$GLB,, | Performed by: RADIOLOGY

## 2021-07-08 PROCEDURE — 99999 PR PBB SHADOW E&M-EST. PATIENT-LVL III: ICD-10-PCS | Mod: PBBFAC,,, | Performed by: RADIOLOGY

## 2021-07-08 PROCEDURE — 1159F PR MEDICATION LIST DOCUMENTED IN MEDICAL RECORD: ICD-10-PCS | Mod: S$GLB,,, | Performed by: RADIOLOGY

## 2021-07-08 PROCEDURE — 99205 PR OFFICE/OUTPT VISIT, NEW, LEVL V, 60-74 MIN: ICD-10-PCS | Mod: S$GLB,,, | Performed by: RADIOLOGY

## 2021-07-08 PROCEDURE — 99999 PR PBB SHADOW E&M-EST. PATIENT-LVL III: CPT | Mod: PBBFAC,,, | Performed by: RADIOLOGY

## 2021-07-08 PROCEDURE — 1159F MED LIST DOCD IN RCRD: CPT | Mod: S$GLB,,, | Performed by: RADIOLOGY

## 2021-07-08 PROCEDURE — 1101F PR PT FALLS ASSESS DOC 0-1 FALLS W/OUT INJ PAST YR: ICD-10-PCS | Mod: CPTII,S$GLB,, | Performed by: RADIOLOGY

## 2021-07-08 PROCEDURE — 1126F PR PAIN SEVERITY QUANTIFIED, NO PAIN PRESENT: ICD-10-PCS | Mod: S$GLB,,, | Performed by: RADIOLOGY

## 2021-07-15 DIAGNOSIS — G47.00 INSOMNIA, UNSPECIFIED TYPE: ICD-10-CM

## 2021-07-16 RX ORDER — TEMAZEPAM 15 MG/1
CAPSULE ORAL
Qty: 90 CAPSULE | Refills: 1 | Status: SHIPPED | OUTPATIENT
Start: 2021-07-16 | End: 2022-01-18

## 2021-07-22 ENCOUNTER — DOCUMENTATION ONLY (OUTPATIENT)
Dept: INFUSION THERAPY | Facility: HOSPITAL | Age: 81
End: 2021-07-22

## 2021-07-22 ENCOUNTER — HOSPITAL ENCOUNTER (OUTPATIENT)
Dept: RADIATION THERAPY | Facility: HOSPITAL | Age: 81
Discharge: HOME | End: 2021-07-22
Attending: INTERNAL MEDICINE
Payer: MEDICARE

## 2021-07-22 PROCEDURE — 77263 THER RADIOLOGY TX PLNG CPLX: CPT | Mod: ,,, | Performed by: RADIOLOGY

## 2021-07-22 PROCEDURE — 77334 PR  RADN TREATMENT AID(S) COMPLX: ICD-10-PCS | Mod: 26,,, | Performed by: RADIOLOGY

## 2021-07-22 PROCEDURE — 77290 THER RAD SIMULAJ FIELD CPLX: CPT | Mod: TC,PN | Performed by: RADIOLOGY

## 2021-07-22 PROCEDURE — 77014 HC CT GUIDANCE RADIATION THERAPY FLDS PLACEMENT: CPT | Mod: TC,PN | Performed by: RADIOLOGY

## 2021-07-22 PROCEDURE — 77263 PR  RADIATION THERAPY PLAN COMPLEX: ICD-10-PCS | Mod: ,,, | Performed by: RADIOLOGY

## 2021-07-22 PROCEDURE — 77334 RADIATION TREATMENT AID(S): CPT | Mod: 26,,, | Performed by: RADIOLOGY

## 2021-07-22 PROCEDURE — 77334 RADIATION TREATMENT AID(S): CPT | Mod: TC,PN | Performed by: RADIOLOGY

## 2021-07-28 PROCEDURE — 77301 PR  INTEN MOD RADIOTHER PLAN W/DOSE VOL HIST: ICD-10-PCS | Mod: 26,,, | Performed by: RADIOLOGY

## 2021-07-28 PROCEDURE — 77301 RADIOTHERAPY DOSE PLAN IMRT: CPT | Mod: 26,,, | Performed by: RADIOLOGY

## 2021-07-28 PROCEDURE — 77293 RESPIRATOR MOTION MGMT SIMUL: CPT | Mod: TC,PN | Performed by: RADIOLOGY

## 2021-07-28 PROCEDURE — 77293 PR RESPIRATORY MOTION MGMT SIMULATION: ICD-10-PCS | Mod: 26,,, | Performed by: RADIOLOGY

## 2021-07-28 PROCEDURE — 77293 RESPIRATOR MOTION MGMT SIMUL: CPT | Mod: 26,,, | Performed by: RADIOLOGY

## 2021-07-28 PROCEDURE — 77301 RADIOTHERAPY DOSE PLAN IMRT: CPT | Mod: TC,PN | Performed by: RADIOLOGY

## 2021-07-29 ENCOUNTER — DOCUMENTATION ONLY (OUTPATIENT)
Dept: RADIATION ONCOLOGY | Facility: CLINIC | Age: 81
End: 2021-07-29

## 2021-07-29 PROCEDURE — 77338 DESIGN MLC DEVICE FOR IMRT: CPT | Mod: 26,,, | Performed by: RADIOLOGY

## 2021-07-29 PROCEDURE — 77014 HC CT GUIDANCE RADIATION THERAPY FLDS PLACEMENT: CPT | Mod: TC,PN,59 | Performed by: RADIOLOGY

## 2021-07-29 PROCEDURE — 77014 PR  CT GUIDANCE PLACEMENT RAD THERAPY FIELDS: CPT | Mod: 26,,, | Performed by: RADIOLOGY

## 2021-07-29 PROCEDURE — 77300 RADIATION THERAPY DOSE PLAN: CPT | Mod: TC,PN | Performed by: RADIOLOGY

## 2021-07-29 PROCEDURE — 77373 STRTCTC BDY RAD THER TX DLVR: CPT | Mod: PN | Performed by: RADIOLOGY

## 2021-07-29 PROCEDURE — 77014 PR  CT GUIDANCE PLACEMENT RAD THERAPY FIELDS: ICD-10-PCS | Mod: 26,,, | Performed by: RADIOLOGY

## 2021-07-29 PROCEDURE — 77300 RADIATION THERAPY DOSE PLAN: CPT | Mod: 26,,, | Performed by: RADIOLOGY

## 2021-07-29 PROCEDURE — 77470 PR  SPECIAL RADIATION TREATMENT: ICD-10-PCS | Mod: 26,59,, | Performed by: RADIOLOGY

## 2021-07-29 PROCEDURE — 77370 RADIATION PHYSICS CONSULT: CPT | Mod: PN | Performed by: RADIOLOGY

## 2021-07-29 PROCEDURE — 77470 SPECIAL RADIATION TREATMENT: CPT | Mod: 59,TC,PN | Performed by: RADIOLOGY

## 2021-07-29 PROCEDURE — 77300 PR RADIATION THERAPY,DOSIMETRY PLAN: ICD-10-PCS | Mod: 26,,, | Performed by: RADIOLOGY

## 2021-07-29 PROCEDURE — 77338 DESIGN MLC DEVICE FOR IMRT: CPT | Mod: TC,PN | Performed by: RADIOLOGY

## 2021-07-29 PROCEDURE — 77338 PR  MLC IMRT DESIGN & CONSTRUCTION PER IMRT PLAN: ICD-10-PCS | Mod: 26,,, | Performed by: RADIOLOGY

## 2021-07-29 PROCEDURE — 77470 SPECIAL RADIATION TREATMENT: CPT | Mod: 26,59,, | Performed by: RADIOLOGY

## 2021-08-02 ENCOUNTER — HOSPITAL ENCOUNTER (OUTPATIENT)
Dept: RADIATION THERAPY | Facility: HOSPITAL | Age: 81
Discharge: HOME | End: 2021-08-02
Attending: RADIOLOGY
Payer: MEDICARE

## 2021-08-02 ENCOUNTER — DOCUMENTATION ONLY (OUTPATIENT)
Dept: RADIATION ONCOLOGY | Facility: CLINIC | Age: 81
End: 2021-08-02

## 2021-08-02 PROCEDURE — 77014 PR  CT GUIDANCE PLACEMENT RAD THERAPY FIELDS: CPT | Mod: 26,,, | Performed by: RADIOLOGY

## 2021-08-02 PROCEDURE — 77014 HC CT GUIDANCE RADIATION THERAPY FLDS PLACEMENT: CPT | Mod: TC,PN | Performed by: RADIOLOGY

## 2021-08-02 PROCEDURE — 77014 PR  CT GUIDANCE PLACEMENT RAD THERAPY FIELDS: ICD-10-PCS | Mod: 26,,, | Performed by: RADIOLOGY

## 2021-08-02 PROCEDURE — 77373 STRTCTC BDY RAD THER TX DLVR: CPT | Mod: PN | Performed by: RADIOLOGY

## 2021-08-04 ENCOUNTER — OFFICE VISIT (OUTPATIENT)
Dept: HEMATOLOGY/ONCOLOGY | Facility: CLINIC | Age: 81
End: 2021-08-04
Payer: MEDICARE

## 2021-08-04 ENCOUNTER — PATIENT MESSAGE (OUTPATIENT)
Dept: HEMATOLOGY/ONCOLOGY | Facility: CLINIC | Age: 81
End: 2021-08-04

## 2021-08-04 VITALS
SYSTOLIC BLOOD PRESSURE: 110 MMHG | BODY MASS INDEX: 33.64 KG/M2 | RESPIRATION RATE: 18 BRPM | OXYGEN SATURATION: 96 % | HEIGHT: 64 IN | DIASTOLIC BLOOD PRESSURE: 74 MMHG | HEART RATE: 83 BPM | TEMPERATURE: 97 F | WEIGHT: 197.06 LBS

## 2021-08-04 DIAGNOSIS — C34.01 MALIGNANT NEOPLASM OF HILUS OF RIGHT LUNG: ICD-10-CM

## 2021-08-04 DIAGNOSIS — C82.00 FOLLICULAR LYMPHOMA GRADE I, UNSPECIFIED BODY REGION: Primary | ICD-10-CM

## 2021-08-04 PROCEDURE — 77014 HC CT GUIDANCE RADIATION THERAPY FLDS PLACEMENT: CPT | Mod: TC,PN,59 | Performed by: RADIOLOGY

## 2021-08-04 PROCEDURE — 77014 PR  CT GUIDANCE PLACEMENT RAD THERAPY FIELDS: ICD-10-PCS | Mod: 26,,, | Performed by: RADIOLOGY

## 2021-08-04 PROCEDURE — 1160F PR REVIEW ALL MEDS BY PRESCRIBER/CLIN PHARMACIST DOCUMENTED: ICD-10-PCS | Mod: CPTII,S$GLB,, | Performed by: INTERNAL MEDICINE

## 2021-08-04 PROCEDURE — 3288F FALL RISK ASSESSMENT DOCD: CPT | Mod: CPTII,S$GLB,, | Performed by: INTERNAL MEDICINE

## 2021-08-04 PROCEDURE — 1101F PT FALLS ASSESS-DOCD LE1/YR: CPT | Mod: CPTII,S$GLB,, | Performed by: INTERNAL MEDICINE

## 2021-08-04 PROCEDURE — 1126F AMNT PAIN NOTED NONE PRSNT: CPT | Mod: CPTII,S$GLB,, | Performed by: INTERNAL MEDICINE

## 2021-08-04 PROCEDURE — 99999 PR PBB SHADOW E&M-EST. PATIENT-LVL IV: CPT | Mod: PBBFAC,,, | Performed by: INTERNAL MEDICINE

## 2021-08-04 PROCEDURE — 1126F PR PAIN SEVERITY QUANTIFIED, NO PAIN PRESENT: ICD-10-PCS | Mod: CPTII,S$GLB,, | Performed by: INTERNAL MEDICINE

## 2021-08-04 PROCEDURE — 3074F PR MOST RECENT SYSTOLIC BLOOD PRESSURE < 130 MM HG: ICD-10-PCS | Mod: CPTII,S$GLB,, | Performed by: INTERNAL MEDICINE

## 2021-08-04 PROCEDURE — 1159F MED LIST DOCD IN RCRD: CPT | Mod: CPTII,S$GLB,, | Performed by: INTERNAL MEDICINE

## 2021-08-04 PROCEDURE — 77373 STRTCTC BDY RAD THER TX DLVR: CPT | Mod: PN | Performed by: RADIOLOGY

## 2021-08-04 PROCEDURE — 1160F RVW MEDS BY RX/DR IN RCRD: CPT | Mod: CPTII,S$GLB,, | Performed by: INTERNAL MEDICINE

## 2021-08-04 PROCEDURE — 77014 PR  CT GUIDANCE PLACEMENT RAD THERAPY FIELDS: CPT | Mod: 26,,, | Performed by: RADIOLOGY

## 2021-08-04 PROCEDURE — 1159F PR MEDICATION LIST DOCUMENTED IN MEDICAL RECORD: ICD-10-PCS | Mod: CPTII,S$GLB,, | Performed by: INTERNAL MEDICINE

## 2021-08-04 PROCEDURE — 99999 PR PBB SHADOW E&M-EST. PATIENT-LVL IV: ICD-10-PCS | Mod: PBBFAC,,, | Performed by: INTERNAL MEDICINE

## 2021-08-04 PROCEDURE — 3074F SYST BP LT 130 MM HG: CPT | Mod: CPTII,S$GLB,, | Performed by: INTERNAL MEDICINE

## 2021-08-04 PROCEDURE — 3078F PR MOST RECENT DIASTOLIC BLOOD PRESSURE < 80 MM HG: ICD-10-PCS | Mod: CPTII,S$GLB,, | Performed by: INTERNAL MEDICINE

## 2021-08-04 PROCEDURE — 99214 PR OFFICE/OUTPT VISIT, EST, LEVL IV, 30-39 MIN: ICD-10-PCS | Mod: S$GLB,,, | Performed by: INTERNAL MEDICINE

## 2021-08-04 PROCEDURE — 1101F PR PT FALLS ASSESS DOC 0-1 FALLS W/OUT INJ PAST YR: ICD-10-PCS | Mod: CPTII,S$GLB,, | Performed by: INTERNAL MEDICINE

## 2021-08-04 PROCEDURE — 3288F PR FALLS RISK ASSESSMENT DOCUMENTED: ICD-10-PCS | Mod: CPTII,S$GLB,, | Performed by: INTERNAL MEDICINE

## 2021-08-04 PROCEDURE — 3078F DIAST BP <80 MM HG: CPT | Mod: CPTII,S$GLB,, | Performed by: INTERNAL MEDICINE

## 2021-08-04 PROCEDURE — 99214 OFFICE O/P EST MOD 30 MIN: CPT | Mod: S$GLB,,, | Performed by: INTERNAL MEDICINE

## 2021-08-04 RX ORDER — DIPHENHYDRAMINE HYDROCHLORIDE 50 MG/ML
50 INJECTION INTRAMUSCULAR; INTRAVENOUS ONCE AS NEEDED
Status: CANCELLED | OUTPATIENT
Start: 2021-08-05

## 2021-08-04 RX ORDER — ACETAMINOPHEN 500 MG
1000 TABLET ORAL
Status: CANCELLED | OUTPATIENT
Start: 2021-08-05

## 2021-08-04 RX ORDER — METHYLPREDNISOLONE SOD SUCC 125 MG
125 VIAL (EA) INJECTION ONCE AS NEEDED
Status: CANCELLED | OUTPATIENT
Start: 2021-08-05

## 2021-08-04 RX ORDER — HEPARIN 100 UNIT/ML
500 SYRINGE INTRAVENOUS
Status: CANCELLED | OUTPATIENT
Start: 2021-08-05

## 2021-08-04 RX ORDER — FAMOTIDINE 10 MG/ML
20 INJECTION INTRAVENOUS ONCE AS NEEDED
Status: CANCELLED
Start: 2021-08-05

## 2021-08-04 RX ORDER — SODIUM CHLORIDE 0.9 % (FLUSH) 0.9 %
10 SYRINGE (ML) INJECTION
Status: CANCELLED | OUTPATIENT
Start: 2021-08-05

## 2021-08-04 RX ORDER — MEPERIDINE HYDROCHLORIDE 50 MG/ML
25 INJECTION INTRAMUSCULAR; INTRAVENOUS; SUBCUTANEOUS EVERY 30 MIN PRN
Status: CANCELLED | OUTPATIENT
Start: 2021-08-05

## 2021-08-05 ENCOUNTER — DOCUMENTATION ONLY (OUTPATIENT)
Dept: INFUSION THERAPY | Facility: HOSPITAL | Age: 81
End: 2021-08-05

## 2021-08-05 ENCOUNTER — INFUSION (OUTPATIENT)
Dept: INFUSION THERAPY | Facility: HOSPITAL | Age: 81
End: 2021-08-05
Attending: INTERNAL MEDICINE
Payer: MEDICARE

## 2021-08-05 VITALS
RESPIRATION RATE: 16 BRPM | DIASTOLIC BLOOD PRESSURE: 65 MMHG | BODY MASS INDEX: 33.64 KG/M2 | HEIGHT: 64 IN | HEART RATE: 74 BPM | SYSTOLIC BLOOD PRESSURE: 107 MMHG | WEIGHT: 197.06 LBS | TEMPERATURE: 98 F

## 2021-08-05 DIAGNOSIS — C82.00 FOLLICULAR LYMPHOMA GRADE I, UNSPECIFIED BODY REGION: Primary | ICD-10-CM

## 2021-08-05 PROCEDURE — 96413 CHEMO IV INFUSION 1 HR: CPT | Mod: PN

## 2021-08-05 PROCEDURE — 63600175 PHARM REV CODE 636 W HCPCS: Mod: TB,PN | Performed by: INTERNAL MEDICINE

## 2021-08-05 PROCEDURE — 25000003 PHARM REV CODE 250: Mod: PN | Performed by: INTERNAL MEDICINE

## 2021-08-05 PROCEDURE — 96367 TX/PROPH/DG ADDL SEQ IV INF: CPT | Mod: PN

## 2021-08-05 PROCEDURE — 96415 CHEMO IV INFUSION ADDL HR: CPT | Mod: PN

## 2021-08-05 RX ORDER — MEPERIDINE HYDROCHLORIDE 25 MG/ML
25 INJECTION INTRAMUSCULAR; INTRAVENOUS; SUBCUTANEOUS EVERY 30 MIN PRN
Status: DISCONTINUED | OUTPATIENT
Start: 2021-08-05 | End: 2021-08-05 | Stop reason: HOSPADM

## 2021-08-05 RX ORDER — ACETAMINOPHEN 500 MG
1000 TABLET ORAL
Status: COMPLETED | OUTPATIENT
Start: 2021-08-05 | End: 2021-08-05

## 2021-08-05 RX ORDER — FAMOTIDINE 10 MG/ML
20 INJECTION INTRAVENOUS ONCE AS NEEDED
Status: DISCONTINUED | OUTPATIENT
Start: 2021-08-05 | End: 2021-08-05 | Stop reason: HOSPADM

## 2021-08-05 RX ORDER — SODIUM CHLORIDE 0.9 % (FLUSH) 0.9 %
10 SYRINGE (ML) INJECTION
Status: DISCONTINUED | OUTPATIENT
Start: 2021-08-05 | End: 2021-08-05 | Stop reason: HOSPADM

## 2021-08-05 RX ORDER — DIPHENHYDRAMINE HYDROCHLORIDE 50 MG/ML
50 INJECTION INTRAMUSCULAR; INTRAVENOUS ONCE AS NEEDED
Status: DISCONTINUED | OUTPATIENT
Start: 2021-08-05 | End: 2021-08-05 | Stop reason: HOSPADM

## 2021-08-05 RX ORDER — METHYLPREDNISOLONE SOD SUCC 125 MG
125 VIAL (EA) INJECTION ONCE AS NEEDED
Status: DISCONTINUED | OUTPATIENT
Start: 2021-08-05 | End: 2021-08-05 | Stop reason: HOSPADM

## 2021-08-05 RX ORDER — HEPARIN 100 UNIT/ML
500 SYRINGE INTRAVENOUS
Status: DISCONTINUED | OUTPATIENT
Start: 2021-08-05 | End: 2021-08-05 | Stop reason: HOSPADM

## 2021-08-05 RX ADMIN — ACETAMINOPHEN 1000 MG: 500 TABLET, FILM COATED ORAL at 10:08

## 2021-08-05 RX ADMIN — HEPARIN 500 UNITS: 100 SYRINGE at 01:08

## 2021-08-05 RX ADMIN — SODIUM CHLORIDE 700 MG: 0.9 INJECTION, SOLUTION INTRAVENOUS at 10:08

## 2021-08-05 RX ADMIN — SODIUM CHLORIDE: 0.9 INJECTION, SOLUTION INTRAVENOUS at 09:08

## 2021-08-05 RX ADMIN — DIPHENHYDRAMINE HYDROCHLORIDE 50 MG: 50 INJECTION INTRAMUSCULAR; INTRAVENOUS at 10:08

## 2021-08-06 ENCOUNTER — DOCUMENTATION ONLY (OUTPATIENT)
Dept: RADIATION ONCOLOGY | Facility: CLINIC | Age: 81
End: 2021-08-06

## 2021-08-06 DIAGNOSIS — C82.08 GRADE 1 FOLLICULAR LYMPHOMA OF LYMPH NODES OF MULTIPLE REGIONS: ICD-10-CM

## 2021-08-06 DIAGNOSIS — C34.11 MALIGNANT NEOPLASM OF RIGHT UPPER LOBE OF LUNG: Primary | ICD-10-CM

## 2021-08-06 PROCEDURE — 77014 PR  CT GUIDANCE PLACEMENT RAD THERAPY FIELDS: CPT | Mod: 26,,, | Performed by: RADIOLOGY

## 2021-08-06 PROCEDURE — 77336 RADIATION PHYSICS CONSULT: CPT | Mod: PN | Performed by: RADIOLOGY

## 2021-08-06 PROCEDURE — 77373 STRTCTC BDY RAD THER TX DLVR: CPT | Mod: PN | Performed by: RADIOLOGY

## 2021-08-06 PROCEDURE — 77014 HC CT GUIDANCE RADIATION THERAPY FLDS PLACEMENT: CPT | Mod: TC,PN | Performed by: RADIOLOGY

## 2021-08-06 PROCEDURE — 77014 PR  CT GUIDANCE PLACEMENT RAD THERAPY FIELDS: ICD-10-PCS | Mod: 26,,, | Performed by: RADIOLOGY

## 2021-08-30 ENCOUNTER — PATIENT MESSAGE (OUTPATIENT)
Dept: INFUSION THERAPY | Facility: HOSPITAL | Age: 81
End: 2021-08-30

## 2021-09-15 DIAGNOSIS — C82.00 FOLLICULAR LYMPHOMA GRADE I, UNSPECIFIED BODY REGION: ICD-10-CM

## 2021-09-15 RX ORDER — OMEPRAZOLE 40 MG/1
40 CAPSULE, DELAYED RELEASE ORAL DAILY
Qty: 30 CAPSULE | Refills: 11 | Status: SHIPPED | OUTPATIENT
Start: 2021-09-15 | End: 2022-07-27

## 2021-09-24 ENCOUNTER — LAB VISIT (OUTPATIENT)
Dept: LAB | Facility: HOSPITAL | Age: 81
End: 2021-09-24
Attending: INTERNAL MEDICINE
Payer: MEDICARE

## 2021-09-24 DIAGNOSIS — C82.00 FOLLICULAR LYMPHOMA GRADE I, UNSPECIFIED BODY REGION: ICD-10-CM

## 2021-09-24 DIAGNOSIS — C34.01 MALIGNANT NEOPLASM OF HILUS OF RIGHT LUNG: ICD-10-CM

## 2021-09-24 LAB
ALBUMIN SERPL BCP-MCNC: 3.8 G/DL (ref 3.5–5.2)
ALP SERPL-CCNC: 72 U/L (ref 55–135)
ALT SERPL W/O P-5'-P-CCNC: 11 U/L (ref 10–44)
ANION GAP SERPL CALC-SCNC: 10 MMOL/L (ref 8–16)
AST SERPL-CCNC: 22 U/L (ref 10–40)
B2 MICROGLOB SERPL-MCNC: 3.3 UG/ML (ref 0–2.5)
BASOPHILS # BLD AUTO: 0.06 K/UL (ref 0–0.2)
BASOPHILS NFR BLD: 0.9 % (ref 0–1.9)
BILIRUB SERPL-MCNC: 0.4 MG/DL (ref 0.1–1)
BUN SERPL-MCNC: 30 MG/DL (ref 8–23)
CALCIUM SERPL-MCNC: 9.5 MG/DL (ref 8.7–10.5)
CHLORIDE SERPL-SCNC: 109 MMOL/L (ref 95–110)
CO2 SERPL-SCNC: 22 MMOL/L (ref 23–29)
CREAT SERPL-MCNC: 1.2 MG/DL (ref 0.5–1.4)
DIFFERENTIAL METHOD: ABNORMAL
EOSINOPHIL # BLD AUTO: 0.2 K/UL (ref 0–0.5)
EOSINOPHIL NFR BLD: 2.5 % (ref 0–8)
ERYTHROCYTE [DISTWIDTH] IN BLOOD BY AUTOMATED COUNT: 14.7 % (ref 11.5–14.5)
EST. GFR  (AFRICAN AMERICAN): 50 ML/MIN/1.73 M^2
EST. GFR  (NON AFRICAN AMERICAN): 43 ML/MIN/1.73 M^2
GLUCOSE SERPL-MCNC: 113 MG/DL (ref 70–110)
HCT VFR BLD AUTO: 36.9 % (ref 37–48.5)
HGB BLD-MCNC: 12.4 G/DL (ref 12–16)
IMM GRANULOCYTES # BLD AUTO: 0.04 K/UL (ref 0–0.04)
IMM GRANULOCYTES NFR BLD AUTO: 0.6 % (ref 0–0.5)
LDH SERPL L TO P-CCNC: 188 U/L (ref 110–260)
LYMPHOCYTES # BLD AUTO: 1 K/UL (ref 1–4.8)
LYMPHOCYTES NFR BLD: 14.8 % (ref 18–48)
MAGNESIUM SERPL-MCNC: 1.5 MG/DL (ref 1.6–2.6)
MCH RBC QN AUTO: 30.7 PG (ref 27–31)
MCHC RBC AUTO-ENTMCNC: 33.6 G/DL (ref 32–36)
MCV RBC AUTO: 91 FL (ref 82–98)
MONOCYTES # BLD AUTO: 0.6 K/UL (ref 0.3–1)
MONOCYTES NFR BLD: 8 % (ref 4–15)
NEUTROPHILS # BLD AUTO: 5 K/UL (ref 1.8–7.7)
NEUTROPHILS NFR BLD: 73.2 % (ref 38–73)
NRBC BLD-RTO: 0 /100 WBC
PLATELET # BLD AUTO: 143 K/UL (ref 150–450)
PMV BLD AUTO: 10.7 FL (ref 9.2–12.9)
POTASSIUM SERPL-SCNC: 3.9 MMOL/L (ref 3.5–5.1)
PROT SERPL-MCNC: 6.6 G/DL (ref 6–8.4)
RBC # BLD AUTO: 4.04 M/UL (ref 4–5.4)
SODIUM SERPL-SCNC: 141 MMOL/L (ref 136–145)
WBC # BLD AUTO: 6.84 K/UL (ref 3.9–12.7)

## 2021-09-24 PROCEDURE — 36415 COLL VENOUS BLD VENIPUNCTURE: CPT | Mod: PO | Performed by: INTERNAL MEDICINE

## 2021-09-24 PROCEDURE — 80053 COMPREHEN METABOLIC PANEL: CPT | Mod: PO | Performed by: INTERNAL MEDICINE

## 2021-09-24 PROCEDURE — 83735 ASSAY OF MAGNESIUM: CPT | Mod: PO | Performed by: INTERNAL MEDICINE

## 2021-09-24 PROCEDURE — 82232 ASSAY OF BETA-2 PROTEIN: CPT | Performed by: INTERNAL MEDICINE

## 2021-09-24 PROCEDURE — 85025 COMPLETE CBC W/AUTO DIFF WBC: CPT | Mod: PO | Performed by: INTERNAL MEDICINE

## 2021-09-24 PROCEDURE — 83615 LACTATE (LD) (LDH) ENZYME: CPT | Mod: PO | Performed by: INTERNAL MEDICINE

## 2021-09-29 ENCOUNTER — OFFICE VISIT (OUTPATIENT)
Dept: HEMATOLOGY/ONCOLOGY | Facility: CLINIC | Age: 81
End: 2021-09-29
Payer: MEDICARE

## 2021-09-29 ENCOUNTER — PATIENT MESSAGE (OUTPATIENT)
Dept: HEMATOLOGY/ONCOLOGY | Facility: CLINIC | Age: 81
End: 2021-09-29

## 2021-09-29 VITALS
DIASTOLIC BLOOD PRESSURE: 94 MMHG | BODY MASS INDEX: 33.69 KG/M2 | RESPIRATION RATE: 18 BRPM | HEIGHT: 64 IN | OXYGEN SATURATION: 94 % | HEART RATE: 91 BPM | TEMPERATURE: 96 F | WEIGHT: 197.31 LBS | SYSTOLIC BLOOD PRESSURE: 149 MMHG

## 2021-09-29 DIAGNOSIS — R61 NIGHT SWEAT: ICD-10-CM

## 2021-09-29 DIAGNOSIS — C82.90 FOLLICULAR LYMPHOMA, UNSPECIFIED FOLLICULAR LYMPHOMA TYPE, UNSPECIFIED BODY REGION: ICD-10-CM

## 2021-09-29 DIAGNOSIS — C34.01 MALIGNANT NEOPLASM OF HILUS OF RIGHT LUNG: ICD-10-CM

## 2021-09-29 DIAGNOSIS — N28.89 LEFT RENAL MASS: ICD-10-CM

## 2021-09-29 DIAGNOSIS — C82.00 FOLLICULAR LYMPHOMA GRADE I, UNSPECIFIED BODY REGION: Primary | ICD-10-CM

## 2021-09-29 DIAGNOSIS — E55.9 VITAMIN D DEFICIENCY: ICD-10-CM

## 2021-09-29 DIAGNOSIS — Z72.0 TOBACCO ABUSE: ICD-10-CM

## 2021-09-29 DIAGNOSIS — R63.0 LOSS OF APPETITE: ICD-10-CM

## 2021-09-29 DIAGNOSIS — R53.83 FATIGUE, UNSPECIFIED TYPE: ICD-10-CM

## 2021-09-29 PROCEDURE — 1101F PR PT FALLS ASSESS DOC 0-1 FALLS W/OUT INJ PAST YR: ICD-10-PCS | Mod: CPTII,S$GLB,, | Performed by: INTERNAL MEDICINE

## 2021-09-29 PROCEDURE — 1160F RVW MEDS BY RX/DR IN RCRD: CPT | Mod: CPTII,S$GLB,, | Performed by: INTERNAL MEDICINE

## 2021-09-29 PROCEDURE — 3077F SYST BP >= 140 MM HG: CPT | Mod: CPTII,S$GLB,, | Performed by: INTERNAL MEDICINE

## 2021-09-29 PROCEDURE — 1126F PR PAIN SEVERITY QUANTIFIED, NO PAIN PRESENT: ICD-10-PCS | Mod: CPTII,S$GLB,, | Performed by: INTERNAL MEDICINE

## 2021-09-29 PROCEDURE — 1159F PR MEDICATION LIST DOCUMENTED IN MEDICAL RECORD: ICD-10-PCS | Mod: CPTII,S$GLB,, | Performed by: INTERNAL MEDICINE

## 2021-09-29 PROCEDURE — 3288F FALL RISK ASSESSMENT DOCD: CPT | Mod: CPTII,S$GLB,, | Performed by: INTERNAL MEDICINE

## 2021-09-29 PROCEDURE — 3077F PR MOST RECENT SYSTOLIC BLOOD PRESSURE >= 140 MM HG: ICD-10-PCS | Mod: CPTII,S$GLB,, | Performed by: INTERNAL MEDICINE

## 2021-09-29 PROCEDURE — 99214 OFFICE O/P EST MOD 30 MIN: CPT | Mod: S$GLB,,, | Performed by: INTERNAL MEDICINE

## 2021-09-29 PROCEDURE — 3080F PR MOST RECENT DIASTOLIC BLOOD PRESSURE >= 90 MM HG: ICD-10-PCS | Mod: CPTII,S$GLB,, | Performed by: INTERNAL MEDICINE

## 2021-09-29 PROCEDURE — 1160F PR REVIEW ALL MEDS BY PRESCRIBER/CLIN PHARMACIST DOCUMENTED: ICD-10-PCS | Mod: CPTII,S$GLB,, | Performed by: INTERNAL MEDICINE

## 2021-09-29 PROCEDURE — 1159F MED LIST DOCD IN RCRD: CPT | Mod: CPTII,S$GLB,, | Performed by: INTERNAL MEDICINE

## 2021-09-29 PROCEDURE — 3288F PR FALLS RISK ASSESSMENT DOCUMENTED: ICD-10-PCS | Mod: CPTII,S$GLB,, | Performed by: INTERNAL MEDICINE

## 2021-09-29 PROCEDURE — 1126F AMNT PAIN NOTED NONE PRSNT: CPT | Mod: CPTII,S$GLB,, | Performed by: INTERNAL MEDICINE

## 2021-09-29 PROCEDURE — 3080F DIAST BP >= 90 MM HG: CPT | Mod: CPTII,S$GLB,, | Performed by: INTERNAL MEDICINE

## 2021-09-29 PROCEDURE — 99999 PR PBB SHADOW E&M-EST. PATIENT-LVL IV: CPT | Mod: PBBFAC,,, | Performed by: INTERNAL MEDICINE

## 2021-09-29 PROCEDURE — 99214 PR OFFICE/OUTPT VISIT, EST, LEVL IV, 30-39 MIN: ICD-10-PCS | Mod: S$GLB,,, | Performed by: INTERNAL MEDICINE

## 2021-09-29 PROCEDURE — 1101F PT FALLS ASSESS-DOCD LE1/YR: CPT | Mod: CPTII,S$GLB,, | Performed by: INTERNAL MEDICINE

## 2021-09-29 PROCEDURE — 99999 PR PBB SHADOW E&M-EST. PATIENT-LVL IV: ICD-10-PCS | Mod: PBBFAC,,, | Performed by: INTERNAL MEDICINE

## 2021-09-29 RX ORDER — BENZONATATE 100 MG/1
100 CAPSULE ORAL 3 TIMES DAILY PRN
Qty: 30 CAPSULE | Refills: 1 | Status: SHIPPED | OUTPATIENT
Start: 2021-09-29 | End: 2021-11-24 | Stop reason: SDUPTHER

## 2021-09-29 RX ORDER — FAMOTIDINE 10 MG/ML
20 INJECTION INTRAVENOUS ONCE AS NEEDED
Status: CANCELLED
Start: 2021-09-30

## 2021-09-29 RX ORDER — PREDNISOLONE ACETATE 10 MG/ML
SUSPENSION/ DROPS OPHTHALMIC
COMMUNITY
Start: 2021-09-22 | End: 2021-10-21

## 2021-09-29 RX ORDER — DIPHENHYDRAMINE HYDROCHLORIDE 50 MG/ML
50 INJECTION INTRAMUSCULAR; INTRAVENOUS ONCE AS NEEDED
Status: CANCELLED | OUTPATIENT
Start: 2021-09-30

## 2021-09-29 RX ORDER — SODIUM CHLORIDE 0.9 % (FLUSH) 0.9 %
10 SYRINGE (ML) INJECTION
Status: CANCELLED | OUTPATIENT
Start: 2021-09-30

## 2021-09-29 RX ORDER — ACETAMINOPHEN 500 MG
1000 TABLET ORAL
Status: CANCELLED | OUTPATIENT
Start: 2021-09-30

## 2021-09-29 RX ORDER — METHYLPREDNISOLONE SOD SUCC 125 MG
125 VIAL (EA) INJECTION ONCE AS NEEDED
Status: CANCELLED | OUTPATIENT
Start: 2021-09-30

## 2021-09-29 RX ORDER — MEPERIDINE HYDROCHLORIDE 50 MG/ML
25 INJECTION INTRAMUSCULAR; INTRAVENOUS; SUBCUTANEOUS EVERY 30 MIN PRN
Status: CANCELLED | OUTPATIENT
Start: 2021-09-30

## 2021-09-29 RX ORDER — HEPARIN 100 UNIT/ML
500 SYRINGE INTRAVENOUS
Status: CANCELLED | OUTPATIENT
Start: 2021-09-30

## 2021-09-29 RX ORDER — ASPIRIN 325 MG
50000 TABLET, DELAYED RELEASE (ENTERIC COATED) ORAL
COMMUNITY
Start: 2021-09-14 | End: 2022-06-15 | Stop reason: CLARIF

## 2021-09-30 ENCOUNTER — INFUSION (OUTPATIENT)
Dept: INFUSION THERAPY | Facility: HOSPITAL | Age: 81
End: 2021-09-30
Attending: INTERNAL MEDICINE
Payer: MEDICARE

## 2021-09-30 ENCOUNTER — DOCUMENTATION ONLY (OUTPATIENT)
Dept: INFUSION THERAPY | Facility: HOSPITAL | Age: 81
End: 2021-09-30

## 2021-09-30 VITALS
BODY MASS INDEX: 33.69 KG/M2 | WEIGHT: 197.31 LBS | DIASTOLIC BLOOD PRESSURE: 74 MMHG | HEART RATE: 81 BPM | HEIGHT: 64 IN | TEMPERATURE: 98 F | RESPIRATION RATE: 18 BRPM | SYSTOLIC BLOOD PRESSURE: 127 MMHG

## 2021-09-30 DIAGNOSIS — C82.00 FOLLICULAR LYMPHOMA GRADE I, UNSPECIFIED BODY REGION: Primary | ICD-10-CM

## 2021-09-30 PROCEDURE — 25000003 PHARM REV CODE 250: Mod: PN | Performed by: INTERNAL MEDICINE

## 2021-09-30 PROCEDURE — 96367 TX/PROPH/DG ADDL SEQ IV INF: CPT | Mod: PN

## 2021-09-30 PROCEDURE — 63600175 PHARM REV CODE 636 W HCPCS: Mod: TB,PN | Performed by: INTERNAL MEDICINE

## 2021-09-30 PROCEDURE — 96413 CHEMO IV INFUSION 1 HR: CPT | Mod: PN

## 2021-09-30 PROCEDURE — 96415 CHEMO IV INFUSION ADDL HR: CPT | Mod: PN

## 2021-09-30 RX ORDER — DIPHENHYDRAMINE HYDROCHLORIDE 50 MG/ML
50 INJECTION INTRAMUSCULAR; INTRAVENOUS ONCE AS NEEDED
Status: DISCONTINUED | OUTPATIENT
Start: 2021-09-30 | End: 2021-09-30 | Stop reason: HOSPADM

## 2021-09-30 RX ORDER — FAMOTIDINE 10 MG/ML
20 INJECTION INTRAVENOUS ONCE AS NEEDED
Status: DISCONTINUED | OUTPATIENT
Start: 2021-09-30 | End: 2021-09-30 | Stop reason: HOSPADM

## 2021-09-30 RX ORDER — SODIUM CHLORIDE 0.9 % (FLUSH) 0.9 %
10 SYRINGE (ML) INJECTION
Status: DISCONTINUED | OUTPATIENT
Start: 2021-09-30 | End: 2021-09-30 | Stop reason: HOSPADM

## 2021-09-30 RX ORDER — ACETAMINOPHEN 500 MG
1000 TABLET ORAL
Status: COMPLETED | OUTPATIENT
Start: 2021-09-30 | End: 2021-09-30

## 2021-09-30 RX ORDER — HEPARIN 100 UNIT/ML
500 SYRINGE INTRAVENOUS
Status: DISCONTINUED | OUTPATIENT
Start: 2021-09-30 | End: 2021-09-30 | Stop reason: HOSPADM

## 2021-09-30 RX ORDER — METHYLPREDNISOLONE SOD SUCC 125 MG
125 VIAL (EA) INJECTION ONCE AS NEEDED
Status: DISCONTINUED | OUTPATIENT
Start: 2021-09-30 | End: 2021-09-30 | Stop reason: HOSPADM

## 2021-09-30 RX ORDER — MEPERIDINE HYDROCHLORIDE 25 MG/ML
25 INJECTION INTRAMUSCULAR; INTRAVENOUS; SUBCUTANEOUS EVERY 30 MIN PRN
Status: DISCONTINUED | OUTPATIENT
Start: 2021-09-30 | End: 2021-09-30 | Stop reason: HOSPADM

## 2021-09-30 RX ADMIN — ACETAMINOPHEN 1000 MG: 500 TABLET, FILM COATED ORAL at 10:09

## 2021-09-30 RX ADMIN — SODIUM CHLORIDE 700 MG: 0.9 INJECTION, SOLUTION INTRAVENOUS at 10:09

## 2021-09-30 RX ADMIN — HEPARIN 500 UNITS: 100 SYRINGE at 02:09

## 2021-09-30 RX ADMIN — DIPHENHYDRAMINE HYDROCHLORIDE 50 MG: 50 INJECTION INTRAMUSCULAR; INTRAVENOUS at 10:09

## 2021-09-30 RX ADMIN — SODIUM CHLORIDE: 0.9 INJECTION, SOLUTION INTRAVENOUS at 10:09

## 2021-10-04 ENCOUNTER — HOSPITAL ENCOUNTER (OUTPATIENT)
Dept: RADIOLOGY | Facility: HOSPITAL | Age: 81
Discharge: HOME | End: 2021-10-04
Attending: RADIOLOGY
Payer: MEDICARE

## 2021-10-04 DIAGNOSIS — C82.08 GRADE 1 FOLLICULAR LYMPHOMA OF LYMPH NODES OF MULTIPLE REGIONS: ICD-10-CM

## 2021-10-04 DIAGNOSIS — C34.11 MALIGNANT NEOPLASM OF RIGHT UPPER LOBE OF LUNG: ICD-10-CM

## 2021-10-04 PROCEDURE — 74176 CT CHEST ABDOMEN PELVIS WITHOUT CONTRAST(XPD): ICD-10-PCS | Mod: 26,MG,, | Performed by: RADIOLOGY

## 2021-10-04 PROCEDURE — A9698 NON-RAD CONTRAST MATERIALNOC: HCPCS | Mod: PO | Performed by: RADIOLOGY

## 2021-10-04 PROCEDURE — G1004 CDSM NDSC: HCPCS

## 2021-10-04 PROCEDURE — 25500020 PHARM REV CODE 255: Mod: PO | Performed by: RADIOLOGY

## 2021-10-04 PROCEDURE — 71250 CT THORAX DX C-: CPT | Mod: 26,MG,, | Performed by: RADIOLOGY

## 2021-10-04 PROCEDURE — 71250 CT CHEST ABDOMEN PELVIS WITHOUT CONTRAST(XPD): ICD-10-PCS | Mod: 26,MG,, | Performed by: RADIOLOGY

## 2021-10-04 PROCEDURE — 74176 CT ABD & PELVIS W/O CONTRAST: CPT | Mod: 26,MG,, | Performed by: RADIOLOGY

## 2021-10-04 RX ADMIN — IOHEXOL 1000 ML: 9 SOLUTION ORAL at 08:10

## 2021-10-07 ENCOUNTER — OFFICE VISIT (OUTPATIENT)
Dept: RADIATION ONCOLOGY | Facility: CLINIC | Age: 81
End: 2021-10-07
Payer: MEDICARE

## 2021-10-07 VITALS
OXYGEN SATURATION: 97 % | BODY MASS INDEX: 31.39 KG/M2 | HEIGHT: 66 IN | HEART RATE: 76 BPM | SYSTOLIC BLOOD PRESSURE: 135 MMHG | WEIGHT: 195.31 LBS | DIASTOLIC BLOOD PRESSURE: 77 MMHG | RESPIRATION RATE: 18 BRPM

## 2021-10-07 DIAGNOSIS — C34.32 PRIMARY MALIGNANT NEOPLASM OF LEFT LOWER LOBE OF LUNG: ICD-10-CM

## 2021-10-07 PROCEDURE — 99999 PR PBB SHADOW E&M-EST. PATIENT-LVL IV: ICD-10-PCS | Mod: PBBFAC,,, | Performed by: RADIOLOGY

## 2021-10-07 PROCEDURE — 3078F DIAST BP <80 MM HG: CPT | Mod: CPTII,S$GLB,, | Performed by: RADIOLOGY

## 2021-10-07 PROCEDURE — 1159F PR MEDICATION LIST DOCUMENTED IN MEDICAL RECORD: ICD-10-PCS | Mod: CPTII,S$GLB,, | Performed by: RADIOLOGY

## 2021-10-07 PROCEDURE — 3288F FALL RISK ASSESSMENT DOCD: CPT | Mod: CPTII,S$GLB,, | Performed by: RADIOLOGY

## 2021-10-07 PROCEDURE — 1159F MED LIST DOCD IN RCRD: CPT | Mod: CPTII,S$GLB,, | Performed by: RADIOLOGY

## 2021-10-07 PROCEDURE — 3288F PR FALLS RISK ASSESSMENT DOCUMENTED: ICD-10-PCS | Mod: CPTII,S$GLB,, | Performed by: RADIOLOGY

## 2021-10-07 PROCEDURE — 1101F PR PT FALLS ASSESS DOC 0-1 FALLS W/OUT INJ PAST YR: ICD-10-PCS | Mod: CPTII,S$GLB,, | Performed by: RADIOLOGY

## 2021-10-07 PROCEDURE — 1126F AMNT PAIN NOTED NONE PRSNT: CPT | Mod: CPTII,S$GLB,, | Performed by: RADIOLOGY

## 2021-10-07 PROCEDURE — 3075F PR MOST RECENT SYSTOLIC BLOOD PRESS GE 130-139MM HG: ICD-10-PCS | Mod: CPTII,S$GLB,, | Performed by: RADIOLOGY

## 2021-10-07 PROCEDURE — 1126F PR PAIN SEVERITY QUANTIFIED, NO PAIN PRESENT: ICD-10-PCS | Mod: CPTII,S$GLB,, | Performed by: RADIOLOGY

## 2021-10-07 PROCEDURE — 99999 PR PBB SHADOW E&M-EST. PATIENT-LVL IV: CPT | Mod: PBBFAC,,, | Performed by: RADIOLOGY

## 2021-10-07 PROCEDURE — 1101F PT FALLS ASSESS-DOCD LE1/YR: CPT | Mod: CPTII,S$GLB,, | Performed by: RADIOLOGY

## 2021-10-07 PROCEDURE — 3078F PR MOST RECENT DIASTOLIC BLOOD PRESSURE < 80 MM HG: ICD-10-PCS | Mod: CPTII,S$GLB,, | Performed by: RADIOLOGY

## 2021-10-07 PROCEDURE — 3075F SYST BP GE 130 - 139MM HG: CPT | Mod: CPTII,S$GLB,, | Performed by: RADIOLOGY

## 2021-10-07 PROCEDURE — 99024 PR POST-OP FOLLOW-UP VISIT: ICD-10-PCS | Mod: S$GLB,,, | Performed by: RADIOLOGY

## 2021-10-07 PROCEDURE — 99024 POSTOP FOLLOW-UP VISIT: CPT | Mod: S$GLB,,, | Performed by: RADIOLOGY

## 2021-10-11 ENCOUNTER — HOSPITAL ENCOUNTER (OUTPATIENT)
Dept: RADIATION THERAPY | Facility: HOSPITAL | Age: 81
Discharge: HOME | End: 2021-10-11
Attending: INTERNAL MEDICINE
Payer: MEDICARE

## 2021-10-14 ENCOUNTER — HOSPITAL ENCOUNTER (OUTPATIENT)
Dept: RADIATION THERAPY | Facility: HOSPITAL | Age: 81
Discharge: HOME | End: 2021-10-14
Attending: INTERNAL MEDICINE
Payer: MEDICARE

## 2021-10-14 PROCEDURE — 77014 PR  CT GUIDANCE PLACEMENT RAD THERAPY FIELDS: ICD-10-PCS | Mod: 26,,, | Performed by: RADIOLOGY

## 2021-10-14 PROCEDURE — 77014 HC CT GUIDANCE RADIATION THERAPY FLDS PLACEMENT: CPT | Mod: TC,PN | Performed by: RADIOLOGY

## 2021-10-14 PROCEDURE — 77014 PR  CT GUIDANCE PLACEMENT RAD THERAPY FIELDS: CPT | Mod: 26,,, | Performed by: RADIOLOGY

## 2021-10-14 PROCEDURE — 77334 PR  RADN TREATMENT AID(S) COMPLX: ICD-10-PCS | Mod: 26,,, | Performed by: RADIOLOGY

## 2021-10-14 PROCEDURE — 77263 PR  RADIATION THERAPY PLAN COMPLEX: ICD-10-PCS | Mod: ,,, | Performed by: RADIOLOGY

## 2021-10-14 PROCEDURE — 77334 RADIATION TREATMENT AID(S): CPT | Mod: 26,,, | Performed by: RADIOLOGY

## 2021-10-14 PROCEDURE — 77263 THER RADIOLOGY TX PLNG CPLX: CPT | Mod: ,,, | Performed by: RADIOLOGY

## 2021-10-14 PROCEDURE — 77334 RADIATION TREATMENT AID(S): CPT | Mod: TC,PN | Performed by: RADIOLOGY

## 2021-10-19 PROCEDURE — 77293 RESPIRATOR MOTION MGMT SIMUL: CPT | Mod: 26,,, | Performed by: RADIOLOGY

## 2021-10-19 PROCEDURE — 77301 PR  INTEN MOD RADIOTHER PLAN W/DOSE VOL HIST: ICD-10-PCS | Mod: 26,,, | Performed by: RADIOLOGY

## 2021-10-19 PROCEDURE — 77293 PR RESPIRATORY MOTION MGMT SIMULATION: ICD-10-PCS | Mod: 26,,, | Performed by: RADIOLOGY

## 2021-10-19 PROCEDURE — 77301 RADIOTHERAPY DOSE PLAN IMRT: CPT | Mod: 26,,, | Performed by: RADIOLOGY

## 2021-10-19 PROCEDURE — 77293 RESPIRATOR MOTION MGMT SIMUL: CPT | Mod: TC,PN | Performed by: RADIOLOGY

## 2021-10-19 PROCEDURE — 77301 RADIOTHERAPY DOSE PLAN IMRT: CPT | Mod: TC,PN | Performed by: RADIOLOGY

## 2021-10-20 PROCEDURE — 77300 PR RADIATION THERAPY,DOSIMETRY PLAN: ICD-10-PCS | Mod: 26,,, | Performed by: RADIOLOGY

## 2021-10-20 PROCEDURE — 77470 SPECIAL RADIATION TREATMENT: CPT | Mod: 26,59,, | Performed by: RADIOLOGY

## 2021-10-20 PROCEDURE — 77014 HC CT GUIDANCE RADIATION THERAPY FLDS PLACEMENT: CPT | Mod: TC,PN,59 | Performed by: RADIOLOGY

## 2021-10-20 PROCEDURE — 77014 PR  CT GUIDANCE PLACEMENT RAD THERAPY FIELDS: ICD-10-PCS | Mod: 26,,, | Performed by: RADIOLOGY

## 2021-10-20 PROCEDURE — 77014 PR  CT GUIDANCE PLACEMENT RAD THERAPY FIELDS: CPT | Mod: 26,,, | Performed by: RADIOLOGY

## 2021-10-20 PROCEDURE — 77370 RADIATION PHYSICS CONSULT: CPT | Mod: PN | Performed by: RADIOLOGY

## 2021-10-20 PROCEDURE — 77470 SPECIAL RADIATION TREATMENT: CPT | Mod: 59,TC,PN | Performed by: RADIOLOGY

## 2021-10-20 PROCEDURE — 77338 DESIGN MLC DEVICE FOR IMRT: CPT | Mod: 26,,, | Performed by: RADIOLOGY

## 2021-10-20 PROCEDURE — 77470 PR  SPECIAL RADIATION TREATMENT: ICD-10-PCS | Mod: 26,59,, | Performed by: RADIOLOGY

## 2021-10-20 PROCEDURE — 77338 DESIGN MLC DEVICE FOR IMRT: CPT | Mod: TC,PN | Performed by: RADIOLOGY

## 2021-10-20 PROCEDURE — 77300 RADIATION THERAPY DOSE PLAN: CPT | Mod: 26,,, | Performed by: RADIOLOGY

## 2021-10-20 PROCEDURE — 77300 RADIATION THERAPY DOSE PLAN: CPT | Mod: TC,PN | Performed by: RADIOLOGY

## 2021-10-20 PROCEDURE — 77338 PR  MLC IMRT DESIGN & CONSTRUCTION PER IMRT PLAN: ICD-10-PCS | Mod: 26,,, | Performed by: RADIOLOGY

## 2021-10-20 PROCEDURE — 77373 STRTCTC BDY RAD THER TX DLVR: CPT | Mod: PN | Performed by: RADIOLOGY

## 2021-10-21 ENCOUNTER — OFFICE VISIT (OUTPATIENT)
Dept: FAMILY MEDICINE | Facility: CLINIC | Age: 81
End: 2021-10-21
Payer: MEDICARE

## 2021-10-21 DIAGNOSIS — E66.01 MORBID (SEVERE) OBESITY DUE TO EXCESS CALORIES: ICD-10-CM

## 2021-10-21 DIAGNOSIS — M46.96 UNSPECIFIED INFLAMMATORY SPONDYLOPATHY, LUMBAR REGION: ICD-10-CM

## 2021-10-21 DIAGNOSIS — I77.819 ECTATIC AORTA: ICD-10-CM

## 2021-10-21 DIAGNOSIS — J32.9 SINUSITIS, UNSPECIFIED CHRONICITY, UNSPECIFIED LOCATION: Primary | ICD-10-CM

## 2021-10-21 PROCEDURE — 99213 OFFICE O/P EST LOW 20 MIN: CPT | Mod: 95,,, | Performed by: FAMILY MEDICINE

## 2021-10-21 PROCEDURE — 99213 PR OFFICE/OUTPT VISIT, EST, LEVL III, 20-29 MIN: ICD-10-PCS | Mod: 95,,, | Performed by: FAMILY MEDICINE

## 2021-10-21 RX ORDER — AMOXICILLIN AND CLAVULANATE POTASSIUM 875; 125 MG/1; MG/1
1 TABLET, FILM COATED ORAL 2 TIMES DAILY
Qty: 20 TABLET | Refills: 0 | Status: SHIPPED | OUTPATIENT
Start: 2021-10-21 | End: 2021-10-31

## 2021-10-22 PROCEDURE — 77014 PR  CT GUIDANCE PLACEMENT RAD THERAPY FIELDS: ICD-10-PCS | Mod: 26,,, | Performed by: RADIOLOGY

## 2021-10-22 PROCEDURE — 77373 STRTCTC BDY RAD THER TX DLVR: CPT | Mod: PN | Performed by: RADIOLOGY

## 2021-10-22 PROCEDURE — 77014 PR  CT GUIDANCE PLACEMENT RAD THERAPY FIELDS: CPT | Mod: 26,,, | Performed by: RADIOLOGY

## 2021-10-22 PROCEDURE — 77014 HC CT GUIDANCE RADIATION THERAPY FLDS PLACEMENT: CPT | Mod: TC,PN,59 | Performed by: RADIOLOGY

## 2021-10-26 PROCEDURE — 77014 PR  CT GUIDANCE PLACEMENT RAD THERAPY FIELDS: CPT | Mod: 26,,, | Performed by: RADIOLOGY

## 2021-10-26 PROCEDURE — 77014 PR  CT GUIDANCE PLACEMENT RAD THERAPY FIELDS: ICD-10-PCS | Mod: 26,,, | Performed by: RADIOLOGY

## 2021-10-26 PROCEDURE — 77014 HC CT GUIDANCE RADIATION THERAPY FLDS PLACEMENT: CPT | Mod: TC,PN,59 | Performed by: RADIOLOGY

## 2021-10-26 PROCEDURE — 77373 STRTCTC BDY RAD THER TX DLVR: CPT | Mod: PN | Performed by: RADIOLOGY

## 2021-10-28 DIAGNOSIS — C34.32 PRIMARY MALIGNANT NEOPLASM OF LEFT LOWER LOBE OF LUNG: Primary | ICD-10-CM

## 2021-10-28 DIAGNOSIS — C82.08 GRADE 1 FOLLICULAR LYMPHOMA OF LYMPH NODES OF MULTIPLE REGIONS: ICD-10-CM

## 2021-10-28 PROCEDURE — 77014 HC CT GUIDANCE RADIATION THERAPY FLDS PLACEMENT: CPT | Mod: TC,PN,59 | Performed by: RADIOLOGY

## 2021-10-28 PROCEDURE — 77373 STRTCTC BDY RAD THER TX DLVR: CPT | Mod: PN | Performed by: RADIOLOGY

## 2021-10-28 PROCEDURE — 77014 PR  CT GUIDANCE PLACEMENT RAD THERAPY FIELDS: CPT | Mod: 26,,, | Performed by: RADIOLOGY

## 2021-10-28 PROCEDURE — 77336 RADIATION PHYSICS CONSULT: CPT | Mod: PN | Performed by: RADIOLOGY

## 2021-10-28 PROCEDURE — 77014 PR  CT GUIDANCE PLACEMENT RAD THERAPY FIELDS: ICD-10-PCS | Mod: 26,,, | Performed by: RADIOLOGY

## 2021-10-28 NOTE — TELEPHONE ENCOUNTER
----- Message from Zhang Carpio sent at 2/13/2017  2:31 PM CST -----  Contact: self   Patient wants to speak with a nurse regarding simvastatin being denied please call back at [phone] (home)     
Informed patient Rx was sent to [facility] today.    
PAST MEDICAL HISTORY:  Atrial fibrillation, unspecified type     CHF (congestive heart failure)     Hypertension     Squamous cell carcinoma

## 2021-11-02 ENCOUNTER — IMMUNIZATION (OUTPATIENT)
Dept: FAMILY MEDICINE | Facility: CLINIC | Age: 81
End: 2021-11-02
Payer: MEDICARE

## 2021-11-02 DIAGNOSIS — Z23 NEED FOR VACCINATION: Primary | ICD-10-CM

## 2021-11-02 PROCEDURE — 0004A COVID-19, MRNA, LNP-S, PF, 30 MCG/0.3 ML DOSE VACCINE: ICD-10-PCS | Mod: CV19,S$GLB,, | Performed by: RADIOLOGY

## 2021-11-02 PROCEDURE — 0004A COVID-19, MRNA, LNP-S, PF, 30 MCG/0.3 ML DOSE VACCINE: CPT | Mod: CV19,S$GLB,, | Performed by: RADIOLOGY

## 2021-11-02 PROCEDURE — 91300 COVID-19, MRNA, LNP-S, PF, 30 MCG/0.3 ML DOSE VACCINE: CPT | Mod: PBBFAC | Performed by: RADIOLOGY

## 2021-11-05 ENCOUNTER — TELEPHONE (OUTPATIENT)
Dept: RADIATION ONCOLOGY | Facility: CLINIC | Age: 81
End: 2021-11-05
Payer: MEDICARE

## 2021-11-22 ENCOUNTER — LAB VISIT (OUTPATIENT)
Dept: LAB | Facility: HOSPITAL | Age: 81
End: 2021-11-22
Attending: INTERNAL MEDICINE
Payer: MEDICARE

## 2021-11-22 DIAGNOSIS — E55.9 VITAMIN D DEFICIENCY: ICD-10-CM

## 2021-11-22 DIAGNOSIS — C34.01 MALIGNANT NEOPLASM OF HILUS OF RIGHT LUNG: ICD-10-CM

## 2021-11-22 DIAGNOSIS — C82.00 FOLLICULAR LYMPHOMA GRADE I, UNSPECIFIED BODY REGION: ICD-10-CM

## 2021-11-22 DIAGNOSIS — Z72.0 TOBACCO ABUSE: ICD-10-CM

## 2021-11-22 LAB
25(OH)D3+25(OH)D2 SERPL-MCNC: 54 NG/ML (ref 30–96)
ALBUMIN SERPL BCP-MCNC: 3.3 G/DL (ref 3.5–5.2)
ALP SERPL-CCNC: 78 U/L (ref 55–135)
ALT SERPL W/O P-5'-P-CCNC: 5 U/L (ref 10–44)
ANION GAP SERPL CALC-SCNC: 14 MMOL/L (ref 8–16)
AST SERPL-CCNC: 14 U/L (ref 10–40)
B2 MICROGLOB SERPL-MCNC: 4.3 UG/ML (ref 0–2.5)
BASOPHILS # BLD AUTO: 0.05 K/UL (ref 0–0.2)
BASOPHILS NFR BLD: 0.6 % (ref 0–1.9)
BILIRUB SERPL-MCNC: 0.3 MG/DL (ref 0.1–1)
BUN SERPL-MCNC: 31 MG/DL (ref 8–23)
CALCIUM SERPL-MCNC: 8.6 MG/DL (ref 8.7–10.5)
CHLORIDE SERPL-SCNC: 105 MMOL/L (ref 95–110)
CO2 SERPL-SCNC: 21 MMOL/L (ref 23–29)
CREAT SERPL-MCNC: 1.3 MG/DL (ref 0.5–1.4)
DIFFERENTIAL METHOD: ABNORMAL
EOSINOPHIL # BLD AUTO: 0.1 K/UL (ref 0–0.5)
EOSINOPHIL NFR BLD: 1.6 % (ref 0–8)
ERYTHROCYTE [DISTWIDTH] IN BLOOD BY AUTOMATED COUNT: 14 % (ref 11.5–14.5)
EST. GFR  (AFRICAN AMERICAN): 45 ML/MIN/1.73 M^2
EST. GFR  (NON AFRICAN AMERICAN): 39 ML/MIN/1.73 M^2
GLUCOSE SERPL-MCNC: 85 MG/DL (ref 70–110)
HCT VFR BLD AUTO: 36.8 % (ref 37–48.5)
HGB BLD-MCNC: 11.8 G/DL (ref 12–16)
IMM GRANULOCYTES # BLD AUTO: 0.07 K/UL (ref 0–0.04)
IMM GRANULOCYTES NFR BLD AUTO: 0.9 % (ref 0–0.5)
LDH SERPL L TO P-CCNC: 195 U/L (ref 110–260)
LYMPHOCYTES # BLD AUTO: 0.6 K/UL (ref 1–4.8)
LYMPHOCYTES NFR BLD: 7.1 % (ref 18–48)
MAGNESIUM SERPL-MCNC: 1.3 MG/DL (ref 1.6–2.6)
MCH RBC QN AUTO: 29.6 PG (ref 27–31)
MCHC RBC AUTO-ENTMCNC: 32.1 G/DL (ref 32–36)
MCV RBC AUTO: 92 FL (ref 82–98)
MONOCYTES # BLD AUTO: 0.6 K/UL (ref 0.3–1)
MONOCYTES NFR BLD: 7.5 % (ref 4–15)
NEUTROPHILS # BLD AUTO: 6.6 K/UL (ref 1.8–7.7)
NEUTROPHILS NFR BLD: 82.3 % (ref 38–73)
NRBC BLD-RTO: 0 /100 WBC
PLATELET # BLD AUTO: 154 K/UL (ref 150–450)
PMV BLD AUTO: 10.2 FL (ref 9.2–12.9)
POTASSIUM SERPL-SCNC: 3.6 MMOL/L (ref 3.5–5.1)
PROT SERPL-MCNC: 6.7 G/DL (ref 6–8.4)
RBC # BLD AUTO: 3.99 M/UL (ref 4–5.4)
SODIUM SERPL-SCNC: 140 MMOL/L (ref 136–145)
WBC # BLD AUTO: 7.99 K/UL (ref 3.9–12.7)

## 2021-11-22 PROCEDURE — 82232 ASSAY OF BETA-2 PROTEIN: CPT | Performed by: INTERNAL MEDICINE

## 2021-11-22 PROCEDURE — 83615 LACTATE (LD) (LDH) ENZYME: CPT | Mod: PN | Performed by: INTERNAL MEDICINE

## 2021-11-22 PROCEDURE — 80053 COMPREHEN METABOLIC PANEL: CPT | Mod: PN | Performed by: INTERNAL MEDICINE

## 2021-11-22 PROCEDURE — 83735 ASSAY OF MAGNESIUM: CPT | Mod: PN | Performed by: INTERNAL MEDICINE

## 2021-11-22 PROCEDURE — 85025 COMPLETE CBC W/AUTO DIFF WBC: CPT | Mod: PN | Performed by: INTERNAL MEDICINE

## 2021-11-22 PROCEDURE — 82306 VITAMIN D 25 HYDROXY: CPT | Performed by: INTERNAL MEDICINE

## 2021-11-22 PROCEDURE — 36415 COLL VENOUS BLD VENIPUNCTURE: CPT | Mod: PN | Performed by: INTERNAL MEDICINE

## 2021-11-24 ENCOUNTER — PATIENT MESSAGE (OUTPATIENT)
Dept: HEMATOLOGY/ONCOLOGY | Facility: CLINIC | Age: 81
End: 2021-11-24

## 2021-11-24 ENCOUNTER — OFFICE VISIT (OUTPATIENT)
Dept: HEMATOLOGY/ONCOLOGY | Facility: CLINIC | Age: 81
End: 2021-11-24
Payer: MEDICARE

## 2021-11-24 VITALS
HEART RATE: 93 BPM | OXYGEN SATURATION: 96 % | BODY MASS INDEX: 32.73 KG/M2 | SYSTOLIC BLOOD PRESSURE: 136 MMHG | DIASTOLIC BLOOD PRESSURE: 92 MMHG | TEMPERATURE: 98 F | WEIGHT: 196.44 LBS | HEIGHT: 65 IN | RESPIRATION RATE: 18 BRPM

## 2021-11-24 DIAGNOSIS — C34.11 MALIGNANT NEOPLASM OF RIGHT UPPER LOBE OF LUNG: ICD-10-CM

## 2021-11-24 DIAGNOSIS — C34.01 MALIGNANT NEOPLASM OF HILUS OF RIGHT LUNG: ICD-10-CM

## 2021-11-24 DIAGNOSIS — E83.42 HYPOMAGNESEMIA: ICD-10-CM

## 2021-11-24 DIAGNOSIS — C82.00 FOLLICULAR LYMPHOMA GRADE I, UNSPECIFIED BODY REGION: Primary | ICD-10-CM

## 2021-11-24 DIAGNOSIS — Z72.0 TOBACCO ABUSE: ICD-10-CM

## 2021-11-24 DIAGNOSIS — R05.9 COUGH: ICD-10-CM

## 2021-11-24 DIAGNOSIS — E55.9 VITAMIN D DEFICIENCY: ICD-10-CM

## 2021-11-24 PROCEDURE — 99999 PR PBB SHADOW E&M-EST. PATIENT-LVL IV: CPT | Mod: PBBFAC,,, | Performed by: NURSE PRACTITIONER

## 2021-11-24 PROCEDURE — 99214 PR OFFICE/OUTPT VISIT, EST, LEVL IV, 30-39 MIN: ICD-10-PCS | Mod: S$GLB,,, | Performed by: NURSE PRACTITIONER

## 2021-11-24 PROCEDURE — 99214 OFFICE O/P EST MOD 30 MIN: CPT | Mod: S$GLB,,, | Performed by: NURSE PRACTITIONER

## 2021-11-24 PROCEDURE — 99999 PR PBB SHADOW E&M-EST. PATIENT-LVL IV: ICD-10-PCS | Mod: PBBFAC,,, | Performed by: NURSE PRACTITIONER

## 2021-11-24 RX ORDER — MEPERIDINE HYDROCHLORIDE 50 MG/ML
25 INJECTION INTRAMUSCULAR; INTRAVENOUS; SUBCUTANEOUS EVERY 30 MIN PRN
Status: CANCELLED | OUTPATIENT
Start: 2021-11-26

## 2021-11-24 RX ORDER — ACETAMINOPHEN 500 MG
1000 TABLET ORAL
Status: CANCELLED | OUTPATIENT
Start: 2021-11-26

## 2021-11-24 RX ORDER — DIPHENHYDRAMINE HYDROCHLORIDE 50 MG/ML
50 INJECTION INTRAMUSCULAR; INTRAVENOUS ONCE AS NEEDED
Status: CANCELLED | OUTPATIENT
Start: 2021-11-26

## 2021-11-24 RX ORDER — AMOXICILLIN 500 MG/1
CAPSULE ORAL
COMMUNITY
Start: 2021-09-14 | End: 2022-03-15

## 2021-11-24 RX ORDER — METHYLPREDNISOLONE SOD SUCC 125 MG
125 VIAL (EA) INJECTION ONCE AS NEEDED
Status: CANCELLED | OUTPATIENT
Start: 2021-11-26

## 2021-11-24 RX ORDER — BENZONATATE 100 MG/1
100 CAPSULE ORAL 3 TIMES DAILY PRN
Qty: 30 CAPSULE | Refills: 1 | Status: SHIPPED | OUTPATIENT
Start: 2021-11-24 | End: 2022-11-24

## 2021-11-24 RX ORDER — FAMOTIDINE 10 MG/ML
20 INJECTION INTRAVENOUS ONCE AS NEEDED
Status: CANCELLED
Start: 2021-11-26

## 2021-11-24 RX ORDER — SODIUM CHLORIDE 0.9 % (FLUSH) 0.9 %
10 SYRINGE (ML) INJECTION
Status: CANCELLED | OUTPATIENT
Start: 2021-11-26

## 2021-11-24 RX ORDER — HYDROCODONE POLISTIREX AND CHLORPHENIRAMINE POLISTIREX 10; 8 MG/5ML; MG/5ML
5 SUSPENSION, EXTENDED RELEASE ORAL EVERY 12 HOURS PRN
Qty: 115 ML | Refills: 0 | Status: SHIPPED | OUTPATIENT
Start: 2021-11-24 | End: 2022-06-15 | Stop reason: CLARIF

## 2021-11-24 RX ORDER — LANOLIN ALCOHOL/MO/W.PET/CERES
400 CREAM (GRAM) TOPICAL DAILY
Qty: 90 TABLET | Refills: 3 | Status: SHIPPED | OUTPATIENT
Start: 2021-11-24 | End: 2022-07-27

## 2021-11-24 RX ORDER — HEPARIN 100 UNIT/ML
500 SYRINGE INTRAVENOUS
Status: CANCELLED | OUTPATIENT
Start: 2021-11-26

## 2021-11-26 ENCOUNTER — INFUSION (OUTPATIENT)
Dept: INFUSION THERAPY | Facility: HOSPITAL | Age: 81
End: 2021-11-26
Attending: INTERNAL MEDICINE
Payer: MEDICARE

## 2021-11-26 VITALS
HEART RATE: 74 BPM | WEIGHT: 199.31 LBS | RESPIRATION RATE: 16 BRPM | HEIGHT: 65 IN | BODY MASS INDEX: 33.21 KG/M2 | DIASTOLIC BLOOD PRESSURE: 63 MMHG | SYSTOLIC BLOOD PRESSURE: 116 MMHG | TEMPERATURE: 98 F

## 2021-11-26 DIAGNOSIS — C82.00 FOLLICULAR LYMPHOMA GRADE I, UNSPECIFIED BODY REGION: Primary | ICD-10-CM

## 2021-11-26 PROCEDURE — 96413 CHEMO IV INFUSION 1 HR: CPT | Mod: PN

## 2021-11-26 PROCEDURE — 87340 HEPATITIS B SURFACE AG IA: CPT | Performed by: INTERNAL MEDICINE

## 2021-11-26 PROCEDURE — 86706 HEP B SURFACE ANTIBODY: CPT | Performed by: INTERNAL MEDICINE

## 2021-11-26 PROCEDURE — A4216 STERILE WATER/SALINE, 10 ML: HCPCS | Mod: PN | Performed by: NURSE PRACTITIONER

## 2021-11-26 PROCEDURE — 63600175 PHARM REV CODE 636 W HCPCS: Mod: PN | Performed by: NURSE PRACTITIONER

## 2021-11-26 PROCEDURE — 25000003 PHARM REV CODE 250: Mod: PN | Performed by: NURSE PRACTITIONER

## 2021-11-26 PROCEDURE — 96415 CHEMO IV INFUSION ADDL HR: CPT | Mod: PN

## 2021-11-26 PROCEDURE — 96367 TX/PROPH/DG ADDL SEQ IV INF: CPT | Mod: PN

## 2021-11-26 RX ORDER — HEPARIN 100 UNIT/ML
500 SYRINGE INTRAVENOUS
Status: DISCONTINUED | OUTPATIENT
Start: 2021-11-26 | End: 2021-11-26 | Stop reason: HOSPADM

## 2021-11-26 RX ORDER — ACETAMINOPHEN 500 MG
1000 TABLET ORAL
Status: COMPLETED | OUTPATIENT
Start: 2021-11-26 | End: 2021-11-26

## 2021-11-26 RX ORDER — SODIUM CHLORIDE 0.9 % (FLUSH) 0.9 %
10 SYRINGE (ML) INJECTION
Status: DISCONTINUED | OUTPATIENT
Start: 2021-11-26 | End: 2021-11-26 | Stop reason: HOSPADM

## 2021-11-26 RX ADMIN — SODIUM CHLORIDE: 0.9 INJECTION, SOLUTION INTRAVENOUS at 10:11

## 2021-11-26 RX ADMIN — HEPARIN 500 UNITS: 100 SYRINGE at 01:11

## 2021-11-26 RX ADMIN — ACETAMINOPHEN 1000 MG: 500 TABLET ORAL at 10:11

## 2021-11-26 RX ADMIN — DIPHENHYDRAMINE HYDROCHLORIDE 50 MG: 50 INJECTION INTRAMUSCULAR; INTRAVENOUS at 10:11

## 2021-11-26 RX ADMIN — SODIUM CHLORIDE 700 MG: 0.9 INJECTION, SOLUTION INTRAVENOUS at 10:11

## 2021-11-26 RX ADMIN — Medication 10 ML: at 01:11

## 2021-11-29 LAB
HBV SURFACE AB SER-ACNC: NEGATIVE M[IU]/ML
HBV SURFACE AG SERPL QL IA: NEGATIVE

## 2021-12-10 ENCOUNTER — TELEPHONE (OUTPATIENT)
Dept: HEMATOLOGY/ONCOLOGY | Facility: CLINIC | Age: 81
End: 2021-12-10
Payer: MEDICARE

## 2021-12-10 DIAGNOSIS — R61 NIGHT SWEAT: ICD-10-CM

## 2021-12-10 DIAGNOSIS — R53.83 FATIGUE, UNSPECIFIED TYPE: ICD-10-CM

## 2021-12-10 DIAGNOSIS — R63.0 LOSS OF APPETITE: ICD-10-CM

## 2021-12-10 DIAGNOSIS — N28.89 LEFT RENAL MASS: ICD-10-CM

## 2021-12-10 DIAGNOSIS — E55.9 VITAMIN D DEFICIENCY: ICD-10-CM

## 2021-12-10 DIAGNOSIS — C82.90 FOLLICULAR LYMPHOMA, UNSPECIFIED FOLLICULAR LYMPHOMA TYPE, UNSPECIFIED BODY REGION: ICD-10-CM

## 2021-12-10 RX ORDER — CHOLECALCIFEROL (VITAMIN D3) 1250 MCG
1 TABLET ORAL
Qty: 12 TABLET | Refills: 6 | Status: SHIPPED | OUTPATIENT
Start: 2021-12-10 | End: 2022-01-06 | Stop reason: SDUPTHER

## 2022-01-06 ENCOUNTER — PATIENT MESSAGE (OUTPATIENT)
Dept: HEMATOLOGY/ONCOLOGY | Facility: CLINIC | Age: 82
End: 2022-01-06
Payer: MEDICARE

## 2022-01-06 DIAGNOSIS — C82.90 FOLLICULAR LYMPHOMA, UNSPECIFIED FOLLICULAR LYMPHOMA TYPE, UNSPECIFIED BODY REGION: ICD-10-CM

## 2022-01-06 DIAGNOSIS — R53.83 FATIGUE, UNSPECIFIED TYPE: ICD-10-CM

## 2022-01-06 DIAGNOSIS — N28.89 LEFT RENAL MASS: ICD-10-CM

## 2022-01-06 DIAGNOSIS — E55.9 VITAMIN D DEFICIENCY: ICD-10-CM

## 2022-01-06 DIAGNOSIS — R63.0 LOSS OF APPETITE: ICD-10-CM

## 2022-01-06 DIAGNOSIS — R61 NIGHT SWEAT: ICD-10-CM

## 2022-01-06 RX ORDER — CHOLECALCIFEROL (VITAMIN D3) 1250 MCG
1 TABLET ORAL
Qty: 12 TABLET | Refills: 6 | Status: SHIPPED | OUTPATIENT
Start: 2022-01-06 | End: 2023-02-08 | Stop reason: SDUPTHER

## 2022-01-10 ENCOUNTER — TELEPHONE (OUTPATIENT)
Dept: HEMATOLOGY/ONCOLOGY | Facility: CLINIC | Age: 82
End: 2022-01-10
Payer: MEDICARE

## 2022-01-10 ENCOUNTER — PATIENT MESSAGE (OUTPATIENT)
Dept: HEMATOLOGY/ONCOLOGY | Facility: CLINIC | Age: 82
End: 2022-01-10
Payer: MEDICARE

## 2022-01-10 DIAGNOSIS — E78.5 HYPERLIPIDEMIA, UNSPECIFIED HYPERLIPIDEMIA TYPE: ICD-10-CM

## 2022-01-10 DIAGNOSIS — I10 ESSENTIAL HYPERTENSION: ICD-10-CM

## 2022-01-10 NOTE — TELEPHONE ENCOUNTER
----- Message from Marie Matos, Patient Care Assistant sent at 1/10/2022  9:56 AM CST -----  Contact: leigh  Type: Needs Medical Advice  Who Called:  leigh Escobar Call Back Number: [phone]      Additional Information: please call patient , she would like a appointment to day if possible , not feeling good ,  please call patient to set up time. Thanks

## 2022-01-10 NOTE — TELEPHONE ENCOUNTER
Spoke to patient per phone.    States no change in symptoms i.e. no fever, no increase in SOB.    Instructed patient to return to Urgent Care of her choice but to go early in the morning to avoid long wait times.    Patient verbally acknowledges understanding    Ms. Velez also understands if SOB increases to call 911 to be transported to nearest ED.

## 2022-01-10 NOTE — TELEPHONE ENCOUNTER
No new care gaps identified.  Powered by [email or web address]. Reference number: [id number].   1/10/2022 4:46:08 AM CST

## 2022-01-11 ENCOUNTER — OFFICE VISIT (OUTPATIENT)
Dept: FAMILY MEDICINE | Facility: CLINIC | Age: 82
End: 2022-01-11
Payer: MEDICARE

## 2022-01-11 DIAGNOSIS — R05.9 COUGH: Primary | ICD-10-CM

## 2022-01-11 PROCEDURE — 99214 PR OFFICE/OUTPT VISIT, EST, LEVL IV, 30-39 MIN: ICD-10-PCS | Mod: 95,,, | Performed by: NURSE PRACTITIONER

## 2022-01-11 PROCEDURE — 99214 OFFICE O/P EST MOD 30 MIN: CPT | Mod: 95,,, | Performed by: NURSE PRACTITIONER

## 2022-01-11 PROCEDURE — 1159F MED LIST DOCD IN RCRD: CPT | Mod: CPTII,95,, | Performed by: NURSE PRACTITIONER

## 2022-01-11 PROCEDURE — 1159F PR MEDICATION LIST DOCUMENTED IN MEDICAL RECORD: ICD-10-PCS | Mod: CPTII,95,, | Performed by: NURSE PRACTITIONER

## 2022-01-11 PROCEDURE — 1160F RVW MEDS BY RX/DR IN RCRD: CPT | Mod: CPTII,95,, | Performed by: NURSE PRACTITIONER

## 2022-01-11 PROCEDURE — 1160F PR REVIEW ALL MEDS BY PRESCRIBER/CLIN PHARMACIST DOCUMENTED: ICD-10-PCS | Mod: CPTII,95,, | Performed by: NURSE PRACTITIONER

## 2022-01-11 RX ORDER — ALBUTEROL SULFATE 90 UG/1
2 AEROSOL, METERED RESPIRATORY (INHALATION) EVERY 6 HOURS PRN
Qty: 8 G | Refills: 0 | Status: SHIPPED | OUTPATIENT
Start: 2022-01-11 | End: 2022-10-13 | Stop reason: ALTCHOICE

## 2022-01-11 RX ORDER — FLUTICASONE PROPIONATE 50 MCG
1 SPRAY, SUSPENSION (ML) NASAL DAILY
Qty: 16 G | Refills: 0 | Status: SHIPPED | OUTPATIENT
Start: 2022-01-11 | End: 2022-10-13 | Stop reason: SDUPTHER

## 2022-01-11 RX ORDER — DOXYCYCLINE 100 MG/1
100 CAPSULE ORAL 2 TIMES DAILY
Qty: 20 CAPSULE | Refills: 0 | Status: SHIPPED | OUTPATIENT
Start: 2022-01-11 | End: 2022-03-15

## 2022-01-11 RX ORDER — GUAIFENESIN 1200 MG/1
1200 TABLET, EXTENDED RELEASE ORAL 2 TIMES DAILY
Qty: 20 TABLET | Refills: 0 | Status: SHIPPED | OUTPATIENT
Start: 2022-01-11 | End: 2022-01-21

## 2022-01-11 RX ORDER — BENZONATATE 200 MG/1
200 CAPSULE ORAL 3 TIMES DAILY PRN
Qty: 30 CAPSULE | Refills: 0 | Status: SHIPPED | OUTPATIENT
Start: 2022-01-11 | End: 2022-01-21

## 2022-01-11 NOTE — PROGRESS NOTES
Subjective:       Patient ID: Shandra Velez is a 81 y.o. female.    Chief Complaint: No chief complaint on file.    [facility] visit:  Patient has had cough and congestion x 1.5 weeks. She did have a negative home test for covid 19 on Saturday. Taking Muicnex but not improving. She is coughing up a lot of mucus, thick and green. No fever or chills. She did have radiation for lung cancer and is on maintenance chemotherapy.     Past Medical History:  1985: Breast cancer      Comment:  right mastectomy  No date: Digestive disorder  No date: Encounter for blood transfusion  No date: Hypercholesterolemia  No date: Hypertension  No date: Hypertension  No date: Indigestion  No date: Lumbar spondylosis  No date: Lymphoma  No date: Osteopenia  No date: Pulmonary nodule  No date: Smoker      Comment:  Current     Past Surgical History:  No date: APPENDECTOMY  6/24/2020: BONE MARROW BIOPSY; Bilateral      Comment:  Procedure: Biopsy-bone marrow;  Surgeon: Rod Montero MD;  [facility];  Service: Oncology;                 Laterality: Bilateral;  1990: BREAST RECONSTRUCTION      Comment:  right  No date: CATARACT EXTRACTION W/  INTRAOCULAR LENS IMPLANT; Bilateral  No date: CHOLECYSTECTOMY  7/9/2021: ENDOBRONCHIAL ULTRASOUND; Bilateral      Comment:  Procedure: ENDOBRONCHIAL ULTRASOUND (EBUS);  Surgeon:                Gregorio Kinney MD;  Location: [facility];  Service:                Pulmonary;  Laterality: Bilateral;  NEED LMA to  eval                right upper paratracheal LN.   No date: EYE SURGERY  11/4/2020: INSERTION OF TUNNELED CENTRAL VENOUS CATHETER (CVC) WITH   SUBCUTANEOUS PORT; Left      Comment:  Procedure: [id number]-[id number];  Surgeon: Kody Pretty MD;  Location: [facility] OR;  Service: General;                 Laterality: Left;  2008: JOINT REPLACEMENT      Comment:  right knee   No date: LUMBAR LAMINECTOMY  No date: LYMPH NODE BIOPSY  1985: MASTECTOMY; Right  5/25/2020:  NEEDLE LOCALIZATION; N/A      Comment:  Procedure: NEEDLE LOCALIZATION - lymph node bx;                 Surgeon: Steve Weaver MD;  Location: [facility];                 Service: Interventional Radiology;  Laterality: N/A;  No date: TOTAL KNEE ARTHROPLASTY; Right  5/14/2020: TRANSFORAMINAL EPIDURAL INJECTION OF STEROID; Left      Comment:  Procedure: Injection,steroid,epidural,transforaminal                approach, l3/4;  Surgeon: Ronnell Baeza MD;                 Location: [facility] OR;  Service: Pain Management;                 Laterality: Left;  No date: TUBAL LIGATION  No date: UMBILICAL HERNIA REPAIR    Review of patient's family history indicates:  Problem: Cancer      Relation: Sister          Age of Onset: (Not Specified)          Comment: uterine  Problem: Diabetes      Relation: Brother          Age of Onset: (Not Specified)  Problem: Cancer      Relation: Sister          Age of Onset: (Not Specified)          Comment: Uterine cancer   Problem: Breast cancer      Relation: Other          Age of Onset: 42      Social History    Socioeconomic History      Marital status:       Number of children: 3    Occupational History      Occupation: retired  for [employer]    Tobacco Use      Smoking status: Current Every Day Smoker        Packs/day: 0.50        Years: 53.00        Pack years: 26.5        Types: Cigarettes      Smokeless tobacco: Never Used    Substance and Sexual Activity      Alcohol use: No      Drug use: No      Sexual activity: Not Currently    Social History Narrative      Lives alone            Hobbies: skyler            From [place]            Current Outpatient Medications:  acetaminophen (TYLENOL) 325 MG tablet, Take 2 tablets (650 mg total) by mouth every 4 (four) hours as needed (pain score 1-2/10)., Disp: , Rfl: 0  albuterol (VENTOLIN HFA) 90 mcg/actuation inhaler, Inhale 2 puffs into the lungs every 6 (six) hours as needed for Wheezing. Rescue, Disp: 8 g, Rfl:  0  amoxicillin (AMOXIL) 500 MG capsule, SMARTSI Capsule(s) By Mouth, Disp: , Rfl:   benzonatate (TESSALON PERLES) 100 MG capsule, Take 1 capsule (100 mg total) by mouth 3 (three) times daily as needed for Cough., Disp: 30 capsule, Rfl: 1  benzonatate (TESSALON) 200 MG capsule, Take 1 capsule (200 mg total) by mouth 3 (three) times daily as needed., Disp: 30 capsule, Rfl: 0  cetirizine (ZYRTEC) 10 MG tablet, Take 10 mg by mouth once daily. Every day, Disp: , Rfl:   cholecalciferol, vitamin D3, 1,250 mcg (50,000 unit) capsule, Take 50,000 Units by mouth every 7 days., Disp: , Rfl:   cholecalciferol, vitamin D3, 1,250 mcg (50,000 unit) Tab, Take 1 tablet by mouth every 7 days., Disp: 12 tablet, Rfl: 6  doxycycline (MONODOX) 100 MG capsule, Take 1 capsule (100 mg total) by mouth 2 (two) times daily., Disp: 20 capsule, Rfl: 0  fluticasone propionate (FLONASE) 50 mcg/actuation nasal spray, 1 spray (50 mcg total) by Each Nostril route once daily., Disp: 16 g, Rfl: 0  guaiFENesin (MUCINEX) 1,200 mg Ta12, Take 1,200 mg by mouth 2 (two) times a day. for 10 days, Disp: 20 tablet, Rfl: 0  hydroCHLOROthiazide (HYDRODIURIL) 25 MG tablet, Take 1 tablet (25 mg total) by mouth once daily., Disp: 90 tablet, Rfl: 3  hydrocodone-chlorpheniramine (TUSSIONEX) 10-8 mg/5 mL suspension, Take 5 mLs by mouth every 12 (twelve) hours as needed for Cough., Disp: 115 mL, Rfl: 0  lisinopriL (PRINIVIL,ZESTRIL) 40 MG tablet, Take 1 tablet (40 mg total) by mouth once daily., Disp: 90 tablet, Rfl: 3  magnesium oxide (MAGOX) 400 mg (241.3 mg magnesium) tablet, Take 1 tablet (400 mg total) by mouth once daily., Disp: 90 tablet, Rfl: 3  multivitamin (THERAGRAN) per tablet, Take 1 tablet by mouth once daily., Disp: , Rfl:   NIFEdipine (ADALAT CC) 60 MG TbSR, Take 1 tablet (60 mg total) by mouth once daily., Disp: 90 tablet, Rfl: 3  omeprazole (PRILOSEC) 40 MG capsule, Take 1 capsule (40 mg total) by mouth once daily., Disp: 30 capsule, Rfl:  11  ondansetron (ZOFRAN) 8 MG tablet, Take 1 tablet (8 mg total) by mouth every 8 (eight) hours as needed for Nausea., Disp: 30 tablet, Rfl: 1  potassium chloride (MICRO-K) 10 MEQ CpSR, Take 2 capsules (20 mEq total) by mouth once daily. (Patient taking differently: Take 10 mEq by mouth once daily.), Disp: 180 capsule, Rfl: 3  simvastatin (ZOCOR) 10 MG tablet, Take 1 tablet (10 mg total) by mouth every evening., Disp: 90 tablet, Rfl: 3  temazepam (RESTORIL) 15 mg Cap, TAKE 1 CAPSULE BY MOUTH AT  BEDTIME AS NEEDED FOR  INSOMNIA, Disp: 90 capsule, Rfl: 1    No current facility-administered medications for this visit.      Review of patient's allergies indicates:  No Known Allergies    Cough  This is a new problem. The current episode started 1 to 4 weeks ago. The problem has been gradually worsening. The problem occurs every few minutes. The cough is productive of sputum and productive of purulent sputum. Associated symptoms include ear congestion, myalgias, nasal congestion, postnasal drip, rhinorrhea, a sore throat and shortness of breath. Pertinent negatives include no chest pain, chills, ear pain, fever, headaches, heartburn, hemoptysis, rash, sweats, weight loss or wheezing. The symptoms are aggravated by lying down. Risk factors for lung disease include smoking/tobacco exposure. She has tried OTC cough suppressant for the symptoms. The treatment provided mild relief. There is no history of asthma, bronchiectasis, bronchitis, COPD, emphysema, environmental allergies or pneumonia.     Review of Systems   Constitutional: Negative for chills, fever and weight loss.   HENT: Positive for nasal congestion, postnasal drip, rhinorrhea and sore throat. Negative for ear pain.    Respiratory: Positive for cough and shortness of breath. Negative for hemoptysis and wheezing.    Cardiovascular: Negative for chest pain.   Gastrointestinal: Negative for constipation, diarrhea, heartburn, nausea and vomiting.   Musculoskeletal:  Positive for myalgias.   Integumentary:  Negative for rash.   Allergic/Immunologic: Negative for environmental allergies.   Neurological: Negative for headaches.         Objective:      Physical Exam  Constitutional:       General: She is not in acute distress.     Appearance: Normal appearance. She is not toxic-appearing.   HENT:      Head: Normocephalic and atraumatic.   Cardiovascular:      Rate and Rhythm: Normal rate.   Pulmonary:      Effort: Pulmonary effort is normal.      Breath sounds: Rhonchi present. No wheezing.   Neurological:      General: No focal deficit present.      Mental Status: She is alert and oriented to person, place, and time.   Psychiatric:         Mood and Affect: Mood normal.         Behavior: Behavior normal.         Assessment:       Problem List Items Addressed This Visit    None     Visit Diagnoses     Cough    -  Primary    Relevant Medications    doxycycline (MONODOX) 100 MG capsule    albuterol (VENTOLIN HFA) 90 mcg/actuation inhaler    fluticasone propionate (FLONASE) 50 mcg/actuation nasal spray    guaiFENesin (MUCINEX) 1,200 mg Ta12    benzonatate (TESSALON) 200 MG capsule    Other Relevant Orders    COVID-19 Routine Screening          Plan:     I spent 30 minutes on this encounter, time includes face-to-face, chart review, documentation, test review and orders.  1. Cough  COVID 19 PCR collected and pending. Treat with doxycycline and albuterol, CXR Thursday if not significantly improving, follow up immediately for new or worsening symptoms.   - doxycycline (MONODOX) 100 MG capsule; Take 1 capsule (100 mg total) by mouth 2 (two) times daily.  Dispense: 20 capsule; Refill: 0  - albuterol (VENTOLIN HFA) 90 mcg/actuation inhaler; Inhale 2 puffs into the lungs every 6 (six) hours as needed for Wheezing. Rescue  Dispense: 8 g; Refill: 0  - fluticasone propionate (FLONASE) 50 mcg/actuation nasal spray; 1 spray (50 mcg total) by Each Nostril route once daily.  Dispense: 16 g; Refill:  0  - guaiFENesin (MUCINEX) 1,200 mg Ta12; Take 1,200 mg by mouth 2 (two) times a day. for 10 days  Dispense: 20 tablet; Refill: 0  - benzonatate (TESSALON) 200 MG capsule; Take 1 capsule (200 mg total) by mouth 3 (three) times daily as needed.  Dispense: 30 capsule; Refill: 0  - COVID-19 Routine Screening

## 2022-01-19 ENCOUNTER — LAB VISIT (OUTPATIENT)
Dept: LAB | Facility: HOSPITAL | Age: 82
End: 2022-01-19
Attending: INTERNAL MEDICINE
Payer: MEDICARE

## 2022-01-19 DIAGNOSIS — C82.00 FOLLICULAR LYMPHOMA GRADE I, UNSPECIFIED BODY REGION: ICD-10-CM

## 2022-01-19 LAB
ALBUMIN SERPL BCP-MCNC: 3.1 G/DL (ref 3.5–5.2)
ALP SERPL-CCNC: 84 U/L (ref 55–135)
ALT SERPL W/O P-5'-P-CCNC: 5 U/L (ref 10–44)
ANION GAP SERPL CALC-SCNC: 9 MMOL/L (ref 8–16)
ANISOCYTOSIS BLD QL SMEAR: SLIGHT
AST SERPL-CCNC: 15 U/L (ref 10–40)
BASOPHILS # BLD AUTO: 0.05 K/UL (ref 0–0.2)
BASOPHILS NFR BLD: 1 % (ref 0–1.9)
BILIRUB SERPL-MCNC: 0.2 MG/DL (ref 0.1–1)
BUN SERPL-MCNC: 37 MG/DL (ref 8–23)
CALCIUM SERPL-MCNC: 8.9 MG/DL (ref 8.7–10.5)
CHLORIDE SERPL-SCNC: 110 MMOL/L (ref 95–110)
CO2 SERPL-SCNC: 22 MMOL/L (ref 23–29)
CREAT SERPL-MCNC: 1.5 MG/DL (ref 0.5–1.4)
DACRYOCYTES BLD QL SMEAR: ABNORMAL
DIFFERENTIAL METHOD: ABNORMAL
EOSINOPHIL # BLD AUTO: 0.3 K/UL (ref 0–0.5)
EOSINOPHIL NFR BLD: 4.8 % (ref 0–8)
ERYTHROCYTE [DISTWIDTH] IN BLOOD BY AUTOMATED COUNT: 14.5 % (ref 11.5–14.5)
EST. GFR  (AFRICAN AMERICAN): 38 ML/MIN/1.73 M^2
EST. GFR  (NON AFRICAN AMERICAN): 33 ML/MIN/1.73 M^2
GLUCOSE SERPL-MCNC: 116 MG/DL (ref 70–110)
HCT VFR BLD AUTO: 35.2 % (ref 37–48.5)
HGB BLD-MCNC: 11.2 G/DL (ref 12–16)
IMM GRANULOCYTES # BLD AUTO: 0.03 K/UL (ref 0–0.04)
IMM GRANULOCYTES NFR BLD AUTO: 0.6 % (ref 0–0.5)
LDH SERPL L TO P-CCNC: 175 U/L (ref 110–260)
LYMPHOCYTES # BLD AUTO: 0.9 K/UL (ref 1–4.8)
LYMPHOCYTES NFR BLD: 17 % (ref 18–48)
MAGNESIUM SERPL-MCNC: 1.3 MG/DL (ref 1.6–2.6)
MCH RBC QN AUTO: 28.7 PG (ref 27–31)
MCHC RBC AUTO-ENTMCNC: 31.8 G/DL (ref 32–36)
MCV RBC AUTO: 90 FL (ref 82–98)
MONOCYTES # BLD AUTO: 0.6 K/UL (ref 0.3–1)
MONOCYTES NFR BLD: 12 % (ref 4–15)
NEUTROPHILS # BLD AUTO: 3.4 K/UL (ref 1.8–7.7)
NEUTROPHILS NFR BLD: 64.6 % (ref 38–73)
NRBC BLD-RTO: 0 /100 WBC
OVALOCYTES BLD QL SMEAR: ABNORMAL
PLATELET # BLD AUTO: 167 K/UL (ref 150–450)
PLATELET BLD QL SMEAR: ABNORMAL
PMV BLD AUTO: 9.9 FL (ref 9.2–12.9)
POIKILOCYTOSIS BLD QL SMEAR: SLIGHT
POTASSIUM SERPL-SCNC: 4.1 MMOL/L (ref 3.5–5.1)
PROT SERPL-MCNC: 6.2 G/DL (ref 6–8.4)
RBC # BLD AUTO: 3.9 M/UL (ref 4–5.4)
SODIUM SERPL-SCNC: 141 MMOL/L (ref 136–145)
WBC # BLD AUTO: 5.25 K/UL (ref 3.9–12.7)

## 2022-01-19 PROCEDURE — 83735 ASSAY OF MAGNESIUM: CPT | Mod: PN | Performed by: NURSE PRACTITIONER

## 2022-01-19 PROCEDURE — 85025 COMPLETE CBC W/AUTO DIFF WBC: CPT | Mod: PN | Performed by: NURSE PRACTITIONER

## 2022-01-19 PROCEDURE — 80053 COMPREHEN METABOLIC PANEL: CPT | Mod: PN | Performed by: NURSE PRACTITIONER

## 2022-01-19 PROCEDURE — 83615 LACTATE (LD) (LDH) ENZYME: CPT | Mod: PN | Performed by: NURSE PRACTITIONER

## 2022-01-19 PROCEDURE — 36415 COLL VENOUS BLD VENIPUNCTURE: CPT | Mod: PN | Performed by: NURSE PRACTITIONER

## 2022-01-20 ENCOUNTER — PATIENT MESSAGE (OUTPATIENT)
Dept: HEMATOLOGY/ONCOLOGY | Facility: CLINIC | Age: 82
End: 2022-01-20

## 2022-01-20 ENCOUNTER — INFUSION (OUTPATIENT)
Dept: INFUSION THERAPY | Facility: HOSPITAL | Age: 82
End: 2022-01-20
Attending: INTERNAL MEDICINE
Payer: MEDICARE

## 2022-01-20 ENCOUNTER — PATIENT MESSAGE (OUTPATIENT)
Dept: FAMILY MEDICINE | Facility: CLINIC | Age: 82
End: 2022-01-20
Payer: MEDICARE

## 2022-01-20 ENCOUNTER — TELEPHONE (OUTPATIENT)
Dept: HEMATOLOGY/ONCOLOGY | Facility: CLINIC | Age: 82
End: 2022-01-20
Payer: MEDICARE

## 2022-01-20 ENCOUNTER — OFFICE VISIT (OUTPATIENT)
Dept: HEMATOLOGY/ONCOLOGY | Facility: CLINIC | Age: 82
End: 2022-01-20
Payer: MEDICARE

## 2022-01-20 VITALS
HEART RATE: 82 BPM | HEIGHT: 65 IN | WEIGHT: 191.56 LBS | RESPIRATION RATE: 16 BRPM | SYSTOLIC BLOOD PRESSURE: 132 MMHG | OXYGEN SATURATION: 96 % | BODY MASS INDEX: 31.92 KG/M2 | TEMPERATURE: 97 F | DIASTOLIC BLOOD PRESSURE: 97 MMHG

## 2022-01-20 VITALS
OXYGEN SATURATION: 96 % | RESPIRATION RATE: 17 BRPM | HEART RATE: 74 BPM | DIASTOLIC BLOOD PRESSURE: 51 MMHG | BODY MASS INDEX: 31.92 KG/M2 | HEIGHT: 65 IN | SYSTOLIC BLOOD PRESSURE: 93 MMHG | TEMPERATURE: 97 F | WEIGHT: 191.56 LBS

## 2022-01-20 DIAGNOSIS — E55.9 VITAMIN D DEFICIENCY: ICD-10-CM

## 2022-01-20 DIAGNOSIS — Z72.0 TOBACCO ABUSE: ICD-10-CM

## 2022-01-20 DIAGNOSIS — E78.5 HYPERLIPIDEMIA, UNSPECIFIED HYPERLIPIDEMIA TYPE: ICD-10-CM

## 2022-01-20 DIAGNOSIS — E86.0 DEHYDRATION: ICD-10-CM

## 2022-01-20 DIAGNOSIS — R05.9 COUGH: ICD-10-CM

## 2022-01-20 DIAGNOSIS — C82.00 FOLLICULAR LYMPHOMA GRADE I, UNSPECIFIED BODY REGION: Primary | ICD-10-CM

## 2022-01-20 DIAGNOSIS — E83.42 HYPOMAGNESEMIA: ICD-10-CM

## 2022-01-20 DIAGNOSIS — C82.58 DIFFUSE FOLLICLE CENTER LYMPHOMA, LYMPH NODES OF MULTIPLE SITES: ICD-10-CM

## 2022-01-20 DIAGNOSIS — I10 ESSENTIAL HYPERTENSION: ICD-10-CM

## 2022-01-20 DIAGNOSIS — C34.11 MALIGNANT NEOPLASM OF RIGHT UPPER LOBE OF LUNG: ICD-10-CM

## 2022-01-20 PROCEDURE — 3080F PR MOST RECENT DIASTOLIC BLOOD PRESSURE >= 90 MM HG: ICD-10-PCS | Mod: CPTII,S$GLB,, | Performed by: INTERNAL MEDICINE

## 2022-01-20 PROCEDURE — 1160F RVW MEDS BY RX/DR IN RCRD: CPT | Mod: CPTII,S$GLB,, | Performed by: INTERNAL MEDICINE

## 2022-01-20 PROCEDURE — 96368 THER/DIAG CONCURRENT INF: CPT | Mod: PN

## 2022-01-20 PROCEDURE — 3080F DIAST BP >= 90 MM HG: CPT | Mod: CPTII,S$GLB,, | Performed by: INTERNAL MEDICINE

## 2022-01-20 PROCEDURE — 3075F SYST BP GE 130 - 139MM HG: CPT | Mod: CPTII,S$GLB,, | Performed by: INTERNAL MEDICINE

## 2022-01-20 PROCEDURE — 96413 CHEMO IV INFUSION 1 HR: CPT | Mod: PN

## 2022-01-20 PROCEDURE — 25000003 PHARM REV CODE 250: Mod: PN | Performed by: INTERNAL MEDICINE

## 2022-01-20 PROCEDURE — 1159F PR MEDICATION LIST DOCUMENTED IN MEDICAL RECORD: ICD-10-PCS | Mod: CPTII,S$GLB,, | Performed by: INTERNAL MEDICINE

## 2022-01-20 PROCEDURE — 99215 PR OFFICE/OUTPT VISIT, EST, LEVL V, 40-54 MIN: ICD-10-PCS | Mod: S$GLB,,, | Performed by: INTERNAL MEDICINE

## 2022-01-20 PROCEDURE — 99215 OFFICE O/P EST HI 40 MIN: CPT | Mod: S$GLB,,, | Performed by: INTERNAL MEDICINE

## 2022-01-20 PROCEDURE — 63600175 PHARM REV CODE 636 W HCPCS: Mod: PN | Performed by: INTERNAL MEDICINE

## 2022-01-20 PROCEDURE — 3075F PR MOST RECENT SYSTOLIC BLOOD PRESS GE 130-139MM HG: ICD-10-PCS | Mod: CPTII,S$GLB,, | Performed by: INTERNAL MEDICINE

## 2022-01-20 PROCEDURE — 96415 CHEMO IV INFUSION ADDL HR: CPT | Mod: PN

## 2022-01-20 PROCEDURE — 96367 TX/PROPH/DG ADDL SEQ IV INF: CPT | Mod: PN

## 2022-01-20 PROCEDURE — 1126F AMNT PAIN NOTED NONE PRSNT: CPT | Mod: CPTII,S$GLB,, | Performed by: INTERNAL MEDICINE

## 2022-01-20 PROCEDURE — 99999 PR PBB SHADOW E&M-EST. PATIENT-LVL III: CPT | Mod: PBBFAC,,, | Performed by: INTERNAL MEDICINE

## 2022-01-20 PROCEDURE — 99999 PR PBB SHADOW E&M-EST. PATIENT-LVL III: ICD-10-PCS | Mod: PBBFAC,,, | Performed by: INTERNAL MEDICINE

## 2022-01-20 PROCEDURE — A4216 STERILE WATER/SALINE, 10 ML: HCPCS | Mod: PN | Performed by: INTERNAL MEDICINE

## 2022-01-20 PROCEDURE — 1126F PR PAIN SEVERITY QUANTIFIED, NO PAIN PRESENT: ICD-10-PCS | Mod: CPTII,S$GLB,, | Performed by: INTERNAL MEDICINE

## 2022-01-20 PROCEDURE — 1159F MED LIST DOCD IN RCRD: CPT | Mod: CPTII,S$GLB,, | Performed by: INTERNAL MEDICINE

## 2022-01-20 PROCEDURE — 1160F PR REVIEW ALL MEDS BY PRESCRIBER/CLIN PHARMACIST DOCUMENTED: ICD-10-PCS | Mod: CPTII,S$GLB,, | Performed by: INTERNAL MEDICINE

## 2022-01-20 RX ORDER — SODIUM CHLORIDE 9 MG/ML
INJECTION, SOLUTION INTRAVENOUS
Status: DISCONTINUED | OUTPATIENT
Start: 2022-01-20 | End: 2022-01-20

## 2022-01-20 RX ORDER — DIPHENHYDRAMINE HYDROCHLORIDE 50 MG/ML
50 INJECTION INTRAMUSCULAR; INTRAVENOUS ONCE AS NEEDED
Status: CANCELLED | OUTPATIENT
Start: 2022-01-20

## 2022-01-20 RX ORDER — DIPHENHYDRAMINE HYDROCHLORIDE 50 MG/ML
50 INJECTION INTRAMUSCULAR; INTRAVENOUS ONCE AS NEEDED
Status: DISCONTINUED | OUTPATIENT
Start: 2022-01-20 | End: 2022-01-20 | Stop reason: HOSPADM

## 2022-01-20 RX ORDER — MEPERIDINE HYDROCHLORIDE 50 MG/ML
25 INJECTION INTRAMUSCULAR; INTRAVENOUS; SUBCUTANEOUS EVERY 30 MIN PRN
Status: CANCELLED | OUTPATIENT
Start: 2022-01-20

## 2022-01-20 RX ORDER — HEPARIN 100 UNIT/ML
500 SYRINGE INTRAVENOUS
Status: DISCONTINUED | OUTPATIENT
Start: 2022-01-20 | End: 2022-01-20 | Stop reason: HOSPADM

## 2022-01-20 RX ORDER — MEPERIDINE HYDROCHLORIDE 25 MG/ML
25 INJECTION INTRAMUSCULAR; INTRAVENOUS; SUBCUTANEOUS EVERY 30 MIN PRN
Status: DISCONTINUED | OUTPATIENT
Start: 2022-01-20 | End: 2022-01-20 | Stop reason: HOSPADM

## 2022-01-20 RX ORDER — ACETAMINOPHEN 500 MG
1000 TABLET ORAL
Status: CANCELLED | OUTPATIENT
Start: 2022-01-20

## 2022-01-20 RX ORDER — ACETAMINOPHEN 500 MG
1000 TABLET ORAL
Status: COMPLETED | OUTPATIENT
Start: 2022-01-20 | End: 2022-01-20

## 2022-01-20 RX ORDER — SODIUM CHLORIDE 0.9 % (FLUSH) 0.9 %
10 SYRINGE (ML) INJECTION
Status: CANCELLED | OUTPATIENT
Start: 2022-01-20

## 2022-01-20 RX ORDER — METHYLPREDNISOLONE SOD SUCC 125 MG
125 VIAL (EA) INJECTION ONCE AS NEEDED
Status: DISCONTINUED | OUTPATIENT
Start: 2022-01-20 | End: 2022-01-20 | Stop reason: HOSPADM

## 2022-01-20 RX ORDER — METHYLPREDNISOLONE SOD SUCC 125 MG
125 VIAL (EA) INJECTION ONCE AS NEEDED
Status: CANCELLED | OUTPATIENT
Start: 2022-01-20

## 2022-01-20 RX ORDER — SODIUM CHLORIDE 9 MG/ML
INJECTION, SOLUTION INTRAVENOUS
Status: CANCELLED
Start: 2022-01-20

## 2022-01-20 RX ORDER — FAMOTIDINE 10 MG/ML
20 INJECTION INTRAVENOUS ONCE AS NEEDED
Status: CANCELLED
Start: 2022-01-20

## 2022-01-20 RX ORDER — FAMOTIDINE 10 MG/ML
20 INJECTION INTRAVENOUS ONCE AS NEEDED
Status: DISCONTINUED | OUTPATIENT
Start: 2022-01-20 | End: 2022-01-20 | Stop reason: HOSPADM

## 2022-01-20 RX ORDER — HEPARIN 100 UNIT/ML
500 SYRINGE INTRAVENOUS
Status: CANCELLED | OUTPATIENT
Start: 2022-01-20

## 2022-01-20 RX ORDER — SODIUM CHLORIDE 0.9 % (FLUSH) 0.9 %
10 SYRINGE (ML) INJECTION
Status: DISCONTINUED | OUTPATIENT
Start: 2022-01-20 | End: 2022-01-20 | Stop reason: HOSPADM

## 2022-01-20 RX ADMIN — ACETAMINOPHEN 1000 MG: 500 TABLET, FILM COATED ORAL at 09:01

## 2022-01-20 RX ADMIN — HEPARIN 500 UNITS: 100 SYRINGE at 12:01

## 2022-01-20 RX ADMIN — MAGNESIUM SULFATE HEPTAHYDRATE: 500 INJECTION, SOLUTION INTRAMUSCULAR; INTRAVENOUS at 09:01

## 2022-01-20 RX ADMIN — SODIUM CHLORIDE 700 MG: 0.9 INJECTION, SOLUTION INTRAVENOUS at 10:01

## 2022-01-20 RX ADMIN — DIPHENHYDRAMINE HYDROCHLORIDE 50 MG: 50 INJECTION INTRAMUSCULAR; INTRAVENOUS at 09:01

## 2022-01-20 RX ADMIN — Medication 10 ML: at 12:01

## 2022-01-20 RX ADMIN — SODIUM CHLORIDE: 0.9 INJECTION, SOLUTION INTRAVENOUS at 09:01

## 2022-01-20 NOTE — Clinical Note
Return to clinic in 8 weeks with interval CBC, CMP, LDH, magnesium, and beta 2 microglobulin prior to appointment.

## 2022-01-20 NOTE — PLAN OF CARE
"  Problem: Adult Inpatient Plan of Care  Goal: Patient-Specific Goal (Individualized)  Outcome: Ongoing, Progressing  Flowsheets (Taken 1/20/2022 0940)  Anxieties, Fears or Concerns: none  Individualized Care Needs:   review of txt   recliner  Patient-Specific Goals (Include Timeframe): no s/s of rxn during txt     Problem: Fatigue  Goal: Improved Activity Tolerance  Intervention: Promote Improved Energy  Flowsheets (Taken 1/20/2022 0950)  Fatigue Management: frequent rest breaks encouraged  Sleep/Rest Enhancement:   natural light exposure provided   noise level reduced  Activity Management:   Ambulated -L4   Ambulated in brooks - L4   Ambulated to bathroom - L4  BP (!) 149/63 (BP Location: Left arm, Patient Position: Sitting)   Pulse 77   Temp 97.4 °F (36.3 °C)   Resp 17   Ht 5' 5" (1.651 m)   Wt 86.9 kg (191 lb 9.3 oz)   SpO2 96%   BMI 31.88 kg/m²   Patient here today for Ruxience. VSS. Port accessed to left chest without difficulty; patent with blood return. Orders released. Pt oriented to unit. Symptoms reviewed. Questions and concerns addressed.     "

## 2022-01-20 NOTE — TELEPHONE ENCOUNTER
No new care gaps identified.  Powered by [employer]. Reference number: [id number].   1/20/2022 3:34:31 PM CST

## 2022-01-20 NOTE — TELEPHONE ENCOUNTER
----- Message from Rod Montero MD sent at 1/20/2022  8:27 AM CST -----  Return to clinic in 8 weeks with interval CBC, CMP, LDH, magnesium, and beta 2 microglobulin prior to appointment.

## 2022-01-20 NOTE — PROGRESS NOTES
History of present illness:  The patient is a 81-year-old white female who presents in consultation from Dr. Chavez regarding newly diagnosed non-Hodgkin's lymphoma.  Patient was in her usual state of health but developed severe/debilitating low pain and lumbar radiculopathy after scrubbing her screened porch.  An MRI was obtained and intra-abdominal lymphadenopathy as well as a complex left renal was noted.  Patient subsequently underwent dedicated CT scan of the abdomen pelvis as well as image guided needle biopsy of a right inguinal lymph node.  Patient was found have grade 1 follicular lymphoma.  As the patient was asymptomatic, she was placed on a watch wait strategy.    Patient completed 6 cycles of CVP-R and is receiving maintenance Rituxan every 8 weeks.      Patient returns to clinic earlier than scheduled follow-up to review results of interval biopsy of right lower lobe pulmonary nodule which was the only lesion that failed to regress since initiation of her therapy for lymphoma.  Unfortunately, the lesion returned positive for squamous cell carcinoma of the lung.  Patient continues to smoke.  Underwent EBUS and biopsy.  She had pathologically confirmed stage I disease and has completed definitive XRT to this lesion.  Patient tolerated therapy well with the exception of some minor fatigue and some shoulder pain from keeping her arms above her head during treatment.    Patient returns to clinic for 12th cycle of maintenance Rituxan.  Patient remains without fevers, chills, painful lymphadenopathy, night sweats, unexplained weight loss.  Patient is smoking consumption has decreased to 2 cigarettes per day.  She continues to have sequela from recent upper respiratory tract infection.  Cough is improving with Tessalon Perles.    Physical examination:  Well-developed, obese, elderly, white female, in no acute distress, who has a weight of 191.5 lb (decreased by 6 lb)  VITAL SIGNS: Documented  and reviewed  this visit.  HEENT: Normocephalic, atraumatic. Oral mucosa pink and moist. Lips without lesions. Tongue midline. Oropharynx clear. Nonicteric sclerae.   NECK: Supple, bilateral supraclavicular adenopathy right internal jugular chain lymphadenopathy.   HEART: Regular rate and rhythm without murmur, gallop or rub.   LUNGS:  Mild, bibasilar crackles bilaterally. Normal respiratory effort.   ABDOMEN: Soft, nontender, nondistended with positive normoactive bowel sounds, no hepatosplenomegaly.   EXTREMITIES: No cyanosis or clubbing appreciated.  Patient has 1+, bilateral ankle edema.  Distal pulses are intact.   AXILLAE AND GROIN:  Small, palpable left inguinal lymphadenopathy is appreciated.   SKIN: Intact/turgor normal.  Palpable subcutaneous nodules about the abdominal wall consistent with lymph node involvement.   NEUROLOGIC: Cranial nerves II-XII grossly intact. Motor: Good muscle bulk and tone. Strength/sensory 5/5 throughout. Gait stable.     Laboratory:  White count 5.3, hemoglobin 11.2, hematocrit 35.2, platelets 167, absolute neutrophil count is 3400.  Sodium 141, potassium 4.1, chloride 110, CO2 22, BUN 37, creatinine 1.5, glucose 116, calcium 8.9, magnesium 1.3, liver function test within normal limits, LDH is 175, GFR is 33.     Impression:  1.  Grade 1 follicular non-Hodgkin's lymphoma - Stage Riley with excellent response to systemic therapy.  2.  Vitamin-D deficiency-improved on supplementation.  3. 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  4. Ongoing tobacco abuse.    5. Nonproductive cough.  6. Dehydration.  7. Hypomagnesemia.    Plan:  1. Proceed with next cycle of maintenance Rituxan typical pre meds.  2. Return to clinic in 8 weeks with interval CBC, CMP, LDH, magnesium,  beta 2 and CT PET scan for restaging prior to appointment.  3. Counseled regarding smoking cessation.  4. Tessalon Perles 1 3 times daily as needed for cough.  5. 1 L of IV  fluids/replace magnesium intravenously in association with this cycle of therapy.     This note was created using voice recognition software and may contain grammatical errors.

## 2022-01-20 NOTE — PLAN OF CARE
"  Problem: Adult Inpatient Plan of Care  Goal: Plan of Care Review  Outcome: Ongoing, Progressing  Flowsheets (Taken 1/20/2022 1240)  Plan of Care Reviewed With: patient    BP (!) 93/51   Pulse 74   Temp 97.4 °F (36.3 °C)   Resp 17   Ht 5' 5" (1.651 m)   Wt 86.9 kg (191 lb 9.3 oz)   SpO2 96%   BMI 31.88 kg/m²   Patient tolerated treatment well. Port flushed with blood return, heparin locked, and de-accessed. AVS provided per portal and reviewed. Ambulates per self.     "

## 2022-01-21 RX ORDER — NIFEDIPINE 60 MG/1
60 TABLET, EXTENDED RELEASE ORAL DAILY
Qty: 90 TABLET | Refills: 3 | Status: SHIPPED | OUTPATIENT
Start: 2022-01-21 | End: 2023-01-09

## 2022-01-21 RX ORDER — HYDROCHLOROTHIAZIDE 25 MG/1
25 TABLET ORAL DAILY
Qty: 90 TABLET | Refills: 3 | Status: SHIPPED | OUTPATIENT
Start: 2022-01-21 | End: 2023-01-10

## 2022-01-21 RX ORDER — SIMVASTATIN 10 MG/1
10 TABLET, FILM COATED ORAL NIGHTLY
Qty: 90 TABLET | Refills: 3 | Status: SHIPPED | OUTPATIENT
Start: 2022-01-21 | End: 2023-01-09

## 2022-01-21 RX ORDER — LISINOPRIL 40 MG/1
40 TABLET ORAL DAILY
Qty: 90 TABLET | Refills: 3 | Status: SHIPPED | OUTPATIENT
Start: 2022-01-21 | End: 2023-01-10

## 2022-01-25 DIAGNOSIS — C82.01 GRADE 1 FOLLICULAR LYMPHOMA OF LYMPH NODES OF NECK: Primary | ICD-10-CM

## 2022-01-25 RX ORDER — SIMVASTATIN 10 MG/1
TABLET, FILM COATED ORAL
Qty: 90 TABLET | Refills: 3 | OUTPATIENT
Start: 2022-01-25

## 2022-01-25 RX ORDER — NIFEDIPINE 60 MG/1
TABLET, EXTENDED RELEASE ORAL
Qty: 90 TABLET | Refills: 3 | OUTPATIENT
Start: 2022-01-25

## 2022-01-25 RX ORDER — LISINOPRIL 40 MG/1
TABLET ORAL
Qty: 90 TABLET | Refills: 3 | OUTPATIENT
Start: 2022-01-25

## 2022-01-25 RX ORDER — HYDROCHLOROTHIAZIDE 25 MG/1
TABLET ORAL
Qty: 90 TABLET | Refills: 3 | OUTPATIENT
Start: 2022-01-25

## 2022-01-25 NOTE — TELEPHONE ENCOUNTER
Quick DC. Request already responded to by other means (e.g. phone or fax)   Refill Authorization Note   Shandra Velez  is requesting a refill authorization.  Brief Assessment and Rationale for Refill:  Quick Discontinue  Medication Therapy Plan:       Medication Reconciliation Completed:  No      Comments:   Pended Medication(s)       Requested Prescriptions     Refused Prescriptions Disp Refills    NIFEdipine (ADALAT CC) 60 MG TbSR [Pharmacy Med Name: NIFEDIPINE  60MG  TAB  CC] 90 tablet 3     Sig: TAKE 1 TABLET BY MOUTH ONCE DAILY     Refused By: DAVID DESIR     Reason for Refusal: Request already responded to by other means (e.g. phone or fax)    lisinopriL (PRINIVIL,ZESTRIL) 40 MG tablet [Pharmacy Med Name: Lisinopril 40 MG Oral Tablet] 90 tablet 3     Sig: TAKE 1 TABLET BY MOUTH ONCE DAILY     Refused By: DAVID DESIR     Reason for Refusal: Request already responded to by other means (e.g. phone or fax)    simvastatin (ZOCOR) 10 MG tablet [Pharmacy Med Name: Simvastatin 10 MG Oral Tablet] 90 tablet 3     Sig: TAKE 1 TABLET BY MOUTH IN  THE EVENING     Refused By: DAVID DESIR     Reason for Refusal: Request already responded to by other means (e.g. phone or fax)    hydroCHLOROthiazide (HYDRODIURIL) 25 MG tablet [Pharmacy Med Name: hydroCHLOROthiazide 25 MG Oral Tablet] 90 tablet 3     Sig: TAKE 1 TABLET BY MOUTH ONCE DAILY     Refused By: DAVID DESIR     Reason for Refusal: Request already responded to by other means (e.g. phone or fax)        Duplicate Pended Encounter(s)/ Last Prescribed Details: (includes pharmacy & prescriber details)   [facility] - [address], [address]   [address] [place] [address], [address] [address]   Phone:  [phone]  Fax:  [phone]   MARLO #:  --   ALEX Reason: --       Outpatient Medication Detail     Disp Refills Start End ALEX   NIFEdipine (ADALAT CC) 60 MG TbSR 90 tablet 3 1/21/2022  No    Sig - Route: Take 1 tablet (60 mg total) by mouth once daily. - Oral   Sent to pharmacy as: NIFEdipine (ADALAT CC) 60 MG TbSR   Class: Normal   Notes to Pharmacy: .   Order: [id number]   Date/Time Signed: 1/21/2022 07:18       E-Prescribing Status: Receipt confirmed by pharmacy (1/21/2022  7:18 AM CST)     Ordering Encounter Report    Associated Reports   View Encounter           [facility] - [place] [address] [facility], [address]   [address] [place] [address]   Phone:  [phone]  Fax:  [phone]   MARLO #:  --   ALEX Reason: --       Outpatient Medication Detail     Disp Refills Start End ALEX   lisinopriL (PRINIVIL,ZESTRIL) 40 MG tablet 90 tablet 3 1/21/2022  No   Sig - Route: Take 1 tablet (40 mg total) by mouth once daily. - Oral   Sent to pharmacy as: lisinopriL (PRINIVIL,ZESTRIL) 40 MG tablet   Class: Normal   Notes to Pharmacy: .   Order: [id number]   Date/Time Signed: 1/21/2022 07:18       E-Prescribing Status: Receipt confirmed by pharmacy (1/21/2022  7:18 AM CST)     Ordering Encounter Report    Associated Reports   View Encounter         [facility] - [place] [address] [facility], [address]   [address] [place] [address]   Phone:  [phone]  Fax:  [phone]   MARLO #:  --   ALEX Reason: --       Outpatient Medication Detail     Disp Refills Start End ALEX   simvastatin (ZOCOR) 10 MG tablet 90 tablet 3 1/21/2022  No   Sig - Route: Take 1 tablet (10 mg total) by mouth every evening. - Oral   Sent to pharmacy as: simvastatin (ZOCOR) 10 MG tablet   Class: Normal   Order: [id number]   Date/Time Signed: 1/21/2022 07:18       E-Prescribing Status: Receipt confirmed by pharmacy (1/21/2022  7:18 AM CST)     Ordering Encounter Report    Associated Reports   View Encounter            Note composed:8:48 AM 01/25/2022

## 2022-01-26 RX ORDER — ONDANSETRON 4 MG/1
4 TABLET, ORALLY DISINTEGRATING ORAL ONCE
Status: CANCELLED | OUTPATIENT
Start: 2022-01-26 | End: 2022-01-26

## 2022-01-26 RX ORDER — DIPHENHYDRAMINE HCL 25 MG
25 CAPSULE ORAL ONCE
Status: CANCELLED | OUTPATIENT
Start: 2022-01-26 | End: 2022-01-26

## 2022-01-26 RX ORDER — EPINEPHRINE 0.3 MG/.3ML
0.3 INJECTION SUBCUTANEOUS ONCE
Status: CANCELLED | OUTPATIENT
Start: 2022-01-26 | End: 2022-01-26

## 2022-01-26 RX ORDER — ALBUTEROL SULFATE 90 UG/1
2 AEROSOL, METERED RESPIRATORY (INHALATION) EVERY 6 HOURS PRN
Status: CANCELLED | OUTPATIENT
Start: 2022-01-26

## 2022-01-26 RX ORDER — PREDNISONE 1 MG/1
40 TABLET ORAL ONCE
Status: CANCELLED | OUTPATIENT
Start: 2022-01-26 | End: 2022-01-26

## 2022-01-26 RX ORDER — ACETAMINOPHEN 325 MG/1
650 TABLET ORAL ONCE
Status: CANCELLED | OUTPATIENT
Start: 2022-01-26 | End: 2022-01-26

## 2022-01-27 ENCOUNTER — HOSPITAL ENCOUNTER (OUTPATIENT)
Dept: RADIOLOGY | Facility: HOSPITAL | Age: 82
Discharge: HOME | End: 2022-01-27
Attending: RADIOLOGY
Payer: MEDICARE

## 2022-01-27 DIAGNOSIS — C34.32 PRIMARY MALIGNANT NEOPLASM OF LEFT LOWER LOBE OF LUNG: ICD-10-CM

## 2022-01-27 DIAGNOSIS — C82.08 GRADE 1 FOLLICULAR LYMPHOMA OF LYMPH NODES OF MULTIPLE REGIONS: ICD-10-CM

## 2022-01-27 PROCEDURE — A9698 NON-RAD CONTRAST MATERIALNOC: HCPCS | Mod: PO | Performed by: RADIOLOGY

## 2022-01-27 PROCEDURE — 25500020 PHARM REV CODE 255: Mod: PO | Performed by: RADIOLOGY

## 2022-01-27 PROCEDURE — 71250 CT THORAX DX C-: CPT | Mod: 26,,, | Performed by: RADIOLOGY

## 2022-01-27 PROCEDURE — 71250 CT CHEST ABDOMEN PELVIS WITHOUT CONTRAST(XPD): ICD-10-PCS | Mod: 26,,, | Performed by: RADIOLOGY

## 2022-01-27 PROCEDURE — 74176 CT CHEST ABDOMEN PELVIS WITHOUT CONTRAST(XPD): ICD-10-PCS | Mod: 26,,, | Performed by: RADIOLOGY

## 2022-01-27 PROCEDURE — 74176 CT ABD & PELVIS W/O CONTRAST: CPT | Mod: 26,,, | Performed by: RADIOLOGY

## 2022-01-27 PROCEDURE — 74176 CT ABD & PELVIS W/O CONTRAST: CPT | Mod: TC,PO

## 2022-01-27 RX ADMIN — IOHEXOL 1000 ML: 9 SOLUTION ORAL at 09:01

## 2022-01-28 ENCOUNTER — INFUSION (OUTPATIENT)
Dept: INFUSION THERAPY | Facility: HOSPITAL | Age: 82
End: 2022-01-28
Attending: INTERNAL MEDICINE
Payer: MEDICARE

## 2022-01-28 VITALS
RESPIRATION RATE: 18 BRPM | SYSTOLIC BLOOD PRESSURE: 151 MMHG | HEART RATE: 89 BPM | TEMPERATURE: 98 F | DIASTOLIC BLOOD PRESSURE: 89 MMHG

## 2022-01-28 DIAGNOSIS — C82.01 GRADE 1 FOLLICULAR LYMPHOMA OF LYMPH NODES OF NECK: Primary | ICD-10-CM

## 2022-01-28 PROCEDURE — 96372 THER/PROPH/DIAG INJ SC/IM: CPT | Mod: PN

## 2022-01-28 PROCEDURE — M0220 HC INJECTION ADMIN, TIXAGEVIMAB-CILGAVIMAB, INCL POST ADMIN MONIT: HCPCS | Performed by: INTERNAL MEDICINE

## 2022-01-28 PROCEDURE — 63600175 PHARM REV CODE 636 W HCPCS: Mod: PN | Performed by: INTERNAL MEDICINE

## 2022-01-28 RX ORDER — ALBUTEROL SULFATE 90 UG/1
2 AEROSOL, METERED RESPIRATORY (INHALATION) EVERY 6 HOURS PRN
Status: CANCELLED | OUTPATIENT
Start: 2022-01-28

## 2022-01-28 RX ORDER — DIPHENHYDRAMINE HCL 25 MG
25 CAPSULE ORAL ONCE
Status: CANCELLED | OUTPATIENT
Start: 2022-01-28 | End: 2022-01-28

## 2022-01-28 RX ORDER — ACETAMINOPHEN 325 MG/1
650 TABLET ORAL ONCE
Status: CANCELLED | OUTPATIENT
Start: 2022-01-28 | End: 2022-01-28

## 2022-01-28 RX ORDER — EPINEPHRINE 0.3 MG/.3ML
0.3 INJECTION SUBCUTANEOUS ONCE
Status: CANCELLED | OUTPATIENT
Start: 2022-01-28 | End: 2022-01-28

## 2022-01-28 RX ORDER — ONDANSETRON 4 MG/1
4 TABLET, ORALLY DISINTEGRATING ORAL ONCE
Status: CANCELLED | OUTPATIENT
Start: 2022-01-28 | End: 2022-01-28

## 2022-01-28 RX ORDER — PREDNISONE 20 MG/1
40 TABLET ORAL ONCE
Status: CANCELLED | OUTPATIENT
Start: 2022-01-28 | End: 2022-01-28

## 2022-01-28 RX ADMIN — Medication 150 MG: at 01:01

## 2022-01-28 NOTE — PLAN OF CARE
Patient received two gluteal intramuscular injections (left and right) of tixagevimab/cilgavimab (EVUSHELD) with no difficulties tolerating the medication  Discharged in NAD

## 2022-02-02 ENCOUNTER — OFFICE VISIT (OUTPATIENT)
Dept: RADIATION ONCOLOGY | Facility: CLINIC | Age: 82
End: 2022-02-02
Payer: MEDICARE

## 2022-02-02 VITALS
WEIGHT: 193.31 LBS | DIASTOLIC BLOOD PRESSURE: 74 MMHG | RESPIRATION RATE: 22 BRPM | OXYGEN SATURATION: 94 % | HEIGHT: 65 IN | BODY MASS INDEX: 32.21 KG/M2 | HEART RATE: 90 BPM | SYSTOLIC BLOOD PRESSURE: 122 MMHG | TEMPERATURE: 99 F

## 2022-02-02 DIAGNOSIS — C34.11 MALIGNANT NEOPLASM OF RIGHT UPPER LOBE OF LUNG: ICD-10-CM

## 2022-02-02 DIAGNOSIS — C34.32 PRIMARY MALIGNANT NEOPLASM OF LEFT LOWER LOBE OF LUNG: ICD-10-CM

## 2022-02-02 DIAGNOSIS — R91.8 PULMONARY MASS: Primary | ICD-10-CM

## 2022-02-02 PROCEDURE — 1126F PR PAIN SEVERITY QUANTIFIED, NO PAIN PRESENT: ICD-10-PCS | Mod: CPTII,S$GLB,, | Performed by: RADIOLOGY

## 2022-02-02 PROCEDURE — 1159F MED LIST DOCD IN RCRD: CPT | Mod: CPTII,S$GLB,, | Performed by: RADIOLOGY

## 2022-02-02 PROCEDURE — 1160F PR REVIEW ALL MEDS BY PRESCRIBER/CLIN PHARMACIST DOCUMENTED: ICD-10-PCS | Mod: CPTII,S$GLB,, | Performed by: RADIOLOGY

## 2022-02-02 PROCEDURE — 1101F PR PT FALLS ASSESS DOC 0-1 FALLS W/OUT INJ PAST YR: ICD-10-PCS | Mod: CPTII,S$GLB,, | Performed by: RADIOLOGY

## 2022-02-02 PROCEDURE — 1126F AMNT PAIN NOTED NONE PRSNT: CPT | Mod: CPTII,S$GLB,, | Performed by: RADIOLOGY

## 2022-02-02 PROCEDURE — 99215 OFFICE O/P EST HI 40 MIN: CPT | Mod: S$GLB,,, | Performed by: RADIOLOGY

## 2022-02-02 PROCEDURE — 99999 PR PBB SHADOW E&M-EST. PATIENT-LVL V: CPT | Mod: PBBFAC,,, | Performed by: RADIOLOGY

## 2022-02-02 PROCEDURE — 3078F DIAST BP <80 MM HG: CPT | Mod: CPTII,S$GLB,, | Performed by: RADIOLOGY

## 2022-02-02 PROCEDURE — 99215 PR OFFICE/OUTPT VISIT, EST, LEVL V, 40-54 MIN: ICD-10-PCS | Mod: S$GLB,,, | Performed by: RADIOLOGY

## 2022-02-02 PROCEDURE — 1159F PR MEDICATION LIST DOCUMENTED IN MEDICAL RECORD: ICD-10-PCS | Mod: CPTII,S$GLB,, | Performed by: RADIOLOGY

## 2022-02-02 PROCEDURE — 1101F PT FALLS ASSESS-DOCD LE1/YR: CPT | Mod: CPTII,S$GLB,, | Performed by: RADIOLOGY

## 2022-02-02 PROCEDURE — 1160F RVW MEDS BY RX/DR IN RCRD: CPT | Mod: CPTII,S$GLB,, | Performed by: RADIOLOGY

## 2022-02-02 PROCEDURE — 3288F FALL RISK ASSESSMENT DOCD: CPT | Mod: CPTII,S$GLB,, | Performed by: RADIOLOGY

## 2022-02-02 PROCEDURE — 3288F PR FALLS RISK ASSESSMENT DOCUMENTED: ICD-10-PCS | Mod: CPTII,S$GLB,, | Performed by: RADIOLOGY

## 2022-02-02 PROCEDURE — 3078F PR MOST RECENT DIASTOLIC BLOOD PRESSURE < 80 MM HG: ICD-10-PCS | Mod: CPTII,S$GLB,, | Performed by: RADIOLOGY

## 2022-02-02 PROCEDURE — 3074F SYST BP LT 130 MM HG: CPT | Mod: CPTII,S$GLB,, | Performed by: RADIOLOGY

## 2022-02-02 PROCEDURE — 99999 PR PBB SHADOW E&M-EST. PATIENT-LVL V: ICD-10-PCS | Mod: PBBFAC,,, | Performed by: RADIOLOGY

## 2022-02-02 PROCEDURE — 3074F PR MOST RECENT SYSTOLIC BLOOD PRESSURE < 130 MM HG: ICD-10-PCS | Mod: CPTII,S$GLB,, | Performed by: RADIOLOGY

## 2022-02-02 NOTE — PROGRESS NOTES
[facility]  Follow Up Visit Note    Diagnosis:  Shandra Velez is a 81 y.o. 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    Oncologic History:  History of left breast ca, status post MRM reconstruction in 80s  Follicular lymphoma- on maintenance Rituxan  right upper lobe squamous cell carcinoma lung, s.p SBRT  LLL presumed primary lung ca, status post SBRT     Interval History  The patient presents today for a regularly scheduled follow up visit.  She was last seen in our clinic on 10/5/2021.   At that time, she was completing SBRT to the LLL lung lesion.  Interval CT chest, abdomen, and pelvis 1/27/22 shows stability of right upper lobe mass (significanlty decreased in size compare to prior to treatment), and apparent resolution of LLL mass; patient also experienced URI/LRI recently with sequelae visible on scan.  She reports ongoing cough particularly at night, but improved SOB, decreased sputum. Has gone down to 2-3 cigarettes per day.    Review of Systems   Review of Systems   Constitutional: Positive for malaise/fatigue. Negative for chills and fever.   HENT: Negative.    Eyes: Negative.    Respiratory: Positive for cough and sputum production. Negative for shortness of breath and wheezing.    Cardiovascular: Negative.    Gastrointestinal: Negative.    Genitourinary: Negative.    Psychiatric/Behavioral: Negative.        Social History:  Social History     Tobacco Use    Smoking status: Current Every Day Smoker     Packs/day: 0.50     Years: 53.00     Pack years: 26.50     Types: Cigarettes    Smokeless tobacco: Never Used   Substance Use Topics    Alcohol use: No    Drug use: No       Family History:  Cancer-related family history includes Breast cancer (age of onset: 42) in her other; Cancer in her sister and sister.    Exam:  Vitals:    02/02/22 1258   BP: 122/74   BP  "Location: Left arm   Patient Position: Sitting   Pulse: 90   Resp: (!) 22   Temp: 98.8 °F (37.1 °C)   SpO2: (!) 94%   Weight: 87.7 kg (193 lb 5.5 oz)   Height: 5' 5" (1.651 m)     Constitutional: Pleasant 81 y.o. female in no acute distress.  Well nourished. Well groomed.   HEENT: Normocephalic and atraumatic   Lungs: No audible wheezing.  Normal effort. No rhonci or wheezes  Musculoskeletal: No gross MSK deformities. Ambulates independently  Skin: No rashes appreciated.   Psych: Alert and oriented with appropriate mood and affect.  Neuro:   Grossly normal.    Imaging:  CT chest, abdomen, and pelvis 1/27/22 reveiwed with findings detailed above      Assessment:   Recovered well.  Resolution of LLL treated mass, significant improvement in right upper lobe treated mass   ECOG: (0) Fully active, able to carry on all predisease performance without restriction    Plan:   Follow up in 4-5 months with CT chest, abdomen, and pelvis at that time   Next PET/CT next month   Smoking cessation counselled   She was given our contact information, and she was told that she could call our clinic at anytime if she has any questions or concerns.   Follow up with other providers as directed        "

## 2022-02-03 DIAGNOSIS — D84.9 IMMUNOSUPPRESSED STATUS: ICD-10-CM

## 2022-02-05 DIAGNOSIS — D84.9 IMMUNOSUPPRESSED STATUS: ICD-10-CM

## 2022-02-07 ENCOUNTER — PATIENT MESSAGE (OUTPATIENT)
Dept: FAMILY MEDICINE | Facility: CLINIC | Age: 82
End: 2022-02-07
Payer: MEDICARE

## 2022-02-07 DIAGNOSIS — Z12.31 ENCOUNTER FOR SCREENING MAMMOGRAM FOR BREAST CANCER: Primary | ICD-10-CM

## 2022-02-07 DIAGNOSIS — D84.9 IMMUNOSUPPRESSED STATUS: ICD-10-CM

## 2022-02-08 DIAGNOSIS — D84.9 IMMUNOSUPPRESSED STATUS: ICD-10-CM

## 2022-02-25 ENCOUNTER — TELEPHONE (OUTPATIENT)
Dept: INFUSION THERAPY | Facility: HOSPITAL | Age: 82
End: 2022-02-25
Payer: MEDICARE

## 2022-02-25 NOTE — TELEPHONE ENCOUNTER
Spoke to pt in regards to getting an additional dose of Evusheld 150 mg, as it is now recommended by the FDA to receive a total of 300 mg for it to be most effective. Pt agreed to receive dose and would like to coordinate with appts here on 3/11. Pt aware that someone will call to confirm and no further needs noted.

## 2022-03-03 RX ORDER — DIPHENHYDRAMINE HCL 25 MG
25 CAPSULE ORAL ONCE
Status: CANCELLED | OUTPATIENT
Start: 2022-03-03 | End: 2022-03-03

## 2022-03-03 RX ORDER — PREDNISONE 20 MG/1
40 TABLET ORAL ONCE
Status: CANCELLED | OUTPATIENT
Start: 2022-03-03 | End: 2022-03-03

## 2022-03-03 RX ORDER — ONDANSETRON 4 MG/1
4 TABLET, ORALLY DISINTEGRATING ORAL ONCE
Status: CANCELLED | OUTPATIENT
Start: 2022-03-03 | End: 2022-03-03

## 2022-03-03 RX ORDER — EPINEPHRINE 0.3 MG/.3ML
0.3 INJECTION SUBCUTANEOUS
Status: CANCELLED | OUTPATIENT
Start: 2022-03-03

## 2022-03-03 RX ORDER — ALBUTEROL SULFATE 90 UG/1
2 AEROSOL, METERED RESPIRATORY (INHALATION) EVERY 4 HOURS PRN
Status: CANCELLED | OUTPATIENT
Start: 2022-03-03

## 2022-03-03 RX ORDER — ACETAMINOPHEN 325 MG/1
650 TABLET ORAL ONCE
Status: CANCELLED | OUTPATIENT
Start: 2022-03-03 | End: 2022-03-03

## 2022-03-07 ENCOUNTER — HOSPITAL ENCOUNTER (OUTPATIENT)
Dept: RADIOLOGY | Facility: HOSPITAL | Age: 82
Discharge: HOME | End: 2022-03-07
Attending: FAMILY MEDICINE
Payer: MEDICARE

## 2022-03-07 DIAGNOSIS — Z12.31 ENCOUNTER FOR SCREENING MAMMOGRAM FOR BREAST CANCER: ICD-10-CM

## 2022-03-07 PROCEDURE — 77067 SCR MAMMO BI INCL CAD: CPT | Mod: TC,PO

## 2022-03-07 PROCEDURE — 77063 MAMMO DIGITAL SCREENING LEFT WITH TOMO: ICD-10-PCS | Mod: 26,,, | Performed by: RADIOLOGY

## 2022-03-07 PROCEDURE — 77067 MAMMO DIGITAL SCREENING LEFT WITH TOMO: ICD-10-PCS | Mod: 26,,, | Performed by: RADIOLOGY

## 2022-03-07 PROCEDURE — 77063 BREAST TOMOSYNTHESIS BI: CPT | Mod: 26,,, | Performed by: RADIOLOGY

## 2022-03-07 PROCEDURE — 77067 SCR MAMMO BI INCL CAD: CPT | Mod: 26,,, | Performed by: RADIOLOGY

## 2022-03-10 ENCOUNTER — TELEPHONE (OUTPATIENT)
Dept: PAIN MEDICINE | Facility: CLINIC | Age: 82
End: 2022-03-10
Payer: MEDICARE

## 2022-03-11 ENCOUNTER — HOSPITAL ENCOUNTER (OUTPATIENT)
Dept: RADIOLOGY | Facility: HOSPITAL | Age: 82
Discharge: HOME | End: 2022-03-11
Attending: INTERNAL MEDICINE
Payer: MEDICARE

## 2022-03-11 ENCOUNTER — PATIENT OUTREACH (OUTPATIENT)
Dept: ADMINISTRATIVE | Facility: OTHER | Age: 82
End: 2022-03-11
Payer: MEDICARE

## 2022-03-11 ENCOUNTER — INFUSION (OUTPATIENT)
Dept: INFUSION THERAPY | Facility: HOSPITAL | Age: 82
End: 2022-03-11
Attending: INTERNAL MEDICINE
Payer: MEDICARE

## 2022-03-11 VITALS
RESPIRATION RATE: 20 BRPM | WEIGHT: 193.31 LBS | HEART RATE: 93 BPM | TEMPERATURE: 97 F | HEIGHT: 65 IN | SYSTOLIC BLOOD PRESSURE: 134 MMHG | BODY MASS INDEX: 32.21 KG/M2 | OXYGEN SATURATION: 96 % | DIASTOLIC BLOOD PRESSURE: 85 MMHG

## 2022-03-11 DIAGNOSIS — C82.00 FOLLICULAR LYMPHOMA GRADE I, UNSPECIFIED BODY REGION: ICD-10-CM

## 2022-03-11 DIAGNOSIS — C82.58 DIFFUSE FOLLICLE CENTER LYMPHOMA, LYMPH NODES OF MULTIPLE SITES: ICD-10-CM

## 2022-03-11 DIAGNOSIS — C82.01 GRADE 1 FOLLICULAR LYMPHOMA OF LYMPH NODES OF NECK: Primary | ICD-10-CM

## 2022-03-11 LAB — GLUCOSE SERPL-MCNC: 114 MG/DL (ref 70–110)

## 2022-03-11 PROCEDURE — M0220 HC INJECTION ADMIN, TIXAGEVIMAB-CILGAVIMAB, INCL POST ADMIN MONIT: HCPCS | Performed by: INTERNAL MEDICINE

## 2022-03-11 PROCEDURE — A9552 F18 FDG: HCPCS | Mod: PN

## 2022-03-11 PROCEDURE — 78815 NM PET CT ROUTINE: ICD-10-PCS | Mod: 26,PS,, | Performed by: RADIOLOGY

## 2022-03-11 PROCEDURE — 63600175 PHARM REV CODE 636 W HCPCS: Mod: PN | Performed by: INTERNAL MEDICINE

## 2022-03-11 PROCEDURE — 78815 PET IMAGE W/CT SKULL-THIGH: CPT | Mod: 26,PS,, | Performed by: RADIOLOGY

## 2022-03-11 RX ORDER — DIPHENHYDRAMINE HCL 25 MG
25 CAPSULE ORAL ONCE
Status: CANCELLED | OUTPATIENT
Start: 2022-03-11 | End: 2022-03-11

## 2022-03-11 RX ORDER — EPINEPHRINE 0.3 MG/.3ML
0.3 INJECTION SUBCUTANEOUS
Status: CANCELLED | OUTPATIENT
Start: 2022-03-11

## 2022-03-11 RX ORDER — PREDNISONE 20 MG/1
40 TABLET ORAL ONCE AS NEEDED
Status: DISCONTINUED | OUTPATIENT
Start: 2022-03-11 | End: 2022-03-11 | Stop reason: HOSPADM

## 2022-03-11 RX ORDER — ACETAMINOPHEN 325 MG/1
650 TABLET ORAL ONCE
Status: CANCELLED | OUTPATIENT
Start: 2022-03-11 | End: 2022-03-11

## 2022-03-11 RX ORDER — ALBUTEROL SULFATE 90 UG/1
2 AEROSOL, METERED RESPIRATORY (INHALATION) EVERY 4 HOURS PRN
Status: CANCELLED | OUTPATIENT
Start: 2022-03-11

## 2022-03-11 RX ORDER — PREDNISONE 20 MG/1
40 TABLET ORAL ONCE
Status: CANCELLED | OUTPATIENT
Start: 2022-03-11 | End: 2022-03-11

## 2022-03-11 RX ORDER — ONDANSETRON 4 MG/1
4 TABLET, ORALLY DISINTEGRATING ORAL ONCE
Status: CANCELLED | OUTPATIENT
Start: 2022-03-11 | End: 2022-03-11

## 2022-03-11 RX ORDER — DIPHENHYDRAMINE HCL 25 MG
25 CAPSULE ORAL ONCE AS NEEDED
Status: DISCONTINUED | OUTPATIENT
Start: 2022-03-11 | End: 2022-03-11 | Stop reason: HOSPADM

## 2022-03-11 RX ADMIN — Medication 150 MG: at 11:03

## 2022-03-11 NOTE — PLAN OF CARE
.Patient received two gluteal intramuscular injections (left and right) of tixagevimab/cilgavimab (EVUSHELD) with no difficulties tolerating the medication Pt waited 1 hour post observation.

## 2022-03-11 NOTE — PROGRESS NOTES
PET Imaging Questionnaire    1. Are you a Diabetic? Recent Blood Sugar level? No    2. Are you anemic? Bone Marrow Stimulation Meds? No    3. Have you had a CT Scan, if so when & where was your last one? No    4. Have you had a PET Scan, if so when & where was your last one? Yes -     5. Chemotherapy or currently on Chemotherapy? Yes    6. Radiation therapy? Yes    Surgical History:   Past Surgical History:   Procedure Laterality Date    APPENDECTOMY      BONE MARROW BIOPSY Bilateral 6/24/2020    Procedure: Biopsy-bone marrow;  Surgeon: Rod Montero MD;  Location: [facility] OR;  Service: Oncology;  Laterality: Bilateral;    BREAST RECONSTRUCTION  1990    right    CATARACT EXTRACTION W/  INTRAOCULAR LENS IMPLANT Bilateral     CHOLECYSTECTOMY      ENDOBRONCHIAL ULTRASOUND Bilateral 7/9/2021    Procedure: ENDOBRONCHIAL ULTRASOUND (EBUS);  Surgeon: Gregorio Kinney MD;  Location: [facility];  Service: Pulmonary;  Laterality: Bilateral;  NEED LMA to  eval right upper paratracheal LN.     EYE SURGERY      INSERTION OF TUNNELED CENTRAL VENOUS CATHETER (CVC) WITH SUBCUTANEOUS PORT Left 11/4/2020    Procedure: [id number];  Surgeon: Kody Pretty MD;  Location: [facility] OR;  Service: General;  Laterality: Left;    JOINT REPLACEMENT  2008    right knee     LUMBAR LAMINECTOMY      LYMPH NODE BIOPSY      MASTECTOMY Right 1985    NEEDLE LOCALIZATION N/A 5/25/2020    Procedure: NEEDLE LOCALIZATION - lymph node bx;  Surgeon: Steve Weaver MD;  [facility];  Service: Interventional Radiology;  Laterality: N/A;    TOTAL KNEE ARTHROPLASTY Right     TRANSFORAMINAL EPIDURAL INJECTION OF STEROID Left 5/14/2020    Procedure: Injection,steroid,epidural,transforaminal approach, l3/4;  Surgeon: Ronnell Baeza MD;  Location: [facility] OR;  Service: Pain Management;  Laterality: Left;    TUBAL LIGATION      UMBILICAL HERNIA REPAIR     7.      8. Have you been fasting for at least 6 hours? Yes    9. Is there any  chance you may be pregnant or breastfeeding? No    Assay: 11.56 MCi@:9.16   Injection Site:rt hand     Residual: 1.2 mCi@: 9.18   Technologist: Vaishali Mccartney Injected:10.36mCi

## 2022-03-12 NOTE — PROGRESS NOTES
Health Maintenance Due   Topic Date Due    Shingles Vaccine (2 of 3) 10/11/2016     Updates were requested from care everywhere.  Chart was reviewed for overdue Proactive [facility] Encounters (CAROLINE) topics (CRS, Breast Cancer Screening, Eye exam)  Health Maintenance has been updated.  LINKS immunization registry triggered.  Immunizations were reconciled.

## 2022-03-15 ENCOUNTER — OFFICE VISIT (OUTPATIENT)
Dept: PAIN MEDICINE | Facility: CLINIC | Age: 82
End: 2022-03-15
Payer: MEDICARE

## 2022-03-15 VITALS
DIASTOLIC BLOOD PRESSURE: 75 MMHG | SYSTOLIC BLOOD PRESSURE: 124 MMHG | HEIGHT: 65 IN | BODY MASS INDEX: 32.4 KG/M2 | HEART RATE: 85 BPM | WEIGHT: 194.44 LBS

## 2022-03-15 DIAGNOSIS — M54.16 LUMBAR RADICULOPATHY: Primary | ICD-10-CM

## 2022-03-15 DIAGNOSIS — M48.061 SPINAL STENOSIS OF LUMBAR REGION WITHOUT NEUROGENIC CLAUDICATION: ICD-10-CM

## 2022-03-15 PROCEDURE — 1160F PR REVIEW ALL MEDS BY PRESCRIBER/CLIN PHARMACIST DOCUMENTED: ICD-10-PCS | Mod: CPTII,S$GLB,, | Performed by: PHYSICIAN ASSISTANT

## 2022-03-15 PROCEDURE — 99999 PR PBB SHADOW E&M-EST. PATIENT-LVL IV: CPT | Mod: PBBFAC,,, | Performed by: PHYSICIAN ASSISTANT

## 2022-03-15 PROCEDURE — 1125F AMNT PAIN NOTED PAIN PRSNT: CPT | Mod: CPTII,S$GLB,, | Performed by: PHYSICIAN ASSISTANT

## 2022-03-15 PROCEDURE — 3074F PR MOST RECENT SYSTOLIC BLOOD PRESSURE < 130 MM HG: ICD-10-PCS | Mod: CPTII,S$GLB,, | Performed by: PHYSICIAN ASSISTANT

## 2022-03-15 PROCEDURE — 99214 OFFICE O/P EST MOD 30 MIN: CPT | Mod: S$GLB,,, | Performed by: PHYSICIAN ASSISTANT

## 2022-03-15 PROCEDURE — 1160F RVW MEDS BY RX/DR IN RCRD: CPT | Mod: CPTII,S$GLB,, | Performed by: PHYSICIAN ASSISTANT

## 2022-03-15 PROCEDURE — 1101F PT FALLS ASSESS-DOCD LE1/YR: CPT | Mod: CPTII,S$GLB,, | Performed by: PHYSICIAN ASSISTANT

## 2022-03-15 PROCEDURE — 1125F PR PAIN SEVERITY QUANTIFIED, PAIN PRESENT: ICD-10-PCS | Mod: CPTII,S$GLB,, | Performed by: PHYSICIAN ASSISTANT

## 2022-03-15 PROCEDURE — 99214 PR OFFICE/OUTPT VISIT, EST, LEVL IV, 30-39 MIN: ICD-10-PCS | Mod: S$GLB,,, | Performed by: PHYSICIAN ASSISTANT

## 2022-03-15 PROCEDURE — 1159F MED LIST DOCD IN RCRD: CPT | Mod: CPTII,S$GLB,, | Performed by: PHYSICIAN ASSISTANT

## 2022-03-15 PROCEDURE — 3078F DIAST BP <80 MM HG: CPT | Mod: CPTII,S$GLB,, | Performed by: PHYSICIAN ASSISTANT

## 2022-03-15 PROCEDURE — 1159F PR MEDICATION LIST DOCUMENTED IN MEDICAL RECORD: ICD-10-PCS | Mod: CPTII,S$GLB,, | Performed by: PHYSICIAN ASSISTANT

## 2022-03-15 PROCEDURE — 3288F PR FALLS RISK ASSESSMENT DOCUMENTED: ICD-10-PCS | Mod: CPTII,S$GLB,, | Performed by: PHYSICIAN ASSISTANT

## 2022-03-15 PROCEDURE — 1101F PR PT FALLS ASSESS DOC 0-1 FALLS W/OUT INJ PAST YR: ICD-10-PCS | Mod: CPTII,S$GLB,, | Performed by: PHYSICIAN ASSISTANT

## 2022-03-15 PROCEDURE — 3074F SYST BP LT 130 MM HG: CPT | Mod: CPTII,S$GLB,, | Performed by: PHYSICIAN ASSISTANT

## 2022-03-15 PROCEDURE — 3288F FALL RISK ASSESSMENT DOCD: CPT | Mod: CPTII,S$GLB,, | Performed by: PHYSICIAN ASSISTANT

## 2022-03-15 PROCEDURE — 99999 PR PBB SHADOW E&M-EST. PATIENT-LVL IV: ICD-10-PCS | Mod: PBBFAC,,, | Performed by: PHYSICIAN ASSISTANT

## 2022-03-15 PROCEDURE — 3078F PR MOST RECENT DIASTOLIC BLOOD PRESSURE < 80 MM HG: ICD-10-PCS | Mod: CPTII,S$GLB,, | Performed by: PHYSICIAN ASSISTANT

## 2022-03-15 RX ORDER — ALPRAZOLAM 0.5 MG/1
1 TABLET, ORALLY DISINTEGRATING ORAL ONCE AS NEEDED
Status: CANCELLED | OUTPATIENT
Start: 2022-03-15 | End: 2033-08-11

## 2022-03-15 NOTE — PROGRESS NOTES
This note was completed with dictation software and grammatical errors may exist.    CC:  Low back and left leg pain    HPI: The patient is a 81-year-old female with past medical history significant for hypertension and hyperlipidemia who presents in referral from Lucita Gonzalez PA-C for low back and right leg pain.  She returns in follow-up today with back and left leg pain.  She states that about 2 weeks ago she developed a return of her left low back, left lateral hip and anterior thigh pain down to the knee.  The pain is worse with standing and walking, improved with sitting.  She denies weakness or numbness, denies bladder or bowel incontinence.    Pain intervention history:   She is status post right L4/5 transforaminal epidural steroid injection on 11/17/17 with mild relief lasting a week, now reporting 0% relief.   She is status post caudal epidural steroid injection on 1/8/18 with 40% relief. She is status post left L3/4 transforaminal injection on 05/14/2020 with almost complete relief of her pain.     Spine surgeries: She underwent left L4/5 laminectomy for severe canal stenosis with Dr. Malloy in May 2017.    Antineuropathics:  NSAIDs:  Physical therapy:  Antidepressants:  Muscle relaxers:  Opioids:  Antiplatelets/Anticoagulants:    ROS:  The patient reports back pain only.  Balance of review of systems is negative.    Past Medical History:   Diagnosis Date    Breast cancer 1985    right mastectomy    Digestive disorder     Encounter for blood transfusion     Hypercholesterolemia     Hypertension     Hypertension     Indigestion     Lumbar spondylosis     Lymphoma     Osteopenia     Pulmonary nodule     Smoker     Current        Past Surgical History:   Procedure Laterality Date    APPENDECTOMY      BONE MARROW BIOPSY Bilateral 6/24/2020    Procedure: Biopsy-bone marrow;  Surgeon: Rod Montero MD;  Location: [facility];  Service: Oncology;  Laterality: Bilateral;    BREAST RECONSTRUCTION   "1990    right    CATARACT EXTRACTION W/  INTRAOCULAR LENS IMPLANT Bilateral     CHOLECYSTECTOMY      ENDOBRONCHIAL ULTRASOUND Bilateral 7/9/2021    Procedure: ENDOBRONCHIAL ULTRASOUND (EBUS);  Surgeon: Gregorio Kinney MD;  [facility];  Service: Pulmonary;  Laterality: Bilateral;  NEED LMA to  eval right upper paratracheal LN.     EYE SURGERY      INSERTION OF TUNNELED CENTRAL VENOUS CATHETER (CVC) WITH SUBCUTANEOUS PORT Left 11/4/2020    Procedure: [id number];  Surgeon: Kody Pretty MD;  Location: [facility] OR;  Service: General;  Laterality: Left;    JOINT REPLACEMENT  2008    right knee     LUMBAR LAMINECTOMY      LYMPH NODE BIOPSY      MASTECTOMY Right 1985    NEEDLE LOCALIZATION N/A 5/25/2020    Procedure: NEEDLE LOCALIZATION - lymph node bx;  Surgeon: Steve Weaver MD;  [facility] CATH;  Service: Interventional Radiology;  Laterality: N/A;    TOTAL KNEE ARTHROPLASTY Right     TRANSFORAMINAL EPIDURAL INJECTION OF STEROID Left 5/14/2020    Procedure: Injection,steroid,epidural,transforaminal approach, l3/4;  Surgeon: Ronnell Baeza MD;  Location: [facility] OR;  Service: Pain Management;  Laterality: Left;    TUBAL LIGATION      UMBILICAL HERNIA REPAIR         Social History     Socioeconomic History    Marital status:     Number of children: 3   Occupational History    Occupation: retired  for [employer]   Tobacco Use    Smoking status: Current Every Day Smoker     Packs/day: 0.50     Years: 53.00     Pack years: 26.50     Types: Cigarettes    Smokeless tobacco: Never Used   Substance and Sexual Activity    Alcohol use: No    Drug use: No    Sexual activity: Not Currently   Social History Narrative    Lives alone        Hobbies: skyler        [place]             Medications/Allergies: See med card    Vitals:    03/15/22 0850   BP: 124/75   Pulse: 85   Weight: 88.2 kg (194 lb 7.1 oz)   Height: 5' 5" (1.651 m)   PainSc:   7   PainLoc: Back "         Physical exam:  Gen: A and O x3, pleasant, well-groomed  Skin: No rashes or obvious lesions  HEENT: PERRLA, no obvious deformities on ears or in canals. Trachea midline.  CVS: Regular rate and rhythm, normal palpable pulses.  Resp:No increased work of breathing, symmetrical chest rise.  Abdomen: Soft, NT/ND.  Musculoskeletal:  Slow cautious gait.    Neuro:  Lower extremities: 5/5 strength bilaterally, 4+/5 left hip flexion  Reflexes: Patellar 2+ left side, 0+ right side, Achilles 2+ bilaterally.  Sensory:  Intact and symmetrical to light touch and pinprick in L2-S1 dermatomes bilaterally.    Lumbar spine:  Lumbar spine: ROM is moderately limited with flexion, extension and  oblique extension with mild low back pain with extension.    Clifton's test causes no increased pain on either side.    Supine straight leg raise causes left anterior thigh pain.  Internal and external rotation of the hip causes no increased pain on either side.  Myofascial exam: No tenderness to palpation across lumbar paraspinous muscles.      Imagin20 MRI L-spine:  There is extensive multilevel degenerative change in addition to postsurgical changes.  There are postsurgical changes of posterior instrumented fusion and decompression of L4 through S1.  There is some degree of disc space narrowing, disc bulge with osteophytic ridging with a without superimposed disc protrusion or disc extrusion in addition to facet joint arthropathy.  Also, there is 5 mm anterolisthesis of L4 on L5 which is without definite change compared to plain films dated 2018 but has progressed compared to prior preoperative MRI.  The pertinent findings are summarized below.  2. There is retroperitoneal lymphadenopathy which is incompletely included and evaluated.  Further evaluation with CT abdomen and pelvis is recommended.  3. At the L3-4 level there is a disc bulge with superimposed left-sided disc extrusion with cephalad extension contributing to  severe left lateral recess and foraminal stenosis, severe spinal stenosis and mild right foraminal stenosis.  The findings have progressed.  4. At the T11-12 level there is moderate-to-marked right foraminal stenosis without spinal stenosis.  5. At the L2-3 level there has been interval progression of degenerative change resulting in moderate left lateral recess stenosis and severe left foraminal stenosis.  6. At the postoperative L4-5 level there is mild-to-moderate right foraminal stenosis without spinal stenosis status post decompression.  7. At the L5-S1 level there is mild crowding of the left lateral recess with mild interval improvement.  There is moderate left and mild-to-moderate right foraminal stenosis without spinal stenosis.        Assessment: The patient is a 81-year-old female with past medical history significant for hypertension and hyperlipidemia who presents in referral from Lucita Gonzalez PA-C for low back and right leg pain.    1. Lumbar radiculopathy     2. Spinal stenosis of lumbar region without neurogenic claudication           Plan:  1. I will schedule patient to repeat a left L3/4 transforaminal epidural steroid injection.  She had complete relief with this in the past.  Of note, her thrombocytopenia has improved and her platelets are within normal limits.  2. Follow-up in 4 weeks postprocedure or sooner as needed.

## 2022-03-15 NOTE — H&P (VIEW-ONLY)
This note was completed with dictation software and grammatical errors may exist.    CC:  Low back and left leg pain    HPI: The patient is a 81-year-old female with past medical history significant for hypertension and hyperlipidemia who presents in referral from Lucita Gonzalez PA-C for low back and right leg pain.  She returns in follow-up today with back and left leg pain.  She states that about 2 weeks ago she developed a return of her left low back, left lateral hip and anterior thigh pain down to the knee.  The pain is worse with standing and walking, improved with sitting.  She denies weakness or numbness, denies bladder or bowel incontinence.    Pain intervention history:   She is status post right L4/5 transforaminal epidural steroid injection on 11/17/17 with mild relief lasting a week, now reporting 0% relief.   She is status post caudal epidural steroid injection on 1/8/18 with 40% relief. She is status post left L3/4 transforaminal injection on 05/14/2020 with almost complete relief of her pain.     Spine surgeries: She underwent left L4/5 laminectomy for severe canal stenosis with Dr. Malloy in May 2017.    Antineuropathics:  NSAIDs:  Physical therapy:  Antidepressants:  Muscle relaxers:  Opioids:  Antiplatelets/Anticoagulants:    ROS:  The patient reports back pain only.  Balance of review of systems is negative.    Past Medical History:   Diagnosis Date    Breast cancer 1985    right mastectomy    Digestive disorder     Encounter for blood transfusion     Hypercholesterolemia     Hypertension     Hypertension     Indigestion     Lumbar spondylosis     Lymphoma     Osteopenia     Pulmonary nodule     Smoker     Current        Past Surgical History:   Procedure Laterality Date    APPENDECTOMY      BONE MARROW BIOPSY Bilateral 6/24/2020    Procedure: Biopsy-bone marrow;  Surgeon: Rod Montero MD;  Location: [facility];  Service: Oncology;  Laterality: Bilateral;    BREAST RECONSTRUCTION   "1990    right    CATARACT EXTRACTION W/  INTRAOCULAR LENS IMPLANT Bilateral     CHOLECYSTECTOMY      ENDOBRONCHIAL ULTRASOUND Bilateral 7/9/2021    Procedure: ENDOBRONCHIAL ULTRASOUND (EBUS);  Surgeon: Gregorio Kinney MD;  Location: [facility];  Service: Pulmonary;  Laterality: Bilateral;  NEED LMA to  eval right upper paratracheal LN.     EYE SURGERY      INSERTION OF TUNNELED CENTRAL VENOUS CATHETER (CVC) WITH SUBCUTANEOUS PORT Left 11/4/2020    Procedure: [id number];  Surgeon: Kody Pretty MD;  Location: [facility] OR;  Service: General;  Laterality: Left;    JOINT REPLACEMENT  2008    right knee     LUMBAR LAMINECTOMY      LYMPH NODE BIOPSY      MASTECTOMY Right 1985    NEEDLE LOCALIZATION N/A 5/25/2020    Procedure: NEEDLE LOCALIZATION - lymph node bx;  Surgeon: Steve Weaver MD;  Location: [facility] CATH;  Service: Interventional Radiology;  Laterality: N/A;    TOTAL KNEE ARTHROPLASTY Right     TRANSFORAMINAL EPIDURAL INJECTION OF STEROID Left 5/14/2020    Procedure: Injection,steroid,epidural,transforaminal approach, l3/4;  Surgeon: Ronnell Baeza MD;  Location: [facility] OR;  Service: Pain Management;  Laterality: Left;    TUBAL LIGATION      UMBILICAL HERNIA REPAIR         Social History     Socioeconomic History    Marital status:     Number of children: 3   Occupational History    Occupation: retired  for [employer]   Tobacco Use    Smoking status: Current Every Day Smoker     Packs/day: 0.50     Years: 53.00     Pack years: 26.50     Types: Cigarettes    Smokeless tobacco: Never Used   Substance and Sexual Activity    Alcohol use: No    Drug use: No    Sexual activity: Not Currently   Social History Narrative    Lives alone        Hobbies: skyler        From [place]             Medications/Allergies: See med card    Vitals:    03/15/22 0850   BP: 124/75   Pulse: 85   Weight: 88.2 kg (194 lb 7.1 oz)   Height: 5' 5" (1.651 m)   PainSc:   7   PainLoc: Back "         Physical exam:  Gen: A and O x3, pleasant, well-groomed  Skin: No rashes or obvious lesions  HEENT: PERRLA, no obvious deformities on ears or in canals. Trachea midline.  CVS: Regular rate and rhythm, normal palpable pulses.  Resp:No increased work of breathing, symmetrical chest rise.  Abdomen: Soft, NT/ND.  Musculoskeletal:  Slow cautious gait.    Neuro:  Lower extremities: 5/5 strength bilaterally, 4+/5 left hip flexion  Reflexes: Patellar 2+ left side, 0+ right side, Achilles 2+ bilaterally.  Sensory:  Intact and symmetrical to light touch and pinprick in L2-S1 dermatomes bilaterally.    Lumbar spine:  Lumbar spine: ROM is moderately limited with flexion, extension and  oblique extension with mild low back pain with extension.    Clifton's test causes no increased pain on either side.    Supine straight leg raise causes left anterior thigh pain.  Internal and external rotation of the hip causes no increased pain on either side.  Myofascial exam: No tenderness to palpation across lumbar paraspinous muscles.      Imagin20 MRI L-spine:  There is extensive multilevel degenerative change in addition to postsurgical changes.  There are postsurgical changes of posterior instrumented fusion and decompression of L4 through S1.  There is some degree of disc space narrowing, disc bulge with osteophytic ridging with a without superimposed disc protrusion or disc extrusion in addition to facet joint arthropathy.  Also, there is 5 mm anterolisthesis of L4 on L5 which is without definite change compared to plain films dated 2018 but has progressed compared to prior preoperative MRI.  The pertinent findings are summarized below.  2. There is retroperitoneal lymphadenopathy which is incompletely included and evaluated.  Further evaluation with CT abdomen and pelvis is recommended.  3. At the L3-4 level there is a disc bulge with superimposed left-sided disc extrusion with cephalad extension contributing to  severe left lateral recess and foraminal stenosis, severe spinal stenosis and mild right foraminal stenosis.  The findings have progressed.  4. At the T11-12 level there is moderate-to-marked right foraminal stenosis without spinal stenosis.  5. At the L2-3 level there has been interval progression of degenerative change resulting in moderate left lateral recess stenosis and severe left foraminal stenosis.  6. At the postoperative L4-5 level there is mild-to-moderate right foraminal stenosis without spinal stenosis status post decompression.  7. At the L5-S1 level there is mild crowding of the left lateral recess with mild interval improvement.  There is moderate left and mild-to-moderate right foraminal stenosis without spinal stenosis.        Assessment: The patient is a 81-year-old female with past medical history significant for hypertension and hyperlipidemia who presents in referral from Lucita Gonzalez PA-C for low back and right leg pain.    1. Lumbar radiculopathy     2. Spinal stenosis of lumbar region without neurogenic claudication           Plan:  1. I will schedule patient to repeat a left L3/4 transforaminal epidural steroid injection.  She had complete relief with this in the past.  Of note, her thrombocytopenia has improved and her platelets are within normal limits.  2. Follow-up in 4 weeks postprocedure or sooner as needed.

## 2022-03-16 ENCOUNTER — TELEPHONE (OUTPATIENT)
Dept: HEMATOLOGY/ONCOLOGY | Facility: CLINIC | Age: 82
End: 2022-03-16
Payer: MEDICARE

## 2022-03-16 ENCOUNTER — OFFICE VISIT (OUTPATIENT)
Dept: HEMATOLOGY/ONCOLOGY | Facility: CLINIC | Age: 82
End: 2022-03-16
Payer: MEDICARE

## 2022-03-16 VITALS
BODY MASS INDEX: 32.29 KG/M2 | RESPIRATION RATE: 18 BRPM | SYSTOLIC BLOOD PRESSURE: 122 MMHG | WEIGHT: 193.81 LBS | HEART RATE: 89 BPM | DIASTOLIC BLOOD PRESSURE: 70 MMHG | OXYGEN SATURATION: 94 % | HEIGHT: 65 IN | TEMPERATURE: 98 F

## 2022-03-16 DIAGNOSIS — Z72.0 TOBACCO ABUSE: ICD-10-CM

## 2022-03-16 DIAGNOSIS — E55.9 VITAMIN D DEFICIENCY: ICD-10-CM

## 2022-03-16 DIAGNOSIS — C82.00 FOLLICULAR LYMPHOMA GRADE I, UNSPECIFIED BODY REGION: Primary | ICD-10-CM

## 2022-03-16 DIAGNOSIS — C34.11 MALIGNANT NEOPLASM OF RIGHT UPPER LOBE OF LUNG: ICD-10-CM

## 2022-03-16 PROCEDURE — 99999 PR PBB SHADOW E&M-EST. PATIENT-LVL V: ICD-10-PCS | Mod: PBBFAC,,, | Performed by: INTERNAL MEDICINE

## 2022-03-16 PROCEDURE — 3078F PR MOST RECENT DIASTOLIC BLOOD PRESSURE < 80 MM HG: ICD-10-PCS | Mod: CPTII,S$GLB,, | Performed by: INTERNAL MEDICINE

## 2022-03-16 PROCEDURE — 3074F PR MOST RECENT SYSTOLIC BLOOD PRESSURE < 130 MM HG: ICD-10-PCS | Mod: CPTII,S$GLB,, | Performed by: INTERNAL MEDICINE

## 2022-03-16 PROCEDURE — 99214 OFFICE O/P EST MOD 30 MIN: CPT | Mod: S$GLB,,, | Performed by: INTERNAL MEDICINE

## 2022-03-16 PROCEDURE — 1101F PT FALLS ASSESS-DOCD LE1/YR: CPT | Mod: CPTII,S$GLB,, | Performed by: INTERNAL MEDICINE

## 2022-03-16 PROCEDURE — 1159F MED LIST DOCD IN RCRD: CPT | Mod: CPTII,S$GLB,, | Performed by: INTERNAL MEDICINE

## 2022-03-16 PROCEDURE — 1160F PR REVIEW ALL MEDS BY PRESCRIBER/CLIN PHARMACIST DOCUMENTED: ICD-10-PCS | Mod: CPTII,S$GLB,, | Performed by: INTERNAL MEDICINE

## 2022-03-16 PROCEDURE — 1101F PR PT FALLS ASSESS DOC 0-1 FALLS W/OUT INJ PAST YR: ICD-10-PCS | Mod: CPTII,S$GLB,, | Performed by: INTERNAL MEDICINE

## 2022-03-16 PROCEDURE — 99999 PR PBB SHADOW E&M-EST. PATIENT-LVL V: CPT | Mod: PBBFAC,,, | Performed by: INTERNAL MEDICINE

## 2022-03-16 PROCEDURE — 3288F FALL RISK ASSESSMENT DOCD: CPT | Mod: CPTII,S$GLB,, | Performed by: INTERNAL MEDICINE

## 2022-03-16 PROCEDURE — 3078F DIAST BP <80 MM HG: CPT | Mod: CPTII,S$GLB,, | Performed by: INTERNAL MEDICINE

## 2022-03-16 PROCEDURE — 99214 PR OFFICE/OUTPT VISIT, EST, LEVL IV, 30-39 MIN: ICD-10-PCS | Mod: S$GLB,,, | Performed by: INTERNAL MEDICINE

## 2022-03-16 PROCEDURE — 1125F AMNT PAIN NOTED PAIN PRSNT: CPT | Mod: CPTII,S$GLB,, | Performed by: INTERNAL MEDICINE

## 2022-03-16 PROCEDURE — 3074F SYST BP LT 130 MM HG: CPT | Mod: CPTII,S$GLB,, | Performed by: INTERNAL MEDICINE

## 2022-03-16 PROCEDURE — 1159F PR MEDICATION LIST DOCUMENTED IN MEDICAL RECORD: ICD-10-PCS | Mod: CPTII,S$GLB,, | Performed by: INTERNAL MEDICINE

## 2022-03-16 PROCEDURE — 3288F PR FALLS RISK ASSESSMENT DOCUMENTED: ICD-10-PCS | Mod: CPTII,S$GLB,, | Performed by: INTERNAL MEDICINE

## 2022-03-16 PROCEDURE — 1160F RVW MEDS BY RX/DR IN RCRD: CPT | Mod: CPTII,S$GLB,, | Performed by: INTERNAL MEDICINE

## 2022-03-16 PROCEDURE — 1125F PR PAIN SEVERITY QUANTIFIED, PAIN PRESENT: ICD-10-PCS | Mod: CPTII,S$GLB,, | Performed by: INTERNAL MEDICINE

## 2022-03-16 RX ORDER — MEPERIDINE HYDROCHLORIDE 50 MG/ML
25 INJECTION INTRAMUSCULAR; INTRAVENOUS; SUBCUTANEOUS EVERY 30 MIN PRN
Status: CANCELLED | OUTPATIENT
Start: 2022-03-17

## 2022-03-16 RX ORDER — ACETAMINOPHEN 500 MG
1000 TABLET ORAL
Status: CANCELLED | OUTPATIENT
Start: 2022-03-17

## 2022-03-16 RX ORDER — HEPARIN 100 UNIT/ML
500 SYRINGE INTRAVENOUS
Status: CANCELLED | OUTPATIENT
Start: 2022-03-17

## 2022-03-16 RX ORDER — FAMOTIDINE 10 MG/ML
20 INJECTION INTRAVENOUS ONCE AS NEEDED
Status: CANCELLED
Start: 2022-03-17

## 2022-03-16 RX ORDER — DIPHENHYDRAMINE HYDROCHLORIDE 50 MG/ML
50 INJECTION INTRAMUSCULAR; INTRAVENOUS ONCE AS NEEDED
Status: CANCELLED | OUTPATIENT
Start: 2022-03-17

## 2022-03-16 RX ORDER — METHYLPREDNISOLONE SOD SUCC 125 MG
125 VIAL (EA) INJECTION ONCE AS NEEDED
Status: CANCELLED | OUTPATIENT
Start: 2022-03-17

## 2022-03-16 RX ORDER — SODIUM CHLORIDE 0.9 % (FLUSH) 0.9 %
10 SYRINGE (ML) INJECTION
Status: CANCELLED | OUTPATIENT
Start: 2022-03-17

## 2022-03-16 NOTE — Clinical Note
Return to clinic in 8 weeks with interval CBC, CMP, LDH, magnesium, beta 2, and vitamin-D prior to appointment.

## 2022-03-16 NOTE — PROGRESS NOTES
History of present illness:  The patient is a 81-year-old white female who presents in consultation from Dr. Chavez regarding newly diagnosed non-Hodgkin's lymphoma.  Patient was in her usual state of health but developed severe/debilitating low pain and lumbar radiculopathy after scrubbing her screened porch.  An MRI was obtained and intra-abdominal lymphadenopathy as well as a complex left renal was noted.  Patient subsequently underwent dedicated CT scan of the abdomen pelvis as well as image guided needle biopsy of a right inguinal lymph node.  Patient was found have grade 1 follicular lymphoma.  As the patient was asymptomatic, she was placed on a watch wait strategy.    Patient completed 6 cycles of CVP-R and is receiving maintenance Rituxan every 8 weeks.     Patient returns to clinic earlier than scheduled follow-up to review results of interval biopsy of right lower lobe pulmonary nodule which was the only lesion that failed to regress since initiation of her therapy for lymphoma.  Unfortunately, the lesion returned positive for squamous cell carcinoma of the lung.  Patient continues to smoke.  Underwent EBUS and biopsy.  She had pathologically confirmed stage I disease and has completed definitive XRT to this lesion.  Patient tolerated therapy well with the exception of some minor fatigue and some shoulder pain from keeping her arms above her head during treatment.    Patient has completed 7/12 cycles of maintenance Rituxan.  Patient returns to review interval laboratory and imaging for surveillance of lymphoma months well as lung cancer.  Patient has been having a rough time as her best friend and another family friend have recently .  She has also been experiencing increasing back pain for which she has injections scheduled.    Physical examination:  Well-developed, obese, elderly, white female, in no acute distress, who has a weight of 193.5 lb (increased by 2 lb)  VITAL SIGNS: Documented  and  reviewed this visit.  HEENT: Normocephalic, atraumatic. Oral mucosa pink and moist. Lips without lesions. Tongue midline. Oropharynx clear. Nonicteric sclerae.   NECK: Supple, bilateral supraclavicular adenopathy right internal jugular chain lymphadenopathy.   HEART: Regular rate and rhythm without murmur, gallop or rub.   LUNGS:  Mild, bibasilar crackles bilaterally. Normal respiratory effort.   ABDOMEN: Soft, nontender, nondistended with positive normoactive bowel sounds, no hepatosplenomegaly.   EXTREMITIES: No cyanosis or clubbing appreciated.  Patient has 1+, bilateral ankle edema.  Distal pulses are intact.   AXILLAE AND GROIN:  Small, palpable left inguinal lymphadenopathy is appreciated.   SKIN: Intact/turgor normal.  Palpable subcutaneous nodules about the abdominal wall consistent with lymph node involvement.   NEUROLOGIC: Cranial nerves II-XII grossly intact. Motor: Good muscle bulk and tone. Strength/sensory 5/5 throughout. Gait stable.     Laboratory:  White count 8.6, hemoglobin 11.5, hematocrit 36.1, platelets 201, absolute neutrophil count is 6200.  Sodium 139, potassium 4.2, chloride 106, CO2 21, BUN 31, creatinine 1.4, glucose 107, calcium 9.6, magnesium 1.7, liver function test within normal limits, , GFR is 36.  Beta 2 microglobulin 4.4.    CT scan of the chest/abdomen/pelvis:  1. The index lesions demonstrate no convincing worrisome detrimental change since the prior.  2. Multifocal peripheral consolidation is noted within the lungs.  This could be neoplastic or infectious or relate to history of COVID infection.  Please clinically correlate.  Clinical correlation with follow-up for resolution and or PET-CT imaging may be of use.  3. Right renal hypodensity of a Hounsfield units slightly greater than expected of 2.6 cm. Ultrasound is suggested to confirm a cyst and to exclude solid components  4. Heterogeneous thyroid gland, thyroid ultrasound could be performed if desired, this is stable  since the prior    Impression:  1.  Grade 1 follicular non-Hodgkin's lymphoma - Stage Riley with excellent response to systemic therapy.  2.  Vitamin-D deficiency-improved on supplementation.  3. 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  4. Ongoing tobacco abuse.      Plan:  1. Proceed with Rituxan as scheduled.  2. Return to clinic in 8 weeks with interval CBC, CMP, LDH, magnesium, beta 2 microglobulin, and vitamin-D level prior to appointment.     This note was created using voice recognition software and may contain grammatical errors.

## 2022-03-16 NOTE — Clinical Note
Return to clinic in 8 weeks with interval CBC, CMP, LDH, magnesium, beta 2 microglobulin, and vitamin-D level

## 2022-03-16 NOTE — TELEPHONE ENCOUNTER
I called and spoke with Mrs. Shandra Velez regarding integrative oncology benefits and services offered. She denies services at this time. She reports she is going this week to get  back injections to relieve her low back pain. I advised her to let Dr. Montero know if she would like our services if she changes her mind. She verbalized understanding.

## 2022-03-17 ENCOUNTER — INFUSION (OUTPATIENT)
Dept: INFUSION THERAPY | Facility: HOSPITAL | Age: 82
End: 2022-03-17
Attending: INTERNAL MEDICINE
Payer: MEDICARE

## 2022-03-17 ENCOUNTER — PATIENT MESSAGE (OUTPATIENT)
Dept: SURGERY | Facility: HOSPITAL | Age: 82
End: 2022-03-17
Payer: MEDICARE

## 2022-03-17 VITALS
HEART RATE: 75 BPM | WEIGHT: 196.19 LBS | TEMPERATURE: 98 F | BODY MASS INDEX: 32.69 KG/M2 | RESPIRATION RATE: 18 BRPM | HEIGHT: 65 IN | DIASTOLIC BLOOD PRESSURE: 62 MMHG | SYSTOLIC BLOOD PRESSURE: 132 MMHG

## 2022-03-17 DIAGNOSIS — C82.00 FOLLICULAR LYMPHOMA GRADE I, UNSPECIFIED BODY REGION: Primary | ICD-10-CM

## 2022-03-17 PROCEDURE — 96413 CHEMO IV INFUSION 1 HR: CPT | Mod: PN

## 2022-03-17 PROCEDURE — 63600175 PHARM REV CODE 636 W HCPCS: Mod: TB,PN | Performed by: INTERNAL MEDICINE

## 2022-03-17 PROCEDURE — 96415 CHEMO IV INFUSION ADDL HR: CPT | Mod: PN

## 2022-03-17 PROCEDURE — 25000003 PHARM REV CODE 250: Mod: PN | Performed by: INTERNAL MEDICINE

## 2022-03-17 RX ORDER — DIPHENHYDRAMINE HCL 50 MG
50 CAPSULE ORAL
Status: COMPLETED | OUTPATIENT
Start: 2022-03-17 | End: 2022-03-17

## 2022-03-17 RX ORDER — MEPERIDINE HYDROCHLORIDE 25 MG/ML
25 INJECTION INTRAMUSCULAR; INTRAVENOUS; SUBCUTANEOUS EVERY 30 MIN PRN
Status: DISCONTINUED | OUTPATIENT
Start: 2022-03-17 | End: 2022-03-17 | Stop reason: HOSPADM

## 2022-03-17 RX ORDER — HEPARIN 100 UNIT/ML
500 SYRINGE INTRAVENOUS
Status: DISCONTINUED | OUTPATIENT
Start: 2022-03-17 | End: 2022-03-17 | Stop reason: HOSPADM

## 2022-03-17 RX ORDER — ACETAMINOPHEN 500 MG
1000 TABLET ORAL
Status: COMPLETED | OUTPATIENT
Start: 2022-03-17 | End: 2022-03-17

## 2022-03-17 RX ORDER — SODIUM CHLORIDE 0.9 % (FLUSH) 0.9 %
10 SYRINGE (ML) INJECTION
Status: DISCONTINUED | OUTPATIENT
Start: 2022-03-17 | End: 2022-03-17 | Stop reason: HOSPADM

## 2022-03-17 RX ADMIN — DIPHENHYDRAMINE HYDROCHLORIDE 50 MG: 50 CAPSULE ORAL at 09:03

## 2022-03-17 RX ADMIN — ACETAMINOPHEN 1000 MG: 500 TABLET, FILM COATED ORAL at 09:03

## 2022-03-17 RX ADMIN — Medication 500 UNITS: at 01:03

## 2022-03-17 RX ADMIN — SODIUM CHLORIDE 700 MG: 0.9 INJECTION, SOLUTION INTRAVENOUS at 10:03

## 2022-03-17 RX ADMIN — SODIUM CHLORIDE: 9 INJECTION, SOLUTION INTRAVENOUS at 09:03

## 2022-03-17 NOTE — PLAN OF CARE
Problem: Adult Inpatient Plan of Care  Goal: Plan of Care Review  Outcome: Ongoing, Progressing  Flowsheets (Taken 3/17/2022 1315)  Plan of Care Reviewed With: patient   Pt tolerated ruxience infusion well.  No adverse reaction noted.   PAD flushed with heparin and de-accessed per protocol.  Patient left clinic in no acute distress.

## 2022-03-17 NOTE — PLAN OF CARE
Problem: Adult Inpatient Plan of Care  Goal: Patient-Specific Goal (Individualized)  Outcome: Ongoing, Progressing  Flowsheets (Taken 3/17/2022 0945)  Anxieties, Fears or Concerns: none  Individualized Care Needs: recliner, warm blanket  Patient-Specific Goals (Include Timeframe): no s/s of rx during tx     Problem: Fatigue  Goal: Improved Activity Tolerance  Outcome: Ongoing, Progressing  Intervention: Promote Improved Energy  Flowsheets (Taken 3/17/2022 0945)  Fatigue Management: frequent rest breaks encouraged  Sleep/Rest Enhancement: relaxation techniques promoted  Activity Management: Ambulated -L4

## 2022-03-23 ENCOUNTER — HOSPITAL ENCOUNTER (OUTPATIENT)
Dept: RADIOLOGY | Facility: HOSPITAL | Age: 82
Discharge: HOME | End: 2022-03-23
Attending: ANESTHESIOLOGY
Payer: MEDICARE

## 2022-03-23 ENCOUNTER — HOSPITAL ENCOUNTER (OUTPATIENT)
Facility: HOSPITAL | Age: 82
Discharge: HOME | End: 2022-03-23
Attending: ANESTHESIOLOGY | Admitting: ANESTHESIOLOGY
Payer: MEDICARE

## 2022-03-23 DIAGNOSIS — M54.50 LOWER BACK PAIN: ICD-10-CM

## 2022-03-23 DIAGNOSIS — M54.16 LUMBAR RADICULOPATHY: ICD-10-CM

## 2022-03-23 PROCEDURE — 76000 FLUOROSCOPY <1 HR PHYS/QHP: CPT | Mod: TC,PO

## 2022-03-23 PROCEDURE — 64483 PR EPIDURAL INJ, ANES/STEROID, TRANSFORAMINAL, LUMB/SACR, SNGL LEVL: ICD-10-PCS | Mod: LT,,, | Performed by: ANESTHESIOLOGY

## 2022-03-23 PROCEDURE — 64483 NJX AA&/STRD TFRM EPI L/S 1: CPT | Mod: LT,,, | Performed by: ANESTHESIOLOGY

## 2022-03-23 PROCEDURE — 64483 NJX AA&/STRD TFRM EPI L/S 1: CPT | Mod: PO | Performed by: ANESTHESIOLOGY

## 2022-03-23 PROCEDURE — 63600175 PHARM REV CODE 636 W HCPCS: Mod: PO | Performed by: ANESTHESIOLOGY

## 2022-03-23 PROCEDURE — 25500020 PHARM REV CODE 255: Mod: PO | Performed by: ANESTHESIOLOGY

## 2022-03-23 PROCEDURE — 25000003 PHARM REV CODE 250: Mod: PO | Performed by: ANESTHESIOLOGY

## 2022-03-23 RX ORDER — METHYLPREDNISOLONE ACETATE 40 MG/ML
INJECTION, SUSPENSION INTRA-ARTICULAR; INTRALESIONAL; INTRAMUSCULAR; SOFT TISSUE
Status: DISCONTINUED | OUTPATIENT
Start: 2022-03-23 | End: 2022-03-23 | Stop reason: HOSPADM

## 2022-03-23 RX ORDER — LIDOCAINE HYDROCHLORIDE 10 MG/ML
INJECTION, SOLUTION EPIDURAL; INFILTRATION; INTRACAUDAL; PERINEURAL
Status: DISCONTINUED | OUTPATIENT
Start: 2022-03-23 | End: 2022-03-23 | Stop reason: HOSPADM

## 2022-03-23 RX ORDER — BUPIVACAINE HYDROCHLORIDE 2.5 MG/ML
INJECTION, SOLUTION EPIDURAL; INFILTRATION; INTRACAUDAL
Status: DISCONTINUED | OUTPATIENT
Start: 2022-03-23 | End: 2022-03-23 | Stop reason: HOSPADM

## 2022-03-23 RX ORDER — METHYLPREDNISOLONE ACETATE 80 MG/ML
INJECTION, SUSPENSION INTRA-ARTICULAR; INTRALESIONAL; INTRAMUSCULAR; SOFT TISSUE
Status: DISCONTINUED | OUTPATIENT
Start: 2022-03-23 | End: 2022-03-23 | Stop reason: HOSPADM

## 2022-03-23 RX ORDER — ALPRAZOLAM 0.5 MG/1
1 TABLET, ORALLY DISINTEGRATING ORAL ONCE AS NEEDED
Status: COMPLETED | OUTPATIENT
Start: 2022-03-23 | End: 2022-03-23

## 2022-03-23 RX ADMIN — ALPRAZOLAM 0.5 MG: 0.5 TABLET, ORALLY DISINTEGRATING ORAL at 02:03

## 2022-03-23 NOTE — DISCHARGE SUMMARY
[facility] - [facility]  Discharge Note  Short Stay    Procedure(s) (LRB):  Injection,steroid,epidural,transforaminal approach L3/4 (Left)    OUTCOME: Patient tolerated treatment/procedure well without complication and is now ready for discharge.    DISPOSITION: Home or Self Care    FINAL DIAGNOSIS:  Lumbar radiculopathy    FOLLOWUP: In clinic    DISCHARGE INSTRUCTIONS:    Discharge Procedure Orders   Diet Adult Regular     No dressing needed     Notify your health care provider if you experience any of the following:  temperature >100.4     Activity as tolerated

## 2022-03-24 VITALS
WEIGHT: 190 LBS | HEIGHT: 65 IN | HEART RATE: 78 BPM | DIASTOLIC BLOOD PRESSURE: 75 MMHG | BODY MASS INDEX: 31.65 KG/M2 | TEMPERATURE: 97 F | SYSTOLIC BLOOD PRESSURE: 140 MMHG | RESPIRATION RATE: 16 BRPM | OXYGEN SATURATION: 97 %

## 2022-04-04 ENCOUNTER — OFFICE VISIT (OUTPATIENT)
Dept: FAMILY MEDICINE | Facility: CLINIC | Age: 82
End: 2022-04-04
Payer: MEDICARE

## 2022-04-04 ENCOUNTER — HOSPITAL ENCOUNTER (OUTPATIENT)
Dept: RADIOLOGY | Facility: HOSPITAL | Age: 82
Discharge: HOME | End: 2022-04-04
Attending: FAMILY MEDICINE
Payer: MEDICARE

## 2022-04-04 DIAGNOSIS — J18.9 PNEUMONIA DUE TO INFECTIOUS ORGANISM, UNSPECIFIED LATERALITY, UNSPECIFIED PART OF LUNG: ICD-10-CM

## 2022-04-04 DIAGNOSIS — E66.01 MORBID (SEVERE) OBESITY DUE TO EXCESS CALORIES: ICD-10-CM

## 2022-04-04 DIAGNOSIS — J43.2 CENTRILOBULAR EMPHYSEMA: ICD-10-CM

## 2022-04-04 DIAGNOSIS — I77.819 ECTATIC AORTA: ICD-10-CM

## 2022-04-04 DIAGNOSIS — J18.9 PNEUMONIA DUE TO INFECTIOUS ORGANISM, UNSPECIFIED LATERALITY, UNSPECIFIED PART OF LUNG: Primary | ICD-10-CM

## 2022-04-04 DIAGNOSIS — N18.32 STAGE 3B CHRONIC KIDNEY DISEASE: ICD-10-CM

## 2022-04-04 DIAGNOSIS — E78.5 HYPERLIPIDEMIA, UNSPECIFIED HYPERLIPIDEMIA TYPE: ICD-10-CM

## 2022-04-04 DIAGNOSIS — Z00.00 PREVENTATIVE HEALTH CARE: ICD-10-CM

## 2022-04-04 DIAGNOSIS — I10 ESSENTIAL HYPERTENSION: ICD-10-CM

## 2022-04-04 PROCEDURE — 99214 OFFICE O/P EST MOD 30 MIN: CPT | Mod: 25,S$GLB,, | Performed by: FAMILY MEDICINE

## 2022-04-04 PROCEDURE — 71046 X-RAY EXAM CHEST 2 VIEWS: CPT | Mod: TC,FY,PO

## 2022-04-04 PROCEDURE — 71046 XR CHEST PA AND LATERAL: ICD-10-PCS | Mod: 26,,, | Performed by: RADIOLOGY

## 2022-04-04 PROCEDURE — 3288F PR FALLS RISK ASSESSMENT DOCUMENTED: ICD-10-PCS | Mod: CPTII,S$GLB,, | Performed by: FAMILY MEDICINE

## 2022-04-04 PROCEDURE — 3078F PR MOST RECENT DIASTOLIC BLOOD PRESSURE < 80 MM HG: ICD-10-PCS | Mod: CPTII,S$GLB,, | Performed by: FAMILY MEDICINE

## 2022-04-04 PROCEDURE — 1159F PR MEDICATION LIST DOCUMENTED IN MEDICAL RECORD: ICD-10-PCS | Mod: CPTII,S$GLB,, | Performed by: FAMILY MEDICINE

## 2022-04-04 PROCEDURE — 1101F PR PT FALLS ASSESS DOC 0-1 FALLS W/OUT INJ PAST YR: ICD-10-PCS | Mod: CPTII,S$GLB,, | Performed by: FAMILY MEDICINE

## 2022-04-04 PROCEDURE — 3074F PR MOST RECENT SYSTOLIC BLOOD PRESSURE < 130 MM HG: ICD-10-PCS | Mod: CPTII,S$GLB,, | Performed by: FAMILY MEDICINE

## 2022-04-04 PROCEDURE — 99999 PR PBB SHADOW E&M-EST. PATIENT-LVL III: ICD-10-PCS | Mod: PBBFAC,,, | Performed by: FAMILY MEDICINE

## 2022-04-04 PROCEDURE — 1159F MED LIST DOCD IN RCRD: CPT | Mod: CPTII,S$GLB,, | Performed by: FAMILY MEDICINE

## 2022-04-04 PROCEDURE — 1126F AMNT PAIN NOTED NONE PRSNT: CPT | Mod: CPTII,S$GLB,, | Performed by: FAMILY MEDICINE

## 2022-04-04 PROCEDURE — 3074F SYST BP LT 130 MM HG: CPT | Mod: CPTII,S$GLB,, | Performed by: FAMILY MEDICINE

## 2022-04-04 PROCEDURE — 96372 PR INJECTION,THERAP/PROPH/DIAG2ST, IM OR SUBCUT: ICD-10-PCS | Mod: S$GLB,,, | Performed by: FAMILY MEDICINE

## 2022-04-04 PROCEDURE — 99999 PR PBB SHADOW E&M-EST. PATIENT-LVL III: CPT | Mod: PBBFAC,,, | Performed by: FAMILY MEDICINE

## 2022-04-04 PROCEDURE — 96372 THER/PROPH/DIAG INJ SC/IM: CPT | Mod: S$GLB,,, | Performed by: FAMILY MEDICINE

## 2022-04-04 PROCEDURE — 3288F FALL RISK ASSESSMENT DOCD: CPT | Mod: CPTII,S$GLB,, | Performed by: FAMILY MEDICINE

## 2022-04-04 PROCEDURE — 1101F PT FALLS ASSESS-DOCD LE1/YR: CPT | Mod: CPTII,S$GLB,, | Performed by: FAMILY MEDICINE

## 2022-04-04 PROCEDURE — 1126F PR PAIN SEVERITY QUANTIFIED, NO PAIN PRESENT: ICD-10-PCS | Mod: CPTII,S$GLB,, | Performed by: FAMILY MEDICINE

## 2022-04-04 PROCEDURE — 71046 X-RAY EXAM CHEST 2 VIEWS: CPT | Mod: 26,,, | Performed by: RADIOLOGY

## 2022-04-04 PROCEDURE — 3078F DIAST BP <80 MM HG: CPT | Mod: CPTII,S$GLB,, | Performed by: FAMILY MEDICINE

## 2022-04-04 PROCEDURE — 99214 PR OFFICE/OUTPT VISIT, EST, LEVL IV, 30-39 MIN: ICD-10-PCS | Mod: 25,S$GLB,, | Performed by: FAMILY MEDICINE

## 2022-04-04 RX ORDER — CEFTRIAXONE 1 G/1
1 INJECTION, POWDER, FOR SOLUTION INTRAMUSCULAR; INTRAVENOUS
Status: COMPLETED | OUTPATIENT
Start: 2022-04-04 | End: 2022-04-04

## 2022-04-04 RX ORDER — AZITHROMYCIN 250 MG/1
TABLET, FILM COATED ORAL
Qty: 6 TABLET | Refills: 0 | Status: SHIPPED | OUTPATIENT
Start: 2022-04-04 | End: 2022-04-09

## 2022-04-04 RX ADMIN — CEFTRIAXONE 1 G: 1 INJECTION, POWDER, FOR SOLUTION INTRAMUSCULAR; INTRAVENOUS at 04:04

## 2022-04-04 NOTE — PROGRESS NOTES
Subjective:       Patient ID: Shandra Velez is a 81 y.o. female.    Chief Complaint: Preventative Health Care (SOB, sinus, cough.. This week bad)    Here for annual exam      Non-Hodgkins lymphoma, renal mass - following with oncology; actively on RItuxin  HTN, CKD - tolerating lisinopril 40mg daily, HCTZ 25mg daily and nifedipine Xl 60mg daily  HLD, ectatic aorta - tolerating Zocor 10mg daily  Allergies: taking Zyrtec 10mg daily  Insomnia - using restoril 15mg at bedtime    Main complaint is 10 days of progressive cough, SOB with short distances, green sputum/nasal discharge, wheezing.  Using proair BID which does help some.    Past Medical History:    Hypertension                                                  Pulmonary nodule                                              Smoker                                                        Osteopenia                                                    Hypertension                                                  Hypercholesterolemia                                          Breast cancer                                                 Lumbar spondylosis                                            Past Surgical History:    MASTECTOMY                                       1985            Comment:right    CHOLECYSTECTOMY                                                APPENDECTOMY                                                   UMBILICAL HERNIA REPAIR                                        TUBAL LIGATION                                                 BREAST RECONSTRUCTION                                            Comment:right    Allergies:   -- No Known Drug Allergies     Social History    Marital Status:              Spouse Name:                       Years of Education:                 Number of children: 3             Occupational History  Occupation          Employer            Comment               retired marketing *                         Social History  Main Topics    Smoking Status: Current Every Day Smoker        Packs/Day: 0.50  Years: 53         Types: Cigarettes    Smokeless Status: Never Used                        Alcohol Use: Yes             Drug Use: No              Sexual Activity: Not Currently        Other Topics            Concern    None on file    Social History Narrative    Lives alone      Hobbies: skyler      From [place]    Current Outpatient Medications on File Prior to Visit:  acetaminophen (TYLENOL) 325 MG tablet, Take 2 tablets (650 mg total) by mouth every 4 (four) hours as needed (pain score 1-2/10)., Disp: , Rfl: 0  albuterol (VENTOLIN HFA) 90 mcg/actuation inhaler, Inhale 2 puffs into the lungs every 6 (six) hours as needed for Wheezing. Rescue, Disp: 8 g, Rfl: 0  benzonatate (TESSALON PERLES) 100 MG capsule, Take 1 capsule (100 mg total) by mouth 3 (three) times daily as needed for Cough., Disp: 30 capsule, Rfl: 1  cetirizine (ZYRTEC) 10 MG tablet, Take 10 mg by mouth once daily. Every day, Disp: , Rfl:   cholecalciferol, vitamin D3, 1,250 mcg (50,000 unit) capsule, Take 50,000 Units by mouth every 7 days., Disp: , Rfl:   cholecalciferol, vitamin D3, 1,250 mcg (50,000 unit) Tab, Take 1 tablet by mouth every 7 days., Disp: 12 tablet, Rfl: 6  fluticasone propionate (FLONASE) 50 mcg/actuation nasal spray, 1 spray (50 mcg total) by Each Nostril route once daily., Disp: 16 g, Rfl: 0  hydroCHLOROthiazide (HYDRODIURIL) 25 MG tablet, Take 1 tablet (25 mg total) by mouth once daily., Disp: 90 tablet, Rfl: 3  hydrocodone-chlorpheniramine (TUSSIONEX) 10-8 mg/5 mL suspension, Take 5 mLs by mouth every 12 (twelve) hours as needed for Cough., Disp: 115 mL, Rfl: 0  lisinopriL (PRINIVIL,ZESTRIL) 40 MG tablet, Take 1 tablet (40 mg total) by mouth once daily., Disp: 90 tablet, Rfl: 3  magnesium oxide (MAGOX) 400 mg (241.3 mg magnesium) tablet, Take 1 tablet (400 mg total) by mouth once daily., Disp: 90 tablet, Rfl: 3  multivitamin (THERAGRAN) per  tablet, Take 1 tablet by mouth once daily., Disp: , Rfl:   NIFEdipine (ADALAT CC) 60 MG TbSR, Take 1 tablet (60 mg total) by mouth once daily., Disp: 90 tablet, Rfl: 3  omeprazole (PRILOSEC) 40 MG capsule, Take 1 capsule (40 mg total) by mouth once daily., Disp: 30 capsule, Rfl: 11  ondansetron (ZOFRAN) 8 MG tablet, Take 1 tablet (8 mg total) by mouth every 8 (eight) hours as needed for Nausea., Disp: 30 tablet, Rfl: 1  potassium chloride (MICRO-K) 10 MEQ CpSR, TAKE 2 CAPSULES BY MOUTH  ONCE DAILY, Disp: 180 capsule, Rfl: 3  simvastatin (ZOCOR) 10 MG tablet, Take 1 tablet (10 mg total) by mouth every evening., Disp: 90 tablet, Rfl: 3  temazepam (RESTORIL) 15 mg Cap, TAKE 1 CAPSULE BY MOUTH AT  BEDTIME AS NEEDED FOR  INSOMNIA, Disp: 90 capsule, Rfl: 1    No current facility-administered medications on file prior to visit.            Review of patient's family history indicates:    Cancer                         Sister                      Comment: uterine    Diabetes                       Brother                       Fatigue  Associated symptoms include congestion, coughing, fatigue and nausea. Pertinent negatives include no arthralgias, chest pain, chills, fever, headaches, neck pain, numbness, rash, sore throat, vomiting or weakness.   Follow-up  Associated symptoms include congestion, coughing, fatigue and nausea. Pertinent negatives include no arthralgias, chest pain, chills, fever, headaches, neck pain, numbness, rash, sore throat, vomiting or weakness.   Hyperlipidemia  Associated symptoms include shortness of breath. Pertinent negatives include no chest pain.     Review of Systems   Constitutional: Positive for appetite change and fatigue. Negative for chills, fever and unexpected weight change.   HENT: Positive for congestion. Negative for sore throat and trouble swallowing.    Eyes: Negative for pain and visual disturbance.   Respiratory: Positive for cough, shortness of breath and wheezing.   "  Cardiovascular: Negative for chest pain and palpitations.   Gastrointestinal: Positive for nausea. Negative for abdominal distention, blood in stool, diarrhea and vomiting.   Genitourinary: Negative for difficulty urinating, dysuria and hematuria.   Musculoskeletal: Negative for arthralgias, back pain, gait problem and neck pain.   Skin: Negative for rash and wound.   Neurological: Positive for tremors (left hand). Negative for dizziness, weakness, numbness and headaches.   Hematological: Negative for adenopathy.   Psychiatric/Behavioral: Negative for dysphoric mood.       Objective:       /70   Pulse 94   Temp 97.7 °F (36.5 °C) (Oral)   Resp 20   Ht 5' 5" (1.651 m)   Wt 88.7 kg (195 lb 8.8 oz)   SpO2 98%   BMI 32.54 kg/m²     Physical Exam  Constitutional:       Appearance: Normal appearance. She is well-developed.   HENT:      Head: Normocephalic.      Mouth/Throat:      Pharynx: No oropharyngeal exudate or posterior oropharyngeal erythema.   Eyes:      Conjunctiva/sclera: Conjunctivae normal.      Pupils: Pupils are equal, round, and reactive to light.   Neck:      Thyroid: No thyromegaly.   Cardiovascular:      Rate and Rhythm: Normal rate and regular rhythm.      Heart sounds: Normal heart sounds, S1 normal and S2 normal. No murmur heard.    No friction rub. No gallop.   Pulmonary:      Effort: Pulmonary effort is normal.      Breath sounds: Examination of the right-lower field reveals decreased breath sounds and rhonchi. Examination of the left-lower field reveals rhonchi. Decreased breath sounds and rhonchi present. No wheezing or rales.   Abdominal:      General: Bowel sounds are normal. There is no distension.      Palpations: Abdomen is soft.      Tenderness: There is no abdominal tenderness.   Musculoskeletal:      Cervical back: Normal range of motion and neck supple.      Lumbar back: No deformity or tenderness. Normal range of motion.      Right lower leg: No edema.      Left lower leg: " No edema.   Lymphadenopathy:      Cervical: No cervical adenopathy.   Skin:     General: Skin is warm.      Findings: No rash.   Neurological:      Mental Status: She is alert and oriented to person, place, and time.      Cranial Nerves: No cranial nerve deficit.      Motor: Tremor present.      Gait: Gait normal.         Results for orders placed or performed during the hospital encounter of 03/11/22   POCT glucose   Result Value Ref Range    POC Glucose 114 (A) 70 - 110 MG/DL       WBC increased  Chest xray with right lower lung pneumonia      Assessment:       1. Pneumonia due to infectious organism, unspecified laterality, unspecified part of lung    2. Hyperlipidemia, unspecified hyperlipidemia type    3. Essential hypertension    4. Preventative health care    5. Stage 3b chronic kidney disease    6. Morbid (severe) obesity due to excess calories    7. Ectatic aorta    8. Centrilobular emphysema        Plan:       Pneumonia due to infectious organism, unspecified laterality, unspecified part of lung  -     X-Ray Chest PA And Lateral; Future; Expected date: 04/04/2022  -     CBC Auto Differential; Future; Expected date: 04/04/2022  -     Comprehensive Metabolic Panel; Future; Expected date: 04/04/2022  -     cefTRIAXone injection 1 g  -     azithromycin (Z-ANGELICA) 250 MG tablet; Take 2 tablets by mouth on day 1; Take 1 tablet by mouth on days 2-5  Dispense: 6 tablet; Refill: 0    Hyperlipidemia, unspecified hyperlipidemia type  -     Lipid Panel; Future; Expected date: 04/04/2022    Essential hypertension    Preventative health care    Stage 3b chronic kidney disease    Morbid (severe) obesity due to excess calories    Ectatic aorta    Centrilobular emphysema          CXR and labs today reviewed  Discussed hospitalization versus outpatient treatment of pneumonia and she would like to try aggressive outpatient treatment  Rocephin and Zpack  Deep breathing exercises discussed.  Increase fluids.  Albuterol HFA  TID  Continue other present meds  F/u 1 week

## 2022-04-05 VITALS
WEIGHT: 195.56 LBS | OXYGEN SATURATION: 98 % | HEART RATE: 94 BPM | BODY MASS INDEX: 32.58 KG/M2 | TEMPERATURE: 98 F | RESPIRATION RATE: 20 BRPM | HEIGHT: 65 IN | DIASTOLIC BLOOD PRESSURE: 70 MMHG | SYSTOLIC BLOOD PRESSURE: 108 MMHG

## 2022-04-05 PROBLEM — N18.32 STAGE 3B CHRONIC KIDNEY DISEASE: Status: ACTIVE | Noted: 2022-04-05

## 2022-04-05 PROBLEM — M46.96 UNSPECIFIED INFLAMMATORY SPONDYLOPATHY, LUMBAR REGION: Status: RESOLVED | Noted: 2019-11-06 | Resolved: 2022-04-05

## 2022-04-12 ENCOUNTER — OFFICE VISIT (OUTPATIENT)
Dept: FAMILY MEDICINE | Facility: CLINIC | Age: 82
End: 2022-04-12
Payer: MEDICARE

## 2022-04-12 VITALS
HEIGHT: 65 IN | TEMPERATURE: 98 F | HEART RATE: 88 BPM | DIASTOLIC BLOOD PRESSURE: 74 MMHG | SYSTOLIC BLOOD PRESSURE: 132 MMHG | OXYGEN SATURATION: 91 % | WEIGHT: 190.94 LBS | BODY MASS INDEX: 31.81 KG/M2

## 2022-04-12 DIAGNOSIS — I10 ESSENTIAL HYPERTENSION: ICD-10-CM

## 2022-04-12 DIAGNOSIS — J18.9 PNEUMONIA DUE TO INFECTIOUS ORGANISM, UNSPECIFIED LATERALITY, UNSPECIFIED PART OF LUNG: Primary | ICD-10-CM

## 2022-04-12 DIAGNOSIS — C85.93 NON-HODGKIN LYMPHOMA OF INTRA-ABDOMINAL LYMPH NODES, UNSPECIFIED NON-HODGKIN LYMPHOMA TYPE: ICD-10-CM

## 2022-04-12 PROCEDURE — 1101F PR PT FALLS ASSESS DOC 0-1 FALLS W/OUT INJ PAST YR: ICD-10-PCS | Mod: CPTII,S$GLB,, | Performed by: FAMILY MEDICINE

## 2022-04-12 PROCEDURE — 1126F PR PAIN SEVERITY QUANTIFIED, NO PAIN PRESENT: ICD-10-PCS | Mod: CPTII,S$GLB,, | Performed by: FAMILY MEDICINE

## 2022-04-12 PROCEDURE — 3075F PR MOST RECENT SYSTOLIC BLOOD PRESS GE 130-139MM HG: ICD-10-PCS | Mod: CPTII,S$GLB,, | Performed by: FAMILY MEDICINE

## 2022-04-12 PROCEDURE — 3078F DIAST BP <80 MM HG: CPT | Mod: CPTII,S$GLB,, | Performed by: FAMILY MEDICINE

## 2022-04-12 PROCEDURE — 3075F SYST BP GE 130 - 139MM HG: CPT | Mod: CPTII,S$GLB,, | Performed by: FAMILY MEDICINE

## 2022-04-12 PROCEDURE — 3288F FALL RISK ASSESSMENT DOCD: CPT | Mod: CPTII,S$GLB,, | Performed by: FAMILY MEDICINE

## 2022-04-12 PROCEDURE — 1101F PT FALLS ASSESS-DOCD LE1/YR: CPT | Mod: CPTII,S$GLB,, | Performed by: FAMILY MEDICINE

## 2022-04-12 PROCEDURE — 3078F PR MOST RECENT DIASTOLIC BLOOD PRESSURE < 80 MM HG: ICD-10-PCS | Mod: CPTII,S$GLB,, | Performed by: FAMILY MEDICINE

## 2022-04-12 PROCEDURE — 3288F PR FALLS RISK ASSESSMENT DOCUMENTED: ICD-10-PCS | Mod: CPTII,S$GLB,, | Performed by: FAMILY MEDICINE

## 2022-04-12 PROCEDURE — 1126F AMNT PAIN NOTED NONE PRSNT: CPT | Mod: CPTII,S$GLB,, | Performed by: FAMILY MEDICINE

## 2022-04-12 PROCEDURE — 99999 PR PBB SHADOW E&M-EST. PATIENT-LVL IV: ICD-10-PCS | Mod: PBBFAC,,, | Performed by: FAMILY MEDICINE

## 2022-04-12 PROCEDURE — 99214 PR OFFICE/OUTPT VISIT, EST, LEVL IV, 30-39 MIN: ICD-10-PCS | Mod: S$GLB,,, | Performed by: FAMILY MEDICINE

## 2022-04-12 PROCEDURE — 99999 PR PBB SHADOW E&M-EST. PATIENT-LVL IV: CPT | Mod: PBBFAC,,, | Performed by: FAMILY MEDICINE

## 2022-04-12 PROCEDURE — 99214 OFFICE O/P EST MOD 30 MIN: CPT | Mod: S$GLB,,, | Performed by: FAMILY MEDICINE

## 2022-04-12 NOTE — PROGRESS NOTES
Subjective:       Patient ID: Shandra Velez is a 81 y.o. female.    Chief Complaint: Follow-up    Here for f/u on pneumonia    Cough much improved.  No fever. Sputum is clearing up.  Breathing improved.   Still with some SOB with exertion.  Appetite still supressed, fatigued.  Using proair BID.  She feels over 50% better      Non-Hodgkins lymphoma, renal mass - following with oncology; actively on Rituxin  HTN, CKD - tolerating lisinopril 40mg daily, HCTZ 25mg daily and nifedipine Xl 60mg daily  HLD, ectatic aorta - tolerating Zocor 10mg daily  Allergies: taking Zyrtec 10mg daily  Insomnia - using restoril 15mg at bedtime      Past Medical History:    Hypertension                                                  Pulmonary nodule                                              Smoker                                                        Osteopenia                                                    Hypertension                                                  Hypercholesterolemia                                          Breast cancer                                                 Lumbar spondylosis                                            Past Surgical History:    MASTECTOMY                                       1985            Comment:right    CHOLECYSTECTOMY                                                APPENDECTOMY                                                   UMBILICAL HERNIA REPAIR                                        TUBAL LIGATION                                                 BREAST RECONSTRUCTION                                            Comment:right    Allergies:   -- No Known Drug Allergies     Social History    Marital Status:              Spouse Name:                       Years of Education:                 Number of children: 3             Occupational History  Occupation          Employer            Comment               retired marketing *                         Social History  Main Topics    Smoking Status: Current Every Day Smoker        Packs/Day: 0.50  Years: 53         Types: Cigarettes    Smokeless Status: Never Used                        Alcohol Use: Yes             Drug Use: No              Sexual Activity: Not Currently        Other Topics            Concern    None on file    Social History Narrative    Lives alone      Hobbies: skyler      From [place]    Current Outpatient Medications on File Prior to Visit:  acetaminophen (TYLENOL) 325 MG tablet, Take 2 tablets (650 mg total) by mouth every 4 (four) hours as needed (pain score 1-2/10)., Disp: , Rfl: 0  albuterol (VENTOLIN HFA) 90 mcg/actuation inhaler, Inhale 2 puffs into the lungs every 6 (six) hours as needed for Wheezing. Rescue, Disp: 8 g, Rfl: 0  benzonatate (TESSALON PERLES) 100 MG capsule, Take 1 capsule (100 mg total) by mouth 3 (three) times daily as needed for Cough., Disp: 30 capsule, Rfl: 1  cetirizine (ZYRTEC) 10 MG tablet, Take 10 mg by mouth once daily. Every day, Disp: , Rfl:   cholecalciferol, vitamin D3, 1,250 mcg (50,000 unit) capsule, Take 50,000 Units by mouth every 7 days., Disp: , Rfl:   cholecalciferol, vitamin D3, 1,250 mcg (50,000 unit) Tab, Take 1 tablet by mouth every 7 days., Disp: 12 tablet, Rfl: 6  fluticasone propionate (FLONASE) 50 mcg/actuation nasal spray, 1 spray (50 mcg total) by Each Nostril route once daily., Disp: 16 g, Rfl: 0  hydroCHLOROthiazide (HYDRODIURIL) 25 MG tablet, Take 1 tablet (25 mg total) by mouth once daily., Disp: 90 tablet, Rfl: 3  hydrocodone-chlorpheniramine (TUSSIONEX) 10-8 mg/5 mL suspension, Take 5 mLs by mouth every 12 (twelve) hours as needed for Cough., Disp: 115 mL, Rfl: 0  lisinopriL (PRINIVIL,ZESTRIL) 40 MG tablet, Take 1 tablet (40 mg total) by mouth once daily., Disp: 90 tablet, Rfl: 3  magnesium oxide (MAGOX) 400 mg (241.3 mg magnesium) tablet, Take 1 tablet (400 mg total) by mouth once daily., Disp: 90 tablet, Rfl: 3  multivitamin (THERAGRAN) per  tablet, Take 1 tablet by mouth once daily., Disp: , Rfl:   NIFEdipine (ADALAT CC) 60 MG TbSR, Take 1 tablet (60 mg total) by mouth once daily., Disp: 90 tablet, Rfl: 3  omeprazole (PRILOSEC) 40 MG capsule, Take 1 capsule (40 mg total) by mouth once daily., Disp: 30 capsule, Rfl: 11  ondansetron (ZOFRAN) 8 MG tablet, Take 1 tablet (8 mg total) by mouth every 8 (eight) hours as needed for Nausea., Disp: 30 tablet, Rfl: 1  potassium chloride (MICRO-K) 10 MEQ CpSR, TAKE 2 CAPSULES BY MOUTH  ONCE DAILY, Disp: 180 capsule, Rfl: 3  simvastatin (ZOCOR) 10 MG tablet, Take 1 tablet (10 mg total) by mouth every evening., Disp: 90 tablet, Rfl: 3  temazepam (RESTORIL) 15 mg Cap, TAKE 1 CAPSULE BY MOUTH AT  BEDTIME AS NEEDED FOR  INSOMNIA, Disp: 90 capsule, Rfl: 1    No current facility-administered medications on file prior to visit.            Review of patient's family history indicates:    Cancer                         Sister                      Comment: uterine    Diabetes                       Brother                       Fatigue  Associated symptoms include congestion, coughing, fatigue and nausea. Pertinent negatives include no arthralgias, chest pain, chills, fever, headaches, neck pain, numbness, rash, sore throat, vomiting or weakness.   Follow-up  Associated symptoms include congestion, coughing, fatigue and nausea. Pertinent negatives include no arthralgias, chest pain, chills, fever, headaches, neck pain, numbness, rash, sore throat, vomiting or weakness.   Hyperlipidemia  Associated symptoms include shortness of breath. Pertinent negatives include no chest pain.     Review of Systems   Constitutional: Positive for appetite change and fatigue. Negative for chills, fever and unexpected weight change.   HENT: Positive for congestion. Negative for sore throat and trouble swallowing.    Eyes: Negative for pain and visual disturbance.   Respiratory: Positive for cough, shortness of breath and wheezing.   "  Cardiovascular: Negative for chest pain and palpitations.   Gastrointestinal: Positive for nausea. Negative for abdominal distention, blood in stool, diarrhea and vomiting.   Genitourinary: Negative for difficulty urinating, dysuria and hematuria.   Musculoskeletal: Negative for arthralgias, back pain, gait problem and neck pain.   Skin: Negative for rash and wound.   Neurological: Positive for tremors (left hand). Negative for dizziness, weakness, numbness and headaches.   Hematological: Negative for adenopathy.   Psychiatric/Behavioral: Negative for dysphoric mood.       Objective:       /74   Pulse 88   Temp 97.5 °F (36.4 °C) (Oral)   Ht 5' 5" (1.651 m)   Wt 86.6 kg (190 lb 14.7 oz)   SpO2 (!) 91%   BMI 31.77 kg/m²     Physical Exam  Constitutional:       Appearance: Normal appearance. She is well-developed.   HENT:      Head: Normocephalic.      Mouth/Throat:      Pharynx: No oropharyngeal exudate or posterior oropharyngeal erythema.   Eyes:      Conjunctiva/sclera: Conjunctivae normal.      Pupils: Pupils are equal, round, and reactive to light.   Neck:      Thyroid: No thyromegaly.   Cardiovascular:      Rate and Rhythm: Normal rate and regular rhythm.      Heart sounds: Normal heart sounds, S1 normal and S2 normal. No murmur heard.    No friction rub. No gallop.   Pulmonary:      Effort: Pulmonary effort is normal.      Breath sounds: Examination of the right-lower field reveals decreased breath sounds and rhonchi. Examination of the left-lower field reveals rhonchi. Decreased breath sounds and rhonchi present. No wheezing or rales.   Abdominal:      General: Bowel sounds are normal. There is no distension.      Palpations: Abdomen is soft.      Tenderness: There is no abdominal tenderness.   Musculoskeletal:      Cervical back: Normal range of motion and neck supple.      Lumbar back: No deformity or tenderness. Normal range of motion.      Right lower leg: No edema.      Left lower leg: No " edema.   Lymphadenopathy:      Cervical: No cervical adenopathy.   Skin:     General: Skin is warm.      Findings: No rash.   Neurological:      Mental Status: She is alert and oriented to person, place, and time.      Cranial Nerves: No cranial nerve deficit.      Motor: Tremor present.      Gait: Gait normal.         Results for orders placed or performed in visit on 04/04/22   Lipid Panel   Result Value Ref Range    Cholesterol 123 120 - 199 mg/dL    Triglycerides 113 30 - 150 mg/dL    HDL 30 (L) 40 - 75 mg/dL    LDL Cholesterol 70.4 63.0 - 159.0 mg/dL    HDL/Cholesterol Ratio 24.4 20.0 - 50.0 %    Total Cholesterol/HDL Ratio 4.1 2.0 - 5.0    Non-HDL Cholesterol 93 mg/dL   CBC Auto Differential   Result Value Ref Range    WBC 20.87 (H) 3.90 - 12.70 K/uL    RBC 3.80 (L) 4.00 - 5.40 M/uL    Hemoglobin 10.9 (L) 12.0 - 16.0 g/dL    Hematocrit 34.4 (L) 37.0 - 48.5 %    MCV 91 82 - 98 fL    MCH 28.7 27.0 - 31.0 pg    MCHC 31.7 (L) 32.0 - 36.0 g/dL    RDW 16.4 (H) 11.5 - 14.5 %    Platelets 218 150 - 450 K/uL    MPV 10.1 9.2 - 12.9 fL    Immature Granulocytes CANCELED 0.0 - 0.5 %    Immature Grans (Abs) CANCELED 0.00 - 0.04 K/uL    Lymph # CANCELED 1.0 - 4.8 K/uL    Mono # CANCELED 0.3 - 1.0 K/uL    Eos # CANCELED 0.0 - 0.5 K/uL    Baso # CANCELED 0.00 - 0.20 K/uL    nRBC 0 0 /100 WBC    Gran % 80.0 (H) 38.0 - 73.0 %    Lymph % 5.0 (L) 18.0 - 48.0 %    Mono % 4.0 4.0 - 15.0 %    Eosinophil % 0.0 0.0 - 8.0 %    Basophil % 0.0 0.0 - 1.9 %    Bands 11.0 %    Platelet Estimate Appears normal     Aniso Slight     Poly Occasional     Differential Method Manual    Comprehensive Metabolic Panel   Result Value Ref Range    Sodium 137 136 - 145 mmol/L    Potassium 3.2 (L) 3.5 - 5.1 mmol/L    Chloride 103 95 - 110 mmol/L    CO2 18 (L) 23 - 29 mmol/L    Glucose 128 (H) 70 - 110 mg/dL    BUN 26 (H) 8 - 23 mg/dL    Creatinine 1.6 (H) 0.5 - 1.4 mg/dL    Calcium 9.1 8.7 - 10.5 mg/dL    Total Protein 6.5 6.0 - 8.4 g/dL    Albumin 2.6  (L) 3.5 - 5.2 g/dL    Total Bilirubin 0.3 0.1 - 1.0 mg/dL    Alkaline Phosphatase 137 (H) 55 - 135 U/L    AST 14 10 - 40 U/L    ALT 10 10 - 44 U/L    Anion Gap 16 8 - 16 mmol/L    eGFR if African American 35 (A) >60 mL/min/1.73 m^2    eGFR if non African American 30 (A) >60 mL/min/1.73 m^2         Assessment:       1. Pneumonia due to infectious organism, unspecified laterality, unspecified part of lung    2. Essential hypertension    3. Non-Hodgkin lymphoma of intra-abdominal lymph nodes, unspecified non-Hodgkin lymphoma type        Plan:       Pneumonia due to infectious organism, unspecified laterality, unspecified part of lung    Essential hypertension    Non-Hodgkin lymphoma of intra-abdominal lymph nodes, unspecified non-Hodgkin lymphoma type          Pneumonia significantly improved  Continue present treatment plan; f/u with any worsening  Deep breathing exercises discussed.    Continue other present meds  F/u with oncology as planned

## 2022-04-20 ENCOUNTER — OFFICE VISIT (OUTPATIENT)
Dept: PAIN MEDICINE | Facility: CLINIC | Age: 82
End: 2022-04-20
Payer: MEDICARE

## 2022-04-20 VITALS
HEART RATE: 88 BPM | HEIGHT: 66 IN | SYSTOLIC BLOOD PRESSURE: 105 MMHG | DIASTOLIC BLOOD PRESSURE: 55 MMHG | BODY MASS INDEX: 30.82 KG/M2 | WEIGHT: 191.81 LBS

## 2022-04-20 DIAGNOSIS — M48.061 SPINAL STENOSIS OF LUMBAR REGION WITHOUT NEUROGENIC CLAUDICATION: ICD-10-CM

## 2022-04-20 DIAGNOSIS — M54.16 LUMBAR RADICULOPATHY: Primary | ICD-10-CM

## 2022-04-20 DIAGNOSIS — R26.81 GAIT INSTABILITY: ICD-10-CM

## 2022-04-20 PROCEDURE — 3078F DIAST BP <80 MM HG: CPT | Mod: CPTII,S$GLB,, | Performed by: PHYSICIAN ASSISTANT

## 2022-04-20 PROCEDURE — 99999 PR PBB SHADOW E&M-EST. PATIENT-LVL IV: ICD-10-PCS | Mod: PBBFAC,,, | Performed by: PHYSICIAN ASSISTANT

## 2022-04-20 PROCEDURE — 1160F RVW MEDS BY RX/DR IN RCRD: CPT | Mod: CPTII,S$GLB,, | Performed by: PHYSICIAN ASSISTANT

## 2022-04-20 PROCEDURE — 1125F PR PAIN SEVERITY QUANTIFIED, PAIN PRESENT: ICD-10-PCS | Mod: CPTII,S$GLB,, | Performed by: PHYSICIAN ASSISTANT

## 2022-04-20 PROCEDURE — 99213 PR OFFICE/OUTPT VISIT, EST, LEVL III, 20-29 MIN: ICD-10-PCS | Mod: S$GLB,,, | Performed by: PHYSICIAN ASSISTANT

## 2022-04-20 PROCEDURE — 3074F SYST BP LT 130 MM HG: CPT | Mod: CPTII,S$GLB,, | Performed by: PHYSICIAN ASSISTANT

## 2022-04-20 PROCEDURE — 3074F PR MOST RECENT SYSTOLIC BLOOD PRESSURE < 130 MM HG: ICD-10-PCS | Mod: CPTII,S$GLB,, | Performed by: PHYSICIAN ASSISTANT

## 2022-04-20 PROCEDURE — 1101F PT FALLS ASSESS-DOCD LE1/YR: CPT | Mod: CPTII,S$GLB,, | Performed by: PHYSICIAN ASSISTANT

## 2022-04-20 PROCEDURE — 1160F PR REVIEW ALL MEDS BY PRESCRIBER/CLIN PHARMACIST DOCUMENTED: ICD-10-PCS | Mod: CPTII,S$GLB,, | Performed by: PHYSICIAN ASSISTANT

## 2022-04-20 PROCEDURE — 3078F PR MOST RECENT DIASTOLIC BLOOD PRESSURE < 80 MM HG: ICD-10-PCS | Mod: CPTII,S$GLB,, | Performed by: PHYSICIAN ASSISTANT

## 2022-04-20 PROCEDURE — 1125F AMNT PAIN NOTED PAIN PRSNT: CPT | Mod: CPTII,S$GLB,, | Performed by: PHYSICIAN ASSISTANT

## 2022-04-20 PROCEDURE — 1159F MED LIST DOCD IN RCRD: CPT | Mod: CPTII,S$GLB,, | Performed by: PHYSICIAN ASSISTANT

## 2022-04-20 PROCEDURE — 3288F FALL RISK ASSESSMENT DOCD: CPT | Mod: CPTII,S$GLB,, | Performed by: PHYSICIAN ASSISTANT

## 2022-04-20 PROCEDURE — 3288F PR FALLS RISK ASSESSMENT DOCUMENTED: ICD-10-PCS | Mod: CPTII,S$GLB,, | Performed by: PHYSICIAN ASSISTANT

## 2022-04-20 PROCEDURE — 99213 OFFICE O/P EST LOW 20 MIN: CPT | Mod: S$GLB,,, | Performed by: PHYSICIAN ASSISTANT

## 2022-04-20 PROCEDURE — 99999 PR PBB SHADOW E&M-EST. PATIENT-LVL IV: CPT | Mod: PBBFAC,,, | Performed by: PHYSICIAN ASSISTANT

## 2022-04-20 PROCEDURE — 1101F PR PT FALLS ASSESS DOC 0-1 FALLS W/OUT INJ PAST YR: ICD-10-PCS | Mod: CPTII,S$GLB,, | Performed by: PHYSICIAN ASSISTANT

## 2022-04-20 PROCEDURE — 1159F PR MEDICATION LIST DOCUMENTED IN MEDICAL RECORD: ICD-10-PCS | Mod: CPTII,S$GLB,, | Performed by: PHYSICIAN ASSISTANT

## 2022-04-20 NOTE — PROGRESS NOTES
This note was completed with dictation software and grammatical errors may exist.    CC:  Low back and left leg pain    HPI: The patient is a 81-year-old female with past medical history significant for hypertension and hyperlipidemia who presents in referral from Lucita Gonzalez PA-C for low back and right leg pain.  She is status post left L3/4 transforaminal epidural steroid injection on 03/23/2022 with 0% relief.  She describes sharp pain in the left low back radiating to the left buttock and left posterior thigh.  The pain is worse with standing and improved with sitting.  She describes weakness in her left leg and difficulty with her balance.  She denies numbness.  She denies any pain extending below her knee.    Pain intervention history:   She is status post right L4/5 transforaminal epidural steroid injection on 11/17/17 with mild relief lasting a week, now reporting 0% relief.   She is status post caudal epidural steroid injection on 1/8/18 with 40% relief. She is status post left L3/4 transforaminal injection on 05/14/2020 with almost complete relief of her pain.   She is status post left L3/4 transforaminal epidural steroid injection on 03/23/2022 with 0% relief.      Spine surgeries: She underwent left L4/5 laminectomy for severe canal stenosis with Dr. Malloy in May 2017.    Antineuropathics:  NSAIDs:  Physical therapy:  Antidepressants:  Muscle relaxers:  Opioids:  Antiplatelets/Anticoagulants:    ROS:  The patient reports back pain only.  Balance of review of systems is negative.    Past Medical History:   Diagnosis Date    Breast cancer 1985    right mastectomy    Digestive disorder     Encounter for blood transfusion     Hypercholesterolemia     Hypertension     Hypertension     Indigestion     Lumbar spondylosis     Lymphoma     Osteopenia     Pulmonary nodule     Smoker     Current        Past Surgical History:   Procedure Laterality Date    APPENDECTOMY      BONE MARROW BIOPSY  Bilateral 6/24/2020    Procedure: Biopsy-bone marrow;  Surgeon: Rod Monetro MD;  Location: [facility] OR;  Service: Oncology;  Laterality: Bilateral;    BREAST RECONSTRUCTION  1990    right    CATARACT EXTRACTION W/  INTRAOCULAR LENS IMPLANT Bilateral     CHOLECYSTECTOMY      ENDOBRONCHIAL ULTRASOUND Bilateral 7/9/2021    Procedure: ENDOBRONCHIAL ULTRASOUND (EBUS);  Surgeon: Gregorio Kinney MD;  [facility];  Service: Pulmonary;  Laterality: Bilateral;  NEED LMA to  eval right upper paratracheal LN.     EYE SURGERY      INSERTION OF TUNNELED CENTRAL VENOUS CATHETER (CVC) WITH SUBCUTANEOUS PORT Left 11/4/2020    Procedure: [id number];  Surgeon: Kody Pretty MD;  Location: [facility] OR;  Service: General;  Laterality: Left;    JOINT REPLACEMENT  2008    right knee     LUMBAR LAMINECTOMY      LYMPH NODE BIOPSY      MASTECTOMY Right 1985    NEEDLE LOCALIZATION N/A 5/25/2020    Procedure: NEEDLE LOCALIZATION - lymph node bx;  Surgeon: Steve Weaver MD;  Location[facility] CATH;  Service: Interventional Radiology;  Laterality: N/A;    TOTAL KNEE ARTHROPLASTY Right     TRANSFORAMINAL EPIDURAL INJECTION OF STEROID Left 5/14/2020    Procedure: Injection,steroid,epidural,transforaminal approach, l3/4;  Surgeon: Ronnell Baeza MD;  Location: [facility] OR;  Service: Pain Management;  Laterality: Left;    TRANSFORAMINAL EPIDURAL INJECTION OF STEROID Left 3/23/2022    Procedure: Injection,steroid,epidural,transforaminal approach L3/4;  Surgeon: Ronnell Baeza MD;  Location: [facility] OR;  Service: Pain Management;  Laterality: Left;    TUBAL LIGATION      UMBILICAL HERNIA REPAIR         Social History     Socioeconomic History    Marital status:     Number of children: 3   Occupational History    Occupation: retired  for [employer]   Tobacco Use    Smoking status: Current Every Day Smoker     Packs/day: 0.50     Years: 53.00     Pack years: 26.50     Types: Cigarettes     "Smokeless tobacco: Never Used   Substance and Sexual Activity    Alcohol use: No    Drug use: No    Sexual activity: Not Currently   Social History Narrative    Lives alone        Hobbies: skyler        From [place]             Medications/Allergies: See med card    Vitals:    22 1322   BP: (!) 105/55   Pulse: 88   Weight: 87 kg (191 lb 12.8 oz)   Height: 5' 6" (1.676 m)   PainSc:   7   PainLoc: Back         Physical exam:  Gen: A and O x3, pleasant, well-groomed  Skin: No rashes or obvious lesions  HEENT: PERRLA, no obvious deformities on ears or in canals. Trachea midline.  CVS: Regular rate and rhythm, normal palpable pulses.  Resp:No increased work of breathing, symmetrical chest rise.  Abdomen: Soft, NT/ND.  Musculoskeletal:  Slow cautious gait.    Neuro:  Lower extremities: 5/5 strength bilaterally, 4+/5 left hip flexion  Reflexes: Patellar 2+ left side, 0+ right side, Achilles 2+ bilaterally.  Sensory:  Intact and symmetrical to light touch and pinprick in L2-S1 dermatomes bilaterally.    Lumbar spine:  Lumbar spine: ROM is moderately limited with flexion, extension and  oblique extension with mild low back pain with extension.    Clifton's test causes no increased pain on either side.    Supine straight leg raise causes left anterior thigh pain.  Internal and external rotation of the hip causes no increased pain on either side.  Myofascial exam: No tenderness to palpation across lumbar paraspinous muscles.      Imagin20 MRI L-spine:  There is extensive multilevel degenerative change in addition to postsurgical changes.  There are postsurgical changes of posterior instrumented fusion and decompression of L4 through S1.  There is some degree of disc space narrowing, disc bulge with osteophytic ridging with a without superimposed disc protrusion or disc extrusion in addition to facet joint arthropathy.  Also, there is 5 mm anterolisthesis of L4 on L5 which is without definite change " compared to plain films dated 06/27/2018 but has progressed compared to prior preoperative MRI.  The pertinent findings are summarized below.  2. There is retroperitoneal lymphadenopathy which is incompletely included and evaluated.  Further evaluation with CT abdomen and pelvis is recommended.  3. At the L3-4 level there is a disc bulge with superimposed left-sided disc extrusion with cephalad extension contributing to severe left lateral recess and foraminal stenosis, severe spinal stenosis and mild right foraminal stenosis.  The findings have progressed.  4. At the T11-12 level there is moderate-to-marked right foraminal stenosis without spinal stenosis.  5. At the L2-3 level there has been interval progression of degenerative change resulting in moderate left lateral recess stenosis and severe left foraminal stenosis.  6. At the postoperative L4-5 level there is mild-to-moderate right foraminal stenosis without spinal stenosis status post decompression.  7. At the L5-S1 level there is mild crowding of the left lateral recess with mild interval improvement.  There is moderate left and mild-to-moderate right foraminal stenosis without spinal stenosis.        Assessment: The patient is a 81-year-old female with past medical history significant for hypertension and hyperlipidemia who presents in referral from Lucita Gonzalez PA-C for low back and right leg pain.    1. Lumbar radiculopathy  Ambulatory referral/consult to Physical/Occupational Therapy   2. Spinal stenosis of lumbar region without neurogenic claudication  Ambulatory referral/consult to Physical/Occupational Therapy   3. Gait instability  Ambulatory referral/consult to Physical/Occupational Therapy         Plan:  1. Since the patient did not have relief following the epidural steroid injection I am going to have her participate in physical therapy at [facility].  If she does not have relief with this I will order a new lumbar spine MRI for further  evaluation.  2. Follow-up in 6 weeks or sooner as needed.

## 2022-05-09 ENCOUNTER — LAB VISIT (OUTPATIENT)
Dept: LAB | Facility: HOSPITAL | Age: 82
End: 2022-05-09
Attending: INTERNAL MEDICINE
Payer: MEDICARE

## 2022-05-09 ENCOUNTER — PATIENT MESSAGE (OUTPATIENT)
Dept: SMOKING CESSATION | Facility: CLINIC | Age: 82
End: 2022-05-09
Payer: MEDICARE

## 2022-05-09 ENCOUNTER — TELEPHONE (OUTPATIENT)
Dept: HEMATOLOGY/ONCOLOGY | Facility: CLINIC | Age: 82
End: 2022-05-09
Payer: MEDICARE

## 2022-05-09 DIAGNOSIS — C82.00 FOLLICULAR LYMPHOMA GRADE I, UNSPECIFIED BODY REGION: ICD-10-CM

## 2022-05-09 LAB
ALBUMIN SERPL BCP-MCNC: 3.4 G/DL (ref 3.5–5.2)
ALP SERPL-CCNC: 71 U/L (ref 55–135)
ALT SERPL W/O P-5'-P-CCNC: 9 U/L (ref 10–44)
ANION GAP SERPL CALC-SCNC: 11 MMOL/L (ref 8–16)
ANISOCYTOSIS BLD QL SMEAR: SLIGHT
AST SERPL-CCNC: 15 U/L (ref 10–40)
B2 MICROGLOB SERPL-MCNC: 4 UG/ML (ref 0–2.5)
BASOPHILS # BLD AUTO: 0.05 K/UL (ref 0–0.2)
BASOPHILS NFR BLD: 2.7 % (ref 0–1.9)
BILIRUB SERPL-MCNC: 0.3 MG/DL (ref 0.1–1)
BUN SERPL-MCNC: 22 MG/DL (ref 8–23)
CALCIUM SERPL-MCNC: 9 MG/DL (ref 8.7–10.5)
CHLORIDE SERPL-SCNC: 108 MMOL/L (ref 95–110)
CO2 SERPL-SCNC: 23 MMOL/L (ref 23–29)
CREAT SERPL-MCNC: 1.3 MG/DL (ref 0.5–1.4)
DIFFERENTIAL METHOD: ABNORMAL
EOSINOPHIL # BLD AUTO: 0.3 K/UL (ref 0–0.5)
EOSINOPHIL NFR BLD: 15.4 % (ref 0–8)
ERYTHROCYTE [DISTWIDTH] IN BLOOD BY AUTOMATED COUNT: 15.5 % (ref 11.5–14.5)
EST. GFR  (AFRICAN AMERICAN): 45 ML/MIN/1.73 M^2
EST. GFR  (NON AFRICAN AMERICAN): 39 ML/MIN/1.73 M^2
GLUCOSE SERPL-MCNC: 99 MG/DL (ref 70–110)
HCT VFR BLD AUTO: 36.3 % (ref 37–48.5)
HGB BLD-MCNC: 11.3 G/DL (ref 12–16)
IMM GRANULOCYTES # BLD AUTO: 0 K/UL (ref 0–0.04)
IMM GRANULOCYTES NFR BLD AUTO: 0 % (ref 0–0.5)
LDH SERPL L TO P-CCNC: 171 U/L (ref 110–260)
LYMPHOCYTES # BLD AUTO: 0.9 K/UL (ref 1–4.8)
LYMPHOCYTES NFR BLD: 47.9 % (ref 18–48)
MAGNESIUM SERPL-MCNC: 1.5 MG/DL (ref 1.6–2.6)
MCH RBC QN AUTO: 28.8 PG (ref 27–31)
MCHC RBC AUTO-ENTMCNC: 31.1 G/DL (ref 32–36)
MCV RBC AUTO: 93 FL (ref 82–98)
MONOCYTES # BLD AUTO: 0.6 K/UL (ref 0.3–1)
MONOCYTES NFR BLD: 30.9 % (ref 4–15)
NEUTROPHILS # BLD AUTO: 0.1 K/UL (ref 1.8–7.7)
NEUTROPHILS NFR BLD: 3.1 % (ref 38–73)
NRBC BLD-RTO: 0 /100 WBC
PLATELET # BLD AUTO: 127 K/UL (ref 150–450)
PMV BLD AUTO: 10.4 FL (ref 9.2–12.9)
POIKILOCYTOSIS BLD QL SMEAR: SLIGHT
POTASSIUM SERPL-SCNC: 3.8 MMOL/L (ref 3.5–5.1)
PROT SERPL-MCNC: 6.4 G/DL (ref 6–8.4)
RBC # BLD AUTO: 3.92 M/UL (ref 4–5.4)
SODIUM SERPL-SCNC: 142 MMOL/L (ref 136–145)
WBC # BLD AUTO: 1.88 K/UL (ref 3.9–12.7)

## 2022-05-09 PROCEDURE — 82232 ASSAY OF BETA-2 PROTEIN: CPT | Performed by: INTERNAL MEDICINE

## 2022-05-09 PROCEDURE — 83615 LACTATE (LD) (LDH) ENZYME: CPT | Mod: PN | Performed by: INTERNAL MEDICINE

## 2022-05-09 PROCEDURE — 80053 COMPREHEN METABOLIC PANEL: CPT | Mod: PN | Performed by: INTERNAL MEDICINE

## 2022-05-09 PROCEDURE — 36415 COLL VENOUS BLD VENIPUNCTURE: CPT | Mod: PN | Performed by: INTERNAL MEDICINE

## 2022-05-09 PROCEDURE — 83735 ASSAY OF MAGNESIUM: CPT | Mod: PN | Performed by: INTERNAL MEDICINE

## 2022-05-09 PROCEDURE — 85027 COMPLETE CBC AUTOMATED: CPT | Mod: PN | Performed by: INTERNAL MEDICINE

## 2022-05-09 PROCEDURE — 85007 BL SMEAR W/DIFF WBC COUNT: CPT | Mod: PN | Performed by: INTERNAL MEDICINE

## 2022-05-09 NOTE — TELEPHONE ENCOUNTER
Critical lab of WBC 1.8 called in by Cyrus. Dr Montero notified and I called patient to let her know if any s/s of infection to call us and to stay away from anybody who is sick. She will call if any concerns otherwise will see her at her scheduled appt onWednesday. Patient verbalized understanding.

## 2022-05-11 ENCOUNTER — HOSPITAL ENCOUNTER (OUTPATIENT)
Dept: RADIOLOGY | Facility: HOSPITAL | Age: 82
Discharge: HOME | End: 2022-05-11
Attending: INTERNAL MEDICINE
Payer: MEDICARE

## 2022-05-11 ENCOUNTER — TELEPHONE (OUTPATIENT)
Dept: HEMATOLOGY/ONCOLOGY | Facility: CLINIC | Age: 82
End: 2022-05-11

## 2022-05-11 ENCOUNTER — INFUSION (OUTPATIENT)
Dept: INFUSION THERAPY | Facility: HOSPITAL | Age: 82
End: 2022-05-11
Attending: INTERNAL MEDICINE
Payer: MEDICARE

## 2022-05-11 ENCOUNTER — OFFICE VISIT (OUTPATIENT)
Dept: HEMATOLOGY/ONCOLOGY | Facility: CLINIC | Age: 82
End: 2022-05-11
Payer: MEDICARE

## 2022-05-11 VITALS
RESPIRATION RATE: 18 BRPM | OXYGEN SATURATION: 95 % | HEART RATE: 90 BPM | SYSTOLIC BLOOD PRESSURE: 129 MMHG | BODY MASS INDEX: 32.51 KG/M2 | HEIGHT: 65 IN | DIASTOLIC BLOOD PRESSURE: 88 MMHG | WEIGHT: 195.13 LBS

## 2022-05-11 DIAGNOSIS — D70.1 LEUKOPENIA DUE TO ANTINEOPLASTIC CHEMOTHERAPY: ICD-10-CM

## 2022-05-11 DIAGNOSIS — C34.11 MALIGNANT NEOPLASM OF RIGHT UPPER LOBE OF LUNG: ICD-10-CM

## 2022-05-11 DIAGNOSIS — T45.1X5A LEUKOPENIA DUE TO ANTINEOPLASTIC CHEMOTHERAPY: ICD-10-CM

## 2022-05-11 DIAGNOSIS — Z72.0 TOBACCO ABUSE: ICD-10-CM

## 2022-05-11 DIAGNOSIS — C82.00 FOLLICULAR LYMPHOMA GRADE I, UNSPECIFIED BODY REGION: Primary | ICD-10-CM

## 2022-05-11 DIAGNOSIS — J18.9 PNEUMONIA OF RIGHT UPPER LOBE DUE TO INFECTIOUS ORGANISM: ICD-10-CM

## 2022-05-11 DIAGNOSIS — T45.1X5A LEUKOPENIA DUE TO ANTINEOPLASTIC CHEMOTHERAPY: Primary | ICD-10-CM

## 2022-05-11 DIAGNOSIS — E55.9 VITAMIN D DEFICIENCY: ICD-10-CM

## 2022-05-11 DIAGNOSIS — C82.00 FOLLICULAR LYMPHOMA GRADE I, UNSPECIFIED BODY REGION: ICD-10-CM

## 2022-05-11 DIAGNOSIS — D70.1 LEUKOPENIA DUE TO ANTINEOPLASTIC CHEMOTHERAPY: Primary | ICD-10-CM

## 2022-05-11 PROCEDURE — 63600175 PHARM REV CODE 636 W HCPCS: Mod: JG,PN | Performed by: INTERNAL MEDICINE

## 2022-05-11 PROCEDURE — 1126F AMNT PAIN NOTED NONE PRSNT: CPT | Mod: CPTII,S$GLB,, | Performed by: INTERNAL MEDICINE

## 2022-05-11 PROCEDURE — 71046 X-RAY EXAM CHEST 2 VIEWS: CPT | Mod: 26,,, | Performed by: RADIOLOGY

## 2022-05-11 PROCEDURE — 96372 THER/PROPH/DIAG INJ SC/IM: CPT | Mod: PN

## 2022-05-11 PROCEDURE — 99999 PR PBB SHADOW E&M-EST. PATIENT-LVL V: ICD-10-PCS | Mod: PBBFAC,,, | Performed by: INTERNAL MEDICINE

## 2022-05-11 PROCEDURE — 71046 XR CHEST PA AND LATERAL: ICD-10-PCS | Mod: 26,,, | Performed by: RADIOLOGY

## 2022-05-11 PROCEDURE — 1159F PR MEDICATION LIST DOCUMENTED IN MEDICAL RECORD: ICD-10-PCS | Mod: CPTII,S$GLB,, | Performed by: INTERNAL MEDICINE

## 2022-05-11 PROCEDURE — 99214 PR OFFICE/OUTPT VISIT, EST, LEVL IV, 30-39 MIN: ICD-10-PCS | Mod: S$GLB,,, | Performed by: INTERNAL MEDICINE

## 2022-05-11 PROCEDURE — 1101F PT FALLS ASSESS-DOCD LE1/YR: CPT | Mod: CPTII,S$GLB,, | Performed by: INTERNAL MEDICINE

## 2022-05-11 PROCEDURE — 71046 X-RAY EXAM CHEST 2 VIEWS: CPT | Mod: TC,PO

## 2022-05-11 PROCEDURE — 1101F PR PT FALLS ASSESS DOC 0-1 FALLS W/OUT INJ PAST YR: ICD-10-PCS | Mod: CPTII,S$GLB,, | Performed by: INTERNAL MEDICINE

## 2022-05-11 PROCEDURE — 99214 OFFICE O/P EST MOD 30 MIN: CPT | Mod: S$GLB,,, | Performed by: INTERNAL MEDICINE

## 2022-05-11 PROCEDURE — 1160F PR REVIEW ALL MEDS BY PRESCRIBER/CLIN PHARMACIST DOCUMENTED: ICD-10-PCS | Mod: CPTII,S$GLB,, | Performed by: INTERNAL MEDICINE

## 2022-05-11 PROCEDURE — 99999 PR PBB SHADOW E&M-EST. PATIENT-LVL V: CPT | Mod: PBBFAC,,, | Performed by: INTERNAL MEDICINE

## 2022-05-11 PROCEDURE — 3079F PR MOST RECENT DIASTOLIC BLOOD PRESSURE 80-89 MM HG: ICD-10-PCS | Mod: CPTII,S$GLB,, | Performed by: INTERNAL MEDICINE

## 2022-05-11 PROCEDURE — 3074F SYST BP LT 130 MM HG: CPT | Mod: CPTII,S$GLB,, | Performed by: INTERNAL MEDICINE

## 2022-05-11 PROCEDURE — 1160F RVW MEDS BY RX/DR IN RCRD: CPT | Mod: CPTII,S$GLB,, | Performed by: INTERNAL MEDICINE

## 2022-05-11 PROCEDURE — 1126F PR PAIN SEVERITY QUANTIFIED, NO PAIN PRESENT: ICD-10-PCS | Mod: CPTII,S$GLB,, | Performed by: INTERNAL MEDICINE

## 2022-05-11 PROCEDURE — 3079F DIAST BP 80-89 MM HG: CPT | Mod: CPTII,S$GLB,, | Performed by: INTERNAL MEDICINE

## 2022-05-11 PROCEDURE — 3074F PR MOST RECENT SYSTOLIC BLOOD PRESSURE < 130 MM HG: ICD-10-PCS | Mod: CPTII,S$GLB,, | Performed by: INTERNAL MEDICINE

## 2022-05-11 PROCEDURE — 1159F MED LIST DOCD IN RCRD: CPT | Mod: CPTII,S$GLB,, | Performed by: INTERNAL MEDICINE

## 2022-05-11 PROCEDURE — 3288F PR FALLS RISK ASSESSMENT DOCUMENTED: ICD-10-PCS | Mod: CPTII,S$GLB,, | Performed by: INTERNAL MEDICINE

## 2022-05-11 PROCEDURE — 3288F FALL RISK ASSESSMENT DOCD: CPT | Mod: CPTII,S$GLB,, | Performed by: INTERNAL MEDICINE

## 2022-05-11 RX ADMIN — PEGFILGRASTIM 6 MG: 6 INJECTION SUBCUTANEOUS at 12:05

## 2022-05-11 NOTE — Clinical Note
Hold Rituxan scheduled for today. Administer Neulasta 6 mg subQ for correction of chemotherapy-induced neutropenia. Return to clinic 2 weeks from now with interval CBC, CMP, LDH and magnesium which time we hope to resume Rituxan if counts have recovered adequately. Patient to see me next visit.

## 2022-05-11 NOTE — PLAN OF CARE
Problem: Adult Inpatient Plan of Care  Goal: Patient-Specific Goal (Individualized)  Outcome: Ongoing, Progressing     Problem: Fall Injury Risk  Goal: Absence of Fall and Fall-Related Injury  Outcome: Ongoing, Progressing   Tolerated injection well today  Discharge instructions given and pt d/c to home per w/c  [facility]

## 2022-05-11 NOTE — TELEPHONE ENCOUNTER
Patient advised of cxr results. ----- Message from Rod Montero MD sent at 5/11/2022  3:08 PM CDT -----  Cxr looks much better

## 2022-05-11 NOTE — PROGRESS NOTES
History of present illness:  The patient is a 81-year-old white female who presents in consultation from Dr. Chavez regarding newly diagnosed non-Hodgkin's lymphoma.  Patient was in her usual state of health but developed severe/debilitating low pain and lumbar radiculopathy after scrubbing her screened porch.  An MRI was obtained and intra-abdominal lymphadenopathy as well as a complex left renal was noted.  Patient subsequently underwent dedicated CT scan of the abdomen pelvis as well as image guided needle biopsy of a right inguinal lymph node.  Patient was found have grade 1 follicular lymphoma.  As the patient was asymptomatic, she was placed on a watch wait strategy.    Patient completed 6 cycles of CVP-R and is receiving maintenance Rituxan every 8 weeks.     Patient returns to clinic earlier than scheduled follow-up to review results of interval biopsy of right lower lobe pulmonary nodule which was the only lesion that failed to regress since initiation of her therapy for lymphoma.  Unfortunately, the lesion returned positive for squamous cell carcinoma of the lung.  Patient continues to smoke.  Underwent EBUS and biopsy.  She had pathologically confirmed stage I disease and has completed definitive XRT to this lesion.  Patient tolerated therapy well with the exception of some minor fatigue and some shoulder pain from keeping her arms above her head during treatment.    Patient has completed 8/12 cycles of maintenance Rituxan.  She returns to clinic for interval re-evaluation prior to next cycle of maintenance therapy.  Since last evaluation, patient was diagnosed with right-sided pneumonitis and treated with antibiotics.  She reports that she has been slow to recover.    Physical examination:  Well-developed, obese, elderly, white female, in no acute distress, who has a weight of 195 lb (increased by 1.5 lb)  VITAL SIGNS: Documented  and reviewed this visit.  HEENT: Normocephalic, atraumatic. Oral mucosa  pink and moist. Lips without lesions. Tongue midline. Oropharynx clear. Nonicteric sclerae.   NECK: Supple, bilateral supraclavicular adenopathy right internal jugular chain lymphadenopathy.   HEART: Regular rate and rhythm without murmur, gallop or rub.   LUNGS:  Mild, bibasilar crackles bilaterally. Normal respiratory effort.   ABDOMEN: Soft, nontender, nondistended with positive normoactive bowel sounds, no hepatosplenomegaly.   EXTREMITIES: No cyanosis or clubbing appreciated.  Patient has 1+, bilateral ankle edema.  Distal pulses are intact.   AXILLAE AND GROIN:  Small, palpable left inguinal lymphadenopathy is appreciated.   SKIN: Intact/turgor normal.  Palpable subcutaneous nodules about the abdominal wall consistent with lymph node involvement.   NEUROLOGIC: Cranial nerves II-XII grossly intact. Motor: Good muscle bulk and tone. Strength/sensory 5/5 throughout. Gait stable.     Laboratory:  White count 1.9, hemoglobin 11.3, hematocrit 36.3, platelets 127, absolute neutrophil count is 100.  Sodium 142, potassium 3.8, chloride 108, CO2 23, BUN 22, creatinine 1.3, glucose 99, calcium 9, magnesium 1.5, liver function test within normal limits, LDH is 171, GFR is 39.     Impression:  1.  Grade 1 follicular non-Hodgkin's lymphoma - Stage Riley with excellent response to systemic therapy.  2.  Vitamin-D deficiency-improved on supplementation.  3. 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  4. Ongoing tobacco abuse.    5. Therapy associated leukopenia/neutropenia.  6. Right-sided pneumonitis-treated.    Plan:  1. Hold scheduled dose of Rituxan in light of severe neutropenia.  2. Neulasta 6 mg subQ today   3. Return to clinic in 2 weeks with interval CBC, CMP, LDH, and magnesium, prior to appointment.  If patient's counts respond appropriately, we will resume Rituxan at that visit.  4. Repeat chest x-ray to monitor for resolution of pneumonitis and review  results at earliest convenience.  5. Patient is aware to contact our office should she develop temperature a 100.4° or greater.       This note was created using voice recognition software and may contain grammatical errors.

## 2022-05-18 NOTE — PROGRESS NOTES
Suture Removal note:  CC: 79 y.o. female patient is here for suture removal.     HPI: Patient is s/p excision of melanoma from the R upper posterior arm on 1/2/20.  Patient reports no problems.    Skin, right posterior upper arm, excision:   -SCAR (POST-SURGICAL)   -NO RESIDUAL MALIGNANT MELANOMA IDENTIFIED   -ANGIOLIPOMA, INCIDENTAL    WOUND PE:  Sutures intact.  Wound healing well.  Good approximation of skin edges.  No signs or symptoms of infection.    IMPRESSION:  negative - margins clear.    PLAN:  Sutures removed today.  Continue wound care.    RTC: In 3 months.   
67

## 2022-05-25 ENCOUNTER — OFFICE VISIT (OUTPATIENT)
Dept: HEMATOLOGY/ONCOLOGY | Facility: CLINIC | Age: 82
End: 2022-05-25
Payer: MEDICARE

## 2022-05-25 ENCOUNTER — LAB VISIT (OUTPATIENT)
Dept: LAB | Facility: HOSPITAL | Age: 82
End: 2022-05-25
Attending: INTERNAL MEDICINE
Payer: MEDICARE

## 2022-05-25 VITALS
OXYGEN SATURATION: 93 % | SYSTOLIC BLOOD PRESSURE: 112 MMHG | TEMPERATURE: 96 F | RESPIRATION RATE: 16 BRPM | HEIGHT: 65 IN | BODY MASS INDEX: 32.32 KG/M2 | DIASTOLIC BLOOD PRESSURE: 75 MMHG | HEART RATE: 87 BPM | WEIGHT: 194 LBS

## 2022-05-25 DIAGNOSIS — J18.9 PNEUMONIA OF RIGHT UPPER LOBE DUE TO INFECTIOUS ORGANISM: ICD-10-CM

## 2022-05-25 DIAGNOSIS — Z72.0 TOBACCO ABUSE: ICD-10-CM

## 2022-05-25 DIAGNOSIS — E55.9 VITAMIN D DEFICIENCY: ICD-10-CM

## 2022-05-25 DIAGNOSIS — C34.11 MALIGNANT NEOPLASM OF RIGHT UPPER LOBE OF LUNG: ICD-10-CM

## 2022-05-25 DIAGNOSIS — D70.1 LEUKOPENIA DUE TO ANTINEOPLASTIC CHEMOTHERAPY: ICD-10-CM

## 2022-05-25 DIAGNOSIS — T45.1X5A LEUKOPENIA DUE TO ANTINEOPLASTIC CHEMOTHERAPY: ICD-10-CM

## 2022-05-25 DIAGNOSIS — E83.42 HYPOMAGNESEMIA: ICD-10-CM

## 2022-05-25 DIAGNOSIS — C82.00 FOLLICULAR LYMPHOMA GRADE I, UNSPECIFIED BODY REGION: ICD-10-CM

## 2022-05-25 DIAGNOSIS — C82.00 FOLLICULAR LYMPHOMA GRADE I, UNSPECIFIED BODY REGION: Primary | ICD-10-CM

## 2022-05-25 LAB
ALBUMIN SERPL BCP-MCNC: 3.7 G/DL (ref 3.5–5.2)
ALP SERPL-CCNC: 111 U/L (ref 55–135)
ALT SERPL W/O P-5'-P-CCNC: 9 U/L (ref 10–44)
ANION GAP SERPL CALC-SCNC: 11 MMOL/L (ref 8–16)
AST SERPL-CCNC: 17 U/L (ref 10–40)
BASOPHILS # BLD AUTO: 0.08 K/UL (ref 0–0.2)
BASOPHILS NFR BLD: 1 % (ref 0–1.9)
BILIRUB SERPL-MCNC: 0.3 MG/DL (ref 0.1–1)
BUN SERPL-MCNC: 23 MG/DL (ref 8–23)
CALCIUM SERPL-MCNC: 9.4 MG/DL (ref 8.7–10.5)
CHLORIDE SERPL-SCNC: 103 MMOL/L (ref 95–110)
CO2 SERPL-SCNC: 24 MMOL/L (ref 23–29)
CREAT SERPL-MCNC: 1.2 MG/DL (ref 0.5–1.4)
DIFFERENTIAL METHOD: ABNORMAL
EOSINOPHIL # BLD AUTO: 0.3 K/UL (ref 0–0.5)
EOSINOPHIL NFR BLD: 3.6 % (ref 0–8)
ERYTHROCYTE [DISTWIDTH] IN BLOOD BY AUTOMATED COUNT: 15.2 % (ref 11.5–14.5)
EST. GFR  (AFRICAN AMERICAN): 50 ML/MIN/1.73 M^2
EST. GFR  (NON AFRICAN AMERICAN): 43 ML/MIN/1.73 M^2
GLUCOSE SERPL-MCNC: 112 MG/DL (ref 70–110)
HCT VFR BLD AUTO: 37.8 % (ref 37–48.5)
HGB BLD-MCNC: 12.1 G/DL (ref 12–16)
IMM GRANULOCYTES # BLD AUTO: 0.06 K/UL (ref 0–0.04)
IMM GRANULOCYTES NFR BLD AUTO: 0.8 % (ref 0–0.5)
LDH SERPL L TO P-CCNC: 191 U/L (ref 110–260)
LYMPHOCYTES # BLD AUTO: 1.3 K/UL (ref 1–4.8)
LYMPHOCYTES NFR BLD: 16.3 % (ref 18–48)
MAGNESIUM SERPL-MCNC: 1.4 MG/DL (ref 1.6–2.6)
MCH RBC QN AUTO: 28.8 PG (ref 27–31)
MCHC RBC AUTO-ENTMCNC: 32 G/DL (ref 32–36)
MCV RBC AUTO: 90 FL (ref 82–98)
MONOCYTES # BLD AUTO: 0.6 K/UL (ref 0.3–1)
MONOCYTES NFR BLD: 8.2 % (ref 4–15)
NEUTROPHILS # BLD AUTO: 5.5 K/UL (ref 1.8–7.7)
NEUTROPHILS NFR BLD: 70.1 % (ref 38–73)
NRBC BLD-RTO: 0 /100 WBC
PLATELET # BLD AUTO: 150 K/UL (ref 150–450)
PMV BLD AUTO: 10.3 FL (ref 9.2–12.9)
POTASSIUM SERPL-SCNC: 4.8 MMOL/L (ref 3.5–5.1)
PROT SERPL-MCNC: 6.9 G/DL (ref 6–8.4)
RBC # BLD AUTO: 4.2 M/UL (ref 4–5.4)
SODIUM SERPL-SCNC: 138 MMOL/L (ref 136–145)
WBC # BLD AUTO: 7.79 K/UL (ref 3.9–12.7)

## 2022-05-25 PROCEDURE — 80053 COMPREHEN METABOLIC PANEL: CPT | Mod: PN | Performed by: INTERNAL MEDICINE

## 2022-05-25 PROCEDURE — 1159F PR MEDICATION LIST DOCUMENTED IN MEDICAL RECORD: ICD-10-PCS | Mod: CPTII,S$GLB,, | Performed by: INTERNAL MEDICINE

## 2022-05-25 PROCEDURE — 1159F MED LIST DOCD IN RCRD: CPT | Mod: CPTII,S$GLB,, | Performed by: INTERNAL MEDICINE

## 2022-05-25 PROCEDURE — 3074F SYST BP LT 130 MM HG: CPT | Mod: CPTII,S$GLB,, | Performed by: INTERNAL MEDICINE

## 2022-05-25 PROCEDURE — 3078F DIAST BP <80 MM HG: CPT | Mod: CPTII,S$GLB,, | Performed by: INTERNAL MEDICINE

## 2022-05-25 PROCEDURE — 1101F PT FALLS ASSESS-DOCD LE1/YR: CPT | Mod: CPTII,S$GLB,, | Performed by: INTERNAL MEDICINE

## 2022-05-25 PROCEDURE — 1126F PR PAIN SEVERITY QUANTIFIED, NO PAIN PRESENT: ICD-10-PCS | Mod: CPTII,S$GLB,, | Performed by: INTERNAL MEDICINE

## 2022-05-25 PROCEDURE — 85025 COMPLETE CBC W/AUTO DIFF WBC: CPT | Mod: PN | Performed by: INTERNAL MEDICINE

## 2022-05-25 PROCEDURE — 3288F FALL RISK ASSESSMENT DOCD: CPT | Mod: CPTII,S$GLB,, | Performed by: INTERNAL MEDICINE

## 2022-05-25 PROCEDURE — 99214 PR OFFICE/OUTPT VISIT, EST, LEVL IV, 30-39 MIN: ICD-10-PCS | Mod: S$GLB,,, | Performed by: INTERNAL MEDICINE

## 2022-05-25 PROCEDURE — 1101F PR PT FALLS ASSESS DOC 0-1 FALLS W/OUT INJ PAST YR: ICD-10-PCS | Mod: CPTII,S$GLB,, | Performed by: INTERNAL MEDICINE

## 2022-05-25 PROCEDURE — 99999 PR PBB SHADOW E&M-EST. PATIENT-LVL IV: CPT | Mod: PBBFAC,,, | Performed by: INTERNAL MEDICINE

## 2022-05-25 PROCEDURE — 83615 LACTATE (LD) (LDH) ENZYME: CPT | Mod: PN | Performed by: INTERNAL MEDICINE

## 2022-05-25 PROCEDURE — 36415 COLL VENOUS BLD VENIPUNCTURE: CPT | Mod: PN | Performed by: INTERNAL MEDICINE

## 2022-05-25 PROCEDURE — 3288F PR FALLS RISK ASSESSMENT DOCUMENTED: ICD-10-PCS | Mod: CPTII,S$GLB,, | Performed by: INTERNAL MEDICINE

## 2022-05-25 PROCEDURE — 99214 OFFICE O/P EST MOD 30 MIN: CPT | Mod: S$GLB,,, | Performed by: INTERNAL MEDICINE

## 2022-05-25 PROCEDURE — 1126F AMNT PAIN NOTED NONE PRSNT: CPT | Mod: CPTII,S$GLB,, | Performed by: INTERNAL MEDICINE

## 2022-05-25 PROCEDURE — 3078F PR MOST RECENT DIASTOLIC BLOOD PRESSURE < 80 MM HG: ICD-10-PCS | Mod: CPTII,S$GLB,, | Performed by: INTERNAL MEDICINE

## 2022-05-25 PROCEDURE — 99999 PR PBB SHADOW E&M-EST. PATIENT-LVL IV: ICD-10-PCS | Mod: PBBFAC,,, | Performed by: INTERNAL MEDICINE

## 2022-05-25 PROCEDURE — 3074F PR MOST RECENT SYSTOLIC BLOOD PRESSURE < 130 MM HG: ICD-10-PCS | Mod: CPTII,S$GLB,, | Performed by: INTERNAL MEDICINE

## 2022-05-25 PROCEDURE — 83735 ASSAY OF MAGNESIUM: CPT | Mod: PN | Performed by: INTERNAL MEDICINE

## 2022-05-25 RX ORDER — FAMOTIDINE 10 MG/ML
20 INJECTION INTRAVENOUS ONCE AS NEEDED
Status: CANCELLED
Start: 2022-05-26

## 2022-05-25 RX ORDER — SODIUM CHLORIDE 0.9 % (FLUSH) 0.9 %
10 SYRINGE (ML) INJECTION
Status: CANCELLED | OUTPATIENT
Start: 2022-05-26

## 2022-05-25 RX ORDER — ACETAMINOPHEN 500 MG
1000 TABLET ORAL
Status: CANCELLED | OUTPATIENT
Start: 2022-05-26

## 2022-05-25 RX ORDER — DIPHENHYDRAMINE HYDROCHLORIDE 50 MG/ML
50 INJECTION INTRAMUSCULAR; INTRAVENOUS ONCE AS NEEDED
Status: CANCELLED | OUTPATIENT
Start: 2022-05-26

## 2022-05-25 RX ORDER — MEPERIDINE HYDROCHLORIDE 50 MG/ML
25 INJECTION INTRAMUSCULAR; INTRAVENOUS; SUBCUTANEOUS EVERY 30 MIN PRN
Status: CANCELLED | OUTPATIENT
Start: 2022-05-26

## 2022-05-25 RX ORDER — HEPARIN 100 UNIT/ML
500 SYRINGE INTRAVENOUS
Status: CANCELLED | OUTPATIENT
Start: 2022-05-26

## 2022-05-25 RX ORDER — METHYLPREDNISOLONE SOD SUCC 125 MG
125 VIAL (EA) INJECTION ONCE AS NEEDED
Status: CANCELLED | OUTPATIENT
Start: 2022-05-26

## 2022-05-25 RX ORDER — MAGNESIUM SULFATE HEPTAHYDRATE 40 MG/ML
2 INJECTION, SOLUTION INTRAVENOUS
Status: CANCELLED
Start: 2022-05-26

## 2022-05-25 NOTE — Clinical Note
See me 8 weeks from now with interval CBC, CMP, LDH, magnesium, beta 2 microglobulin, and chest x-ray prior to appointment.

## 2022-05-25 NOTE — PROGRESS NOTES
History of present illness:  The patient is a 81-year-old white female who presents in consultation from Dr. Chavez regarding newly diagnosed non-Hodgkin's lymphoma.  Patient was in her usual state of health but developed severe/debilitating low pain and lumbar radiculopathy after scrubbing her screened porch.  An MRI was obtained and intra-abdominal lymphadenopathy as well as a complex left renal was noted.  Patient subsequently underwent dedicated CT scan of the abdomen pelvis as well as image guided needle biopsy of a right inguinal lymph node.  Patient was found have grade 1 follicular lymphoma.  As the patient was asymptomatic, she was placed on a watch wait strategy.    Patient completed 6 cycles of CVP-R and is receiving maintenance Rituxan every 8 weeks.     Patient returns to clinic earlier than scheduled follow-up to review results of interval biopsy of right lower lobe pulmonary nodule which was the only lesion that failed to regress since initiation of her therapy for lymphoma.  Unfortunately, the lesion returned positive for squamous cell carcinoma of the lung.  Patient continues to smoke.  Underwent EBUS and biopsy.  She had pathologically confirmed stage I disease and has completed definitive XRT to this lesion.  Patient tolerated therapy well with the exception of some minor fatigue and some shoulder pain from keeping her arms above her head during treatment.    Patient has completed 8/12 cycles of maintenance Rituxan.  Patient was diagnosed with right-sided pneumonitis and treated with antibiotics.  She reports that she has been slow to recover.  Last scheduled dose of maintenance Rituxan was held due to profound neutropenia.  Patient was treated with Neulasta and returns to clinic for 2 week interval follow-up in hopes of resuming therapy.    Physical examination:  Well-developed, obese, elderly, white female, in no acute distress, who has a weight of 194 lb (decreased by 2 lb)  VITAL SIGNS:  Documented  and reviewed this visit.  HEENT: Normocephalic, atraumatic. Oral mucosa pink and moist. Lips without lesions. Tongue midline. Oropharynx clear. Nonicteric sclerae.   NECK: Supple, bilateral supraclavicular adenopathy right internal jugular chain lymphadenopathy.   HEART: Regular rate and rhythm without murmur, gallop or rub.   LUNGS:  Mild, bibasilar crackles bilaterally. Normal respiratory effort.   ABDOMEN: Soft, nontender, nondistended with positive normoactive bowel sounds, no hepatosplenomegaly.   EXTREMITIES: No cyanosis or clubbing appreciated.  Patient has Tr bilateral ankle edema.  Distal pulses are intact.   AXILLAE AND GROIN:  Small, palpable left inguinal lymphadenopathy is appreciated.   SKIN: Intact/turgor normal.  Palpable subcutaneous nodules about the abdominal wall consistent with lymph node involvement.   NEUROLOGIC: Cranial nerves II-XII grossly intact. Motor: Good muscle bulk and tone. Strength/sensory 5/5 throughout. Gait stable.     Laboratory:  White count 7.8, hemoglobin 12.1, hematocrit 37.8, platelets 150, absolute neutrophil count 5500.  Sodium 138, potassium 4.8, chloride 103, CO2 24, BUN 23, creatinine 1.2, glucose 112, calcium 9.4, magnesium 1.4, liver function test within normal limits, LDH is 191, GFR is 43.    Chest x-ray:  Study performed 05/11/2022.  Improvement in the appearance of the chest with resolution of the confluent areas of opacification that was seen basilar on the prior exam.  Remains mild prominence of the interstitial markings in the lower lung field and right apical pleural thickening and stranding.    Impression:  1.  Grade 1 follicular non-Hodgkin's lymphoma - Stage Riley with excellent response to systemic therapy.  2.  Vitamin-D deficiency-improved on supplementation.  3. 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  4. Ongoing tobacco abuse.    5. Therapy associated  leukopenia/neutropenia-resolved.  6. Right-sided pneumonitis-treated/improving on most recent chest x-ray.  7. Hypomagnesemia.    Plan:  1.  Resume maintenance Rituxan as previously scheduled.  2. Return to clinic in 8 weeks with interval chest x-ray,  CBC, CMP, LDH, magnesium, beta 2, and vitamin-D prior to appointment.    3. Replace magnesium during patient's current Infusion visit.     This note was created using voice recognition software and may contain grammatical errors.

## 2022-05-26 ENCOUNTER — DOCUMENTATION ONLY (OUTPATIENT)
Dept: INFUSION THERAPY | Facility: HOSPITAL | Age: 82
End: 2022-05-26
Payer: MEDICARE

## 2022-05-26 ENCOUNTER — INFUSION (OUTPATIENT)
Dept: INFUSION THERAPY | Facility: HOSPITAL | Age: 82
End: 2022-05-26
Attending: INTERNAL MEDICINE
Payer: MEDICARE

## 2022-05-26 VITALS
WEIGHT: 194 LBS | HEART RATE: 81 BPM | DIASTOLIC BLOOD PRESSURE: 73 MMHG | RESPIRATION RATE: 16 BRPM | HEIGHT: 65 IN | SYSTOLIC BLOOD PRESSURE: 145 MMHG | OXYGEN SATURATION: 98 % | TEMPERATURE: 98 F | BODY MASS INDEX: 32.32 KG/M2

## 2022-05-26 DIAGNOSIS — C82.00 FOLLICULAR LYMPHOMA GRADE I, UNSPECIFIED BODY REGION: Primary | ICD-10-CM

## 2022-05-26 PROCEDURE — 63600175 PHARM REV CODE 636 W HCPCS: Mod: PN | Performed by: INTERNAL MEDICINE

## 2022-05-26 PROCEDURE — 25000003 PHARM REV CODE 250: Mod: PN | Performed by: INTERNAL MEDICINE

## 2022-05-26 PROCEDURE — 96415 CHEMO IV INFUSION ADDL HR: CPT | Mod: PN

## 2022-05-26 PROCEDURE — A4216 STERILE WATER/SALINE, 10 ML: HCPCS | Mod: PN | Performed by: INTERNAL MEDICINE

## 2022-05-26 PROCEDURE — 96413 CHEMO IV INFUSION 1 HR: CPT | Mod: PN

## 2022-05-26 PROCEDURE — 96367 TX/PROPH/DG ADDL SEQ IV INF: CPT | Mod: PN

## 2022-05-26 RX ORDER — SODIUM CHLORIDE 0.9 % (FLUSH) 0.9 %
10 SYRINGE (ML) INJECTION
Status: DISCONTINUED | OUTPATIENT
Start: 2022-05-26 | End: 2022-05-26 | Stop reason: HOSPADM

## 2022-05-26 RX ORDER — ACETAMINOPHEN 500 MG
1000 TABLET ORAL
Status: COMPLETED | OUTPATIENT
Start: 2022-05-26 | End: 2022-05-26

## 2022-05-26 RX ORDER — MAGNESIUM SULFATE HEPTAHYDRATE 40 MG/ML
2 INJECTION, SOLUTION INTRAVENOUS ONCE
Status: COMPLETED | OUTPATIENT
Start: 2022-05-26 | End: 2022-05-26

## 2022-05-26 RX ORDER — HEPARIN 100 UNIT/ML
500 SYRINGE INTRAVENOUS
Status: DISCONTINUED | OUTPATIENT
Start: 2022-05-26 | End: 2022-05-26 | Stop reason: HOSPADM

## 2022-05-26 RX ADMIN — ACETAMINOPHEN 1000 MG: 500 TABLET, FILM COATED ORAL at 10:05

## 2022-05-26 RX ADMIN — Medication 500 UNITS: at 02:05

## 2022-05-26 RX ADMIN — Medication 10 ML: at 02:05

## 2022-05-26 RX ADMIN — MAGNESIUM SULFATE IN WATER 2 G: 40 INJECTION, SOLUTION INTRAVENOUS at 09:05

## 2022-05-26 RX ADMIN — SODIUM CHLORIDE 700 MG: 0.9 INJECTION, SOLUTION INTRAVENOUS at 11:05

## 2022-05-26 RX ADMIN — DIPHENHYDRAMINE HYDROCHLORIDE 50 MG: 50 INJECTION, SOLUTION INTRAMUSCULAR; INTRAVENOUS at 11:05

## 2022-05-26 RX ADMIN — SODIUM CHLORIDE: 0.9 INJECTION, SOLUTION INTRAVENOUS at 09:05
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
## 2022-06-01 ENCOUNTER — OFFICE VISIT (OUTPATIENT)
Dept: PAIN MEDICINE | Facility: CLINIC | Age: 82
End: 2022-06-01
Payer: MEDICARE

## 2022-06-01 VITALS
HEIGHT: 66 IN | HEART RATE: 95 BPM | WEIGHT: 194.13 LBS | SYSTOLIC BLOOD PRESSURE: 115 MMHG | DIASTOLIC BLOOD PRESSURE: 63 MMHG | BODY MASS INDEX: 31.2 KG/M2

## 2022-06-01 DIAGNOSIS — M48.061 SPINAL STENOSIS OF LUMBAR REGION WITHOUT NEUROGENIC CLAUDICATION: ICD-10-CM

## 2022-06-01 DIAGNOSIS — M47.816 LUMBAR SPONDYLOSIS: ICD-10-CM

## 2022-06-01 DIAGNOSIS — M51.36 DDD (DEGENERATIVE DISC DISEASE), LUMBAR: Primary | ICD-10-CM

## 2022-06-01 PROCEDURE — 1160F PR REVIEW ALL MEDS BY PRESCRIBER/CLIN PHARMACIST DOCUMENTED: ICD-10-PCS | Mod: CPTII,S$GLB,, | Performed by: PHYSICIAN ASSISTANT

## 2022-06-01 PROCEDURE — 1125F PR PAIN SEVERITY QUANTIFIED, PAIN PRESENT: ICD-10-PCS | Mod: CPTII,S$GLB,, | Performed by: PHYSICIAN ASSISTANT

## 2022-06-01 PROCEDURE — 1101F PT FALLS ASSESS-DOCD LE1/YR: CPT | Mod: CPTII,S$GLB,, | Performed by: PHYSICIAN ASSISTANT

## 2022-06-01 PROCEDURE — 99999 PR PBB SHADOW E&M-EST. PATIENT-LVL IV: CPT | Mod: PBBFAC,,, | Performed by: PHYSICIAN ASSISTANT

## 2022-06-01 PROCEDURE — 3074F SYST BP LT 130 MM HG: CPT | Mod: CPTII,S$GLB,, | Performed by: PHYSICIAN ASSISTANT

## 2022-06-01 PROCEDURE — 3288F FALL RISK ASSESSMENT DOCD: CPT | Mod: CPTII,S$GLB,, | Performed by: PHYSICIAN ASSISTANT

## 2022-06-01 PROCEDURE — 99214 PR OFFICE/OUTPT VISIT, EST, LEVL IV, 30-39 MIN: ICD-10-PCS | Mod: S$GLB,,, | Performed by: PHYSICIAN ASSISTANT

## 2022-06-01 PROCEDURE — 1101F PR PT FALLS ASSESS DOC 0-1 FALLS W/OUT INJ PAST YR: ICD-10-PCS | Mod: CPTII,S$GLB,, | Performed by: PHYSICIAN ASSISTANT

## 2022-06-01 PROCEDURE — 99999 PR PBB SHADOW E&M-EST. PATIENT-LVL IV: ICD-10-PCS | Mod: PBBFAC,,, | Performed by: PHYSICIAN ASSISTANT

## 2022-06-01 PROCEDURE — 3078F PR MOST RECENT DIASTOLIC BLOOD PRESSURE < 80 MM HG: ICD-10-PCS | Mod: CPTII,S$GLB,, | Performed by: PHYSICIAN ASSISTANT

## 2022-06-01 PROCEDURE — 3074F PR MOST RECENT SYSTOLIC BLOOD PRESSURE < 130 MM HG: ICD-10-PCS | Mod: CPTII,S$GLB,, | Performed by: PHYSICIAN ASSISTANT

## 2022-06-01 PROCEDURE — 99214 OFFICE O/P EST MOD 30 MIN: CPT | Mod: S$GLB,,, | Performed by: PHYSICIAN ASSISTANT

## 2022-06-01 PROCEDURE — 1160F RVW MEDS BY RX/DR IN RCRD: CPT | Mod: CPTII,S$GLB,, | Performed by: PHYSICIAN ASSISTANT

## 2022-06-01 PROCEDURE — 3288F PR FALLS RISK ASSESSMENT DOCUMENTED: ICD-10-PCS | Mod: CPTII,S$GLB,, | Performed by: PHYSICIAN ASSISTANT

## 2022-06-01 PROCEDURE — 3078F DIAST BP <80 MM HG: CPT | Mod: CPTII,S$GLB,, | Performed by: PHYSICIAN ASSISTANT

## 2022-06-01 PROCEDURE — 1159F MED LIST DOCD IN RCRD: CPT | Mod: CPTII,S$GLB,, | Performed by: PHYSICIAN ASSISTANT

## 2022-06-01 PROCEDURE — 1125F AMNT PAIN NOTED PAIN PRSNT: CPT | Mod: CPTII,S$GLB,, | Performed by: PHYSICIAN ASSISTANT

## 2022-06-01 PROCEDURE — 1159F PR MEDICATION LIST DOCUMENTED IN MEDICAL RECORD: ICD-10-PCS | Mod: CPTII,S$GLB,, | Performed by: PHYSICIAN ASSISTANT

## 2022-06-02 ENCOUNTER — HOSPITAL ENCOUNTER (OUTPATIENT)
Dept: RADIOLOGY | Facility: HOSPITAL | Age: 82
Discharge: HOME | End: 2022-06-02
Attending: RADIOLOGY
Payer: MEDICARE

## 2022-06-02 DIAGNOSIS — C34.32 PRIMARY MALIGNANT NEOPLASM OF LEFT LOWER LOBE OF LUNG: ICD-10-CM

## 2022-06-02 DIAGNOSIS — C34.11 MALIGNANT NEOPLASM OF RIGHT UPPER LOBE OF LUNG: ICD-10-CM

## 2022-06-02 PROCEDURE — 71260 CT THORAX DX C+: CPT | Mod: 26,,, | Performed by: RADIOLOGY

## 2022-06-02 PROCEDURE — A9698 NON-RAD CONTRAST MATERIALNOC: HCPCS | Mod: PO | Performed by: RADIOLOGY

## 2022-06-02 PROCEDURE — 25500020 PHARM REV CODE 255: Mod: PO | Performed by: RADIOLOGY

## 2022-06-02 PROCEDURE — 71260 CT CHEST ABDOMEN PELVIS WITH CONTRAST (XPD): ICD-10-PCS | Mod: 26,,, | Performed by: RADIOLOGY

## 2022-06-02 PROCEDURE — 74177 CT CHEST ABDOMEN PELVIS WITH CONTRAST (XPD): ICD-10-PCS | Mod: 26,,, | Performed by: RADIOLOGY

## 2022-06-02 PROCEDURE — 71260 CT THORAX DX C+: CPT | Mod: TC,PO

## 2022-06-02 PROCEDURE — 74177 CT ABD & PELVIS W/CONTRAST: CPT | Mod: 26,,, | Performed by: RADIOLOGY

## 2022-06-02 PROCEDURE — 74177 CT ABD & PELVIS W/CONTRAST: CPT | Mod: TC,PO

## 2022-06-02 RX ADMIN — IOHEXOL 75 ML: 350 INJECTION, SOLUTION INTRAVENOUS at 01:06

## 2022-06-02 RX ADMIN — IOHEXOL 1000 ML: 12 SOLUTION ORAL at 01:06

## 2022-06-05 NOTE — H&P (VIEW-ONLY)
This note was completed with dictation software and grammatical errors may exist.    CC:  Low back and left leg pain    HPI: The patient is a 81-year-old female with past medical history significant for hypertension and hyperlipidemia who presents in referral from Lucita Gonzalez PA-C for low back and right leg pain.  She returns in follow-up today with continued back pain.  She describes mainly low back pain with some intermittent radiation to the lateral buttocks and left lateral thigh.  The pain is much worse with walking and improved with sitting.  She denies weakness or numbness.  She has been doing physical therapy with temporary benefit but continues to have pain.    Pain intervention history:   She is status post right L4/5 transforaminal epidural steroid injection on 11/17/17 with mild relief lasting a week, now reporting 0% relief.   She is status post caudal epidural steroid injection on 1/8/18 with 40% relief. She is status post left L3/4 transforaminal injection on 05/14/2020 with almost complete relief of her pain.   She is status post left L3/4 transforaminal epidural steroid injection on 03/23/2022 with 0% relief.      Spine surgeries: She underwent left L4/5 laminectomy for severe canal stenosis with Dr. Malloy in May 2017. PSIF L4-5 on 04/13/2018 Dr. Miller.    Antineuropathics:  NSAIDs:  Physical therapy: Dynamic  Antidepressants:  Muscle relaxers:  Opioids:  Antiplatelets/Anticoagulants:    ROS:  The patient reports back pain only.  Balance of review of systems is negative.    Past Medical History:   Diagnosis Date    Breast cancer 1985    right mastectomy    Digestive disorder     Encounter for blood transfusion     Hypercholesterolemia     Hypertension     Hypertension     Indigestion     Lumbar spondylosis     Lymphoma     Osteopenia     Pulmonary nodule     Smoker     Current        Past Surgical History:   Procedure Laterality Date    APPENDECTOMY      BONE MARROW BIOPSY Bilateral  6/24/2020    Procedure: Biopsy-bone marrow;  Surgeon: Rod Montero MD;  Location: [facility] OR;  Service: Oncology;  Laterality: Bilateral;    BREAST RECONSTRUCTION  1990    right    CATARACT EXTRACTION W/  INTRAOCULAR LENS IMPLANT Bilateral     CHOLECYSTECTOMY      ENDOBRONCHIAL ULTRASOUND Bilateral 7/9/2021    Procedure: ENDOBRONCHIAL ULTRASOUND (EBUS);  Surgeon: Gregorio Kinney MD;  Location: [facility];  Service: Pulmonary;  Laterality: Bilateral;  NEED LMA to  eval right upper paratracheal LN.     EYE SURGERY      INSERTION OF TUNNELED CENTRAL VENOUS CATHETER (CVC) WITH SUBCUTANEOUS PORT Left 11/4/2020    Procedure: [id number];  Surgeon: Kody Pretty MD;  Location: [facility] OR;  Service: General;  Laterality: Left;    JOINT REPLACEMENT  2008    right knee     LUMBAR LAMINECTOMY      LYMPH NODE BIOPSY      MASTECTOMY Right 1985    NEEDLE LOCALIZATION N/A 5/25/2020    Procedure: NEEDLE LOCALIZATION - lymph node bx;  Surgeon: Steve Weaver MD;  Location: [facility] CATH;  Service: Interventional Radiology;  Laterality: N/A;    TOTAL KNEE ARTHROPLASTY Right     TRANSFORAMINAL EPIDURAL INJECTION OF STEROID Left 5/14/2020    Procedure: Injection,steroid,epidural,transforaminal approach, l3/4;  Surgeon: Ronnell Baeza MD;  Location: [facility] OR;  Service: Pain Management;  Laterality: Left;    TRANSFORAMINAL EPIDURAL INJECTION OF STEROID Left 3/23/2022    Procedure: Injection,steroid,epidural,transforaminal approach L3/4;  Surgeon: Ronnell Baeza MD;  Location: [facility] OR;  Service: Pain Management;  Laterality: Left;    TUBAL LIGATION      UMBILICAL HERNIA REPAIR         Social History     Socioeconomic History    Marital status:     Number of children: 3   Occupational History    Occupation: retired  for [employer]   Tobacco Use    Smoking status: Current Every Day Smoker     Packs/day: 0.50     Years: 53.00     Pack years: 26.50     Types: Cigarettes    Smokeless tobacco:  "Never Used   Substance and Sexual Activity    Alcohol use: No    Drug use: No    Sexual activity: Not Currently   Social History Narrative    Lives alone        Hobbies: skyler        From [place]             Medications/Allergies: See med card    Vitals:    22 1305   BP: 115/63   Pulse: 95   Weight: 88 kg (194 lb 1.8 oz)   Height: 5' 6" (1.676 m)   PainSc:   5   PainLoc: Back         Physical exam:  Gen: A and O x3, pleasant, well-groomed  Skin: No rashes or obvious lesions  HEENT: PERRLA, no obvious deformities on ears or in canals. Trachea midline.  CVS: Regular rate and rhythm, normal palpable pulses.  Resp:No increased work of breathing, symmetrical chest rise.  Abdomen: Soft, NT/ND.  Musculoskeletal:  Slow cautious gait.    Neuro:  Lower extremities: 5/5 strength bilaterally, 4+/5 left hip flexion  Reflexes: Patellar 2+ left side, 0+ right side, Achilles 2+ bilaterally.  Sensory:  Intact and symmetrical to light touch and pinprick in L2-S1 dermatomes bilaterally.    Lumbar spine:  Lumbar spine: ROM is moderately limited with flexion, extension and  oblique extension with mild low back pain with extension.    Clifton's test causes no increased pain on either side.    Supine straight leg raise causes left anterior thigh pain.  Internal and external rotation of the hip causes no increased pain on either side.  Myofascial exam: No tenderness to palpation across lumbar paraspinous muscles.      Imagin20 MRI L-spine:  There is extensive multilevel degenerative change in addition to postsurgical changes.  There are postsurgical changes of posterior instrumented fusion and decompression of L4 through S1.  There is some degree of disc space narrowing, disc bulge with osteophytic ridging with a without superimposed disc protrusion or disc extrusion in addition to facet joint arthropathy.  Also, there is 5 mm anterolisthesis of L4 on L5 which is without definite change compared to plain films dated " 06/27/2018 but has progressed compared to prior preoperative MRI.  The pertinent findings are summarized below.  2. There is retroperitoneal lymphadenopathy which is incompletely included and evaluated.  Further evaluation with CT abdomen and pelvis is recommended.  3. At the L3-4 level there is a disc bulge with superimposed left-sided disc extrusion with cephalad extension contributing to severe left lateral recess and foraminal stenosis, severe spinal stenosis and mild right foraminal stenosis.  The findings have progressed.  4. At the T11-12 level there is moderate-to-marked right foraminal stenosis without spinal stenosis.  5. At the L2-3 level there has been interval progression of degenerative change resulting in moderate left lateral recess stenosis and severe left foraminal stenosis.  6. At the postoperative L4-5 level there is mild-to-moderate right foraminal stenosis without spinal stenosis status post decompression.  7. At the L5-S1 level there is mild crowding of the left lateral recess with mild interval improvement.  There is moderate left and mild-to-moderate right foraminal stenosis without spinal stenosis.        Assessment: The patient is a 81-year-old female with past medical history significant for hypertension and hyperlipidemia who presents in referral from Lucita Gonzalez PA-C for low back and right leg pain.    1. DDD (degenerative disc disease), lumbar  MRI Lumbar Spine Without Contrast   2. Spinal stenosis of lumbar region without neurogenic claudication     3. Lumbar spondylosis           Plan:  1. Since she continues to have pain following interventional procedures and physical therapy I am going to update her lumbar spine MRI for further evaluation.  We will call her with results and recommendations.  We may consider diagnostic medial branch nerve blocks.  Lastly, we may have her see Neurosurgery again prior to considering spinal cord stimulation.  I have given her information from  [employer].  She states that her grandson works for this company as well.    The total time spent for evaluation and management on 6/1/22 including reviewing separately obtained history, performing a medically appropriate exam and evaluation, documenting clinical information in the health record, independently interpreting results and communicating them to the patient/family/caregiver, and ordering medications/tests/procedures was between 30-39 minutes.

## 2022-06-05 NOTE — PROGRESS NOTES
This note was completed with dictation software and grammatical errors may exist.    CC:  Low back and left leg pain    HPI: The patient is a 81-year-old female with past medical history significant for hypertension and hyperlipidemia who presents in referral from Lucita Gonzalez PA-C for low back and right leg pain.  She returns in follow-up today with continued back pain.  She describes mainly low back pain with some intermittent radiation to the lateral buttocks and left lateral thigh.  The pain is much worse with walking and improved with sitting.  She denies weakness or numbness.  She has been doing physical therapy with temporary benefit but continues to have pain.    Pain intervention history:   She is status post right L4/5 transforaminal epidural steroid injection on 11/17/17 with mild relief lasting a week, now reporting 0% relief.   She is status post caudal epidural steroid injection on 1/8/18 with 40% relief. She is status post left L3/4 transforaminal injection on 05/14/2020 with almost complete relief of her pain.   She is status post left L3/4 transforaminal epidural steroid injection on 03/23/2022 with 0% relief.      Spine surgeries: She underwent left L4/5 laminectomy for severe canal stenosis with Dr. Malloy in May 2017. PSIF L4-5 on 04/13/2018 Dr. Miller.    Antineuropathics:  NSAIDs:  Physical therapy: Dynamic  Antidepressants:  Muscle relaxers:  Opioids:  Antiplatelets/Anticoagulants:    ROS:  The patient reports back pain only.  Balance of review of systems is negative.    Past Medical History:   Diagnosis Date    Breast cancer 1985    right mastectomy    Digestive disorder     Encounter for blood transfusion     Hypercholesterolemia     Hypertension     Hypertension     Indigestion     Lumbar spondylosis     Lymphoma     Osteopenia     Pulmonary nodule     Smoker     Current        Past Surgical History:   Procedure Laterality Date    APPENDECTOMY      BONE MARROW BIOPSY Bilateral  6/24/2020    Procedure: Biopsy-bone marrow;  Surgeon: Rod Montero MD;  Location: [facility] OR;  Service: Oncology;  Laterality: Bilateral;    BREAST RECONSTRUCTION  1990    right    CATARACT EXTRACTION W/  INTRAOCULAR LENS IMPLANT Bilateral     CHOLECYSTECTOMY      ENDOBRONCHIAL ULTRASOUND Bilateral 7/9/2021    Procedure: ENDOBRONCHIAL ULTRASOUND (EBUS);  Surgeon: Gregorio Kinney MD;  [facility];  Service: Pulmonary;  Laterality: Bilateral;  NEED LMA to  eval right upper paratracheal LN.     EYE SURGERY      INSERTION OF TUNNELED CENTRAL VENOUS CATHETER (CVC) WITH SUBCUTANEOUS PORT Left 11/4/2020    Procedure: [id number];  Surgeon: Kody Pretty MD;  Location: [facility] OR;  Service: General;  Laterality: Left;    JOINT REPLACEMENT  2008    right knee     LUMBAR LAMINECTOMY      LYMPH NODE BIOPSY      MASTECTOMY Right 1985    NEEDLE LOCALIZATION N/A 5/25/2020    Procedure: NEEDLE LOCALIZATION - lymph node bx;  Surgeon: Steve Weaver MD;  Location: [facility] CATH;  Service: Interventional Radiology;  Laterality: N/A;    TOTAL KNEE ARTHROPLASTY Right     TRANSFORAMINAL EPIDURAL INJECTION OF STEROID Left 5/14/2020    Procedure: Injection,steroid,epidural,transforaminal approach, l3/4;  Surgeon: Ronnell Baeza MD;  Location: [facility] OR;  Service: Pain Management;  Laterality: Left;    TRANSFORAMINAL EPIDURAL INJECTION OF STEROID Left 3/23/2022    Procedure: Injection,steroid,epidural,transforaminal approach L3/4;  Surgeon: Ronnell Baeza MD;  Location: [facility] OR;  Service: Pain Management;  Laterality: Left;    TUBAL LIGATION      UMBILICAL HERNIA REPAIR         Social History     Socioeconomic History    Marital status:     Number of children: 3   Occupational History    Occupation: retired  for [employer]   Tobacco Use    Smoking status: Current Every Day Smoker     Packs/day: 0.50     Years: 53.00     Pack years: 26.50     Types: Cigarettes    Smokeless tobacco:  "Never Used   Substance and Sexual Activity    Alcohol use: No    Drug use: No    Sexual activity: Not Currently   Social History Narrative    Lives alone        Hobbies: skyler        From [place]             Medications/Allergies: See med card    Vitals:    22 1305   BP: 115/63   Pulse: 95   Weight: 88 kg (194 lb 1.8 oz)   Height: 5' 6" (1.676 m)   PainSc:   5   PainLoc: Back         Physical exam:  Gen: A and O x3, pleasant, well-groomed  Skin: No rashes or obvious lesions  HEENT: PERRLA, no obvious deformities on ears or in canals. Trachea midline.  CVS: Regular rate and rhythm, normal palpable pulses.  Resp:No increased work of breathing, symmetrical chest rise.  Abdomen: Soft, NT/ND.  Musculoskeletal:  Slow cautious gait.    Neuro:  Lower extremities: 5/5 strength bilaterally, 4+/5 left hip flexion  Reflexes: Patellar 2+ left side, 0+ right side, Achilles 2+ bilaterally.  Sensory:  Intact and symmetrical to light touch and pinprick in L2-S1 dermatomes bilaterally.    Lumbar spine:  Lumbar spine: ROM is moderately limited with flexion, extension and  oblique extension with mild low back pain with extension.    Clifton's test causes no increased pain on either side.    Supine straight leg raise causes left anterior thigh pain.  Internal and external rotation of the hip causes no increased pain on either side.  Myofascial exam: No tenderness to palpation across lumbar paraspinous muscles.      Imagin20 MRI L-spine:  There is extensive multilevel degenerative change in addition to postsurgical changes.  There are postsurgical changes of posterior instrumented fusion and decompression of L4 through S1.  There is some degree of disc space narrowing, disc bulge with osteophytic ridging with a without superimposed disc protrusion or disc extrusion in addition to facet joint arthropathy.  Also, there is 5 mm anterolisthesis of L4 on L5 which is without definite change compared to plain films dated " 06/27/2018 but has progressed compared to prior preoperative MRI.  The pertinent findings are summarized below.  2. There is retroperitoneal lymphadenopathy which is incompletely included and evaluated.  Further evaluation with CT abdomen and pelvis is recommended.  3. At the L3-4 level there is a disc bulge with superimposed left-sided disc extrusion with cephalad extension contributing to severe left lateral recess and foraminal stenosis, severe spinal stenosis and mild right foraminal stenosis.  The findings have progressed.  4. At the T11-12 level there is moderate-to-marked right foraminal stenosis without spinal stenosis.  5. At the L2-3 level there has been interval progression of degenerative change resulting in moderate left lateral recess stenosis and severe left foraminal stenosis.  6. At the postoperative L4-5 level there is mild-to-moderate right foraminal stenosis without spinal stenosis status post decompression.  7. At the L5-S1 level there is mild crowding of the left lateral recess with mild interval improvement.  There is moderate left and mild-to-moderate right foraminal stenosis without spinal stenosis.        Assessment: The patient is a 81-year-old female with past medical history significant for hypertension and hyperlipidemia who presents in referral from Lucita Gonzalez PA-C for low back and right leg pain.    1. DDD (degenerative disc disease), lumbar  MRI Lumbar Spine Without Contrast   2. Spinal stenosis of lumbar region without neurogenic claudication     3. Lumbar spondylosis           Plan:  1. Since she continues to have pain following interventional procedures and physical therapy I am going to update her lumbar spine MRI for further evaluation.  We will call her with results and recommendations.  We may consider diagnostic medial branch nerve blocks.  Lastly, we may have her see Neurosurgery again prior to considering spinal cord stimulation.  I have given her information from  [employer].  She states that her grandson works for this company as well.    The total time spent for evaluation and management on 6/1/22 including reviewing separately obtained history, performing a medically appropriate exam and evaluation, documenting clinical information in the health record, independently interpreting results and communicating them to the patient/family/caregiver, and ordering medications/tests/procedures was between 30-39 minutes.

## 2022-06-06 ENCOUNTER — HOSPITAL ENCOUNTER (OUTPATIENT)
Dept: RADIOLOGY | Facility: HOSPITAL | Age: 82
Discharge: HOME | End: 2022-06-06
Attending: PHYSICIAN ASSISTANT
Payer: MEDICARE

## 2022-06-06 ENCOUNTER — TELEPHONE (OUTPATIENT)
Dept: PAIN MEDICINE | Facility: CLINIC | Age: 82
End: 2022-06-06
Payer: MEDICARE

## 2022-06-06 DIAGNOSIS — M51.36 DDD (DEGENERATIVE DISC DISEASE), LUMBAR: Primary | ICD-10-CM

## 2022-06-06 DIAGNOSIS — M51.36 DDD (DEGENERATIVE DISC DISEASE), LUMBAR: ICD-10-CM

## 2022-06-06 PROCEDURE — 72148 MRI LUMBAR SPINE W/O DYE: CPT | Mod: 26,,, | Performed by: RADIOLOGY

## 2022-06-06 PROCEDURE — 72148 MRI LUMBAR SPINE W/O DYE: CPT | Mod: TC,PO

## 2022-06-06 PROCEDURE — 72148 MRI LUMBAR SPINE WITHOUT CONTRAST: ICD-10-PCS | Mod: 26,,, | Performed by: RADIOLOGY

## 2022-06-06 NOTE — TELEPHONE ENCOUNTER
Spoke to patient regarding lumbar spine MRI results.  She has worsening stenosis and a left facet joint cyst at L3/4 above her fusion.  I placed a referral to Neurosurgery.  Please set up a consult.  We also discussed that she has an abnormality in her right kidney and is going to contact Dr. Montero to discuss further.

## 2022-06-08 ENCOUNTER — OFFICE VISIT (OUTPATIENT)
Dept: RADIATION ONCOLOGY | Facility: CLINIC | Age: 82
End: 2022-06-08
Payer: MEDICARE

## 2022-06-08 VITALS
BODY MASS INDEX: 31.77 KG/M2 | RESPIRATION RATE: 16 BRPM | WEIGHT: 190.69 LBS | OXYGEN SATURATION: 92 % | SYSTOLIC BLOOD PRESSURE: 143 MMHG | DIASTOLIC BLOOD PRESSURE: 87 MMHG | HEART RATE: 88 BPM | HEIGHT: 65 IN

## 2022-06-08 DIAGNOSIS — C34.32 PRIMARY MALIGNANT NEOPLASM OF LEFT LOWER LOBE OF LUNG: ICD-10-CM

## 2022-06-08 DIAGNOSIS — C82.01 GRADE 1 FOLLICULAR LYMPHOMA OF LYMPH NODES OF NECK: ICD-10-CM

## 2022-06-08 DIAGNOSIS — C34.11 MALIGNANT NEOPLASM OF RIGHT UPPER LOBE OF LUNG: Primary | ICD-10-CM

## 2022-06-08 DIAGNOSIS — C82.00 FOLLICULAR LYMPHOMA GRADE I, UNSPECIFIED BODY REGION: ICD-10-CM

## 2022-06-08 PROCEDURE — 99215 PR OFFICE/OUTPT VISIT, EST, LEVL V, 40-54 MIN: ICD-10-PCS | Mod: S$GLB,,, | Performed by: RADIOLOGY

## 2022-06-08 PROCEDURE — 1101F PT FALLS ASSESS-DOCD LE1/YR: CPT | Mod: CPTII,S$GLB,, | Performed by: RADIOLOGY

## 2022-06-08 PROCEDURE — 99999 PR PBB SHADOW E&M-EST. PATIENT-LVL IV: ICD-10-PCS | Mod: PBBFAC,,, | Performed by: RADIOLOGY

## 2022-06-08 PROCEDURE — 1159F MED LIST DOCD IN RCRD: CPT | Mod: CPTII,S$GLB,, | Performed by: RADIOLOGY

## 2022-06-08 PROCEDURE — 1101F PR PT FALLS ASSESS DOC 0-1 FALLS W/OUT INJ PAST YR: ICD-10-PCS | Mod: CPTII,S$GLB,, | Performed by: RADIOLOGY

## 2022-06-08 PROCEDURE — 3079F PR MOST RECENT DIASTOLIC BLOOD PRESSURE 80-89 MM HG: ICD-10-PCS | Mod: CPTII,S$GLB,, | Performed by: RADIOLOGY

## 2022-06-08 PROCEDURE — 3288F PR FALLS RISK ASSESSMENT DOCUMENTED: ICD-10-PCS | Mod: CPTII,S$GLB,, | Performed by: RADIOLOGY

## 2022-06-08 PROCEDURE — 3077F PR MOST RECENT SYSTOLIC BLOOD PRESSURE >= 140 MM HG: ICD-10-PCS | Mod: CPTII,S$GLB,, | Performed by: RADIOLOGY

## 2022-06-08 PROCEDURE — 3077F SYST BP >= 140 MM HG: CPT | Mod: CPTII,S$GLB,, | Performed by: RADIOLOGY

## 2022-06-08 PROCEDURE — 99215 OFFICE O/P EST HI 40 MIN: CPT | Mod: S$GLB,,, | Performed by: RADIOLOGY

## 2022-06-08 PROCEDURE — 3079F DIAST BP 80-89 MM HG: CPT | Mod: CPTII,S$GLB,, | Performed by: RADIOLOGY

## 2022-06-08 PROCEDURE — 1125F PR PAIN SEVERITY QUANTIFIED, PAIN PRESENT: ICD-10-PCS | Mod: CPTII,S$GLB,, | Performed by: RADIOLOGY

## 2022-06-08 PROCEDURE — 1159F PR MEDICATION LIST DOCUMENTED IN MEDICAL RECORD: ICD-10-PCS | Mod: CPTII,S$GLB,, | Performed by: RADIOLOGY

## 2022-06-08 PROCEDURE — 3288F FALL RISK ASSESSMENT DOCD: CPT | Mod: CPTII,S$GLB,, | Performed by: RADIOLOGY

## 2022-06-08 PROCEDURE — 1125F AMNT PAIN NOTED PAIN PRSNT: CPT | Mod: CPTII,S$GLB,, | Performed by: RADIOLOGY

## 2022-06-08 PROCEDURE — 99999 PR PBB SHADOW E&M-EST. PATIENT-LVL IV: CPT | Mod: PBBFAC,,, | Performed by: RADIOLOGY

## 2022-06-08 RX ORDER — BUPROPION HYDROCHLORIDE 150 MG/1
150 TABLET, EXTENDED RELEASE ORAL 2 TIMES DAILY
Qty: 60 TABLET | Refills: 1 | Status: SHIPPED | OUTPATIENT
Start: 2022-06-08 | End: 2022-10-13

## 2022-06-08 NOTE — PROGRESS NOTES
[facility] Radiation [facility]  Follow Up Visit Note    Diagnosis:  Shandra Velez is a 81 y.o. 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  She was last seen in our clinic on 2/2/22.   Interval CT chest, abdomen, and pelvis 6/2/22 noted increase in size of a precarinal lymph node to 1.8cm (prev 1.5cm); no other enlarged lymph nodes. NEW 6mm LEFT upper lobe nodule, concerning.  Residual 1.8 x 1.5cm RIGHT apical mass with extensive surrounding post-radiation therapy fibrosis, additional unchaged RIGHT apical 8mm lesion; Stable wedge-shaped LLL medial atelectasis/fibrosis without visible nodule; increase in size of RIGHT lower renal mass, now 3.1cm (2.0cm in 5/2020) Has gone down to 3-4 cigarettes per day..    Review of Systems   Review of Systems   Constitutional: Negative.    HENT: Negative.    Eyes: Negative.    Respiratory: Negative.    Cardiovascular: Negative.    Gastrointestinal: Negative.    Genitourinary: Negative.    Musculoskeletal: Positive for back pain (recent MRI spine shows significant degernative changes).   Skin: Negative.    Neurological: Negative.    Psychiatric/Behavioral: Positive for depression.       Social History:  Social History     Tobacco Use    Smoking status: Current Every Day Smoker     Packs/day: 0.50     Years: 53.00     Pack years: 26.50     Types: Cigarettes    Smokeless tobacco: Never Used   Substance Use Topics    Alcohol use: No    Drug use: No       Family History:  Cancer-related family history includes Breast cancer (age  of onset: 42) in her other; Cancer in her sister and sister.    Physical Exam  Vitals reviewed.   Constitutional:       General: She is not in acute distress.     Appearance: Normal appearance. She is not ill-appearing or toxic-appearing.   HENT:      Head: Normocephalic and atraumatic.      Nose: Nose normal.   Eyes:      Extraocular Movements: Extraocular movements intact.      Conjunctiva/sclera: Conjunctivae normal.      Pupils: Pupils are equal, round, and reactive to light.   Pulmonary:      Effort: Pulmonary effort is normal. No respiratory distress.   Musculoskeletal:         General: Normal range of motion.      Cervical back: Normal range of motion.   Skin:     General: Skin is warm.   Neurological:      General: No focal deficit present.      Mental Status: She is alert and oriented to person, place, and time.      Gait: Gait normal.   Psychiatric:         Mood and Affect: Mood normal.         Behavior: Behavior normal.         Thought Content: Thought content normal.         Judgment: Judgment normal.         Imaging:  CT chest, abdomen, and pelvis 6/2/22 reveiwed with findings detailed above      Assessment:   Recovered well.  Resolution of LLL treated mass, significant improvement in right upper lobe treated mass; residual post-treatment fibrosis   New concerning findings on interval CT scan (LLL, precarinal lymph node, R kidney)   ECOG: (0) Fully active, able to carry on all predisease performance without restriction    Plan:   Follow up in 1 week for discussion of TB recs   Present at upcoming Pulm TB 6/14/22 for discussion of CT findings   Rx Bupropion 150 BID (start once daily x3 days then increase) for depression and smoking cessation    Smoking cessation counselled (will stop 5-7 days after starting Bupropion)   She was given our contact information, and she was told that she could call our clinic at anytime if she has any questions or concerns.   Follow up with other providers as  directed

## 2022-06-14 ENCOUNTER — OFFICE VISIT (OUTPATIENT)
Dept: NEUROSURGERY | Facility: CLINIC | Age: 82
End: 2022-06-14
Payer: MEDICARE

## 2022-06-14 ENCOUNTER — DOCUMENTATION ONLY (OUTPATIENT)
Dept: HEMATOLOGY/ONCOLOGY | Facility: CLINIC | Age: 82
End: 2022-06-14
Payer: MEDICARE

## 2022-06-14 ENCOUNTER — TELEPHONE (OUTPATIENT)
Dept: PAIN MEDICINE | Facility: CLINIC | Age: 82
End: 2022-06-14

## 2022-06-14 VITALS
RESPIRATION RATE: 18 BRPM | BODY MASS INDEX: 31.77 KG/M2 | SYSTOLIC BLOOD PRESSURE: 147 MMHG | DIASTOLIC BLOOD PRESSURE: 92 MMHG | HEART RATE: 77 BPM | HEIGHT: 65 IN | WEIGHT: 190.69 LBS

## 2022-06-14 DIAGNOSIS — M48.062 SPINAL STENOSIS OF LUMBAR REGION WITH NEUROGENIC CLAUDICATION: Primary | ICD-10-CM

## 2022-06-14 DIAGNOSIS — M51.36 DDD (DEGENERATIVE DISC DISEASE), LUMBAR: ICD-10-CM

## 2022-06-14 PROCEDURE — 1125F PR PAIN SEVERITY QUANTIFIED, PAIN PRESENT: ICD-10-PCS | Mod: CPTII,S$GLB,, | Performed by: NEUROLOGICAL SURGERY

## 2022-06-14 PROCEDURE — 1101F PT FALLS ASSESS-DOCD LE1/YR: CPT | Mod: CPTII,S$GLB,, | Performed by: NEUROLOGICAL SURGERY

## 2022-06-14 PROCEDURE — 3288F PR FALLS RISK ASSESSMENT DOCUMENTED: ICD-10-PCS | Mod: CPTII,S$GLB,, | Performed by: NEUROLOGICAL SURGERY

## 2022-06-14 PROCEDURE — 3288F FALL RISK ASSESSMENT DOCD: CPT | Mod: CPTII,S$GLB,, | Performed by: NEUROLOGICAL SURGERY

## 2022-06-14 PROCEDURE — 3077F PR MOST RECENT SYSTOLIC BLOOD PRESSURE >= 140 MM HG: ICD-10-PCS | Mod: CPTII,S$GLB,, | Performed by: NEUROLOGICAL SURGERY

## 2022-06-14 PROCEDURE — 1125F AMNT PAIN NOTED PAIN PRSNT: CPT | Mod: CPTII,S$GLB,, | Performed by: NEUROLOGICAL SURGERY

## 2022-06-14 PROCEDURE — 99214 PR OFFICE/OUTPT VISIT, EST, LEVL IV, 30-39 MIN: ICD-10-PCS | Mod: S$GLB,,, | Performed by: NEUROLOGICAL SURGERY

## 2022-06-14 PROCEDURE — 3080F DIAST BP >= 90 MM HG: CPT | Mod: CPTII,S$GLB,, | Performed by: NEUROLOGICAL SURGERY

## 2022-06-14 PROCEDURE — 3080F PR MOST RECENT DIASTOLIC BLOOD PRESSURE >= 90 MM HG: ICD-10-PCS | Mod: CPTII,S$GLB,, | Performed by: NEUROLOGICAL SURGERY

## 2022-06-14 PROCEDURE — 1101F PR PT FALLS ASSESS DOC 0-1 FALLS W/OUT INJ PAST YR: ICD-10-PCS | Mod: CPTII,S$GLB,, | Performed by: NEUROLOGICAL SURGERY

## 2022-06-14 PROCEDURE — 99214 OFFICE O/P EST MOD 30 MIN: CPT | Mod: S$GLB,,, | Performed by: NEUROLOGICAL SURGERY

## 2022-06-14 PROCEDURE — 3077F SYST BP >= 140 MM HG: CPT | Mod: CPTII,S$GLB,, | Performed by: NEUROLOGICAL SURGERY

## 2022-06-14 NOTE — PROGRESS NOTES
[facility]      THORACIC MULTIDISCIPLINARY TUMOR BOARD  PATIENT REVIEW FORM  ________________________________________________________________________    CLINIC #: [id number]  DATE: 06/14/2022    TUMOR SITE:   LLL mass     PRESENTER:   Dr. Webber       PATIENT SUMMARY:   71 y/o with hx of multiple malignancies  Lt breast CA 1980's HUMAIRA  2019: shave bx RUE melanoma   Stage Riley low grade follicular lymphoma; treated  Renal lesion being followed  June 2021- New RUL mass--> squamous cell   SBRT to RUL mass 8/2021  10/2021: Follow up imaging found to have increasing lesion LLL; patient did not wish to have repeat bx  Treated empirically with XRT   Continues on maintenance Rituximab for NHL; follows with Dr. Montero   CT chest: New ISAIAS nodule, stable right apical mass, RUL nodule, enlarged precarinal lymph node stable. Right kidney with 3 cm mass; enlarged from previous scans       BOARD RECOMMENDATIONS:   EBUS--> send for FLOW,  refer to urology after EBUS  Add PDL testing    CONSULT NEEDED:     [] Surgery    [] Hem/Onc    [] Rad/Onc    [] Dietary                 [] Social Service    [] Psychology       [x] Pulmonology           [x] [facility] Treatment Guidelines reviewed and care planned is consistent with guidelines.         (i.e., NCCN, NCI, PD, ACO, AUA, etc.)    PRESENTATION AT CANCER CONFERENCE:         [x] Prospective    [] Retrospective     [] Follow-Up          [] Eligible for clinical trial      Amrita Mcclain

## 2022-06-14 NOTE — TELEPHONE ENCOUNTER
Please contact the patient and set her up for a left L3/4 facet joint cyst aspiration, 2 week follow-up after the procedure

## 2022-06-14 NOTE — PROGRESS NOTES
I have seen the patient, reviewed the Advanced Practice Provider's history and physical, assessment and plan. I have personally interviewed and examined the patient at bedside and interpreted the relevant imaging and lab work and I agree with the findings. I personally performed the documented services. See below for any additional comments.    History of L4-S1 posterior instrumented fusion in 2018.  She states that surgery was taxing and required a longer recovery at that time but she did very well with her back pain until it gradually returned and has now started to interfere with her ambulation.  She states that she requires a cart or a walker to ambulate because if she ambulates unsupported, she develops progressive low back pain radiating to the left hip and thigh above the knee.  She has had epidural steroid injection at L3-L4 which had worked in the past but did not provide any significant relief recently.  She has an oncologic history and in parallel to her lumbar issues, she is currently being evaluated for new masses on body imaging.  She is awaiting a definitive plan from her oncologist after tumor board review.  The patient states that she just quit smoking a few days ago.    MRI shows advanced disc degeneration at L2-L3 and L3-L4 with coronal deformity and foraminal stenosis at both levels and with significant associated central stenosis at L3-L4 as well as a superimposed left synovial cyst.    No weakness or dermatomal numbness on exam.    I explained that her oncologic issues take precedence over her lumbar spine issues.  While her symptoms are secondary to the L2-L4 pathology, there is no immediate surgical indication at this time.  Surgery would most likely require extending the fusion to L2, which would be an extensive surgery with associated risks and long recovery.  Furthermore, if she requires cytotoxic chemotherapy or radiation, she would not be a candidate for spine surgery during that  treatment.  One option for nonsurgical treatment would be to discuss whether Dr. Baeza feels that synovial cyst aspiration would be feasible.  We will discuss this with him.  Follow-up in approximately 3 months to reassess.

## 2022-06-14 NOTE — TELEPHONE ENCOUNTER
----- Message from Belia Da Silva PA-C sent at 6/14/2022  4:51 PM CDT -----  Yes please. Thank you.     ----- Message -----  From: Ronnell Baeza MD  Sent: 6/14/2022   4:32 PM CDT  To: Belia Da Silva PA-C    Yes, looks like it's amenable. I'll send a message to staff to set her up for the left L3/4 facet joint aspiration if you would like.  Thanks,  Alon  ----- Message -----  From: Belia Da Silva PA-C  Sent: 6/14/2022   4:14 PM CDT  To: Ronnell Baeza MD    Hi Dr. Baeza,    Would you be able to aspirate the L3-4 synovial cyst on this patient?    Thank you,    Belia

## 2022-06-14 NOTE — PROGRESS NOTES
Neurosurgery History and Physical    Patient ID: Shandra Velez is a 81 y.o. female.    Chief Complaint   Patient presents with    Lumbar Spine Pain (L-Spine)     Patient presents to clinic with c/o LBP, L side greater than R.  Pain radiates into the R leg, but denies numbness or tingling.  Patient states she was told she has a cyst on her back, per MRI. Patient reports walking long distances increases her pain.        Review of Systems   Constitutional: Positive for activity change.   HENT: Negative.    Eyes: Negative.    Respiratory: Negative.    Cardiovascular: Negative.    Gastrointestinal: Negative.    Endocrine: Negative.    Genitourinary: Negative.    Musculoskeletal: Positive for back pain. Negative for gait problem.   Skin: Negative.    Allergic/Immunologic: Negative.    Neurological: Positive for weakness. Negative for numbness.   Hematological: Negative.    Psychiatric/Behavioral: Negative.        Past Medical History:   Diagnosis Date    Breast cancer 1985    right mastectomy    Digestive disorder     Encounter for blood transfusion     Hypercholesterolemia     Hypertension     Hypertension     Indigestion     Lumbar spondylosis     Lymphoma     Osteopenia     Pulmonary nodule     Smoker     Current      Social History     Socioeconomic History    Marital status:     Number of children: 3   Occupational History    Occupation: retired  for [employer]   Tobacco Use    Smoking status: Current Every Day Smoker     Packs/day: 0.50     Years: 53.00     Pack years: 26.50     Types: Cigarettes    Smokeless tobacco: Never Used   Substance and Sexual Activity    Alcohol use: No    Drug use: No    Sexual activity: Not Currently   Social History Narrative    Lives alone        Hobbies: karthikeyanino        From [place]         Family History   Problem Relation Age of Onset    Cancer Sister         uterine    Diabetes Brother     Cancer Sister         Uterine cancer     Breast  cancer Other 44     Review of patient's allergies indicates:  No Known Allergies    Current Outpatient Medications:     acetaminophen (TYLENOL) 325 MG tablet, Take 2 tablets (650 mg total) by mouth every 4 (four) hours as needed (pain score 1-2/10)., Disp: , Rfl: 0    albuterol (VENTOLIN HFA) 90 mcg/actuation inhaler, Inhale 2 puffs into the lungs every 6 (six) hours as needed for Wheezing. Rescue, Disp: 8 g, Rfl: 0    benzonatate (TESSALON PERLES) 100 MG capsule, Take 1 capsule (100 mg total) by mouth 3 (three) times daily as needed for Cough., Disp: 30 capsule, Rfl: 1    buPROPion (WELLBUTRIN SR) 150 MG TBSR 12 hr tablet, Take 1 tablet (150 mg total) by mouth 2 (two) times daily., Disp: 60 tablet, Rfl: 1    cetirizine (ZYRTEC) 10 MG tablet, Take 10 mg by mouth once daily. Every day, Disp: , Rfl:     cholecalciferol, vitamin D3, 1,250 mcg (50,000 unit) capsule, Take 50,000 Units by mouth every 7 days., Disp: , Rfl:     cholecalciferol, vitamin D3, 1,250 mcg (50,000 unit) Tab, Take 1 tablet by mouth every 7 days., Disp: 12 tablet, Rfl: 6    fluticasone propionate (FLONASE) 50 mcg/actuation nasal spray, 1 spray (50 mcg total) by Each Nostril route once daily., Disp: 16 g, Rfl: 0    hydroCHLOROthiazide (HYDRODIURIL) 25 MG tablet, Take 1 tablet (25 mg total) by mouth once daily., Disp: 90 tablet, Rfl: 3    hydrocodone-chlorpheniramine (TUSSIONEX) 10-8 mg/5 mL suspension, Take 5 mLs by mouth every 12 (twelve) hours as needed for Cough., Disp: 115 mL, Rfl: 0    lisinopriL (PRINIVIL,ZESTRIL) 40 MG tablet, Take 1 tablet (40 mg total) by mouth once daily., Disp: 90 tablet, Rfl: 3    magnesium oxide (MAGOX) 400 mg (241.3 mg magnesium) tablet, Take 1 tablet (400 mg total) by mouth once daily., Disp: 90 tablet, Rfl: 3    multivitamin (THERAGRAN) per tablet, Take 1 tablet by mouth once daily., Disp: , Rfl:     NIFEdipine (ADALAT CC) 60 MG TbSR, Take 1 tablet (60 mg total) by mouth once daily., Disp: 90 tablet,  "Rfl: 3    omeprazole (PRILOSEC) 40 MG capsule, Take 1 capsule (40 mg total) by mouth once daily., Disp: 30 capsule, Rfl: 11    ondansetron (ZOFRAN) 8 MG tablet, Take 1 tablet (8 mg total) by mouth every 8 (eight) hours as needed for Nausea., Disp: 30 tablet, Rfl: 1    potassium chloride (MICRO-K) 10 MEQ CpSR, TAKE 2 CAPSULES BY MOUTH  ONCE DAILY, Disp: 180 capsule, Rfl: 3    simvastatin (ZOCOR) 10 MG tablet, Take 1 tablet (10 mg total) by mouth every evening., Disp: 90 tablet, Rfl: 3    temazepam (RESTORIL) 15 mg Cap, TAKE 1 CAPSULE BY MOUTH AT  BEDTIME AS NEEDED FOR  INSOMNIA, Disp: 90 capsule, Rfl: 1  Blood pressure (!) 147/92, pulse 77, resp. rate 18, height 5' 5" (1.651 m), weight 86.5 kg (190 lb 11.2 oz).      Neurologic Exam     Mental Status   Oriented to person, place, and time.   Attention: normal. Concentration: normal.   Speech: speech is normal   Level of consciousness: alert  Knowledge: good.     Cranial Nerves     CN II   Visual acuity: normal    CN III, IV, VI   Pupils are equal, round, and reactive to light.  Extraocular motions are normal.     CN V   Facial sensation intact.     CN VII   Facial expression full, symmetric.     CN VIII   Hearing: intact    CN IX, X   Palate: symmetric    CN XI   CN XI normal.     CN XII   CN XII normal.     Motor Exam   Muscle bulk: normal  Overall muscle tone: normal  Right arm pronator drift: absent  Left arm pronator drift: absent    Strength   Right deltoid: 5/5  Left deltoid: 5/5  Right biceps: 5/5  Left biceps: 5/5  Right triceps: 5/5  Left triceps: 5/5  Right wrist flexion: 5/5  Left wrist flexion: 5/5  Right wrist extension: 5/5  Left wrist extension: 5/5  Right interossei: 5/5  Left interossei: 5/5  Right iliopsoas: 5/5  Left iliopsoas: 5/5  Right quadriceps: 5/5  Left quadriceps: 5/5  Right hamstrin/5  Left hamstrin/5  Right anterior tibial: 5/5  Left anterior tibial: 5/5  Right posterior tibial: 5/5  Left posterior tibial: 5/5  Right peroneal: " "5/5  Left peroneal: 5/5  Right gastroc: 5/5  Left gastroc: 5/5    Sensory Exam   Light touch normal.     Gait, Coordination, and Reflexes     Gait  Gait: normal    Coordination   Romberg: negative  Finger to nose coordination: normal    Tremor   Resting tremor: absent    Reflexes   Right brachioradialis: 2+  Left brachioradialis: 2+  Right biceps: 2+  Left biceps: 2+  Right triceps: 2+  Left triceps: 2+  Right patellar: 2+  Left patellar: 2+  Right achilles: 1+  Left achilles: 1+  Right plantar: normal  Left plantar: equivocal  Right Melendez: absent  Left Melendez: absent  Right ankle clonus: absent  Left ankle clonus: absent      Physical Exam  Eyes:      Extraocular Movements: EOM normal.      Pupils: Pupils are equal, round, and reactive to light.   Neurological:      Mental Status: She is oriented to person, place, and time.      Coordination: Finger-Nose-Finger Test and Romberg Test normal.      Gait: Gait is intact.      Deep Tendon Reflexes:      Reflex Scores:       Tricep reflexes are 2+ on the right side and 2+ on the left side.       Bicep reflexes are 2+ on the right side and 2+ on the left side.       Brachioradialis reflexes are 2+ on the right side and 2+ on the left side.       Patellar reflexes are 2+ on the right side and 2+ on the left side.       Achilles reflexes are 1+ on the right side and 1+ on the left side.  Psychiatric:         Speech: Speech normal.         Vital Signs  Pulse: 77  Resp: 18  BP: (!) 147/92  BP Location: Left arm  Patient Position: Sitting  Pain Score:   4  Pain Loc: Back  Height and Weight  Height: 5' 5" (165.1 cm)  Weight: 86.5 kg (190 lb 11.2 oz)  BSA (Calculated - sq m): 1.99 sq meters  BMI (Calculated): 31.7  Weight in (lb) to have BMI = 25: 149.9]    Provider dictation:  I reviewed the imaging.    "MRI shows advanced disc degeneration at L2-L3 and L3-L4 with coronal deformity and foraminal stenosis at both levels and with significant associated central stenosis at " "L3-L4 as well as a superimposed left synovial cyst."    The patient is a 81 year old female who presents for neurosurgical follow-up. She has a history of L4-S1 posterior instrumented fusion in 2018 with Dr. Miller. She reports a long recovery, but her back pain eventually improved. The pain has slowly returned and is limiting her activity. Patient reports severe lower back pain, left > right that is worse with ambulating. The pain will radiate into the left hip and thigh. She has to lean on a walker or shopping cart for relief. She participated in PT and denies any significant relief. She also underwent an SILVERIO with Dr. Baeza in March 2022 and denies relief. Denies bowel/bladder dysfunction or numbness.     On exam, patient has no weakness or numbness.    No need for surgery at this time considering her oncologic issues take precedence over her lumbar spine issues. We will ask Dr. Baeza if he feels a synovial cyst aspiration would be possible. If so we will have his clinic schedule her for the procedure. She will follow-up in 3 months.       Visit Diagnosis:  1. Spinal stenosis of lumbar region with neurogenic claudication     2. DDD (degenerative disc disease), lumbar  Ambulatory referral/consult to Neurosurgery           "

## 2022-06-15 ENCOUNTER — OFFICE VISIT (OUTPATIENT)
Dept: RADIATION ONCOLOGY | Facility: CLINIC | Age: 82
End: 2022-06-15
Payer: MEDICARE

## 2022-06-15 DIAGNOSIS — Z85.3 PERSONAL HISTORY OF BREAST CANCER: ICD-10-CM

## 2022-06-15 DIAGNOSIS — C34.11 MALIGNANT NEOPLASM OF RIGHT UPPER LOBE OF LUNG: ICD-10-CM

## 2022-06-15 DIAGNOSIS — C34.32 PRIMARY MALIGNANT NEOPLASM OF LEFT LOWER LOBE OF LUNG: Primary | ICD-10-CM

## 2022-06-15 PROCEDURE — 99441 PR PHYSICIAN TELEPHONE EVALUATION 5-10 MIN: ICD-10-PCS | Mod: 95,,, | Performed by: RADIOLOGY

## 2022-06-15 PROCEDURE — 99441 PR PHYSICIAN TELEPHONE EVALUATION 5-10 MIN: CPT | Mod: 95,,, | Performed by: RADIOLOGY

## 2022-06-16 NOTE — PROGRESS NOTES
[facility] of Radiation Oncology  Follow Up Visit Note    Established Patient - Audio Only Telehealth Visit     The patient location is: home  The chief complaint leading to consultation is: follow up Tumor Board Recommendations  Visit type: Virtual visit with audio only (telephone)  Total time spent with patient: 10 min       The reason for the audio only service rather than synchronous audio and video virtual visit was related to technical difficulties or patient preference/necessity.     Each patient to whom I provide medical services by telemedicine is:  (1) informed of the relationship between the physician and patient and the respective role of any other health care provider with respect to management of the patient; and (2) notified that they may decline to receive medical services by telemedicine and may withdraw from such care at any time. Patient verbally consented to receive this service via voice-only telephone call.       Diagnosis:  Shandra Velez is a 81 y.o. 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 NEW 6mm LEFT upper lobe nodule, concerning.  Residual 1.8 x 1.5cm RIGHT apical mass with extensive surrounding post-radiation therapy fibrosis, additional unchaged RIGHT apical 8mm lesion; Stable wedge-shaped LLL medial atelectasis/fibrosis without visible nodule; increase in size  of RIGHT lower renal mass, now 3.1cm (2.0cm in 5/2020) Has gone down to 3-4 cigarettes per day.       The patient presents today for a regularly scheduled follow up visit.  She was last seen in our clinic on 6/8/22.   At that time she was given an Rx for bupropion to help with anxiety/depression and smoking cessation.  She has so far done well- last cigarette was over the weekend.  She also has back pain which is thought to be related to a paraspinal cyst which may be aspirated.  Her case was discussed at Pulmonary Tumor Board yesterday, with a consensus recommendation for EBUS-guided biopsy of the enlarging precarinal lymph node to establish if this is malignant, and if so, if it is related to prior skin or lung cancer, or recurrent lymphoma.  Following this and depending on the results, she will be referred to Urology to follow the slowly enlarging renal mass.       Review of Systems   Review of Systems   Constitutional: Negative.    HENT: Negative.    Eyes: Negative.    Respiratory: Negative.    Cardiovascular: Negative.    Gastrointestinal: Negative.    Genitourinary: Negative.    Musculoskeletal: Positive for back pain (recent MRI spine shows significant degernative changes).   Skin: Negative.    Neurological: Negative.    Psychiatric/Behavioral: Positive for depression.         Social History:  Social History      Tobacco Use    Smoking status: Current Every Day Smoker       Packs/day: 0.50       Years: 53.00       Pack years: 26.50       Types: Cigarettes    Smokeless tobacco: Never Used   Substance Use Topics    Alcohol use: No    Drug use: No         Family History:  Cancer-related family history includes Breast cancer (age of onset: 42) in her other; Cancer in her sister and sister.     Physical Exam  Vitals reviewed.   Constitutional:       General: She is not in acute distress.     Appearance: Normal appearance. She is not ill-appearing or toxic-appearing.   HENT:      Head: Normocephalic and  atraumatic.      Nose: Nose normal.   Eyes:      Extraocular Movements: Extraocular movements intact.      Conjunctiva/sclera: Conjunctivae normal.      Pupils: Pupils are equal, round, and reactive to light.   Pulmonary:      Effort: Pulmonary effort is normal. No respiratory distress.   Musculoskeletal:         General: Normal range of motion.      Cervical back: Normal range of motion.   Skin:     General: Skin is warm.   Neurological:      General: No focal deficit present.      Mental Status: She is alert and oriented to person, place, and time.      Gait: Gait normal.   Psychiatric:         Mood and Affect: Mood normal.         Behavior: Behavior normal.         Thought Content: Thought content normal.         Judgment: Judgment normal.            Imaging:  CT chest, abdomen, and pelvis 6/2/22 reveiwed with findings detailed above        Assessment:  · New concerning findings on interval CT scan (LLL, precarinal lymph node, increasing R kidney mass)  · ECOG: (0) Fully active, able to carry on all predisease performance without restriction     Plan:  · Referral to Dr. Nirmal IRELAND for EBUS/biopsy (scheduled for tomorrow)  · RTC 1 week after biopsy for further discussion of treatment options  · Continue bupropion 150 BID   · Smoking cessation counseled (stopped 6/12/22)- encouragement given  · She was given our contact information, and she was told that she could call our clinic at anytime if she has any questions or concerns.  · Genetics referral for personal history of 5 separate malignancies (breast, melanoma, lung, lymphoma, RCC)  · Follow up with other providers as directed     Case discussed with Dr. Walker and Dr. Kinney.             This service was not originating from a related E/M service provided within the previous 7 days nor will  to an E/M service or procedure within the next 24 hours or my soonest available appointment.  Prevailing standard of care was able to be met in this audio-only  visit.

## 2022-06-21 ENCOUNTER — DOCUMENTATION ONLY (OUTPATIENT)
Dept: HEMATOLOGY/ONCOLOGY | Facility: CLINIC | Age: 82
End: 2022-06-21
Payer: MEDICARE

## 2022-06-21 ENCOUNTER — PATIENT MESSAGE (OUTPATIENT)
Dept: RADIATION ONCOLOGY | Facility: CLINIC | Age: 82
End: 2022-06-21
Payer: MEDICARE

## 2022-06-21 DIAGNOSIS — M47.816 ARTHRITIS OF FACET JOINT OF LUMBAR SPINE: Primary | ICD-10-CM

## 2022-06-21 DIAGNOSIS — M54.16 LUMBAR RADICULOPATHY: Primary | ICD-10-CM

## 2022-06-21 RX ORDER — SODIUM CHLORIDE, SODIUM LACTATE, POTASSIUM CHLORIDE, CALCIUM CHLORIDE 600; 310; 30; 20 MG/100ML; MG/100ML; MG/100ML; MG/100ML
INJECTION, SOLUTION INTRAVENOUS CONTINUOUS
Status: CANCELLED | OUTPATIENT
Start: 2022-06-30

## 2022-06-21 NOTE — PROGRESS NOTES
[facility]      THORACIC MULTIDISCIPLINARY TUMOR BOARD  PATIENT REVIEW FORM  ________________________________________________________________________    CLINIC #: [id number]  DATE: 06/21/2022    TUMOR SITE:   ISAIAS nodule    PRESENTER:   Dr. Webber     PATIENT SUMMARY:   73 y/o with hx of multiple malignancies  Lt breast CA 1980's HUMAIRA  2019: shave bx RUE melanoma   Stage Riley low grade follicular lymphoma; treated  Renal lesion being followed  June 2021- New RUL mass--> squamous cell   SBRT to RUL mass 8/2021  10/2021: Follow up imaging found to have increasing lesion LLL; patient did not wish to have repeat bx  Treated empirically with XRT   Continues on maintenance Rituximab for NHL; follows with Dr. Montero   CT chest: New ISAIAS nodule, stable right apical mass, RUL nodule, enlarged precarinal lymph node stable. Right kidney with 3 cm mass; enlarged from previous scans     EBUS: Mediastinal node squamous cell carcinoma   High PDL expression     BOARD RECOMMENDATIONS:   Immunotherapy--> Keytruda     CONSULT NEEDED:     [] Surgery    [] Hem/Onc    [] Rad/Onc    [] Dietary                 [] Social Service    [] Psychology       [] Pulmonology    GROUP STAGE:     [] O    [] 1A    [] IB    [] IIA    [] IIB     [] IIIA     [] IIIB     [] IIIC    [] IV                               [] Local recurrence     [] Regional recurrence     [] Distant recurrence                   [] NSCLC     [] SCLC     Tumor type     Unstageable:      [] Yes     [] No  Metastatic site(s):          [x] [facility] Treatment Guidelines reviewed and care planned is consistent with guidelines.         (i.e., NCCN, NCI, PD, ACO, AUA, etc.)    PRESENTATION AT CANCER CONFERENCE:         [] Prospective    [] Retrospective     [x] Follow-Up          [] Eligible for clinical trial      Amrita Mcclain

## 2022-06-21 NOTE — TELEPHONE ENCOUNTER
Call placed to Pt to schedule Left L3/4 facet joint cyst aspiration, 2 week follow-up after the procedure with Dr. Baeza. Procedure scheduled for 06-30-22. F/u scheduled for 07-22-22.

## 2022-06-22 NOTE — TELEPHONE ENCOUNTER
Spoke briefly with patient regarding pathology from EBUS showing squamous cell carcinoma in the mediastinal lymph node.  Discussed recommendations from Pulmonary Tumor Board for continued monitoring of new, sub-cm left upper lobe lesion, consideration for Keytruda for mediastinal recurrence of squamous cell carcinoma, and referral to Urology to follow growing right renal mass.  All questions answered.    FW

## 2022-06-23 ENCOUNTER — TELEPHONE (OUTPATIENT)
Dept: HEMATOLOGY/ONCOLOGY | Facility: CLINIC | Age: 82
End: 2022-06-23
Payer: MEDICARE

## 2022-06-23 NOTE — TELEPHONE ENCOUNTER
Advised patient due to treament change to Keytruda her Rituxan was cancelled. Assisted in making new appt to discuss further with Dr Montero for 6/29. patient good with this plan.   ----- Message from Chely Max sent at 6/23/2022 11:15 AM CDT -----  Type:Needs Medical Advice    Who Called:PT  Best call back number:[phone]  Additional Info: Requesting a call back regarding#PT would like to know why her infusion that was scheduled for 7/21 was canceled and she was not told about it? Please call to discuss.  Please Advise- Thank you

## 2022-06-27 DIAGNOSIS — G47.00 INSOMNIA, UNSPECIFIED TYPE: ICD-10-CM

## 2022-06-27 RX ORDER — TEMAZEPAM 15 MG/1
CAPSULE ORAL
Qty: 90 CAPSULE | Refills: 1 | Status: SHIPPED | OUTPATIENT
Start: 2022-06-27 | End: 2023-01-10

## 2022-06-27 NOTE — TELEPHONE ENCOUNTER
No new care gaps identified.  [facility] Embedded Care Gaps. Reference number: [id number]. 6/27/2022   12:19:25 PM CDT

## 2022-06-29 ENCOUNTER — OFFICE VISIT (OUTPATIENT)
Dept: HEMATOLOGY/ONCOLOGY | Facility: CLINIC | Age: 82
End: 2022-06-29
Payer: MEDICARE

## 2022-06-29 ENCOUNTER — LAB VISIT (OUTPATIENT)
Dept: LAB | Facility: HOSPITAL | Age: 82
End: 2022-06-29
Attending: INTERNAL MEDICINE
Payer: MEDICARE

## 2022-06-29 VITALS
SYSTOLIC BLOOD PRESSURE: 139 MMHG | WEIGHT: 195.13 LBS | OXYGEN SATURATION: 97 % | HEART RATE: 81 BPM | HEIGHT: 65 IN | TEMPERATURE: 97 F | RESPIRATION RATE: 16 BRPM | DIASTOLIC BLOOD PRESSURE: 83 MMHG | BODY MASS INDEX: 32.51 KG/M2

## 2022-06-29 DIAGNOSIS — C34.11 MALIGNANT NEOPLASM OF RIGHT UPPER LOBE OF LUNG: Primary | ICD-10-CM

## 2022-06-29 DIAGNOSIS — C82.00 FOLLICULAR LYMPHOMA GRADE I, UNSPECIFIED BODY REGION: ICD-10-CM

## 2022-06-29 DIAGNOSIS — E55.9 VITAMIN D DEFICIENCY: ICD-10-CM

## 2022-06-29 DIAGNOSIS — Z72.0 TOBACCO ABUSE: ICD-10-CM

## 2022-06-29 DIAGNOSIS — C34.11 MALIGNANT NEOPLASM OF RIGHT UPPER LOBE OF LUNG: ICD-10-CM

## 2022-06-29 PROCEDURE — 1125F AMNT PAIN NOTED PAIN PRSNT: CPT | Mod: CPTII,S$GLB,, | Performed by: INTERNAL MEDICINE

## 2022-06-29 PROCEDURE — 1101F PR PT FALLS ASSESS DOC 0-1 FALLS W/OUT INJ PAST YR: ICD-10-PCS | Mod: CPTII,S$GLB,, | Performed by: INTERNAL MEDICINE

## 2022-06-29 PROCEDURE — 99215 PR OFFICE/OUTPT VISIT, EST, LEVL V, 40-54 MIN: ICD-10-PCS | Mod: S$GLB,,, | Performed by: INTERNAL MEDICINE

## 2022-06-29 PROCEDURE — 36415 COLL VENOUS BLD VENIPUNCTURE: CPT | Mod: PN | Performed by: INTERNAL MEDICINE

## 2022-06-29 PROCEDURE — 1160F PR REVIEW ALL MEDS BY PRESCRIBER/CLIN PHARMACIST DOCUMENTED: ICD-10-PCS | Mod: CPTII,S$GLB,, | Performed by: INTERNAL MEDICINE

## 2022-06-29 PROCEDURE — 99999 PR PBB SHADOW E&M-EST. PATIENT-LVL III: CPT | Mod: PBBFAC,,, | Performed by: INTERNAL MEDICINE

## 2022-06-29 PROCEDURE — 1101F PT FALLS ASSESS-DOCD LE1/YR: CPT | Mod: CPTII,S$GLB,, | Performed by: INTERNAL MEDICINE

## 2022-06-29 PROCEDURE — 84439 ASSAY OF FREE THYROXINE: CPT | Performed by: INTERNAL MEDICINE

## 2022-06-29 PROCEDURE — 99999 PR PBB SHADOW E&M-EST. PATIENT-LVL III: ICD-10-PCS | Mod: PBBFAC,,, | Performed by: INTERNAL MEDICINE

## 2022-06-29 PROCEDURE — 1159F MED LIST DOCD IN RCRD: CPT | Mod: CPTII,S$GLB,, | Performed by: INTERNAL MEDICINE

## 2022-06-29 PROCEDURE — 3288F PR FALLS RISK ASSESSMENT DOCUMENTED: ICD-10-PCS | Mod: CPTII,S$GLB,, | Performed by: INTERNAL MEDICINE

## 2022-06-29 PROCEDURE — 3075F PR MOST RECENT SYSTOLIC BLOOD PRESS GE 130-139MM HG: ICD-10-PCS | Mod: CPTII,S$GLB,, | Performed by: INTERNAL MEDICINE

## 2022-06-29 PROCEDURE — 3075F SYST BP GE 130 - 139MM HG: CPT | Mod: CPTII,S$GLB,, | Performed by: INTERNAL MEDICINE

## 2022-06-29 PROCEDURE — 3288F FALL RISK ASSESSMENT DOCD: CPT | Mod: CPTII,S$GLB,, | Performed by: INTERNAL MEDICINE

## 2022-06-29 PROCEDURE — 1159F PR MEDICATION LIST DOCUMENTED IN MEDICAL RECORD: ICD-10-PCS | Mod: CPTII,S$GLB,, | Performed by: INTERNAL MEDICINE

## 2022-06-29 PROCEDURE — 84443 ASSAY THYROID STIM HORMONE: CPT | Performed by: INTERNAL MEDICINE

## 2022-06-29 PROCEDURE — 3079F DIAST BP 80-89 MM HG: CPT | Mod: CPTII,S$GLB,, | Performed by: INTERNAL MEDICINE

## 2022-06-29 PROCEDURE — 99215 OFFICE O/P EST HI 40 MIN: CPT | Mod: S$GLB,,, | Performed by: INTERNAL MEDICINE

## 2022-06-29 PROCEDURE — 1125F PR PAIN SEVERITY QUANTIFIED, PAIN PRESENT: ICD-10-PCS | Mod: CPTII,S$GLB,, | Performed by: INTERNAL MEDICINE

## 2022-06-29 PROCEDURE — 3079F PR MOST RECENT DIASTOLIC BLOOD PRESSURE 80-89 MM HG: ICD-10-PCS | Mod: CPTII,S$GLB,, | Performed by: INTERNAL MEDICINE

## 2022-06-29 PROCEDURE — 1160F RVW MEDS BY RX/DR IN RCRD: CPT | Mod: CPTII,S$GLB,, | Performed by: INTERNAL MEDICINE

## 2022-06-29 RX ORDER — HEPARIN 100 UNIT/ML
500 SYRINGE INTRAVENOUS
Status: CANCELLED | OUTPATIENT
Start: 2022-07-06

## 2022-06-29 RX ORDER — SODIUM CHLORIDE 0.9 % (FLUSH) 0.9 %
10 SYRINGE (ML) INJECTION
Status: CANCELLED | OUTPATIENT
Start: 2022-07-06

## 2022-06-29 RX ORDER — EPINEPHRINE 0.3 MG/.3ML
0.3 INJECTION SUBCUTANEOUS ONCE AS NEEDED
Status: CANCELLED | OUTPATIENT
Start: 2022-07-06

## 2022-06-29 RX ORDER — ACETAMINOPHEN 500 MG
1000 TABLET ORAL
Status: CANCELLED
Start: 2022-07-06

## 2022-06-29 RX ORDER — DIPHENHYDRAMINE HYDROCHLORIDE 50 MG/ML
50 INJECTION INTRAMUSCULAR; INTRAVENOUS ONCE AS NEEDED
Status: CANCELLED | OUTPATIENT
Start: 2022-07-06

## 2022-06-29 NOTE — PROGRESS NOTES
History of present illness:  The patient is a 81-year-old white female who presents in consultation from Dr. Chavez regarding newly diagnosed non-Hodgkin's lymphoma.  Patient was in her usual state of health but developed severe/debilitating low pain and lumbar radiculopathy after scrubbing her screened porch.  An MRI was obtained and intra-abdominal lymphadenopathy as well as a complex left renal was noted.  Patient subsequently underwent dedicated CT scan of the abdomen pelvis as well as image guided needle biopsy of a right inguinal lymph node.  Patient was found have grade 1 follicular lymphoma.  As the patient was asymptomatic, she was placed on a watch wait strategy.    Patient completed 6 cycles of CVP-R and is receiving maintenance Rituxan every 8 weeks.     Patient returns to clinic earlier than scheduled follow-up to review results of interval biopsy of right lower lobe pulmonary nodule which was the only lesion that failed to regress since initiation of her therapy for lymphoma.  Unfortunately, the lesion returned positive for squamous cell carcinoma of the lung.  Patient continues to smoke.  Underwent EBUS and biopsy.  She had pathologically confirmed stage I disease and has completed definitive XRT to this lesion.  Patient tolerated therapy well with the exception of some minor fatigue and some shoulder pain from keeping her arms above her head during treatment.    Patient has completed 9/12 cycles of maintenance Rituxan.  Patient was diagnosed with right-sided pneumonitis and treated with antibiotics.  She reports that she has been slow to recover.  She had improved enough to resume maintenance Rituxan at the time of her last office evaluation.  Interval imaging obtained follow-up on her pneumonitis was remarkable for a right pulmonary nodule and mediastinal lymph node which were suspicious.  Patient had EBUS/biopsy was discovered to have metastatic squamous cell carcinoma lung in the 4R lymph node  station.  Her recurrence has been evaluated in multidisciplinary tumor board.  Consensus opinion was to proceed with single agent immunotherapy as Radiation Oncology was uncomfortable treating both her new right pulmonary lesion and her mediastinum.  Patient has been able to quit smoking since last office evaluation.    Physical examination:  Well-developed, obese, elderly, white female, in no acute distress, who has a weight of 195 lb (increased by 1 lb)  VITAL SIGNS: Documented  and reviewed this visit.  HEENT: Normocephalic, atraumatic. Oral mucosa pink and moist. Lips without lesions. Tongue midline. Oropharynx clear. Nonicteric sclerae.   NECK: Supple, bilateral supraclavicular adenopathy right internal jugular chain lymphadenopathy.   HEART: Regular rate and rhythm without murmur, gallop or rub.   LUNGS:  Mild, bibasilar crackles bilaterally. Normal respiratory effort.   ABDOMEN: Soft, nontender, nondistended with positive normoactive bowel sounds, no hepatosplenomegaly.   EXTREMITIES: No cyanosis or clubbing appreciated.  Patient has Tr bilateral ankle edema.  Distal pulses are intact.   AXILLAE AND GROIN:  Small, palpable left inguinal lymphadenopathy is appreciated.   SKIN: Intact/turgor normal.  Palpable subcutaneous nodules about the abdominal wall consistent with lymph node involvement.   NEUROLOGIC: Cranial nerves II-XII grossly intact. Motor: Good muscle bulk and tone. Strength/sensory 5/5 throughout. Gait stable.     Laboratory:  Studies performed on 06/16/2022.  White count 7.8, hemoglobin 12.1, hematocrit 38, platelets 154, absolute neutrophil count is 5300.  Prothrombin time 13, INR 1, partial thromboplastin time 30.5.  Sodium 138, potassium 3.8, chloride 106, CO2 25, BUN 27, creatinine 1.3, glucose 100, calcium 8.7, GFR is 39.     Impression:  1.  Grade 1 follicular non-Hodgkin's lymphoma - Stage Riley with excellent response to systemic therapy.  2.  Vitamin-D deficiency-improved on  supplementation.  3. 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  4. Tobacco abuse-patient has now discontinued smoking.      Plan:  1.  Discontinue maintenance Rituxan.  2. Obtain baseline TSH, and free T4.  3. Begin single agent immunotherapy consisting of Q 6 week Keytruda with typical pre meds at earliest convenience.  4. 40 minutes of contact time spent counseling patient regarding the recurrent nature squamous cell carcinoma lung, rationale for discontinuing maintenance therapy for diagnosis of non-Hodgkin's lymphoma, antineoplastic/supportive care meds to be employed in treatment of recurrent lung carcinoma, aspects of their administration, potential side effects associated with therapy, interventions for such side effects, etcetera.  She voices understanding wishes to proceed as above.  5. See me prior to cycle 2 of treatment with interval CBC, CMP, LDH magnesium prior to appointment.      This note was created using voice recognition software and may contain grammatical errors.

## 2022-06-29 NOTE — Clinical Note
Stat TSH and free T4. Nurse navigation request for brief chemotherapy teaching on Keytruda Return to Infusion Services at earliest convenience for 1st cycle of Keytruda. See me 6 weeks later with interval CBC, CMP, LDH and magnesium prior to appointment.

## 2022-06-30 ENCOUNTER — HOSPITAL ENCOUNTER (OUTPATIENT)
Dept: RADIOLOGY | Facility: HOSPITAL | Age: 82
Discharge: HOME | End: 2022-06-30
Attending: ANESTHESIOLOGY
Payer: MEDICARE

## 2022-06-30 ENCOUNTER — HOSPITAL ENCOUNTER (OUTPATIENT)
Facility: HOSPITAL | Age: 82
Discharge: HOME | End: 2022-06-30
Attending: ANESTHESIOLOGY | Admitting: ANESTHESIOLOGY
Payer: MEDICARE

## 2022-06-30 ENCOUNTER — TELEPHONE (OUTPATIENT)
Dept: INFUSION THERAPY | Facility: HOSPITAL | Age: 82
End: 2022-06-30
Payer: MEDICARE

## 2022-06-30 ENCOUNTER — TELEPHONE (OUTPATIENT)
Dept: HEMATOLOGY/ONCOLOGY | Facility: CLINIC | Age: 82
End: 2022-06-30
Payer: MEDICARE

## 2022-06-30 DIAGNOSIS — C34.11 MALIGNANT NEOPLASM OF RIGHT UPPER LOBE OF LUNG: Primary | ICD-10-CM

## 2022-06-30 DIAGNOSIS — M71.38 SYNOVIAL CYST OF LUMBAR FACET JOINT: Primary | ICD-10-CM

## 2022-06-30 DIAGNOSIS — M54.50 LOWER BACK PAIN: ICD-10-CM

## 2022-06-30 DIAGNOSIS — M47.816 ARTHRITIS OF FACET JOINT OF LUMBAR SPINE: ICD-10-CM

## 2022-06-30 PROBLEM — N28.89 RIGHT RENAL MASS: Status: ACTIVE | Noted: 2022-06-30

## 2022-06-30 LAB
T4 FREE SERPL-MCNC: 0.83 NG/DL (ref 0.71–1.51)
TSH SERPL DL<=0.005 MIU/L-ACNC: 6.24 UIU/ML (ref 0.4–4)

## 2022-06-30 PROCEDURE — 20612 PR ASPIRAT/INJECTION GANGLION CYST(S): ICD-10-PCS | Mod: LT,,, | Performed by: ANESTHESIOLOGY

## 2022-06-30 PROCEDURE — 20612 ASPIRATE/INJ GANGLION CYST: CPT | Mod: LT,,, | Performed by: ANESTHESIOLOGY

## 2022-06-30 PROCEDURE — 76000 FLUOROSCOPY <1 HR PHYS/QHP: CPT | Mod: TC,PO

## 2022-06-30 PROCEDURE — 20615 TREATMENT OF BONE CYST: CPT | Mod: PO | Performed by: ANESTHESIOLOGY

## 2022-06-30 PROCEDURE — 63600175 PHARM REV CODE 636 W HCPCS: Mod: PO | Performed by: ANESTHESIOLOGY

## 2022-06-30 PROCEDURE — 25500020 PHARM REV CODE 255: Mod: PO | Performed by: ANESTHESIOLOGY

## 2022-06-30 PROCEDURE — 25000003 PHARM REV CODE 250: Mod: PO | Performed by: ANESTHESIOLOGY

## 2022-06-30 RX ORDER — MIDAZOLAM HYDROCHLORIDE 2 MG/2ML
INJECTION, SOLUTION INTRAMUSCULAR; INTRAVENOUS
Status: DISCONTINUED | OUTPATIENT
Start: 2022-06-30 | End: 2022-06-30 | Stop reason: HOSPADM

## 2022-06-30 RX ORDER — FENTANYL CITRATE 50 UG/ML
INJECTION, SOLUTION INTRAMUSCULAR; INTRAVENOUS
Status: DISCONTINUED | OUTPATIENT
Start: 2022-06-30 | End: 2022-06-30 | Stop reason: HOSPADM

## 2022-06-30 RX ORDER — SODIUM CHLORIDE, SODIUM LACTATE, POTASSIUM CHLORIDE, CALCIUM CHLORIDE 600; 310; 30; 20 MG/100ML; MG/100ML; MG/100ML; MG/100ML
INJECTION, SOLUTION INTRAVENOUS CONTINUOUS
Status: DISCONTINUED | OUTPATIENT
Start: 2022-06-30 | End: 2022-06-30 | Stop reason: HOSPADM

## 2022-06-30 RX ORDER — LIDOCAINE HYDROCHLORIDE 10 MG/ML
INJECTION, SOLUTION EPIDURAL; INFILTRATION; INTRACAUDAL; PERINEURAL
Status: DISCONTINUED | OUTPATIENT
Start: 2022-06-30 | End: 2022-06-30 | Stop reason: HOSPADM

## 2022-06-30 RX ADMIN — SODIUM CHLORIDE, SODIUM LACTATE, POTASSIUM CHLORIDE, AND CALCIUM CHLORIDE: .6; .31; .03; .02 INJECTION, SOLUTION INTRAVENOUS at 01:06

## 2022-06-30 NOTE — NURSING
Per Dr. Montero, patient to start Keytruda. Education and start of treatment appointment scheduled and discussed with patient. She verbalized understanding. Ok to cancel upcoming CXR per Dr. Montero.

## 2022-06-30 NOTE — OP NOTE
PROCEDURE DATE: 6/30/2022    PROCEDURE: Left side L3/4 facet joint cyst aspiration under fluoroscopy.    Diagnosis: Lumbar spondylosis, Lumbar facet joint synovial cyst    PHYSICIAN: Ronnell Baeza MD    MEDICATIONS INJECTED:  From a mixture of 3 ml of 0.25% bupivicaine and 80mg of methylprednisone, 0.9ml of solution was injected into each facet joint.    LOCAL ANESTHETIC USED:   Lidocaine 1%, 3 ml per level.    SEDATION MEDICATIONS: 2mg versed, 25mcg fentanyl    ESTIMATED BLOOD LOSS:  none    COMPLICATIONS:  none    TECHNIQUE:   A time-out was taken to identify the patient and procedure side prior to starting the procedure.  Lying in a prone position, the patient was prepped and draped in the usual sterile fashion using ChloraPrep and fenestrated drape.  The levels were determined under fluoroscopic guidance in the AP view and then rotated into the oblique view to visualize the joint space.  Local anesthetic was given by raising a wheal at the skin and then anesthetizing a tract using a 25-gauge, 1.5inch needle.  A 3.5 in 22-gauge needle was introduced into the left L3/4 facet joints.  Negative pressure applied to aspirate the facet joint. 0.5cc of straw colored fluid was able to be aspirated but any attempt to reach more anterior into the facet joint was met with bony obstruction. The patient tolerated the procedure well.    The patient was monitored after the procedure.  Patient was given post procedure and discharge instructions to follow at home. The patient was discharged in a stable condition    Event Time In   In Facility 1154   In Pre-Procedure 1231   Physician Available    Anesthesia Available    Pre-Op: Bedside Procedure Start    Pre-Op: Bedside Procedure Stop    Pre-Procedure Complete 1310   Out of Pre-Procedure    Anesthesia Start    Anesthesia Start Data Collection    Setup Start    Setup Complete    In Room 1317   Prep Start    Procedure Prep Complete    Procedure Start 1324   Procedure Closing     Emergence    Procedure Finish 1340   Sedation Start 1316.   Scope In    Extent Reached    Scope Out    Sedation End 1340   Out of Room 1341   Cleanup Start    Cleanup Complete    Cosmetic Start    Cosmetic Stop    Pain Mgmt In Room    Pain Mgmt Out Room    In Recovery    Anesthesia Finish    Bedside Procedure Start    Bedside Procedure Stop    Recovery Care Complete    Out of Recovery    To Phase II    In Phase II    Pain Mgmt Recovery Start    Pain Mgmt Recovery Stop    Obs Rec Start    Obs Rec Stop    Phase II Care Complete    Out of Phase II    Procedural Care Complete    Discharge    Pain Follow Up Needed    Pain Follow Up Complete      Minimal sedation was achieved with midazolam 2mg and fentanyl 25mcg.  Continuous monitoring of EKG, blood pressure and pulse oximetry was provided by a registered nurse during the entire course of the procedure under my supervision and recorded in the patient's medical record.   Total time for sedation was 24 minutes.

## 2022-06-30 NOTE — DISCHARGE SUMMARY
[facility] - Surgery  Discharge Note  Short Stay    Procedure(s) (LRB):  FACET JOINT Cyst Aspiration L3/4 (Left)    OUTCOME: Patient tolerated treatment/procedure well without complication and is now ready for discharge.    DISPOSITION: Home or Self Care    FINAL DIAGNOSIS:  Synovial cyst of lumbar facet joint    FOLLOWUP: In clinic    DISCHARGE INSTRUCTIONS:    Discharge Procedure Orders   Diet Adult Regular     No dressing needed     Notify your health care provider if you experience any of the following:  temperature >100.4     Activity as tolerated

## 2022-06-30 NOTE — TELEPHONE ENCOUNTER
[8:11 AM] Marietta Rios  can [id number] be added to start Keytruda on 7/6 to follow chemo ed?    [9:05 AM] Juana Hou  mrn [id number] 07/06 @ 11:30

## 2022-06-30 NOTE — DISCHARGE INSTRUCTIONS

## 2022-07-01 ENCOUNTER — PATIENT MESSAGE (OUTPATIENT)
Dept: HEMATOLOGY/ONCOLOGY | Facility: CLINIC | Age: 82
End: 2022-07-01
Payer: MEDICARE

## 2022-07-01 VITALS
OXYGEN SATURATION: 100 % | SYSTOLIC BLOOD PRESSURE: 135 MMHG | HEIGHT: 65 IN | BODY MASS INDEX: 31.65 KG/M2 | TEMPERATURE: 98 F | WEIGHT: 190 LBS | RESPIRATION RATE: 18 BRPM | HEART RATE: 69 BPM | DIASTOLIC BLOOD PRESSURE: 74 MMHG

## 2022-07-06 ENCOUNTER — INFUSION (OUTPATIENT)
Dept: INFUSION THERAPY | Facility: HOSPITAL | Age: 82
End: 2022-07-06
Attending: INTERNAL MEDICINE
Payer: MEDICARE

## 2022-07-06 ENCOUNTER — PATIENT MESSAGE (OUTPATIENT)
Dept: ADMINISTRATIVE | Facility: OTHER | Age: 82
End: 2022-07-06
Payer: MEDICARE

## 2022-07-06 ENCOUNTER — CLINICAL SUPPORT (OUTPATIENT)
Dept: HEMATOLOGY/ONCOLOGY | Facility: CLINIC | Age: 82
End: 2022-07-06
Payer: MEDICARE

## 2022-07-06 VITALS
SYSTOLIC BLOOD PRESSURE: 113 MMHG | TEMPERATURE: 97 F | HEIGHT: 65 IN | BODY MASS INDEX: 33.06 KG/M2 | WEIGHT: 198.44 LBS | HEART RATE: 78 BPM | DIASTOLIC BLOOD PRESSURE: 67 MMHG | OXYGEN SATURATION: 97 % | RESPIRATION RATE: 16 BRPM

## 2022-07-06 VITALS
HEART RATE: 78 BPM | WEIGHT: 198.44 LBS | DIASTOLIC BLOOD PRESSURE: 82 MMHG | HEIGHT: 65 IN | TEMPERATURE: 96 F | BODY MASS INDEX: 33.06 KG/M2 | OXYGEN SATURATION: 97 % | RESPIRATION RATE: 16 BRPM | SYSTOLIC BLOOD PRESSURE: 131 MMHG

## 2022-07-06 DIAGNOSIS — C34.32 PRIMARY MALIGNANT NEOPLASM OF LEFT LOWER LOBE OF LUNG: Primary | ICD-10-CM

## 2022-07-06 DIAGNOSIS — C82.00 FOLLICULAR LYMPHOMA GRADE I, UNSPECIFIED BODY REGION: ICD-10-CM

## 2022-07-06 DIAGNOSIS — C34.11 MALIGNANT NEOPLASM OF RIGHT UPPER LOBE OF LUNG: Primary | ICD-10-CM

## 2022-07-06 PROCEDURE — 96367 TX/PROPH/DG ADDL SEQ IV INF: CPT | Mod: PN

## 2022-07-06 PROCEDURE — 99999 PR PBB SHADOW E&M-EST. PATIENT-LVL V: ICD-10-PCS | Mod: PBBFAC,,, | Performed by: NURSE PRACTITIONER

## 2022-07-06 PROCEDURE — 63600175 PHARM REV CODE 636 W HCPCS: Mod: PN | Performed by: INTERNAL MEDICINE

## 2022-07-06 PROCEDURE — 99999 PR PBB SHADOW E&M-EST. PATIENT-LVL V: CPT | Mod: PBBFAC,,, | Performed by: NURSE PRACTITIONER

## 2022-07-06 PROCEDURE — 96413 CHEMO IV INFUSION 1 HR: CPT | Mod: PN

## 2022-07-06 PROCEDURE — 99214 PR OFFICE/OUTPT VISIT, EST, LEVL IV, 30-39 MIN: ICD-10-PCS | Mod: S$GLB,,, | Performed by: NURSE PRACTITIONER

## 2022-07-06 PROCEDURE — 99214 OFFICE O/P EST MOD 30 MIN: CPT | Mod: S$GLB,,, | Performed by: NURSE PRACTITIONER

## 2022-07-06 PROCEDURE — A4216 STERILE WATER/SALINE, 10 ML: HCPCS | Mod: PN | Performed by: INTERNAL MEDICINE

## 2022-07-06 PROCEDURE — 25000003 PHARM REV CODE 250: Mod: PN | Performed by: INTERNAL MEDICINE

## 2022-07-06 RX ORDER — SODIUM CHLORIDE 0.9 % (FLUSH) 0.9 %
10 SYRINGE (ML) INJECTION
Status: DISCONTINUED | OUTPATIENT
Start: 2022-07-06 | End: 2022-07-06 | Stop reason: HOSPADM

## 2022-07-06 RX ORDER — DIPHENHYDRAMINE HYDROCHLORIDE 50 MG/ML
50 INJECTION INTRAMUSCULAR; INTRAVENOUS ONCE AS NEEDED
Status: DISCONTINUED | OUTPATIENT
Start: 2022-07-06 | End: 2022-07-06 | Stop reason: HOSPADM

## 2022-07-06 RX ORDER — EPINEPHRINE 0.3 MG/.3ML
0.3 INJECTION SUBCUTANEOUS ONCE AS NEEDED
Status: DISCONTINUED | OUTPATIENT
Start: 2022-07-06 | End: 2022-07-06 | Stop reason: HOSPADM

## 2022-07-06 RX ORDER — HEPARIN 100 UNIT/ML
500 SYRINGE INTRAVENOUS
Status: DISCONTINUED | OUTPATIENT
Start: 2022-07-06 | End: 2022-07-06 | Stop reason: HOSPADM

## 2022-07-06 RX ORDER — ACETAMINOPHEN 500 MG
1000 TABLET ORAL
Status: COMPLETED | OUTPATIENT
Start: 2022-07-06 | End: 2022-07-06

## 2022-07-06 RX ADMIN — Medication 10 ML: at 11:07

## 2022-07-06 RX ADMIN — SODIUM CHLORIDE 400 MG: 9 INJECTION, SOLUTION INTRAVENOUS at 11:07

## 2022-07-06 RX ADMIN — ACETAMINOPHEN 1000 MG: 500 TABLET, FILM COATED ORAL at 11:07

## 2022-07-06 RX ADMIN — SODIUM CHLORIDE: 0.9 INJECTION, SOLUTION INTRAVENOUS at 11:07

## 2022-07-06 RX ADMIN — DIPHENHYDRAMINE HYDROCHLORIDE 25 MG: 50 INJECTION, SOLUTION INTRAMUSCULAR; INTRAVENOUS at 11:07

## 2022-07-06 RX ADMIN — Medication 500 UNITS: at 12:07

## 2022-07-06 NOTE — PLAN OF CARE
Problem: Adult Inpatient Plan of Care  Goal: Patient-Specific Goal (Individualized)  Outcome: Ongoing, Progressing     Problem: Fatigue  Goal: Improved Activity Tolerance  Outcome: Ongoing, Progressing   Tolerated infusion well today Able to be d/c to home  Discharge instructions given with copy of avs and pt ambulated to private vehicle without difficulty NAD

## 2022-07-06 NOTE — PROGRESS NOTES
Subjective:        Name: Shandra Velez  : 1940  MRN: [id number]      HPI:   Shandra Velez is a 81 y.o. female presents for evaluation of Patient Education    71 y/o with hx of multiple malignancies  Lt breast CA 's HUMAIRA  2019: shave bx RUE melanoma   Stage Riley low grade follicular lymphoma; treated  Renal lesion being followed  2021- New RUL mass--> squamous cell   SBRT to RUL mass 2021  10/2021: Follow up imaging found to have increasing lesion LLL; patient did not wish to have repeat bx  Treated empirically with XRT   Continues on maintenance Rituximab for NHL; follows with Dr. Montero   CT chest: New ISAIAS nodule, stable right apical mass, RUL nodule, enlarged precarinal lymph node stable. Right kidney with 3 cm mass; enlarged from previous scans      EBUS: Mediastinal node squamous cell carcinoma   High PDL expression      Presents to the clinic today for education on Keytruda & cycle 1 thereafter.  In good spirits; stopped smoking.    Oncology History   Follicular lymphoma   10/7/2020 Initial Diagnosis    Follicular lymphoma     2020 - 2022 Chemotherapy    Treatment Summary   Plan Name: OP CVP-R Q3W-RUXIENCE-VINCRIS-CYCLOPHOS  Treatment Goal: Palliative  Status: Inactive  Start Date: 2020  End Date: 2022  Provider: Rod Montero MD  Chemotherapy: predniSONE (DELTASONE) 50 MG Tab, 100 mg (100 % of original dose 100 mg), Oral, Daily, 6 of 6 cycles  Dose modification: 100 mg (original dose 100 mg, Cycle 1)  vinCRIStine (ONCOVIN) 2 mg in sodium chloride 0.9% 50 mL chemo infusion, 2 mg (100 % of original dose 2 mg), Intravenous, Clinic/HOD 1 time, 6 of 6 cycles  Dose modification: 2 mg (original dose 2 mg, Cycle 1)  Administration: 2 mg (2020), 2 mg (2020), 2 mg (2020), 2 mg (2021), 2 mg (2021), 2 mg (2021)  cyclophosphamide (CYTOXAN) 750 mg/m2 = 1,500 mg in sodium chloride 0.9% 250 mL chemo infusion, 750 mg/m2 = 1,500 mg, Intravenous,  Clinic/HOD 1 time, 6 of 6 cycles  Administration: 1,500 mg (11/5/2020), 1,600 mg (11/25/2020), 1,500 mg (12/17/2020), 1,500 mg (1/7/2021), 1,500 mg (1/28/2021), 1,500 mg (2/18/2021)  rituximab-pvvr (RUXIENCE) 700 mg in sodium chloride 0.9% 700 mL infusion (conc: 1 mg/mL), 750 mg, Intravenous, Clinic/HOD 1 time, 14 of 18 cycles  Administration: 700 mg (11/5/2020), 700 mg (11/25/2020), 750 mg (12/17/2020), 750 mg (1/7/2021), 700 mg (1/28/2021), 700 mg (2/18/2021), 700 mg (4/15/2021), 700 mg (6/10/2021), 700 mg (8/5/2021), 700 mg (9/30/2021), 700 mg (11/26/2021), 700 mg (1/20/2022), 700 mg (3/17/2022), 700 mg (5/26/2022)     7/6/2022 -  Chemotherapy    Treatment Summary   Plan Name: OP PEMBROLIZUMAB 400MG Q6W  Treatment Goal: Palliative  Status: Active  Start Date: 7/6/2022 (Planned)  End Date: 5/8/2024 (Planned)  Provider: Rod Montero MD  Chemotherapy: pembrolizumab (KEYTRUDA) 400 mg in sodium chloride 0.9% 116 mL infusion, 400 mg, Intravenous, Clinic/HOD 1 time, 0 of 17 cycles     Primary malignant neoplasm of left lower lobe of lung   10/7/2021 Initial Diagnosis    Primary malignant neoplasm of left lower lobe of lung     7/6/2022 -  Chemotherapy    Treatment Summary   Plan Name: OP PEMBROLIZUMAB 400MG Q6W  Treatment Goal: Palliative  Status: Active  Start Date: 7/6/2022 (Planned)  End Date: 5/8/2024 (Planned)  Provider: Rod Montero MD  Chemotherapy: pembrolizumab (KEYTRUDA) 400 mg in sodium chloride 0.9% 116 mL infusion, 400 mg, Intravenous, Clinic/HOD 1 time, 0 of 17 cycles            Past Medical History:   Diagnosis Date    Breast cancer 1985    right mastectomy    Digestive disorder     Encounter for blood transfusion     History of pneumonia     Hypercholesterolemia     Hypertension     Indigestion     Lumbar spondylosis     Lymphoma     Osteopenia     Pulmonary nodule     Smoker 06/11/2022       Past Surgical History:   Procedure Laterality Date    APPENDECTOMY      BONE MARROW BIOPSY  Bilateral 06/24/2020    Procedure: Biopsy-bone marrow;  Surgeon: Rod Montero MD;  Location: [facility] OR;  Service: Oncology;  Laterality: Bilateral;    BREAST RECONSTRUCTION  1990    right    CATARACT EXTRACTION W/  INTRAOCULAR LENS IMPLANT Bilateral     CHOLECYSTECTOMY      ENDOBRONCHIAL ULTRASOUND Bilateral 07/09/2021    Procedure: ENDOBRONCHIAL ULTRASOUND (EBUS);  Surgeon: Gregorio Kinney MD;  Location: [facility];  Service: Pulmonary;  Laterality: Bilateral;  NEED LMA to  eval right upper paratracheal LN.     ENDOBRONCHIAL ULTRASOUND Bilateral 6/16/2022    Procedure: ENDOBRONCHIAL ULTRASOUND (EBUS);  Surgeon: Gregorio Kinney MD;  Location: [facility];  Service: Pulmonary;  Laterality: Bilateral;    INJECTION OF FACET JOINT Left 6/30/2022    Procedure: FACET JOINT Cyst Aspiration L3/4;  Surgeon: Ronnell Baeza MD;  Location: [facility] OR;  Service: Pain Management;  Laterality: Left;    INSERTION OF TUNNELED CENTRAL VENOUS CATHETER (CVC) WITH SUBCUTANEOUS PORT Left 11/04/2020    Procedure: [id number];  Surgeon: Kody Pretty MD;  Location: [facility] OR;  Service: General;  Laterality: Left;    JOINT REPLACEMENT  2008    right knee     LUMBAR LAMINECTOMY      LYMPH NODE BIOPSY      MASTECTOMY Right 1985    NEEDLE LOCALIZATION N/A 05/25/2020    Procedure: NEEDLE LOCALIZATION - lymph node bx;  Surgeon: Steve Weaver MD;  Location: [facility];  Service: Interventional Radiology;  Laterality: N/A;    TRANSFORAMINAL EPIDURAL INJECTION OF STEROID Left 05/14/2020    Procedure: Injection,steroid,epidural,transforaminal approach, l3/4;  Surgeon: Ronnell Baeza MD;  Location: [facility] OR;  Service: Pain Management;  Laterality: Left;    TRANSFORAMINAL EPIDURAL INJECTION OF STEROID Left 03/23/2022    Procedure: Injection,steroid,epidural,transforaminal approach L3/4;  Surgeon: Ronnell Baeza MD;  Location: [facility] OR;  Service: Pain Management;  Laterality: Left;    TUBAL LIGATION      UMBILICAL  "HERNIA REPAIR         Family History   Problem Relation Age of Onset    Cancer Sister         uterine    Diabetes Brother     Cancer Sister         Uterine cancer     Breast cancer Other 44       Social History     Socioeconomic History    Marital status:     Number of children: 3   Occupational History    Occupation: retired  for [employer]   Tobacco Use    Smoking status: Former Smoker     Packs/day: 0.50     Years: 53.00     Pack years: 26.50     Types: Cigarettes     Quit date: 2022     Years since quittin.0    Smokeless tobacco: Never Used   Substance and Sexual Activity    Alcohol use: No    Drug use: No    Sexual activity: Not Currently   Social History Narrative    Lives alone        Hobbies: skyler        From [place]           Review of patient's allergies indicates:  No Known Allergies    Review of Systems   Eyes: Negative for visual disturbance.   Cardiovascular: Negative for chest pain.   Respiratory: Negative for cough and shortness of breath.    Hematologic/Lymphatic: Negative for adenopathy.   Skin: Negative for rash.   Musculoskeletal: Negative for back pain.   Gastrointestinal: Negative for abdominal pain and diarrhea.   Genitourinary: Negative for frequency.   Neurological: Negative for headaches.   Psychiatric/Behavioral: The patient is not nervous/anxious.             Objective:     Vitals:    22 1003   BP: 131/82   BP Location: Left arm   Patient Position: Sitting   BP Method: Medium (Automatic)   Pulse: 78   Resp: 16   Temp: 96 °F (35.6 °C)   TempSrc: Temporal   SpO2: 97%   Weight: 90 kg (198 lb 6.6 oz)   Height: 5' 5" (1.651 m)        Physical Exam        Current Outpatient Medications on File Prior to Visit   Medication Sig    acetaminophen (TYLENOL) 325 MG tablet Take 2 tablets (650 mg total) by mouth every 4 (four) hours as needed (pain score 1-2/10).    albuterol (VENTOLIN HFA) 90 mcg/actuation inhaler Inhale 2 puffs into the lungs every " 6 (six) hours as needed for Wheezing. Rescue    benzonatate (TESSALON PERLES) 100 MG capsule Take 1 capsule (100 mg total) by mouth 3 (three) times daily as needed for Cough.    buPROPion (WELLBUTRIN SR) 150 MG TBSR 12 hr tablet Take 1 tablet (150 mg total) by mouth 2 (two) times daily.    cetirizine (ZYRTEC) 10 MG tablet Take 10 mg by mouth once daily. Every day    cholecalciferol, vitamin D3, 1,250 mcg (50,000 unit) Tab Take 1 tablet by mouth every 7 days.    fluticasone propionate (FLONASE) 50 mcg/actuation nasal spray 1 spray (50 mcg total) by Each Nostril route once daily. (Patient taking differently: 1 spray by Each Nostril route daily as needed.)    hydroCHLOROthiazide (HYDRODIURIL) 25 MG tablet Take 1 tablet (25 mg total) by mouth once daily.    lisinopriL (PRINIVIL,ZESTRIL) 40 MG tablet Take 1 tablet (40 mg total) by mouth once daily.    magnesium oxide (MAGOX) 400 mg (241.3 mg magnesium) tablet Take 1 tablet (400 mg total) by mouth once daily.    multivitamin (THERAGRAN) per tablet Take 1 tablet by mouth once daily.    NIFEdipine (ADALAT CC) 60 MG TbSR Take 1 tablet (60 mg total) by mouth once daily.    omeprazole (PRILOSEC) 40 MG capsule Take 1 capsule (40 mg total) by mouth once daily.    ondansetron (ZOFRAN) 8 MG tablet Take 1 tablet (8 mg total) by mouth every 8 (eight) hours as needed for Nausea.    potassium chloride (MICRO-K) 10 MEQ CpSR TAKE 2 CAPSULES BY MOUTH  ONCE DAILY (Patient taking differently: Take 20 mEq by mouth once daily.)    simvastatin (ZOCOR) 10 MG tablet Take 1 tablet (10 mg total) by mouth every evening.    temazepam (RESTORIL) 15 mg Cap TAKE 1 CAPSULE BY MOUTH AT  BEDTIME AS NEEDED FOR  INSOMNIA     No current facility-administered medications on file prior to visit.       CBC:  Lab Results   Component Value Date    WBC 7.67 06/16/2022    HGB 12.1 06/16/2022    HCT 38.0 06/16/2022    MCV 91 06/16/2022     06/16/2022         CMP:  Sodium   Date Value Ref Range  Status   06/16/2022 138 136 - 145 mmol/L Final     Potassium   Date Value Ref Range Status   06/16/2022 3.8 3.5 - 5.1 mmol/L Final     Chloride   Date Value Ref Range Status   06/16/2022 106 95 - 110 mmol/L Final     CO2   Date Value Ref Range Status   06/16/2022 25 22 - 31 mmol/L Final     Glucose   Date Value Ref Range Status   06/16/2022 100 70 - 110 mg/dL Final     Comment:     The ADA recommends the following guidelines for fasting glucose:    Normal:       less than 100 mg/dL    Prediabetes:  100 mg/dL to 125 mg/dL    Diabetes:     126 mg/dL or higher       BUN   Date Value Ref Range Status   06/16/2022 27 (H) 7 - 18 mg/dL Final     Creatinine   Date Value Ref Range Status   06/16/2022 1.29 0.50 - 1.40 mg/dL Final     Calcium   Date Value Ref Range Status   06/16/2022 8.7 8.4 - 10.2 mg/dL Final     Total Protein   Date Value Ref Range Status   05/25/2022 6.9 6.0 - 8.4 g/dL Final     Albumin   Date Value Ref Range Status   05/25/2022 3.7 3.5 - 5.2 g/dL Final     Total Bilirubin   Date Value Ref Range Status   05/25/2022 0.3 0.1 - 1.0 mg/dL Final     Comment:     For infants and newborns, interpretation of results should be based  on gestational age, weight and in agreement with clinical  observations.    Premature Infant recommended reference ranges:  Up to 24 hours.............<8.0 mg/dL  Up to 48 hours............<12.0 mg/dL  3-5 days..................<15.0 mg/dL  6-29 days.................<15.0 mg/dL       Alkaline Phosphatase   Date Value Ref Range Status   05/25/2022 111 55 - 135 U/L Final     AST   Date Value Ref Range Status   05/25/2022 17 10 - 40 U/L Final     ALT   Date Value Ref Range Status   05/25/2022 9 (L) 10 - 44 U/L Final     Anion Gap   Date Value Ref Range Status   06/16/2022 7 (L) 8 - 16 mmol/L Final     eGFR if    Date Value Ref Range Status   06/16/2022 46 (A) >60 mL/min/1.73 m^2 Final     eGFR if non    Date Value Ref Range Status   06/16/2022 39 (A) >60  mL/min/1.73 m^2 Final     Comment:     Calculation used to obtain the estimated glomerular filtration  rate (eGFR) is the CKD-EPI equation.          SURG [facility] Surgery Fluoro Usage  See OP Notes for results.     IMPRESSION: See OP Notes for results.     This procedure was auto-finalized by: Virtual Radiologist       All pertinent labs and imaging reviewed.    Assessment:       1. Malignant neoplasm of right upper lobe of lung    2. Follicular lymphoma grade I, unspecified body region         Plan:     Malignant neoplasm of right upper lobe of lung  -     Ambulatory referral/consult to [facility]         1.  Treatment plan of Pembrolizumab (Keytruda) every 6 weeks discussed with patient.  2.  Handouts provided from [facility] on immunotherapy agent.    3.  Discussed the mechanism of action, potential side effects of this treatment as well as ways to manage them at home.   4.  Discussed follow-up with the physician for toxicity monitoring throughout treatment.    5.  The patient verbalized understanding. All questions were answered and an informed consent was obtained.     Assessment/plan reviewed and approved by Dr. Montero.        Answers for HPI/ROS submitted by the patient on 6/30/2022  appetite change : No  unexpected weight change: No  mouth sores: No

## 2022-07-10 ENCOUNTER — PATIENT MESSAGE (OUTPATIENT)
Dept: PAIN MEDICINE | Facility: CLINIC | Age: 82
End: 2022-07-10
Payer: MEDICARE

## 2022-07-11 ENCOUNTER — PATIENT MESSAGE (OUTPATIENT)
Dept: PAIN MEDICINE | Facility: CLINIC | Age: 82
End: 2022-07-11
Payer: MEDICARE

## 2022-07-11 ENCOUNTER — PATIENT MESSAGE (OUTPATIENT)
Dept: ADMINISTRATIVE | Facility: OTHER | Age: 82
End: 2022-07-11
Payer: MEDICARE

## 2022-07-11 RX ORDER — ALPRAZOLAM 0.5 MG/1
1 TABLET, ORALLY DISINTEGRATING ORAL ONCE AS NEEDED
Status: CANCELLED | OUTPATIENT
Start: 2022-07-11 | End: 2033-12-07

## 2022-07-11 NOTE — TELEPHONE ENCOUNTER
Since she did not have relief with the cyst aspiration, we could consider 1 more attempt at a transforaminal epidural steroid injection at the left L3/4.  She had relief in 2020 when we perform this.  Unfortunately, she may only get relief from further surgery but that is not an option at this point due to her oncology issues.

## 2022-07-13 ENCOUNTER — PATIENT MESSAGE (OUTPATIENT)
Dept: ADMINISTRATIVE | Facility: OTHER | Age: 82
End: 2022-07-13
Payer: MEDICARE

## 2022-07-16 ENCOUNTER — PATIENT MESSAGE (OUTPATIENT)
Dept: ADMINISTRATIVE | Facility: OTHER | Age: 82
End: 2022-07-16
Payer: MEDICARE

## 2022-07-17 ENCOUNTER — PATIENT MESSAGE (OUTPATIENT)
Dept: ADMINISTRATIVE | Facility: OTHER | Age: 82
End: 2022-07-17
Payer: MEDICARE

## 2022-07-18 ENCOUNTER — PATIENT MESSAGE (OUTPATIENT)
Dept: ADMINISTRATIVE | Facility: OTHER | Age: 82
End: 2022-07-18
Payer: MEDICARE

## 2022-08-01 ENCOUNTER — HOSPITAL ENCOUNTER (OUTPATIENT)
Facility: HOSPITAL | Age: 82
Discharge: HOME | End: 2022-08-01
Attending: ANESTHESIOLOGY | Admitting: ANESTHESIOLOGY
Payer: MEDICARE

## 2022-08-01 ENCOUNTER — HOSPITAL ENCOUNTER (OUTPATIENT)
Dept: RADIOLOGY | Facility: HOSPITAL | Age: 82
Discharge: HOME | End: 2022-08-01
Attending: ANESTHESIOLOGY
Payer: MEDICARE

## 2022-08-01 VITALS
HEART RATE: 76 BPM | TEMPERATURE: 98 F | BODY MASS INDEX: 32.99 KG/M2 | OXYGEN SATURATION: 96 % | HEIGHT: 65 IN | SYSTOLIC BLOOD PRESSURE: 105 MMHG | WEIGHT: 198 LBS | DIASTOLIC BLOOD PRESSURE: 57 MMHG | RESPIRATION RATE: 16 BRPM

## 2022-08-01 DIAGNOSIS — M54.50 LOWER BACK PAIN: ICD-10-CM

## 2022-08-01 DIAGNOSIS — M54.16 LUMBAR RADICULOPATHY: ICD-10-CM

## 2022-08-01 PROCEDURE — 64483 PR EPIDURAL INJ, ANES/STEROID, TRANSFORAMINAL, LUMB/SACR, SNGL LEVL: ICD-10-PCS | Mod: LT,,, | Performed by: ANESTHESIOLOGY

## 2022-08-01 PROCEDURE — 64483 NJX AA&/STRD TFRM EPI L/S 1: CPT | Mod: PO | Performed by: ANESTHESIOLOGY

## 2022-08-01 PROCEDURE — 64483 NJX AA&/STRD TFRM EPI L/S 1: CPT | Mod: LT,,, | Performed by: ANESTHESIOLOGY

## 2022-08-01 PROCEDURE — 25500020 PHARM REV CODE 255: Mod: PO | Performed by: ANESTHESIOLOGY

## 2022-08-01 PROCEDURE — 76000 FLUOROSCOPY <1 HR PHYS/QHP: CPT | Mod: TC,PO

## 2022-08-01 PROCEDURE — 25000003 PHARM REV CODE 250: Mod: PO | Performed by: ANESTHESIOLOGY

## 2022-08-01 PROCEDURE — 63600175 PHARM REV CODE 636 W HCPCS: Mod: PO | Performed by: ANESTHESIOLOGY

## 2022-08-01 RX ORDER — ALPRAZOLAM 0.5 MG/1
1 TABLET, ORALLY DISINTEGRATING ORAL ONCE AS NEEDED
Status: COMPLETED | OUTPATIENT
Start: 2022-08-01 | End: 2022-08-01

## 2022-08-01 RX ORDER — LIDOCAINE HYDROCHLORIDE 10 MG/ML
INJECTION, SOLUTION EPIDURAL; INFILTRATION; INTRACAUDAL; PERINEURAL
Status: DISCONTINUED | OUTPATIENT
Start: 2022-08-01 | End: 2022-08-01 | Stop reason: HOSPADM

## 2022-08-01 RX ORDER — METHYLPREDNISOLONE ACETATE 40 MG/ML
INJECTION, SUSPENSION INTRA-ARTICULAR; INTRALESIONAL; INTRAMUSCULAR; SOFT TISSUE
Status: DISCONTINUED | OUTPATIENT
Start: 2022-08-01 | End: 2022-08-01 | Stop reason: HOSPADM

## 2022-08-01 RX ORDER — BUPIVACAINE HYDROCHLORIDE 2.5 MG/ML
INJECTION, SOLUTION EPIDURAL; INFILTRATION; INTRACAUDAL
Status: DISCONTINUED | OUTPATIENT
Start: 2022-08-01 | End: 2022-08-01 | Stop reason: HOSPADM

## 2022-08-01 RX ADMIN — ALPRAZOLAM 1 MG: 0.5 TABLET, ORALLY DISINTEGRATING ORAL at 01:08

## 2022-08-01 NOTE — DISCHARGE INSTRUCTIONS

## 2022-08-01 NOTE — OP NOTE
PROCEDURE DATE: 8/1/2022    PROCEDURE: Left L3/4 transforaminal epidural steroid injection under fluoroscopy    DIAGNOSIS: left  Radiculopathy    Post op diagnosis: Same    PHYSICIAN: Ronnell Baeza MD    MEDICATIONS INJECTED:  Methylprednisolone 40mg (1ml) and 1ml 0.25% bupivicaine at each nerve root.     LOCAL ANESTHETIC INJECTED:  Lidocaine 1%. 4 ml per site.    SEDATION MEDICATIONS: none    ESTIMATED BLOOD LOSS:  none    COMPLICATIONS:  none    TECHNIQUE:   A time-out was taken to identify patient and procedure side prior to starting the procedure. The patient was placed in a prone position, prepped and draped in the usual sterile fashion using ChloraPrep and sterile towels.  The area to be injected was determined under fluoroscopic guidance in AP and oblique view.  Local anesthetic was given by raising a wheal and going down to the hub of a 25-gauge 1.5 inch needle.  In oblique view, a 3.5 inch 22-gauge bent-tip spinal needle was introduced towards 6 oclock position of the pedicle of each above named nerve root level.  The needle was walked medially then hinged into the neural foramen and position was confirmed in AP and lateral views.  Omnipaque contrast dye was injected to confirm appropriate placement and that there was no vascular uptake.  After negative aspiration for blood or CSF, the medication was then injected. This was performed at the left L3/4 level(s). The patient tolerated the procedure well.    The patient was monitored after the procedure.  Patient was given post procedure and discharge instructions to follow at home. The patient was discharged in a stable condition.

## 2022-08-01 NOTE — H&P
CC: Back pain    HPI: The patient is a 82yo woman with a history of lumbar radiculopathy here for left L3/4 transforaminal injection. There are no major changes in history and physical from 6/1/22.    Past Medical History:   Diagnosis Date    Breast cancer 1985    right mastectomy    Digestive disorder     Encounter for blood transfusion     History of pneumonia     Hypercholesterolemia     Hypertension     Indigestion     Lumbar spondylosis     Lung cancer     2022 - currently in treatment as of 7/27/22    Lymphoma     Osteopenia     Pulmonary nodule     Smoker 06/11/2022       Past Surgical History:   Procedure Laterality Date    APPENDECTOMY      BONE MARROW BIOPSY Bilateral 06/24/2020    Procedure: Biopsy-bone marrow;  Surgeon: Rod Montero MD;  Location: [facility];  Service: Oncology;  Laterality: Bilateral;    BREAST RECONSTRUCTION  1990    right    CATARACT EXTRACTION W/  INTRAOCULAR LENS IMPLANT Bilateral     CHOLECYSTECTOMY      ENDOBRONCHIAL ULTRASOUND Bilateral 07/09/2021    Procedure: ENDOBRONCHIAL ULTRASOUND (EBUS);  Surgeon: Gregorio Kinney MD;  Location: [facility];  Service: Pulmonary;  Laterality: Bilateral;  NEED LMA to  eval right upper paratracheal LN.     ENDOBRONCHIAL ULTRASOUND Bilateral 6/16/2022    Procedure: ENDOBRONCHIAL ULTRASOUND (EBUS);  Surgeon: Gregorio Kinney MD;  Location: [facility];  Service: Pulmonary;  Laterality: Bilateral;    INJECTION OF FACET JOINT Left 6/30/2022    Procedure: FACET JOINT Cyst Aspiration L3/4;  Surgeon: Ronnell Baeza MD;  Location: [facility] OR;  Service: Pain Management;  Laterality: Left;    INSERTION OF TUNNELED CENTRAL VENOUS CATHETER (CVC) WITH SUBCUTANEOUS PORT Left 11/04/2020    Procedure: [id number];  Surgeon: Kody Pretty MD;  Location: [facility] OR;  Service: General;  Laterality: Left;    JOINT REPLACEMENT  2008    right knee     LUMBAR LAMINECTOMY      LYMPH NODE BIOPSY      MASTECTOMY Right 1985    NEEDLE  "LOCALIZATION N/A 2020    Procedure: NEEDLE LOCALIZATION - lymph node bx;  Surgeon: Steve Weaver MD;  Location: [facility];  Service: Interventional Radiology;  Laterality: N/A;    TRANSFORAMINAL EPIDURAL INJECTION OF STEROID Left 2020    Procedure: Injection,steroid,epidural,transforaminal approach, l3/4;  Surgeon: Ronnell Beaza MD;  Location: [facility] OR;  Service: Pain Management;  Laterality: Left;    TRANSFORAMINAL EPIDURAL INJECTION OF STEROID Left 2022    Procedure: Injection,steroid,epidural,transforaminal approach L3/4;  Surgeon: Ronnell Baeza MD;  Location: [facility] OR;  Service: Pain Management;  Laterality: Left;    TUBAL LIGATION      UMBILICAL HERNIA REPAIR         Family History   Problem Relation Age of Onset    Cancer Sister         uterine    Diabetes Brother     Cancer Sister         Uterine cancer     Breast cancer Other 44       Social History     Socioeconomic History    Marital status:     Number of children: 3   Occupational History    Occupation: retired  for [employer]   Tobacco Use    Smoking status: Former Smoker     Packs/day: 0.50     Years: 53.00     Pack years: 26.50     Types: Cigarettes     Quit date: 2022     Years since quittin.1    Smokeless tobacco: Never Used   Substance and Sexual Activity    Alcohol use: No    Drug use: No    Sexual activity: Not Currently   Social History Narrative    Lives alone        Hobbies: skyler        From [place]           No current facility-administered medications for this encounter.       Review of patient's allergies indicates:  No Known Allergies    Vitals:    22 1131 22 1320   BP:  115/81   Pulse:  83   Resp:  16   Temp:  97.6 °F (36.4 °C)   SpO2:  95%   Weight: 89.8 kg (198 lb)    Height: 5' 5" (1.651 m)        ASA 2, Mallampati 2    REVIEW OF SYSTEMS:     GENERAL: No weight loss, malaise or fevers.  HEENT:  No recent changes in vision or hearing  NECK: Negative " for lumps, no difficulty with swallowing.  RESPIRATORY: Negative for cough, wheezing or shortness of breath, patient denies any recent URI.  CARDIOVASCULAR: Negative for chest pain, leg swelling or palpitations.  GI: Negative for abdominal discomfort, blood in stools or black stools or change in bowel habits.  MUSCULOSKELETAL: See HPI.  SKIN: Negative for lesions, rash, and itching.  PSYCH: No suicidal or homicidal ideations, no current mood disturbances.  HEMATOLOGY/LYMPHOLOGY: Negative for prolonged bleeding, bruising easily or swollen nodes. Patient is not currently taking any anti-coagulants  ENDO: No history of diabetes or thyroid dysfunction  NEURO: No history of syncope, paralysis, seizures or tremors.All other reviewed and negative other than HPI.    Physical exam:  Gen: A and O x3, pleasant, well-groomed  Skin: No rashes or obvious lesions  HEENT: PERRLA, no obvious deformities on ears or in canals. No thyroid masses, trachea midline, no palpable lymph nodes in neck, axilla.  CVS: Regular rate and rhythm, normal S1 and S2, no murmurs.  Resp: Clear to auscultation bilaterally.  Abdomen: Soft, NT/ND, normal bowel sounds present.  Musculoskeletal/Neuro: Moving all extremities    Assessment:  Lumbar radiculopathy  -     Case Request Operating Room: Injection,steroid,epidural,transforaminal approach  -     Vital signs; Standing  -     Verify informed consent; Standing  -     Notify physician ; Standing  -     Notify physician ; Standing  -     Notify physician (specify); Standing  -     Diet NPO; Standing  -     Place in Outpatient; Standing  -     alprazolam ODT dissolvable tablet 1 mg    Other orders  -     IP VTE HIGH RISK PATIENT; Standing

## 2022-08-09 ENCOUNTER — PATIENT MESSAGE (OUTPATIENT)
Dept: HEMATOLOGY/ONCOLOGY | Facility: CLINIC | Age: 82
End: 2022-08-09
Payer: MEDICARE

## 2022-08-17 ENCOUNTER — OFFICE VISIT (OUTPATIENT)
Dept: HEMATOLOGY/ONCOLOGY | Facility: CLINIC | Age: 82
End: 2022-08-17
Payer: MEDICARE

## 2022-08-17 ENCOUNTER — INFUSION (OUTPATIENT)
Dept: INFUSION THERAPY | Facility: HOSPITAL | Age: 82
End: 2022-08-17
Attending: INTERNAL MEDICINE
Payer: MEDICARE

## 2022-08-17 ENCOUNTER — DOCUMENTATION ONLY (OUTPATIENT)
Dept: INFUSION THERAPY | Facility: HOSPITAL | Age: 82
End: 2022-08-17
Payer: MEDICARE

## 2022-08-17 VITALS
WEIGHT: 206.13 LBS | RESPIRATION RATE: 16 BRPM | BODY MASS INDEX: 34.34 KG/M2 | TEMPERATURE: 97 F | SYSTOLIC BLOOD PRESSURE: 114 MMHG | HEART RATE: 76 BPM | DIASTOLIC BLOOD PRESSURE: 73 MMHG | HEIGHT: 65 IN

## 2022-08-17 VITALS
DIASTOLIC BLOOD PRESSURE: 81 MMHG | SYSTOLIC BLOOD PRESSURE: 141 MMHG | WEIGHT: 206.13 LBS | HEART RATE: 84 BPM | TEMPERATURE: 97 F | BODY MASS INDEX: 34.3 KG/M2 | OXYGEN SATURATION: 94 %

## 2022-08-17 DIAGNOSIS — J20.9 ACUTE BRONCHITIS, UNSPECIFIED ORGANISM: ICD-10-CM

## 2022-08-17 DIAGNOSIS — E55.9 VITAMIN D DEFICIENCY: ICD-10-CM

## 2022-08-17 DIAGNOSIS — C34.11 MALIGNANT NEOPLASM OF RIGHT UPPER LOBE OF LUNG: Primary | ICD-10-CM

## 2022-08-17 DIAGNOSIS — Z72.0 TOBACCO ABUSE: ICD-10-CM

## 2022-08-17 DIAGNOSIS — C82.00 FOLLICULAR LYMPHOMA GRADE I, UNSPECIFIED BODY REGION: ICD-10-CM

## 2022-08-17 DIAGNOSIS — E83.42 HYPOMAGNESEMIA: ICD-10-CM

## 2022-08-17 DIAGNOSIS — C34.32 PRIMARY MALIGNANT NEOPLASM OF LEFT LOWER LOBE OF LUNG: Primary | ICD-10-CM

## 2022-08-17 PROCEDURE — 1101F PR PT FALLS ASSESS DOC 0-1 FALLS W/OUT INJ PAST YR: ICD-10-PCS | Mod: CPTII,S$GLB,, | Performed by: INTERNAL MEDICINE

## 2022-08-17 PROCEDURE — 99999 PR PBB SHADOW E&M-EST. PATIENT-LVL IV: CPT | Mod: PBBFAC,,, | Performed by: INTERNAL MEDICINE

## 2022-08-17 PROCEDURE — 1125F AMNT PAIN NOTED PAIN PRSNT: CPT | Mod: CPTII,S$GLB,, | Performed by: INTERNAL MEDICINE

## 2022-08-17 PROCEDURE — 1159F PR MEDICATION LIST DOCUMENTED IN MEDICAL RECORD: ICD-10-PCS | Mod: CPTII,S$GLB,, | Performed by: INTERNAL MEDICINE

## 2022-08-17 PROCEDURE — 3288F PR FALLS RISK ASSESSMENT DOCUMENTED: ICD-10-PCS | Mod: CPTII,S$GLB,, | Performed by: INTERNAL MEDICINE

## 2022-08-17 PROCEDURE — 1160F PR REVIEW ALL MEDS BY PRESCRIBER/CLIN PHARMACIST DOCUMENTED: ICD-10-PCS | Mod: CPTII,S$GLB,, | Performed by: INTERNAL MEDICINE

## 2022-08-17 PROCEDURE — 3077F PR MOST RECENT SYSTOLIC BLOOD PRESSURE >= 140 MM HG: ICD-10-PCS | Mod: CPTII,S$GLB,, | Performed by: INTERNAL MEDICINE

## 2022-08-17 PROCEDURE — 99215 PR OFFICE/OUTPT VISIT, EST, LEVL V, 40-54 MIN: ICD-10-PCS | Mod: S$GLB,,, | Performed by: INTERNAL MEDICINE

## 2022-08-17 PROCEDURE — 96417 CHEMO IV INFUS EACH ADDL SEQ: CPT | Mod: PN

## 2022-08-17 PROCEDURE — 25000003 PHARM REV CODE 250: Mod: PN | Performed by: INTERNAL MEDICINE

## 2022-08-17 PROCEDURE — 99215 OFFICE O/P EST HI 40 MIN: CPT | Mod: S$GLB,,, | Performed by: INTERNAL MEDICINE

## 2022-08-17 PROCEDURE — 99999 PR PBB SHADOW E&M-EST. PATIENT-LVL IV: ICD-10-PCS | Mod: PBBFAC,,, | Performed by: INTERNAL MEDICINE

## 2022-08-17 PROCEDURE — 1125F PR PAIN SEVERITY QUANTIFIED, PAIN PRESENT: ICD-10-PCS | Mod: CPTII,S$GLB,, | Performed by: INTERNAL MEDICINE

## 2022-08-17 PROCEDURE — 3079F PR MOST RECENT DIASTOLIC BLOOD PRESSURE 80-89 MM HG: ICD-10-PCS | Mod: CPTII,S$GLB,, | Performed by: INTERNAL MEDICINE

## 2022-08-17 PROCEDURE — 3077F SYST BP >= 140 MM HG: CPT | Mod: CPTII,S$GLB,, | Performed by: INTERNAL MEDICINE

## 2022-08-17 PROCEDURE — 63600175 PHARM REV CODE 636 W HCPCS: Mod: PN | Performed by: INTERNAL MEDICINE

## 2022-08-17 PROCEDURE — A4216 STERILE WATER/SALINE, 10 ML: HCPCS | Mod: PN | Performed by: INTERNAL MEDICINE

## 2022-08-17 PROCEDURE — 96413 CHEMO IV INFUSION 1 HR: CPT | Mod: PN

## 2022-08-17 PROCEDURE — 1160F RVW MEDS BY RX/DR IN RCRD: CPT | Mod: CPTII,S$GLB,, | Performed by: INTERNAL MEDICINE

## 2022-08-17 PROCEDURE — 3079F DIAST BP 80-89 MM HG: CPT | Mod: CPTII,S$GLB,, | Performed by: INTERNAL MEDICINE

## 2022-08-17 PROCEDURE — 1101F PT FALLS ASSESS-DOCD LE1/YR: CPT | Mod: CPTII,S$GLB,, | Performed by: INTERNAL MEDICINE

## 2022-08-17 PROCEDURE — 1159F MED LIST DOCD IN RCRD: CPT | Mod: CPTII,S$GLB,, | Performed by: INTERNAL MEDICINE

## 2022-08-17 PROCEDURE — 3288F FALL RISK ASSESSMENT DOCD: CPT | Mod: CPTII,S$GLB,, | Performed by: INTERNAL MEDICINE

## 2022-08-17 RX ORDER — HEPARIN 100 UNIT/ML
500 SYRINGE INTRAVENOUS
Status: CANCELLED | OUTPATIENT
Start: 2022-08-17

## 2022-08-17 RX ORDER — SODIUM CHLORIDE 0.9 % (FLUSH) 0.9 %
10 SYRINGE (ML) INJECTION
Status: DISCONTINUED | OUTPATIENT
Start: 2022-08-17 | End: 2022-08-17 | Stop reason: HOSPADM

## 2022-08-17 RX ORDER — DIPHENHYDRAMINE HYDROCHLORIDE 50 MG/ML
50 INJECTION INTRAMUSCULAR; INTRAVENOUS ONCE AS NEEDED
Status: CANCELLED | OUTPATIENT
Start: 2022-08-17

## 2022-08-17 RX ORDER — EPINEPHRINE 0.3 MG/.3ML
0.3 INJECTION SUBCUTANEOUS ONCE AS NEEDED
Status: CANCELLED | OUTPATIENT
Start: 2022-08-17

## 2022-08-17 RX ORDER — ACETAMINOPHEN 500 MG
1000 TABLET ORAL
Status: CANCELLED
Start: 2022-08-17

## 2022-08-17 RX ORDER — DOXYCYCLINE 100 MG/1
100 CAPSULE ORAL 2 TIMES DAILY
Qty: 14 CAPSULE | Refills: 0 | Status: SHIPPED | OUTPATIENT
Start: 2022-08-17 | End: 2022-08-24

## 2022-08-17 RX ORDER — HEPARIN 100 UNIT/ML
500 SYRINGE INTRAVENOUS
Status: DISCONTINUED | OUTPATIENT
Start: 2022-08-17 | End: 2022-08-17 | Stop reason: HOSPADM

## 2022-08-17 RX ORDER — SODIUM CHLORIDE 0.9 % (FLUSH) 0.9 %
10 SYRINGE (ML) INJECTION
Status: CANCELLED | OUTPATIENT
Start: 2022-08-17

## 2022-08-17 RX ORDER — MAGNESIUM SULFATE HEPTAHYDRATE 40 MG/ML
2 INJECTION, SOLUTION INTRAVENOUS ONCE
Status: COMPLETED | OUTPATIENT
Start: 2022-08-17 | End: 2022-08-17

## 2022-08-17 RX ORDER — ACETAMINOPHEN 500 MG
1000 TABLET ORAL
Status: COMPLETED | OUTPATIENT
Start: 2022-08-17 | End: 2022-08-17

## 2022-08-17 RX ADMIN — SODIUM CHLORIDE 400 MG: 9 INJECTION, SOLUTION INTRAVENOUS at 01:08

## 2022-08-17 RX ADMIN — Medication 10 ML: at 02:08

## 2022-08-17 RX ADMIN — Medication 500 UNITS: at 02:08

## 2022-08-17 RX ADMIN — DIPHENHYDRAMINE HYDROCHLORIDE 25 MG: 50 INJECTION, SOLUTION INTRAMUSCULAR; INTRAVENOUS at 01:08

## 2022-08-17 RX ADMIN — SODIUM CHLORIDE: 0.9 INJECTION, SOLUTION INTRAVENOUS at 11:08

## 2022-08-17 RX ADMIN — ACETAMINOPHEN 1000 MG: 500 TABLET, FILM COATED ORAL at 01:08

## 2022-08-17 RX ADMIN — MAGNESIUM SULFATE HEPTAHYDRATE 2 G: 40 INJECTION, SOLUTION INTRAVENOUS at 11:08

## 2022-08-17 NOTE — PROGRESS NOTES
History of present illness:  The patient is a 81-year-old white female who presents in consultation from Dr. Chavez regarding newly diagnosed non-Hodgkin's lymphoma.  Patient was in her usual state of health but developed severe/debilitating low pain and lumbar radiculopathy after scrubbing her screened porch.  An MRI was obtained and intra-abdominal lymphadenopathy as well as a complex left renal was noted.  Patient subsequently underwent dedicated CT scan of the abdomen pelvis as well as image guided needle biopsy of a right inguinal lymph node.  Patient was found have grade 1 follicular lymphoma.  As the patient was asymptomatic, she was placed on a watch wait strategy.    Patient completed 6 cycles of CVP-R and was receiving maintenance Rituxan every 8 weeks.  She completed 9 of 12 cycles.  Her course was complicated by the development of squamous cell carcinoma of the right lung.  Patient had stage I disease which was peripherally located.  She was treated with definitive XRT.  Patient had good response.  Unfortunately, patient has developed new pulmonary nodules and had a documented recurrence in mediastinal lymph nodes.  Multi disciplinary tumor board recommended immunotherapy consisting of single agent Keytruda.  Maintenance Rituxan was stopped.  Patient returns to clinic  for re-evaluation prior to 2nd cycle of Keytruda.  Patient recently quit smoking and remains off all tobacco products.  Patient had no difficulty tolerating 1st cycle of Keytruda.  Patient has some difficulty with operative colored sputum.    Physical examination:  Well-developed, obese, elderly, white female, in no acute distress, who has a weight of 206 lb (increased by 11 lb)  VITAL SIGNS: Documented  and reviewed this visit.  HEENT: Normocephalic, atraumatic. Oral mucosa pink and moist. Lips without lesions. Tongue midline. Oropharynx clear. Nonicteric sclerae.   NECK: Supple, bilateral supraclavicular adenopathy right internal  jugular chain lymphadenopathy.   HEART: Regular rate and rhythm without murmur, gallop or rub.   LUNGS:  Mild, bibasilar crackles bilaterally. Normal respiratory effort.   ABDOMEN: Soft, nontender, nondistended with positive normoactive bowel sounds, no hepatosplenomegaly.   EXTREMITIES: No cyanosis or clubbing appreciated.  Patient has Tr bilateral ankle edema.  Distal pulses are intact.   AXILLAE AND GROIN:  Small, palpable left inguinal lymphadenopathy is appreciated.   SKIN: Intact/turgor normal.  Palpable subcutaneous nodules about the abdominal wall consistent with lymph node involvement.   NEUROLOGIC: Cranial nerves II-XII grossly intact. Motor: Good muscle bulk and tone. Strength/sensory 5/5 throughout. Gait stable.     Laboratory:  White count 8.6, hemoglobin 12.2, hematocrit 38.5, platelets 141, absolute neutrophil count 6400.    Sodium 144, potassium 4.3, chloride 108, CO2 23, BUN 21, creatinine 1.4, glucose 94, calcium 9.4, magnesium 1.4, liver function test within normal limits, , GFR 38.     Impression:  1.  Grade 1 follicular non-Hodgkin's lymphoma - Stage Riley with excellent response to systemic therapy.  2.  Vitamin-D deficiency-improved on supplementation.  3. 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  4. Tobacco abuse-patient has now discontinued smoking.    5. Hypomagnesemia.    6. Doxycycline 100 BID for 7 days.    Plan:  1.  Proceed with 2nd cycle of Keytruda.  Will replace magnesium in association with today's cycle of therapy.  2.  Return to clinic 6 weeks from now with interval CBC, CMP, LDH, magnesium, TSH, free T4, fasting cortisol, and CT PET scan for restaging.      This note was created using voice recognition software and may contain grammatical errors.

## 2022-08-17 NOTE — Clinical Note
Return to clinic 6 weeks from now with interval CBC, CMP, LDH, magnesium, TSH, free T4, fasting cortisol and PET scan for restaging prior to appointment.  Patient is to see me

## 2022-08-17 NOTE — PLAN OF CARE
Pt tolerated Keytruda and Magnesium infusions well.  No s/s of infusion reaction noted.  Instructed to call MD with any problems.

## 2022-08-17 NOTE — PROGRESS NOTES
Oncology Nutrition   Chemotherapy Infusion Visit    Nutrition Follow Up   RD met with patient at chairside during infusion treatment. Pt reports continues to do well nutritionally- eating without difficulty, gaining weight, and denies nutrition related side effects.     Wt Readings from Last 10 Encounters:   08/17/22 93.5 kg (206 lb 2.1 oz)   08/17/22 93.5 kg (206 lb 2.1 oz)   07/27/22 89.8 kg (198 lb)   07/06/22 90 kg (198 lb 6.6 oz)   07/06/22 90 kg (198 lb 6.6 oz)   06/30/22 86.2 kg (190 lb)   06/29/22 88.5 kg (195 lb 1.7 oz)   06/16/22 87.2 kg (192 lb 3.9 oz)   06/14/22 86.5 kg (190 lb 11.2 oz)   06/08/22 86.5 kg (190 lb 11.2 oz)       All other nutrition questions/concerns addressed as appropriate. Will continue to follow and monitor throughout treatment PRN.     Belen Lyons, MS, RD, LDN  08/17/2022  12:47 PM

## 2022-08-23 ENCOUNTER — OFFICE VISIT (OUTPATIENT)
Dept: PAIN MEDICINE | Facility: CLINIC | Age: 82
End: 2022-08-23
Payer: MEDICARE

## 2022-08-23 VITALS
WEIGHT: 205.13 LBS | BODY MASS INDEX: 34.14 KG/M2 | OXYGEN SATURATION: 93 % | SYSTOLIC BLOOD PRESSURE: 124 MMHG | DIASTOLIC BLOOD PRESSURE: 68 MMHG | HEART RATE: 92 BPM

## 2022-08-23 DIAGNOSIS — M51.36 DDD (DEGENERATIVE DISC DISEASE), LUMBAR: ICD-10-CM

## 2022-08-23 DIAGNOSIS — M54.16 LUMBAR RADICULOPATHY: Primary | ICD-10-CM

## 2022-08-23 DIAGNOSIS — M48.061 SPINAL STENOSIS OF LUMBAR REGION WITHOUT NEUROGENIC CLAUDICATION: ICD-10-CM

## 2022-08-23 PROCEDURE — 1125F AMNT PAIN NOTED PAIN PRSNT: CPT | Mod: CPTII,S$GLB,, | Performed by: PHYSICIAN ASSISTANT

## 2022-08-23 PROCEDURE — 1160F RVW MEDS BY RX/DR IN RCRD: CPT | Mod: CPTII,S$GLB,, | Performed by: PHYSICIAN ASSISTANT

## 2022-08-23 PROCEDURE — 99213 PR OFFICE/OUTPT VISIT, EST, LEVL III, 20-29 MIN: ICD-10-PCS | Mod: S$GLB,,, | Performed by: PHYSICIAN ASSISTANT

## 2022-08-23 PROCEDURE — 99999 PR PBB SHADOW E&M-EST. PATIENT-LVL IV: ICD-10-PCS | Mod: PBBFAC,,, | Performed by: PHYSICIAN ASSISTANT

## 2022-08-23 PROCEDURE — 99999 PR PBB SHADOW E&M-EST. PATIENT-LVL IV: CPT | Mod: PBBFAC,,, | Performed by: PHYSICIAN ASSISTANT

## 2022-08-23 PROCEDURE — 99213 OFFICE O/P EST LOW 20 MIN: CPT | Mod: S$GLB,,, | Performed by: PHYSICIAN ASSISTANT

## 2022-08-23 PROCEDURE — 1160F PR REVIEW ALL MEDS BY PRESCRIBER/CLIN PHARMACIST DOCUMENTED: ICD-10-PCS | Mod: CPTII,S$GLB,, | Performed by: PHYSICIAN ASSISTANT

## 2022-08-23 PROCEDURE — 1159F MED LIST DOCD IN RCRD: CPT | Mod: CPTII,S$GLB,, | Performed by: PHYSICIAN ASSISTANT

## 2022-08-23 PROCEDURE — 1125F PR PAIN SEVERITY QUANTIFIED, PAIN PRESENT: ICD-10-PCS | Mod: CPTII,S$GLB,, | Performed by: PHYSICIAN ASSISTANT

## 2022-08-23 PROCEDURE — 3078F DIAST BP <80 MM HG: CPT | Mod: CPTII,S$GLB,, | Performed by: PHYSICIAN ASSISTANT

## 2022-08-23 PROCEDURE — 1159F PR MEDICATION LIST DOCUMENTED IN MEDICAL RECORD: ICD-10-PCS | Mod: CPTII,S$GLB,, | Performed by: PHYSICIAN ASSISTANT

## 2022-08-23 PROCEDURE — 1101F PT FALLS ASSESS-DOCD LE1/YR: CPT | Mod: CPTII,S$GLB,, | Performed by: PHYSICIAN ASSISTANT

## 2022-08-23 PROCEDURE — 1101F PR PT FALLS ASSESS DOC 0-1 FALLS W/OUT INJ PAST YR: ICD-10-PCS | Mod: CPTII,S$GLB,, | Performed by: PHYSICIAN ASSISTANT

## 2022-08-23 PROCEDURE — 3078F PR MOST RECENT DIASTOLIC BLOOD PRESSURE < 80 MM HG: ICD-10-PCS | Mod: CPTII,S$GLB,, | Performed by: PHYSICIAN ASSISTANT

## 2022-08-23 PROCEDURE — 3074F PR MOST RECENT SYSTOLIC BLOOD PRESSURE < 130 MM HG: ICD-10-PCS | Mod: CPTII,S$GLB,, | Performed by: PHYSICIAN ASSISTANT

## 2022-08-23 PROCEDURE — 3074F SYST BP LT 130 MM HG: CPT | Mod: CPTII,S$GLB,, | Performed by: PHYSICIAN ASSISTANT

## 2022-08-23 PROCEDURE — 3288F PR FALLS RISK ASSESSMENT DOCUMENTED: ICD-10-PCS | Mod: CPTII,S$GLB,, | Performed by: PHYSICIAN ASSISTANT

## 2022-08-23 PROCEDURE — 3288F FALL RISK ASSESSMENT DOCD: CPT | Mod: CPTII,S$GLB,, | Performed by: PHYSICIAN ASSISTANT

## 2022-08-23 NOTE — PROGRESS NOTES
This note was completed with dictation software and grammatical errors may exist.    CC:  Low back and left leg pain    HPI: The patient is a 81-year-old female with past medical history significant for hypertension and hyperlipidemia who presents in referral from Lucita Gonzalez PA-C for low back and right leg pain.  She is status post left L3/4 facet joint cyst aspiration on 06/30/2022 with mild relief.  She is status post left L3/4 transforaminal epidural steroid injection on 08/01/2022 with 30-40% relief.  She does feel that her left leg pain has significantly improved but she continues to have low back and left buttock pain with intermittent radiation to her left leg.  Her pain is no longer constant but she can not walk very far because the pain will become severe.  She is currently being treated for cancer and she can not have back surgery until she has completed chemotherapy.  She is going to have a PET scan next month.  She reports weakness in her left leg.  She denies numbness or incontinence.    Pain intervention history:   She is status post right L4/5 transforaminal epidural steroid injection on 11/17/17 with mild relief lasting a week, now reporting 0% relief.   She is status post caudal epidural steroid injection on 1/8/18 with 40% relief. She is status post left L3/4 transforaminal injection on 05/14/2020 with almost complete relief of her pain.   She is status post left L3/4 transforaminal epidural steroid injection on 03/23/2022 with 0% relief.    She is status post left L3/4 facet joint cyst aspiration on 06/30/2022 with mild relief.  She is status post left L3/4 transforaminal epidural steroid injection on 08/01/2022 with 30-40% relief.     Spine surgeries: She underwent left L4/5 laminectomy for severe canal stenosis with Dr. Malloy in May 2017. PSIF L4-5 on 04/13/2018 Dr. Miller.    Antineuropathics:  NSAIDs:  Physical therapy: Dynamic  Antidepressants:  Muscle  relaxers:  Opioids:  Antiplatelets/Anticoagulants:    ROS:  The patient reports back pain only.  Balance of review of systems is negative.    Past Medical History:   Diagnosis Date    Breast cancer 1985    right mastectomy    Digestive disorder     Encounter for blood transfusion     History of pneumonia     Hypercholesterolemia     Hypertension     Indigestion     Lumbar spondylosis     Lung cancer     2022 - currently in treatment as of 7/27/22    Lymphoma     Osteopenia     Pulmonary nodule     Smoker 06/11/2022       Past Surgical History:   Procedure Laterality Date    APPENDECTOMY      BONE MARROW BIOPSY Bilateral 06/24/2020    Procedure: Biopsy-bone marrow;  Surgeon: Rod Montero MD;  Location: [facility] OR;  Service: Oncology;  Laterality: Bilateral;    BREAST RECONSTRUCTION  1990    right    CATARACT EXTRACTION W/  INTRAOCULAR LENS IMPLANT Bilateral     CHOLECYSTECTOMY      ENDOBRONCHIAL ULTRASOUND Bilateral 07/09/2021    Procedure: ENDOBRONCHIAL ULTRASOUND (EBUS);  Surgeon: Gregorio Kinney MD;  Location: [facility];  Service: Pulmonary;  Laterality: Bilateral;  NEED LMA to  eval right upper paratracheal LN.     ENDOBRONCHIAL ULTRASOUND Bilateral 6/16/2022    Procedure: ENDOBRONCHIAL ULTRASOUND (EBUS);  Surgeon: Gregorio Kinney MD;  Location: [facility];  Service: Pulmonary;  Laterality: Bilateral;    INJECTION OF FACET JOINT Left 6/30/2022    Procedure: FACET JOINT Cyst Aspiration L3/4;  Surgeon: Ronnell Baeza MD;  Location: [facility] OR;  Service: Pain Management;  Laterality: Left;    INSERTION OF TUNNELED CENTRAL VENOUS CATHETER (CVC) WITH SUBCUTANEOUS PORT Left 11/04/2020    Procedure: [id number];  Surgeon: Kody Pretty MD;  Location: [facility] OR;  Service: General;  Laterality: Left;    JOINT REPLACEMENT  2008    right knee     LUMBAR LAMINECTOMY      LYMPH NODE BIOPSY      MASTECTOMY Right 1985    NEEDLE LOCALIZATION N/A 05/25/2020    Procedure: NEEDLE LOCALIZATION -  lymph node bx;  Surgeon: Steve Weaver MD;  Location: [facility] CATH;  Service: Interventional Radiology;  Laterality: N/A;    TRANSFORAMINAL EPIDURAL INJECTION OF STEROID Left 2020    Procedure: Injection,steroid,epidural,transforaminal approach, l3/4;  Surgeon: Ronnell Baeza MD;  Location: [facility] OR;  Service: Pain Management;  Laterality: Left;    TRANSFORAMINAL EPIDURAL INJECTION OF STEROID Left 2022    Procedure: Injection,steroid,epidural,transforaminal approach L3/4;  Surgeon: Ronnell Baeza MD;  Location: [facility] OR;  Service: Pain Management;  Laterality: Left;    TRANSFORAMINAL EPIDURAL INJECTION OF STEROID Left 2022    Procedure: Injection,steroid,epidural,transforaminal approach L3/4;  Surgeon: Ronnell Baeza MD;  Location: [facility] OR;  Service: Pain Management;  Laterality: Left;    TUBAL LIGATION      UMBILICAL HERNIA REPAIR         Social History     Socioeconomic History    Marital status:     Number of children: 3   Occupational History    Occupation: retired  for [employer]   Tobacco Use    Smoking status: Former Smoker     Packs/day: 0.50     Years: 53.00     Pack years: 26.50     Types: Cigarettes     Quit date: 2022     Years since quittin.2    Smokeless tobacco: Never Used   Substance and Sexual Activity    Alcohol use: No    Drug use: No    Sexual activity: Not Currently   Social History Narrative    Lives alone        Hobbies: skyler        From [place]             Medications/Allergies: See med card    Vitals:    22 1308   BP: 124/68   Pulse: 92   SpO2: (!) 93%   Weight: 93.1 kg (205 lb 2.2 oz)   PainSc:   6   PainLoc: Back         Physical exam:  Gen: A and O x3, pleasant, well-groomed  Skin: No rashes or obvious lesions  HEENT: PERRLA, no obvious deformities on ears or in canals. Trachea midline.  CVS: Regular rate and rhythm, normal palpable pulses.  Resp:No increased work of breathing, symmetrical chest rise.  Abdomen:  Soft, NT/ND.  Musculoskeletal:  Slow cautious gait.    Neuro:  Lower extremities: 5/5 strength bilaterally, 4+/5 left hip flexion  Reflexes: Patellar 2+ left side, 0+ right side, Achilles 2+ bilaterally.  Sensory:  Intact and symmetrical to light touch and pinprick in L2-S1 dermatomes bilaterally.    Lumbar spine:  Lumbar spine: ROM is moderately limited with flexion, extension and  oblique extension with mild low back pain with extension.    Clifton's test causes no increased pain on either side.    Supine straight leg raise causes left anterior thigh pain.  Internal and external rotation of the hip causes no increased pain on either side.  Myofascial exam: No tenderness to palpation across lumbar paraspinous muscles.      Imagin20 MRI L-spine:  There is extensive multilevel degenerative change in addition to postsurgical changes.  There are postsurgical changes of posterior instrumented fusion and decompression of L4 through S1.  There is some degree of disc space narrowing, disc bulge with osteophytic ridging with a without superimposed disc protrusion or disc extrusion in addition to facet joint arthropathy.  Also, there is 5 mm anterolisthesis of L4 on L5 which is without definite change compared to plain films dated 2018 but has progressed compared to prior preoperative MRI.  The pertinent findings are summarized below.  2. There is retroperitoneal lymphadenopathy which is incompletely included and evaluated.  Further evaluation with CT abdomen and pelvis is recommended.  3. At the L3-4 level there is a disc bulge with superimposed left-sided disc extrusion with cephalad extension contributing to severe left lateral recess and foraminal stenosis, severe spinal stenosis and mild right foraminal stenosis.  The findings have progressed.  4. At the T11-12 level there is moderate-to-marked right foraminal stenosis without spinal stenosis.  5. At the L2-3 level there has been interval progression of  degenerative change resulting in moderate left lateral recess stenosis and severe left foraminal stenosis.  6. At the postoperative L4-5 level there is mild-to-moderate right foraminal stenosis without spinal stenosis status post decompression.  7. At the L5-S1 level there is mild crowding of the left lateral recess with mild interval improvement.  There is moderate left and mild-to-moderate right foraminal stenosis without spinal stenosis.    06/06/2022 MRI lumbar spine   There are postoperative changes of posterior instrumented fusion at L4-S1 utilizing bilateral pedicle screws and vertical stabilization rods.  There are postoperative changes of posterior decompression at L4 and L5.  There is a grade I retrolisthesis of L2 on L3 and L3 on L4.  There is a grade I anterolisthesis of L4 on L5.  No acute lumbar compression fracture or pathologic marrow replacement process.  There is a mild dextroconvex curvature of the thoracolumbar spine.  There is degenerative disc desiccation and disc space narrowing at every lumbar level.  There are prominent degenerative endplate changes observed at L2-L3.     The conus medullaris terminates at L1-L2.  The visualized lower thoracic spinal cord is normal in signal intensity with no evidence of cord edema, myelomalacia, or cord syrinx.  No epidural fluid collections or masses.  The paraspinous musculature is unremarkable.     The distal descending thoracic aorta measures up to 30 x 36 mm in maximal dimension on series 6, image 1.  There is a fusiform infrarenal abdominal aortic aneurysm present which measures up to 3.5 x 3.4 cm on the provided images (series 5, image 21 and series 2, image 9).  There is a 24 mm focus of T2 hyperintense signal present within the anterior cortex of the lower pole of the right kidney on series 6, image 10.  This was noted at the time of a recent CT of the chest, abdomen, and pelvis, and a solid mass cannot be excluded.  Previously noted  retroperitoneal lymphadenopathy has resolved in the interim since the prior study.     T11-T12: There is right neuroforaminal stenosis as a result of the patient's scoliotic curvature and also due to right facet arthropathy and a possible small right foraminal disc protrusion (series 2 and 3, image 6).     T12-L1: There is ligamentum flavum thickening and minor left facet arthropathy.  No disc protrusion or extrusion. No central canal stenosis or neuroforaminal stenosis.     L1-L2: There is a minimal broad left paracentral/foraminal disc protrusion.  There is ligamentum flavum thickening and facet arthropathy.  No disc protrusion or extrusion. No central canal stenosis or neuroforaminal stenosis.     L2-L3: There is a grade I retrolisthesis of L2 on L3.  There is a left paracentral/foraminal/extraforaminal posterior disc osteophyte complex.  No definite encroachment upon the exited left L2 nerve.  There is ligamentum flavum thickening and bilateral hypertrophic facet arthropathy.  There is moderate-severe left neuroforaminal stenosis present.  No right neuroforaminal stenosis.  There is mild overall central canal stenosis.     L3-L4: There is a grade I retrolisthesis of L3 on L4 with associated uncovering of the intervertebral disc and a superimposed broad disc bulge which extends into both neural foramina.  There is ligamentum flavum thickening and bilateral hypertrophic facet arthropathy.  There is a new 3 x 10 mm lentiform subligamentous cystic structure present in association with the left facet joint on series 6, image 12, most likely a developing intraspinal synovial cyst.  This results in prominent left lateral recess stenosis and probable encroachment on the descending left L4 nerve in the left lateral recess.  There is moderate-severe central canal stenosis.  There is prominent right lateral recess stenosis which could produce symptoms referable to the right L4 nerve.  There is bilateral moderate  neuroforaminal stenosis.     L4-L5: There are postoperative changes of posterior decompression.  There is a grade I anterolisthesis of L4 on L5.  No definite central canal stenosis or neuroforaminal stenosis.  There is bilateral hypertrophic facet arthropathy.  There are postoperative changes of posterior fusion.     L5-S1: There is a grade I retrolisthesis of L5 on S1.  There is a broad left paracentral disc protrusion present superimposed upon a broad disc bulge.  There is bilateral hypertrophic facet arthropathy.  No neuroforaminal stenosis.  No overall central canal stenosis.    Assessment: The patient is a 81-year-old female with past medical history significant for hypertension and hyperlipidemia who presents in referral from Lucita Gonzalez PA-C for low back and right leg pain.    1. Lumbar radiculopathy     2. DDD (degenerative disc disease), lumbar     3. Spinal stenosis of lumbar region without neurogenic claudication           Plan:  1. She had some relief following the left L3/4 transforaminal epidural steroid injection but continues to have severe pain.  She is going to be having a PET scan next month and will follow-up with her oncologist and then follow-up with her neurosurgeon.  If she is unable to have surgery we discussed considering another left L3/4 transforaminal epidural steroid injection if her pain worsens.    2. She will follow-up as needed for now.

## 2022-09-01 ENCOUNTER — PATIENT MESSAGE (OUTPATIENT)
Dept: HEMATOLOGY/ONCOLOGY | Facility: CLINIC | Age: 82
End: 2022-09-01
Payer: MEDICARE

## 2022-09-23 ENCOUNTER — HOSPITAL ENCOUNTER (OUTPATIENT)
Dept: RADIOLOGY | Facility: HOSPITAL | Age: 82
Discharge: HOME | End: 2022-09-23
Attending: INTERNAL MEDICINE
Payer: MEDICARE

## 2022-09-23 DIAGNOSIS — C34.11 MALIGNANT NEOPLASM OF RIGHT UPPER LOBE OF LUNG: ICD-10-CM

## 2022-09-23 LAB — GLUCOSE SERPL-MCNC: 104 MG/DL (ref 70–110)

## 2022-09-23 PROCEDURE — 78815 NM PET CT ROUTINE: ICD-10-PCS | Mod: 26,PS,, | Performed by: RADIOLOGY

## 2022-09-23 PROCEDURE — 78815 PET IMAGE W/CT SKULL-THIGH: CPT | Mod: 26,PS,, | Performed by: RADIOLOGY

## 2022-09-23 PROCEDURE — 78815 PET IMAGE W/CT SKULL-THIGH: CPT | Mod: TC,PS,PN

## 2022-09-23 NOTE — PROGRESS NOTES
PET Imaging Questionnaire    Are you a Diabetic? Recent Blood Sugar level? No    Are you anemic? Bone Marrow Stimulation Meds? No    Have you had a CT Scan, if so when & where was your last one? Yes -     Have you had a PET Scan, if so when & where was your last one? Yes -     Chemotherapy or currently on Chemotherapy? Yes    Radiation therapy? Yes    Surgical History:   Past Surgical History:   Procedure Laterality Date    APPENDECTOMY      BONE MARROW BIOPSY Bilateral 06/24/2020    Procedure: Biopsy-bone marrow;  Surgeon: Rod Montero MD;  Location: [facility] OR;  Service: Oncology;  Laterality: Bilateral;    BREAST RECONSTRUCTION  1990    right    CATARACT EXTRACTION W/  INTRAOCULAR LENS IMPLANT Bilateral     CHOLECYSTECTOMY      ENDOBRONCHIAL ULTRASOUND Bilateral 07/09/2021    Procedure: ENDOBRONCHIAL ULTRASOUND (EBUS);  Surgeon: Gregorio Kinney MD;  Location: [facility];  Service: Pulmonary;  Laterality: Bilateral;  NEED LMA to  eval right upper paratracheal LN.     ENDOBRONCHIAL ULTRASOUND Bilateral 6/16/2022    Procedure: ENDOBRONCHIAL ULTRASOUND (EBUS);  Surgeon: Gregorio Kinney MD;  Location: [facility];  Service: Pulmonary;  Laterality: Bilateral;    INJECTION OF FACET JOINT Left 6/30/2022    Procedure: FACET JOINT Cyst Aspiration L3/4;  Surgeon: Ronnell Baeza MD;  Location: [facility] OR;  Service: Pain Management;  Laterality: Left;    INSERTION OF TUNNELED CENTRAL VENOUS CATHETER (CVC) WITH SUBCUTANEOUS PORT Left 11/04/2020    Procedure: [id number];  Surgeon: Kody Pretty MD;  Location: [facility] OR;  Service: General;  Laterality: Left;    JOINT REPLACEMENT  2008    right knee     LUMBAR LAMINECTOMY      LYMPH NODE BIOPSY      MASTECTOMY Right 1985    NEEDLE LOCALIZATION N/A 05/25/2020    Procedure: NEEDLE LOCALIZATION - lymph node bx;  Surgeon: Steve Weaver MD;  Location: [facility];  Service: Interventional Radiology;  Laterality: N/A;    TRANSFORAMINAL EPIDURAL INJECTION OF STEROID Left  05/14/2020    Procedure: Injection,steroid,epidural,transforaminal approach, l3/4;  Surgeon: Ronnell Baeza MD;  Location: [facility] OR;  Service: Pain Management;  Laterality: Left;    TRANSFORAMINAL EPIDURAL INJECTION OF STEROID Left 03/23/2022    Procedure: Injection,steroid,epidural,transforaminal approach L3/4;  Surgeon: Ronnell Baeza MD;  Location: [facility] OR;  Service: Pain Management;  Laterality: Left;    TRANSFORAMINAL EPIDURAL INJECTION OF STEROID Left 8/1/2022    Procedure: Injection,steroid,epidural,transforaminal approach L3/4;  Surgeon: Ronnell Baeza MD;  Location: [facility] OR;  Service: Pain Management;  Laterality: Left;    TUBAL LIGATION      UMBILICAL HERNIA REPAIR          Have you been fasting for at least 6 hours? Yes    Is there any chance you may be pregnant or breastfeeding? No    Assay: 12.25 MCi@:8:59   Injection Site:LT AC    Residual: .57 mCi@: 9:01   Technologist: Jurgen Mccartney Injected:11.68mCi

## 2022-09-27 RX ORDER — ACETAMINOPHEN 325 MG/1
650 TABLET ORAL ONCE
Status: CANCELLED | OUTPATIENT
Start: 2022-09-28 | End: 2022-09-28

## 2022-09-27 RX ORDER — PREDNISONE 20 MG/1
40 TABLET ORAL ONCE
Status: CANCELLED | OUTPATIENT
Start: 2022-09-28 | End: 2022-09-28

## 2022-09-27 RX ORDER — DIPHENHYDRAMINE HCL 25 MG
25 CAPSULE ORAL ONCE
Status: CANCELLED | OUTPATIENT
Start: 2022-09-28 | End: 2022-09-28

## 2022-09-27 RX ORDER — ALBUTEROL SULFATE 90 UG/1
2 AEROSOL, METERED RESPIRATORY (INHALATION) EVERY 4 HOURS PRN
Status: CANCELLED | OUTPATIENT
Start: 2022-09-28

## 2022-09-27 RX ORDER — EPINEPHRINE 0.3 MG/.3ML
0.3 INJECTION SUBCUTANEOUS
Status: CANCELLED | OUTPATIENT
Start: 2022-09-28

## 2022-09-27 RX ORDER — ONDANSETRON 4 MG/1
4 TABLET, ORALLY DISINTEGRATING ORAL ONCE
Status: CANCELLED | OUTPATIENT
Start: 2022-09-28 | End: 2022-09-28

## 2022-09-28 ENCOUNTER — OFFICE VISIT (OUTPATIENT)
Dept: HEMATOLOGY/ONCOLOGY | Facility: CLINIC | Age: 82
End: 2022-09-28
Payer: MEDICARE

## 2022-09-28 ENCOUNTER — DOCUMENTATION ONLY (OUTPATIENT)
Dept: INFUSION THERAPY | Facility: HOSPITAL | Age: 82
End: 2022-09-28
Payer: MEDICARE

## 2022-09-28 ENCOUNTER — INFUSION (OUTPATIENT)
Dept: INFUSION THERAPY | Facility: HOSPITAL | Age: 82
End: 2022-09-28
Attending: INTERNAL MEDICINE
Payer: MEDICARE

## 2022-09-28 VITALS
OXYGEN SATURATION: 95 % | DIASTOLIC BLOOD PRESSURE: 80 MMHG | HEART RATE: 86 BPM | SYSTOLIC BLOOD PRESSURE: 128 MMHG | HEIGHT: 65 IN | WEIGHT: 208.56 LBS | BODY MASS INDEX: 34.75 KG/M2 | RESPIRATION RATE: 18 BRPM | TEMPERATURE: 98 F

## 2022-09-28 VITALS
OXYGEN SATURATION: 95 % | HEART RATE: 70 BPM | HEIGHT: 65 IN | DIASTOLIC BLOOD PRESSURE: 72 MMHG | RESPIRATION RATE: 16 BRPM | WEIGHT: 208.56 LBS | BODY MASS INDEX: 34.75 KG/M2 | SYSTOLIC BLOOD PRESSURE: 124 MMHG | TEMPERATURE: 98 F

## 2022-09-28 DIAGNOSIS — E83.42 HYPOMAGNESEMIA: ICD-10-CM

## 2022-09-28 DIAGNOSIS — R79.89 PRERENAL AZOTEMIA: ICD-10-CM

## 2022-09-28 DIAGNOSIS — J01.00 ACUTE MAXILLARY SINUSITIS, RECURRENCE NOT SPECIFIED: ICD-10-CM

## 2022-09-28 DIAGNOSIS — M48.061 SPINAL STENOSIS OF LUMBAR REGION WITHOUT NEUROGENIC CLAUDICATION: ICD-10-CM

## 2022-09-28 DIAGNOSIS — C82.01 GRADE 1 FOLLICULAR LYMPHOMA OF LYMPH NODES OF NECK: Primary | ICD-10-CM

## 2022-09-28 DIAGNOSIS — C82.00 FOLLICULAR LYMPHOMA GRADE I, UNSPECIFIED BODY REGION: ICD-10-CM

## 2022-09-28 DIAGNOSIS — Z72.0 TOBACCO ABUSE: ICD-10-CM

## 2022-09-28 DIAGNOSIS — C34.11 MALIGNANT NEOPLASM OF RIGHT UPPER LOBE OF LUNG: Primary | ICD-10-CM

## 2022-09-28 DIAGNOSIS — E03.9 ACQUIRED HYPOTHYROIDISM: ICD-10-CM

## 2022-09-28 DIAGNOSIS — C34.32 PRIMARY MALIGNANT NEOPLASM OF LEFT LOWER LOBE OF LUNG: Primary | ICD-10-CM

## 2022-09-28 DIAGNOSIS — E55.9 VITAMIN D DEFICIENCY: ICD-10-CM

## 2022-09-28 PROCEDURE — 99999 PR PBB SHADOW E&M-EST. PATIENT-LVL IV: CPT | Mod: PBBFAC,,, | Performed by: INTERNAL MEDICINE

## 2022-09-28 PROCEDURE — 1160F PR REVIEW ALL MEDS BY PRESCRIBER/CLIN PHARMACIST DOCUMENTED: ICD-10-PCS | Mod: CPTII,S$GLB,, | Performed by: INTERNAL MEDICINE

## 2022-09-28 PROCEDURE — 3074F PR MOST RECENT SYSTOLIC BLOOD PRESSURE < 130 MM HG: ICD-10-PCS | Mod: CPTII,S$GLB,, | Performed by: INTERNAL MEDICINE

## 2022-09-28 PROCEDURE — 3079F PR MOST RECENT DIASTOLIC BLOOD PRESSURE 80-89 MM HG: ICD-10-PCS | Mod: CPTII,S$GLB,, | Performed by: INTERNAL MEDICINE

## 2022-09-28 PROCEDURE — 3288F FALL RISK ASSESSMENT DOCD: CPT | Mod: CPTII,S$GLB,, | Performed by: INTERNAL MEDICINE

## 2022-09-28 PROCEDURE — 1159F MED LIST DOCD IN RCRD: CPT | Mod: CPTII,S$GLB,, | Performed by: INTERNAL MEDICINE

## 2022-09-28 PROCEDURE — 1159F PR MEDICATION LIST DOCUMENTED IN MEDICAL RECORD: ICD-10-PCS | Mod: CPTII,S$GLB,, | Performed by: INTERNAL MEDICINE

## 2022-09-28 PROCEDURE — M0220 HC INJECTION ADMIN, TIXAGEVIMAB-CILGAVIMAB, INCL POST ADMIN MONIT: HCPCS | Performed by: INTERNAL MEDICINE

## 2022-09-28 PROCEDURE — 63600175 PHARM REV CODE 636 W HCPCS: Mod: PN | Performed by: INTERNAL MEDICINE

## 2022-09-28 PROCEDURE — 1126F AMNT PAIN NOTED NONE PRSNT: CPT | Mod: CPTII,S$GLB,, | Performed by: INTERNAL MEDICINE

## 2022-09-28 PROCEDURE — 1101F PR PT FALLS ASSESS DOC 0-1 FALLS W/OUT INJ PAST YR: ICD-10-PCS | Mod: CPTII,S$GLB,, | Performed by: INTERNAL MEDICINE

## 2022-09-28 PROCEDURE — 96367 TX/PROPH/DG ADDL SEQ IV INF: CPT | Mod: PN

## 2022-09-28 PROCEDURE — 96413 CHEMO IV INFUSION 1 HR: CPT | Mod: PN

## 2022-09-28 PROCEDURE — 25000003 PHARM REV CODE 250: Mod: PN | Performed by: INTERNAL MEDICINE

## 2022-09-28 PROCEDURE — 1126F PR PAIN SEVERITY QUANTIFIED, NO PAIN PRESENT: ICD-10-PCS | Mod: CPTII,S$GLB,, | Performed by: INTERNAL MEDICINE

## 2022-09-28 PROCEDURE — 3079F DIAST BP 80-89 MM HG: CPT | Mod: CPTII,S$GLB,, | Performed by: INTERNAL MEDICINE

## 2022-09-28 PROCEDURE — 1101F PT FALLS ASSESS-DOCD LE1/YR: CPT | Mod: CPTII,S$GLB,, | Performed by: INTERNAL MEDICINE

## 2022-09-28 PROCEDURE — 99215 PR OFFICE/OUTPT VISIT, EST, LEVL V, 40-54 MIN: ICD-10-PCS | Mod: S$GLB,,, | Performed by: INTERNAL MEDICINE

## 2022-09-28 PROCEDURE — 3288F PR FALLS RISK ASSESSMENT DOCUMENTED: ICD-10-PCS | Mod: CPTII,S$GLB,, | Performed by: INTERNAL MEDICINE

## 2022-09-28 PROCEDURE — A4216 STERILE WATER/SALINE, 10 ML: HCPCS | Mod: PN | Performed by: INTERNAL MEDICINE

## 2022-09-28 PROCEDURE — 1160F RVW MEDS BY RX/DR IN RCRD: CPT | Mod: CPTII,S$GLB,, | Performed by: INTERNAL MEDICINE

## 2022-09-28 PROCEDURE — 99215 OFFICE O/P EST HI 40 MIN: CPT | Mod: S$GLB,,, | Performed by: INTERNAL MEDICINE

## 2022-09-28 PROCEDURE — 3074F SYST BP LT 130 MM HG: CPT | Mod: CPTII,S$GLB,, | Performed by: INTERNAL MEDICINE

## 2022-09-28 PROCEDURE — 99999 PR PBB SHADOW E&M-EST. PATIENT-LVL IV: ICD-10-PCS | Mod: PBBFAC,,, | Performed by: INTERNAL MEDICINE

## 2022-09-28 RX ORDER — DOXYCYCLINE 100 MG/1
100 CAPSULE ORAL 2 TIMES DAILY
Qty: 20 CAPSULE | Refills: 0 | Status: SHIPPED | OUTPATIENT
Start: 2022-09-28 | End: 2022-10-08

## 2022-09-28 RX ORDER — ACETAMINOPHEN 325 MG/1
650 TABLET ORAL ONCE
Status: CANCELLED | OUTPATIENT
Start: 2022-09-28 | End: 2022-09-28

## 2022-09-28 RX ORDER — SODIUM CHLORIDE 0.9 % (FLUSH) 0.9 %
10 SYRINGE (ML) INJECTION
Status: DISCONTINUED | OUTPATIENT
Start: 2022-09-28 | End: 2022-09-28 | Stop reason: HOSPADM

## 2022-09-28 RX ORDER — ONDANSETRON 4 MG/1
4 TABLET, ORALLY DISINTEGRATING ORAL ONCE
Status: CANCELLED | OUTPATIENT
Start: 2022-09-28 | End: 2022-09-28

## 2022-09-28 RX ORDER — SODIUM CHLORIDE 0.9 % (FLUSH) 0.9 %
10 SYRINGE (ML) INJECTION
Status: CANCELLED | OUTPATIENT
Start: 2022-09-28

## 2022-09-28 RX ORDER — DIPHENHYDRAMINE HYDROCHLORIDE 50 MG/ML
50 INJECTION INTRAMUSCULAR; INTRAVENOUS ONCE AS NEEDED
Status: CANCELLED | OUTPATIENT
Start: 2022-09-28

## 2022-09-28 RX ORDER — PREDNISONE 20 MG/1
40 TABLET ORAL ONCE
Status: CANCELLED | OUTPATIENT
Start: 2022-09-28 | End: 2022-09-28

## 2022-09-28 RX ORDER — HEPARIN 100 UNIT/ML
500 SYRINGE INTRAVENOUS
Status: CANCELLED | OUTPATIENT
Start: 2022-09-28

## 2022-09-28 RX ORDER — EPINEPHRINE 0.3 MG/.3ML
0.3 INJECTION SUBCUTANEOUS
Status: CANCELLED | OUTPATIENT
Start: 2022-09-28

## 2022-09-28 RX ORDER — EPINEPHRINE 0.3 MG/.3ML
0.3 INJECTION SUBCUTANEOUS ONCE AS NEEDED
Status: CANCELLED | OUTPATIENT
Start: 2022-09-28

## 2022-09-28 RX ORDER — ALBUTEROL SULFATE 90 UG/1
2 AEROSOL, METERED RESPIRATORY (INHALATION) EVERY 4 HOURS PRN
Status: CANCELLED | OUTPATIENT
Start: 2022-09-28

## 2022-09-28 RX ORDER — MAGNESIUM SULFATE HEPTAHYDRATE 40 MG/ML
2 INJECTION, SOLUTION INTRAVENOUS ONCE
Status: COMPLETED | OUTPATIENT
Start: 2022-09-28 | End: 2022-09-28

## 2022-09-28 RX ORDER — ACETAMINOPHEN 500 MG
1000 TABLET ORAL
Status: COMPLETED | OUTPATIENT
Start: 2022-09-28 | End: 2022-09-28

## 2022-09-28 RX ORDER — HEPARIN 100 UNIT/ML
500 SYRINGE INTRAVENOUS
Status: DISCONTINUED | OUTPATIENT
Start: 2022-09-28 | End: 2022-09-28 | Stop reason: HOSPADM

## 2022-09-28 RX ORDER — ACETAMINOPHEN 500 MG
1000 TABLET ORAL
Status: CANCELLED
Start: 2022-09-28

## 2022-09-28 RX ORDER — DIPHENHYDRAMINE HCL 25 MG
25 CAPSULE ORAL ONCE
Status: CANCELLED | OUTPATIENT
Start: 2022-09-28 | End: 2022-09-28

## 2022-09-28 RX ORDER — LEVOTHYROXINE SODIUM 100 UG/1
100 TABLET ORAL DAILY
Qty: 30 TABLET | Refills: 11 | Status: SHIPPED | OUTPATIENT
Start: 2022-09-28 | End: 2023-07-12 | Stop reason: SDUPTHER

## 2022-09-28 RX ADMIN — SODIUM CHLORIDE 400 MG: 9 INJECTION, SOLUTION INTRAVENOUS at 02:09

## 2022-09-28 RX ADMIN — DIPHENHYDRAMINE HYDROCHLORIDE 25 MG: 50 INJECTION, SOLUTION INTRAMUSCULAR; INTRAVENOUS at 02:09

## 2022-09-28 RX ADMIN — Medication 300 MG: at 01:09

## 2022-09-28 RX ADMIN — Medication 10 ML: at 03:09

## 2022-09-28 RX ADMIN — Medication 500 UNITS: at 03:09

## 2022-09-28 RX ADMIN — SODIUM CHLORIDE: 0.9 INJECTION, SOLUTION INTRAVENOUS at 12:09

## 2022-09-28 RX ADMIN — ACETAMINOPHEN 1000 MG: 500 TABLET, FILM COATED ORAL at 01:09

## 2022-09-28 RX ADMIN — MAGNESIUM SULFATE IN WATER 2 G: 40 INJECTION, SOLUTION INTRAVENOUS at 01:09

## 2022-09-28 NOTE — PLAN OF CARE
Patient received two gluteal intramuscular injections (left and right) of tixagevimab/cilgavimab (EVUSHELD) with no difficulties tolerating the medication

## 2022-09-28 NOTE — PLAN OF CARE
Pt tolerated Keytruda and Magnesium infusions well.  No s/s of infusion reaction from Keytruda noted.  Instructed to call MD with any problems

## 2022-09-28 NOTE — PROGRESS NOTES
History of present illness:  The patient is a 81-year-old white female who presents in consultation from Dr. Chavez regarding newly diagnosed non-Hodgkin's lymphoma.  Patient was in her usual state of health but developed severe/debilitating low pain and lumbar radiculopathy after scrubbing her screened porch.  An MRI was obtained and intra-abdominal lymphadenopathy as well as a complex left renal was noted.  Patient subsequently underwent dedicated CT scan of the abdomen pelvis as well as image guided needle biopsy of a right inguinal lymph node.  Patient was found have grade 1 follicular lymphoma.  As the patient was asymptomatic, she was placed on a watch wait strategy.    Patient completed 6 cycles of CVP-R and was receiving maintenance Rituxan every 8 weeks.  She completed 9 of 12 cycles.  Her course was complicated by the development of squamous cell carcinoma of the right lung.  Patient had stage I disease which was peripherally located.  She was treated with definitive XRT.  Patient had good response.  Unfortunately, patient has developed new pulmonary nodules and had a documented recurrence in mediastinal lymph nodes.  Multi disciplinary tumor board recommended immunotherapy consisting of single agent Keytruda.  Maintenance Rituxan was stopped.  Patient returns to clinic  for re-evaluation prior to 3rd cycle of Keytruda.  Patient recently quit smoking and remains off all tobacco products.  Patient had interval laboratory and imaging for restaging in anticipation of today's appointment.  Has been experiencing congestion, frontal headache, and purulent nasal discharge.  She denies fever.    Physical examination:  Well-developed, obese, elderly, white female, in no acute distress, who has a weight of 208.5 lb (increased by 2.5 lb)  VITAL SIGNS: Documented  and reviewed this visit.  HEENT: Normocephalic, atraumatic. Oral mucosa pink and moist. Lips without lesions. Tongue midline. Oropharynx clear.  Nonicteric sclerae.   NECK: Supple, bilateral supraclavicular adenopathy right internal jugular chain lymphadenopathy.   HEART: Regular rate and rhythm without murmur, gallop or rub.   LUNGS:  Mild, bibasilar crackles bilaterally. Normal respiratory effort.   ABDOMEN: Soft, nontender, nondistended with positive normoactive bowel sounds, no hepatosplenomegaly.   EXTREMITIES: No cyanosis or clubbing appreciated.  Patient has Tr bilateral ankle edema.  Distal pulses are intact.   AXILLAE AND GROIN:  Small, palpable left inguinal lymphadenopathy is appreciated.   SKIN: Intact/turgor normal.  Palpable subcutaneous nodules about the abdominal wall consistent with lymph node involvement.   NEUROLOGIC: Cranial nerves II-XII grossly intact. Motor: Good muscle bulk and tone. Strength/sensory 5/5 throughout. Gait stable.     Laboratory:  White count 10.1, hemoglobin 11.7, hematocrit 36, platelets 124, absolute neutrophil count is 7600.  Sodium 142, potassium 4.3, chloride 110, CO2 22, BUN 29, creatinine 1.6, glucose 119, calcium 8.7, magnesium 1.5, liver function test within normal limits, GFR is 33, .    Cortisol 14.2.    TSH 8.231, free T4 0.80.      CT PET:   1. Mixed response to therapy with resolution of masslike consolidation in the superior left lower lobe and decreased FDG uptake in the right upper lung.  Subcarinal lymph node is stable in size with slight decreased FDG uptake.  However, there is a new left upper lobe pulmonary nodule which is below the resolution of PET most compatible with metastasis.  There is also a new FDG avid AP window lymph node.    Impression:  1.  Grade 1 follicular non-Hodgkin's lymphoma - Stage Riley with excellent response to systemic therapy.  2.  Vitamin-D deficiency-improved on supplementation.  3. 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  4. Tobacco abuse-patient has now  discontinued smoking.    5. Hypomagnesemia.    6. Mild prerenal azotemia.  7. Acquired hypothyroidism.  8. Acute sinusitis.    Plan:  1.  Proceed with 3rd cycle of Keytruda.  Will replace magnesium/provide supplemental intravenous fluid in association with today's cycle of therapy.  2.  Return to clinic 6 weeks from now with interval CBC, CMP, LDH, and magnesium prior to appointment.    3. Plan to reimage after 4th cycle of Keytruda.  Mixed findings on most recent imaging could be the result of acute inflammation or pseudoprogression.  4. Begin Synthroid replacement.  5. Doxycycline 100 mg p.o. b.i.d. for 10 days for management of number 8.     This note was created using voice recognition software and may contain grammatical errors.

## 2022-09-28 NOTE — PROGRESS NOTES
Oncology Nutrition   Chemotherapy Infusion Visit    Nutrition Follow Up   RD met with patient at chairside during infusion tx. Pt reports continues to do well nutritionally- eating without difficulty, maintaining weight, and denies nutrition related side effects. Pt always very pleasant to speak with.    Wt Readings from Last 10 Encounters:   09/28/22 94.6 kg (208 lb 8.9 oz)   09/28/22 94.6 kg (208 lb 8.9 oz)   08/23/22 93.1 kg (205 lb 2.2 oz)   08/17/22 93.5 kg (206 lb 2.1 oz)   08/17/22 93.5 kg (206 lb 2.1 oz)   07/27/22 89.8 kg (198 lb)   07/06/22 90 kg (198 lb 6.6 oz)   07/06/22 90 kg (198 lb 6.6 oz)   06/30/22 86.2 kg (190 lb)   06/29/22 88.5 kg (195 lb 1.7 oz)       All other nutrition questions/concerns addressed as appropriate. Will continue to follow and monitor throughout treatment PRN.     Belen Lyons, MS, RD, LDN  09/28/2022  3:08 PM

## 2022-10-10 ENCOUNTER — PATIENT MESSAGE (OUTPATIENT)
Dept: FAMILY MEDICINE | Facility: CLINIC | Age: 82
End: 2022-10-10
Payer: MEDICARE

## 2022-10-10 NOTE — TELEPHONE ENCOUNTER
I spoke with Ms. Devi. She has had cough and congestion for 3 weeks. She is just finishing her antibiotic that Dr. Vizcaino gave her.  She thinks it may be going into a pneumonia.I have scheduled her to be seen in house on 10/13.  Are you ok with that? I did tell her I needed to make sure it was ok to be seen in house vs Virtual/

## 2022-10-11 ENCOUNTER — PATIENT MESSAGE (OUTPATIENT)
Dept: HEMATOLOGY/ONCOLOGY | Facility: CLINIC | Age: 82
End: 2022-10-11
Payer: MEDICARE

## 2022-10-13 ENCOUNTER — OFFICE VISIT (OUTPATIENT)
Dept: FAMILY MEDICINE | Facility: CLINIC | Age: 82
End: 2022-10-13
Payer: MEDICARE

## 2022-10-13 VITALS
OXYGEN SATURATION: 93 % | RESPIRATION RATE: 18 BRPM | HEIGHT: 65 IN | SYSTOLIC BLOOD PRESSURE: 110 MMHG | WEIGHT: 208.31 LBS | BODY MASS INDEX: 34.71 KG/M2 | HEART RATE: 81 BPM | DIASTOLIC BLOOD PRESSURE: 70 MMHG | TEMPERATURE: 98 F

## 2022-10-13 DIAGNOSIS — R05.9 COUGH: ICD-10-CM

## 2022-10-13 DIAGNOSIS — J01.40 SUBACUTE PANSINUSITIS: Primary | ICD-10-CM

## 2022-10-13 PROCEDURE — 1160F PR REVIEW ALL MEDS BY PRESCRIBER/CLIN PHARMACIST DOCUMENTED: ICD-10-PCS | Mod: CPTII,S$GLB,, | Performed by: FAMILY MEDICINE

## 2022-10-13 PROCEDURE — 99999 PR PBB SHADOW E&M-EST. PATIENT-LVL III: CPT | Mod: PBBFAC,,, | Performed by: FAMILY MEDICINE

## 2022-10-13 PROCEDURE — 3074F SYST BP LT 130 MM HG: CPT | Mod: CPTII,S$GLB,, | Performed by: FAMILY MEDICINE

## 2022-10-13 PROCEDURE — 3288F FALL RISK ASSESSMENT DOCD: CPT | Mod: CPTII,S$GLB,, | Performed by: FAMILY MEDICINE

## 2022-10-13 PROCEDURE — 99999 PR PBB SHADOW E&M-EST. PATIENT-LVL III: ICD-10-PCS | Mod: PBBFAC,,, | Performed by: FAMILY MEDICINE

## 2022-10-13 PROCEDURE — 3288F PR FALLS RISK ASSESSMENT DOCUMENTED: ICD-10-PCS | Mod: CPTII,S$GLB,, | Performed by: FAMILY MEDICINE

## 2022-10-13 PROCEDURE — 3078F PR MOST RECENT DIASTOLIC BLOOD PRESSURE < 80 MM HG: ICD-10-PCS | Mod: CPTII,S$GLB,, | Performed by: FAMILY MEDICINE

## 2022-10-13 PROCEDURE — 1101F PT FALLS ASSESS-DOCD LE1/YR: CPT | Mod: CPTII,S$GLB,, | Performed by: FAMILY MEDICINE

## 2022-10-13 PROCEDURE — 99213 PR OFFICE/OUTPT VISIT, EST, LEVL III, 20-29 MIN: ICD-10-PCS | Mod: S$GLB,,, | Performed by: FAMILY MEDICINE

## 2022-10-13 PROCEDURE — 3074F PR MOST RECENT SYSTOLIC BLOOD PRESSURE < 130 MM HG: ICD-10-PCS | Mod: CPTII,S$GLB,, | Performed by: FAMILY MEDICINE

## 2022-10-13 PROCEDURE — 1101F PR PT FALLS ASSESS DOC 0-1 FALLS W/OUT INJ PAST YR: ICD-10-PCS | Mod: CPTII,S$GLB,, | Performed by: FAMILY MEDICINE

## 2022-10-13 PROCEDURE — 1159F PR MEDICATION LIST DOCUMENTED IN MEDICAL RECORD: ICD-10-PCS | Mod: CPTII,S$GLB,, | Performed by: FAMILY MEDICINE

## 2022-10-13 PROCEDURE — 1160F RVW MEDS BY RX/DR IN RCRD: CPT | Mod: CPTII,S$GLB,, | Performed by: FAMILY MEDICINE

## 2022-10-13 PROCEDURE — 3078F DIAST BP <80 MM HG: CPT | Mod: CPTII,S$GLB,, | Performed by: FAMILY MEDICINE

## 2022-10-13 PROCEDURE — 1159F MED LIST DOCD IN RCRD: CPT | Mod: CPTII,S$GLB,, | Performed by: FAMILY MEDICINE

## 2022-10-13 PROCEDURE — 99213 OFFICE O/P EST LOW 20 MIN: CPT | Mod: S$GLB,,, | Performed by: FAMILY MEDICINE

## 2022-10-13 PROCEDURE — 1126F AMNT PAIN NOTED NONE PRSNT: CPT | Mod: CPTII,S$GLB,, | Performed by: FAMILY MEDICINE

## 2022-10-13 PROCEDURE — 1126F PR PAIN SEVERITY QUANTIFIED, NO PAIN PRESENT: ICD-10-PCS | Mod: CPTII,S$GLB,, | Performed by: FAMILY MEDICINE

## 2022-10-13 RX ORDER — FLUTICASONE PROPIONATE 50 MCG
1 SPRAY, SUSPENSION (ML) NASAL DAILY
Qty: 16 G | Refills: 1 | Status: SHIPPED | OUTPATIENT
Start: 2022-10-13 | End: 2022-11-26

## 2022-10-13 RX ORDER — IPRATROPIUM BROMIDE 42 UG/1
2 SPRAY, METERED NASAL 4 TIMES DAILY
COMMUNITY
Start: 2022-10-03 | End: 2022-10-13

## 2022-10-13 RX ORDER — CEFUROXIME AXETIL 500 MG/1
500 TABLET ORAL 2 TIMES DAILY
COMMUNITY
Start: 2022-10-03 | End: 2023-01-16 | Stop reason: CLARIF

## 2022-10-13 RX ORDER — PROMETHAZINE HYDROCHLORIDE AND DEXTROMETHORPHAN HYDROBROMIDE 6.25; 15 MG/5ML; MG/5ML
5 SYRUP ORAL 4 TIMES DAILY PRN
COMMUNITY
Start: 2022-10-03 | End: 2022-10-27 | Stop reason: SDUPTHER

## 2022-10-13 RX ORDER — LEVOFLOXACIN 500 MG/1
500 TABLET, FILM COATED ORAL DAILY
Qty: 10 TABLET | Refills: 0 | Status: SHIPPED | OUTPATIENT
Start: 2022-10-13 | End: 2022-10-23

## 2022-10-13 NOTE — PROGRESS NOTES
Subjective:       Patient ID: Shandra Velez is a 82 y.o. female.    Chief Complaint: Cough and congestion not getting better (She did 2 rounds of antibiotic) and Fullness in ears    C/o 4 weeks of sinus pressure.  She was prescribed doxyxycline 100mg BID on 9/28.  Symptoms persisted and worsened with congestion and she went to [facility] on 10/3. She was treated with steroid shot and Ceftin 500mg BID.  Still with persistent yellow/green nasal discharge, occ cough, PND, ear popping  Denies chest congestion or SOB, fever.    She tried flonase and zyrtec.      Past Medical History:   Diagnosis Date    Breast cancer 1985    right mastectomy    Digestive disorder     Encounter for blood transfusion     History of pneumonia     Hypercholesterolemia     Hypertension     Indigestion     Lumbar spondylosis     Lung cancer     2022 - currently in treatment as of 7/27/22    Lymphoma     Osteopenia     Pulmonary nodule     Smoker 06/11/2022       Past Surgical History:   Procedure Laterality Date    APPENDECTOMY      BONE MARROW BIOPSY Bilateral 06/24/2020    Procedure: Biopsy-bone marrow;  Surgeon: Rod Montero MD;  Location: [facility] OR;  Service: Oncology;  Laterality: Bilateral;    BREAST RECONSTRUCTION  1990    right    CATARACT EXTRACTION W/  INTRAOCULAR LENS IMPLANT Bilateral     CHOLECYSTECTOMY      ENDOBRONCHIAL ULTRASOUND Bilateral 07/09/2021    Procedure: ENDOBRONCHIAL ULTRASOUND (EBUS);  Surgeon: Gregorio Kinney MD;  Location: [facility];  Service: Pulmonary;  Laterality: Bilateral;  NEED LMA to  eval right upper paratracheal LN.     ENDOBRONCHIAL ULTRASOUND Bilateral 6/16/2022    Procedure: ENDOBRONCHIAL ULTRASOUND (EBUS);  Surgeon: Gregorio Kinney MD;  Location: [facility];  Service: Pulmonary;  Laterality: Bilateral;    INJECTION OF FACET JOINT Left 6/30/2022    Procedure: FACET JOINT Cyst Aspiration L3/4;  Surgeon: Ronnell Baeza MD;  Location: [facility] OR;  Service: Pain Management;  Laterality: Left;    INSERTION OF  TUNNELED CENTRAL VENOUS CATHETER (CVC) WITH SUBCUTANEOUS PORT Left 2020    Procedure: [id number];  Surgeon: Kody Pretty MD;  Location: [facility] OR;  Service: General;  Laterality: Left;    JOINT REPLACEMENT  2008    right knee     LUMBAR LAMINECTOMY      LYMPH NODE BIOPSY      MASTECTOMY Right 1985    NEEDLE LOCALIZATION N/A 2020    Procedure: NEEDLE LOCALIZATION - lymph node bx;  Surgeon: Steve Weaver MD;  Location: [facility] CATH;  Service: Interventional Radiology;  Laterality: N/A;    TRANSFORAMINAL EPIDURAL INJECTION OF STEROID Left 2020    Procedure: Injection,steroid,epidural,transforaminal approach, l3/4;  Surgeon: Ronnell Baeza MD;  Location: [facility] OR;  Service: Pain Management;  Laterality: Left;    TRANSFORAMINAL EPIDURAL INJECTION OF STEROID Left 2022    Procedure: Injection,steroid,epidural,transforaminal approach L3/4;  Surgeon: Ronnell Baeza MD;  Location: [facility] OR;  Service: Pain Management;  Laterality: Left;    TRANSFORAMINAL EPIDURAL INJECTION OF STEROID Left 2022    Procedure: Injection,steroid,epidural,transforaminal approach L3/4;  Surgeon: Ronnell Baeza MD;  Location: [facility] OR;  Service: Pain Management;  Laterality: Left;    TUBAL LIGATION      UMBILICAL HERNIA REPAIR         Review of patient's allergies indicates:  No Known Allergies    Social History     Socioeconomic History    Marital status:     Number of children: 3   Occupational History    Occupation: retired  for [employer]   Tobacco Use    Smoking status: Former     Packs/day: 0.50     Years: 53.00     Pack years: 26.50     Types: Cigarettes     Quit date: 2022     Years since quittin.3    Smokeless tobacco: Never   Substance and Sexual Activity    Alcohol use: No    Drug use: No    Sexual activity: Not Currently   Social History Narrative    Lives alone        Hobbies: skyler        From [place]           Current Outpatient Medications on File Prior  to Visit   Medication Sig Dispense Refill    acetaminophen (TYLENOL) 325 MG tablet Take 2 tablets (650 mg total) by mouth every 4 (four) hours as needed (pain score 1-2/10).  0    cefUROXime (CEFTIN) 500 MG tablet Take 500 mg by mouth 2 (two) times daily.      cetirizine (ZYRTEC) 10 MG tablet Take 10 mg by mouth once daily. Every day      cholecalciferol, vitamin D3, 1,250 mcg (50,000 unit) Tab Take 1 tablet by mouth every 7 days. 12 tablet 6    hydroCHLOROthiazide (HYDRODIURIL) 25 MG tablet Take 1 tablet (25 mg total) by mouth once daily. 90 tablet 3    levothyroxine (SYNTHROID) 100 MCG tablet Take 1 tablet (100 mcg total) by mouth once daily. 30 tablet 11    lisinopriL (PRINIVIL,ZESTRIL) 40 MG tablet Take 1 tablet (40 mg total) by mouth once daily. 90 tablet 3    multivitamin (THERAGRAN) per tablet Take 1 tablet by mouth once daily.      NIFEdipine (ADALAT CC) 60 MG TbSR Take 1 tablet (60 mg total) by mouth once daily. 90 tablet 3    omeprazole (PRILOSEC) 40 MG capsule TAKE 1 CAPSULE BY MOUTH  ONCE DAILY 30 capsule 11    potassium chloride (MICRO-K) 10 MEQ CpSR TAKE 2 CAPSULES BY MOUTH  ONCE DAILY (Patient taking differently: Take 20 mEq by mouth once daily.) 180 capsule 3    simvastatin (ZOCOR) 10 MG tablet Take 1 tablet (10 mg total) by mouth every evening. 90 tablet 3    temazepam (RESTORIL) 15 mg Cap TAKE 1 CAPSULE BY MOUTH AT  BEDTIME AS NEEDED FOR  INSOMNIA 90 capsule 1    benzonatate (TESSALON PERLES) 100 MG capsule Take 1 capsule (100 mg total) by mouth 3 (three) times daily as needed for Cough. (Patient not taking: No sig reported) 30 capsule 1    ondansetron (ZOFRAN) 8 MG tablet Take 1 tablet (8 mg total) by mouth every 8 (eight) hours as needed for Nausea. (Patient not taking: Reported on 10/13/2022) 30 tablet 1    promethazine-dextromethorphan (PROMETHAZINE-DM) 6.25-15 mg/5 mL Syrp Take 5 mLs by mouth 4 (four) times daily as needed.       No current facility-administered medications on file prior to  "visit.       Family History   Problem Relation Age of Onset    Cancer Sister         uterine    Diabetes Brother     Cancer Sister         Uterine cancer     Breast cancer Other 42       Review of Systems   Constitutional:  Negative for appetite change, chills, fever and unexpected weight change.   HENT:  Positive for congestion and sinus pressure. Negative for sore throat and trouble swallowing.    Eyes:  Negative for pain and visual disturbance.   Respiratory:  Positive for cough. Negative for shortness of breath and wheezing.    Cardiovascular:  Negative for chest pain and palpitations.   Gastrointestinal:  Negative for abdominal pain, blood in stool, diarrhea, nausea and vomiting.   Genitourinary:  Negative for difficulty urinating, dysuria and hematuria.   Musculoskeletal:  Negative for arthralgias, gait problem and neck pain.   Skin:  Negative for rash and wound.   Neurological:  Negative for dizziness, weakness, numbness and headaches.   Hematological:  Negative for adenopathy.   Psychiatric/Behavioral:  Negative for dysphoric mood.      Objective:      /70   Pulse 81   Temp 97.7 °F (36.5 °C) (Oral)   Resp 18   Ht 5' 5" (1.651 m)   Wt 94.5 kg (208 lb 5.4 oz)   SpO2 (!) 93%   BMI 34.67 kg/m²   Physical Exam  Constitutional:       Appearance: She is well-developed.   HENT:      Head: Normocephalic and atraumatic.      Right Ear: Tympanic membrane, ear canal and external ear normal.      Left Ear: Hearing, tympanic membrane and external ear normal.      Nose: Nose normal. No septal deviation or rhinorrhea.      Right Sinus: No maxillary sinus tenderness or frontal sinus tenderness.      Left Sinus: No maxillary sinus tenderness or frontal sinus tenderness.      Mouth/Throat:      Pharynx: No oropharyngeal exudate or posterior oropharyngeal erythema.      Tonsils: No tonsillar abscesses.   Eyes:      Conjunctiva/sclera: Conjunctivae normal.      Pupils: Pupils are equal, round, and reactive to light. "   Cardiovascular:      Rate and Rhythm: Normal rate and regular rhythm.      Heart sounds: Normal heart sounds.   Pulmonary:      Effort: Pulmonary effort is normal.      Breath sounds: Normal breath sounds. No wheezing or rales.   Musculoskeletal:      Cervical back: Normal range of motion and neck supple.   Lymphadenopathy:      Cervical: No cervical adenopathy.       Assessment:       1. Subacute pansinusitis    2. Cough        Plan:       Subacute pansinusitis  -     levoFLOXacin (LEVAQUIN) 500 MG tablet; Take 1 tablet (500 mg total) by mouth once daily. for 10 days  Dispense: 10 tablet; Refill: 0    Cough  -     fluticasone propionate (FLONASE) 50 mcg/actuation nasal spray; 1 spray (50 mcg total) by Each Nostril route once daily.  Dispense: 16 g; Refill: 1      Patient advised to take Mucinex BID, pursue nasal saline irrigation, increase oral fluids, and try warm steam inhalation.   Counseled on regular exercise, maintenance of a healthy weight, balanced diet rich in fruits/vegetables and lean protein, and avoidance of unhealthy habits like smoking and excessive alcohol intake.

## 2022-10-20 ENCOUNTER — TELEPHONE (OUTPATIENT)
Dept: HEMATOLOGY/ONCOLOGY | Facility: CLINIC | Age: 82
End: 2022-10-20
Payer: MEDICARE

## 2022-10-20 ENCOUNTER — PATIENT MESSAGE (OUTPATIENT)
Dept: HEMATOLOGY/ONCOLOGY | Facility: CLINIC | Age: 82
End: 2022-10-20
Payer: MEDICARE

## 2022-10-21 ENCOUNTER — PATIENT MESSAGE (OUTPATIENT)
Dept: HEMATOLOGY/ONCOLOGY | Facility: CLINIC | Age: 82
End: 2022-10-21

## 2022-10-21 ENCOUNTER — OFFICE VISIT (OUTPATIENT)
Dept: HEMATOLOGY/ONCOLOGY | Facility: CLINIC | Age: 82
End: 2022-10-21
Payer: MEDICARE

## 2022-10-21 VITALS
HEIGHT: 65 IN | HEART RATE: 91 BPM | TEMPERATURE: 98 F | OXYGEN SATURATION: 93 % | SYSTOLIC BLOOD PRESSURE: 130 MMHG | DIASTOLIC BLOOD PRESSURE: 82 MMHG | BODY MASS INDEX: 34.34 KG/M2 | RESPIRATION RATE: 16 BRPM | WEIGHT: 206.13 LBS

## 2022-10-21 DIAGNOSIS — E55.9 VITAMIN D DEFICIENCY: ICD-10-CM

## 2022-10-21 DIAGNOSIS — Z72.0 TOBACCO ABUSE: ICD-10-CM

## 2022-10-21 DIAGNOSIS — E03.9 ACQUIRED HYPOTHYROIDISM: ICD-10-CM

## 2022-10-21 DIAGNOSIS — C82.00 FOLLICULAR LYMPHOMA GRADE I, UNSPECIFIED BODY REGION: ICD-10-CM

## 2022-10-21 DIAGNOSIS — M54.50 BACK PAIN AT L4-L5 LEVEL: ICD-10-CM

## 2022-10-21 DIAGNOSIS — C34.11 MALIGNANT NEOPLASM OF RIGHT UPPER LOBE OF LUNG: Primary | ICD-10-CM

## 2022-10-21 PROCEDURE — 99213 PR OFFICE/OUTPT VISIT, EST, LEVL III, 20-29 MIN: ICD-10-PCS | Mod: S$GLB,,, | Performed by: INTERNAL MEDICINE

## 2022-10-21 PROCEDURE — 1159F MED LIST DOCD IN RCRD: CPT | Mod: CPTII,S$GLB,, | Performed by: INTERNAL MEDICINE

## 2022-10-21 PROCEDURE — 3075F SYST BP GE 130 - 139MM HG: CPT | Mod: CPTII,S$GLB,, | Performed by: INTERNAL MEDICINE

## 2022-10-21 PROCEDURE — 1101F PT FALLS ASSESS-DOCD LE1/YR: CPT | Mod: CPTII,S$GLB,, | Performed by: INTERNAL MEDICINE

## 2022-10-21 PROCEDURE — 3079F DIAST BP 80-89 MM HG: CPT | Mod: CPTII,S$GLB,, | Performed by: INTERNAL MEDICINE

## 2022-10-21 PROCEDURE — 99213 OFFICE O/P EST LOW 20 MIN: CPT | Mod: S$GLB,,, | Performed by: INTERNAL MEDICINE

## 2022-10-21 PROCEDURE — 1125F AMNT PAIN NOTED PAIN PRSNT: CPT | Mod: CPTII,S$GLB,, | Performed by: INTERNAL MEDICINE

## 2022-10-21 PROCEDURE — 99999 PR PBB SHADOW E&M-EST. PATIENT-LVL IV: CPT | Mod: PBBFAC,,, | Performed by: INTERNAL MEDICINE

## 2022-10-21 PROCEDURE — 3288F FALL RISK ASSESSMENT DOCD: CPT | Mod: CPTII,S$GLB,, | Performed by: INTERNAL MEDICINE

## 2022-10-21 PROCEDURE — 1101F PR PT FALLS ASSESS DOC 0-1 FALLS W/OUT INJ PAST YR: ICD-10-PCS | Mod: CPTII,S$GLB,, | Performed by: INTERNAL MEDICINE

## 2022-10-21 PROCEDURE — 1159F PR MEDICATION LIST DOCUMENTED IN MEDICAL RECORD: ICD-10-PCS | Mod: CPTII,S$GLB,, | Performed by: INTERNAL MEDICINE

## 2022-10-21 PROCEDURE — 99999 PR PBB SHADOW E&M-EST. PATIENT-LVL IV: ICD-10-PCS | Mod: PBBFAC,,, | Performed by: INTERNAL MEDICINE

## 2022-10-21 PROCEDURE — 3079F PR MOST RECENT DIASTOLIC BLOOD PRESSURE 80-89 MM HG: ICD-10-PCS | Mod: CPTII,S$GLB,, | Performed by: INTERNAL MEDICINE

## 2022-10-21 PROCEDURE — 3075F PR MOST RECENT SYSTOLIC BLOOD PRESS GE 130-139MM HG: ICD-10-PCS | Mod: CPTII,S$GLB,, | Performed by: INTERNAL MEDICINE

## 2022-10-21 PROCEDURE — 1125F PR PAIN SEVERITY QUANTIFIED, PAIN PRESENT: ICD-10-PCS | Mod: CPTII,S$GLB,, | Performed by: INTERNAL MEDICINE

## 2022-10-21 PROCEDURE — 3288F PR FALLS RISK ASSESSMENT DOCUMENTED: ICD-10-PCS | Mod: CPTII,S$GLB,, | Performed by: INTERNAL MEDICINE

## 2022-10-21 NOTE — PROGRESS NOTES
History of present illness:  The patient is a 82-year-old white female who presents in consultation from Dr. Chavez regarding newly diagnosed non-Hodgkin's lymphoma.  Patient was in her usual state of health but developed severe/debilitating low pain and lumbar radiculopathy after scrubbing her screened porch.  An MRI was obtained and intra-abdominal lymphadenopathy as well as a complex left renal was noted.  Patient subsequently underwent dedicated CT scan of the abdomen pelvis as well as image guided needle biopsy of a right inguinal lymph node.  Patient was found have grade 1 follicular lymphoma.  As the patient was asymptomatic, she was placed on a watch wait strategy.    Patient completed 6 cycles of CVP-R and was receiving maintenance Rituxan every 8 weeks.  She completed 9 of 12 cycles.  Her course was complicated by the development of squamous cell carcinoma of the right lung.  Patient had stage I disease which was peripherally located.  She was treated with definitive XRT.  Patient had good response.  Unfortunately, patient has developed new pulmonary nodules and had a documented recurrence in mediastinal lymph nodes.  Multi disciplinary tumor board recommended immunotherapy consisting of single agent Keytruda.  Maintenance Rituxan was stopped.  Patient returns to clinic  earlier than scheduled follow-up with concerns of increasing back pain and bilateral lower extremity pain which she relates to facet cyst noted on prior MRI.  She has been seen and evaluated once by neurosurgery but would like another opinion regarding this lesion.    Physical examination:  Well-developed, obese, elderly, white female, in no acute distress, who has a weight of 206 lb (decreased by 2.5 lb).  VITAL SIGNS: Documented  and reviewed this visit.  HEENT: Normocephalic, atraumatic. Oral mucosa pink and moist. Lips without lesions. Tongue midline. Oropharynx clear. Nonicteric sclerae.   NECK: Supple, bilateral supraclavicular  adenopathy right internal jugular chain lymphadenopathy.   HEART: Regular rate and rhythm without murmur, gallop or rub.   LUNGS:  Mild, bibasilar crackles bilaterally. Normal respiratory effort.   ABDOMEN: Soft, nontender, nondistended with positive normoactive bowel sounds, no hepatosplenomegaly.   EXTREMITIES: No cyanosis or clubbing appreciated.  Patient has Tr bilateral ankle edema.  Distal pulses are intact.   AXILLAE AND GROIN:  Small, palpable left inguinal lymphadenopathy is appreciated.   SKIN: Intact/turgor normal.  Palpable subcutaneous nodules about the abdominal wall consistent with lymph node involvement.   NEUROLOGIC: Cranial nerves II-XII grossly intact. Motor: Good muscle bulk and tone. Strength/sensory 5/5 throughout. Gait stable.     Impression:  1.  Grade 1 follicular non-Hodgkin's lymphoma - Stage Riley with excellent response to systemic therapy.  2.  Vitamin-D deficiency-improved on supplementation.  3. 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  4. Tobacco abuse-patient has now discontinued smoking.    5. Hypomagnesemia.    6. Mild prerenal azotemia.  7. Acquired hypothyroidism.  8. Acute sinusitis.  9. Worsening back pain.    Plan:  1.  Keep scheduled appointment for next cycle of Keytruda.  2.  Will refer to Dr. Delgado for 2nd opinion regarding back pain and facet cyst.    This note was created using voice recognition software and may contain grammatical errors.

## 2022-10-27 ENCOUNTER — OFFICE VISIT (OUTPATIENT)
Dept: NEUROSURGERY | Facility: CLINIC | Age: 82
End: 2022-10-27
Payer: MEDICARE

## 2022-10-27 ENCOUNTER — PATIENT MESSAGE (OUTPATIENT)
Dept: FAMILY MEDICINE | Facility: CLINIC | Age: 82
End: 2022-10-27
Payer: MEDICARE

## 2022-10-27 VITALS
HEIGHT: 65 IN | BODY MASS INDEX: 34.32 KG/M2 | SYSTOLIC BLOOD PRESSURE: 143 MMHG | HEART RATE: 82 BPM | RESPIRATION RATE: 18 BRPM | WEIGHT: 206 LBS | DIASTOLIC BLOOD PRESSURE: 93 MMHG

## 2022-10-27 DIAGNOSIS — Z98.1 HISTORY OF SPINAL FUSION: Primary | ICD-10-CM

## 2022-10-27 DIAGNOSIS — M71.30 SYNOVIAL CYST: ICD-10-CM

## 2022-10-27 DIAGNOSIS — M51.36 LUMBAR ADJACENT SEGMENT DISEASE WITH SPONDYLOLISTHESIS: ICD-10-CM

## 2022-10-27 DIAGNOSIS — M43.16 LUMBAR ADJACENT SEGMENT DISEASE WITH SPONDYLOLISTHESIS: ICD-10-CM

## 2022-10-27 DIAGNOSIS — M54.50 BACK PAIN AT L4-L5 LEVEL: ICD-10-CM

## 2022-10-27 PROCEDURE — 1159F PR MEDICATION LIST DOCUMENTED IN MEDICAL RECORD: ICD-10-PCS | Mod: CPTII,S$GLB,, | Performed by: STUDENT IN AN ORGANIZED HEALTH CARE EDUCATION/TRAINING PROGRAM

## 2022-10-27 PROCEDURE — 1101F PR PT FALLS ASSESS DOC 0-1 FALLS W/OUT INJ PAST YR: ICD-10-PCS | Mod: CPTII,S$GLB,, | Performed by: STUDENT IN AN ORGANIZED HEALTH CARE EDUCATION/TRAINING PROGRAM

## 2022-10-27 PROCEDURE — 3288F FALL RISK ASSESSMENT DOCD: CPT | Mod: CPTII,S$GLB,, | Performed by: STUDENT IN AN ORGANIZED HEALTH CARE EDUCATION/TRAINING PROGRAM

## 2022-10-27 PROCEDURE — 1125F AMNT PAIN NOTED PAIN PRSNT: CPT | Mod: CPTII,S$GLB,, | Performed by: STUDENT IN AN ORGANIZED HEALTH CARE EDUCATION/TRAINING PROGRAM

## 2022-10-27 PROCEDURE — 1159F MED LIST DOCD IN RCRD: CPT | Mod: CPTII,S$GLB,, | Performed by: STUDENT IN AN ORGANIZED HEALTH CARE EDUCATION/TRAINING PROGRAM

## 2022-10-27 PROCEDURE — 99214 PR OFFICE/OUTPT VISIT, EST, LEVL IV, 30-39 MIN: ICD-10-PCS | Mod: S$GLB,,, | Performed by: STUDENT IN AN ORGANIZED HEALTH CARE EDUCATION/TRAINING PROGRAM

## 2022-10-27 PROCEDURE — 3077F SYST BP >= 140 MM HG: CPT | Mod: CPTII,S$GLB,, | Performed by: STUDENT IN AN ORGANIZED HEALTH CARE EDUCATION/TRAINING PROGRAM

## 2022-10-27 PROCEDURE — 99214 OFFICE O/P EST MOD 30 MIN: CPT | Mod: S$GLB,,, | Performed by: STUDENT IN AN ORGANIZED HEALTH CARE EDUCATION/TRAINING PROGRAM

## 2022-10-27 PROCEDURE — 3077F PR MOST RECENT SYSTOLIC BLOOD PRESSURE >= 140 MM HG: ICD-10-PCS | Mod: CPTII,S$GLB,, | Performed by: STUDENT IN AN ORGANIZED HEALTH CARE EDUCATION/TRAINING PROGRAM

## 2022-10-27 PROCEDURE — 1101F PT FALLS ASSESS-DOCD LE1/YR: CPT | Mod: CPTII,S$GLB,, | Performed by: STUDENT IN AN ORGANIZED HEALTH CARE EDUCATION/TRAINING PROGRAM

## 2022-10-27 PROCEDURE — 1125F PR PAIN SEVERITY QUANTIFIED, PAIN PRESENT: ICD-10-PCS | Mod: CPTII,S$GLB,, | Performed by: STUDENT IN AN ORGANIZED HEALTH CARE EDUCATION/TRAINING PROGRAM

## 2022-10-27 PROCEDURE — 3080F DIAST BP >= 90 MM HG: CPT | Mod: CPTII,S$GLB,, | Performed by: STUDENT IN AN ORGANIZED HEALTH CARE EDUCATION/TRAINING PROGRAM

## 2022-10-27 PROCEDURE — 3080F PR MOST RECENT DIASTOLIC BLOOD PRESSURE >= 90 MM HG: ICD-10-PCS | Mod: CPTII,S$GLB,, | Performed by: STUDENT IN AN ORGANIZED HEALTH CARE EDUCATION/TRAINING PROGRAM

## 2022-10-27 PROCEDURE — 3288F PR FALLS RISK ASSESSMENT DOCUMENTED: ICD-10-PCS | Mod: CPTII,S$GLB,, | Performed by: STUDENT IN AN ORGANIZED HEALTH CARE EDUCATION/TRAINING PROGRAM

## 2022-10-27 RX ORDER — PROMETHAZINE HYDROCHLORIDE AND DEXTROMETHORPHAN HYDROBROMIDE 6.25; 15 MG/5ML; MG/5ML
5 SYRUP ORAL 4 TIMES DAILY PRN
Qty: 180 ML | Refills: 2 | Status: SHIPPED | OUTPATIENT
Start: 2022-10-27 | End: 2023-01-16 | Stop reason: CLARIF

## 2022-10-27 NOTE — PROGRESS NOTES
[facility]  Clinic Consult     Consult Requested By: Rod Montero MD  PCP: Akil Chavez MD    SUBJECTIVE:     Chief Complaint:   Chief Complaint   Patient presents with    Lumbar Spine Pain (L-Spine)     Patient presents to clinic in regards to lumbar spine pain L4-L5. 2nd opinion. Patient states calfs hurt, occasionally tingling in both feet. Denies any bladder/bowel dysfunction. Pain has been going on awhile now, with no balance issues.        History of Present Illness:  Shandra Velez is a 82 y.o. female with HTN, HLD, lung cancer, h/o breast cancer, CKD who presents for second opinion of low back pain. Patient is status post L4-S1 fusion in 2018 by Dr. Miller. She was last evaluated by Dr. Trinh in June 2022, but did not undergo surgical intervention due to cancer diagnosis and need for treatment. She reports L>R back pain with radiation down the posterior leg. She reports increased cramping in the calves. She experiences tingling in her feet. She has difficulty ambulating due to the pain. She has adjacent segment disease with stenosis. Pain management attempted to aspirate the left L3-4 synovial cyst, but did not get much fluid.     Pertinent and recent history, provider evaluations, imaging and data reviewed in EPIC        Past Medical History:   Diagnosis Date    Breast cancer 1985    right mastectomy    Digestive disorder     Encounter for blood transfusion     History of pneumonia     Hypercholesterolemia     Hypertension     Indigestion     Lumbar spondylosis     Lung cancer     2022 - currently in treatment as of 7/27/22    Lymphoma     Osteopenia     Pulmonary nodule     Smoker 06/11/2022     Past Surgical History:   Procedure Laterality Date    APPENDECTOMY      BONE MARROW BIOPSY Bilateral 06/24/2020    Procedure: Biopsy-bone marrow;  Surgeon: Rod Montero MD;  Location: [facility] OR;  Service: Oncology;  Laterality: Bilateral;    BREAST  RECONSTRUCTION  1990    right    CATARACT EXTRACTION W/  INTRAOCULAR LENS IMPLANT Bilateral     CHOLECYSTECTOMY      ENDOBRONCHIAL ULTRASOUND Bilateral 07/09/2021    Procedure: ENDOBRONCHIAL ULTRASOUND (EBUS);  Surgeon: Gregorio Kinney MD;  Location: [facility];  Service: Pulmonary;  Laterality: Bilateral;  NEED LMA to  eval right upper paratracheal LN.     ENDOBRONCHIAL ULTRASOUND Bilateral 6/16/2022    Procedure: ENDOBRONCHIAL ULTRASOUND (EBUS);  Surgeon: Gregorio Kinney MD;  Location: [facility];  Service: Pulmonary;  Laterality: Bilateral;    INJECTION OF FACET JOINT Left 6/30/2022    Procedure: FACET JOINT Cyst Aspiration L3/4;  Surgeon: Ronnell Baeza MD;  Location: [facility] OR;  Service: Pain Management;  Laterality: Left;    INSERTION OF TUNNELED CENTRAL VENOUS CATHETER (CVC) WITH SUBCUTANEOUS PORT Left 11/04/2020    Procedure: [id number];  Surgeon: Kody Pretty MD;  Location: [facility] OR;  Service: General;  Laterality: Left;    JOINT REPLACEMENT  2008    right knee     LUMBAR LAMINECTOMY      LYMPH NODE BIOPSY      MASTECTOMY Right 1985    NEEDLE LOCALIZATION N/A 05/25/2020    Procedure: NEEDLE LOCALIZATION - lymph node bx;  Surgeon: Steve Weaver MD;  Location: [facility];  Service: Interventional Radiology;  Laterality: N/A;    TRANSFORAMINAL EPIDURAL INJECTION OF STEROID Left 05/14/2020    Procedure: Injection,steroid,epidural,transforaminal approach, l3/4;  Surgeon: Ronnell Baeza MD;  Location: [facility] OR;  Service: Pain Management;  Laterality: Left;    TRANSFORAMINAL EPIDURAL INJECTION OF STEROID Left 03/23/2022    Procedure: Injection,steroid,epidural,transforaminal approach L3/4;  Surgeon: Ronnell Baeza MD;  Location: [facility] OR;  Service: Pain Management;  Laterality: Left;    TRANSFORAMINAL EPIDURAL INJECTION OF STEROID Left 8/1/2022    Procedure: Injection,steroid,epidural,transforaminal approach L3/4;  Surgeon: Ronnell Baeza MD;  Location: [facility] OR;  Service: Pain Management;   Laterality: Left;    TUBAL LIGATION      UMBILICAL HERNIA REPAIR       Family History   Problem Relation Age of Onset    Cancer Sister         uterine    Diabetes Brother     Cancer Sister         Uterine cancer     Breast cancer Other 42     Social History     Tobacco Use    Smoking status: Former     Packs/day: 0.50     Years: 53.00     Pack years: 26.50     Types: Cigarettes     Quit date: 2022     Years since quittin.3    Smokeless tobacco: Never   Substance Use Topics    Alcohol use: No    Drug use: No      Review of patient's allergies indicates:  No Known Allergies    Current Outpatient Medications:     acetaminophen (TYLENOL) 325 MG tablet, Take 2 tablets (650 mg total) by mouth every 4 (four) hours as needed (pain score 1-2/10)., Disp: , Rfl: 0    benzonatate (TESSALON PERLES) 100 MG capsule, Take 1 capsule (100 mg total) by mouth 3 (three) times daily as needed for Cough. (Patient not taking: No sig reported), Disp: 30 capsule, Rfl: 1    cefUROXime (CEFTIN) 500 MG tablet, Take 500 mg by mouth 2 (two) times daily., Disp: , Rfl:     cetirizine (ZYRTEC) 10 MG tablet, Take 10 mg by mouth once daily. Every day, Disp: , Rfl:     cholecalciferol, vitamin D3, 1,250 mcg (50,000 unit) Tab, Take 1 tablet by mouth every 7 days., Disp: 12 tablet, Rfl: 6    fluticasone propionate (FLONASE) 50 mcg/actuation nasal spray, 1 spray (50 mcg total) by Each Nostril route once daily., Disp: 16 g, Rfl: 1    hydroCHLOROthiazide (HYDRODIURIL) 25 MG tablet, Take 1 tablet (25 mg total) by mouth once daily., Disp: 90 tablet, Rfl: 3    levothyroxine (SYNTHROID) 100 MCG tablet, Take 1 tablet (100 mcg total) by mouth once daily., Disp: 30 tablet, Rfl: 11    lisinopriL (PRINIVIL,ZESTRIL) 40 MG tablet, Take 1 tablet (40 mg total) by mouth once daily., Disp: 90 tablet, Rfl: 3    multivitamin (THERAGRAN) per tablet, Take 1 tablet by mouth once daily., Disp: , Rfl:     NIFEdipine (ADALAT CC) 60 MG TbSR, Take 1 tablet (60 mg total)  "by mouth once daily., Disp: 90 tablet, Rfl: 3    omeprazole (PRILOSEC) 40 MG capsule, TAKE 1 CAPSULE BY MOUTH  ONCE DAILY, Disp: 30 capsule, Rfl: 11    ondansetron (ZOFRAN) 8 MG tablet, Take 1 tablet (8 mg total) by mouth every 8 (eight) hours as needed for Nausea. (Patient not taking: No sig reported), Disp: 30 tablet, Rfl: 1    potassium chloride (MICRO-K) 10 MEQ CpSR, TAKE 2 CAPSULES BY MOUTH  ONCE DAILY (Patient taking differently: Take 20 mEq by mouth once daily.), Disp: 180 capsule, Rfl: 3    promethazine-dextromethorphan (PROMETHAZINE-DM) 6.25-15 mg/5 mL Syrp, Take 5 mLs by mouth 4 (four) times daily as needed., Disp: , Rfl:     simvastatin (ZOCOR) 10 MG tablet, Take 1 tablet (10 mg total) by mouth every evening., Disp: 90 tablet, Rfl: 3    temazepam (RESTORIL) 15 mg Cap, TAKE 1 CAPSULE BY MOUTH AT  BEDTIME AS NEEDED FOR  INSOMNIA, Disp: 90 capsule, Rfl: 1    Review of Systems:   Constitutional: no fever, chills or night sweats. No changes in weight   Eyes: no visual changes   ENT: no nasal congestion or sore throat   Respiratory: no cough or shortness of breath   Cardiovascular: no chest pain or palpitations   Gastrointestinal: no nausea or vomiting   Genitourinary: no hematuria or dysuria   Integument/Breast: no rash or pruritis   Hematologic/Lymphatic: no easy bruising or lymphadenopathy   Musculoskeletal:+back pain  Neurological: no seizures or tremors   Behavioral/Psych: no auditory or visual hallucinations   Endocrine: no heat or cold intolerance         OBJECTIVE:     Vital Signs (Most Recent):  Pulse: 82 (10/27/22 1037)  Resp: 18 (10/27/22 1037)  BP: (!) 143/93 (10/27/22 1037)  Estimated body mass index is 34.28 kg/m² as calculated from the following:    Height as of this encounter: 5' 5" (1.651 m).    Weight as of this encounter: 93.4 kg (206 lb).    Physical Exam:   General: well developed, well nourished, no distress   Neurologic: Alert and oriented. Thought content appropriate. GCS 15.   Language: " No aphasia  Speech: No dysarthria  Head: normocephalic, atraumatic  Eyes: EOMI  Neck: trachea midline, no JVD   Cardiovascular: no LE edema  Pulmonary: normal respirations, no signs of respiratory distress  Abdomen: non-distended  Sensory: intact to light touch throughout  Skin: Skin is warm, dry and intact    Motor Strength: Moves all extremities spontaneously with good tone. No abnormal movements seen.       Deltoids Triceps Biceps Wrist Extension Wrist Flexion Hand  Interossei   Upper: R 5/5 5/5 5/5 5/5 5/5 5/5 5/5    L 5/5 5/5 5/5 5/5 5/5 5/5 5/5     Iliopsoas Quadriceps Knee  Flexion Tibialis  anterior Gastro- cnemius EHL    Lower: R 5/5 5/5 5/5 5/5 5/5 5/5     L 5/5 5/5 5/5 5/5 5/5 5/5      DTR's: 1+ right patellar, 2+ left patellar   Gait: normal          Diagnostic Results:  I have independently reviewed the following imaging:  MRI: Reviewed  6/22  L4-S1 decompression instrumented fusion.  Adequate decompression.  She is adjacent segment disease at L3-4 facet hypertrophy disc degeneration slight retrolisthesis facet arthropathy with moderate to severe circumferential stenosis, L2-3 again significant collapse loss of disc height degenerative disc disease facet arthropathy ligamentum buckling with moderate stenosis and compression of thecal sac    ASSESSMENT/PLAN:     History of spinal fusion    Back pain at L4-L5 level  -     Ambulatory referral/consult to Neurosurgery    Lumbar adjacent segment disease with spondylolisthesis    Synovial cyst      Shandra Velez is a 82 y.o. female  With a history of an L4-1 fusion by Dr. Miller  She she would a very difficult recovery.  But had benefit  Unfortunately last year so she is had more difficulty with back pain greater than radiculopathy.  This limits her activity and her community ambulation.  But she states she does not mobilize much.  She is had a diagnosis of lung cancer has undergone multiple rounds of chemotherapy is now on immunotherapy  She is a patient  of Dr. Montero that his transfer care with his departure to Dr. Mills  She is had a recent PET scan with some question of progression she will have an appointment in a few weeks with Dr. Mills for evaluation and recommendations  She has high frequency and severity of symptoms 2 levels of adjacent segment disease degeneration stenosis, and a synovial cyst  Surgery would require cyst resection, decompression and extension of fusion.  Which is going to be major operation with rehabilitation similar to her last surgery  .  At this point she does not want to proceed with that with recurrent treatment she will see with the impression of Dr. Rodriguez.  She is going to follow up with Dr. Baeza pain management to continue her nonoperative care, modify her activity ect.  All questions were answered in detail              Patient verbalized understanding of plan. Encouraged to call with any questions or concerns.     This note was partially dictated using voice recognition software, so please excuse any errors that were not corrected.

## 2022-11-09 ENCOUNTER — PATIENT MESSAGE (OUTPATIENT)
Dept: HEMATOLOGY/ONCOLOGY | Facility: CLINIC | Age: 82
End: 2022-11-09

## 2022-11-09 ENCOUNTER — HOSPITAL ENCOUNTER (OUTPATIENT)
Dept: RADIOLOGY | Facility: HOSPITAL | Age: 82
Discharge: HOME | End: 2022-11-09
Attending: INTERNAL MEDICINE
Payer: MEDICARE

## 2022-11-09 ENCOUNTER — OFFICE VISIT (OUTPATIENT)
Dept: HEMATOLOGY/ONCOLOGY | Facility: CLINIC | Age: 82
End: 2022-11-09
Payer: MEDICARE

## 2022-11-09 ENCOUNTER — DOCUMENTATION ONLY (OUTPATIENT)
Dept: INFUSION THERAPY | Facility: HOSPITAL | Age: 82
End: 2022-11-09

## 2022-11-09 ENCOUNTER — TELEPHONE (OUTPATIENT)
Dept: HEMATOLOGY/ONCOLOGY | Facility: CLINIC | Age: 82
End: 2022-11-09
Payer: MEDICARE

## 2022-11-09 ENCOUNTER — INFUSION (OUTPATIENT)
Dept: INFUSION THERAPY | Facility: HOSPITAL | Age: 82
End: 2022-11-09
Attending: INTERNAL MEDICINE
Payer: MEDICARE

## 2022-11-09 VITALS
HEART RATE: 89 BPM | BODY MASS INDEX: 35.04 KG/M2 | OXYGEN SATURATION: 96 % | RESPIRATION RATE: 16 BRPM | WEIGHT: 210.31 LBS | TEMPERATURE: 98 F | SYSTOLIC BLOOD PRESSURE: 112 MMHG | HEIGHT: 65 IN | DIASTOLIC BLOOD PRESSURE: 80 MMHG

## 2022-11-09 VITALS
RESPIRATION RATE: 16 BRPM | HEART RATE: 80 BPM | SYSTOLIC BLOOD PRESSURE: 108 MMHG | WEIGHT: 210.31 LBS | DIASTOLIC BLOOD PRESSURE: 74 MMHG | BODY MASS INDEX: 35 KG/M2 | TEMPERATURE: 98 F

## 2022-11-09 DIAGNOSIS — E78.5 HYPERLIPIDEMIA, UNSPECIFIED HYPERLIPIDEMIA TYPE: ICD-10-CM

## 2022-11-09 DIAGNOSIS — I10 HYPERTENSION, UNSPECIFIED TYPE: ICD-10-CM

## 2022-11-09 DIAGNOSIS — C34.32 PRIMARY MALIGNANT NEOPLASM OF LEFT LOWER LOBE OF LUNG: Primary | ICD-10-CM

## 2022-11-09 DIAGNOSIS — N28.89 RIGHT RENAL MASS: ICD-10-CM

## 2022-11-09 DIAGNOSIS — C82.00 FOLLICULAR LYMPHOMA GRADE I, UNSPECIFIED BODY REGION: ICD-10-CM

## 2022-11-09 DIAGNOSIS — C34.11 MALIGNANT NEOPLASM OF RIGHT UPPER LOBE OF LUNG: Primary | ICD-10-CM

## 2022-11-09 DIAGNOSIS — R05.9 COUGH, UNSPECIFIED TYPE: ICD-10-CM

## 2022-11-09 DIAGNOSIS — E03.9 ACQUIRED HYPOTHYROIDISM: ICD-10-CM

## 2022-11-09 DIAGNOSIS — D89.89 OTHER SPECIFIED DISORDERS INVOLVING THE IMMUNE MECHANISM, NOT ELSEWHERE CLASSIFIED: ICD-10-CM

## 2022-11-09 DIAGNOSIS — G47.00 INSOMNIA, UNSPECIFIED TYPE: ICD-10-CM

## 2022-11-09 DIAGNOSIS — J18.9 PNEUMONIA OF RIGHT LOWER LOBE DUE TO INFECTIOUS ORGANISM: ICD-10-CM

## 2022-11-09 PROCEDURE — 25000003 PHARM REV CODE 250: Mod: PN | Performed by: INTERNAL MEDICINE

## 2022-11-09 PROCEDURE — 99999 PR PBB SHADOW E&M-EST. PATIENT-LVL V: ICD-10-PCS | Mod: PBBFAC,,, | Performed by: INTERNAL MEDICINE

## 2022-11-09 PROCEDURE — 71046 X-RAY EXAM CHEST 2 VIEWS: CPT | Mod: 26,,, | Performed by: RADIOLOGY

## 2022-11-09 PROCEDURE — 1101F PT FALLS ASSESS-DOCD LE1/YR: CPT | Mod: CPTII,S$GLB,, | Performed by: INTERNAL MEDICINE

## 2022-11-09 PROCEDURE — 1101F PR PT FALLS ASSESS DOC 0-1 FALLS W/OUT INJ PAST YR: ICD-10-PCS | Mod: CPTII,S$GLB,, | Performed by: INTERNAL MEDICINE

## 2022-11-09 PROCEDURE — 3074F PR MOST RECENT SYSTOLIC BLOOD PRESSURE < 130 MM HG: ICD-10-PCS | Mod: CPTII,S$GLB,, | Performed by: INTERNAL MEDICINE

## 2022-11-09 PROCEDURE — 3288F PR FALLS RISK ASSESSMENT DOCUMENTED: ICD-10-PCS | Mod: CPTII,S$GLB,, | Performed by: INTERNAL MEDICINE

## 2022-11-09 PROCEDURE — 1126F AMNT PAIN NOTED NONE PRSNT: CPT | Mod: CPTII,S$GLB,, | Performed by: INTERNAL MEDICINE

## 2022-11-09 PROCEDURE — 63600175 PHARM REV CODE 636 W HCPCS: Mod: JG,PN | Performed by: INTERNAL MEDICINE

## 2022-11-09 PROCEDURE — 96413 CHEMO IV INFUSION 1 HR: CPT | Mod: PN

## 2022-11-09 PROCEDURE — 1126F PR PAIN SEVERITY QUANTIFIED, NO PAIN PRESENT: ICD-10-PCS | Mod: CPTII,S$GLB,, | Performed by: INTERNAL MEDICINE

## 2022-11-09 PROCEDURE — 3074F SYST BP LT 130 MM HG: CPT | Mod: CPTII,S$GLB,, | Performed by: INTERNAL MEDICINE

## 2022-11-09 PROCEDURE — 71046 XR CHEST PA AND LATERAL: ICD-10-PCS | Mod: 26,,, | Performed by: RADIOLOGY

## 2022-11-09 PROCEDURE — 99215 OFFICE O/P EST HI 40 MIN: CPT | Mod: S$GLB,,, | Performed by: INTERNAL MEDICINE

## 2022-11-09 PROCEDURE — 3079F DIAST BP 80-89 MM HG: CPT | Mod: CPTII,S$GLB,, | Performed by: INTERNAL MEDICINE

## 2022-11-09 PROCEDURE — 3288F FALL RISK ASSESSMENT DOCD: CPT | Mod: CPTII,S$GLB,, | Performed by: INTERNAL MEDICINE

## 2022-11-09 PROCEDURE — 99999 PR PBB SHADOW E&M-EST. PATIENT-LVL V: CPT | Mod: PBBFAC,,, | Performed by: INTERNAL MEDICINE

## 2022-11-09 PROCEDURE — 3079F PR MOST RECENT DIASTOLIC BLOOD PRESSURE 80-89 MM HG: ICD-10-PCS | Mod: CPTII,S$GLB,, | Performed by: INTERNAL MEDICINE

## 2022-11-09 PROCEDURE — 71046 X-RAY EXAM CHEST 2 VIEWS: CPT | Mod: TC,FY,PO

## 2022-11-09 PROCEDURE — 99215 PR OFFICE/OUTPT VISIT, EST, LEVL V, 40-54 MIN: ICD-10-PCS | Mod: S$GLB,,, | Performed by: INTERNAL MEDICINE

## 2022-11-09 RX ORDER — EPINEPHRINE 0.3 MG/.3ML
0.3 INJECTION SUBCUTANEOUS ONCE AS NEEDED
Status: CANCELLED | OUTPATIENT
Start: 2022-11-09

## 2022-11-09 RX ORDER — SODIUM CHLORIDE 0.9 % (FLUSH) 0.9 %
10 SYRINGE (ML) INJECTION
Status: CANCELLED | OUTPATIENT
Start: 2022-11-09

## 2022-11-09 RX ORDER — DIPHENHYDRAMINE HYDROCHLORIDE 50 MG/ML
50 INJECTION INTRAMUSCULAR; INTRAVENOUS ONCE AS NEEDED
Status: CANCELLED | OUTPATIENT
Start: 2022-11-09

## 2022-11-09 RX ORDER — HEPARIN 100 UNIT/ML
500 SYRINGE INTRAVENOUS
Status: DISCONTINUED | OUTPATIENT
Start: 2022-11-09 | End: 2022-11-09 | Stop reason: HOSPADM

## 2022-11-09 RX ORDER — HEPARIN 100 UNIT/ML
500 SYRINGE INTRAVENOUS
Status: CANCELLED | OUTPATIENT
Start: 2022-11-09

## 2022-11-09 RX ORDER — DIPHENHYDRAMINE HYDROCHLORIDE 50 MG/ML
50 INJECTION INTRAMUSCULAR; INTRAVENOUS ONCE AS NEEDED
Status: DISCONTINUED | OUTPATIENT
Start: 2022-11-09 | End: 2022-11-09 | Stop reason: HOSPADM

## 2022-11-09 RX ORDER — ACETAMINOPHEN 500 MG
1000 TABLET ORAL
Status: DISCONTINUED | OUTPATIENT
Start: 2022-11-09 | End: 2022-11-09 | Stop reason: HOSPADM

## 2022-11-09 RX ORDER — AMOXICILLIN AND CLAVULANATE POTASSIUM 875; 125 MG/1; MG/1
1 TABLET, FILM COATED ORAL 2 TIMES DAILY
Qty: 20 TABLET | Refills: 0 | Status: SHIPPED | OUTPATIENT
Start: 2022-11-09 | End: 2022-11-14 | Stop reason: SINTOL

## 2022-11-09 RX ORDER — SODIUM CHLORIDE 0.9 % (FLUSH) 0.9 %
10 SYRINGE (ML) INJECTION
Status: DISCONTINUED | OUTPATIENT
Start: 2022-11-09 | End: 2022-11-09 | Stop reason: HOSPADM

## 2022-11-09 RX ORDER — ALBUTEROL SULFATE 90 UG/1
2 AEROSOL, METERED RESPIRATORY (INHALATION) EVERY 6 HOURS PRN
Qty: 8 G | Refills: 3 | Status: SHIPPED | OUTPATIENT
Start: 2022-11-09 | End: 2023-03-15 | Stop reason: SDUPTHER

## 2022-11-09 RX ORDER — EPINEPHRINE 0.3 MG/.3ML
0.3 INJECTION SUBCUTANEOUS ONCE AS NEEDED
Status: DISCONTINUED | OUTPATIENT
Start: 2022-11-09 | End: 2022-11-09 | Stop reason: HOSPADM

## 2022-11-09 RX ORDER — ACETAMINOPHEN 500 MG
1000 TABLET ORAL
Status: CANCELLED
Start: 2022-11-09

## 2022-11-09 RX ADMIN — SODIUM CHLORIDE: 9 INJECTION, SOLUTION INTRAVENOUS at 12:11

## 2022-11-09 RX ADMIN — SODIUM CHLORIDE 400 MG: 9 INJECTION, SOLUTION INTRAVENOUS at 01:11

## 2022-11-09 NOTE — PROGRESS NOTES
Oncology Nutrition   Chemotherapy Infusion Visit    Nutrition Follow Up   RD met with patient at chairside during infusion treatment. Pt reports continues to do well nutritionally- eating without difficulty, maintaining weight, and denies nutrition related side effects.    Wt Readings from Last 10 Encounters:   11/09/22 95.4 kg (210 lb 5.1 oz)   11/09/22 95.4 kg (210 lb 5.1 oz)   10/27/22 93.4 kg (206 lb)   10/21/22 93.5 kg (206 lb 2.1 oz)   10/13/22 94.5 kg (208 lb 5.4 oz)   09/28/22 94.6 kg (208 lb 8.9 oz)   09/28/22 94.6 kg (208 lb 8.9 oz)   08/23/22 93.1 kg (205 lb 2.2 oz)   08/17/22 93.5 kg (206 lb 2.1 oz)   08/17/22 93.5 kg (206 lb 2.1 oz)       All other nutrition questions/concerns addressed as appropriate. Will continue to follow and monitor throughout treatment PRN.     Belen Lyons, MS, RD, LDN  11/09/2022  3:43 PM

## 2022-11-09 NOTE — TELEPHONE ENCOUNTER
Spoke with the pt and got the pt scheduled to see Dr. Raymundo on 12/06. Dr. Kinney did not have any openings until next yr. Pt verbalized and understanding.  ----- Message from Marietta Rios RN sent at 11/9/2022 11:57 AM CST -----  Dr. Kinney will not be back at the cancer center until 11/22/22. Please reach out to [facility] at [phone] to schedule urgent appointment     Thanks      ----- Message -----  From: Flor Felipe MA  Sent: 11/9/2022  11:25 AM CST  To: [facility] Navigation Outpatient    she needs an urgent appt with Dr Kinney for chronci cough and SOB.

## 2022-11-09 NOTE — PROGRESS NOTES
3:32 PM  This LCSW met with this pt to check in and provide support. The pt was in good spirits and reported no needs a this time.

## 2022-11-09 NOTE — PROGRESS NOTES
PATIENT: Shandra Velez  MRN: [id number]  DATE: 11/9/2022      Diagnosis:   1. Malignant neoplasm of right upper lobe of lung    2. Other specified disorders involving the immune mechanism, not elsewhere classified    3. Cough, unspecified type    4. Pneumonia of right lower lobe due to infectious organism    5. Follicular lymphoma grade I, unspecified body region    6. Right renal mass    7. Hypertension, unspecified type    8. Hyperlipidemia, unspecified hyperlipidemia type    9. Acquired hypothyroidism    10. Insomnia, unspecified type        Chief Complaint: Lung Cancer (Former Dr. Montero pt)      Oncologic History:     See Below    Subjective:    Initial History: Ms. Velez is a 82 y.o. female with HLD, HTN, osteopenia, follicular lymphoma who presents for follow up of NSCLC.  Pt initially underwent right inguinal LN biopsy 5/25/22 showing grade 1 follicular lymphoma.  Bone marrow biopsy done 06/24/2020 showed involvement with follicular lymphoma.  PET/CT 7/07/20 showed lymphadenopathy above and below the diaphragm as well as the spleen.  Also seen wasa RUL lesion.  The patient was started on CVP-R followed by maintenance rituxan every 8 weeks.  Biopsy of the RUL nodule on 6/18/21 showed SCC with PD-L1 at 86%.  EBUS 7/09/21 showed no malignant cells at station 7, 4R or 11RS.  The patient was treated with SBRT under the care of Dr Barnes with 50Gy in 4 fractions completed 10/28/21.  The patient then developed new pulmonary nodules on CT scan 6/02/22.  WBUS 6/16/22 showed positive SCC in 4R LN.  Pt was discussed at tumor board on 6/21/22 with recommendation for Keytruda.  The patient underwent PET/CT on 9/23/22 after 2 doses of treatment showing a 1.5 cm precarinal lymph node; stable right apical mass measuring 1.8 x 1.5 cm; stable 8 mm right apical lung nodule with adjacent post radiation change measuring 2 x 2.7 cm; stable 4 mm subpleural nodule in the lateral right upper lobe; fluid/mucus in the right lower  lobe bronchus tree; 6 mm anterior left upper lobe nodule which is new; 2.3 cm simple cyst in the midpole of the left kidney; 3.1 x 2.3 cm rim enhancing complex cystic and solid mass in the mid to lower pole the right kidney; infrarenal abdominal aortic aneurysm measuring 3.8 x 3.5 cm; and stable a left para-aortic lymph node measuring 11 mm. 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    Currently the patient complains of shortness of breath which has worsened since all her last treatment.  She endorses a productive cough but denies fever and chest pain.  The patient states she had similar symptoms prior to her bronchoscopy in June of 2022 and states her symptoms improved after mucus plugging was removed.  The patient denies CP, abdominal pain, N/V, constipation, diarrhea.  The patient denies fever, chills, night sweats, weight loss, new lumps or bumps, easy bruising or bleeding.    Past Medical History:   Past Medical History:   Diagnosis Date    Breast cancer 1985    right mastectomy    Digestive disorder     Encounter for blood transfusion     History of pneumonia     Hypercholesterolemia     Hypertension     Indigestion     Lumbar spondylosis     Lung cancer     2022 - currently in treatment as of 7/27/22    Lymphoma     Osteopenia     Pulmonary nodule     Smoker 06/11/2022       Past Surgical HIstory:   Past Surgical History:   Procedure Laterality Date    APPENDECTOMY      BONE MARROW BIOPSY Bilateral 06/24/2020    Procedure: Biopsy-bone marrow;  Surgeon: Rod Montero MD;  Location: [facility] OR;  Service: Oncology;  Laterality: Bilateral;    BREAST RECONSTRUCTION  1990    right    CATARACT EXTRACTION W/  INTRAOCULAR LENS IMPLANT Bilateral     CHOLECYSTECTOMY      ENDOBRONCHIAL ULTRASOUND Bilateral 07/09/2021    Procedure: ENDOBRONCHIAL ULTRASOUND (EBUS);  Surgeon: Gregorio Kinney MD;  Location: [facility];  Service: Pulmonary;   Laterality: Bilateral;  NEED LMA to  eval right upper paratracheal LN.     ENDOBRONCHIAL ULTRASOUND Bilateral 6/16/2022    Procedure: ENDOBRONCHIAL ULTRASOUND (EBUS);  Surgeon: Gregorio Kinney MD;  Location: [facility];  Service: Pulmonary;  Laterality: Bilateral;    INJECTION OF FACET JOINT Left 6/30/2022    Procedure: FACET JOINT Cyst Aspiration L3/4;  Surgeon: Ronnell Baeza MD;  Location: [facility] OR;  Service: Pain Management;  Laterality: Left;    INSERTION OF TUNNELED CENTRAL VENOUS CATHETER (CVC) WITH SUBCUTANEOUS PORT Left 11/04/2020    Procedure: [id number]-F-CATH;  Surgeon: Kody Pretty MD;  Location: [facility] OR;  Service: General;  Laterality: Left;    JOINT REPLACEMENT  2008    right knee     LUMBAR LAMINECTOMY      LYMPH NODE BIOPSY      MASTECTOMY Right 1985    NEEDLE LOCALIZATION N/A 05/25/2020    Procedure: NEEDLE LOCALIZATION - lymph node bx;  Surgeon: Steve Weaver MD;  Location: [facility];  Service: Interventional Radiology;  Laterality: N/A;    TRANSFORAMINAL EPIDURAL INJECTION OF STEROID Left 05/14/2020    Procedure: Injection,steroid,epidural,transforaminal approach, l3/4;  Surgeon: Ronnell Baeza MD;  Location: [facility] OR;  Service: Pain Management;  Laterality: Left;    TRANSFORAMINAL EPIDURAL INJECTION OF STEROID Left 03/23/2022    Procedure: Injection,steroid,epidural,transforaminal approach L3/4;  Surgeon: Ronnell Baeza MD;  Location: [facility] OR;  Service: Pain Management;  Laterality: Left;    TRANSFORAMINAL EPIDURAL INJECTION OF STEROID Left 8/1/2022    Procedure: Injection,steroid,epidural,transforaminal approach L3/4;  Surgeon: Ronnell Baeza MD;  Location: [facility] OR;  Service: Pain Management;  Laterality: Left;    TUBAL LIGATION      UMBILICAL HERNIA REPAIR         Family History:   Family History   Problem Relation Age of Onset    Cancer Sister         uterine    Diabetes Brother     Cancer Sister         Uterine cancer     Breast cancer Other 42       Social History:   reports that she quit smoking about 4 months ago. Her smoking use included cigarettes. She has a 26.50 pack-year smoking history. She has never used smokeless tobacco. She reports that she does not drink alcohol and does not use drugs.    Allergies:  Review of patient's allergies indicates:  No Known Allergies    Medications:  Current Outpatient Medications   Medication Sig Dispense Refill    acetaminophen (TYLENOL) 325 MG tablet Take 2 tablets (650 mg total) by mouth every 4 (four) hours as needed (pain score 1-2/10).  0    benzonatate (TESSALON PERLES) 100 MG capsule Take 1 capsule (100 mg total) by mouth 3 (three) times daily as needed for Cough. 30 capsule 1    cefUROXime (CEFTIN) 500 MG tablet Take 500 mg by mouth 2 (two) times daily.      cetirizine (ZYRTEC) 10 MG tablet Take 10 mg by mouth once daily. Every day      cholecalciferol, vitamin D3, 1,250 mcg (50,000 unit) Tab Take 1 tablet by mouth every 7 days. 12 tablet 6    fluticasone propionate (FLONASE) 50 mcg/actuation nasal spray 1 spray (50 mcg total) by Each Nostril route once daily. 16 g 1    hydroCHLOROthiazide (HYDRODIURIL) 25 MG tablet Take 1 tablet (25 mg total) by mouth once daily. 90 tablet 3    levothyroxine (SYNTHROID) 100 MCG tablet Take 1 tablet (100 mcg total) by mouth once daily. 30 tablet 11    lisinopriL (PRINIVIL,ZESTRIL) 40 MG tablet Take 1 tablet (40 mg total) by mouth once daily. 90 tablet 3    multivitamin (THERAGRAN) per tablet Take 1 tablet by mouth once daily.      NIFEdipine (ADALAT CC) 60 MG TbSR Take 1 tablet (60 mg total) by mouth once daily. 90 tablet 3    omeprazole (PRILOSEC) 40 MG capsule TAKE 1 CAPSULE BY MOUTH  ONCE DAILY 30 capsule 11    ondansetron (ZOFRAN) 8 MG tablet Take 1 tablet (8 mg total) by mouth every 8 (eight) hours as needed for Nausea. 30 tablet 1    potassium chloride (MICRO-K) 10 MEQ CpSR TAKE 2 CAPSULES BY MOUTH  ONCE DAILY (Patient taking differently: Take 20 mEq by mouth once daily.) 180 capsule 3     promethazine-dextromethorphan (PROMETHAZINE-DM) 6.25-15 mg/5 mL Syrp Take 5 mLs by mouth 4 (four) times daily as needed. 180 mL 2    simvastatin (ZOCOR) 10 MG tablet Take 1 tablet (10 mg total) by mouth every evening. 90 tablet 3    temazepam (RESTORIL) 15 mg Cap TAKE 1 CAPSULE BY MOUTH AT  BEDTIME AS NEEDED FOR  INSOMNIA 90 capsule 1    albuterol (VENTOLIN HFA) 90 mcg/actuation inhaler Inhale 2 puffs into the lungs every 6 (six) hours as needed for Wheezing or Shortness of Breath. Rescue 8 g 3    amoxicillin-clavulanate 875-125mg (AUGMENTIN) 875-125 mg per tablet Take 1 tablet by mouth 2 (two) times daily. for 10 days 20 tablet 0     No current facility-administered medications for this visit.     Facility-Administered Medications Ordered in Other Visits   Medication Dose Route Frequency Provider Last Rate Last Admin    acetaminophen tablet 1,000 mg  1,000 mg Oral 1 time in Clinic/HOD Chad Mills MD        alteplase injection 2 mg  2 mg Intra-Catheter PRN Chad Mills MD        diphenhydrAMINE (BENADRYL) 25 mg in NS 50 mL IVPB  25 mg Intravenous 1 time in Clinic/HOD Chad Mills MD        diphenhydrAMINE injection 50 mg  50 mg Intravenous Once PRN Chad Mills MD        EPINEPHrine (EPIPEN) 0.3 mg/0.3 mL pen injection 0.3 mg  0.3 mg Intramuscular Once PRN Chad Mills MD        heparin, porcine (PF) 100 unit/mL injection flush 500 Units  500 Units Intravenous PRN Chad Mills MD        hydrocortisone sodium succinate injection 100 mg  100 mg Intravenous Once PRN Chad Mills MD        sodium chloride 0.9% 100 mL flush bag   Intravenous 1 time in Clinic/HOD Chad Mills MD        sodium chloride 0.9% flush 10 mL  10 mL Intravenous PRN Chad Mills MD           Review of Systems   Constitutional:  Negative for appetite change and unexpected weight change.   HENT:  Negative for mouth sores.    Eyes:  Negative for visual disturbance.   Respiratory:  Positive for cough and shortness  "of breath.    Cardiovascular:  Negative for chest pain.   Gastrointestinal:  Negative for abdominal pain and diarrhea.   Musculoskeletal:  Positive for back pain.   Skin:  Negative for rash.   Neurological:  Negative for headaches.   Hematological:  Negative for adenopathy.   Psychiatric/Behavioral:  The patient is not nervous/anxious.      ECOG Performance Status: 2   Objective:      Vitals:   Vitals:    11/09/22 1043   BP: 112/80   BP Location: Left arm   Patient Position: Sitting   BP Method: Large (Manual)   Pulse: 89   Resp: 16   Temp: 97.7 °F (36.5 °C)   TempSrc: Temporal   SpO2: 96%   Weight: 95.4 kg (210 lb 5.1 oz)   Height: 5' 5" (1.651 m)       Physical Exam  Constitutional:       General: She is not in acute distress.     Appearance: She is well-developed. She is not diaphoretic.   HENT:      Head: Normocephalic and atraumatic.   Cardiovascular:      Rate and Rhythm: Normal rate and regular rhythm.      Heart sounds: Normal heart sounds. No murmur heard.    No friction rub. No gallop.   Pulmonary:      Effort: Pulmonary effort is normal. No respiratory distress.      Breath sounds: Normal breath sounds. No wheezing or rales.   Chest:      Chest wall: No tenderness.   Abdominal:      General: Bowel sounds are normal. There is no distension.      Palpations: Abdomen is soft. There is no mass.      Tenderness: There is no abdominal tenderness. There is no guarding or rebound.   Lymphadenopathy:      Cervical: No cervical adenopathy.      Upper Body:      Right upper body: No supraclavicular or axillary adenopathy.      Left upper body: No supraclavicular or axillary adenopathy.   Skin:     Findings: No erythema or rash.   Neurological:      Mental Status: She is alert and oriented to person, place, and time.   Psychiatric:         Behavior: Behavior normal.       Laboratory Data:  Lab Visit on 11/09/2022   Component Date Value Ref Range Status    WBC 11/09/2022 13.50 (H)  3.90 - 12.70 K/uL Final    RBC " 11/09/2022 3.66 (L)  4.00 - 5.40 M/uL Final    Hemoglobin 11/09/2022 10.9 (L)  12.0 - 16.0 g/dL Final    Hematocrit 11/09/2022 33.4 (L)  37.0 - 48.5 % Final    MCV 11/09/2022 91  82 - 98 fL Final    MCH 11/09/2022 29.8  27.0 - 31.0 pg Final    MCHC 11/09/2022 32.6  32.0 - 36.0 g/dL Final    RDW 11/09/2022 14.0  11.5 - 14.5 % Final    Platelets 11/09/2022 148 (L)  150 - 450 K/uL Final    MPV 11/09/2022 10.7  9.2 - 12.9 fL Final    Immature Granulocytes 11/09/2022 0.7 (H)  0.0 - 0.5 % Final    Gran # (ANC) 11/09/2022 11.2 (H)  1.8 - 7.7 K/uL Final    Immature Grans (Abs) 11/09/2022 0.09 (H)  0.00 - 0.04 K/uL Final    Comment: Mild elevation in immature granulocytes is non specific and   can be seen in a variety of conditions including stress response,   acute inflammation, trauma and pregnancy. Correlation with other   laboratory and clinical findings is essential.      Lymph # 11/09/2022 1.1  1.0 - 4.8 K/uL Final    Mono # 11/09/2022 0.8  0.3 - 1.0 K/uL Final    Eos # 11/09/2022 0.3  0.0 - 0.5 K/uL Final    Baso # 11/09/2022 0.04  0.00 - 0.20 K/uL Final    nRBC 11/09/2022 0  0 /100 WBC Final    Gran % 11/09/2022 83.2 (H)  38.0 - 73.0 % Final    Lymph % 11/09/2022 7.8 (L)  18.0 - 48.0 % Final    Mono % 11/09/2022 6.1  4.0 - 15.0 % Final    Eosinophil % 11/09/2022 1.9  0.0 - 8.0 % Final    Basophil % 11/09/2022 0.3  0.0 - 1.9 % Final    Differential Method 11/09/2022 Automated   Final    Sodium 11/09/2022 140  136 - 145 mmol/L Final    Potassium 11/09/2022 3.5  3.5 - 5.1 mmol/L Final    Chloride 11/09/2022 112 (H)  95 - 110 mmol/L Final    CO2 11/09/2022 17 (L)  23 - 29 mmol/L Final    Glucose 11/09/2022 121 (H)  70 - 110 mg/dL Final    BUN 11/09/2022 23  8 - 23 mg/dL Final    Creatinine 11/09/2022 1.5 (H)  0.5 - 1.4 mg/dL Final    Calcium 11/09/2022 8.7  8.7 - 10.5 mg/dL Final    Total Protein 11/09/2022 6.4  6.0 - 8.4 g/dL Final    Albumin 11/09/2022 3.0 (L)  3.5 - 5.2 g/dL Final    Total Bilirubin 11/09/2022 0.4   0.1 - 1.0 mg/dL Final    Comment: For infants and newborns, interpretation of results should be based  on gestational age, weight and in agreement with clinical  observations.    Premature Infant recommended reference ranges:  Up to 24 hours.............<8.0 mg/dL  Up to 48 hours............<12.0 mg/dL  3-5 days..................<15.0 mg/dL  6-29 days.................<15.0 mg/dL      Alkaline Phosphatase 11/09/2022 89  55 - 135 U/L Final    AST 11/09/2022 15  10 - 40 U/L Final    ALT 11/09/2022 6 (L)  10 - 44 U/L Final    Anion Gap 11/09/2022 11  8 - 16 mmol/L Final    eGFR 11/09/2022 35.0 (A)  >60 mL/min/1.73 m^2 Final    LD 11/09/2022 202  110 - 260 U/L Final    Results are increased in hemolyzed samples.    Magnesium 11/09/2022 1.3 (L)  1.6 - 2.6 mg/dL Final         Imaging: PET/CT 9/23/22    Chest: Left-sided medication port is present.  Emphysema is present.     There is a 1.5 cm short axis diameter precarinal lymph node which has increased in size compared to the prior chest CT.  It measured 1.8 cm in short axis on PET-CT, and was noted to be hypermetabolic.  There are no other enlarged mediastinal, hilar or axillary lymph nodes.  There are surgical clips in the right axilla.  Patient appears to be status post right mastectomy and flap reconstruction.  There is extensive calcific plaque in the aorta, coronary arteries.  No pleural effusions are present.     There is a residual right apical mass at the site of the patient's known malignancy measuring 1.8 x 1.5 cm on series 6, image 59, unchanged compared to PET-CT but smaller compared to the prior chest CT.  There is extensive surrounding post radiation fibrosis.  There is additionally an 8 mm right apical lung nodule series 6, image 83, unchanged, with adjacent post radiation change measuring 2.0 x 2.7 cm series 6, image 83.     There is a stable 4 mm subpleural nodule in the lateral right upper lobe series 6, image 110.     Fluid/mucous fills the right lower  lobe bronchial tree.  Similar findings noted on previous CT.  The associated consolidation in the right lower lobe noted on the prior CT has resolved.     There is a stable wedge-shaped focus of atelectasis/fibrosis in the medial left lower lobe; a pulmonary nodule visible in this location on older CTs is no longer visible.     There is a 6 mm anterior left upper lobe nodule series 6, image 135 which is new.     Abdomen/pelvis: The gallbladder is surgically absent.  The liver, spleen, adrenal glands, pancreas are normal.  There is a 2.3 cm simple cyst at the midpole of the left kidney, as well as several tiny nonobstructing renal calculi.  There is a 3.1 x 2.3 cm rim enhancing complex cystic and solid mid/lower pole right renal mass; most of the patient's previous CTs have been performed without IV contrast, and this finding is much more difficult to appreciate without contrast.  It has increased in size compared to the contrast-enhanced exam of 05/11/2020 at which time it measured 2.0 cm in largest dimension.     Infrarenal abdominal aortic aneurysm is not significantly changed measuring 3.8 cm AP x 3.5 cm transverse.  Left para aortic lymph nodes have short axis diameters of up to 11 mm, similar to the prior CT.  No enlarging lymph nodes are present.     There is a left lower quadrant septated fat containing ventral hernia.  There is a cystocele.  The uterus is atrophic or absent.  There are colonic diverticula, no diverticulitis.     Degenerative and postsurgical changes are present in the spine.   Assessment:       1. Malignant neoplasm of right upper lobe of lung    2. Other specified disorders involving the immune mechanism, not elsewhere classified    3. Cough, unspecified type    4. Pneumonia of right lower lobe due to infectious organism    5. Follicular lymphoma grade I, unspecified body region    6. Right renal mass    7. Hypertension, unspecified type    8. Hyperlipidemia, unspecified hyperlipidemia type     9. Acquired hypothyroidism    10. Insomnia, unspecified type           Plan:     NSCLC - Pt has NSCLC SCC with involvement of the right lung and mediastinum  -Pt with prior radiation to a RUL nodule  -PD-L1 was 86% on 6/18/21  -Pt currently on Keytruda with 6 week dosing  -PET/CT on 9/23/22 with possible pseudoprogression  -Will proceed with cycle 4 today with plans for repeat PET/CT in 3 weeks    Pneumonia - pt with productive cough over the past month   -CXR done today shows findings concerning for RLL PNA  -Will start the patient on Augmentin for 10 days  -Pt to see pulmonary given PET/CT showed fluid/mucous fills the right lower lobe bronchial tree which was also seen on prior bronch    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 for 9/12 cycles with good response  -Will monitor    Right Renal Mass - Pt with right renal mass seen on PET/CT 9/23/22 wich is enlarging  -Will monitor and consider biopsy in future    HTN - pt on lisinopril and nifedipine  -BP controlled  -Will monitor    HLD - Pt on statin   -Mangement per PCP    Hypothyroidism - pt on levothyroxine  -Management per PCP    Insomnia - pt on temazepam  -Controlled  -Will monitor    Route Chart for Scheduling    Med Onc Chart Routing      Follow up with physician 3 weeks. The patient can proceed with treatment.  She needs a CXR today.  She needs an appt with me in 3 weeks with PET/CT just prior to the appt.  she needs an urgent appt with Dr Kinney for chronci cough and SOB.   Follow up with DAYAMI    Infusion scheduling note    Injection scheduling note    Labs    Imaging    Pharmacy appointment    Other referrals        Treatment Plan Information   OP PEMBROLIZUMAB 400MG Q6W   Chad Mills MD   Upcoming Treatment Dates - OP PEMBROLIZUMAB 400MG Q6W    12/21/2022       Pre-Medications       acetaminophen tablet 1,000 mg       diphenhydrAMINE (BENADRYL) 25 mg in NS 50 mL IVPB       Chemotherapy       pembrolizumab  (KEYTRUDA) 400 mg in sodium chloride 0.9% 116 mL infusion  2/1/2023       Pre-Medications       acetaminophen tablet 1,000 mg       diphenhydrAMINE (BENADRYL) 25 mg in NS 50 mL IVPB       Chemotherapy       pembrolizumab (KEYTRUDA) 400 mg in sodium chloride 0.9% 116 mL infusion  3/15/2023       Pre-Medications       acetaminophen tablet 1,000 mg       diphenhydrAMINE (BENADRYL) 25 mg in NS 50 mL IVPB       Chemotherapy       pembrolizumab (KEYTRUDA) 400 mg in sodium chloride 0.9% 116 mL infusion  4/26/2023       Pre-Medications       acetaminophen tablet 1,000 mg       diphenhydrAMINE (BENADRYL) 25 mg in NS 50 mL IVPB       Chemotherapy       pembrolizumab (KEYTRUDA) 400 mg in sodium chloride 0.9% 116 mL infusion    Therapy Plan Information  NATASHA (TIXAGEVIMAB/CILGAVIMAB)  diphenhydrAMINE capsule 25 mg  25 mg, Oral, PRN  predniSONE tablet 40 mg  40 mg, Oral, PRN  EPINEPHrine (EPIPEN) 0.3 mg/0.3 mL pen injection 0.3 mg  0.3 mg, Intramuscular, PRN  ondansetron disintegrating tablet 4 mg  4 mg, Oral, PRN  acetaminophen tablet 650 mg  650 mg, Oral, PRN  albuterol inhaler 2 puff  2 puff, Inhalation, PRN    INF PEGFILGRASTIM (NEULASTA)  pegfilgrastim injection 6 mg  6 mg, Subcutaneous, Every visit      Chad Mills MD  [facility]  Hematology and Oncology  [facility] [address] [place] [address]   O: [phone]  F: [phone]

## 2022-11-14 ENCOUNTER — PATIENT MESSAGE (OUTPATIENT)
Dept: HEMATOLOGY/ONCOLOGY | Facility: CLINIC | Age: 82
End: 2022-11-14
Payer: MEDICARE

## 2022-11-14 DIAGNOSIS — C34.11 MALIGNANT NEOPLASM OF RIGHT UPPER LOBE OF LUNG: Primary | ICD-10-CM

## 2022-11-14 DIAGNOSIS — J18.9 PNEUMONIA OF RIGHT LOWER LOBE DUE TO INFECTIOUS ORGANISM: ICD-10-CM

## 2022-11-14 RX ORDER — MOXIFLOXACIN HYDROCHLORIDE 400 MG/1
400 TABLET ORAL DAILY
Qty: 7 TABLET | Refills: 0 | Status: SHIPPED | OUTPATIENT
Start: 2022-11-14 | End: 2022-11-21

## 2022-11-17 ENCOUNTER — PATIENT MESSAGE (OUTPATIENT)
Dept: HEMATOLOGY/ONCOLOGY | Facility: CLINIC | Age: 82
End: 2022-11-17
Payer: MEDICARE

## 2022-11-17 ENCOUNTER — TELEPHONE (OUTPATIENT)
Dept: HEMATOLOGY/ONCOLOGY | Facility: CLINIC | Age: 82
End: 2022-11-17
Payer: MEDICARE

## 2022-11-17 NOTE — TELEPHONE ENCOUNTER
I called the patient whom stated she was already taking zyrtec daily.  I instructed her to start taking Mucinex daily.  The patient expressed understanding.  All questions were answered to her satisfaction.    Chad Mills MD  [facility]  Hematology and Oncology  [facility]   [address] [place] [address]   O: [phone]  F: [phone]

## 2022-11-17 NOTE — TELEPHONE ENCOUNTER
I called the patient and left her a message to call me back at [phone]     Chad Mills MD  [facility]  Hematology and Oncology  [facility]   [address] [place] [address]   O: [phone]  F: [phone]

## 2022-11-29 ENCOUNTER — HOSPITAL ENCOUNTER (OUTPATIENT)
Dept: RADIOLOGY | Facility: HOSPITAL | Age: 82
Discharge: HOME | End: 2022-11-29
Attending: INTERNAL MEDICINE
Payer: MEDICARE

## 2022-11-29 DIAGNOSIS — C34.11 MALIGNANT NEOPLASM OF RIGHT UPPER LOBE OF LUNG: ICD-10-CM

## 2022-11-29 LAB — GLUCOSE SERPL-MCNC: 116 MG/DL (ref 70–110)

## 2022-11-29 PROCEDURE — 78815 PET IMAGE W/CT SKULL-THIGH: CPT | Mod: PS,TC,PN

## 2022-11-29 PROCEDURE — 78815 NM PET CT ROUTINE: ICD-10-PCS | Mod: 26,PS,, | Performed by: RADIOLOGY

## 2022-11-29 PROCEDURE — 78815 PET IMAGE W/CT SKULL-THIGH: CPT | Mod: 26,PS,, | Performed by: RADIOLOGY

## 2022-11-29 PROCEDURE — A9552 F18 FDG: HCPCS | Mod: PN

## 2022-11-29 NOTE — PROGRESS NOTES
PET Imaging Questionnaire    Are you a Diabetic? Recent Blood Sugar level? No    Are you anemic? Bone Marrow Stimulation Meds? No    Have you had a CT Scan, if so when & where was your last one? Yes -     Have you had a PET Scan, if so when & where was your last one? Yes -     Chemotherapy or currently on Chemotherapy? Yes    Radiation therapy? Yes    Surgical History:   Past Surgical History:   Procedure Laterality Date    APPENDECTOMY      BONE MARROW BIOPSY Bilateral 06/24/2020    Procedure: Biopsy-bone marrow;  Surgeon: Rod Montero MD;  Location: [facility] OR;  Service: Oncology;  Laterality: Bilateral;    BREAST RECONSTRUCTION  1990    right    CATARACT EXTRACTION W/  INTRAOCULAR LENS IMPLANT Bilateral     CHOLECYSTECTOMY      ENDOBRONCHIAL ULTRASOUND Bilateral 07/09/2021    Procedure: ENDOBRONCHIAL ULTRASOUND (EBUS);  Surgeon: Gregorio Kineny MD;  Location: [facility];  Service: Pulmonary;  Laterality: Bilateral;  NEED LMA to  eval right upper paratracheal LN.     ENDOBRONCHIAL ULTRASOUND Bilateral 6/16/2022    Procedure: ENDOBRONCHIAL ULTRASOUND (EBUS);  Surgeon: Gregorio Kinney MD;  Location: [facility];  Service: Pulmonary;  Laterality: Bilateral;    INJECTION OF FACET JOINT Left 6/30/2022    Procedure: FACET JOINT Cyst Aspiration L3/4;  Surgeon: Ronnell Baeza MD;  Location: [facility] OR;  Service: Pain Management;  Laterality: Left;    INSERTION OF TUNNELED CENTRAL VENOUS CATHETER (CVC) WITH SUBCUTANEOUS PORT Left 11/04/2020    Procedure: [id number];  Surgeon: Kody Pretty MD;  Location: [facility] OR;  Service: General;  Laterality: Left;    JOINT REPLACEMENT  2008    right knee     LUMBAR LAMINECTOMY      LYMPH NODE BIOPSY      MASTECTOMY Right 1985    NEEDLE LOCALIZATION N/A 05/25/2020    Procedure: NEEDLE LOCALIZATION - lymph node bx;  Surgeon: Steve Weaver MD;  Location: [facility];  Service: Interventional Radiology;  Laterality: N/A;    TRANSFORAMINAL EPIDURAL INJECTION OF STEROID Left  05/14/2020    Procedure: Injection,steroid,epidural,transforaminal approach, l3/4;  Surgeon: Ronnell Baeza MD;  Location: [facility] OR;  Service: Pain Management;  Laterality: Left;    TRANSFORAMINAL EPIDURAL INJECTION OF STEROID Left 03/23/2022    Procedure: Injection,steroid,epidural,transforaminal approach L3/4;  Surgeon: Ronnell Baeza MD;  Location: [facility] OR;  Service: Pain Management;  Laterality: Left;    TRANSFORAMINAL EPIDURAL INJECTION OF STEROID Left 8/1/2022    Procedure: Injection,steroid,epidural,transforaminal approach L3/4;  Surgeon: Ronnell Baeza MD;  Location: [facility] OR;  Service: Pain Management;  Laterality: Left;    TUBAL LIGATION      UMBILICAL HERNIA REPAIR          Have you been fasting for at least 6 hours? Yes    Is there any chance you may be pregnant or breastfeeding? No    Assay: 11.38 MCi@:8.30   Injection Site:lt hand     Residual: 1.02 mCi@: 8.32   Technologist: Vaishali Mccartney Injected:10.36mCi

## 2022-12-02 ENCOUNTER — OFFICE VISIT (OUTPATIENT)
Dept: HEMATOLOGY/ONCOLOGY | Facility: CLINIC | Age: 82
End: 2022-12-02
Payer: MEDICARE

## 2022-12-02 VITALS
BODY MASS INDEX: 36.14 KG/M2 | SYSTOLIC BLOOD PRESSURE: 114 MMHG | WEIGHT: 203.94 LBS | RESPIRATION RATE: 18 BRPM | DIASTOLIC BLOOD PRESSURE: 70 MMHG | OXYGEN SATURATION: 95 % | TEMPERATURE: 97 F | HEART RATE: 90 BPM | HEIGHT: 63 IN

## 2022-12-02 DIAGNOSIS — I10 HYPERTENSION, UNSPECIFIED TYPE: ICD-10-CM

## 2022-12-02 DIAGNOSIS — G47.00 INSOMNIA, UNSPECIFIED TYPE: ICD-10-CM

## 2022-12-02 DIAGNOSIS — J18.9 PNEUMONIA OF RIGHT LOWER LOBE DUE TO INFECTIOUS ORGANISM: ICD-10-CM

## 2022-12-02 DIAGNOSIS — C34.11 MALIGNANT NEOPLASM OF RIGHT UPPER LOBE OF LUNG: Primary | ICD-10-CM

## 2022-12-02 DIAGNOSIS — E78.5 HYPERLIPIDEMIA, UNSPECIFIED HYPERLIPIDEMIA TYPE: ICD-10-CM

## 2022-12-02 DIAGNOSIS — D89.89 OTHER SPECIFIED DISORDERS INVOLVING THE IMMUNE MECHANISM, NOT ELSEWHERE CLASSIFIED: ICD-10-CM

## 2022-12-02 DIAGNOSIS — N28.89 RIGHT RENAL MASS: ICD-10-CM

## 2022-12-02 DIAGNOSIS — C82.00 FOLLICULAR LYMPHOMA GRADE I, UNSPECIFIED BODY REGION: ICD-10-CM

## 2022-12-02 DIAGNOSIS — E03.9 ACQUIRED HYPOTHYROIDISM: ICD-10-CM

## 2022-12-02 PROCEDURE — 1159F MED LIST DOCD IN RCRD: CPT | Mod: CPTII,S$GLB,, | Performed by: INTERNAL MEDICINE

## 2022-12-02 PROCEDURE — 3078F PR MOST RECENT DIASTOLIC BLOOD PRESSURE < 80 MM HG: ICD-10-PCS | Mod: CPTII,S$GLB,, | Performed by: INTERNAL MEDICINE

## 2022-12-02 PROCEDURE — 3074F SYST BP LT 130 MM HG: CPT | Mod: CPTII,S$GLB,, | Performed by: INTERNAL MEDICINE

## 2022-12-02 PROCEDURE — 1126F AMNT PAIN NOTED NONE PRSNT: CPT | Mod: CPTII,S$GLB,, | Performed by: INTERNAL MEDICINE

## 2022-12-02 PROCEDURE — 1159F PR MEDICATION LIST DOCUMENTED IN MEDICAL RECORD: ICD-10-PCS | Mod: CPTII,S$GLB,, | Performed by: INTERNAL MEDICINE

## 2022-12-02 PROCEDURE — 1101F PT FALLS ASSESS-DOCD LE1/YR: CPT | Mod: CPTII,S$GLB,, | Performed by: INTERNAL MEDICINE

## 2022-12-02 PROCEDURE — 3074F PR MOST RECENT SYSTOLIC BLOOD PRESSURE < 130 MM HG: ICD-10-PCS | Mod: CPTII,S$GLB,, | Performed by: INTERNAL MEDICINE

## 2022-12-02 PROCEDURE — 99999 PR PBB SHADOW E&M-EST. PATIENT-LVL IV: ICD-10-PCS | Mod: PBBFAC,,, | Performed by: INTERNAL MEDICINE

## 2022-12-02 PROCEDURE — 99215 PR OFFICE/OUTPT VISIT, EST, LEVL V, 40-54 MIN: ICD-10-PCS | Mod: S$GLB,,, | Performed by: INTERNAL MEDICINE

## 2022-12-02 PROCEDURE — 3288F FALL RISK ASSESSMENT DOCD: CPT | Mod: CPTII,S$GLB,, | Performed by: INTERNAL MEDICINE

## 2022-12-02 PROCEDURE — 3078F DIAST BP <80 MM HG: CPT | Mod: CPTII,S$GLB,, | Performed by: INTERNAL MEDICINE

## 2022-12-02 PROCEDURE — 1101F PR PT FALLS ASSESS DOC 0-1 FALLS W/OUT INJ PAST YR: ICD-10-PCS | Mod: CPTII,S$GLB,, | Performed by: INTERNAL MEDICINE

## 2022-12-02 PROCEDURE — 99215 OFFICE O/P EST HI 40 MIN: CPT | Mod: S$GLB,,, | Performed by: INTERNAL MEDICINE

## 2022-12-02 PROCEDURE — 3288F PR FALLS RISK ASSESSMENT DOCUMENTED: ICD-10-PCS | Mod: CPTII,S$GLB,, | Performed by: INTERNAL MEDICINE

## 2022-12-02 PROCEDURE — 1126F PR PAIN SEVERITY QUANTIFIED, NO PAIN PRESENT: ICD-10-PCS | Mod: CPTII,S$GLB,, | Performed by: INTERNAL MEDICINE

## 2022-12-02 PROCEDURE — 99999 PR PBB SHADOW E&M-EST. PATIENT-LVL IV: CPT | Mod: PBBFAC,,, | Performed by: INTERNAL MEDICINE

## 2022-12-02 NOTE — PROGRESS NOTES
PATIENT: Shandra Velez  MRN: [id number]  DATE: 12/2/2022      Diagnosis:   1. Malignant neoplasm of right upper lobe of lung    2. Other specified disorders involving the immune mechanism, not elsewhere classified    3. Pneumonia of right lower lobe due to infectious organism    4. Follicular lymphoma grade I, unspecified body region    5. Right renal mass    6. Hypertension, unspecified type    7. Hyperlipidemia, unspecified hyperlipidemia type    8. Acquired hypothyroidism    9. Insomnia, unspecified type          Chief Complaint: Lung Cancer (3 week follow up )      Oncologic History:     Pt initially underwent right inguinal LN biopsy 5/25/22 showing grade 1 follicular lymphoma.  Bone marrow biopsy done 06/24/2020 showed involvement with follicular lymphoma.  PET/CT 7/07/20 showed lymphadenopathy above and below the diaphragm as well as the spleen.  Also seen wasa RUL lesion.  The patient was started on CVP-R followed by maintenance rituxan every 8 weeks.  Biopsy of the RUL nodule on 6/18/21 showed SCC with PD-L1 at 86%.  EBUS 7/09/21 showed no malignant cells at station 7, 4R or 11RS.  The patient was treated with SBRT under the care of Dr Barnes with 50Gy in 4 fractions completed 10/28/21.  The patient then developed new pulmonary nodules on CT scan 6/02/22.  WBUS 6/16/22 showed positive SCC in 4R LN.  Pt was discussed at tumor board on 6/21/22 with recommendation for Keytruda.  The patient underwent PET/CT on 9/23/22 after 2 doses of treatment showing a 1.5 cm precarinal lymph node; stable right apical mass measuring 1.8 x 1.5 cm; stable 8 mm right apical lung nodule with adjacent post radiation change measuring 2 x 2.7 cm; stable 4 mm subpleural nodule in the lateral right upper lobe; fluid/mucus in the right lower lobe bronchus tree; 6 mm anterior left upper lobe nodule which is new; 2.3 cm simple cyst in the midpole of the left kidney; 3.1 x 2.3 cm rim enhancing complex cystic and solid mass in the mid to  lower pole the right kidney; infrarenal abdominal aortic aneurysm measuring 3.8 x 3.5 cm; and stable a left para-aortic lymph node measuring 11 mm. 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    Subjective:    Interval History: Ms. Velez is a 82 y.o. female with HLD, HTN, osteopenia, follicular lymphoma who presents for follow up of NSCLC.  Since since the last clinic visit the patient states her breathing improved with administration of antibiotics.  The patient underwent PET-CT on 11/29/2022 showing an irregular hypermetabolic right upper lobe tumor which is stable measuring 1.6 x 1.2 cm; hypermetabolic subpleural right upper lobe consolidation diminished in extent; unchanged linear zone of hypermetabolic atelectasis in the superior segment of the left lower lobe; new poorly metabolic ill-defined infiltrate in the posterior right lower lobe; 4 mm left upper lobe nodule which is stable; hypermetabolic precarinal mediastinal lymph node stable measuring 1 cm; and hypermetabolic aortopulmonary lymph node stable measuring 1.1 x 1.1 cm.    Patient with improved cough and shortness of breath.  The patient denies CP, abdominal pain, N/V, constipation, diarrhea.  The patient denies fever, chills, night sweats, weight loss, new lumps or bumps, easy bruising or bleeding.    Past Medical History:   Past Medical History:   Diagnosis Date    Breast cancer 1985    right mastectomy    Digestive disorder     Encounter for blood transfusion     History of pneumonia     Hypercholesterolemia     Hypertension     Indigestion     Lumbar spondylosis     Lung cancer     2022 - currently in treatment as of 7/27/22    Lymphoma     Osteopenia     Pulmonary nodule     Smoker 06/11/2022       Past Surgical HIstory:   Past Surgical History:   Procedure Laterality Date    APPENDECTOMY      BONE MARROW BIOPSY Bilateral 06/24/2020    Procedure: Biopsy-bone marrow;   Surgeon: Rod Montero MD;  Location: [facility] OR;  Service: Oncology;  Laterality: Bilateral;    BREAST RECONSTRUCTION  1990    right    CATARACT EXTRACTION W/  INTRAOCULAR LENS IMPLANT Bilateral     CHOLECYSTECTOMY      ENDOBRONCHIAL ULTRASOUND Bilateral 07/09/2021    Procedure: ENDOBRONCHIAL ULTRASOUND (EBUS);  Surgeon: Gregorio Kinney MD;  Location: [facility];  Service: Pulmonary;  Laterality: Bilateral;  NEED LMA to  eval right upper paratracheal LN.     ENDOBRONCHIAL ULTRASOUND Bilateral 6/16/2022    Procedure: ENDOBRONCHIAL ULTRASOUND (EBUS);  Surgeon: Gregorio Kinney MD;  Location: [facility];  Service: Pulmonary;  Laterality: Bilateral;    INJECTION OF FACET JOINT Left 6/30/2022    Procedure: FACET JOINT Cyst Aspiration L3/4;  Surgeon: Ronnell Baeza MD;  Location: [facility] OR;  Service: Pain Management;  Laterality: Left;    INSERTION OF TUNNELED CENTRAL VENOUS CATHETER (CVC) WITH SUBCUTANEOUS PORT Left 11/04/2020    Procedure: [id number];  Surgeon: Kody Pretty MD;  Location: [facility] OR;  Service: General;  Laterality: Left;    JOINT REPLACEMENT  2008    right knee     LUMBAR LAMINECTOMY      LYMPH NODE BIOPSY      MASTECTOMY Right 1985    NEEDLE LOCALIZATION N/A 05/25/2020    Procedure: NEEDLE LOCALIZATION - lymph node bx;  Surgeon: Steve Weaver MD;  Location: [facility];  Service: Interventional Radiology;  Laterality: N/A;    TRANSFORAMINAL EPIDURAL INJECTION OF STEROID Left 05/14/2020    Procedure: Injection,steroid,epidural,transforaminal approach, l3/4;  Surgeon: Ronnell Baeza MD;  Location: [facility] OR;  Service: Pain Management;  Laterality: Left;    TRANSFORAMINAL EPIDURAL INJECTION OF STEROID Left 03/23/2022    Procedure: Injection,steroid,epidural,transforaminal approach L3/4;  Surgeon: Ronnell Baeza MD;  Location: [facility] OR;  Service: Pain Management;  Laterality: Left;    TRANSFORAMINAL EPIDURAL INJECTION OF STEROID Left 8/1/2022    Procedure:  Injection,steroid,epidural,transforaminal approach L3/4;  Surgeon: Ronnell Baeza MD;  Location: [facility] OR;  Service: Pain Management;  Laterality: Left;    TUBAL LIGATION      UMBILICAL HERNIA REPAIR         Family History:   Family History   Problem Relation Age of Onset    Cancer Sister         uterine    Diabetes Brother     Cancer Sister         Uterine cancer     Breast cancer Other 42       Social History:  reports that she quit smoking about 5 months ago. Her smoking use included cigarettes. She has a 26.50 pack-year smoking history. She has never used smokeless tobacco. She reports that she does not drink alcohol and does not use drugs.    Allergies:  Review of patient's allergies indicates:  No Known Allergies    Medications:  Current Outpatient Medications   Medication Sig Dispense Refill    acetaminophen (TYLENOL) 325 MG tablet Take 2 tablets (650 mg total) by mouth every 4 (four) hours as needed (pain score 1-2/10).  0    albuterol (VENTOLIN HFA) 90 mcg/actuation inhaler Inhale 2 puffs into the lungs every 6 (six) hours as needed for Wheezing or Shortness of Breath. Rescue 8 g 3    cefUROXime (CEFTIN) 500 MG tablet Take 500 mg by mouth 2 (two) times daily.      cetirizine (ZYRTEC) 10 MG tablet Take 10 mg by mouth once daily. Every day      cholecalciferol, vitamin D3, 1,250 mcg (50,000 unit) Tab Take 1 tablet by mouth every 7 days. 12 tablet 6    fluticasone propionate (FLONASE) 50 mcg/actuation nasal spray USE 1 SPRAY (50 MCG TOTAL) BY EACH NOSTRIL ROUTE ONCE DAILY. 32 g 3    hydroCHLOROthiazide (HYDRODIURIL) 25 MG tablet Take 1 tablet (25 mg total) by mouth once daily. 90 tablet 3    levothyroxine (SYNTHROID) 100 MCG tablet Take 1 tablet (100 mcg total) by mouth once daily. 30 tablet 11    lisinopriL (PRINIVIL,ZESTRIL) 40 MG tablet Take 1 tablet (40 mg total) by mouth once daily. 90 tablet 3    multivitamin (THERAGRAN) per tablet Take 1 tablet by mouth once daily.      NIFEdipine (ADALAT CC) 60 MG  "TbSR Take 1 tablet (60 mg total) by mouth once daily. 90 tablet 3    omeprazole (PRILOSEC) 40 MG capsule TAKE 1 CAPSULE BY MOUTH  ONCE DAILY 30 capsule 11    ondansetron (ZOFRAN) 8 MG tablet Take 1 tablet (8 mg total) by mouth every 8 (eight) hours as needed for Nausea. 30 tablet 1    potassium chloride (MICRO-K) 10 MEQ CpSR TAKE 2 CAPSULES BY MOUTH  ONCE DAILY (Patient taking differently: Take 20 mEq by mouth once daily.) 180 capsule 3    promethazine-dextromethorphan (PROMETHAZINE-DM) 6.25-15 mg/5 mL Syrp Take 5 mLs by mouth 4 (four) times daily as needed. 180 mL 2    simvastatin (ZOCOR) 10 MG tablet Take 1 tablet (10 mg total) by mouth every evening. 90 tablet 3    temazepam (RESTORIL) 15 mg Cap TAKE 1 CAPSULE BY MOUTH AT  BEDTIME AS NEEDED FOR  INSOMNIA 90 capsule 1     No current facility-administered medications for this visit.       Review of Systems   Constitutional:  Negative for appetite change and unexpected weight change.   HENT:  Negative for mouth sores.    Eyes:  Negative for visual disturbance.   Respiratory:  Negative for cough and shortness of breath.    Cardiovascular:  Negative for chest pain.   Gastrointestinal:  Negative for abdominal pain and diarrhea.   Musculoskeletal:  Negative for back pain.   Skin:  Negative for rash.   Neurological:  Negative for headaches.   Hematological:  Negative for adenopathy.   Psychiatric/Behavioral:  The patient is not nervous/anxious.      ECOG Performance Status: 2   Objective:      Vitals:   Vitals:    12/02/22 1258   BP: 114/70   BP Location: Left arm   Patient Position: Sitting   BP Method: Medium (Manual)   Pulse: 90   Resp: 18   Temp: 97.4 °F (36.3 °C)   TempSrc: Temporal   SpO2: 95%   Weight: 92.5 kg (203 lb 14.8 oz)   Height: 5' 3" (1.6 m)       Physical Exam  Constitutional:       General: She is not in acute distress.     Appearance: She is well-developed. She is not diaphoretic.   HENT:      Head: Normocephalic and atraumatic.   Cardiovascular:      " Rate and Rhythm: Normal rate and regular rhythm.      Heart sounds: Normal heart sounds. No murmur heard.    No friction rub. No gallop.   Pulmonary:      Effort: Pulmonary effort is normal. No respiratory distress.      Breath sounds: Normal breath sounds. No wheezing or rales.   Chest:      Chest wall: No tenderness.   Abdominal:      General: Bowel sounds are normal. There is no distension.      Palpations: Abdomen is soft. There is no mass.      Tenderness: There is no abdominal tenderness. There is no guarding or rebound.   Lymphadenopathy:      Cervical: No cervical adenopathy.      Upper Body:      Right upper body: No supraclavicular or axillary adenopathy.      Left upper body: No supraclavicular or axillary adenopathy.   Skin:     Findings: No erythema or rash.   Neurological:      Mental Status: She is alert and oriented to person, place, and time.   Psychiatric:         Behavior: Behavior normal.       Laboratory Data:  Hospital Outpatient Visit on 11/29/2022   Component Date Value Ref Range Status    POC Glucose 11/29/2022 116 (A)  70 - 110 MG/DL Final   Lab Visit on 11/29/2022   Component Date Value Ref Range Status    WBC 11/29/2022 7.79  3.90 - 12.70 K/uL Final    RBC 11/29/2022 3.86 (L)  4.00 - 5.40 M/uL Final    Hemoglobin 11/29/2022 11.3 (L)  12.0 - 16.0 g/dL Final    Hematocrit 11/29/2022 34.9 (L)  37.0 - 48.5 % Final    MCV 11/29/2022 90  82 - 98 fL Final    MCH 11/29/2022 29.3  27.0 - 31.0 pg Final    MCHC 11/29/2022 32.4  32.0 - 36.0 g/dL Final    RDW 11/29/2022 15.0 (H)  11.5 - 14.5 % Final    Platelets 11/29/2022 169  150 - 450 K/uL Final    MPV 11/29/2022 10.0  9.2 - 12.9 fL Final    Immature Granulocytes 11/29/2022 0.8 (H)  0.0 - 0.5 % Final    Gran # (ANC) 11/29/2022 5.3  1.8 - 7.7 K/uL Final    Immature Grans (Abs) 11/29/2022 0.06 (H)  0.00 - 0.04 K/uL Final    Comment: Mild elevation in immature granulocytes is non specific and   can be seen in a variety of conditions including stress  response,   acute inflammation, trauma and pregnancy. Correlation with other   laboratory and clinical findings is essential.      Lymph # 11/29/2022 1.3  1.0 - 4.8 K/uL Final    Mono # 11/29/2022 0.7  0.3 - 1.0 K/uL Final    Eos # 11/29/2022 0.4  0.0 - 0.5 K/uL Final    Baso # 11/29/2022 0.09  0.00 - 0.20 K/uL Final    nRBC 11/29/2022 0  0 /100 WBC Final    Gran % 11/29/2022 67.6  38.0 - 73.0 % Final    Lymph % 11/29/2022 16.6 (L)  18.0 - 48.0 % Final    Mono % 11/29/2022 9.1  4.0 - 15.0 % Final    Eosinophil % 11/29/2022 4.7  0.0 - 8.0 % Final    Basophil % 11/29/2022 1.2  0.0 - 1.9 % Final    Differential Method 11/29/2022 Automated   Final    Sodium 11/29/2022 140  136 - 145 mmol/L Final    Potassium 11/29/2022 4.1  3.5 - 5.1 mmol/L Final    Chloride 11/29/2022 110  95 - 110 mmol/L Final    CO2 11/29/2022 20 (L)  23 - 29 mmol/L Final    Glucose 11/29/2022 109  70 - 110 mg/dL Final    BUN 11/29/2022 31 (H)  8 - 23 mg/dL Final    Creatinine 11/29/2022 1.3  0.5 - 1.4 mg/dL Final    Calcium 11/29/2022 8.8  8.7 - 10.5 mg/dL Final    Total Protein 11/29/2022 6.3  6.0 - 8.4 g/dL Final    Albumin 11/29/2022 3.2 (L)  3.5 - 5.2 g/dL Final    Total Bilirubin 11/29/2022 0.2  0.1 - 1.0 mg/dL Final    Comment: For infants and newborns, interpretation of results should be based  on gestational age, weight and in agreement with clinical  observations.    Premature Infant recommended reference ranges:  Up to 24 hours.............<8.0 mg/dL  Up to 48 hours............<12.0 mg/dL  3-5 days..................<15.0 mg/dL  6-29 days.................<15.0 mg/dL      Alkaline Phosphatase 11/29/2022 93  55 - 135 U/L Final    AST 11/29/2022 16  10 - 40 U/L Final    ALT 11/29/2022 <5 (L)  10 - 44 U/L Final    Anion Gap 11/29/2022 10  8 - 16 mmol/L Final    eGFR 11/29/2022 41.6 (A)  >60 mL/min/1.73 m^2 Final    TSH 11/29/2022 1.072  0.400 - 4.000 uIU/mL Final         Imaging:    PET/CT 11/29/22  Head/neck:     No significant abnormal  hypermetabolic foci are identified in the head and neck region.  No lymphadenopathy is present.     Chest:     The irregular hypermetabolic right upper lobe apical tumor is unchanged in size and appearance.  On image 59 the tumor measures 1.6 x 1.2 cm and has a max SUV of 3.6 compared to 3.8 previously.  Masslike hypermetabolic adjacent subpleural right upper lobe consolidation has diminished in extent since the prior study and on image 63 has a max SUV of 3.6 compared to 2.6 previously.  A linear zone of hypermetabolic atelectasis in the superior segment of the left lower lobe posteriorly is essentially unchanged and has a max SUV of 3.0 compared to 2.7 previously.  There is a new poorly metabolic ill-defined infiltrate in the posterior right lower lobe with a max SUV of 2.4.  Infectious or inflammatory etiologies are suspected.  A 4 mm left upper lobe nodule on image 68 is unchanged in size and appearance.  A hypermetabolic precarinal mediastinal lymph node is unchanged in size and has a short axis diameter of 1.0 cm on image 76 with a max SUV of 8.7 compared to 9.7 previously.  A hypermetabolic aortopulmonary lymph node on image 68 is unchanged in size and measures 1.1 x 1.1 cm with a max SUV of 8.9 compared to 6.4 previously.     Abdomen/pelvis:     No significant abnormal hypermetabolic foci are identified within the abdomen and pelvis.  No lymphadenopathy is present.  The adrenal glands are normal.     Skeleton:     No significant abnormal hypermetabolic foci are identified within the skeleton.  There are no findings to suggest osseous neoplastic disease.        Assessment:       1. Malignant neoplasm of right upper lobe of lung    2. Other specified disorders involving the immune mechanism, not elsewhere classified    3. Pneumonia of right lower lobe due to infectious organism    4. Follicular lymphoma grade I, unspecified body region    5. Right renal mass    6. Hypertension, unspecified type    7.  Hyperlipidemia, unspecified hyperlipidemia type    8. Acquired hypothyroidism    9. Insomnia, unspecified type             Plan:     NSCLC - Pt has NSCLC SCC with involvement of the right lung and mediastinum  -Pt with prior radiation to a RUL nodule  -PD-L1 was 86% on 6/18/21  -Pt currently on Keytruda with 6 week dosing  -PET/CT on 9/23/22 with possible pseudoprogression  -PET/CT on 11/29/22 shows stable disease  -New RLL infiltrate is resolving PNA  -Will continue current treatment wit next cycle 12/21/22    Pneumonia - pt with RLL PNA and completed Augmentin for 10 days  -Pt to see pulmonary given PET/CT showed fluid/mucous fills the right lower lobe bronchial tree which was also seen on prior bronch  -Repeat PET/CT 11/29/22 showed resolving RLL infiltrate    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 for 9/12 cycles with good response  -Will monitor    Right Renal Mass - Pt with right renal mass seen on PET/CT 9/23/22 wich is enlarging  -No mention of renal mass on recent PET/CT  -Will monitor and consider biopsy in future    HTN - pt on lisinopril and nifedipine  -BP controlled  -Will monitor    HLD - Pt on statin   -Mangement per PCP    Hypothyroidism - pt on levothyroxine  -Management per PCP    Insomnia - pt on temazepam  -Controlled  -Will monitor    Route Chart for Scheduling    Med Onc Chart Routing      Follow up with physician 3 weeks. the patient needs a CBC, CMP and TSH on 12/21/22 with an appt with me and treatment.   Follow up with DAYAMI    Infusion scheduling note    Injection scheduling note    Labs    Imaging    Pharmacy appointment    Other referrals        Treatment Plan Information   OP PEMBROLIZUMAB 400MG Q6W   Chad Mills MD   Upcoming Treatment Dates - OP PEMBROLIZUMAB 400MG Q6W    12/21/2022       Pre-Medications       acetaminophen tablet 1,000 mg       diphenhydrAMINE (BENADRYL) 25 mg in NS 50 mL IVPB       Chemotherapy       pembrolizumab  (KEYTRUDA) 400 mg in sodium chloride 0.9% 116 mL infusion  2/1/2023       Pre-Medications       acetaminophen tablet 1,000 mg       diphenhydrAMINE (BENADRYL) 25 mg in NS 50 mL IVPB       Chemotherapy       pembrolizumab (KEYTRUDA) 400 mg in sodium chloride 0.9% 116 mL infusion  3/15/2023       Pre-Medications       acetaminophen tablet 1,000 mg       diphenhydrAMINE (BENADRYL) 25 mg in NS 50 mL IVPB       Chemotherapy       pembrolizumab (KEYTRUDA) 400 mg in sodium chloride 0.9% 116 mL infusion  4/26/2023       Pre-Medications       acetaminophen tablet 1,000 mg       diphenhydrAMINE (BENADRYL) 25 mg in NS 50 mL IVPB       Chemotherapy       pembrolizumab (KEYTRUDA) 400 mg in sodium chloride 0.9% 116 mL infusion    Therapy Plan Information  NATASHA (TIXAGEVIMAB/CILGAVIMAB)  diphenhydrAMINE capsule 25 mg  25 mg, Oral, PRN  predniSONE tablet 40 mg  40 mg, Oral, PRN  EPINEPHrine (EPIPEN) 0.3 mg/0.3 mL pen injection 0.3 mg  0.3 mg, Intramuscular, PRN  ondansetron disintegrating tablet 4 mg  4 mg, Oral, PRN  acetaminophen tablet 650 mg  650 mg, Oral, PRN  albuterol inhaler 2 puff  2 puff, Inhalation, PRN    INF PEGFILGRASTIM (NEULASTA)  pegfilgrastim injection 6 mg  6 mg, Subcutaneous, Every visit      Chad Mills MD  [facility]  Hematology and Oncology  [facility] [address] [place] [address]   O: [phone]  F: [phone]

## 2022-12-21 ENCOUNTER — INFUSION (OUTPATIENT)
Dept: INFUSION THERAPY | Facility: HOSPITAL | Age: 82
End: 2022-12-21
Attending: INTERNAL MEDICINE
Payer: MEDICARE

## 2022-12-21 ENCOUNTER — OFFICE VISIT (OUTPATIENT)
Dept: HEMATOLOGY/ONCOLOGY | Facility: CLINIC | Age: 82
End: 2022-12-21
Payer: MEDICARE

## 2022-12-21 VITALS
OXYGEN SATURATION: 95 % | HEART RATE: 79 BPM | WEIGHT: 202.38 LBS | HEIGHT: 63 IN | DIASTOLIC BLOOD PRESSURE: 62 MMHG | SYSTOLIC BLOOD PRESSURE: 111 MMHG | RESPIRATION RATE: 16 BRPM | BODY MASS INDEX: 35.86 KG/M2

## 2022-12-21 VITALS
OXYGEN SATURATION: 95 % | SYSTOLIC BLOOD PRESSURE: 117 MMHG | BODY MASS INDEX: 35.86 KG/M2 | TEMPERATURE: 96 F | DIASTOLIC BLOOD PRESSURE: 79 MMHG | HEIGHT: 63 IN | RESPIRATION RATE: 18 BRPM | WEIGHT: 202.38 LBS | HEART RATE: 96 BPM

## 2022-12-21 DIAGNOSIS — J18.9 PNEUMONIA OF RIGHT LOWER LOBE DUE TO INFECTIOUS ORGANISM: ICD-10-CM

## 2022-12-21 DIAGNOSIS — I10 HYPERTENSION, UNSPECIFIED TYPE: ICD-10-CM

## 2022-12-21 DIAGNOSIS — C82.00 FOLLICULAR LYMPHOMA GRADE I, UNSPECIFIED BODY REGION: ICD-10-CM

## 2022-12-21 DIAGNOSIS — N28.89 RIGHT RENAL MASS: ICD-10-CM

## 2022-12-21 DIAGNOSIS — C34.11 MALIGNANT NEOPLASM OF RIGHT UPPER LOBE OF LUNG: Primary | ICD-10-CM

## 2022-12-21 DIAGNOSIS — C34.32 PRIMARY MALIGNANT NEOPLASM OF LEFT LOWER LOBE OF LUNG: Primary | ICD-10-CM

## 2022-12-21 DIAGNOSIS — E03.9 ACQUIRED HYPOTHYROIDISM: ICD-10-CM

## 2022-12-21 DIAGNOSIS — G47.00 INSOMNIA, UNSPECIFIED TYPE: ICD-10-CM

## 2022-12-21 PROCEDURE — 3078F PR MOST RECENT DIASTOLIC BLOOD PRESSURE < 80 MM HG: ICD-10-PCS | Mod: CPTII,S$GLB,,

## 2022-12-21 PROCEDURE — 99999 PR PBB SHADOW E&M-EST. PATIENT-LVL IV: CPT | Mod: PBBFAC,,,

## 2022-12-21 PROCEDURE — 99214 OFFICE O/P EST MOD 30 MIN: CPT | Mod: S$GLB,,,

## 2022-12-21 PROCEDURE — 3288F FALL RISK ASSESSMENT DOCD: CPT | Mod: CPTII,S$GLB,,

## 2022-12-21 PROCEDURE — A4216 STERILE WATER/SALINE, 10 ML: HCPCS | Mod: PN

## 2022-12-21 PROCEDURE — 63600175 PHARM REV CODE 636 W HCPCS: Mod: PN

## 2022-12-21 PROCEDURE — 3288F PR FALLS RISK ASSESSMENT DOCUMENTED: ICD-10-PCS | Mod: CPTII,S$GLB,,

## 2022-12-21 PROCEDURE — 96413 CHEMO IV INFUSION 1 HR: CPT | Mod: PN

## 2022-12-21 PROCEDURE — 99999 PR PBB SHADOW E&M-EST. PATIENT-LVL IV: ICD-10-PCS | Mod: PBBFAC,,,

## 2022-12-21 PROCEDURE — 3074F PR MOST RECENT SYSTOLIC BLOOD PRESSURE < 130 MM HG: ICD-10-PCS | Mod: CPTII,S$GLB,,

## 2022-12-21 PROCEDURE — 1101F PR PT FALLS ASSESS DOC 0-1 FALLS W/OUT INJ PAST YR: ICD-10-PCS | Mod: CPTII,S$GLB,,

## 2022-12-21 PROCEDURE — 96367 TX/PROPH/DG ADDL SEQ IV INF: CPT | Mod: PN

## 2022-12-21 PROCEDURE — 1101F PT FALLS ASSESS-DOCD LE1/YR: CPT | Mod: CPTII,S$GLB,,

## 2022-12-21 PROCEDURE — 99214 PR OFFICE/OUTPT VISIT, EST, LEVL IV, 30-39 MIN: ICD-10-PCS | Mod: S$GLB,,,

## 2022-12-21 PROCEDURE — 1126F PR PAIN SEVERITY QUANTIFIED, NO PAIN PRESENT: ICD-10-PCS | Mod: CPTII,S$GLB,,

## 2022-12-21 PROCEDURE — 25000003 PHARM REV CODE 250: Mod: PN

## 2022-12-21 PROCEDURE — 3074F SYST BP LT 130 MM HG: CPT | Mod: CPTII,S$GLB,,

## 2022-12-21 PROCEDURE — 3078F DIAST BP <80 MM HG: CPT | Mod: CPTII,S$GLB,,

## 2022-12-21 PROCEDURE — 1126F AMNT PAIN NOTED NONE PRSNT: CPT | Mod: CPTII,S$GLB,,

## 2022-12-21 RX ORDER — SODIUM CHLORIDE 0.9 % (FLUSH) 0.9 %
10 SYRINGE (ML) INJECTION
Status: DISCONTINUED | OUTPATIENT
Start: 2022-12-21 | End: 2022-12-21 | Stop reason: HOSPADM

## 2022-12-21 RX ORDER — ACETAMINOPHEN 500 MG
1000 TABLET ORAL
Status: COMPLETED | OUTPATIENT
Start: 2022-12-21 | End: 2022-12-21

## 2022-12-21 RX ORDER — SODIUM CHLORIDE 0.9 % (FLUSH) 0.9 %
10 SYRINGE (ML) INJECTION
Status: CANCELLED | OUTPATIENT
Start: 2022-12-21

## 2022-12-21 RX ORDER — HEPARIN 100 UNIT/ML
500 SYRINGE INTRAVENOUS
Status: DISCONTINUED | OUTPATIENT
Start: 2022-12-21 | End: 2022-12-21 | Stop reason: HOSPADM

## 2022-12-21 RX ORDER — EPINEPHRINE 0.3 MG/.3ML
0.3 INJECTION SUBCUTANEOUS ONCE AS NEEDED
Status: CANCELLED | OUTPATIENT
Start: 2022-12-21

## 2022-12-21 RX ORDER — DIPHENHYDRAMINE HYDROCHLORIDE 50 MG/ML
50 INJECTION INTRAMUSCULAR; INTRAVENOUS ONCE AS NEEDED
Status: CANCELLED | OUTPATIENT
Start: 2022-12-21

## 2022-12-21 RX ORDER — HEPARIN 100 UNIT/ML
500 SYRINGE INTRAVENOUS
Status: CANCELLED | OUTPATIENT
Start: 2022-12-21

## 2022-12-21 RX ORDER — ACETAMINOPHEN 500 MG
1000 TABLET ORAL
Status: CANCELLED
Start: 2022-12-21

## 2022-12-21 RX ADMIN — Medication 10 ML: at 01:12

## 2022-12-21 RX ADMIN — DIPHENHYDRAMINE HYDROCHLORIDE 25 MG: 50 INJECTION, SOLUTION INTRAMUSCULAR; INTRAVENOUS at 11:12

## 2022-12-21 RX ADMIN — ACETAMINOPHEN 1000 MG: 500 TABLET ORAL at 11:12

## 2022-12-21 RX ADMIN — SODIUM CHLORIDE: 9 INJECTION, SOLUTION INTRAVENOUS at 11:12

## 2022-12-21 RX ADMIN — SODIUM CHLORIDE 400 MG: 9 INJECTION, SOLUTION INTRAVENOUS at 12:12

## 2022-12-21 NOTE — PROGRESS NOTES
PATIENT: Shandra Velez  MRN: [id number]  DATE: 12/21/2022      Diagnosis:   1. Malignant neoplasm of right upper lobe of lung    2. Pneumonia of right lower lobe due to infectious organism    3. Follicular lymphoma grade I, unspecified body region    4. Right renal mass    5. Hypertension, unspecified type    6. Acquired hypothyroidism    7. Insomnia, unspecified type        Chief Complaint: Establish Care (hemonc)    Subjective:   HPI: Ms. Velez is a 82 y.o. female with HLD, HTN, osteopenia, follicular lymphoma who presents for follow up of NSCLC  She is here today in cosideration of C5 Keytruda; she is on every 6 week dosing.   Patient has been following with pulmonology as well, had PFTs last week. She reports that she does note continued improvement in SOB, not requiring supplemental O2.  Denies HA, CP, cough, abd pain, changes in bowel habits, N/V, dysuria. She denies rashes, fevers, chills, new lumps or bumps.     Oncologic History:   5/25/20 LN biopsy showing grade 1 follicular lymphoma.    06/24/2020 BMBX showed involvement with follicular lymphoma.    7/07/20 PET/CT showed lymphadenopathy above and below the diaphragm as well as the spleen.  Also seen wasa RUL lesion.  The patient was started on CVP-R followed by maintenance rituxan every 8 weeks.    6/18/21 Bx RUL showed SCC with PD-L1 at 86%.    7/09/21 EBUS showed no malignant cells at station 7, 4R or 11RS.    The patient was treated with SBRT under the care of Dr Barnes with 50Gy in 4 fractions completed 10/28/21.      6/2/2022 CT showed newly developed pulmonary nodules    6/16/22 EBUS showed positive SCC in 4R LN.      Pt was discussed at tumor board on 6/21/22 with recommendation for Keytruda.      9/23/22 PET/CT after 2 doses of treatment showing a 1.5 cm precarinal lymph node; stable right apical mass measuring 1.8 x 1.5 cm; stable 8 mm right apical lung nodule with adjacent post radiation change measuring 2 x 2.7 cm; stable 4 mm subpleural nodule  in the lateral right upper lobe; fluid/mucus in the right lower lobe bronchus tree; 6 mm anterior left upper lobe nodule which is new; 2.3 cm simple cyst in the midpole of the left kidney; 3.1 x 2.3 cm rim enhancing complex cystic and solid mass in the mid to lower pole the right kidney; infrarenal abdominal aortic aneurysm measuring 3.8 x 3.5 cm; and stable a left para-aortic lymph node measuring 11 mm.     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  11/29/2022 PET/CT showing an irregular hypermetabolic right upper lobe tumor which is stable measuring 1.6 x 1.2 cm; hypermetabolic subpleural right upper lobe consolidation diminished in extent; unchanged linear zone of hypermetabolic atelectasis in the superior segment of the left lower lobe; new poorly metabolic ill-defined infiltrate in the posterior right lower lobe; 4 mm left upper lobe nodule which is stable; hypermetabolic precarinal mediastinal lymph node stable measuring 1 cm; and hypermetabolic aortopulmonary lymph node stable measuring 1.1 x 1.1 cm.    Oncology History   Follicular lymphoma   10/7/2020 Initial Diagnosis    Follicular lymphoma     11/5/2020 - 5/26/2022 Chemotherapy    Treatment Summary   Plan Name: OP CVP-R Q3W-RUXIENCE-VINCRIS-CYCLOPHOS  Treatment Goal: Palliative  Status: Inactive  Start Date: 11/5/2020  End Date: 5/26/2022  Provider: Rod Montero MD  Chemotherapy: predniSONE (DELTASONE) 50 MG Tab, 100 mg (100 % of original dose 100 mg), Oral, Daily, 6 of 6 cycles  Dose modification: 100 mg (original dose 100 mg, Cycle 1)  vinCRIStine (ONCOVIN) 2 mg in sodium chloride 0.9% 50 mL chemo infusion, 2 mg (100 % of original dose 2 mg), Intravenous, [facility] 1 time, 6 of 6 cycles  Dose modification: 2 mg (original dose 2 mg, Cycle 1)  Administration: 2 mg (11/5/2020), 2 mg (11/25/2020), 2 mg (12/17/2020), 2 mg (1/7/2021), 2 mg (1/28/2021), 2 mg  (2/18/2021)  cyclophosphamide (CYTOXAN) 750 mg/m2 = 1,500 mg in sodium chloride 0.9% 250 mL chemo infusion, 750 mg/m2 = 1,500 mg, Intravenous, [facility] 1 time, 6 of 6 cycles  Administration: 1,500 mg (11/5/2020), 1,600 mg (11/25/2020), 1,500 mg (12/17/2020), 1,500 mg (1/7/2021), 1,500 mg (1/28/2021), 1,500 mg (2/18/2021)  rituximab-pvvr (RUXIENCE) 700 mg in sodium chloride 0.9% 700 mL infusion (conc: 1 mg/mL), 750 mg, Intravenous, [facility] 1 time, 14 of 18 cycles  Administration: 700 mg (11/5/2020), 700 mg (11/25/2020), 750 mg (12/17/2020), 750 mg (1/7/2021), 700 mg (1/28/2021), 700 mg (2/18/2021), 700 mg (4/15/2021), 700 mg (6/10/2021), 700 mg (8/5/2021), 700 mg (9/30/2021), 700 mg (11/26/2021), 700 mg (1/20/2022), 700 mg (3/17/2022), 700 mg (5/26/2022)       7/6/2022 -  Chemotherapy    Treatment Summary   Plan Name: OP PEMBROLIZUMAB 400MG Q6W  Treatment Goal: Palliative  Status: Active  Start Date: 7/6/2022  End Date: 5/8/2024 (Planned)  Provider: Chad Mills MD  Chemotherapy: [No matching medication found in this treatment plan]       Primary malignant neoplasm of left lower lobe of lung   10/7/2021 Initial Diagnosis    Primary malignant neoplasm of left lower lobe of lung     7/6/2022 -  Chemotherapy    Treatment Summary   Plan Name: OP PEMBROLIZUMAB 400MG Q6W  Treatment Goal: Palliative  Status: Active  Start Date: 7/6/2022  End Date: 5/8/2024 (Planned)  Provider: Chad Mills MD  Chemotherapy: [No matching medication found in this treatment plan]          Past Medical History:   Past Medical History:   Diagnosis Date    Breast cancer 1985    right mastectomy    Digestive disorder     Encounter for blood transfusion     History of pneumonia     Hypercholesterolemia     Hypertension     Indigestion     Lumbar spondylosis     Lung cancer     2022 - currently in treatment as of 7/27/22    Lymphoma     Osteopenia     Pulmonary nodule     Smoker 06/11/2022       Past Surgical HIstory:   Past Surgical  History:   Procedure Laterality Date    APPENDECTOMY      BONE MARROW BIOPSY Bilateral 06/24/2020    Procedure: Biopsy-bone marrow;  Surgeon: Rod Montero MD;  Location: [facility] OR;  Service: Oncology;  Laterality: Bilateral;    BREAST RECONSTRUCTION  1990    right    CATARACT EXTRACTION W/  INTRAOCULAR LENS IMPLANT Bilateral     CHOLECYSTECTOMY      ENDOBRONCHIAL ULTRASOUND Bilateral 07/09/2021    Procedure: ENDOBRONCHIAL ULTRASOUND (EBUS);  Surgeon: Gregorio Kinney MD;  Location: [facility];  Service: Pulmonary;  Laterality: Bilateral;  NEED LMA to  eval right upper paratracheal LN.     ENDOBRONCHIAL ULTRASOUND Bilateral 6/16/2022    Procedure: ENDOBRONCHIAL ULTRASOUND (EBUS);  Surgeon: Gregorio Kinney MD;  Location: [facility];  Service: Pulmonary;  Laterality: Bilateral;    INJECTION OF FACET JOINT Left 6/30/2022    Procedure: FACET JOINT Cyst Aspiration L3/4;  Surgeon: Ronnell Baeza MD;  Location: [facility] OR;  Service: Pain Management;  Laterality: Left;    INSERTION OF TUNNELED CENTRAL VENOUS CATHETER (CVC) WITH SUBCUTANEOUS PORT Left 11/04/2020    Procedure: [id number];  Surgeon: Kody Pretty MD;  Location: [facility] OR;  Service: General;  Laterality: Left;    JOINT REPLACEMENT  2008    right knee     LUMBAR LAMINECTOMY      LYMPH NODE BIOPSY      MASTECTOMY Right 1985    NEEDLE LOCALIZATION N/A 05/25/2020    Procedure: NEEDLE LOCALIZATION - lymph node bx;  Surgeon: Steve Weaver MD;  Location: [facility];  Service: Interventional Radiology;  Laterality: N/A;    TRANSFORAMINAL EPIDURAL INJECTION OF STEROID Left 05/14/2020    Procedure: Injection,steroid,epidural,transforaminal approach, l3/4;  Surgeon: Ronnell Baeza MD;  Location: [facility] OR;  Service: Pain Management;  Laterality: Left;    TRANSFORAMINAL EPIDURAL INJECTION OF STEROID Left 03/23/2022    Procedure: Injection,steroid,epidural,transforaminal approach L3/4;  Surgeon: Ronnell Baeza MD;  Location: [facility] OR;  Service: Pain  Management;  Laterality: Left;    TRANSFORAMINAL EPIDURAL INJECTION OF STEROID Left 8/1/2022    Procedure: Injection,steroid,epidural,transforaminal approach L3/4;  Surgeon: Ronnell Baeza MD;  Location: [facility] OR;  Service: Pain Management;  Laterality: Left;    TUBAL LIGATION      UMBILICAL HERNIA REPAIR         Family History:   Family History   Problem Relation Age of Onset    Cancer Sister         uterine    Diabetes Brother     Cancer Sister         Uterine cancer     Breast cancer Other 42       Social History:  reports that she quit smoking about 6 months ago. Her smoking use included cigarettes. She has a 26.50 pack-year smoking history. She has never used smokeless tobacco. She reports that she does not drink alcohol and does not use drugs.    Allergies:  Review of patient's allergies indicates:  No Known Allergies    Medications:  Current Outpatient Medications   Medication Sig Dispense Refill    acetaminophen (TYLENOL) 325 MG tablet Take 2 tablets (650 mg total) by mouth every 4 (four) hours as needed (pain score 1-2/10).  0    albuterol (VENTOLIN HFA) 90 mcg/actuation inhaler Inhale 2 puffs into the lungs every 6 (six) hours as needed for Wheezing or Shortness of Breath. Rescue 8 g 3    cefUROXime (CEFTIN) 500 MG tablet Take 500 mg by mouth 2 (two) times daily.      cetirizine (ZYRTEC) 10 MG tablet Take 10 mg by mouth once daily. Every day      cholecalciferol, vitamin D3, 1,250 mcg (50,000 unit) Tab Take 1 tablet by mouth every 7 days. 12 tablet 6    fluticasone propionate (FLONASE) 50 mcg/actuation nasal spray USE 1 SPRAY (50 MCG TOTAL) BY EACH NOSTRIL ROUTE ONCE DAILY. 32 g 3    fluticasone-umeclidin-vilanter (TRELEGY ELLIPTA) 200-62.5-25 mcg inhaler Inhale 1 puff into the lungs once daily. 60 each 11    hydroCHLOROthiazide (HYDRODIURIL) 25 MG tablet Take 1 tablet (25 mg total) by mouth once daily. 90 tablet 3    levothyroxine (SYNTHROID) 100 MCG tablet Take 1 tablet (100 mcg total) by mouth once  daily. 30 tablet 11    lisinopriL (PRINIVIL,ZESTRIL) 40 MG tablet Take 1 tablet (40 mg total) by mouth once daily. 90 tablet 3    multivitamin (THERAGRAN) per tablet Take 1 tablet by mouth once daily.      NIFEdipine (ADALAT CC) 60 MG TbSR Take 1 tablet (60 mg total) by mouth once daily. 90 tablet 3    omeprazole (PRILOSEC) 40 MG capsule TAKE 1 CAPSULE BY MOUTH  ONCE DAILY 30 capsule 11    ondansetron (ZOFRAN) 8 MG tablet Take 1 tablet (8 mg total) by mouth every 8 (eight) hours as needed for Nausea. 30 tablet 1    potassium chloride (MICRO-K) 10 MEQ CpSR TAKE 2 CAPSULES BY MOUTH  ONCE DAILY (Patient taking differently: Take 20 mEq by mouth once daily.) 180 capsule 3    promethazine-dextromethorphan (PROMETHAZINE-DM) 6.25-15 mg/5 mL Syrp Take 5 mLs by mouth 4 (four) times daily as needed. 180 mL 2    simvastatin (ZOCOR) 10 MG tablet Take 1 tablet (10 mg total) by mouth every evening. 90 tablet 3    temazepam (RESTORIL) 15 mg Cap TAKE 1 CAPSULE BY MOUTH AT  BEDTIME AS NEEDED FOR  INSOMNIA 90 capsule 1     No current facility-administered medications for this visit.     Facility-Administered Medications Ordered in Other Visits   Medication Dose Route Frequency Provider Last Rate Last Admin    diphenhydrAMINE (BENADRYL) 25 mg in NS 50 mL IVPB  25 mg Intravenous 1 time in [facility] Amrita Mcclain  mL/hr at 12/21/22 1146 25 mg at 12/21/22 1146    heparin, porcine (PF) 100 unit/mL injection flush 500 Units  500 Units Intravenous PRN Amrita Mcclain NP        pembrolizumab (KEYTRUDA) 400 mg in sodium chloride 0.9% SolP 131 mL infusion  400 mg Intravenous 1 time in [facility] Amrita Mcclain NP        sodium chloride 0.9% flush 10 mL  10 mL Intravenous PRN Amrita Mcclain NP           Review of Systems   Constitutional:  Negative for appetite change, chills, fatigue, fever and unexpected weight change.   HENT:  Negative for trouble swallowing.    Respiratory:  Positive for shortness of breath (Improved). Negative for cough.  "   Cardiovascular:  Negative for chest pain and palpitations.   Gastrointestinal:  Negative for abdominal pain, constipation, diarrhea, nausea and vomiting.   Genitourinary:  Negative for dysuria.   Musculoskeletal:  Negative for back pain.   Skin:  Negative for rash.   Neurological:  Negative for light-headedness and headaches.   Hematological:  Negative for adenopathy. Does not bruise/bleed easily.   Psychiatric/Behavioral:  The patient is not nervous/anxious.      ECOG Performance Status:   ECOG SCORE             Objective:      Vitals:   Vitals:    12/21/22 1037   BP: 117/79   BP Location: Left arm   Patient Position: Sitting   BP Method: Medium (Automatic)   Pulse: 96   Resp: 18   Temp: 96 °F (35.6 °C)   TempSrc: Temporal   SpO2: 95%   Weight: 91.8 kg (202 lb 6.1 oz)   Height: 5' 3" (1.6 m)     BMI: Body mass index is 35.85 kg/m².    Physical Exam  Vitals and nursing note reviewed.   Constitutional:       General: She is not in acute distress.     Appearance: She is not diaphoretic.   HENT:      Head: Normocephalic and atraumatic.      Mouth/Throat:      Mouth: Mucous membranes are moist.      Pharynx: No posterior oropharyngeal erythema.   Eyes:      General: No scleral icterus.  Cardiovascular:      Rate and Rhythm: Normal rate and regular rhythm.      Heart sounds: Normal heart sounds. No murmur heard.  Pulmonary:      Effort: Pulmonary effort is normal. No respiratory distress.      Breath sounds: No wheezing.   Abdominal:      General: Bowel sounds are normal. There is no distension.      Palpations: Abdomen is soft.      Tenderness: There is no abdominal tenderness.   Musculoskeletal:      Cervical back: No tenderness.      Right lower leg: No edema.      Left lower leg: No edema.   Lymphadenopathy:      Cervical: No cervical adenopathy.   Skin:     General: Skin is warm.      Coloration: Skin is not jaundiced.      Findings: No rash.   Neurological:      Mental Status: She is alert and oriented to " person, place, and time.   Psychiatric:         Behavior: Behavior normal.       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  The patient is status post radiation therapy and immunotherapy.     TECHNIQUE:  Following IV injection of 10.36 mCi F-18 FDG, 3D PET imaging was performed from the skull base to the mid thighs.  A noncontrast non breath hold CT was obtained in conjunction with the PET scan for attenuation correction and anatomic correlation.  PET-CT fusion images were generated.     COMPARISON:  09/23/2022     FINDINGS:  Head/neck:     No significant abnormal hypermetabolic foci are identified in the head and neck region.  No lymphadenopathy is present.     Chest:     The irregular hypermetabolic right upper lobe apical tumor is unchanged in size and appearance.  On image 59 the tumor measures 1.6 x 1.2 cm and has a max SUV of 3.6 compared to 3.8 previously.  Masslike hypermetabolic adjacent subpleural right upper lobe consolidation has diminished in extent since the prior study and on image 63 has a max SUV of 3.6 compared to 2.6 previously.  A linear zone of hypermetabolic atelectasis in the superior segment of the left lower lobe posteriorly is essentially unchanged and has a max SUV of 3.0 compared to 2.7 previously.  There is a new poorly metabolic ill-defined infiltrate in the posterior right lower lobe with a max SUV of 2.4.  Infectious or inflammatory etiologies are suspected.  A 4 mm left upper lobe nodule on image 68 is unchanged in size and appearance.  A hypermetabolic precarinal  mediastinal lymph node is unchanged in size and has a short axis diameter of 1.0 cm on image 76 with a max SUV of 8.7 compared to 9.7 previously.  A hypermetabolic aortopulmonary lymph node on image 68 is unchanged in size and measures 1.1 x 1.1 cm with a max SUV of 8.9 compared to 6.4 previously.     Abdomen/pelvis:     No significant abnormal hypermetabolic foci are identified within the abdomen and pelvis.  No lymphadenopathy is present.  The adrenal glands are normal.     Skeleton:     No significant abnormal hypermetabolic foci are identified within the skeleton.  There are no findings to suggest osseous neoplastic disease.     Impression:     1. No significant change in the hypermetabolic right upper lobe apical tumor.  Adjacent hypermetabolic masslike consolidation has decreased in extent.  2. Linear hypermetabolic atelectasis in the posterior aspect of the superior segment of the left lower lobe which is essentially unchanged.  3. Unchanged 4 mm left upper lobe nodule.  4. Unchanged hypermetabolic mediastinal lymphadenopathy.  5. New poorly metabolic posterior right lower lobe infiltrate which is most likely infectious or inflammatory in nature.        Electronically signed by: Omer Alarcon MD  Date:                                            11/29/2022  Time:                                           10:45   Assessment:       1. Malignant neoplasm of right upper lobe of lung    2. Pneumonia of right lower lobe due to infectious organism    3. Follicular lymphoma grade I, unspecified body region    4. Right renal mass    5. Hypertension, unspecified type    6. Acquired hypothyroidism    7. Insomnia, unspecified type         Plan:   NSCLC   -Pt has NSCLC SCC with involvement of the right lung and mediastinum  -Pt with prior radiation to a RUL nodule  -PD-L1 was 86% on 6/18/21  -Pt currently on Keytruda with 6 week dosing  -PET/CT on 9/23/22 with possible pseudoprogression  -PET/CT on 11/29/22 shows stable  disease  -New RLL infiltrate is resolving PNA  -Proceed with Keytruda cycle 5 today   -Follow up with Dr. Mills in 6 weeks with CBC, CMP, TSH prior to appointment      Pneumonia   -pt with RLL PNA and completed Augmentin for 10 days  -Pt to see pulmonary given PET/CT showed fluid/mucous fills the right lower lobe bronchial tree which was also seen on prior bronch  -Repeat PET/CT 11/29/22 showed resolving RLL infiltrate  -Improved symptoms      Follicular Lymphoma   -Pt previously treated with CVP-R with maintenance Rituxan for 9/12 cycles with good response  -Will monitor     Right Renal Mass   -Pt with right renal mass seen on PET/CT 9/23/22 wich is enlarging  -No mention of renal mass on 11/29/22  PET/CT  -Will monitor and consider biopsy in future     HTN   -pt on lisinopril and nifedipine  -BP controlled  -Will monitor     Hypothyroidism   -pt on levothyroxine  -Management per PCP     Insomnia   -pt on temazepam  -Controlled  -Will monitor    Patient queried and all questions were answered.  Assessment/Plan reviewed and approved by Dr. Mills     Route Chart for Scheduling    Med Onc Chart Routing      Follow up with physician 6 weeks. with labs prior to appointment with Dr Mills   Follow up with DAYAMI    Infusion scheduling note Proceed with Keytruda today   Injection scheduling note    Labs CBC, CMP and TSH   Lab interval:  Prior to appointment in 6 weeks   Imaging    Pharmacy appointment    Other referrals        Treatment Plan Information   OP PEMBROLIZUMAB 400MG Q6W   Chad Mills MD   Upcoming Treatment Dates - OP PEMBROLIZUMAB 400MG Q6W    2/1/2023       Pre-Medications       acetaminophen tablet 1,000 mg       diphenhydrAMINE (BENADRYL) 25 mg in NS 50 mL IVPB       Chemotherapy       pembrolizumab (KEYTRUDA) 400 mg in sodium chloride 0.9% SolP 116 mL infusion  3/15/2023       Pre-Medications       acetaminophen tablet 1,000 mg       diphenhydrAMINE (BENADRYL) 25 mg in NS 50 mL IVPB       Chemotherapy        pembrolizumab (KEYTRUDA) 400 mg in sodium chloride 0.9% SolP 116 mL infusion  4/26/2023       Pre-Medications       acetaminophen tablet 1,000 mg       diphenhydrAMINE (BENADRYL) 25 mg in NS 50 mL IVPB       Chemotherapy       pembrolizumab (KEYTRUDA) 400 mg in sodium chloride 0.9% SolP 116 mL infusion  6/7/2023       Pre-Medications       acetaminophen tablet 1,000 mg       diphenhydrAMINE (BENADRYL) 25 mg in NS 50 mL IVPB       Chemotherapy       pembrolizumab (KEYTRUDA) 400 mg in sodium chloride 0.9% SolP 116 mL infusion    Therapy Plan Information  EVUSHELD (TIXAGEVIMAB/CILGAVIMAB)  diphenhydrAMINE capsule 25 mg  25 mg, Oral, PRN  predniSONE tablet 40 mg  40 mg, Oral, PRN  EPINEPHrine (EPIPEN) 0.3 mg/0.3 mL pen injection 0.3 mg  0.3 mg, Intramuscular, PRN  ondansetron disintegrating tablet 4 mg  4 mg, Oral, PRN  acetaminophen tablet 650 mg  650 mg, Oral, PRN  albuterol inhaler 2 puff  2 puff, Inhalation, PRN    INF PEGFILGRASTIM (NEULASTA)  pegfilgrastim injection 6 mg  6 mg, Subcutaneous, Every visit

## 2022-12-21 NOTE — PLAN OF CARE
Pt tolerated Keytruda infusion well. No s/s of infusion reaction noted.  Instructed to call MD with any problems.

## 2023-01-09 ENCOUNTER — PATIENT MESSAGE (OUTPATIENT)
Dept: FAMILY MEDICINE | Facility: CLINIC | Age: 83
End: 2023-01-09
Payer: MEDICARE

## 2023-01-09 NOTE — TELEPHONE ENCOUNTER
Spoke to pt scheduling her an appt on 01/10/2023 to have symptoms evaluated. Pt verbalized understanding.

## 2023-01-10 ENCOUNTER — OFFICE VISIT (OUTPATIENT)
Dept: FAMILY MEDICINE | Facility: CLINIC | Age: 83
End: 2023-01-10
Payer: MEDICARE

## 2023-01-10 VITALS
TEMPERATURE: 97 F | SYSTOLIC BLOOD PRESSURE: 114 MMHG | OXYGEN SATURATION: 95 % | DIASTOLIC BLOOD PRESSURE: 74 MMHG | WEIGHT: 199.19 LBS | BODY MASS INDEX: 35.29 KG/M2 | HEART RATE: 88 BPM | HEIGHT: 63 IN

## 2023-01-10 DIAGNOSIS — I77.819 ECTATIC AORTA: ICD-10-CM

## 2023-01-10 DIAGNOSIS — D89.89 OTHER SPECIFIED DISORDERS INVOLVING THE IMMUNE MECHANISM, NOT ELSEWHERE CLASSIFIED: ICD-10-CM

## 2023-01-10 DIAGNOSIS — C34.11 MALIGNANT NEOPLASM OF RIGHT UPPER LOBE OF LUNG: ICD-10-CM

## 2023-01-10 DIAGNOSIS — J43.2 CENTRILOBULAR EMPHYSEMA: ICD-10-CM

## 2023-01-10 DIAGNOSIS — R05.8 COUGH WITH SPUTUM: Primary | ICD-10-CM

## 2023-01-10 DIAGNOSIS — N18.32 STAGE 3B CHRONIC KIDNEY DISEASE: ICD-10-CM

## 2023-01-10 DIAGNOSIS — E66.01 MORBID (SEVERE) OBESITY DUE TO EXCESS CALORIES: ICD-10-CM

## 2023-01-10 PROCEDURE — 99214 PR OFFICE/OUTPT VISIT, EST, LEVL IV, 30-39 MIN: ICD-10-PCS | Mod: S$GLB,,, | Performed by: FAMILY MEDICINE

## 2023-01-10 PROCEDURE — 3074F SYST BP LT 130 MM HG: CPT | Mod: CPTII,S$GLB,, | Performed by: FAMILY MEDICINE

## 2023-01-10 PROCEDURE — 3078F DIAST BP <80 MM HG: CPT | Mod: CPTII,S$GLB,, | Performed by: FAMILY MEDICINE

## 2023-01-10 PROCEDURE — 1159F PR MEDICATION LIST DOCUMENTED IN MEDICAL RECORD: ICD-10-PCS | Mod: CPTII,S$GLB,, | Performed by: FAMILY MEDICINE

## 2023-01-10 PROCEDURE — 1160F PR REVIEW ALL MEDS BY PRESCRIBER/CLIN PHARMACIST DOCUMENTED: ICD-10-PCS | Mod: CPTII,S$GLB,, | Performed by: FAMILY MEDICINE

## 2023-01-10 PROCEDURE — 1159F MED LIST DOCD IN RCRD: CPT | Mod: CPTII,S$GLB,, | Performed by: FAMILY MEDICINE

## 2023-01-10 PROCEDURE — 99999 PR PBB SHADOW E&M-EST. PATIENT-LVL IV: CPT | Mod: PBBFAC,,, | Performed by: FAMILY MEDICINE

## 2023-01-10 PROCEDURE — 1126F PR PAIN SEVERITY QUANTIFIED, NO PAIN PRESENT: ICD-10-PCS | Mod: CPTII,S$GLB,, | Performed by: FAMILY MEDICINE

## 2023-01-10 PROCEDURE — 3288F PR FALLS RISK ASSESSMENT DOCUMENTED: ICD-10-PCS | Mod: CPTII,S$GLB,, | Performed by: FAMILY MEDICINE

## 2023-01-10 PROCEDURE — 99999 PR PBB SHADOW E&M-EST. PATIENT-LVL IV: ICD-10-PCS | Mod: PBBFAC,,, | Performed by: FAMILY MEDICINE

## 2023-01-10 PROCEDURE — 99214 OFFICE O/P EST MOD 30 MIN: CPT | Mod: S$GLB,,, | Performed by: FAMILY MEDICINE

## 2023-01-10 PROCEDURE — 1101F PT FALLS ASSESS-DOCD LE1/YR: CPT | Mod: CPTII,S$GLB,, | Performed by: FAMILY MEDICINE

## 2023-01-10 PROCEDURE — 3078F PR MOST RECENT DIASTOLIC BLOOD PRESSURE < 80 MM HG: ICD-10-PCS | Mod: CPTII,S$GLB,, | Performed by: FAMILY MEDICINE

## 2023-01-10 PROCEDURE — 3074F PR MOST RECENT SYSTOLIC BLOOD PRESSURE < 130 MM HG: ICD-10-PCS | Mod: CPTII,S$GLB,, | Performed by: FAMILY MEDICINE

## 2023-01-10 PROCEDURE — 3288F FALL RISK ASSESSMENT DOCD: CPT | Mod: CPTII,S$GLB,, | Performed by: FAMILY MEDICINE

## 2023-01-10 PROCEDURE — 1160F RVW MEDS BY RX/DR IN RCRD: CPT | Mod: CPTII,S$GLB,, | Performed by: FAMILY MEDICINE

## 2023-01-10 PROCEDURE — 1126F AMNT PAIN NOTED NONE PRSNT: CPT | Mod: CPTII,S$GLB,, | Performed by: FAMILY MEDICINE

## 2023-01-10 PROCEDURE — 1101F PR PT FALLS ASSESS DOC 0-1 FALLS W/OUT INJ PAST YR: ICD-10-PCS | Mod: CPTII,S$GLB,, | Performed by: FAMILY MEDICINE

## 2023-01-10 NOTE — PROGRESS NOTES
Subjective:       Patient ID: Shandra Velez is a 82 y.o. female.    Chief Complaint: Shortness of Breath (cough)    C/o cough with green sputum for the last 2 months.  + SOB with exertion.  No fever  She has taken multiple antibiotics in the last 4 months (ceftin, Avelox, Levaquin) without relief.  Recent sputum culture showed Moraxella pneumonia. She was treated with Levaquin after that.      Following with oncology, pulmonology.  She was most recentyl prescribed Levaquin 1 month ago.  She had PFTS an sputum culture.      Past Medical History:   Diagnosis Date    Breast cancer 1985    right mastectomy    Digestive disorder     Encounter for blood transfusion     History of pneumonia     Hypercholesterolemia     Hypertension     Indigestion     Lumbar spondylosis     Lung cancer     2022 - currently in treatment as of 7/27/22    Lymphoma     Osteopenia     Pulmonary nodule     Smoker 06/11/2022       Past Surgical History:   Procedure Laterality Date    APPENDECTOMY      BONE MARROW BIOPSY Bilateral 06/24/2020    Procedure: Biopsy-bone marrow;  Surgeon: Rod Montero MD;  Location: [facility] OR;  Service: Oncology;  Laterality: Bilateral;    BREAST RECONSTRUCTION  1990    right    CATARACT EXTRACTION W/  INTRAOCULAR LENS IMPLANT Bilateral     CHOLECYSTECTOMY      ENDOBRONCHIAL ULTRASOUND Bilateral 07/09/2021    Procedure: ENDOBRONCHIAL ULTRASOUND (EBUS);  Surgeon: Gregorio Kinney MD;  Location: [facility];  Service: Pulmonary;  Laterality: Bilateral;  NEED LMA to  eval right upper paratracheal LN.     ENDOBRONCHIAL ULTRASOUND Bilateral 6/16/2022    Procedure: ENDOBRONCHIAL ULTRASOUND (EBUS);  Surgeon: Gregorio Kinney MD;  Location: [facility];  Service: Pulmonary;  Laterality: Bilateral;    INJECTION OF FACET JOINT Left 6/30/2022    Procedure: FACET JOINT Cyst Aspiration L3/4;  Surgeon: Ronnell Baeza MD;  Location: [facility] OR;  Service: Pain Management;  Laterality: Left;    INSERTION OF TUNNELED CENTRAL VENOUS  CATHETER (CVC) WITH SUBCUTANEOUS PORT Left 2020    Procedure: [id number];  Surgeon: Kody Pretty MD;  Location: [facility] OR;  Service: General;  Laterality: Left;    JOINT REPLACEMENT  2008    right knee     LUMBAR LAMINECTOMY      LYMPH NODE BIOPSY      MASTECTOMY Right 1985    NEEDLE LOCALIZATION N/A 2020    Procedure: NEEDLE LOCALIZATION - lymph node bx;  Surgeon: Steve Weaver MD;  Location: [facility] CATH;  Service: Interventional Radiology;  Laterality: N/A;    TRANSFORAMINAL EPIDURAL INJECTION OF STEROID Left 2020    Procedure: Injection,steroid,epidural,transforaminal approach, l3/4;  Surgeon: Ronnell Baeza MD;  Location: [facility] OR;  Service: Pain Management;  Laterality: Left;    TRANSFORAMINAL EPIDURAL INJECTION OF STEROID Left 2022    Procedure: Injection,steroid,epidural,transforaminal approach L3/4;  Surgeon: Ronnell Baeza MD;  Location: [facility] OR;  Service: Pain Management;  Laterality: Left;    TRANSFORAMINAL EPIDURAL INJECTION OF STEROID Left 2022    Procedure: Injection,steroid,epidural,transforaminal approach L3/4;  Surgeon: Ronnlel Baeza MD;  Location: [facility] OR;  Service: Pain Management;  Laterality: Left;    TUBAL LIGATION      UMBILICAL HERNIA REPAIR         Review of patient's allergies indicates:  No Known Allergies    Social History     Socioeconomic History    Marital status:     Number of children: 3   Occupational History    Occupation: retired  for [employer]   Tobacco Use    Smoking status: Former     Packs/day: 0.50     Years: 53.00     Pack years: 26.50     Types: Cigarettes     Quit date: 2022     Years since quittin.6    Smokeless tobacco: Never   Substance and Sexual Activity    Alcohol use: No    Drug use: No    Sexual activity: Not Currently   Social History Narrative    Lives alone        Hobbies: skyler        From [place]           Current Outpatient Medications on File Prior to Visit   Medication  Sig Dispense Refill    acetaminophen (TYLENOL) 325 MG tablet Take 2 tablets (650 mg total) by mouth every 4 (four) hours as needed (pain score 1-2/10).  0    albuterol (VENTOLIN HFA) 90 mcg/actuation inhaler Inhale 2 puffs into the lungs every 6 (six) hours as needed for Wheezing or Shortness of Breath. Rescue 8 g 3    cefUROXime (CEFTIN) 500 MG tablet Take 500 mg by mouth 2 (two) times daily.      cetirizine (ZYRTEC) 10 MG tablet Take 10 mg by mouth once daily. Every day      cholecalciferol, vitamin D3, 1,250 mcg (50,000 unit) Tab Take 1 tablet by mouth every 7 days. 12 tablet 6    fluticasone propionate (FLONASE) 50 mcg/actuation nasal spray USE 1 SPRAY (50 MCG TOTAL) BY EACH NOSTRIL ROUTE ONCE DAILY. 32 g 3    fluticasone-umeclidin-vilanter (TRELEGY ELLIPTA) 200-62.5-25 mcg inhaler Inhale 1 puff into the lungs once daily. 60 each 11    hydroCHLOROthiazide (HYDRODIURIL) 25 MG tablet TAKE 1 TABLET BY MOUTH ONCE DAILY 90 tablet 3    levothyroxine (SYNTHROID) 100 MCG tablet Take 1 tablet (100 mcg total) by mouth once daily. 30 tablet 11    lisinopriL (PRINIVIL,ZESTRIL) 40 MG tablet TAKE 1 TABLET BY MOUTH ONCE DAILY 90 tablet 3    multivitamin (THERAGRAN) per tablet Take 1 tablet by mouth once daily.      NIFEdipine (ADALAT CC) 60 MG TbSR TAKE 1 TABLET BY MOUTH ONCE DAILY 90 tablet 3    omeprazole (PRILOSEC) 40 MG capsule TAKE 1 CAPSULE BY MOUTH  ONCE DAILY 30 capsule 11    ondansetron (ZOFRAN) 8 MG tablet Take 1 tablet (8 mg total) by mouth every 8 (eight) hours as needed for Nausea. 30 tablet 1    potassium chloride (MICRO-K) 10 MEQ CpSR TAKE 2 CAPSULES BY MOUTH  ONCE DAILY (Patient taking differently: Take 20 mEq by mouth once daily.) 180 capsule 3    promethazine-dextromethorphan (PROMETHAZINE-DM) 6.25-15 mg/5 mL Syrp Take 5 mLs by mouth 4 (four) times daily as needed. 180 mL 2    simvastatin (ZOCOR) 10 MG tablet TAKE 1 TABLET BY MOUTH IN  THE EVENING 90 tablet 0    temazepam (RESTORIL) 15 mg Cap TAKE 1 CAPSULE  "BY MOUTH AT  BEDTIME AS NEEDED FOR  INSOMNIA 90 capsule 1     No current facility-administered medications on file prior to visit.       Family History   Problem Relation Age of Onset    Cancer Sister         uterine    Diabetes Brother     Cancer Sister         Uterine cancer     Breast cancer Other 42       Review of Systems   Constitutional:  Positive for fatigue. Negative for appetite change, chills, fever and unexpected weight change.   HENT:  Negative for sore throat and trouble swallowing.    Eyes:  Negative for pain and visual disturbance.   Respiratory:  Positive for cough and shortness of breath. Negative for wheezing.    Cardiovascular:  Negative for chest pain and palpitations.   Gastrointestinal:  Negative for abdominal pain, blood in stool, diarrhea, nausea and vomiting.   Genitourinary:  Negative for difficulty urinating, dysuria and hematuria.   Musculoskeletal:  Negative for arthralgias, gait problem and neck pain.   Skin:  Negative for rash and wound.   Neurological:  Negative for dizziness, weakness, numbness and headaches.   Hematological:  Negative for adenopathy.   Psychiatric/Behavioral:  Negative for dysphoric mood.      Objective:      /74 (BP Location: Left arm, Patient Position: Sitting)   Pulse 88   Temp 97.4 °F (36.3 °C)   Ht 5' 3" (1.6 m)   Wt 90.4 kg (199 lb 3 oz)   SpO2 95%   BMI 35.28 kg/m²   Physical Exam  Constitutional:       Appearance: Normal appearance. She is well-developed.   HENT:      Head: Normocephalic.      Mouth/Throat:      Pharynx: No oropharyngeal exudate or posterior oropharyngeal erythema.   Eyes:      Conjunctiva/sclera: Conjunctivae normal.      Pupils: Pupils are equal, round, and reactive to light.   Neck:      Thyroid: No thyromegaly.   Cardiovascular:      Rate and Rhythm: Normal rate and regular rhythm.      Heart sounds: Normal heart sounds, S1 normal and S2 normal. No murmur heard.    No friction rub. No gallop.   Pulmonary:      Effort: " Pulmonary effort is normal.      Breath sounds: Examination of the left-upper field reveals rhonchi. Rhonchi present. No wheezing or rales.   Musculoskeletal:      Cervical back: Normal range of motion and neck supple.      Right lower leg: No edema.      Left lower leg: No edema.   Lymphadenopathy:      Cervical: No cervical adenopathy.   Skin:     General: Skin is warm.      Findings: No rash.   Neurological:      Mental Status: She is alert and oriented to person, place, and time.      Cranial Nerves: No cranial nerve deficit.      Gait: Gait normal.       Assessment:       1. Cough with sputum    2. Malignant neoplasm of right upper lobe of lung    3. Morbid (severe) obesity due to excess calories    4. Other specified disorders involving the immune mechanism, not elsewhere classified    5. Ectatic aorta    6. Centrilobular emphysema    7. Stage 3b chronic kidney disease        Plan:       Cough with sputum    Malignant neoplasm of right upper lobe of lung    Morbid (severe) obesity due to excess calories    Other specified disorders involving the immune mechanism, not elsewhere classified    Ectatic aorta    Centrilobular emphysema    Stage 3b chronic kidney disease      Consulted with pulmonology and will extend course of Levaquin until upcoming pulmonology appt  Deep breathing exercises discussed.  Increase fluids.  Mucinex BID PRN for congestion.   Continue other present meds and specialist follow-up

## 2023-01-10 NOTE — Clinical Note
Patient is still having significant and copius green sputum production despite recent treatment with Levaquin. She denies any fever, and her shortness of breath has remained consistent. Since she has not responded to the oral antibiotics, and her recent sputum did show presence of yeast, is it a possibility that she has a fungal empyema? I have not seen or managed this before, so I would like your input to see if further testing or treating for this would be advisable. She does have an appointment with you in 2 weeks, but she wanted me to reach out to you now since she is feeling poorly.

## 2023-01-11 ENCOUNTER — TELEPHONE (OUTPATIENT)
Dept: FAMILY MEDICINE | Facility: CLINIC | Age: 83
End: 2023-01-11
Payer: MEDICARE

## 2023-01-11 ENCOUNTER — PATIENT MESSAGE (OUTPATIENT)
Dept: FAMILY MEDICINE | Facility: CLINIC | Age: 83
End: 2023-01-11
Payer: MEDICARE

## 2023-01-11 DIAGNOSIS — R05.9 COUGH, UNSPECIFIED TYPE: ICD-10-CM

## 2023-01-11 DIAGNOSIS — J43.2 CENTRILOBULAR EMPHYSEMA: ICD-10-CM

## 2023-01-11 RX ORDER — LEVOFLOXACIN 750 MG/1
750 TABLET ORAL DAILY
Qty: 7 TABLET | Refills: 0 | Status: ON HOLD | OUTPATIENT
Start: 2023-01-11 | End: 2023-01-23 | Stop reason: HOSPADM

## 2023-01-11 NOTE — TELEPHONE ENCOUNTER
Inform patient Dr. Raymundo advised extending her antibiotic treatment to see if this clears her likely bacterial lung infection. I have sent in 1 more week of Levaquin for her to take, and she should follow-up with Dr. Raymundo as planned.

## 2023-01-11 NOTE — TELEPHONE ENCOUNTER
----- Message from Gael Raymundo MD sent at 1/10/2023  4:42 PM CST -----  She may just need a longer course of antibiotic treatment given her underlying lung malignancy and COPD. Yeast is part of normal respiratory gennaro and rarely causes invasive pulmonary infection unless a person is severely immunocompromised. We can repeat sputum cultures and see if she is still growing moraxella.   ----- Message -----  From: Akil Chavez MD  Sent: 1/10/2023  10:53 AM CST  To: Gael Raymundo MD    Patient is still having significant and copius green sputum production despite recent treatment with Levaquin. She denies any fever, and her shortness of breath has remained consistent. Since she has not responded to the oral antibiotics, and her recent sputum did show presence of yeast, is it a possibility that she has a fungal empyema? I have not seen or managed this before, so I would like your input to see if further testing or treating for this would be advisable. She does have an appointment with you in 2 weeks, but she wanted me to reach out to you now since she is feeling poorly.

## 2023-01-11 NOTE — TELEPHONE ENCOUNTER
Pt aware of Dr Chavez sending in another week of antibiotics and to follow up w/ Dr Raymundo .--lp

## 2023-01-11 NOTE — TELEPHONE ENCOUNTER
----- Message from Nancy Goyal sent at 1/10/2023  4:27 PM CST -----  Type: Patient Call Back         Who called: Patient          What is the request in detail: wants to know if provider has spoken to Dr. Raymundo and would like a call back to confirm; please advise         Best call back number: [phone]         Additional Information:            Thank You

## 2023-01-16 PROBLEM — R06.00 DYSPNEA: Status: ACTIVE | Noted: 2023-01-16

## 2023-01-16 PROBLEM — Z71.89 ADVANCE CARE PLANNING: Status: ACTIVE | Noted: 2023-01-16

## 2023-01-16 PROBLEM — N17.9 AKI (ACUTE KIDNEY INJURY): Status: ACTIVE | Noted: 2023-01-16

## 2023-01-18 PROBLEM — T17.800A MULTIPLE TRACHEOBRONCHIAL MUCUS PLUGS: Status: ACTIVE | Noted: 2023-01-18

## 2023-01-18 PROBLEM — C34.91 SQUAMOUS CELL CARCINOMA OF RIGHT LUNG: Status: ACTIVE | Noted: 2023-01-18

## 2023-01-18 PROBLEM — Z79.899 HIGH RISK MEDICATION USE: Status: ACTIVE | Noted: 2023-01-18

## 2023-01-18 PROBLEM — R55 SYNCOPE: Status: ACTIVE | Noted: 2023-01-18

## 2023-01-18 PROBLEM — J84.89 ORGANIZING PNEUMONIA: Status: ACTIVE | Noted: 2023-01-18

## 2023-01-18 PROBLEM — R94.31 PROLONGED Q-T INTERVAL ON ECG: Status: ACTIVE | Noted: 2023-01-18

## 2023-01-23 PROBLEM — R91.8 GROUND GLASS OPACITY PRESENT ON IMAGING OF LUNG: Status: RESOLVED | Noted: 2021-06-18 | Resolved: 2023-01-23

## 2023-01-25 ENCOUNTER — TELEPHONE (OUTPATIENT)
Dept: FAMILY MEDICINE | Facility: CLINIC | Age: 83
End: 2023-01-25
Payer: MEDICARE

## 2023-01-25 ENCOUNTER — PATIENT MESSAGE (OUTPATIENT)
Dept: FAMILY MEDICINE | Facility: CLINIC | Age: 83
End: 2023-01-25
Payer: MEDICARE

## 2023-01-25 DIAGNOSIS — G47.00 INSOMNIA, UNSPECIFIED TYPE: Primary | ICD-10-CM

## 2023-01-25 NOTE — TELEPHONE ENCOUNTER
----- Message from Marsha Gibbs sent at 1/23/2023  3:59 PM CST -----  Regarding: appt: hospital f/u  Contact: Patient  Type:  Sooner Appointment Request    Caller is requesting a sooner appointment.  Caller declined first available appointment listed below.  Caller will not accept being placed on the waitlist and is requesting a message be sent to doctor.    Name of Caller:  Patient  When is the first available appointment?    Symptoms:  [facility] f/u [facility] 1/23/23 admitted with lemonia  Best Call Back Number:  [phone] (home)     Additional Information:  Please call to advise. Thanks!

## 2023-01-26 ENCOUNTER — TELEPHONE (OUTPATIENT)
Dept: HEMATOLOGY/ONCOLOGY | Facility: CLINIC | Age: 83
End: 2023-01-26
Payer: MEDICARE

## 2023-01-26 DIAGNOSIS — G47.00 INSOMNIA, UNSPECIFIED TYPE: ICD-10-CM

## 2023-01-26 RX ORDER — TEMAZEPAM 30 MG/1
30 CAPSULE ORAL NIGHTLY PRN
Qty: 30 CAPSULE | Refills: 5 | Status: SHIPPED | OUTPATIENT
Start: 2023-01-26 | End: 2023-02-25

## 2023-01-26 NOTE — TELEPHONE ENCOUNTER
I spoke with the patient to schedule her new PT appt. I offered first available and she declined as it was too early or late. She asked for something in the middle of day and accepted 3/1 at 2pm. Location was reviewed.      ----- Message from Henrry Calhoun MA sent at 1/25/2023  3:01 PM CST -----  Regarding: PT referral  Call to schedule from referral     97.8

## 2023-01-26 NOTE — TELEPHONE ENCOUNTER
----- Message from Flaco Lind sent at 1/25/2023  1:57 PM CST -----  Who Called: pharmacy       What is the reqeust in detail:is requesting a location change due to mail order not having it and stated she is in need of it  today . Please advise     temazepam (RESTORIL) 15 mg Cap 90 capsule 1 1/10/2023  No  Sig: TAKE 1 CAPSULE BY MOUTH AT  BEDTIME AS NEEDED FOR  INSOMNIA  Patient taking differently: Take 15 mg by mouth every evening.       Sent to pharmacy as: temazepam (RESTORIL) 15 mg Cap  Class: Normal    [facility] - [place], [place] - [address]  [address] [facility] [address]  Phone: [phone] Fax: [phone]  Hours: Not open 24 hours      Can the clinic reply by [facility]? No       Best Call Back Number:[phone]      Additional Information:

## 2023-01-26 NOTE — TELEPHONE ENCOUNTER
No new care gaps identified.  [facility] Embedded Care Gaps. Reference number: [id number]. 1/26/2023   5:32:23 PM CST

## 2023-01-27 ENCOUNTER — PATIENT MESSAGE (OUTPATIENT)
Dept: FAMILY MEDICINE | Facility: CLINIC | Age: 83
End: 2023-01-27
Payer: MEDICARE

## 2023-01-27 RX ORDER — TEMAZEPAM 30 MG/1
30 CAPSULE ORAL NIGHTLY PRN
Qty: 30 CAPSULE | Refills: 5 | OUTPATIENT
Start: 2023-01-27 | End: 2023-02-26

## 2023-01-31 NOTE — PROGRESS NOTES
PATIENT: Shandra Velez  MRN: [id number]  DATE: 2/1/2023      Diagnosis:   1. Malignant neoplasm of unspecified part of unspecified bronchus or lung    2. Other specified disorders involving the immune mechanism, not elsewhere classified    3. H/O recurrent pneumonia    4. Follicular lymphoma grade I, unspecified body region    5. Right renal mass    6. Hypertension, unspecified type    7. Acquired hypothyroidism    8. Insomnia, unspecified type            Chief Complaint: Lung Cancer        Oncologic History:     Pt initially underwent right inguinal LN biopsy 5/25/22 showing grade 1 follicular lymphoma.  Bone marrow biopsy done 06/24/2020 showed involvement with follicular lymphoma.  PET/CT 7/07/20 showed lymphadenopathy above and below the diaphragm as well as the spleen.  Also seen wasa RUL lesion.  The patient was started on CVP-R followed by maintenance rituxan every 8 weeks.  Biopsy of the RUL nodule on 6/18/21 showed SCC with PD-L1 at 86%.  EBUS 7/09/21 showed no malignant cells at station 7, 4R or 11RS.  The patient was treated with SBRT under the care of Dr Barnes with 50Gy in 4 fractions completed 10/28/21.  The patient then developed new pulmonary nodules on CT scan 6/02/22.  WBUS 6/16/22 showed positive SCC in 4R LN.  Pt was discussed at tumor board on 6/21/22 with recommendation for Keytruda.  The patient underwent PET/CT on 9/23/22 after 2 doses of treatment showing a 1.5 cm precarinal lymph node; stable right apical mass measuring 1.8 x 1.5 cm; stable 8 mm right apical lung nodule with adjacent post radiation change measuring 2 x 2.7 cm; stable 4 mm subpleural nodule in the lateral right upper lobe; fluid/mucus in the right lower lobe bronchus tree; 6 mm anterior left upper lobe nodule which is new; 2.3 cm simple cyst in the midpole of the left kidney; 3.1 x 2.3 cm rim enhancing complex cystic and solid mass in the mid to lower pole the right kidney; infrarenal abdominal aortic aneurysm measuring 3.8  x 3.5 cm; and stable a left para-aortic lymph node measuring 11 mm. 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   The patient underwent PET-CT on 11/29/2022 showing an irregular hypermetabolic right upper lobe tumor which is stable measuring 1.6 x 1.2 cm; hypermetabolic subpleural right upper lobe consolidation diminished in extent; unchanged linear zone of hypermetabolic atelectasis in the superior segment of the left lower lobe; new poorly metabolic ill-defined infiltrate in the posterior right lower lobe; 4 mm left upper lobe nodule which is stable; hypermetabolic precarinal mediastinal lymph node stable measuring 1 cm; and hypermetabolic aortopulmonary lymph node stable measuring 1.1 x 1.1 cm.    Subjective:    Interval History: Ms. Velez is a 82 y.o. female with HLD, HTN, osteopenia, follicular lymphoma who presents for follow up of NSCLC.  Since since the last clinic visit the patient was admitted to the hospital from 1/16/23 to 1/23/23 for pneumonia.  She underwent bronchoscopy with Dr Kinney on 1/18/23 showing significant mucus plug impaction int the pharynx, left mainstem bronchus and right and left lower lobes.  The patient was discharged on 3L O2.  The patient endorses a continued cough.  She endorses SOB without oxygen.  The patient denies CP, abdominal pain, N/V, constipation, diarrhea.  The patient denies fever, chills, night sweats, weight loss, new lumps or bumps, easy bruising or bleeding.    Past Medical History:   Past Medical History:   Diagnosis Date    Breast cancer 1985    right mastectomy    Digestive disorder     Encounter for blood transfusion     History of pneumonia     Hypercholesterolemia     Hypertension     Indigestion     Lumbar spondylosis     Lung cancer     2022 - currently in treatment as of 7/27/22    Lymphoma     Osteopenia     Pulmonary nodule     Smoker 06/11/2022       Past Surgical HIstory:    Past Surgical History:   Procedure Laterality Date    APPENDECTOMY      BONE MARROW BIOPSY Bilateral 06/24/2020    Procedure: Biopsy-bone marrow;  Surgeon: Rod Montero MD;  Location: [facility] OR;  Service: Oncology;  Laterality: Bilateral;    BREAST RECONSTRUCTION  1990    right    BRONCHOSCOPY N/A 1/18/2023    Procedure: BRONCHOSCOPY;  Surgeon: Gregorio Kinney MD;  Location: [facility];  Service: Pulmonary;  Laterality: N/A;    CATARACT EXTRACTION W/  INTRAOCULAR LENS IMPLANT Bilateral     CHOLECYSTECTOMY      ENDOBRONCHIAL ULTRASOUND Bilateral 07/09/2021    Procedure: ENDOBRONCHIAL ULTRASOUND (EBUS);  Surgeon: Gregorio Kinney MD;  Location: [facility];  Service: Pulmonary;  Laterality: Bilateral;  NEED LMA to  eval right upper paratracheal LN.     ENDOBRONCHIAL ULTRASOUND Bilateral 6/16/2022    Procedure: ENDOBRONCHIAL ULTRASOUND (EBUS);  Surgeon: Gregorio Kinney MD;  Location: [facility];  Service: Pulmonary;  Laterality: Bilateral;    INJECTION OF FACET JOINT Left 6/30/2022    Procedure: FACET JOINT Cyst Aspiration L3/4;  Surgeon: Ronnell Baeza MD;  Location: [facility] OR;  Service: Pain Management;  Laterality: Left;    INSERTION OF TUNNELED CENTRAL VENOUS CATHETER (CVC) WITH SUBCUTANEOUS PORT Left 11/04/2020    Procedure: [id number];  Surgeon: Kody Pretty MD;  Location: [facility] OR;  Service: General;  Laterality: Left;    JOINT REPLACEMENT  2008    right knee     LUMBAR LAMINECTOMY      LYMPH NODE BIOPSY      MASTECTOMY Right 1985    NEEDLE LOCALIZATION N/A 05/25/2020    Procedure: NEEDLE LOCALIZATION - lymph node bx;  Surgeon: Steve Weaver MD;  Location: [facility] CATH;  Service: Interventional Radiology;  Laterality: N/A;    TRANSFORAMINAL EPIDURAL INJECTION OF STEROID Left 05/14/2020    Procedure: Injection,steroid,epidural,transforaminal approach, l3/4;  Surgeon: Ronnell Baeza MD;  Location: [facility] OR;  Service: Pain Management;  Laterality: Left;    TRANSFORAMINAL EPIDURAL INJECTION OF  STEROID Left 03/23/2022    Procedure: Injection,steroid,epidural,transforaminal approach L3/4;  Surgeon: Ronnell Baeza MD;  Location: [facility] OR;  Service: Pain Management;  Laterality: Left;    TRANSFORAMINAL EPIDURAL INJECTION OF STEROID Left 8/1/2022    Procedure: Injection,steroid,epidural,transforaminal approach L3/4;  Surgeon: Ronnell Baeza MD;  Location: [facility] OR;  Service: Pain Management;  Laterality: Left;    TUBAL LIGATION      UMBILICAL HERNIA REPAIR         Family History:   Family History   Problem Relation Age of Onset    Cancer Sister         uterine    Diabetes Brother     Cancer Sister         Uterine cancer     Breast cancer Other 42       Social History:  reports that she quit smoking about 7 months ago. Her smoking use included cigarettes. She has a 26.50 pack-year smoking history. She has never used smokeless tobacco. She reports that she does not drink alcohol and does not use drugs.    Allergies:  Review of patient's allergies indicates:  No Known Allergies    Medications:  Current Outpatient Medications   Medication Sig Dispense Refill    albuterol (VENTOLIN HFA) 90 mcg/actuation inhaler Inhale 2 puffs into the lungs every 6 (six) hours as needed for Wheezing or Shortness of Breath. Rescue 8 g 3    albuterol (VENTOLIN HFA) 90 mcg/actuation inhaler Inhale 2 puffs into the lungs every 6 (six) hours as needed for Wheezing. Rescue 50 g 0    budesonide (PULMICORT) 0.5 mg/2 mL nebulizer solution Take 2 mLs (0.5 mg total) by nebulization 2 (two) times a day. Controller for 15 days 50 mL 0    cetirizine (ZYRTEC) 10 MG tablet Take 10 mg by mouth once daily.      cholecalciferol, vitamin D3, 1,250 mcg (50,000 unit) Tab Take 1 tablet by mouth every 7 days. (Patient taking differently: Take 50,000 Units by mouth every Tuesday.) 12 tablet 6    fluticasone propionate (FLONASE) 50 mcg/actuation nasal spray USE 1 SPRAY (50 MCG TOTAL) BY EACH NOSTRIL ROUTE ONCE DAILY. (Patient taking differently: 1  spray by Each Nostril route daily as needed for Allergies.) 32 g 3    hydroCHLOROthiazide (HYDRODIURIL) 25 MG tablet TAKE 1 TABLET BY MOUTH ONCE DAILY (Patient taking differently: Take 25 mg by mouth once daily.) 90 tablet 3    ibuprofen (ADVIL,MOTRIN) 200 MG tablet Take 600 mg by mouth every 6 (six) hours as needed for Pain.      levothyroxine (SYNTHROID) 100 MCG tablet Take 1 tablet (100 mcg total) by mouth once daily. 30 tablet 11    LIDOcaine 4 % PtMd Apply 1 patch topically daily as needed (pain). otc      lisinopriL (PRINIVIL,ZESTRIL) 40 MG tablet TAKE 1 TABLET BY MOUTH ONCE DAILY (Patient taking differently: Take 40 mg by mouth once daily.) 90 tablet 3    mucus clearing device (ACAPELLA, FLUTTER) by Misc.(Non-Drug; Combo Route) route once daily. 100 each 0    multivitamin (THERAGRAN) per tablet Take 1 tablet by mouth once daily.      NIFEdipine (ADALAT CC) 60 MG TbSR TAKE 1 TABLET BY MOUTH ONCE DAILY (Patient taking differently: Take 60 mg by mouth once daily.) 90 tablet 3    omeprazole (PRILOSEC) 40 MG capsule TAKE 1 CAPSULE BY MOUTH  ONCE DAILY (Patient taking differently: Take 40 mg by mouth once daily.) 30 capsule 11    ondansetron (ZOFRAN) 8 MG tablet Take 1 tablet (8 mg total) by mouth every 8 (eight) hours as needed for Nausea. 30 tablet 1    potassium chloride (MICRO-K) 10 MEQ CpSR TAKE 2 CAPSULES BY MOUTH  ONCE DAILY (Patient taking differently: Take 20 mEq by mouth every evening.) 180 capsule 3    simvastatin (ZOCOR) 10 MG tablet TAKE 1 TABLET BY MOUTH IN  THE EVENING (Patient taking differently: Take 10 mg by mouth every evening.) 90 tablet 0    sodium chloride 3% 3 % nebulizer solution Take 4 mLs by nebulization as needed for Other. 100 mL 0    temazepam (RESTORIL) 30 mg capsule Take 1 capsule (30 mg total) by mouth nightly as needed for Insomnia. 30 capsule 5     No current facility-administered medications for this visit.     Facility-Administered Medications Ordered in Other Visits   Medication  Dose Route Frequency Provider Last Rate Last Admin    acetaminophen tablet 1,000 mg  1,000 mg Oral 1 time in Clinic/HOD Chad Mills MD        diphenhydrAMINE (BENADRYL) 25 mg in NS 50 mL IVPB  25 mg Intravenous 1 time in Clinic/HOD Chad Mills MD        diphenhydrAMINE injection 50 mg  50 mg Intravenous Once PRN Chad Mills MD        EPINEPHrine (EPIPEN) 0.3 mg/0.3 mL pen injection 0.3 mg  0.3 mg Intramuscular Once PRN Chad Mills MD        heparin, porcine (PF) 100 unit/mL injection flush 500 Units  500 Units Intravenous PRN Chad Mills MD        hydrocortisone sodium succinate injection 100 mg  100 mg Intravenous Once PRN Chad Mills MD        pembrolizumab (KEYTRUDA) 400 mg in sodium chloride 0.9% SolP 131 mL infusion  400 mg Intravenous 1 time in Clinic/HOD Chad Mills MD        sodium chloride 0.9% 100 mL flush bag   Intravenous 1 time in Clinic/HOD Chad Mills MD        sodium chloride 0.9% flush 10 mL  10 mL Intravenous PRN Chad Mills MD           Review of Systems   Constitutional:  Negative for appetite change, chills, fatigue, fever and unexpected weight change.   HENT:  Negative for mouth sores.    Eyes:  Negative for visual disturbance.   Respiratory:  Positive for cough and shortness of breath (with exertion and when off oxygen).    Cardiovascular:  Negative for chest pain and palpitations.   Gastrointestinal:  Negative for abdominal pain, constipation, diarrhea, nausea and vomiting.   Musculoskeletal:  Negative for back pain.   Skin:  Negative for rash.   Neurological:  Negative for headaches.   Hematological:  Negative for adenopathy. Does not bruise/bleed easily.   Psychiatric/Behavioral:  The patient is not nervous/anxious.      ECOG Performance Status: 2   Objective:      Vitals:   Vitals:    02/01/23 1013   BP: 110/80   Patient Position: Sitting   BP Method: Medium (Manual)   Pulse: 87   Resp: 16   Temp: 97.7 °F (36.5 °C)   TempSrc: Temporal   SpO2:  "(!) 94%   Weight: 90.9 kg (200 lb 4.6 oz)   Height: 5' 4" (1.626 m)         Physical Exam  Constitutional:       General: She is not in acute distress.     Appearance: She is well-developed. She is not diaphoretic.   HENT:      Head: Normocephalic and atraumatic.   Cardiovascular:      Rate and Rhythm: Normal rate and regular rhythm.      Heart sounds: Normal heart sounds. No murmur heard.    No friction rub. No gallop.   Pulmonary:      Effort: Pulmonary effort is normal. No respiratory distress.      Breath sounds: Normal breath sounds. No wheezing or rales.   Chest:      Chest wall: No tenderness.   Abdominal:      General: Bowel sounds are normal. There is no distension.      Palpations: Abdomen is soft. There is no mass.      Tenderness: There is no abdominal tenderness. There is no guarding or rebound.   Lymphadenopathy:      Cervical: No cervical adenopathy.      Upper Body:      Right upper body: No supraclavicular or axillary adenopathy.      Left upper body: No supraclavicular or axillary adenopathy.   Skin:     Findings: No erythema or rash.   Neurological:      Mental Status: She is alert and oriented to person, place, and time.   Psychiatric:         Behavior: Behavior normal.       Laboratory Data:  Lab Visit on 02/01/2023   Component Date Value Ref Range Status    WBC 02/01/2023 12.88 (H)  3.90 - 12.70 K/uL Final    RBC 02/01/2023 3.78 (L)  4.00 - 5.40 M/uL Final    Hemoglobin 02/01/2023 10.4 (L)  12.0 - 16.0 g/dL Final    Hematocrit 02/01/2023 34.3 (L)  37.0 - 48.5 % Final    MCV 02/01/2023 91  82 - 98 fL Final    MCH 02/01/2023 27.5  27.0 - 31.0 pg Final    MCHC 02/01/2023 30.3 (L)  32.0 - 36.0 g/dL Final    RDW 02/01/2023 16.5 (H)  11.5 - 14.5 % Final    Platelets 02/01/2023 190  150 - 450 K/uL Final    MPV 02/01/2023 9.9  9.2 - 12.9 fL Final    Immature Granulocytes 02/01/2023 1.1 (H)  0.0 - 0.5 % Final    Gran # (ANC) 02/01/2023 10.9 (H)  1.8 - 7.7 K/uL Final    Immature Grans (Abs) 02/01/2023 " 0.14 (H)  0.00 - 0.04 K/uL Final    Comment: Mild elevation in immature granulocytes is non specific and   can be seen in a variety of conditions including stress response,   acute inflammation, trauma and pregnancy. Correlation with other   laboratory and clinical findings is essential.      Lymph # 02/01/2023 0.7 (L)  1.0 - 4.8 K/uL Final    Mono # 02/01/2023 0.8  0.3 - 1.0 K/uL Final    Eos # 02/01/2023 0.4  0.0 - 0.5 K/uL Final    Baso # 02/01/2023 0.05  0.00 - 0.20 K/uL Final    nRBC 02/01/2023 0  0 /100 WBC Final    Gran % 02/01/2023 84.2 (H)  38.0 - 73.0 % Final    Lymph % 02/01/2023 5.1 (L)  18.0 - 48.0 % Final    Mono % 02/01/2023 6.1  4.0 - 15.0 % Final    Eosinophil % 02/01/2023 3.1  0.0 - 8.0 % Final    Basophil % 02/01/2023 0.4  0.0 - 1.9 % Final    Differential Method 02/01/2023 Automated   Final    Sodium 02/01/2023 145  136 - 145 mmol/L Final    Potassium 02/01/2023 3.9  3.5 - 5.1 mmol/L Final    Chloride 02/01/2023 110  95 - 110 mmol/L Final    CO2 02/01/2023 23  23 - 29 mmol/L Final    Glucose 02/01/2023 116 (H)  70 - 110 mg/dL Final    BUN 02/01/2023 39 (H)  8 - 23 mg/dL Final    Creatinine 02/01/2023 1.3  0.5 - 1.4 mg/dL Final    Calcium 02/01/2023 8.3 (L)  8.7 - 10.5 mg/dL Final    Total Protein 02/01/2023 5.9 (L)  6.0 - 8.4 g/dL Final    Albumin 02/01/2023 2.9 (L)  3.5 - 5.2 g/dL Final    Total Bilirubin 02/01/2023 0.3  0.1 - 1.0 mg/dL Final    Comment: For infants and newborns, interpretation of results should be based  on gestational age, weight and in agreement with clinical  observations.    Premature Infant recommended reference ranges:  Up to 24 hours.............<8.0 mg/dL  Up to 48 hours............<12.0 mg/dL  3-5 days..................<15.0 mg/dL  6-29 days.................<15.0 mg/dL      Alkaline Phosphatase 02/01/2023 78  55 - 135 U/L Final    AST 02/01/2023 11  10 - 40 U/L Final    ALT 02/01/2023 8 (L)  10 - 44 U/L Final    Anion Gap 02/01/2023 12  8 - 16 mmol/L Final    eGFR  02/01/2023 41.6 (A)  >60 mL/min/1.73 m^2 Final         Imaging:    PET/CT 11/29/22  Head/neck:     No significant abnormal hypermetabolic foci are identified in the head and neck region.  No lymphadenopathy is present.     Chest:     The irregular hypermetabolic right upper lobe apical tumor is unchanged in size and appearance.  On image 59 the tumor measures 1.6 x 1.2 cm and has a max SUV of 3.6 compared to 3.8 previously.  Masslike hypermetabolic adjacent subpleural right upper lobe consolidation has diminished in extent since the prior study and on image 63 has a max SUV of 3.6 compared to 2.6 previously.  A linear zone of hypermetabolic atelectasis in the superior segment of the left lower lobe posteriorly is essentially unchanged and has a max SUV of 3.0 compared to 2.7 previously.  There is a new poorly metabolic ill-defined infiltrate in the posterior right lower lobe with a max SUV of 2.4.  Infectious or inflammatory etiologies are suspected.  A 4 mm left upper lobe nodule on image 68 is unchanged in size and appearance.  A hypermetabolic precarinal mediastinal lymph node is unchanged in size and has a short axis diameter of 1.0 cm on image 76 with a max SUV of 8.7 compared to 9.7 previously.  A hypermetabolic aortopulmonary lymph node on image 68 is unchanged in size and measures 1.1 x 1.1 cm with a max SUV of 8.9 compared to 6.4 previously.     Abdomen/pelvis:     No significant abnormal hypermetabolic foci are identified within the abdomen and pelvis.  No lymphadenopathy is present.  The adrenal glands are normal.     Skeleton:     No significant abnormal hypermetabolic foci are identified within the skeleton.  There are no findings to suggest osseous neoplastic disease.        Assessment:       1. Malignant neoplasm of unspecified part of unspecified bronchus or lung    2. Other specified disorders involving the immune mechanism, not elsewhere classified    3. H/O recurrent pneumonia    4. Follicular  lymphoma grade I, unspecified body region    5. Right renal mass    6. Hypertension, unspecified type    7. Acquired hypothyroidism    8. Insomnia, unspecified type               Plan:     NSCLC - Pt has NSCLC SCC with involvement of the right lung and mediastinum  -Pt with prior radiation to a RUL nodule  -PD-L1 was 86% on 6/18/21  -Pt currently on Keytruda with 6 week dosing  -PET/CT on 9/23/22 with possible pseudoprogression  -PET/CT on 11/29/22 showed stable disease with RLL infiltrate concerning for resolving PNA  -Will proceed with cycle 6  -Will repeat PET/CT prior to her next treatment    Recurrent Pneumonias - pt recently admitted to the hospital from 1/16/23 to 1/23/23 for pneumonia  -She underwent bronchoscopy with Dr Kinney on 1/18/23 showing significant mucus plug impaction int the pharynx, left mainstem bronchus and right and left lower lobes  -Pt still requiring oxygen and has crackles on exam in the RLL  -Will have the patient see Dr Kinney with pulmonary ASAP for further evaluation and management per patient request    Leukocytosis - WBC 12.88 with left shift  -Pt with recent PNA with significant mucous plugging  -Pt to see Dr Kinney ASAP    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 for 9/12 cycles with good response  -Will monitor    Right Renal Mass - Pt with right renal mass seen on PET/CT 9/23/22 wich was enlarging  -No mention of renal mass on recent PET/CT 11/29/22  -Will monitor on repeat PET/CT    HTN - pt on lisinopril, HCTZ and nifedipine  -BP controlled  -Will monitor    HLD - Pt on statin   -Mangement per PCP    Hypothyroidism - pt on levothyroxine  -Management per PCP    Insomnia - pt on temazepam  -Controlled  -Will monitor    Route Chart for Scheduling    Med Onc Chart Routing      Follow up with physician 6 weeks. The patient can proceed with treatment.  She will need an urgent follow up appt with Dr Kinney for recurrent pneumonia.  She needs a  PET/CT the week of 3/06/23.  She will need a CBC, CMP and TSH on 3/15/23 with an appt with me and treatment that day.   Follow up with DAYAMI    Infusion scheduling note    Injection scheduling note    Labs    Imaging    Pharmacy appointment    Other referrals        Treatment Plan Information   OP PEMBROLIZUMAB 400MG Q6W   Chad Mills MD   Upcoming Treatment Dates - OP PEMBROLIZUMAB 400MG Q6W    3/15/2023       Pre-Medications       acetaminophen tablet 1,000 mg       diphenhydrAMINE (BENADRYL) 25 mg in NS 50 mL IVPB       Chemotherapy       pembrolizumab (KEYTRUDA) 400 mg in sodium chloride 0.9% SolP 116 mL infusion  4/26/2023       Pre-Medications       acetaminophen tablet 1,000 mg       diphenhydrAMINE (BENADRYL) 25 mg in NS 50 mL IVPB       Chemotherapy       pembrolizumab (KEYTRUDA) 400 mg in sodium chloride 0.9% SolP 116 mL infusion  6/7/2023       Pre-Medications       acetaminophen tablet 1,000 mg       diphenhydrAMINE (BENADRYL) 25 mg in NS 50 mL IVPB       Chemotherapy       pembrolizumab (KEYTRUDA) 400 mg in sodium chloride 0.9% SolP 116 mL infusion  7/19/2023       Pre-Medications       acetaminophen tablet 1,000 mg       diphenhydrAMINE (BENADRYL) 25 mg in NS 50 mL IVPB       Chemotherapy       pembrolizumab (KEYTRUDA) 400 mg in sodium chloride 0.9% SolP 116 mL infusion    Therapy Plan Information  EVUSHELD (TIXAGEVIMAB/CILGAVIMAB)  diphenhydrAMINE capsule 25 mg  25 mg, Oral, PRN  predniSONE tablet 40 mg  40 mg, Oral, PRN  EPINEPHrine (EPIPEN) 0.3 mg/0.3 mL pen injection 0.3 mg  0.3 mg, Intramuscular, PRN  ondansetron disintegrating tablet 4 mg  4 mg, Oral, PRN  acetaminophen tablet 650 mg  650 mg, Oral, PRN  albuterol inhaler 2 puff  2 puff, Inhalation, PRN    INF PEGFILGRASTIM (NEULASTA)  pegfilgrastim injection 6 mg  6 mg, Subcutaneous, Every visit        Chad Mills MD  [facility]  Hematology and Oncology  [facility] [address]   [place] [address]   O:  [phone]  F: [phone]

## 2023-02-01 ENCOUNTER — DOCUMENTATION ONLY (OUTPATIENT)
Dept: INFUSION THERAPY | Facility: HOSPITAL | Age: 83
End: 2023-02-01

## 2023-02-01 ENCOUNTER — OFFICE VISIT (OUTPATIENT)
Dept: HEMATOLOGY/ONCOLOGY | Facility: CLINIC | Age: 83
End: 2023-02-01
Payer: MEDICARE

## 2023-02-01 ENCOUNTER — INFUSION (OUTPATIENT)
Dept: INFUSION THERAPY | Facility: HOSPITAL | Age: 83
End: 2023-02-01
Attending: INTERNAL MEDICINE
Payer: MEDICARE

## 2023-02-01 VITALS
HEIGHT: 64 IN | DIASTOLIC BLOOD PRESSURE: 59 MMHG | RESPIRATION RATE: 16 BRPM | WEIGHT: 200.31 LBS | TEMPERATURE: 98 F | BODY MASS INDEX: 34.2 KG/M2 | SYSTOLIC BLOOD PRESSURE: 105 MMHG | HEART RATE: 74 BPM

## 2023-02-01 VITALS
RESPIRATION RATE: 16 BRPM | TEMPERATURE: 98 F | BODY MASS INDEX: 34.2 KG/M2 | HEIGHT: 64 IN | SYSTOLIC BLOOD PRESSURE: 110 MMHG | HEART RATE: 87 BPM | DIASTOLIC BLOOD PRESSURE: 80 MMHG | OXYGEN SATURATION: 94 % | WEIGHT: 200.31 LBS

## 2023-02-01 DIAGNOSIS — C82.00 FOLLICULAR LYMPHOMA GRADE I, UNSPECIFIED BODY REGION: ICD-10-CM

## 2023-02-01 DIAGNOSIS — Z87.01 H/O RECURRENT PNEUMONIA: ICD-10-CM

## 2023-02-01 DIAGNOSIS — N28.89 RIGHT RENAL MASS: ICD-10-CM

## 2023-02-01 DIAGNOSIS — E03.9 ACQUIRED HYPOTHYROIDISM: ICD-10-CM

## 2023-02-01 DIAGNOSIS — C34.32 PRIMARY MALIGNANT NEOPLASM OF LEFT LOWER LOBE OF LUNG: Primary | ICD-10-CM

## 2023-02-01 DIAGNOSIS — C34.90 MALIGNANT NEOPLASM OF UNSPECIFIED PART OF UNSPECIFIED BRONCHUS OR LUNG: Primary | ICD-10-CM

## 2023-02-01 DIAGNOSIS — I10 HYPERTENSION, UNSPECIFIED TYPE: ICD-10-CM

## 2023-02-01 DIAGNOSIS — G47.00 INSOMNIA, UNSPECIFIED TYPE: ICD-10-CM

## 2023-02-01 DIAGNOSIS — D89.89 OTHER SPECIFIED DISORDERS INVOLVING THE IMMUNE MECHANISM, NOT ELSEWHERE CLASSIFIED: ICD-10-CM

## 2023-02-01 PROCEDURE — 3074F PR MOST RECENT SYSTOLIC BLOOD PRESSURE < 130 MM HG: ICD-10-PCS | Mod: CPTII,S$GLB,, | Performed by: INTERNAL MEDICINE

## 2023-02-01 PROCEDURE — 99999 PR PBB SHADOW E&M-EST. PATIENT-LVL V: CPT | Mod: PBBFAC,,, | Performed by: INTERNAL MEDICINE

## 2023-02-01 PROCEDURE — 99999 PR PBB SHADOW E&M-EST. PATIENT-LVL V: ICD-10-PCS | Mod: PBBFAC,,, | Performed by: INTERNAL MEDICINE

## 2023-02-01 PROCEDURE — 3288F PR FALLS RISK ASSESSMENT DOCUMENTED: ICD-10-PCS | Mod: CPTII,S$GLB,, | Performed by: INTERNAL MEDICINE

## 2023-02-01 PROCEDURE — 63600175 PHARM REV CODE 636 W HCPCS: Mod: PN | Performed by: INTERNAL MEDICINE

## 2023-02-01 PROCEDURE — 1100F PR PT FALLS ASSESS DOC 2+ FALLS/FALL W/INJURY/YR: ICD-10-PCS | Mod: CPTII,S$GLB,, | Performed by: INTERNAL MEDICINE

## 2023-02-01 PROCEDURE — 3074F SYST BP LT 130 MM HG: CPT | Mod: CPTII,S$GLB,, | Performed by: INTERNAL MEDICINE

## 2023-02-01 PROCEDURE — 25000003 PHARM REV CODE 250: Mod: PN | Performed by: INTERNAL MEDICINE

## 2023-02-01 PROCEDURE — 1126F PR PAIN SEVERITY QUANTIFIED, NO PAIN PRESENT: ICD-10-PCS | Mod: CPTII,S$GLB,, | Performed by: INTERNAL MEDICINE

## 2023-02-01 PROCEDURE — 1100F PTFALLS ASSESS-DOCD GE2>/YR: CPT | Mod: CPTII,S$GLB,, | Performed by: INTERNAL MEDICINE

## 2023-02-01 PROCEDURE — 3079F PR MOST RECENT DIASTOLIC BLOOD PRESSURE 80-89 MM HG: ICD-10-PCS | Mod: CPTII,S$GLB,, | Performed by: INTERNAL MEDICINE

## 2023-02-01 PROCEDURE — 96366 THER/PROPH/DIAG IV INF ADDON: CPT | Mod: PN

## 2023-02-01 PROCEDURE — 99215 PR OFFICE/OUTPT VISIT, EST, LEVL V, 40-54 MIN: ICD-10-PCS | Mod: S$GLB,,, | Performed by: INTERNAL MEDICINE

## 2023-02-01 PROCEDURE — 96413 CHEMO IV INFUSION 1 HR: CPT | Mod: PN

## 2023-02-01 PROCEDURE — 3288F FALL RISK ASSESSMENT DOCD: CPT | Mod: CPTII,S$GLB,, | Performed by: INTERNAL MEDICINE

## 2023-02-01 PROCEDURE — 99215 OFFICE O/P EST HI 40 MIN: CPT | Mod: S$GLB,,, | Performed by: INTERNAL MEDICINE

## 2023-02-01 PROCEDURE — 1126F AMNT PAIN NOTED NONE PRSNT: CPT | Mod: CPTII,S$GLB,, | Performed by: INTERNAL MEDICINE

## 2023-02-01 PROCEDURE — 1111F PR DISCHARGE MEDS RECONCILED W/ CURRENT OUTPATIENT MED LIST: ICD-10-PCS | Mod: CPTII,S$GLB,, | Performed by: INTERNAL MEDICINE

## 2023-02-01 PROCEDURE — 3079F DIAST BP 80-89 MM HG: CPT | Mod: CPTII,S$GLB,, | Performed by: INTERNAL MEDICINE

## 2023-02-01 PROCEDURE — 1111F DSCHRG MED/CURRENT MED MERGE: CPT | Mod: CPTII,S$GLB,, | Performed by: INTERNAL MEDICINE

## 2023-02-01 RX ORDER — EPINEPHRINE 0.3 MG/.3ML
0.3 INJECTION SUBCUTANEOUS ONCE AS NEEDED
Status: CANCELLED | OUTPATIENT
Start: 2023-02-01

## 2023-02-01 RX ORDER — DIPHENHYDRAMINE HYDROCHLORIDE 50 MG/ML
50 INJECTION INTRAMUSCULAR; INTRAVENOUS ONCE AS NEEDED
Status: DISCONTINUED | OUTPATIENT
Start: 2023-02-01 | End: 2023-02-01 | Stop reason: HOSPADM

## 2023-02-01 RX ORDER — ACETAMINOPHEN 500 MG
1000 TABLET ORAL
Status: COMPLETED | OUTPATIENT
Start: 2023-02-01 | End: 2023-02-01

## 2023-02-01 RX ORDER — SODIUM CHLORIDE 0.9 % (FLUSH) 0.9 %
10 SYRINGE (ML) INJECTION
Status: DISCONTINUED | OUTPATIENT
Start: 2023-02-01 | End: 2023-02-01 | Stop reason: HOSPADM

## 2023-02-01 RX ORDER — DIPHENHYDRAMINE HYDROCHLORIDE 50 MG/ML
50 INJECTION INTRAMUSCULAR; INTRAVENOUS ONCE AS NEEDED
Status: CANCELLED | OUTPATIENT
Start: 2023-02-01

## 2023-02-01 RX ORDER — ACETAMINOPHEN 500 MG
1000 TABLET ORAL
Status: CANCELLED
Start: 2023-02-01

## 2023-02-01 RX ORDER — SODIUM CHLORIDE 0.9 % (FLUSH) 0.9 %
10 SYRINGE (ML) INJECTION
Status: CANCELLED | OUTPATIENT
Start: 2023-02-01

## 2023-02-01 RX ORDER — HEPARIN 100 UNIT/ML
500 SYRINGE INTRAVENOUS
Status: CANCELLED | OUTPATIENT
Start: 2023-02-01

## 2023-02-01 RX ORDER — EPINEPHRINE 0.3 MG/.3ML
0.3 INJECTION SUBCUTANEOUS ONCE AS NEEDED
Status: DISCONTINUED | OUTPATIENT
Start: 2023-02-01 | End: 2023-02-01 | Stop reason: HOSPADM

## 2023-02-01 RX ORDER — HEPARIN 100 UNIT/ML
500 SYRINGE INTRAVENOUS
Status: DISCONTINUED | OUTPATIENT
Start: 2023-02-01 | End: 2023-02-01 | Stop reason: HOSPADM

## 2023-02-01 RX ADMIN — ACETAMINOPHEN 1000 MG: 500 TABLET ORAL at 11:02

## 2023-02-01 RX ADMIN — Medication 500 UNITS: at 12:02

## 2023-02-01 RX ADMIN — SODIUM CHLORIDE: 9 INJECTION, SOLUTION INTRAVENOUS at 11:02

## 2023-02-01 RX ADMIN — SODIUM CHLORIDE 400 MG: 9 INJECTION, SOLUTION INTRAVENOUS at 11:02

## 2023-02-01 RX ADMIN — DIPHENHYDRAMINE HYDROCHLORIDE 25 MG: 50 INJECTION, SOLUTION INTRAMUSCULAR; INTRAVENOUS at 11:02

## 2023-02-01 NOTE — PROGRESS NOTES
Oncology Nutrition   Chemotherapy Infusion Visit    Nutrition Follow Up   RD met with patient at chairside during infusion treatment. Pt lost weight during recent hospitalization however states she is already gaining it back. Pt denies any difficulty consuming adequate intake, especially because she was d/c'd on steroids. Pt states she has two more days left of prescription. Pt very pleasant and appreciative today.    Wt Readings from Last 10 Encounters:   02/01/23 90.9 kg (200 lb 4.6 oz)   02/01/23 90.9 kg (200 lb 4.6 oz)   01/18/23 89.4 kg (197 lb)   01/10/23 90.4 kg (199 lb 3 oz)   12/21/22 91.8 kg (202 lb 6.1 oz)   12/21/22 91.8 kg (202 lb 6.1 oz)   12/06/22 92.5 kg (204 lb)   12/02/22 92.5 kg (203 lb 14.8 oz)   11/09/22 95.4 kg (210 lb 5.1 oz)   11/09/22 95.4 kg (210 lb 5.1 oz)       All other nutrition questions/concerns addressed as appropriate. Will continue to follow and monitor throughout treatment PRN.     Belen Lyons, MS, RD, LDN  02/01/2023  11:48 AM

## 2023-02-01 NOTE — PLAN OF CARE
Problem: Adult Inpatient Plan of Care  Goal: Patient-Specific Goal (Individualized)  Outcome: Ongoing, Progressing  Flowsheets (Taken 2/1/2023 1108)  Anxieties, Fears or Concerns: none  Individualized Care Needs: recliner, warm blanket     Problem: Fatigue  Goal: Improved Activity Tolerance  Outcome: Ongoing, Progressing  Intervention: Promote Improved Energy  Flowsheets (Taken 2/1/2023 1108)  Fatigue Management: frequent rest breaks encouraged  Sleep/Rest Enhancement: awakenings minimized  Activity Management: Ambulated -L4

## 2023-02-01 NOTE — PLAN OF CARE
Problem: Adult Inpatient Plan of Care  Goal: Plan of Care Review  Outcome: Ongoing, Progressing  Flowsheets (Taken 2/1/2023 1228)  Plan of Care Reviewed With: patient   Pt tolerated keytruda infusion well.  No adverse reaction noted.   PAD flushed with heparin and de-accessed per protocol.  Patient left clinic in no acute distress.

## 2023-02-02 ENCOUNTER — HOSPITAL ENCOUNTER (OUTPATIENT)
Dept: RADIOLOGY | Facility: HOSPITAL | Age: 83
Discharge: HOME | End: 2023-02-02
Attending: INTERNAL MEDICINE
Payer: MEDICARE

## 2023-02-02 ENCOUNTER — OFFICE VISIT (OUTPATIENT)
Dept: FAMILY MEDICINE | Facility: CLINIC | Age: 83
End: 2023-02-02
Payer: MEDICARE

## 2023-02-02 VITALS
OXYGEN SATURATION: 93 % | HEIGHT: 64 IN | HEART RATE: 98 BPM | DIASTOLIC BLOOD PRESSURE: 70 MMHG | RESPIRATION RATE: 24 BRPM | TEMPERATURE: 100 F | SYSTOLIC BLOOD PRESSURE: 130 MMHG | WEIGHT: 199.31 LBS | BODY MASS INDEX: 34.03 KG/M2

## 2023-02-02 DIAGNOSIS — I10 ESSENTIAL HYPERTENSION: ICD-10-CM

## 2023-02-02 DIAGNOSIS — H69.93 ETD (EUSTACHIAN TUBE DYSFUNCTION), BILATERAL: Primary | ICD-10-CM

## 2023-02-02 DIAGNOSIS — R05.9 COUGH, UNSPECIFIED TYPE: ICD-10-CM

## 2023-02-02 DIAGNOSIS — C34.11 MALIGNANT NEOPLASM OF RIGHT UPPER LOBE OF LUNG: ICD-10-CM

## 2023-02-02 DIAGNOSIS — J43.2 CENTRILOBULAR EMPHYSEMA: ICD-10-CM

## 2023-02-02 PROCEDURE — 3078F DIAST BP <80 MM HG: CPT | Mod: CPTII,S$GLB,, | Performed by: FAMILY MEDICINE

## 2023-02-02 PROCEDURE — 1101F PT FALLS ASSESS-DOCD LE1/YR: CPT | Mod: CPTII,S$GLB,, | Performed by: FAMILY MEDICINE

## 2023-02-02 PROCEDURE — 99214 PR OFFICE/OUTPT VISIT, EST, LEVL IV, 30-39 MIN: ICD-10-PCS | Mod: S$GLB,,, | Performed by: FAMILY MEDICINE

## 2023-02-02 PROCEDURE — 71046 X-RAY EXAM CHEST 2 VIEWS: CPT | Mod: TC,FY,PO

## 2023-02-02 PROCEDURE — 3288F PR FALLS RISK ASSESSMENT DOCUMENTED: ICD-10-PCS | Mod: CPTII,S$GLB,, | Performed by: FAMILY MEDICINE

## 2023-02-02 PROCEDURE — 3075F PR MOST RECENT SYSTOLIC BLOOD PRESS GE 130-139MM HG: ICD-10-PCS | Mod: CPTII,S$GLB,, | Performed by: FAMILY MEDICINE

## 2023-02-02 PROCEDURE — 1125F PR PAIN SEVERITY QUANTIFIED, PAIN PRESENT: ICD-10-PCS | Mod: CPTII,S$GLB,, | Performed by: FAMILY MEDICINE

## 2023-02-02 PROCEDURE — 71046 XR CHEST PA AND LATERAL: ICD-10-PCS | Mod: 26,,, | Performed by: RADIOLOGY

## 2023-02-02 PROCEDURE — 1101F PR PT FALLS ASSESS DOC 0-1 FALLS W/OUT INJ PAST YR: ICD-10-PCS | Mod: CPTII,S$GLB,, | Performed by: FAMILY MEDICINE

## 2023-02-02 PROCEDURE — 99999 PR PBB SHADOW E&M-EST. PATIENT-LVL III: ICD-10-PCS | Mod: PBBFAC,,, | Performed by: FAMILY MEDICINE

## 2023-02-02 PROCEDURE — 1160F PR REVIEW ALL MEDS BY PRESCRIBER/CLIN PHARMACIST DOCUMENTED: ICD-10-PCS | Mod: CPTII,S$GLB,, | Performed by: FAMILY MEDICINE

## 2023-02-02 PROCEDURE — 99214 OFFICE O/P EST MOD 30 MIN: CPT | Mod: S$GLB,,, | Performed by: FAMILY MEDICINE

## 2023-02-02 PROCEDURE — 99999 PR PBB SHADOW E&M-EST. PATIENT-LVL III: CPT | Mod: PBBFAC,,, | Performed by: FAMILY MEDICINE

## 2023-02-02 PROCEDURE — 1160F RVW MEDS BY RX/DR IN RCRD: CPT | Mod: CPTII,S$GLB,, | Performed by: FAMILY MEDICINE

## 2023-02-02 PROCEDURE — 1111F PR DISCHARGE MEDS RECONCILED W/ CURRENT OUTPATIENT MED LIST: ICD-10-PCS | Mod: CPTII,S$GLB,, | Performed by: FAMILY MEDICINE

## 2023-02-02 PROCEDURE — 1111F DSCHRG MED/CURRENT MED MERGE: CPT | Mod: CPTII,S$GLB,, | Performed by: FAMILY MEDICINE

## 2023-02-02 PROCEDURE — 1159F MED LIST DOCD IN RCRD: CPT | Mod: CPTII,S$GLB,, | Performed by: FAMILY MEDICINE

## 2023-02-02 PROCEDURE — 1125F AMNT PAIN NOTED PAIN PRSNT: CPT | Mod: CPTII,S$GLB,, | Performed by: FAMILY MEDICINE

## 2023-02-02 PROCEDURE — 71046 X-RAY EXAM CHEST 2 VIEWS: CPT | Mod: 26,,, | Performed by: RADIOLOGY

## 2023-02-02 PROCEDURE — 1159F PR MEDICATION LIST DOCUMENTED IN MEDICAL RECORD: ICD-10-PCS | Mod: CPTII,S$GLB,, | Performed by: FAMILY MEDICINE

## 2023-02-02 PROCEDURE — 3075F SYST BP GE 130 - 139MM HG: CPT | Mod: CPTII,S$GLB,, | Performed by: FAMILY MEDICINE

## 2023-02-02 PROCEDURE — 3078F PR MOST RECENT DIASTOLIC BLOOD PRESSURE < 80 MM HG: ICD-10-PCS | Mod: CPTII,S$GLB,, | Performed by: FAMILY MEDICINE

## 2023-02-02 PROCEDURE — 3288F FALL RISK ASSESSMENT DOCD: CPT | Mod: CPTII,S$GLB,, | Performed by: FAMILY MEDICINE

## 2023-02-02 NOTE — PROGRESS NOTES
Subjective:       Patient ID: Shandra Velez is a 82 y.o. female.    Chief Complaint: Otalgia (Ear pain for about three weeks ago, Was in the hospital prior)    Here to f/u on recent hospitalization    Admission Date: 1/16/2023  Hospital Length of Stay: 5 days  Discharge Date and Time: No discharge date for patient encounter.  Attending Physician: Konstantin Myers MD   Discharging Provider: Konstantin Myers MD  Primary Care Provider: Akil Chavez MD  Primary Care Team: Networked reference to record PCT   HPI:   82-year-old female with a past history of lung CA, lymphoma, breast cancer, hypertension, and hypercholesterolemia presents to the ED after having a syncopal episode at home yesterday while getting dressed.  She denies any dizziness or weakness preceding the episode.  She states that she fell landing on her buttocks.  She is also complaining of SOB, nausea, and left wrist pain. No obvious deformity noted, distal pulses intact.  She denies use of anticoagulants, neck pain, head injury, fever, chest pain, vomiting, or diarrhea.  She is undergoing immunotherapy for lung cancer. Pt states she was on treatment for lung infection with  for 7 days of levofloxacin   Procedure(s) (LRB):  BRONCHOSCOPY (N/A)    Hospital Course:   82-year-old female with a past history of lung CA, lymphoma, breast cancer, hypertension, and hypercholesterolemia presents to the ED after having a syncopal episode at home yesterday while getting dressed.  She denies any dizziness or weakness preceding the episode.  She states that she fell landing on her buttocks.  She is also complaining of SOB, nausea, and left wrist pain. No obvious deformity noted, distal pulses intact.  She denies use of anticoagulants, neck pain, head injury, fever, chest pain, vomiting, or diarrhea.  She is undergoing immunotherapy for lung cancer. Pt states she was on treatment for lung infection with  for 7 days of levofloxacin ,she was  admitted - s/p bronch - : Significant mucus plug impaction pharynx  and left mainstem bronchus; LLL and RLL. F/u with cultures DC to FU with pul. And PCP with 1 week of DC      She will see pulmonology on 2/10  She saw hematology yesterday and recommendation was made to f/u with pulmonology ASAP  She is off all altibiotics  C/o fullness in bilateral ears for the last 8 weeks.      Following with oncology, pulmonology.  She was most recentyl prescribed Levaquin 1 month ago.  She had PFTS and sputum culture.    Transitional Care Note    Family and/or Caretaker present at visit?  Yes.  Diagnostic tests reviewed/disposition: No diagnosic tests pending after this hospitalization.  Disease/illness education: yes  Home health/community services discussion/referrals: Patient does not have home health established from hospital visit.  They do not need home health.  If needed, we will set up home health for the patient.   Establishment or re-establishment of referral orders for community resources: No other necessary community resources.   Discussion with other health care providers: No discussion with other health care providers necessary.              Past Medical History:   Diagnosis Date    Breast cancer 1985    right mastectomy    Digestive disorder     Encounter for blood transfusion     History of pneumonia     Hypercholesterolemia     Hypertension     Indigestion     Lumbar spondylosis     Lung cancer     2022 - currently in treatment as of 7/27/22    Lymphoma     Osteopenia     Pulmonary nodule     Smoker 06/11/2022       Past Surgical History:   Procedure Laterality Date    APPENDECTOMY      BONE MARROW BIOPSY Bilateral 06/24/2020    Procedure: Biopsy-bone marrow;  Surgeon: Rod Montero MD;  Location: [facility] OR;  Service: Oncology;  Laterality: Bilateral;    BREAST RECONSTRUCTION  1990    right    BRONCHOSCOPY N/A 1/18/2023    Procedure: BRONCHOSCOPY;  Surgeon: Gregorio Kinney MD;  Location: [facility]  ENDO;  Service: Pulmonary;  Laterality: N/A;    CATARACT EXTRACTION W/  INTRAOCULAR LENS IMPLANT Bilateral     CHOLECYSTECTOMY      ENDOBRONCHIAL ULTRASOUND Bilateral 07/09/2021    Procedure: ENDOBRONCHIAL ULTRASOUND (EBUS);  Surgeon: Gregorio Kinney MD;  Location: [facility];  Service: Pulmonary;  Laterality: Bilateral;  NEED LMA to  eval right upper paratracheal LN.     ENDOBRONCHIAL ULTRASOUND Bilateral 6/16/2022    Procedure: ENDOBRONCHIAL ULTRASOUND (EBUS);  Surgeon: Gregorio Kinney MD;  Location: [facility];  Service: Pulmonary;  Laterality: Bilateral;    INJECTION OF FACET JOINT Left 6/30/2022    Procedure: FACET JOINT Cyst Aspiration L3/4;  Surgeon: Ronnell Baeza MD;  Location: [facility] OR;  Service: Pain Management;  Laterality: Left;    INSERTION OF TUNNELED CENTRAL VENOUS CATHETER (CVC) WITH SUBCUTANEOUS PORT Left 11/04/2020    Procedure: [id number];  Surgeon: Kody Pretty MD;  Location: [facility] OR;  Service: General;  Laterality: Left;    JOINT REPLACEMENT  2008    right knee     LUMBAR LAMINECTOMY      LYMPH NODE BIOPSY      MASTECTOMY Right 1985    NEEDLE LOCALIZATION N/A 05/25/2020    Procedure: NEEDLE LOCALIZATION - lymph node bx;  Surgeon: Steve Weaver MD;  Location: [facility];  Service: Interventional Radiology;  Laterality: N/A;    TRANSFORAMINAL EPIDURAL INJECTION OF STEROID Left 05/14/2020    Procedure: Injection,steroid,epidural,transforaminal approach, l3/4;  Surgeon: Ronnell Baeza MD;  Location: [facility] OR;  Service: Pain Management;  Laterality: Left;    TRANSFORAMINAL EPIDURAL INJECTION OF STEROID Left 03/23/2022    Procedure: Injection,steroid,epidural,transforaminal approach L3/4;  Surgeon: Ronnell Baeza MD;  Location: [facility] OR;  Service: Pain Management;  Laterality: Left;    TRANSFORAMINAL EPIDURAL INJECTION OF STEROID Left 8/1/2022    Procedure: Injection,steroid,epidural,transforaminal approach L3/4;  Surgeon: Ronnell Baeza MD;  Location: [facility]  OR;  Service: Pain Management;  Laterality: Left;    TUBAL LIGATION      UMBILICAL HERNIA REPAIR         Review of patient's allergies indicates:  No Known Allergies    Social History     Socioeconomic History    Marital status:     Number of children: 3   Occupational History    Occupation: retired  for [employer]   Tobacco Use    Smoking status: Former     Packs/day: 0.50     Years: 53.00     Pack years: 26.50     Types: Cigarettes     Quit date: 2022     Years since quittin.6    Smokeless tobacco: Never   Substance and Sexual Activity    Alcohol use: No    Drug use: No    Sexual activity: Not Currently   Social History Narrative    Lives alone        Hobbies: skyler        From [place]           Current Outpatient Medications on File Prior to Visit   Medication Sig Dispense Refill    albuterol (VENTOLIN HFA) 90 mcg/actuation inhaler Inhale 2 puffs into the lungs every 6 (six) hours as needed for Wheezing or Shortness of Breath. Rescue 8 g 3    cetirizine (ZYRTEC) 10 MG tablet Take 10 mg by mouth once daily.      hydroCHLOROthiazide (HYDRODIURIL) 25 MG tablet TAKE 1 TABLET BY MOUTH ONCE DAILY (Patient taking differently: Take 25 mg by mouth once daily.) 90 tablet 3    ibuprofen (ADVIL,MOTRIN) 200 MG tablet Take 600 mg by mouth every 6 (six) hours as needed for Pain.      levothyroxine (SYNTHROID) 100 MCG tablet Take 1 tablet (100 mcg total) by mouth once daily. 30 tablet 11    lisinopriL (PRINIVIL,ZESTRIL) 40 MG tablet TAKE 1 TABLET BY MOUTH ONCE DAILY (Patient taking differently: Take 40 mg by mouth once daily.) 90 tablet 3    mucus clearing device (ACAPELLA, FLUTTER) by Misc.(Non-Drug; Combo Route) route once daily. 100 each 0    multivitamin (THERAGRAN) per tablet Take 1 tablet by mouth once daily.      NIFEdipine (ADALAT CC) 60 MG TbSR TAKE 1 TABLET BY MOUTH ONCE DAILY (Patient taking differently: Take 60 mg by mouth once daily.) 90 tablet 3    omeprazole  (PRILOSEC) 40 MG capsule TAKE 1 CAPSULE BY MOUTH  ONCE DAILY (Patient taking differently: Take 40 mg by mouth once daily.) 30 capsule 11    ondansetron (ZOFRAN) 8 MG tablet Take 1 tablet (8 mg total) by mouth every 8 (eight) hours as needed for Nausea. 30 tablet 1    potassium chloride (MICRO-K) 10 MEQ CpSR TAKE 2 CAPSULES BY MOUTH  ONCE DAILY (Patient taking differently: Take 20 mEq by mouth every evening.) 180 capsule 3    simvastatin (ZOCOR) 10 MG tablet TAKE 1 TABLET BY MOUTH IN  THE EVENING (Patient taking differently: Take 10 mg by mouth every evening.) 90 tablet 0    temazepam (RESTORIL) 30 mg capsule Take 1 capsule (30 mg total) by mouth nightly as needed for Insomnia. 30 capsule 5    albuterol (VENTOLIN HFA) 90 mcg/actuation inhaler Inhale 2 puffs into the lungs every 6 (six) hours as needed for Wheezing. Rescue 50 g 0    fluticasone propionate (FLONASE) 50 mcg/actuation nasal spray USE 1 SPRAY (50 MCG TOTAL) BY EACH NOSTRIL ROUTE ONCE DAILY. 32 g 3    LIDOcaine 4 % PtMd Apply 1 patch topically daily as needed (pain). otc       No current facility-administered medications on file prior to visit.       Family History   Problem Relation Age of Onset    Cancer Sister         uterine    Diabetes Brother     Cancer Sister         Uterine cancer     Breast cancer Other 42       Review of Systems   Constitutional:  Positive for fatigue. Negative for appetite change, chills, fever and unexpected weight change.   HENT:  Positive for ear pain. Negative for sore throat and trouble swallowing.    Eyes:  Negative for pain and visual disturbance.   Respiratory:  Positive for cough and shortness of breath. Negative for wheezing.    Cardiovascular:  Negative for chest pain and palpitations.   Gastrointestinal:  Negative for abdominal pain, blood in stool, diarrhea, nausea and vomiting.   Genitourinary:  Negative for difficulty urinating, dysuria and hematuria.   Musculoskeletal:  Negative for arthralgias, gait  "problem and neck pain.   Skin:  Negative for rash and wound.   Neurological:  Negative for dizziness, weakness, numbness and headaches.   Hematological:  Negative for adenopathy.   Psychiatric/Behavioral:  Negative for dysphoric mood.      Objective:      /70   Pulse 98   Temp 99.6 °F (37.6 °C) (Oral)   Resp (!) 24   Ht 5' 4" (1.626 m)   Wt 90.4 kg (199 lb 4.7 oz)   SpO2 (!) 93% Comment: on oxygen  BMI 34.21 kg/m²   Physical Exam  Constitutional:       Appearance: Normal appearance. She is well-developed.   HENT:      Head: Normocephalic.      Right Ear: Tympanic membrane, ear canal and external ear normal.      Left Ear: Tympanic membrane, ear canal and external ear normal.      Mouth/Throat:      Pharynx: No oropharyngeal exudate or posterior oropharyngeal erythema.   Eyes:      Conjunctiva/sclera: Conjunctivae normal.      Pupils: Pupils are equal, round, and reactive to light.   Neck:      Thyroid: No thyromegaly.   Cardiovascular:      Rate and Rhythm: Normal rate and regular rhythm.      Heart sounds: Normal heart sounds, S1 normal and S2 normal. No murmur heard.    No friction rub. No gallop.   Pulmonary:      Effort: Pulmonary effort is normal.      Breath sounds: Examination of the left-upper field reveals rhonchi. Rhonchi present. No wheezing or rales.   Musculoskeletal:      Cervical back: Normal range of motion and neck supple.      Right lower leg: No edema.      Left lower leg: No edema.   Lymphadenopathy:      Cervical: No cervical adenopathy.   Skin:     General: Skin is warm.      Findings: No rash.   Neurological:      Mental Status: She is alert and oriented to person, place, and time.      Cranial Nerves: No cranial nerve deficit.      Gait: Gait normal.       Assessment:       1. ETD (Eustachian tube dysfunction), bilateral    2. Essential hypertension    3. Malignant neoplasm of right upper lobe of lung    4. Cough, unspecified type          Plan:       ETD (Eustachian tube " dysfunction), bilateral    Essential hypertension    Malignant neoplasm of right upper lobe of lung    Cough, unspecified type    Ear pressure symptom cleared with valsalva in office  Advised flonase BID and valsalva PRN  Continue other present meds  F/u with pulmonology as planned  Other chronic conditions stable

## 2023-02-08 DIAGNOSIS — R53.83 FATIGUE, UNSPECIFIED TYPE: ICD-10-CM

## 2023-02-08 DIAGNOSIS — N28.89 LEFT RENAL MASS: ICD-10-CM

## 2023-02-08 DIAGNOSIS — E55.9 VITAMIN D DEFICIENCY: ICD-10-CM

## 2023-02-08 DIAGNOSIS — R63.0 LOSS OF APPETITE: ICD-10-CM

## 2023-02-08 DIAGNOSIS — R61 NIGHT SWEAT: ICD-10-CM

## 2023-02-08 DIAGNOSIS — C82.90 FOLLICULAR LYMPHOMA, UNSPECIFIED FOLLICULAR LYMPHOMA TYPE, UNSPECIFIED BODY REGION: ICD-10-CM

## 2023-02-08 RX ORDER — CHOLECALCIFEROL (VITAMIN D3) 1250 MCG
1 TABLET ORAL
Qty: 12 TABLET | Refills: 6 | Status: SHIPPED | OUTPATIENT
Start: 2023-02-08 | End: 2024-02-27 | Stop reason: SDUPTHER

## 2023-02-08 NOTE — TELEPHONE ENCOUNTER
----- Message from Lyssa Lugo sent at 2/8/2023  8:17 AM CST -----  Regarding: rx refill  Who Called:[facility]     Refill or New Rx.:Refill    Vitamin B-2 50,000 units      Preferred Pharmacy with phone number:  [facility] - [place], [place] - [address] [facility] [address]  Phone: [phone] Fax: [phone]         Best Call Back Number:Phone: [phone]      Additional Information:

## 2023-03-08 ENCOUNTER — HOSPITAL ENCOUNTER (OUTPATIENT)
Dept: RADIOLOGY | Facility: HOSPITAL | Age: 83
Discharge: HOME | End: 2023-03-08
Attending: INTERNAL MEDICINE
Payer: MEDICARE

## 2023-03-08 DIAGNOSIS — C34.90 MALIGNANT NEOPLASM OF UNSPECIFIED PART OF UNSPECIFIED BRONCHUS OR LUNG: ICD-10-CM

## 2023-03-08 LAB — GLUCOSE SERPL-MCNC: 129 MG/DL (ref 70–110)

## 2023-03-08 PROCEDURE — A9552 F18 FDG: HCPCS | Mod: PN

## 2023-03-08 PROCEDURE — 78815 PET IMAGE W/CT SKULL-THIGH: CPT | Mod: TC,PS,PN

## 2023-03-08 PROCEDURE — 78815 NM PET CT ROUTINE: ICD-10-PCS | Mod: 26,PS,, | Performed by: RADIOLOGY

## 2023-03-08 PROCEDURE — 78815 PET IMAGE W/CT SKULL-THIGH: CPT | Mod: 26,PS,, | Performed by: RADIOLOGY

## 2023-03-08 NOTE — PROGRESS NOTES
PET Imaging Questionnaire    Are you a Diabetic? Recent Blood Sugar level? No    Are you anemic? Bone Marrow Stimulation Meds? No    Have you had a CT Scan, if so when & where was your last one? Yes -     Have you had a PET Scan, if so when & where was your last one? Yes -     Chemotherapy or currently on Chemotherapy? Yes    Radiation therapy? Yes    Surgical History:   Past Surgical History:   Procedure Laterality Date    APPENDECTOMY      BONE MARROW BIOPSY Bilateral 06/24/2020    Procedure: Biopsy-bone marrow;  Surgeon: Rod Montero MD;  Location: [facility] OR;  Service: Oncology;  Laterality: Bilateral;    BREAST RECONSTRUCTION  1990    right    BRONCHOSCOPY N/A 1/18/2023    Procedure: BRONCHOSCOPY;  Surgeon: Gregorio Kinney MD;  Location: [facility];  Service: Pulmonary;  Laterality: N/A;    BRONCHOSCOPY Bilateral 2/10/2023    Procedure: Bronchoscopy;  Surgeon: Gregorio Kinney MD;  Location: [facility];  Service: Pulmonary;  Laterality: Bilateral;  for airway clearance. Purple top tube for cell count diff    CATARACT EXTRACTION W/  INTRAOCULAR LENS IMPLANT Bilateral     CHOLECYSTECTOMY      ENDOBRONCHIAL ULTRASOUND Bilateral 07/09/2021    Procedure: ENDOBRONCHIAL ULTRASOUND (EBUS);  Surgeon: Gregorio Kinney MD;  Location: [facility];  Service: Pulmonary;  Laterality: Bilateral;  NEED LMA to  eval right upper paratracheal LN.     ENDOBRONCHIAL ULTRASOUND Bilateral 6/16/2022    Procedure: ENDOBRONCHIAL ULTRASOUND (EBUS);  Surgeon: Gregorio Kinney MD;  Location: [facility];  Service: Pulmonary;  Laterality: Bilateral;    INJECTION OF FACET JOINT Left 6/30/2022    Procedure: FACET JOINT Cyst Aspiration L3/4;  Surgeon: Ronnell Baeza MD;  Location: [facility] OR;  Service: Pain Management;  Laterality: Left;    INSERTION OF TUNNELED CENTRAL VENOUS CATHETER (CVC) WITH SUBCUTANEOUS PORT Left 11/04/2020    Procedure: [id number];  Surgeon: Kody Pretty MD;  Location: [facility] OR;  Service: General;  Laterality:  Left;    JOINT REPLACEMENT  2008    right knee     LUMBAR LAMINECTOMY      LYMPH NODE BIOPSY      MASTECTOMY Right 1985    NEEDLE LOCALIZATION N/A 05/25/2020    Procedure: NEEDLE LOCALIZATION - lymph node bx;  Surgeon: Steve Weaver MD;  Location: [facility];  Service: Interventional Radiology;  Laterality: N/A;    TRANSFORAMINAL EPIDURAL INJECTION OF STEROID Left 05/14/2020    Procedure: Injection,steroid,epidural,transforaminal approach, l3/4;  Surgeon: Ronnell Baeza MD;  Location: [facility] OR;  Service: Pain Management;  Laterality: Left;    TRANSFORAMINAL EPIDURAL INJECTION OF STEROID Left 03/23/2022    Procedure: Injection,steroid,epidural,transforaminal approach L3/4;  Surgeon: Ronnell Baeza MD;  Location: [facility] OR;  Service: Pain Management;  Laterality: Left;    TRANSFORAMINAL EPIDURAL INJECTION OF STEROID Left 8/1/2022    Procedure: Injection,steroid,epidural,transforaminal approach L3/4;  Surgeon: Ronnell Baeza MD;  Location: [facility] OR;  Service: Pain Management;  Laterality: Left;    TUBAL LIGATION      UMBILICAL HERNIA REPAIR          Have you been fasting for at least 6 hours? Yes    Is there any chance you may be pregnant or breastfeeding? No    Assay: 12.61 MCi@:12.47   Injection Site:lt ac     Residual: .956 mCi@: 12.49   Technologist: Vaishali Mccartney Injected:11.65mCi

## 2023-03-12 DIAGNOSIS — E78.5 HYPERLIPIDEMIA, UNSPECIFIED HYPERLIPIDEMIA TYPE: ICD-10-CM

## 2023-03-12 RX ORDER — SIMVASTATIN 10 MG/1
TABLET, FILM COATED ORAL
Qty: 90 TABLET | Refills: 0 | Status: SHIPPED | OUTPATIENT
Start: 2023-03-12 | End: 2023-05-25

## 2023-03-12 NOTE — TELEPHONE ENCOUNTER
Refill Decision Note   Shandra Velez  is requesting a refill authorization.  Brief Assessment and Rationale for Refill:  Approve     Medication Therapy Plan:       Medication Reconciliation Completed: No   Comments:     Provider Staff:     Action is required for this patient.   Please see care gap opportunities below in Care Due Message.     Thanks!  [facility]     Appointments      Date Provider   Last Visit   2/2/2023 Akil Chavez MD   Next Visit   4/19/2023 Akil Chavez MD     Note composed:6:21 PM 03/12/2023           Note composed:6:21 PM 03/12/2023

## 2023-03-12 NOTE — TELEPHONE ENCOUNTER
Care Due:                  Date            Visit Type   Department     Provider  --------------------------------------------------------------------------------                                HOSPITAL     [facility]  Last Visit: 02-      FOLLOW UP    MEDICINE       JESSE EPPERSON                               -                              [facility]  Next Visit: 04-      CARE ([facility])   MEDICINE       JESSE EPPERSON                                                            Last  Test          Frequency    Reason                     Performed    Due Date  --------------------------------------------------------------------------------    Lipid Panel.  12 months..  simvastatin..............  04- 03-    [facility] Embedded Care Gaps. Reference number: [id number]. 3/12/2023   6:11:40 AM CDT

## 2023-03-15 ENCOUNTER — TELEPHONE (OUTPATIENT)
Dept: HEMATOLOGY/ONCOLOGY | Facility: CLINIC | Age: 83
End: 2023-03-15
Payer: MEDICARE

## 2023-03-15 NOTE — TELEPHONE ENCOUNTER
Returned phone call to patient.  States she had procedure w/ Dr. Kinney 03/13, bronchoscopy.  Also states Dr. Kinney will speak w/ Dr. Mills & let him know he recommends holding off on tx today.  Pt currently taking Augmentin--will take a total of 8 days (today 2 of 8 days)    [facility], notified & Infusion Center[facility]ian, notified of today's appointment cancellations.    Instructed pt she will be notified when appointments are rescheduled.    Instructed this RN will notify Dr. Mills re:  above conversation.   Pt verbally acknowledges understanding.  No further questions or concerns currently    Lilly Gomez, RN

## 2023-03-16 ENCOUNTER — DOCUMENTATION ONLY (OUTPATIENT)
Dept: HEMATOLOGY/ONCOLOGY | Facility: CLINIC | Age: 83
End: 2023-03-16
Payer: MEDICARE

## 2023-03-16 NOTE — NURSING
Patient notified of future appointments for lab/Gene, then infusion.  She will review on the portal.    Instructed this RN will notify Dr. Mills re:  above conversation.   Pt verbally acknowledges understanding.  No further questions or concerns currently    Lilly Gomez RN

## 2023-03-18 PROBLEM — J47.9 BRONCHIECTASIS: Status: ACTIVE | Noted: 2023-03-18

## 2023-03-18 PROBLEM — J96.01 ACUTE HYPOXEMIC RESPIRATORY FAILURE: Status: ACTIVE | Noted: 2023-03-18

## 2023-03-18 PROBLEM — I26.02 ACUTE SADDLE PULMONARY EMBOLISM WITH ACUTE COR PULMONALE: Status: ACTIVE | Noted: 2023-03-18

## 2023-03-18 PROBLEM — J96.21 ACUTE ON CHRONIC RESPIRATORY FAILURE WITH HYPOXIA: Status: ACTIVE | Noted: 2023-03-18

## 2023-03-19 PROBLEM — J98.4 PNEUMONITIS: Status: ACTIVE | Noted: 2023-03-19

## 2023-03-19 PROBLEM — D64.9 NORMOCYTIC ANEMIA: Status: ACTIVE | Noted: 2023-03-19

## 2023-03-19 PROBLEM — D69.6 THROMBOCYTOPENIA: Status: ACTIVE | Noted: 2023-03-19

## 2023-03-21 PROBLEM — K64.4 EXTERNAL HEMORRHOID: Status: ACTIVE | Noted: 2023-03-21

## 2023-03-24 PROBLEM — N18.31 STAGE 3A CHRONIC KIDNEY DISEASE: Status: ACTIVE | Noted: 2022-04-05

## 2023-03-24 PROBLEM — D62 ACUTE BLOOD LOSS ANEMIA: Status: ACTIVE | Noted: 2023-03-24

## 2023-03-24 PROBLEM — K62.5 RECTAL BLEEDING: Status: ACTIVE | Noted: 2023-03-21

## 2023-03-24 PROBLEM — J43.2 CENTRILOBULAR EMPHYSEMA: Status: RESOLVED | Noted: 2021-06-30 | Resolved: 2023-03-24

## 2023-03-24 PROBLEM — N18.31 STAGE 3A CHRONIC KIDNEY DISEASE: Status: RESOLVED | Noted: 2022-04-05 | Resolved: 2023-03-24

## 2023-03-24 PROBLEM — J98.4 PNEUMONITIS: Status: RESOLVED | Noted: 2023-03-19 | Resolved: 2023-03-24

## 2023-03-24 PROBLEM — D69.6 THROMBOCYTOPENIA: Status: RESOLVED | Noted: 2023-03-19 | Resolved: 2023-03-24

## 2023-03-24 PROBLEM — J47.9 BRONCHIECTASIS: Status: RESOLVED | Noted: 2023-03-18 | Resolved: 2023-03-24

## 2023-03-24 PROBLEM — E87.6 HYPOKALEMIA: Status: RESOLVED | Noted: 2018-04-06 | Resolved: 2023-03-24

## 2023-03-28 PROBLEM — I10 HYPERTENSION: Status: ACTIVE | Noted: 2023-03-28

## 2023-03-28 PROBLEM — D64.9 NORMOCYTIC ANEMIA: Status: RESOLVED | Noted: 2023-03-19 | Resolved: 2023-03-28

## 2023-04-04 PROBLEM — E87.1 HYPONATREMIA: Status: ACTIVE | Noted: 2023-04-04

## 2023-04-05 ENCOUNTER — PATIENT OUTREACH (OUTPATIENT)
Dept: ADMINISTRATIVE | Facility: HOSPITAL | Age: 83
End: 2023-04-05
Payer: MEDICARE

## 2023-04-11 ENCOUNTER — TELEPHONE (OUTPATIENT)
Dept: HEMATOLOGY/ONCOLOGY | Facility: CLINIC | Age: 83
End: 2023-04-11
Payer: MEDICARE

## 2023-04-11 NOTE — TELEPHONE ENCOUNTER
----- Message from Chad Mills MD sent at 3/29/2023  9:15 AM CDT -----  Pt will need a follow up appt with me once she is out of rehab.  Chemo does not need to be rescheduled as we will likely stop immunotherapy.

## 2023-04-11 NOTE — NURSING
Patient returned my call and accepted a hosp f/u visit for Friday at 1400.  Date, time, and location confirmed.

## 2023-04-13 PROBLEM — F17.201 TOBACCO ABUSE, IN REMISSION: Status: ACTIVE | Noted: 2021-06-28

## 2023-04-13 NOTE — PROGRESS NOTES
PATIENT: Shandra Velez  MRN: [id number]  DATE: 4/14/2023      Diagnosis:   1. Malignant neoplasm of unspecified part of unspecified bronchus or lung    2. Other specified disorders involving the immune mechanism, not elsewhere classified    3. Pneumonitis    4. Acute saddle pulmonary embolism with acute cor pulmonale    5. Follicular lymphoma grade I, unspecified body region    6. Right renal mass    7. Hypertension, unspecified type    8. Hyperlipidemia, unspecified hyperlipidemia type    9. Acquired hypothyroidism              Chief Complaint: Lung Cancer        Oncologic History:     Pt initially underwent right inguinal LN biopsy 5/25/22 showing grade 1 follicular lymphoma.  Bone marrow biopsy done 06/24/2020 showed involvement with follicular lymphoma.  PET/CT 7/07/20 showed lymphadenopathy above and below the diaphragm as well as the spleen.  Also seen wasa RUL lesion.  The patient was started on CVP-R followed by maintenance rituxan every 8 weeks.  Biopsy of the RUL nodule on 6/18/21 showed SCC with PD-L1 at 86%.  EBUS 7/09/21 showed no malignant cells at station 7, 4R or 11RS.  The patient was treated with SBRT under the care of Dr Barnes with 50Gy in 4 fractions completed 10/28/21.  The patient then developed new pulmonary nodules on CT scan 6/02/22.  WBUS 6/16/22 showed positive SCC in 4R LN.  Pt was discussed at tumor board on 6/21/22 with recommendation for Keytruda.  The patient underwent PET/CT on 9/23/22 after 2 doses of treatment showing a 1.5 cm precarinal lymph node; stable right apical mass measuring 1.8 x 1.5 cm; stable 8 mm right apical lung nodule with adjacent post radiation change measuring 2 x 2.7 cm; stable 4 mm subpleural nodule in the lateral right upper lobe; fluid/mucus in the right lower lobe bronchus tree; 6 mm anterior left upper lobe nodule which is new; 2.3 cm simple cyst in the midpole of the left kidney; 3.1 x 2.3 cm rim enhancing complex cystic and solid mass in the mid to lower  pole the right kidney; infrarenal abdominal aortic aneurysm measuring 3.8 x 3.5 cm; and stable a left para-aortic lymph node measuring 11 mm. 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   The patient underwent PET-CT on 11/29/2022 showing an irregular hypermetabolic right upper lobe tumor which is stable measuring 1.6 x 1.2 cm; hypermetabolic subpleural right upper lobe consolidation diminished in extent; unchanged linear zone of hypermetabolic atelectasis in the superior segment of the left lower lobe; new poorly metabolic ill-defined infiltrate in the posterior right lower lobe; 4 mm left upper lobe nodule which is stable; hypermetabolic precarinal mediastinal lymph node stable measuring 1 cm; and hypermetabolic aortopulmonary lymph node stable measuring 1.1 x 1.1 cm.   The patient was admitted to the hospital from 1/16/23 to 1/23/23 for pneumonia.  She underwent bronchoscopy with Dr Kinney on 1/18/23 showing significant mucus plug impaction int the pharynx, left mainstem bronchus and right and left lower lobes.  The patient was discharged on 3L O2.    Subjective:    Interval History: Ms. Velez is a 82 y.o. female with HLD, HTN, osteopenia, follicular lymphoma who presents for follow up of NSCLC.  Since since the last clinic visit the patient underwent PET-CT on 03/08/2023 showing thickening of the pleura; new ground-glass opacities within the left upper lobe; patchy airspace opacities in the right lower lobe; right lower lobe pulmonary artery hypermetabolic activity possibly secondary to bronchial wall thickening or thrombus in the pulmonary artery; diffuse hypermetabolic activity identified throughout the axial skeleton.  The patient was then admitted to the hospital on 03/18  after presenting with shortness of breath and being found to have a saddle pulmonary embolus on CTA 03/18/2023.  The patient underwent treatment with a  heparin drip and thrombectomy on 03/18/2023 with eventual transition to Eliquis.    Currently the patient endorses shortness breath with activity.  She is still on 2 L of oxygen.  She is been off of steroids for the last week.  The patient denies CP, cough, abdominal pain, nausea, vomiting, constipation, diarrhea.  The patient denies fever, chills, night sweats, weight loss, new lumps or bumps, easy bruising or bleeding.    Past Medical History:   Past Medical History:   Diagnosis Date    Breast cancer 1985    right mastectomy    Digestive disorder     Encounter for blood transfusion     History of pneumonia     Hypercholesterolemia     Hypertension     Indigestion     Lumbar spondylosis     Lung cancer     2022 - currently in treatment as of 7/27/22    Lymphoma     Osteopenia     Pulmonary nodule     Smoker 06/11/2022       Past Surgical HIstory:   Past Surgical History:   Procedure Laterality Date    APPENDECTOMY      BONE MARROW BIOPSY Bilateral 06/24/2020    Procedure: Biopsy-bone marrow;  Surgeon: Rod Montero MD;  Location: [facility] OR;  Service: Oncology;  Laterality: Bilateral;    BREAST RECONSTRUCTION  1990    right    BRONCHOSCOPY N/A 1/18/2023    Procedure: BRONCHOSCOPY;  Surgeon: Gregorio Kinney MD;  Location: [facility];  Service: Pulmonary;  Laterality: N/A;    BRONCHOSCOPY Bilateral 2/10/2023    Procedure: Bronchoscopy;  Surgeon: Gregorio Kinney MD;  Location: [facility];  Service: Pulmonary;  Laterality: Bilateral;  for airway clearance. Purple top tube for cell count diff    BRONCHOSCOPY N/A 3/13/2023    Procedure: Bronchoscopy;  Surgeon: Gregorio Kinney MD;  Location: [facility];  Service: Pulmonary;  Laterality: N/A;  therapeutic scope. Please have purple top tube and iced saline    CATARACT EXTRACTION W/  INTRAOCULAR LENS IMPLANT Bilateral     CHOLECYSTECTOMY      ENDOBRONCHIAL ULTRASOUND Bilateral 07/09/2021    Procedure: ENDOBRONCHIAL ULTRASOUND (EBUS);  Surgeon: Gregorio Kinney MD;  Location:  [facility];  Service: Pulmonary;  Laterality: Bilateral;  NEED LMA to  eval right upper paratracheal LN.     ENDOBRONCHIAL ULTRASOUND Bilateral 6/16/2022    Procedure: ENDOBRONCHIAL ULTRASOUND (EBUS);  Surgeon: Gregorio Kinney MD;  Location: [facility];  Service: Pulmonary;  Laterality: Bilateral;    INJECTION OF FACET JOINT Left 6/30/2022    Procedure: FACET JOINT Cyst Aspiration L3/4;  Surgeon: Ronnell Baeza MD;  Location: [facility] OR;  Service: Pain Management;  Laterality: Left;    INSERTION OF TUNNELED CENTRAL VENOUS CATHETER (CVC) WITH SUBCUTANEOUS PORT Left 11/04/2020    Procedure: [id number];  Surgeon: Kody Pretty MD;  Location: [facility] OR;  Service: General;  Laterality: Left;    JOINT REPLACEMENT  2008    right knee     LUMBAR LAMINECTOMY      LYMPH NODE BIOPSY      MASTECTOMY Right 1985    NEEDLE LOCALIZATION N/A 05/25/2020    Procedure: NEEDLE LOCALIZATION - lymph node bx;  Surgeon: Steve Weaver MD;  Location: [facility] CATH;  Service: Interventional Radiology;  Laterality: N/A;    RIGHT HEART CATHETERIZATION Right 3/18/2023    Procedure: INSERTION, CATHETER, RIGHT HEART;  Surgeon: Rukhsana Lang MD;  Location: [facility] CATH;  Service: Cardiology;  Laterality: Right;    SELECTIVE UNILATERAL PULMONARY ANGIOGRAPHY  3/18/2023    Procedure: Pumonary angiography - unilateral;  Surgeon: Rukhsana Lang MD;  Location: [facility] CATH;  Service: Cardiology;;    THROMBECTOMY N/A 3/18/2023    Procedure: THROMBECTOMY;  Surgeon: Rukhsana Lang MD;  Location: [facility] CATH;  Service: Cardiology;  Laterality: N/A;    TRANSFORAMINAL EPIDURAL INJECTION OF STEROID Left 05/14/2020    Procedure: Injection,steroid,epidural,transforaminal approach, l3/4;  Surgeon: Ronnell Baeza MD;  Location: [facility] OR;  Service: Pain Management;  Laterality: Left;    TRANSFORAMINAL EPIDURAL INJECTION OF STEROID Left 03/23/2022    Procedure: Injection,steroid,epidural,transforaminal approach L3/4;  Surgeon: Ronnell Baeza MD;  Location:  [facility] OR;  Service: Pain Management;  Laterality: Left;    TRANSFORAMINAL EPIDURAL INJECTION OF STEROID Left 8/1/2022    Procedure: Injection,steroid,epidural,transforaminal approach L3/4;  Surgeon: Ronnell Baeza MD;  Location: [facility] OR;  Service: Pain Management;  Laterality: Left;    TUBAL LIGATION      UMBILICAL HERNIA REPAIR         Family History:   Family History   Problem Relation Age of Onset    Cancer Sister         uterine    Diabetes Brother     Cancer Sister         Uterine cancer     Breast cancer Other 42       Social History:  reports that she quit smoking about 10 months ago. Her smoking use included cigarettes. She has a 26.50 pack-year smoking history. She has never used smokeless tobacco. She reports that she does not drink alcohol and does not use drugs.    Allergies:  Review of patient's allergies indicates:   Allergen Reactions    Opioids - morphine analogues Other (See Comments)     Hypotension       Medications:  Current Outpatient Medications   Medication Sig Dispense Refill    albuterol (VENTOLIN HFA) 90 mcg/actuation inhaler Inhale 2 puffs into the lungs every 6 (six) hours as needed for Wheezing. Rescue 50 g 0    albuterol-ipratropium (DUO-NEB) 2.5 mg-0.5 mg/3 mL nebulizer solution Take 3 mLs by nebulization every 6 (six) hours as needed for Wheezing. Rescue 75 mL 2    apixaban (ELIQUIS) 5 mg Tab Take 1 tablet (5 mg total) by mouth 2 (two) times daily. 60 tablet 11    ascorbic acid, vitamin C, (VITAMIN C) 500 MG tablet Take 1 tablet (500 mg total) by mouth once daily. 30 tablet 0    budesonide (PULMICORT) 0.5 mg/2 mL nebulizer solution Take 2 mLs (0.5 mg total) by nebulization 2 (two) times a day. Controller 120 mL 11    cetirizine (ZYRTEC) 10 MG tablet Take 10 mg by mouth once daily.      cholecalciferol, vitamin D3, 1,250 mcg (50,000 unit) Tab Take 1 tablet by mouth every 7 days. 12 tablet 6    docusate sodium (COLACE) 100 MG capsule Take 2 capsules (200 mg total) by mouth 2 (two)  times daily. 120 capsule 0    ferrous sulfate 324 mg (65 mg iron) TbEC Take 1 tablet (324 mg total) by mouth once daily. 30 tablet 0    fluticasone propionate (FLONASE) 50 mcg/actuation nasal spray USE 1 SPRAY (50 MCG TOTAL) BY EACH NOSTRIL ROUTE ONCE DAILY. (Patient taking differently: 1 spray by Each Nostril route once daily.) 32 g 3    hydroCHLOROthiazide (HYDRODIURIL) 25 MG tablet TAKE 1 TABLET BY MOUTH ONCE DAILY (Patient taking differently: Take 25 mg by mouth once daily.) 90 tablet 3    Lactobacillus rhamnosus GG (CULTURELLE) 10 billion cell capsule Take 1 capsule by mouth once daily. 30 capsule 5    levothyroxine (SYNTHROID) 100 MCG tablet Take 1 tablet (100 mcg total) by mouth once daily. 30 tablet 11    lisinopriL (PRINIVIL,ZESTRIL) 40 MG tablet TAKE 1 TABLET BY MOUTH ONCE DAILY (Patient taking differently: Take 40 mg by mouth every evening.) 90 tablet 3    mucus clearing device (ACAPELLA, FLUTTER) by Misc.(Non-Drug; Combo Route) route once daily. 100 each 0    multivitamin (THERAGRAN) per tablet Take 1 tablet by mouth once daily.      NIFEdipine (ADALAT CC) 60 MG TbSR TAKE 1 TABLET BY MOUTH ONCE DAILY (Patient taking differently: Take 60 mg by mouth once daily.) 90 tablet 3    omeprazole (PRILOSEC) 40 MG capsule TAKE 1 CAPSULE BY MOUTH  ONCE DAILY (Patient taking differently: Take 40 mg by mouth once daily.) 30 capsule 11    potassium chloride (MICRO-K) 10 MEQ CpSR TAKE 2 CAPSULES BY MOUTH  ONCE DAILY (Patient taking differently: Take 20 mEq by mouth every evening.) 180 capsule 3    simvastatin (ZOCOR) 10 MG tablet TAKE 1 TABLET BY MOUTH IN THE  EVENING (Patient taking differently: Take 10 mg by mouth every evening.) 90 tablet 0    sodium chloride 3% 3 % nebulizer solution Take 4 mLs by nebulization 2 (two) times a day. 240 mL 3    temazepam (RESTORIL) 30 mg capsule Take 30 mg by mouth every evening.       No current facility-administered medications for this visit.       Review of Systems  "  Constitutional:  Negative for appetite change, chills, fatigue, fever and unexpected weight change.   Eyes:  Negative for visual disturbance.   Respiratory:  Positive for shortness of breath (with exertion and when off oxygen). Negative for cough.    Cardiovascular:  Negative for chest pain and palpitations.   Gastrointestinal:  Negative for abdominal pain, constipation, diarrhea, nausea and vomiting.   Musculoskeletal:  Negative for back pain.   Skin:  Negative for rash.   Neurological:  Negative for headaches.   Hematological:  Negative for adenopathy. Does not bruise/bleed easily.   Psychiatric/Behavioral:  The patient is not nervous/anxious.      ECOG Performance Status: 2   Objective:      Vitals:   Vitals:    04/14/23 1340   BP: 108/80   BP Location: Left arm   Patient Position: Sitting   BP Method: Large (Manual)   Pulse: 85   Resp: 18   Temp: 97.4 °F (36.3 °C)   TempSrc: Temporal   SpO2: (!) 94%   Weight: 89.3 kg (196 lb 13.9 oz)   Height: 5' 4" (1.626 m)           Physical Exam  Constitutional:       General: She is not in acute distress.     Appearance: She is well-developed. She is not diaphoretic.   HENT:      Head: Normocephalic and atraumatic.   Cardiovascular:      Rate and Rhythm: Normal rate and regular rhythm.      Heart sounds: Normal heart sounds. No murmur heard.    No friction rub. No gallop.   Pulmonary:      Effort: Pulmonary effort is normal. No respiratory distress.      Breath sounds: Wheezing present. No rales.   Chest:      Chest wall: No tenderness.   Abdominal:      General: Bowel sounds are normal. There is no distension.      Palpations: Abdomen is soft. There is no mass.      Tenderness: There is no abdominal tenderness. There is no guarding or rebound.   Lymphadenopathy:      Cervical: No cervical adenopathy.      Upper Body:      Right upper body: No supraclavicular or axillary adenopathy.      Left upper body: No supraclavicular or axillary adenopathy.   Skin:     Findings: No " erythema or rash.   Neurological:      Mental Status: She is alert and oriented to person, place, and time.   Psychiatric:         Behavior: Behavior normal.       Laboratory Data:  Admission on 04/03/2023, Discharged on 04/12/2023   Component Date Value Ref Range Status    POC Glucose 04/03/2023 123 (A)  70 - 110 MG/DL Final    POCT Glucose 04/03/2023 123 (H)  70 - 110 mg/dL Final    POC Glucose 04/04/2023 101  70 - 110 MG/DL Final    POCT Glucose 04/04/2023 101  70 - 110 mg/dL Final    POCT Glucose 04/05/2023 89  70 - 110 mg/dL Final    TB Induration 48 - 72 hr read 04/08/2023 0  mm Final    WBC 04/06/2023 4.69  3.90 - 12.70 K/uL Final    RBC 04/06/2023 3.39 (L)  4.00 - 5.40 M/uL Final    Hemoglobin 04/06/2023 9.8 (L)  12.0 - 16.0 g/dL Final    Hematocrit 04/06/2023 32.0 (L)  37.0 - 48.5 % Final    MCV 04/06/2023 94  82 - 98 fL Final    MCH 04/06/2023 28.9  27.0 - 31.0 pg Final    MCHC 04/06/2023 30.6 (L)  32.0 - 36.0 g/dL Final    RDW 04/06/2023 18.1 (H)  11.5 - 14.5 % Final    Platelets 04/06/2023 102 (L)  150 - 450 K/uL Final    MPV 04/06/2023 10.8  9.2 - 12.9 fL Final    Immature Granulocytes 04/06/2023 2.6 (H)  0.0 - 0.5 % Final    Gran # (ANC) 04/06/2023 3.0  1.8 - 7.7 K/uL Final    Immature Grans (Abs) 04/06/2023 0.12 (H)  0.00 - 0.04 K/uL Final    Comment: Mild elevation in immature granulocytes is non specific and   can be seen in a variety of conditions including stress response,   acute inflammation, trauma and pregnancy. Correlation with other   laboratory and clinical findings is essential.      Lymph # 04/06/2023 0.9 (L)  1.0 - 4.8 K/uL Final    Mono # 04/06/2023 0.4  0.3 - 1.0 K/uL Final    Eos # 04/06/2023 0.2  0.0 - 0.5 K/uL Final    Baso # 04/06/2023 0.04  0.00 - 0.20 K/uL Final    nRBC 04/06/2023 0  0 /100 WBC Final    Gran % 04/06/2023 63.9  38.0 - 73.0 % Final    Lymph % 04/06/2023 19.8  18.0 - 48.0 % Final    Mono % 04/06/2023 9.2  4.0 - 15.0 % Final    Eosinophil % 04/06/2023 3.6  0.0 - 8.0  % Final    Basophil % 04/06/2023 0.9  0.0 - 1.9 % Final    Differential Method 04/06/2023 Automated   Final    Sodium 04/06/2023 140  136 - 145 mmol/L Final    Potassium 04/06/2023 3.6  3.5 - 5.1 mmol/L Final    Chloride 04/06/2023 107  95 - 110 mmol/L Final    CO2 04/06/2023 23  23 - 29 mmol/L Final    Glucose 04/06/2023 92  70 - 110 mg/dL Final    BUN 04/06/2023 26 (H)  8 - 23 mg/dL Final    Creatinine 04/06/2023 1.0  0.5 - 1.4 mg/dL Final    Calcium 04/06/2023 8.4 (L)  8.7 - 10.5 mg/dL Final    Total Protein 04/06/2023 5.1 (L)  6.0 - 8.4 g/dL Final    Albumin 04/06/2023 2.8 (L)  3.5 - 5.2 g/dL Final    Total Bilirubin 04/06/2023 0.4  0.1 - 1.0 mg/dL Final    Comment: For infants and newborns, interpretation of results should be based  on gestational age, weight and in agreement with clinical  observations.    Premature Infant recommended reference ranges:  Up to 24 hours.............<8.0 mg/dL  Up to 48 hours............<12.0 mg/dL  3-5 days..................<15.0 mg/dL  6-29 days.................<15.0 mg/dL      Alkaline Phosphatase 04/06/2023 52 (L)  55 - 135 U/L Final    AST 04/06/2023 13  10 - 40 U/L Final    ALT 04/06/2023 8 (L)  10 - 44 U/L Final    Anion Gap 04/06/2023 10  8 - 16 mmol/L Final    eGFR 04/06/2023 57 (A)  >60 mL/min/1.73 m^2 Final    POCT Glucose 04/09/2023 86  70 - 110 mg/dL Final    WBC 04/10/2023 6.09  3.90 - 12.70 K/uL Final    RBC 04/10/2023 3.46 (L)  4.00 - 5.40 M/uL Final    Hemoglobin 04/10/2023 10.0 (L)  12.0 - 16.0 g/dL Final    Hematocrit 04/10/2023 33.2 (L)  37.0 - 48.5 % Final    MCV 04/10/2023 96  82 - 98 fL Final    MCH 04/10/2023 28.9  27.0 - 31.0 pg Final    MCHC 04/10/2023 30.1 (L)  32.0 - 36.0 g/dL Final    RDW 04/10/2023 18.0 (H)  11.5 - 14.5 % Final    Platelets 04/10/2023 121 (L)  150 - 450 K/uL Final    MPV 04/10/2023 11.6  9.2 - 12.9 fL Final    Immature Granulocytes 04/10/2023 2.1 (H)  0.0 - 0.5 % Final    Gran # (ANC) 04/10/2023 4.5  1.8 - 7.7 K/uL Final    Immature  Grans (Abs) 04/10/2023 0.13 (H)  0.00 - 0.04 K/uL Final    Comment: Mild elevation in immature granulocytes is non specific and   can be seen in a variety of conditions including stress response,   acute inflammation, trauma and pregnancy. Correlation with other   laboratory and clinical findings is essential.      Lymph # 04/10/2023 0.9 (L)  1.0 - 4.8 K/uL Final    Mono # 04/10/2023 0.4  0.3 - 1.0 K/uL Final    Eos # 04/10/2023 0.2  0.0 - 0.5 K/uL Final    Baso # 04/10/2023 0.03  0.00 - 0.20 K/uL Final    nRBC 04/10/2023 0  0 /100 WBC Final    Gran % 04/10/2023 73.6 (H)  38.0 - 73.0 % Final    Lymph % 04/10/2023 14.1 (L)  18.0 - 48.0 % Final    Mono % 04/10/2023 6.6  4.0 - 15.0 % Final    Eosinophil % 04/10/2023 3.1  0.0 - 8.0 % Final    Basophil % 04/10/2023 0.5  0.0 - 1.9 % Final    Differential Method 04/10/2023 Automated   Final    Sodium 04/10/2023 141  136 - 145 mmol/L Final    Potassium 04/10/2023 3.6  3.5 - 5.1 mmol/L Final    Chloride 04/10/2023 109  95 - 110 mmol/L Final    CO2 04/10/2023 22 (L)  23 - 29 mmol/L Final    Glucose 04/10/2023 87  70 - 110 mg/dL Final    BUN 04/10/2023 19  8 - 23 mg/dL Final    Creatinine 04/10/2023 1.0  0.5 - 1.4 mg/dL Final    Calcium 04/10/2023 8.2 (L)  8.7 - 10.5 mg/dL Final    Total Protein 04/10/2023 5.0 (L)  6.0 - 8.4 g/dL Final    Albumin 04/10/2023 2.8 (L)  3.5 - 5.2 g/dL Final    Total Bilirubin 04/10/2023 0.3  0.1 - 1.0 mg/dL Final    Comment: For infants and newborns, interpretation of results should be based  on gestational age, weight and in agreement with clinical  observations.    Premature Infant recommended reference ranges:  Up to 24 hours.............<8.0 mg/dL  Up to 48 hours............<12.0 mg/dL  3-5 days..................<15.0 mg/dL  6-29 days.................<15.0 mg/dL      Alkaline Phosphatase 04/10/2023 50 (L)  55 - 135 U/L Final    AST 04/10/2023 15  10 - 40 U/L Final    ALT 04/10/2023 8 (L)  10 - 44 U/L Final    Anion Gap 04/10/2023 10  8 - 16  mmol/L Final    eGFR 04/10/2023 57 (A)  >60 mL/min/1.73 m^2 Final    Prealbumin 04/10/2023 18 (L)  20 - 43 mg/dL Final    POCT Glucose 04/10/2023 87  70 - 110 mg/dL Final         Imaging:    PET/CT 3/08/23  Head and Neck:     Brain demonstrates normal metabolism.  However, FDG-PET has an approximate 60% sensitivity for brain metastases which are best detected by MRI with gadolinium.  Physiologic uptake is in the neck.     Chest:     Apical scarring is identified on the right with thickening of the pleura without evidence for hypermetabolic activity.  The overall appearance is not significantly changed.  New ground-glass opacities identified within the left upper lobe adjacent to the pericardium on image number [id number] this has a ground-glass opacity on the CT component has a maximum SUV of 2.5.  Patchy airspace opacities are identified within the right lower lobe.  The distribution of the right lower lobe pulmonary artery hypermetabolic activity identified.  This could be secondary to the adjacent bronchial wall thickening or could represent thrombus in pulmonary artery..  No enlarged or FDG avid lymph nodes are in the chest.     Abdomen and Pelvis:     Physiologic uptake is in the liver, spleen, urinary system and bowel. No enlarged or FDG avid lymph nodes are in the abdomen or pelvis. Adrenal glands are normal.     Musculoskeletal:     No FDG avid osseous lesions are present.  Diffuse hypermetabolic activity identified throughout the axial skeleton could represent the use of a colony stimulating factor.    CTA 3/18/23  There is a saddle pulmonary embolus extending into the bilateral upper lobes and left lower lobe.  There is no flattening of the intraventricular septum.  There is minimal reflux of contrast into the a Paddock veins.     The airway is widely patent.  There is a left subclavian port.  There is no mediastinal or hilar lymphadenopathy.  There is mild interstitial edema with lower lobe atelectasis.  There is  no pleural effusion.  There is no pulmonary nodule or mass.     The upper abdomen demonstrates no acute abnormality.  There is no lytic or blastic osseous lesion.       US LE 3/18/23  The right common femoral, femoral, greater saphenous, deep femoral, popliteal, peroneal, posterior tibial, and anterior tibial veins are patent with normal compressibility.  The veins demonstrate normal phasic flow and augmentation response.     The left common femoral, femoral, greater saphenous, deep femoral, popliteal, peroneal, posterior tibial, and anterior tibial veins are patent with normal compressibility.  The veins demonstrate normal phasic flow and augmentation response.        Assessment:       1. Malignant neoplasm of unspecified part of unspecified bronchus or lung    2. Other specified disorders involving the immune mechanism, not elsewhere classified    3. Pneumonitis    4. Acute saddle pulmonary embolism with acute cor pulmonale    5. Follicular lymphoma grade I, unspecified body region    6. Right renal mass    7. Hypertension, unspecified type    8. Hyperlipidemia, unspecified hyperlipidemia type    9. Acquired hypothyroidism                 Plan:     NSCLC - Pt has NSCLC SCC with involvement of the right lung and mediastinum  -Pt with prior radiation to a RUL nodule  -PD-L1 was 86% on 6/18/21  -Pt was on Keytruda with 6 week cycles and completed 6 tx's  -PET/CT on 9/23/22 showed possible pseudoprogression  -PET/CT on 11/29/22 showed stable disease with RLL infiltrate concerning for resolving PNA  -PET-CT on 03/08/2023 showed resolution of prior disease   -Given immune mediated pneumonitis, will hold off on additional immunotherapy   -Will repeat CT chest in 4 weeks    Pneumonitis - from Keytruda   -Patient completed steroid taper   -Management per Pulmonary    Saddle Pulmonary Embolus - seen on CTA 03/18/2023   -Patient is status post thrombectomy and currently on Eliquis   -Patient will need to be on Eliquis  lifelong given lung cancer    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 for 9/12 cycles with good response  -Will monitor    Right Renal Mass - Pt with right renal mass seen on PET/CT 9/23/22 wich was enlarging  -No mention of renal mass on recent PET/CT 11/29/22 and 3/08/23  -Will monitor    HTN - pt on lisinopril, HCTZ and nifedipine  -BP controlled  -Will monitor    HLD - Pt on statin   -Mangement per PCP    Hypothyroidism - pt on levothyroxine  -Management per PCP    Route Chart for Scheduling    Med Onc Chart Routing      Follow up with physician 4 weeks. Pt will need her labs on 4/24, 4/25, and 4/26 cancelled.  she can have her chemo and appt with me on 4/26/23 cancelled.  She needs a CBC, CMP and TSH in 4 weeks with an appt with me.  the patient needs a CT chest just prior to her appt with me in 4 weeks.   Follow up with DAYAMI    Infusion scheduling note    Injection scheduling note    Labs    Imaging    Pharmacy appointment    Other referrals           Treatment Plan Information   OP PEMBROLIZUMAB 400MG Q6W   Chad Mills MD   Upcoming Treatment Dates - OP PEMBROLIZUMAB 400MG Q6W    3/15/2023       Pre-Medications       acetaminophen tablet 1,000 mg       diphenhydrAMINE (BENADRYL) 25 mg in NS 50 mL IVPB       Chemotherapy       pembrolizumab (KEYTRUDA) 400 mg in sodium chloride 0.9% SolP 116 mL infusion  4/26/2023       Pre-Medications       acetaminophen tablet 1,000 mg       diphenhydrAMINE (BENADRYL) 25 mg in NS 50 mL IVPB       Chemotherapy       pembrolizumab (KEYTRUDA) 400 mg in sodium chloride 0.9% SolP 116 mL infusion  6/7/2023       Pre-Medications       acetaminophen tablet 1,000 mg       diphenhydrAMINE (BENADRYL) 25 mg in NS 50 mL IVPB       Chemotherapy       pembrolizumab (KEYTRUDA) 400 mg in sodium chloride 0.9% SolP 116 mL infusion  7/19/2023       Pre-Medications       acetaminophen tablet 1,000 mg       diphenhydrAMINE (BENADRYL) 25 mg in NS 50 mL  IVPB       Chemotherapy       pembrolizumab (KEYTRUDA) 400 mg in sodium chloride 0.9% SolP 116 mL infusion    Therapy Plan Information  EVUSHELD (TIXAGEVIMAB/CILGAVIMAB)  diphenhydrAMINE capsule 25 mg  25 mg, Oral, PRN  predniSONE tablet 40 mg  40 mg, Oral, PRN  EPINEPHrine (EPIPEN) 0.3 mg/0.3 mL pen injection 0.3 mg  0.3 mg, Intramuscular, PRN  ondansetron disintegrating tablet 4 mg  4 mg, Oral, PRN  acetaminophen tablet 650 mg  650 mg, Oral, PRN  albuterol inhaler 2 puff  2 puff, Inhalation, PRN    INF PEGFILGRASTIM (NEULASTA)  pegfilgrastim injection 6 mg  6 mg, Subcutaneous, Every visit        Chad Mills MD  [facility]  Hematology and Oncology  [facility]   [address] [place] [address]   O: [phone]  F: [phone]

## 2023-04-14 ENCOUNTER — OFFICE VISIT (OUTPATIENT)
Dept: HEMATOLOGY/ONCOLOGY | Facility: CLINIC | Age: 83
End: 2023-04-14
Payer: MEDICARE

## 2023-04-14 VITALS
SYSTOLIC BLOOD PRESSURE: 108 MMHG | BODY MASS INDEX: 33.61 KG/M2 | TEMPERATURE: 97 F | WEIGHT: 196.88 LBS | HEART RATE: 85 BPM | OXYGEN SATURATION: 94 % | RESPIRATION RATE: 18 BRPM | HEIGHT: 64 IN | DIASTOLIC BLOOD PRESSURE: 80 MMHG

## 2023-04-14 DIAGNOSIS — J98.4 PNEUMONITIS: ICD-10-CM

## 2023-04-14 DIAGNOSIS — E03.9 ACQUIRED HYPOTHYROIDISM: ICD-10-CM

## 2023-04-14 DIAGNOSIS — C82.00 FOLLICULAR LYMPHOMA GRADE I, UNSPECIFIED BODY REGION: ICD-10-CM

## 2023-04-14 DIAGNOSIS — N28.89 RIGHT RENAL MASS: ICD-10-CM

## 2023-04-14 DIAGNOSIS — I10 HYPERTENSION, UNSPECIFIED TYPE: ICD-10-CM

## 2023-04-14 DIAGNOSIS — I26.02 ACUTE SADDLE PULMONARY EMBOLISM WITH ACUTE COR PULMONALE: ICD-10-CM

## 2023-04-14 DIAGNOSIS — C34.90 MALIGNANT NEOPLASM OF UNSPECIFIED PART OF UNSPECIFIED BRONCHUS OR LUNG: Primary | ICD-10-CM

## 2023-04-14 DIAGNOSIS — E78.5 HYPERLIPIDEMIA, UNSPECIFIED HYPERLIPIDEMIA TYPE: ICD-10-CM

## 2023-04-14 DIAGNOSIS — D89.89 OTHER SPECIFIED DISORDERS INVOLVING THE IMMUNE MECHANISM, NOT ELSEWHERE CLASSIFIED: ICD-10-CM

## 2023-04-14 PROCEDURE — 3079F DIAST BP 80-89 MM HG: CPT | Mod: CPTII,S$GLB,, | Performed by: INTERNAL MEDICINE

## 2023-04-14 PROCEDURE — 1126F AMNT PAIN NOTED NONE PRSNT: CPT | Mod: CPTII,S$GLB,, | Performed by: INTERNAL MEDICINE

## 2023-04-14 PROCEDURE — 3074F PR MOST RECENT SYSTOLIC BLOOD PRESSURE < 130 MM HG: ICD-10-PCS | Mod: CPTII,S$GLB,, | Performed by: INTERNAL MEDICINE

## 2023-04-14 PROCEDURE — 1111F PR DISCHARGE MEDS RECONCILED W/ CURRENT OUTPATIENT MED LIST: ICD-10-PCS | Mod: CPTII,S$GLB,, | Performed by: INTERNAL MEDICINE

## 2023-04-14 PROCEDURE — 1101F PR PT FALLS ASSESS DOC 0-1 FALLS W/OUT INJ PAST YR: ICD-10-PCS | Mod: CPTII,S$GLB,, | Performed by: INTERNAL MEDICINE

## 2023-04-14 PROCEDURE — 1111F DSCHRG MED/CURRENT MED MERGE: CPT | Mod: CPTII,S$GLB,, | Performed by: INTERNAL MEDICINE

## 2023-04-14 PROCEDURE — 99999 PR PBB SHADOW E&M-EST. PATIENT-LVL V: ICD-10-PCS | Mod: PBBFAC,,, | Performed by: INTERNAL MEDICINE

## 2023-04-14 PROCEDURE — 99999 PR PBB SHADOW E&M-EST. PATIENT-LVL V: CPT | Mod: PBBFAC,,, | Performed by: INTERNAL MEDICINE

## 2023-04-14 PROCEDURE — 99215 OFFICE O/P EST HI 40 MIN: CPT | Mod: S$GLB,,, | Performed by: INTERNAL MEDICINE

## 2023-04-14 PROCEDURE — 3288F PR FALLS RISK ASSESSMENT DOCUMENTED: ICD-10-PCS | Mod: CPTII,S$GLB,, | Performed by: INTERNAL MEDICINE

## 2023-04-14 PROCEDURE — 1126F PR PAIN SEVERITY QUANTIFIED, NO PAIN PRESENT: ICD-10-PCS | Mod: CPTII,S$GLB,, | Performed by: INTERNAL MEDICINE

## 2023-04-14 PROCEDURE — 1159F MED LIST DOCD IN RCRD: CPT | Mod: CPTII,S$GLB,, | Performed by: INTERNAL MEDICINE

## 2023-04-14 PROCEDURE — 99215 PR OFFICE/OUTPT VISIT, EST, LEVL V, 40-54 MIN: ICD-10-PCS | Mod: S$GLB,,, | Performed by: INTERNAL MEDICINE

## 2023-04-14 PROCEDURE — 3288F FALL RISK ASSESSMENT DOCD: CPT | Mod: CPTII,S$GLB,, | Performed by: INTERNAL MEDICINE

## 2023-04-14 PROCEDURE — 3079F PR MOST RECENT DIASTOLIC BLOOD PRESSURE 80-89 MM HG: ICD-10-PCS | Mod: CPTII,S$GLB,, | Performed by: INTERNAL MEDICINE

## 2023-04-14 PROCEDURE — 3074F SYST BP LT 130 MM HG: CPT | Mod: CPTII,S$GLB,, | Performed by: INTERNAL MEDICINE

## 2023-04-14 PROCEDURE — 1101F PT FALLS ASSESS-DOCD LE1/YR: CPT | Mod: CPTII,S$GLB,, | Performed by: INTERNAL MEDICINE

## 2023-04-14 PROCEDURE — 1159F PR MEDICATION LIST DOCUMENTED IN MEDICAL RECORD: ICD-10-PCS | Mod: CPTII,S$GLB,, | Performed by: INTERNAL MEDICINE

## 2023-04-17 ENCOUNTER — PATIENT MESSAGE (OUTPATIENT)
Dept: FAMILY MEDICINE | Facility: CLINIC | Age: 83
End: 2023-04-17
Payer: MEDICARE

## 2023-04-17 ENCOUNTER — TELEPHONE (OUTPATIENT)
Dept: FAMILY MEDICINE | Facility: CLINIC | Age: 83
End: 2023-04-17
Payer: MEDICARE

## 2023-04-17 NOTE — TELEPHONE ENCOUNTER
----- Message from Codi Jalloh sent at 4/17/2023 10:02 AM CDT -----  Contact: Self  Pt is calling in regards to her Well check with Dr Chavez that was scheduled for 04/19 at 2:40, but when she went to confirm the appt it was not showing on her my chart abraham, stated she scheduled this appt a year ago. I did see that it was cancelled by automated reminder, but she stated she would not cancel this appt. Can we look into this and see if we can reschedule her for this week sometime. The earliest I showed available was 05/25. Please call pt back to advise at [phone]. Thank You.

## 2023-04-18 ENCOUNTER — PATIENT MESSAGE (OUTPATIENT)
Dept: FAMILY MEDICINE | Facility: CLINIC | Age: 83
End: 2023-04-18
Payer: MEDICARE

## 2023-04-24 PROBLEM — J84.89 ORGANIZING PNEUMONIA: Status: RESOLVED | Noted: 2023-01-18 | Resolved: 2023-04-24

## 2023-05-05 ENCOUNTER — TELEPHONE (OUTPATIENT)
Dept: FAMILY MEDICINE | Facility: CLINIC | Age: 83
End: 2023-05-05
Payer: MEDICARE

## 2023-05-05 NOTE — TELEPHONE ENCOUNTER
I spoke with Ms Devi, she  is wanting to be seen for fluid in her ears.  THE soonest appointment is with Dr. Aguirre on 5/11/23.  She took that appointment

## 2023-05-05 NOTE — TELEPHONE ENCOUNTER
----- Message from Aparna Wild sent at 5/5/2023  2:06 PM CDT -----  Contact: self  Pt went to a ready med last week her ears are clogged up and she was told she has fluid behind her ears.  Put her on antibiotics and nasal spray but not any better, please try to get her in today if possible and call back at [phone] and thanks

## 2023-05-10 ENCOUNTER — HOSPITAL ENCOUNTER (OUTPATIENT)
Dept: RADIOLOGY | Facility: HOSPITAL | Age: 83
Discharge: HOME | End: 2023-05-10
Attending: INTERNAL MEDICINE
Payer: MEDICARE

## 2023-05-10 DIAGNOSIS — C34.90 MALIGNANT NEOPLASM OF UNSPECIFIED PART OF UNSPECIFIED BRONCHUS OR LUNG: ICD-10-CM

## 2023-05-10 PROCEDURE — 71250 CT THORAX DX C-: CPT | Mod: 26,,, | Performed by: RADIOLOGY

## 2023-05-10 PROCEDURE — 71250 CT THORAX DX C-: CPT | Mod: TC,PO

## 2023-05-10 PROCEDURE — 71250 CT CHEST WITHOUT CONTRAST: ICD-10-PCS | Mod: 26,,, | Performed by: RADIOLOGY

## 2023-05-11 ENCOUNTER — OFFICE VISIT (OUTPATIENT)
Dept: FAMILY MEDICINE | Facility: CLINIC | Age: 83
End: 2023-05-11
Payer: MEDICARE

## 2023-05-11 VITALS
HEART RATE: 89 BPM | SYSTOLIC BLOOD PRESSURE: 104 MMHG | OXYGEN SATURATION: 95 % | BODY MASS INDEX: 32.01 KG/M2 | WEIGHT: 186.5 LBS | DIASTOLIC BLOOD PRESSURE: 78 MMHG

## 2023-05-11 DIAGNOSIS — H81.12 BENIGN PAROXYSMAL POSITIONAL VERTIGO OF LEFT EAR: Primary | ICD-10-CM

## 2023-05-11 PROCEDURE — 3288F FALL RISK ASSESSMENT DOCD: CPT | Mod: CPTII,S$GLB,, | Performed by: FAMILY MEDICINE

## 2023-05-11 PROCEDURE — 99213 PR OFFICE/OUTPT VISIT, EST, LEVL III, 20-29 MIN: ICD-10-PCS | Mod: S$GLB,,, | Performed by: FAMILY MEDICINE

## 2023-05-11 PROCEDURE — 99999 PR PBB SHADOW E&M-EST. PATIENT-LVL IV: ICD-10-PCS | Mod: PBBFAC,,, | Performed by: FAMILY MEDICINE

## 2023-05-11 PROCEDURE — 1101F PT FALLS ASSESS-DOCD LE1/YR: CPT | Mod: CPTII,S$GLB,, | Performed by: FAMILY MEDICINE

## 2023-05-11 PROCEDURE — 99213 OFFICE O/P EST LOW 20 MIN: CPT | Mod: S$GLB,,, | Performed by: FAMILY MEDICINE

## 2023-05-11 PROCEDURE — 1126F AMNT PAIN NOTED NONE PRSNT: CPT | Mod: CPTII,S$GLB,, | Performed by: FAMILY MEDICINE

## 2023-05-11 PROCEDURE — 3074F SYST BP LT 130 MM HG: CPT | Mod: CPTII,S$GLB,, | Performed by: FAMILY MEDICINE

## 2023-05-11 PROCEDURE — 1126F PR PAIN SEVERITY QUANTIFIED, NO PAIN PRESENT: ICD-10-PCS | Mod: CPTII,S$GLB,, | Performed by: FAMILY MEDICINE

## 2023-05-11 PROCEDURE — 1160F RVW MEDS BY RX/DR IN RCRD: CPT | Mod: CPTII,S$GLB,, | Performed by: FAMILY MEDICINE

## 2023-05-11 PROCEDURE — 3078F PR MOST RECENT DIASTOLIC BLOOD PRESSURE < 80 MM HG: ICD-10-PCS | Mod: CPTII,S$GLB,, | Performed by: FAMILY MEDICINE

## 2023-05-11 PROCEDURE — 1111F DSCHRG MED/CURRENT MED MERGE: CPT | Mod: CPTII,S$GLB,, | Performed by: FAMILY MEDICINE

## 2023-05-11 PROCEDURE — 3078F DIAST BP <80 MM HG: CPT | Mod: CPTII,S$GLB,, | Performed by: FAMILY MEDICINE

## 2023-05-11 PROCEDURE — 3288F PR FALLS RISK ASSESSMENT DOCUMENTED: ICD-10-PCS | Mod: CPTII,S$GLB,, | Performed by: FAMILY MEDICINE

## 2023-05-11 PROCEDURE — 1160F PR REVIEW ALL MEDS BY PRESCRIBER/CLIN PHARMACIST DOCUMENTED: ICD-10-PCS | Mod: CPTII,S$GLB,, | Performed by: FAMILY MEDICINE

## 2023-05-11 PROCEDURE — 1159F PR MEDICATION LIST DOCUMENTED IN MEDICAL RECORD: ICD-10-PCS | Mod: CPTII,S$GLB,, | Performed by: FAMILY MEDICINE

## 2023-05-11 PROCEDURE — 1159F MED LIST DOCD IN RCRD: CPT | Mod: CPTII,S$GLB,, | Performed by: FAMILY MEDICINE

## 2023-05-11 PROCEDURE — 3074F PR MOST RECENT SYSTOLIC BLOOD PRESSURE < 130 MM HG: ICD-10-PCS | Mod: CPTII,S$GLB,, | Performed by: FAMILY MEDICINE

## 2023-05-11 PROCEDURE — 1101F PR PT FALLS ASSESS DOC 0-1 FALLS W/OUT INJ PAST YR: ICD-10-PCS | Mod: CPTII,S$GLB,, | Performed by: FAMILY MEDICINE

## 2023-05-11 PROCEDURE — 1111F PR DISCHARGE MEDS RECONCILED W/ CURRENT OUTPATIENT MED LIST: ICD-10-PCS | Mod: CPTII,S$GLB,, | Performed by: FAMILY MEDICINE

## 2023-05-11 PROCEDURE — 99999 PR PBB SHADOW E&M-EST. PATIENT-LVL IV: CPT | Mod: PBBFAC,,, | Performed by: FAMILY MEDICINE

## 2023-05-11 NOTE — PROGRESS NOTES
PATIENT: Shandra Velez  MRN: [id number]  DATE: 5/12/2023      Diagnosis:   1. Malignant neoplasm of unspecified part of unspecified bronchus or lung    2. Essential hypertension    3. Pneumonitis    4. Acute saddle pulmonary embolism with acute cor pulmonale    5. Follicular lymphoma grade I, unspecified body region    6. Right renal mass    7. Hypertension, unspecified type    8. Hyperlipidemia, unspecified hyperlipidemia type    9. Acquired hypothyroidism    10. ZAINA (acute kidney injury)                Chief Complaint: Lung Cancer        Oncologic History:     Pt initially underwent right inguinal LN biopsy 5/25/22 showing grade 1 follicular lymphoma.  Bone marrow biopsy done 06/24/2020 showed involvement with follicular lymphoma.  PET/CT 7/07/20 showed lymphadenopathy above and below the diaphragm as well as the spleen.  Also seen wasa RUL lesion.  The patient was started on CVP-R followed by maintenance rituxan every 8 weeks.  Biopsy of the RUL nodule on 6/18/21 showed SCC with PD-L1 at 86%.  EBUS 7/09/21 showed no malignant cells at station 7, 4R or 11RS.  The patient was treated with SBRT under the care of Dr Barnes with 50Gy in 4 fractions completed 10/28/21.  The patient then developed new pulmonary nodules on CT scan 6/02/22.  WBUS 6/16/22 showed positive SCC in 4R LN.  Pt was discussed at tumor board on 6/21/22 with recommendation for Keytruda.  The patient underwent PET/CT on 9/23/22 after 2 doses of treatment showing a 1.5 cm precarinal lymph node; stable right apical mass measuring 1.8 x 1.5 cm; stable 8 mm right apical lung nodule with adjacent post radiation change measuring 2 x 2.7 cm; stable 4 mm subpleural nodule in the lateral right upper lobe; fluid/mucus in the right lower lobe bronchus tree; 6 mm anterior left upper lobe nodule which is new; 2.3 cm simple cyst in the midpole of the left kidney; 3.1 x 2.3 cm rim enhancing complex cystic and solid mass in the mid to lower pole the right kidney;  infrarenal abdominal aortic aneurysm measuring 3.8 x 3.5 cm; and stable a left para-aortic lymph node measuring 11 mm. 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   The patient underwent PET-CT on 11/29/2022 showing an irregular hypermetabolic right upper lobe tumor which is stable measuring 1.6 x 1.2 cm; hypermetabolic subpleural right upper lobe consolidation diminished in extent; unchanged linear zone of hypermetabolic atelectasis in the superior segment of the left lower lobe; new poorly metabolic ill-defined infiltrate in the posterior right lower lobe; 4 mm left upper lobe nodule which is stable; hypermetabolic precarinal mediastinal lymph node stable measuring 1 cm; and hypermetabolic aortopulmonary lymph node stable measuring 1.1 x 1.1 cm.   The patient was admitted to the hospital from 1/16/23 to 1/23/23 for pneumonia.  She underwent bronchoscopy with Dr Kinney on 1/18/23 showing significant mucus plug impaction int the pharynx, left mainstem bronchus and right and left lower lobes.  The patient was discharged on 3L O2.   The patient underwent PET-CT on 03/08/2023 showing thickening of the pleura; new ground-glass opacities within the left upper lobe; patchy airspace opacities in the right lower lobe; right lower lobe pulmonary artery hypermetabolic activity possibly secondary to bronchial wall thickening or thrombus in the pulmonary artery; diffuse hypermetabolic activity identified throughout the axial skeleton.  The patient was then admitted to the hospital on 03/18/23-4/3/23 after presenting with shortness of breath and being found to have a saddle pulmonary embolus on CTA 03/18/2023.  The patient underwent treatment with a heparin drip and thrombectomy on 03/18/2023 with eventual transition to Eliquis.    Subjective:    Interval History: Ms. Velez is a 82 y.o. female with HLD, HTN, osteopenia, follicular lymphoma who  presents for follow up of NSCLC.  Since since the last clinic visit the patient underwent CT chest on 04/14/2023 showing areas of air trapping in subpleural bleb formation in both jarek thoraces with areas of interstitial scarring and bibasilar bronchiectasis; stable solid nodule in left upper lobe measuring 6 mm; masslike area measuring 3 x 1.3 x 1.5 cm in the right upper lobe stable.    The patient continues to endorse shortness of breath with activity.  The patient denies CP, cough, abdominal pain, nausea, vomiting, constipation, diarrhea.  The patient denies fever, chills, night sweats, weight loss, new lumps or bumps, easy bruising or bleeding.    Past Medical History:   Past Medical History:   Diagnosis Date    Breast cancer 1985    right mastectomy    Digestive disorder     Encounter for blood transfusion     History of pneumonia     Hypercholesterolemia     Hypertension     Indigestion     Lumbar spondylosis     Lung cancer     2022 - currently in treatment as of 7/27/22    Lymphoma     Osteopenia     Pulmonary nodule     Smoker 06/11/2022       Past Surgical HIstory:   Past Surgical History:   Procedure Laterality Date    APPENDECTOMY      BONE MARROW BIOPSY Bilateral 06/24/2020    Procedure: Biopsy-bone marrow;  Surgeon: Rod Montero MD;  Location: [facility] OR;  Service: Oncology;  Laterality: Bilateral;    BREAST RECONSTRUCTION  1990    right    BRONCHOSCOPY N/A 1/18/2023    Procedure: BRONCHOSCOPY;  Surgeon: Gregorio Kinney MD;  Location: [facility] ENDO;  Service: Pulmonary;  Laterality: N/A;    BRONCHOSCOPY Bilateral 2/10/2023    Procedure: Bronchoscopy;  Surgeon: Gregorio Kinney MD;  Location: [facility] ENDO;  Service: Pulmonary;  Laterality: Bilateral;  for airway clearance. Purple top tube for cell count diff    BRONCHOSCOPY N/A 3/13/2023    Procedure: Bronchoscopy;  Surgeon: Gregorio Kinney MD;  Location: [facility] ENDO;  Service: Pulmonary;  Laterality: N/A;  therapeutic scope. Please have purple top tube and iced  saline    CATARACT EXTRACTION W/  INTRAOCULAR LENS IMPLANT Bilateral     CHOLECYSTECTOMY      ENDOBRONCHIAL ULTRASOUND Bilateral 07/09/2021    Procedure: ENDOBRONCHIAL ULTRASOUND (EBUS);  Surgeon: Gregorio Kinney MD;  Location: [facility];  Service: Pulmonary;  Laterality: Bilateral;  NEED LMA to  eval right upper paratracheal LN.     ENDOBRONCHIAL ULTRASOUND Bilateral 6/16/2022    Procedure: ENDOBRONCHIAL ULTRASOUND (EBUS);  Surgeon: Gregorio Kinney MD;  Location: [facility];  Service: Pulmonary;  Laterality: Bilateral;    INJECTION OF FACET JOINT Left 6/30/2022    Procedure: FACET JOINT Cyst Aspiration L3/4;  Surgeon: Ronnell Baeza MD;  Location: [facility] OR;  Service: Pain Management;  Laterality: Left;    INSERTION OF TUNNELED CENTRAL VENOUS CATHETER (CVC) WITH SUBCUTANEOUS PORT Left 11/04/2020    Procedure: [id number]-T-CATH;  Surgeon: Kody Pretty MD;  Location: [facility];  Service: General;  Laterality: Left;    JOINT REPLACEMENT  2008    right knee     LUMBAR LAMINECTOMY      LYMPH NODE BIOPSY      MASTECTOMY Right 1985    NEEDLE LOCALIZATION N/A 05/25/2020    Procedure: NEEDLE LOCALIZATION - lymph node bx;  Surgeon: Steve Weaver MD;  Location: [facility] CATH;  Service: Interventional Radiology;  Laterality: N/A;    RIGHT HEART CATHETERIZATION Right 3/18/2023    Procedure: INSERTION, CATHETER, RIGHT HEART;  Surgeon: Rukhsana Lang MD;  Location: [facility] CATH;  Service: Cardiology;  Laterality: Right;    SELECTIVE UNILATERAL PULMONARY ANGIOGRAPHY  3/18/2023    Procedure: Pumonary angiography - unilateral;  Surgeon: Rukhsana Lang MD;  Location: [facility] CATH;  Service: Cardiology;;    THROMBECTOMY N/A 3/18/2023    Procedure: THROMBECTOMY;  Surgeon: Rukhsana Lang MD;  Location: [facility] CATH;  Service: Cardiology;  Laterality: N/A;    TRANSFORAMINAL EPIDURAL INJECTION OF STEROID Left 05/14/2020    Procedure: Injection,steroid,epidural,transforaminal approach, l3/4;  Surgeon: Ronnell Baeza MD;  Location: [facility] OR;   Service: Pain Management;  Laterality: Left;    TRANSFORAMINAL EPIDURAL INJECTION OF STEROID Left 03/23/2022    Procedure: Injection,steroid,epidural,transforaminal approach L3/4;  Surgeon: Ronnell Baeza MD;  Location: [facility] OR;  Service: Pain Management;  Laterality: Left;    TRANSFORAMINAL EPIDURAL INJECTION OF STEROID Left 8/1/2022    Procedure: Injection,steroid,epidural,transforaminal approach L3/4;  Surgeon: Ronnell Baeza MD;  Location: [facility] OR;  Service: Pain Management;  Laterality: Left;    TUBAL LIGATION      UMBILICAL HERNIA REPAIR         Family History:   Family History   Problem Relation Age of Onset    Cancer Sister         uterine    Diabetes Brother     Cancer Sister         Uterine cancer     Breast cancer Other 42       Social History:  reports that she quit smoking about 11 months ago. Her smoking use included cigarettes. She has a 26.50 pack-year smoking history. She has never used smokeless tobacco. She reports that she does not drink alcohol and does not use drugs.    Allergies:  Review of patient's allergies indicates:   Allergen Reactions    Opioids - morphine analogues Other (See Comments)     Hypotension       Medications:  Current Outpatient Medications   Medication Sig Dispense Refill    albuterol (VENTOLIN HFA) 90 mcg/actuation inhaler Inhale 2 puffs into the lungs every 6 (six) hours as needed for Wheezing. Rescue 50 g 0    albuterol-ipratropium (DUO-NEB) 2.5 mg-0.5 mg/3 mL nebulizer solution Take 3 mLs by nebulization every 6 (six) hours as needed for Wheezing. Rescue 75 mL 2    apixaban (ELIQUIS) 5 mg Tab Take 1 tablet (5 mg total) by mouth 2 (two) times daily. 60 tablet 11    ascorbic acid, vitamin C, (VITAMIN C) 500 MG tablet Take 1 tablet (500 mg total) by mouth once daily. 30 tablet 0    budesonide (PULMICORT) 0.5 mg/2 mL nebulizer solution Take 2 mLs (0.5 mg total) by nebulization 2 (two) times a day. Controller 120 mL 11    cetirizine (ZYRTEC) 10 MG tablet Take 10 mg  by mouth once daily.      cholecalciferol, vitamin D3, 1,250 mcg (50,000 unit) Tab Take 1 tablet by mouth every 7 days. 12 tablet 6    docusate sodium (COLACE) 100 MG capsule Take 2 capsules (200 mg total) by mouth 2 (two) times daily. 120 capsule 0    ferrous sulfate 324 mg (65 mg iron) TbEC Take 1 tablet (324 mg total) by mouth once daily. 30 tablet 0    fluticasone propionate (FLONASE) 50 mcg/actuation nasal spray USE 1 SPRAY (50 MCG TOTAL) BY EACH NOSTRIL ROUTE ONCE DAILY. (Patient taking differently: 1 spray by Each Nostril route once daily.) 32 g 3    Lactobacillus rhamnosus GG (CULTURELLE) 10 billion cell capsule Take 1 capsule by mouth once daily. 30 capsule 5    levothyroxine (SYNTHROID) 100 MCG tablet Take 1 tablet (100 mcg total) by mouth once daily. 30 tablet 11    mucus clearing device (ACAPELLA, FLUTTER) by Misc.(Non-Drug; Combo Route) route once daily. 100 each 0    multivitamin (THERAGRAN) per tablet Take 1 tablet by mouth once daily.      NIFEdipine (ADALAT CC) 60 MG TbSR TAKE 1 TABLET BY MOUTH ONCE DAILY (Patient taking differently: Take 60 mg by mouth once daily.) 90 tablet 3    omeprazole (PRILOSEC) 40 MG capsule TAKE 1 CAPSULE BY MOUTH  ONCE DAILY (Patient taking differently: Take 40 mg by mouth once daily.) 30 capsule 11    potassium chloride (MICRO-K) 10 MEQ CpSR TAKE 2 CAPSULES BY MOUTH  ONCE DAILY (Patient taking differently: Take 20 mEq by mouth every evening.) 180 capsule 3    simvastatin (ZOCOR) 10 MG tablet TAKE 1 TABLET BY MOUTH IN THE  EVENING (Patient taking differently: Take 10 mg by mouth every evening.) 90 tablet 0    sodium chloride 3% 3 % nebulizer solution Take 4 mLs by nebulization 2 (two) times a day. 240 mL 3    temazepam (RESTORIL) 30 mg capsule Take 30 mg by mouth every evening.      hydroCHLOROthiazide (HYDRODIURIL) 25 MG tablet Take 1 tablet (25 mg total) by mouth once daily.      lisinopriL (PRINIVIL,ZESTRIL) 40 MG tablet Take 1 tablet (40 mg total) by mouth every  "evening.       No current facility-administered medications for this visit.       Review of Systems   Constitutional:  Negative for appetite change, chills, fatigue, fever and unexpected weight change.   Eyes:  Negative for visual disturbance.   Respiratory:  Positive for shortness of breath (with exertion and when off oxygen). Negative for cough.    Cardiovascular:  Negative for chest pain and palpitations.   Gastrointestinal:  Negative for abdominal pain, constipation, diarrhea, nausea and vomiting.   Musculoskeletal:  Negative for back pain.   Skin:  Negative for rash.   Neurological:  Negative for headaches.   Hematological:  Negative for adenopathy. Does not bruise/bleed easily.   Psychiatric/Behavioral:  The patient is not nervous/anxious.      ECOG Performance Status: 2   Objective:      Vitals:   Vitals:    05/12/23 1402   BP: 114/70   BP Location: Right arm   Patient Position: Sitting   BP Method: Large (Manual)   Pulse: 91   Resp: 14   Temp: 97.5 °F (36.4 °C)   TempSrc: Temporal   SpO2: 95%   Weight: 85.3 kg (188 lb 0.8 oz)   Height: 5' 4" (1.626 m)             Physical Exam  Constitutional:       General: She is not in acute distress.     Appearance: She is well-developed. She is not diaphoretic.   HENT:      Head: Normocephalic and atraumatic.   Cardiovascular:      Rate and Rhythm: Normal rate and regular rhythm.      Heart sounds: Normal heart sounds. No murmur heard.    No friction rub. No gallop.   Pulmonary:      Effort: Pulmonary effort is normal. No respiratory distress.      Breath sounds: Wheezing present. No rales.   Chest:      Chest wall: No tenderness.   Abdominal:      General: Bowel sounds are normal. There is no distension.      Palpations: Abdomen is soft. There is no mass.      Tenderness: There is no abdominal tenderness. There is no guarding or rebound.   Lymphadenopathy:      Cervical: No cervical adenopathy.      Upper Body:      Right upper body: No supraclavicular or axillary " adenopathy.      Left upper body: No supraclavicular or axillary adenopathy.   Skin:     Findings: No erythema or rash.   Neurological:      Mental Status: She is alert and oriented to person, place, and time.   Psychiatric:         Behavior: Behavior normal.       Laboratory Data:  Lab Visit on 05/10/2023   Component Date Value Ref Range Status    WBC 05/10/2023 12.61  3.90 - 12.70 K/uL Final    RBC 05/10/2023 4.00  4.00 - 5.40 M/uL Final    Hemoglobin 05/10/2023 11.9 (L)  12.0 - 16.0 g/dL Final    Hematocrit 05/10/2023 36.2 (L)  37.0 - 48.5 % Final    MCV 05/10/2023 91  82 - 98 fL Final    MCH 05/10/2023 29.8  27.0 - 31.0 pg Final    MCHC 05/10/2023 32.9  32.0 - 36.0 g/dL Final    RDW 05/10/2023 15.6 (H)  11.5 - 14.5 % Final    Platelets 05/10/2023 163  150 - 450 K/uL Final    MPV 05/10/2023 10.1  9.2 - 12.9 fL Final    Immature Granulocytes 05/10/2023 CANCELED  0.0 - 0.5 % Final    Result canceled by the ancillary.    Immature Grans (Abs) 05/10/2023 CANCELED  0.00 - 0.04 K/uL Final    Comment: Mild elevation in immature granulocytes is non specific and   can be seen in a variety of conditions including stress response,   acute inflammation, trauma and pregnancy. Correlation with other   laboratory and clinical findings is essential.    Result canceled by the ancillary.      Lymph # 05/10/2023 CANCELED  1.0 - 4.8 K/uL Final    Result canceled by the ancillary.    Mono # 05/10/2023 CANCELED  0.3 - 1.0 K/uL Final    Result canceled by the ancillary.    Eos # 05/10/2023 CANCELED  0.0 - 0.5 K/uL Final    Result canceled by the ancillary.    Baso # 05/10/2023 CANCELED  0.00 - 0.20 K/uL Final    Result canceled by the ancillary.    nRBC 05/10/2023 0  0 /100 WBC Final    Gran % 05/10/2023 76.0 (H)  38.0 - 73.0 % Final    Lymph % 05/10/2023 11.0 (L)  18.0 - 48.0 % Final    Mono % 05/10/2023 6.0  4.0 - 15.0 % Final    Eosinophil % 05/10/2023 5.0  0.0 - 8.0 % Final    Basophil % 05/10/2023 0.0  0.0 - 1.9 % Final    Bands  05/10/2023 2.0  % Final    Platelet Estimate 05/10/2023 Appears normal   Final    Differential Method 05/10/2023 Manual   Corrected    Comment: CORRECTED RESULT; previously reported as Automated on 05/10/2023 at   12:51.      Sodium 05/10/2023 141  136 - 145 mmol/L Final    Potassium 05/10/2023 3.8  3.5 - 5.1 mmol/L Final    Chloride 05/10/2023 106  95 - 110 mmol/L Final    CO2 05/10/2023 21 (L)  23 - 29 mmol/L Final    Glucose 05/10/2023 131 (H)  70 - 110 mg/dL Final    BUN 05/10/2023 55 (H)  8 - 23 mg/dL Final    Creatinine 05/10/2023 1.6 (H)  0.5 - 1.4 mg/dL Final    Calcium 05/10/2023 8.8  8.7 - 10.5 mg/dL Final    Total Protein 05/10/2023 5.9 (L)  6.0 - 8.4 g/dL Final    Albumin 05/10/2023 3.1 (L)  3.5 - 5.2 g/dL Final    Total Bilirubin 05/10/2023 0.3  0.1 - 1.0 mg/dL Final    Comment: For infants and newborns, interpretation of results should be based  on gestational age, weight and in agreement with clinical  observations.    Premature Infant recommended reference ranges:  Up to 24 hours.............<8.0 mg/dL  Up to 48 hours............<12.0 mg/dL  3-5 days..................<15.0 mg/dL  6-29 days.................<15.0 mg/dL      Alkaline Phosphatase 05/10/2023 76  55 - 135 U/L Final    AST 05/10/2023 13  10 - 40 U/L Final    ALT 05/10/2023 5 (L)  10 - 44 U/L Final    Anion Gap 05/10/2023 14  8 - 16 mmol/L Final    eGFR 05/10/2023 32 (A)  >60 mL/min/1.73 m^2 Final    TSH 05/10/2023 0.952  0.400 - 4.000 uIU/mL Final         Imaging:    CT Chest 4/14/23    A left subclavian infusion catheter is in place with its tip in the region the superior vena cava.  Vascular calcifications including coronary artery calcifications are present.  Is normal sized low right paratracheal lymph nodes are identified.  Coronary artery calcifications are present.  There is prominence is central pulmonary vasculature with the main pulmonary artery measuring 3.3 cm in the proximal aorta measuring 3.4 cm in diameter.  Pulmonary artery  hypertension cannot be excluded in this patient recently 6 status post pulmonary thromboembolic disease.  Mitral annular calcification is also present.  The adrenal glands are unremarkable.  There is a 22 mm exophytic left renal cyst..     Areas of air trapping and subpleural bleb formation are evident in both jarek thoraces with areas of interstitial scarring and bibasal bronchiectasis are noted.  Findings are again compatible with emphysema.  There is a solid nodule in the left upper lobe measuring 6 mm on series number 4, image number 118.  This is stable relative to prior PET-CT scan there is linear scarring and wedge-shaped volume loss and parenchymal opacity in left lower lobe.  Areas of interstitial thickening and nodularity are seen in the left lower lobe posteriorly.     In the right base there is also evidence of bronchiectasis and interstitial and subpleural scarring.  In the right apex there is subpleural scarring and density extending in to the right upper lobe with a masslike area measuring 3.0 x 1.3 by 1.5 cm.  This is unchanged.  Is no new pulmonary nodules or masses are identified.  There is evidence of prior cholecystectomy.        Assessment:       1. Malignant neoplasm of unspecified part of unspecified bronchus or lung    2. Essential hypertension    3. Pneumonitis    4. Acute saddle pulmonary embolism with acute cor pulmonale    5. Follicular lymphoma grade I, unspecified body region    6. Right renal mass    7. Hypertension, unspecified type    8. Hyperlipidemia, unspecified hyperlipidemia type    9. Acquired hypothyroidism    10. ZAINA (acute kidney injury)                   Plan:     NSCLC - Pt has NSCLC SCC with involvement of the right lung and mediastinum  -Pt with prior radiation to a RUL nodule  -PD-L1 was 86% on 6/18/21  -Pt was on Keytruda with 6 week cycles and completed 6 tx's  -PET/CT on 9/23/22 showed possible pseudoprogression  -PET/CT on 11/29/22 showed stable disease with RLL  infiltrate concerning for resolving PNA  -PET-CT on 03/08/2023 showed resolution of prior disease   -CT Chest 4/14/23 with stable findings  -Will repeat CT chest in 2 months    Pneumonitis - from Keytruda   -Patient completed steroid taper   -Management per Pulmonary    Saddle Pulmonary Embolus - seen on CTA 03/18/2023   -Patient is status post thrombectomy and currently on Eliquis   -Patient will need to be on Eliquis lifelong given lung cancer    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 for 9/12 cycles with good response  -Will monitor    Right Renal Mass - Pt with right renal mass seen on PET/CT 9/23/22 wich was enlarging  -No mention of renal mass on recent PET/CT 11/29/22 and 3/08/23  -Will monitor    HTN - pt on lisinopril, HCTZ and nifedipine  -BP controlled  -PT to hold HCTZ and lisinopril due to ZANIA  -Will monitor    HLD - Pt on statin   -Mangement per PCP    Hypothyroidism - pt on levothyroxine  -Management per PCP    ZAINA - pt with creatine increase to 1.6  -BUN to Cr ratio suggestive of dehydration   -IV fluids suggested but pt declined  -Pt instructed to drink more fluids  -Pt to see nephrology  -Pt to hold HCTZ and lsinopril    Route Chart for Scheduling    Med Onc Chart Routing      Follow up with physician 2 months. Pt needs an appt with nephrology ASAP for ZAINA.  Pt needs a CBC, CMP and CT chest in 2 months with a return appt.   Follow up with DAYAMI    Infusion scheduling note    Injection scheduling note    Labs    Imaging    Pharmacy appointment    Other referrals           Treatment Plan Information   OP PEMBROLIZUMAB 400MG Q6W   Chad Mills MD   Upcoming Treatment Dates - OP PEMBROLIZUMAB 400MG Q6W    5/12/2023       Pre-Medications       acetaminophen tablet 1,000 mg       diphenhydrAMINE (BENADRYL) 25 mg in NS 50 mL IVPB       Chemotherapy       pembrolizumab (KEYTRUDA) 400 mg in sodium chloride 0.9% SolP 116 mL infusion  6/23/2023        Pre-Medications       acetaminophen tablet 1,000 mg       diphenhydrAMINE (BENADRYL) 25 mg in NS 50 mL IVPB       Chemotherapy       pembrolizumab (KEYTRUDA) 400 mg in sodium chloride 0.9% SolP 116 mL infusion  8/4/2023       Pre-Medications       acetaminophen tablet 1,000 mg       diphenhydrAMINE (BENADRYL) 25 mg in NS 50 mL IVPB       Chemotherapy       pembrolizumab (KEYTRUDA) 400 mg in sodium chloride 0.9% SolP 116 mL infusion  9/15/2023       Pre-Medications       acetaminophen tablet 1,000 mg       diphenhydrAMINE (BENADRYL) 25 mg in NS 50 mL IVPB       Chemotherapy       pembrolizumab (KEYTRUDA) 400 mg in sodium chloride 0.9% SolP 116 mL infusion    Therapy Plan Information  EVUSHELD (TIXAGEVIMAB/CILGAVIMAB)  diphenhydrAMINE capsule 25 mg  25 mg, Oral, PRN  predniSONE tablet 40 mg  40 mg, Oral, PRN  EPINEPHrine (EPIPEN) 0.3 mg/0.3 mL pen injection 0.3 mg  0.3 mg, Intramuscular, PRN  ondansetron disintegrating tablet 4 mg  4 mg, Oral, PRN  acetaminophen tablet 650 mg  650 mg, Oral, PRN  albuterol inhaler 2 puff  2 puff, Inhalation, PRN    INF PEGFILGRASTIM (NEULASTA)  pegfilgrastim injection 6 mg  6 mg, Subcutaneous, Every visit        Chad Mills MD  [facility]  Hematology and Oncology  [facility]   [address] [place] [address]   O: [phone]  F: [phone]

## 2023-05-11 NOTE — PROGRESS NOTES
Subjective:       Patient ID: Shandra Velez is a 82 y.o. female.    Chief Complaint: Ear Fullness (Off and on since February/Dizziness )    HPI:  82-year-old patient here today with what sounds like benign positional vertigo.  This started back in February.  It is associated with head movement.  She has got bilateral hearing decreases secondary to but also vertigo which is intermittent.  She has been given steroids as well as tried I decongestants.  She is very upset because no one has been able to help her yet.  She said that I would be her best friend if I can help her.    Review of Systems    Objective:      Vitals:    05/11/23 1456   BP: 104/78   Pulse: 89   SpO2: 95%   Weight: 84.6 kg (186 lb 8.2 oz)      Physical Exam    Results for orders placed or performed in visit on 05/10/23   CBC w/ DIFF   Result Value Ref Range    WBC 12.61 3.90 - 12.70 K/uL    RBC 4.00 4.00 - 5.40 M/uL    Hemoglobin 11.9 (L) 12.0 - 16.0 g/dL    Hematocrit 36.2 (L) 37.0 - 48.5 %    MCV 91 82 - 98 fL    MCH 29.8 27.0 - 31.0 pg    MCHC 32.9 32.0 - 36.0 g/dL    RDW 15.6 (H) 11.5 - 14.5 %    Platelets 163 150 - 450 K/uL    MPV 10.1 9.2 - 12.9 fL    Immature Granulocytes CANCELED 0.0 - 0.5 %    Immature Grans (Abs) CANCELED 0.00 - 0.04 K/uL    Lymph # CANCELED 1.0 - 4.8 K/uL    Mono # CANCELED 0.3 - 1.0 K/uL    Eos # CANCELED 0.0 - 0.5 K/uL    Baso # CANCELED 0.00 - 0.20 K/uL    nRBC 0 0 /100 WBC    Gran % 76.0 (H) 38.0 - 73.0 %    Lymph % 11.0 (L) 18.0 - 48.0 %    Mono % 6.0 4.0 - 15.0 %    Eosinophil % 5.0 0.0 - 8.0 %    Basophil % 0.0 0.0 - 1.9 %    Bands 2.0 %    Platelet Estimate Appears normal     Differential Method Manual    CMP   Result Value Ref Range    Sodium 141 136 - 145 mmol/L    Potassium 3.8 3.5 - 5.1 mmol/L    Chloride 106 95 - 110 mmol/L    CO2 21 (L) 23 - 29 mmol/L    Glucose 131 (H) 70 - 110 mg/dL    BUN 55 (H) 8 - 23 mg/dL    Creatinine 1.6 (H) 0.5 - 1.4 mg/dL    Calcium 8.8 8.7 - 10.5 mg/dL    Total Protein 5.9 (L)  6.0 - 8.4 g/dL    Albumin 3.1 (L) 3.5 - 5.2 g/dL    Total Bilirubin 0.3 0.1 - 1.0 mg/dL    Alkaline Phosphatase 76 55 - 135 U/L    AST 13 10 - 40 U/L    ALT 5 (L) 10 - 44 U/L    Anion Gap 14 8 - 16 mmol/L    eGFR 32 (A) >60 mL/min/1.73 m^2   TSH   Result Value Ref Range    TSH 0.952 0.400 - 4.000 uIU/mL     *Note: Due to a large number of results and/or encounters for the requested time period, some results have not been displayed. A complete set of results can be found in Results Review.      Assessment:       1. Benign paroxysmal positional vertigo of left ear        Plan:       Benign paroxysmal positional vertigo of left ear          Medication List with Changes/Refills   Current Medications    ALBUTEROL (VENTOLIN HFA) 90 MCG/ACTUATION INHALER    Inhale 2 puffs into the lungs every 6 (six) hours as needed for Wheezing. Rescue    ALBUTEROL-IPRATROPIUM (DUO-NEB) 2.5 MG-0.5 MG/3 ML NEBULIZER SOLUTION    Take 3 mLs by nebulization every 6 (six) hours as needed for Wheezing. Rescue    APIXABAN (ELIQUIS) 5 MG TAB    Take 1 tablet (5 mg total) by mouth 2 (two) times daily.    ASCORBIC ACID, VITAMIN C, (VITAMIN C) 500 MG TABLET    Take 1 tablet (500 mg total) by mouth once daily.    BUDESONIDE (PULMICORT) 0.5 MG/2 ML NEBULIZER SOLUTION    Take 2 mLs (0.5 mg total) by nebulization 2 (two) times a day. Controller    CETIRIZINE (ZYRTEC) 10 MG TABLET    Take 10 mg by mouth once daily.    CHOLECALCIFEROL, VITAMIN D3, 1,250 MCG (50,000 UNIT) TAB    Take 1 tablet by mouth every 7 days.    DOCUSATE SODIUM (COLACE) 100 MG CAPSULE    Take 2 capsules (200 mg total) by mouth 2 (two) times daily.    FERROUS SULFATE 324 MG (65 MG IRON) TBEC    Take 1 tablet (324 mg total) by mouth once daily.    FLUTICASONE PROPIONATE (FLONASE) 50 MCG/ACTUATION NASAL SPRAY    USE 1 SPRAY (50 MCG TOTAL) BY EACH NOSTRIL ROUTE ONCE DAILY.    HYDROCHLOROTHIAZIDE (HYDRODIURIL) 25 MG TABLET    TAKE 1 TABLET BY MOUTH ONCE DAILY    LACTOBACILLUS RHAMNOSUS GG  (CULTURELLE) 10 BILLION CELL CAPSULE    Take 1 capsule by mouth once daily.    LEVOTHYROXINE (SYNTHROID) 100 MCG TABLET    Take 1 tablet (100 mcg total) by mouth once daily.    LISINOPRIL (PRINIVIL,ZESTRIL) 40 MG TABLET    TAKE 1 TABLET BY MOUTH ONCE DAILY    MUCUS CLEARING DEVICE (ACAPELLA, FLUTTER)    by Misc.(Non-Drug; Combo Route) route once daily.    MULTIVITAMIN (THERAGRAN) PER TABLET    Take 1 tablet by mouth once daily.    NIFEDIPINE (ADALAT CC) 60 MG TBSR    TAKE 1 TABLET BY MOUTH ONCE DAILY    OMEPRAZOLE (PRILOSEC) 40 MG CAPSULE    TAKE 1 CAPSULE BY MOUTH  ONCE DAILY    POTASSIUM CHLORIDE (MICRO-K) 10 MEQ CPSR    TAKE 2 CAPSULES BY MOUTH  ONCE DAILY    SIMVASTATIN (ZOCOR) 10 MG TABLET    TAKE 1 TABLET BY MOUTH IN THE  EVENING    SODIUM CHLORIDE 3% 3 % NEBULIZER SOLUTION    Take 4 mLs by nebulization 2 (two) times a day.    TEMAZEPAM (RESTORIL) 30 MG CAPSULE    Take 30 mg by mouth every evening.

## 2023-05-12 ENCOUNTER — OFFICE VISIT (OUTPATIENT)
Dept: HEMATOLOGY/ONCOLOGY | Facility: CLINIC | Age: 83
End: 2023-05-12
Payer: MEDICARE

## 2023-05-12 ENCOUNTER — CLINICAL SUPPORT (OUTPATIENT)
Dept: REHABILITATION | Facility: HOSPITAL | Age: 83
End: 2023-05-12
Payer: MEDICARE

## 2023-05-12 ENCOUNTER — TELEPHONE (OUTPATIENT)
Dept: HEMATOLOGY/ONCOLOGY | Facility: CLINIC | Age: 83
End: 2023-05-12
Payer: MEDICARE

## 2023-05-12 ENCOUNTER — TELEPHONE (OUTPATIENT)
Dept: NEPHROLOGY | Facility: CLINIC | Age: 83
End: 2023-05-12
Payer: MEDICARE

## 2023-05-12 VITALS
TEMPERATURE: 98 F | HEART RATE: 91 BPM | BODY MASS INDEX: 32.11 KG/M2 | WEIGHT: 188.06 LBS | SYSTOLIC BLOOD PRESSURE: 114 MMHG | DIASTOLIC BLOOD PRESSURE: 70 MMHG | OXYGEN SATURATION: 95 % | HEIGHT: 64 IN | RESPIRATION RATE: 14 BRPM

## 2023-05-12 DIAGNOSIS — I10 ESSENTIAL HYPERTENSION: ICD-10-CM

## 2023-05-12 DIAGNOSIS — I10 HYPERTENSION, UNSPECIFIED TYPE: ICD-10-CM

## 2023-05-12 DIAGNOSIS — E03.9 ACQUIRED HYPOTHYROIDISM: ICD-10-CM

## 2023-05-12 DIAGNOSIS — C82.00 FOLLICULAR LYMPHOMA GRADE I, UNSPECIFIED BODY REGION: ICD-10-CM

## 2023-05-12 DIAGNOSIS — J98.4 PNEUMONITIS: ICD-10-CM

## 2023-05-12 DIAGNOSIS — I26.02 ACUTE SADDLE PULMONARY EMBOLISM WITH ACUTE COR PULMONALE: ICD-10-CM

## 2023-05-12 DIAGNOSIS — H81.12 BENIGN PAROXYSMAL POSITIONAL VERTIGO OF LEFT EAR: ICD-10-CM

## 2023-05-12 DIAGNOSIS — E78.5 HYPERLIPIDEMIA, UNSPECIFIED HYPERLIPIDEMIA TYPE: ICD-10-CM

## 2023-05-12 DIAGNOSIS — N28.89 RIGHT RENAL MASS: ICD-10-CM

## 2023-05-12 DIAGNOSIS — C34.90 MALIGNANT NEOPLASM OF UNSPECIFIED PART OF UNSPECIFIED BRONCHUS OR LUNG: Primary | ICD-10-CM

## 2023-05-12 DIAGNOSIS — N17.9 AKI (ACUTE KIDNEY INJURY): ICD-10-CM

## 2023-05-12 PROCEDURE — 99214 PR OFFICE/OUTPT VISIT, EST, LEVL IV, 30-39 MIN: ICD-10-PCS | Mod: S$GLB,,, | Performed by: INTERNAL MEDICINE

## 2023-05-12 PROCEDURE — 1159F PR MEDICATION LIST DOCUMENTED IN MEDICAL RECORD: ICD-10-PCS | Mod: CPTII,S$GLB,, | Performed by: INTERNAL MEDICINE

## 2023-05-12 PROCEDURE — 99999 PR PBB SHADOW E&M-EST. PATIENT-LVL V: CPT | Mod: PBBFAC,,, | Performed by: INTERNAL MEDICINE

## 2023-05-12 PROCEDURE — 95992 CANALITH REPOSITIONING PROC: CPT | Mod: PO | Performed by: PHYSICAL THERAPIST

## 2023-05-12 PROCEDURE — 3078F DIAST BP <80 MM HG: CPT | Mod: CPTII,S$GLB,, | Performed by: INTERNAL MEDICINE

## 2023-05-12 PROCEDURE — 1111F PR DISCHARGE MEDS RECONCILED W/ CURRENT OUTPATIENT MED LIST: ICD-10-PCS | Mod: CPTII,S$GLB,, | Performed by: INTERNAL MEDICINE

## 2023-05-12 PROCEDURE — 1126F PR PAIN SEVERITY QUANTIFIED, NO PAIN PRESENT: ICD-10-PCS | Mod: CPTII,S$GLB,, | Performed by: INTERNAL MEDICINE

## 2023-05-12 PROCEDURE — 3288F PR FALLS RISK ASSESSMENT DOCUMENTED: ICD-10-PCS | Mod: CPTII,S$GLB,, | Performed by: INTERNAL MEDICINE

## 2023-05-12 PROCEDURE — 97161 PT EVAL LOW COMPLEX 20 MIN: CPT | Mod: PO | Performed by: PHYSICAL THERAPIST

## 2023-05-12 PROCEDURE — 1126F AMNT PAIN NOTED NONE PRSNT: CPT | Mod: CPTII,S$GLB,, | Performed by: INTERNAL MEDICINE

## 2023-05-12 PROCEDURE — 1159F MED LIST DOCD IN RCRD: CPT | Mod: CPTII,S$GLB,, | Performed by: INTERNAL MEDICINE

## 2023-05-12 PROCEDURE — 99999 PR PBB SHADOW E&M-EST. PATIENT-LVL V: ICD-10-PCS | Mod: PBBFAC,,, | Performed by: INTERNAL MEDICINE

## 2023-05-12 PROCEDURE — 1101F PT FALLS ASSESS-DOCD LE1/YR: CPT | Mod: CPTII,S$GLB,, | Performed by: INTERNAL MEDICINE

## 2023-05-12 PROCEDURE — 3288F FALL RISK ASSESSMENT DOCD: CPT | Mod: CPTII,S$GLB,, | Performed by: INTERNAL MEDICINE

## 2023-05-12 PROCEDURE — 99214 OFFICE O/P EST MOD 30 MIN: CPT | Mod: S$GLB,,, | Performed by: INTERNAL MEDICINE

## 2023-05-12 PROCEDURE — 3074F SYST BP LT 130 MM HG: CPT | Mod: CPTII,S$GLB,, | Performed by: INTERNAL MEDICINE

## 2023-05-12 PROCEDURE — 1101F PR PT FALLS ASSESS DOC 0-1 FALLS W/OUT INJ PAST YR: ICD-10-PCS | Mod: CPTII,S$GLB,, | Performed by: INTERNAL MEDICINE

## 2023-05-12 PROCEDURE — 3074F PR MOST RECENT SYSTOLIC BLOOD PRESSURE < 130 MM HG: ICD-10-PCS | Mod: CPTII,S$GLB,, | Performed by: INTERNAL MEDICINE

## 2023-05-12 PROCEDURE — 1111F DSCHRG MED/CURRENT MED MERGE: CPT | Mod: CPTII,S$GLB,, | Performed by: INTERNAL MEDICINE

## 2023-05-12 PROCEDURE — 3078F PR MOST RECENT DIASTOLIC BLOOD PRESSURE < 80 MM HG: ICD-10-PCS | Mod: CPTII,S$GLB,, | Performed by: INTERNAL MEDICINE

## 2023-05-12 RX ORDER — LISINOPRIL 40 MG/1
40 TABLET ORAL NIGHTLY
Start: 2023-05-12 | End: 2023-05-25

## 2023-05-12 RX ORDER — HYDROCHLOROTHIAZIDE 25 MG/1
25 TABLET ORAL DAILY
Start: 2023-05-12 | End: 2023-05-25

## 2023-05-12 NOTE — PLAN OF CARE
[facility]  Physical Therapy Initial Evaluation    Name: Shandra Velez  Clinic Number: [id number]    Therapy Diagnosis:   Encounter Diagnosis   Name Primary?    Benign paroxysmal positional vertigo of left ear      Physician: Declan Aguirre MD    Physician Orders: PT Eval and Treat   Benign paroxysmal positional vertigo of left ear   Medical Diagnosis from Referral:   Evaluation Date: 5/12/2023  Authorization Period Expiration: 12/31/2023  Plan of Care Expiration: 07/07/2023  Visit # / Visits authorized: 1    Time In: 1200  Time Out: 1300  Total Billable Time: 60 minutes    Precautions: Standard and blood thinners, HTN    Subjective   Date of onset: 05/11/2023  History of current condition - Shandra reports: having vertigo like s/s with head turns, getting out of the the bed and while standing up briefly. No faint likes spells. No falls noted. Patient reported walking with RW since the beginning of the new year and has continuous O2 2L/min noted. Patient recalls having the vertigo since Feb 2023.       Past Medical History:   Diagnosis Date    Breast cancer 1985    right mastectomy    Digestive disorder     Encounter for blood transfusion     History of pneumonia     Hypercholesterolemia     Hypertension     Indigestion     Lumbar spondylosis     Lung cancer     2022 - currently in treatment as of 7/27/22    Lymphoma     Osteopenia     Pulmonary nodule     Smoker 06/11/2022     Shandra Velez  has a past surgical history that includes Cholecystectomy; Appendectomy; Umbilical hernia repair; Tubal ligation; Lumbar laminectomy; Mastectomy (Right, 1985); Cataract extraction w/  intraocular lens implant (Bilateral); Joint replacement (2008); Breast reconstruction (1990); Transforaminal epidural injection of steroid (Left, 05/14/2020); Needle localization (N/A, 05/25/2020); Lymph node biopsy; Bone marrow biopsy (Bilateral, 06/24/2020); Insertion of tunneled central venous catheter (CVC)  with subcutaneous port (Left, 11/04/2020); Endobronchial ultrasound (Bilateral, 07/09/2021); Transforaminal epidural injection of steroid (Left, 03/23/2022); Endobronchial ultrasound (Bilateral, 6/16/2022); Injection of facet joint (Left, 6/30/2022); Transforaminal epidural injection of steroid (Left, 8/1/2022); Bronchoscopy (N/A, 1/18/2023); Bronchoscopy (Bilateral, 2/10/2023); Bronchoscopy (N/A, 3/13/2023); Thrombectomy (N/A, 3/18/2023); Right heart catheterization (Right, 3/18/2023); and Selective unilateral pulmonary angiography (3/18/2023).    Shandra has a current medication list which includes the following prescription(s): albuterol, albuterol-ipratropium, apixaban, ascorbic acid (vitamin c), budesonide, cetirizine, cholecalciferol (vitamin d3), docusate sodium, ferrous sulfate, fluticasone propionate, hydrochlorothiazide, lactobacillus rhamnosus gg, levothyroxine, lisinopril, mucus clearing device, multivitamin, nifedipine, omeprazole, potassium chloride, simvastatin, sodium chloride 3%, and temazepam.    Review of patient's allergies indicates:   Allergen Reactions    Opioids - morphine analogues Other (See Comments)     Hypotension        Imaging, :     Prior Therapy: none noted  Social History:  lives alone, one story noted, rollator  Occupation: Retired   Work demands: none  Leisure activities: walking, family time.  Prior Level of Function: independent  Current Level of Function/limitation: modified independent, increase time noted with home management  Recent or major surgery: none noted  Accidents: none    Pain:  Current 0/10, worst 0/10, best 0/10   Location:    Description:   Constant symptoms:   Intermittent symptoms:   Worse:   Better:      Pts goals: To get rid of the vertigo and walk without having the spinning sensation.    Objective     Outcome Measure:   Dizziness Handicap Inventory:     SYSTEMS SCREEN    - Follows commands: 100% of time   - Speech: no deficits     Mental status: alert,  "oriented to person, place, and time  Appearance: Casually dressed  Behavior:  calm  Attention Span and Concentration:  Normal    Posture observation:  Sitting: lordotic/neutral/kyphotic: kyphotic  Change in posture: better/worse/same: better  Standing: lordotic/neutral/kyphotic: neutral                               Lateral shift: right/left/nil: nil  Shift relevant: yes/no: no    Sensation: Light Touch: Intact          Proprioception:           Vibratory sense          Coordination:   - UE coordination:  Rapid alternating movement Intact; Finger to nose Intact  - LE coordination:   Rapid alternating movement Intact; Heel to shin  Intact  Modified VAS (Vertebral Artery Screen), in sitting (rotation, then extension 10"):  R: Negative  L: Negative    VESTIBULAR EXAMINATION    Oculomotor Screen in room light (fixation present):   Known eye dysfunction: None   Ocular ROM: WNL    Tracking/Smooth Pursuits: Impaired: left  Saccades: Impaired: L>R  Convergence: WNL    Spontaneous Nystagmus: Absent   Gaze Holding Nystagmus: Absent     Head Impulse: + L > right side    POSITIONAL CANAL TESTING  Looking for nystagmus (slow phase followed by quick phase to the affected side for BPPV)    [place] Hallpike (posterior / CL anterior)   Right : Negative nystagmus, Negative dizziness   Left: Positive nystagmus, Positive dizziness  Horizontal Canals   Right: Negative nystagmus, Negative dizziness   Left: Negative nystagmus, Negative dizziness   Treatment Performed: epley maneuver x 2    Postural control: MCTSIB: Evaluation 05/12/2023 (Average of 3 trials)   1. Eyes Open/feet together/Firm:  seconds   2. Eyes Closed/feet together/Firm:   seconds   3. Eyes Open/feet together/Foam:   seconds   4. Eyes Closed/feet together/Foam:   seconds   TOTAL /120      Evaluation 05/12/2023   Single Limb Stance R LE 0  (<10 sec = HIGH FALL RISK)   Single Limb Stance L LE 0  (<10 sec = HIGH FALL RISK)      Evaluation 05/12/2023   Timed Up and Go  sec  < 20 " sec safe for independent transfers, < 30 sec safe for dependent transfers/assist required   Self Selected Walking Speed  m/sec (6m/s)   Fast Walking Speed DNT        Evaluation 05/12/2023   30 second Chair Rise  (adults > 61 y/o)  completed  arms   5 times sit-stand  (adults 18-63 y/o) seconds  >12 sec= fall risk for general elderly  >16 sec= fall risk for Parkinson's disease  >10 sec= balance/vestibular dysfunction (<61 y/o)  >14.2 sec= balance/vestibular dysfunction (>61 y/o)  >12 sec= fall risk for CVA     Gait Assessment:(if indicated)  - AD used: rollator  - Assistance: modified independent, increase time noted  - Distance: 50    GAIT DEVIATIONS:  Shandra displays the following deviations with ambulation: decreased gosia        Dizziness Positional Status (DPS)  Results:  Score  Intake 60.7  Patient responses to Dizziness Positional Status (DPS) were as follows:  Question Response at Intake  Does looking up increase your problem? No  Do quick movements of your head increase your problem? Sometimes  Does bending over increase your problem? Sometimes  Does turning over in bed increase your problem? No       TREATMENT   Treatment Time In: 1230  Treatment Time Out: 1240  Total Treatment time separate from Evaluation: 10 minutes    Shandra participated in neuromuscular re-education activities to improve: Balance, Coordination, Kinesthetic, Sense, Proprioception, Posture, and vestibular for 10 minutes. The following activities were included:  Epley maneuver    Home Exercises and Patient Education Provided    Education provided:   - yes    Written Home Exercises Provided: yes.  Exercises were reviewed and Shandra was able to demonstrate them prior to the end of the session.  Shandra demonstrated good  understanding of the education provided.     See EMR under Patient Instructions for exercises provided 5/12/2023.    Assessment   Shandra is a 82 y.o. female referred to outpatient Physical Therapy with a medical diagnosis  of Benign paroxysmal positional vertigo of left ear . Pt presents with postural imbalance, vertigo.    Problem List: decreased balance and stability and inability to participate fully in vocation pursuits.    Pt prognosis is Good.   Pt will benefit from skilled outpatient Physical Therapy to address the deficits stated above and in the chart below, provide pt/family education, and to maximize pt's level of independence.     Plan of care discussed with patient: Yes  Pt's spiritual, cultural and educational needs considered and patient is agreeable to the plan of care and goals as stated below:     Anticipated Barriers for therapy: none    Medical Necessity is demonstrated by the following  History  Co-morbidities and personal factors that may impact the plan of care Co-morbidities:   advanced age, history of cancer, and HTN    Personal Factors:   no deficits     high   Examination  Body Structures and Functions, activity limitations and participation restrictions that may impact the plan of care Body Regions:   head  neck  lower extremities    Body Systems:    strength  gross coordinated movement  balance  gait  transfers  transitions  motor control    Participation Restrictions:   Home management  Community ambulation    Activity limitations:   Learning and applying knowledge  no deficits    General Tasks and Commands  no deficits    Communication  no deficits    Mobility  walking    Self care  no deficits    Domestic Life  shopping  cooking  doing house work (cleaning house, washing dishes, laundry)    Interactions/Relationships  no deficits    Life Areas  no deficits    Community and Social Life  no deficits         high   Clinical Presentation stable and uncomplicated low   Decision Making/ Complexity Score: low     Short Term GOALS:  In 4 weeks, pt. will:  - improve sit to stands independently with one hand support x 5  - demonstrate cervical left rotation from supine to sit with no vertigo s/s.  - report > 50%  "reduction in vertigo s/s with home management.  - demonstrate VOR horizontal movement in seated position tolerance of 30" or > for mobility purposes.    Long Term GOALS:  In 8 weeks, pt. will:  - be independent and compliant with HEP and SX management   - demonstrate no vertigo s/s with bed mobility.  - report no vertigo s/s with home management.  - demonstrate standing tolerance of 1' VOR for mobility purposes.    Further goals to be added once positional vertigo abolished.    Plan   Plan of care Certification: 5/12/2023 to 07/07/2023.  Outpatient Physical Therapy 2 times weekly for 8 weeks to include the following interventions: Gait Training, Manual Therapy, Moist Heat/ Ice, Neuromuscular Re-ed, Patient Education, Therapeutic Activities, and Therapeutic Exercise.      Shandra may at times be seen by a PTA as part of the Rehab Team.    Jonathan Gallardo, PT        "

## 2023-05-12 NOTE — TELEPHONE ENCOUNTER
----- Message from Darek Yeh sent at 5/12/2023  2:49 PM CDT -----  Type:  Sooner Appointment Request    Caller is requesting a sooner appointment.  Caller declined first available appointment listed below.  Caller will not accept being placed on the waitlist and is requesting a message be sent to doctor.    Name of Caller:  Alisa/ Dr. Mills  When is the first available appointment?  06/14/23  Symptoms:  ZAINA  Best Call Back Number:  Patient  [phone]  Additional Information:  Caller- [phone]

## 2023-05-12 NOTE — TELEPHONE ENCOUNTER
Called [facility] to get patient on with Nephrology, per Dr Mills that needs to seen ASAP, patient rep advised that Dr De La Paz and Dr Davis doesn't have any opening until mid June, he called office but they are gone early on Friday, so call rep sent message to office, and they will call me or patient back to schedule sooner than mid June.

## 2023-05-13 NOTE — PATIENT INSTRUCTIONS
Patient was given Home instruction with handout after repositioning procedures.  -Sleep with 2-3 pillows so head is elevated  -Do not sleep on affected side  -Do not look up or down  -Do not bend over-keep your head upright  -Do not turn your head to the left or right quickly     Patient verbalized understanding.

## 2023-05-15 ENCOUNTER — CLINICAL SUPPORT (OUTPATIENT)
Dept: REHABILITATION | Facility: HOSPITAL | Age: 83
End: 2023-05-15
Payer: MEDICARE

## 2023-05-15 DIAGNOSIS — R42 VERTIGO: ICD-10-CM

## 2023-05-15 DIAGNOSIS — R26.89 IMBALANCE: ICD-10-CM

## 2023-05-15 PROCEDURE — 97112 NEUROMUSCULAR REEDUCATION: CPT | Mod: PO | Performed by: PHYSICAL THERAPIST

## 2023-05-15 PROCEDURE — 95992 CANALITH REPOSITIONING PROC: CPT | Mod: PO | Performed by: PHYSICAL THERAPIST

## 2023-05-15 NOTE — PROGRESS NOTES
[facility]   Physical Therapy Treatment Note     Name: Shandra Velez  [facility] Number: [id number]    Therapy Diagnosis:   Encounter Diagnoses   Name Primary?    Vertigo     Imbalance      Physician: Declan Aguirre MD    Visit Date: 5/15/2023  Physician Orders: PT Eval and Treat   Benign paroxysmal positional vertigo of left ear   Medical Diagnosis from Referral:   Evaluation Date: 5/12/2023  Authorization Period Expiration: 12/31/2023  Plan of Care Expiration: 07/07/2023  Visit # / Visits authorized: 1     Time In: 1000  Time Out: 1045  Total Billable Time: 38 minutes     Precautions: Standard and blood thinners, HTN    SUBJECTIVE     Pt reports: no new s/s. No vertigo this morning. Patient reported no spinning though at this time. No falls noted.  She was compliant with home exercise program.  Response to previous treatment: too soon to tell  Functional change: too soon to tell    Pain: 0/10  Location:  NA     OBJECTIVE     Objective Measures updated at progress report unless specified.     Treatment     Shandra received the treatments listed below:      therapeutic exercises to develop strength, endurance, ROM, flexibility, posture, and core stabilization for 00 minutes including:    neuromuscular re-education activities to improve: Balance, Coordination, Kinesthetic, Sense, Proprioception, and Posture for 38 minutes. The following activities were included:  VESTIBULAR EXAMINATION     Oculomotor Screen in room light (fixation present):   Known eye dysfunction: None   Ocular ROM: WNL    Tracking/Smooth Pursuits: Impaired: left  Saccades: Impaired: L>R  Convergence: WNL    Spontaneous Nystagmus: Absent   Gaze Holding Nystagmus: Absent      Head Impulse: + L corrective saccade     POSITIONAL CANAL TESTING  Looking for nystagmus (slow phase followed by quick phase to the affected side for BPPV)     [place] Hallpike (posterior / CL anterior)              Right : Negative nystagmus, Negative  dizziness              Left: Positive nystagmus, Positive dizziness  Horizontal Canals              Right: Negative nystagmus, Negative dizziness              Left: Negative nystagmus, Negative dizziness   Treatment Performed: epley maneuver x 2     Postural control: MCTSIB: Evaluation 05/12/2023 (Average of 3 trials)   1. Eyes Open/feet together/Firm: 30 seconds   2. Eyes Closed/feet together/Firm: 15  seconds   3. Eyes Open/feet together/Foam: 30 seconds   4. Eyes Closed/feet together/Foam:  5 seconds   TOTAL 80/120        Evaluation 05/12/2023   Single Limb Stance R LE 0  (<10 sec = HIGH FALL RISK)   Single Limb Stance L LE 0  (<10 sec = HIGH FALL RISK)        Evaluation 05/12/2023   Timed Up and Go  sec  < 20 sec safe for independent transfers, < 30 sec safe for dependent transfers/assist required   Self Selected Walking Speed  m/sec (6m/s)   Fast Walking Speed DNT           Evaluation 05/12/2023   30 second Chair Rise  (adults > 61 y/o)  completed  arms   5 times sit-stand  (adults 18-63 y/o) seconds  >12 sec= fall risk for general elderly  >16 sec= fall risk for Parkinson's disease  >10 sec= balance/vestibular dysfunction (<61 y/o)  >14.2 sec= balance/vestibular dysfunction (>61 y/o)  >12 sec= fall risk for CVA      Gait Assessment:(if indicated)  - AD used: rollator  - Assistance: modified independent, increase time noted  - Distance: 50       therapeutic activities to improve functional performance for 00  minutes, including:      Patient Education and Home Exercises     Home Exercises Provided and Patient Education Provided     Education provided:   - yes    Written Home Exercises Provided: Patient instructed to cont prior HEP. Exercises were reviewed and Shandra was able to demonstrate them prior to the end of the session.  Shandra demonstrated good  understanding of the education provided. See EMR under Patient Instructions for exercises provided during therapy sessions    ASSESSMENT     Patient  "demonstrated no dizziness nor spinning like sensation upon maneuvers. + left corrective saccade noted. Patient demonstrated fair tolerance with VOR up to 1' and toe taps while focusing on target for proprioceptive purposes.    Shandra Is progressing well towards her goals.   Pt prognosis is Good.     Pt will continue to benefit from skilled outpatient physical therapy to address the deficits listed in the problem list box on initial evaluation, provide pt/family education and to maximize pt's level of independence in the home and community environment.     Pt's spiritual, cultural and educational needs considered and pt agreeable to plan of care and goals.     Anticipated barriers to physical therapy: none    Goals:   Short Term GOALS:  In 4 weeks, pt. will:  - improve sit to stands independently with one hand support x 5  - demonstrate cervical left rotation from supine to sit with no vertigo s/s.  - report > 50% reduction in vertigo s/s with home managemenCt.  - demonstrate VOR horizontal movement in seated position tolerance of 30" or > for mobility purposes.    PLAN     Continue with JESSI Gallardo, PT     "

## 2023-05-17 ENCOUNTER — PATIENT MESSAGE (OUTPATIENT)
Dept: FAMILY MEDICINE | Facility: CLINIC | Age: 83
End: 2023-05-17
Payer: MEDICARE

## 2023-05-17 DIAGNOSIS — H81.12 BENIGN PAROXYSMAL POSITIONAL VERTIGO OF LEFT EAR: Primary | ICD-10-CM

## 2023-05-17 NOTE — TELEPHONE ENCOUNTER
Pt. Confirmed audiogram appt. At 2:30pm and ENT consult at 3:20pm on 7/6/23.  Assured her that she's on wait list as well.  Pt.celia.

## 2023-05-17 NOTE — TELEPHONE ENCOUNTER
Pt. States that Dr. Aguirre assured her that if PT is not effective at relieving her vertigo he will ensure she sees ENT ASAP.  Assured her that office will reach out to her when Dr. Aguirre advises upon return to clinic on 5/23/23.  Pt. Vu.

## 2023-05-22 ENCOUNTER — PATIENT MESSAGE (OUTPATIENT)
Dept: OTOLARYNGOLOGY | Facility: CLINIC | Age: 83
End: 2023-05-22
Payer: MEDICARE

## 2023-05-25 ENCOUNTER — OFFICE VISIT (OUTPATIENT)
Dept: OTOLARYNGOLOGY | Facility: CLINIC | Age: 83
End: 2023-05-25
Payer: MEDICARE

## 2023-05-25 ENCOUNTER — OFFICE VISIT (OUTPATIENT)
Dept: FAMILY MEDICINE | Facility: CLINIC | Age: 83
End: 2023-05-25
Payer: MEDICARE

## 2023-05-25 ENCOUNTER — CLINICAL SUPPORT (OUTPATIENT)
Dept: AUDIOLOGY | Facility: CLINIC | Age: 83
End: 2023-05-25
Payer: MEDICARE

## 2023-05-25 VITALS — WEIGHT: 193.56 LBS | HEIGHT: 64 IN | BODY MASS INDEX: 33.05 KG/M2

## 2023-05-25 VITALS
OXYGEN SATURATION: 97 % | RESPIRATION RATE: 18 BRPM | HEIGHT: 64 IN | BODY MASS INDEX: 33.05 KG/M2 | SYSTOLIC BLOOD PRESSURE: 126 MMHG | TEMPERATURE: 98 F | WEIGHT: 193.56 LBS | DIASTOLIC BLOOD PRESSURE: 74 MMHG | HEART RATE: 94 BPM

## 2023-05-25 DIAGNOSIS — H91.90 HEARING DIFFICULTY, UNSPECIFIED LATERALITY: ICD-10-CM

## 2023-05-25 DIAGNOSIS — J34.2 NASAL SEPTAL DEVIATION: ICD-10-CM

## 2023-05-25 DIAGNOSIS — E03.9 HYPOTHYROIDISM, UNSPECIFIED TYPE: ICD-10-CM

## 2023-05-25 DIAGNOSIS — H65.02 NON-RECURRENT ACUTE SEROUS OTITIS MEDIA OF LEFT EAR: Primary | ICD-10-CM

## 2023-05-25 DIAGNOSIS — Z99.81 DEPENDENCE ON CONTINUOUS SUPPLEMENTAL OXYGEN: ICD-10-CM

## 2023-05-25 DIAGNOSIS — H93.8X3 SENSATION OF PRESSURE IN EAR, BILATERAL: Primary | ICD-10-CM

## 2023-05-25 DIAGNOSIS — E78.5 HYPERLIPIDEMIA, UNSPECIFIED HYPERLIPIDEMIA TYPE: ICD-10-CM

## 2023-05-25 DIAGNOSIS — I10 ESSENTIAL HYPERTENSION: Primary | ICD-10-CM

## 2023-05-25 DIAGNOSIS — H92.09 OTALGIA, UNSPECIFIED LATERALITY: Primary | ICD-10-CM

## 2023-05-25 DIAGNOSIS — C34.11 MALIGNANT NEOPLASM OF RIGHT UPPER LOBE OF LUNG: ICD-10-CM

## 2023-05-25 DIAGNOSIS — R42 LIGHTHEADEDNESS: ICD-10-CM

## 2023-05-25 DIAGNOSIS — G47.00 INSOMNIA, UNSPECIFIED TYPE: ICD-10-CM

## 2023-05-25 DIAGNOSIS — H91.90 HEARING DIFFICULTY, UNSPECIFIED LATERALITY: Primary | ICD-10-CM

## 2023-05-25 PROCEDURE — 99214 OFFICE O/P EST MOD 30 MIN: CPT | Mod: S$GLB,,, | Performed by: FAMILY MEDICINE

## 2023-05-25 PROCEDURE — 1160F RVW MEDS BY RX/DR IN RCRD: CPT | Mod: CPTII,S$GLB,, | Performed by: FAMILY MEDICINE

## 2023-05-25 PROCEDURE — 1101F PT FALLS ASSESS-DOCD LE1/YR: CPT | Mod: CPTII,S$GLB,, | Performed by: FAMILY MEDICINE

## 2023-05-25 PROCEDURE — 99999 PR PBB SHADOW E&M-EST. PATIENT-LVL IV: CPT | Mod: PBBFAC,,, | Performed by: NURSE PRACTITIONER

## 2023-05-25 PROCEDURE — 3078F DIAST BP <80 MM HG: CPT | Mod: CPTII,S$GLB,, | Performed by: FAMILY MEDICINE

## 2023-05-25 PROCEDURE — 3074F SYST BP LT 130 MM HG: CPT | Mod: CPTII,S$GLB,, | Performed by: FAMILY MEDICINE

## 2023-05-25 PROCEDURE — 1159F MED LIST DOCD IN RCRD: CPT | Mod: CPTII,S$GLB,, | Performed by: NURSE PRACTITIONER

## 2023-05-25 PROCEDURE — 99214 OFFICE O/P EST MOD 30 MIN: CPT | Mod: S$GLB,,, | Performed by: NURSE PRACTITIONER

## 2023-05-25 PROCEDURE — 1101F PR PT FALLS ASSESS DOC 0-1 FALLS W/OUT INJ PAST YR: ICD-10-PCS | Mod: CPTII,S$GLB,, | Performed by: NURSE PRACTITIONER

## 2023-05-25 PROCEDURE — 1126F PR PAIN SEVERITY QUANTIFIED, NO PAIN PRESENT: ICD-10-PCS | Mod: CPTII,S$GLB,, | Performed by: NURSE PRACTITIONER

## 2023-05-25 PROCEDURE — 92567 TYMPANOMETRY: CPT | Mod: S$GLB,,, | Performed by: AUDIOLOGIST

## 2023-05-25 PROCEDURE — 1126F AMNT PAIN NOTED NONE PRSNT: CPT | Mod: CPTII,S$GLB,, | Performed by: FAMILY MEDICINE

## 2023-05-25 PROCEDURE — 3078F PR MOST RECENT DIASTOLIC BLOOD PRESSURE < 80 MM HG: ICD-10-PCS | Mod: CPTII,S$GLB,, | Performed by: FAMILY MEDICINE

## 2023-05-25 PROCEDURE — 1126F PR PAIN SEVERITY QUANTIFIED, NO PAIN PRESENT: ICD-10-PCS | Mod: CPTII,S$GLB,, | Performed by: FAMILY MEDICINE

## 2023-05-25 PROCEDURE — 3074F PR MOST RECENT SYSTOLIC BLOOD PRESSURE < 130 MM HG: ICD-10-PCS | Mod: CPTII,S$GLB,, | Performed by: FAMILY MEDICINE

## 2023-05-25 PROCEDURE — 3288F FALL RISK ASSESSMENT DOCD: CPT | Mod: CPTII,S$GLB,, | Performed by: FAMILY MEDICINE

## 2023-05-25 PROCEDURE — 3288F FALL RISK ASSESSMENT DOCD: CPT | Mod: CPTII,S$GLB,, | Performed by: NURSE PRACTITIONER

## 2023-05-25 PROCEDURE — 99999 PR PBB SHADOW E&M-EST. PATIENT-LVL III: CPT | Mod: PBBFAC,,, | Performed by: FAMILY MEDICINE

## 2023-05-25 PROCEDURE — 99214 PR OFFICE/OUTPT VISIT, EST, LEVL IV, 30-39 MIN: ICD-10-PCS | Mod: S$GLB,,, | Performed by: NURSE PRACTITIONER

## 2023-05-25 PROCEDURE — 99214 PR OFFICE/OUTPT VISIT, EST, LEVL IV, 30-39 MIN: ICD-10-PCS | Mod: S$GLB,,, | Performed by: FAMILY MEDICINE

## 2023-05-25 PROCEDURE — 1101F PR PT FALLS ASSESS DOC 0-1 FALLS W/OUT INJ PAST YR: ICD-10-PCS | Mod: CPTII,S$GLB,, | Performed by: FAMILY MEDICINE

## 2023-05-25 PROCEDURE — 1159F PR MEDICATION LIST DOCUMENTED IN MEDICAL RECORD: ICD-10-PCS | Mod: CPTII,S$GLB,, | Performed by: FAMILY MEDICINE

## 2023-05-25 PROCEDURE — 1101F PT FALLS ASSESS-DOCD LE1/YR: CPT | Mod: CPTII,S$GLB,, | Performed by: NURSE PRACTITIONER

## 2023-05-25 PROCEDURE — 1160F PR REVIEW ALL MEDS BY PRESCRIBER/CLIN PHARMACIST DOCUMENTED: ICD-10-PCS | Mod: CPTII,S$GLB,, | Performed by: FAMILY MEDICINE

## 2023-05-25 PROCEDURE — 92567 PR TYMPA2METRY: ICD-10-PCS | Mod: S$GLB,,, | Performed by: AUDIOLOGIST

## 2023-05-25 PROCEDURE — 99999 PR PBB SHADOW E&M-EST. PATIENT-LVL III: ICD-10-PCS | Mod: PBBFAC,,, | Performed by: FAMILY MEDICINE

## 2023-05-25 PROCEDURE — 1159F PR MEDICATION LIST DOCUMENTED IN MEDICAL RECORD: ICD-10-PCS | Mod: CPTII,S$GLB,, | Performed by: NURSE PRACTITIONER

## 2023-05-25 PROCEDURE — 3288F PR FALLS RISK ASSESSMENT DOCUMENTED: ICD-10-PCS | Mod: CPTII,S$GLB,, | Performed by: NURSE PRACTITIONER

## 2023-05-25 PROCEDURE — 3288F PR FALLS RISK ASSESSMENT DOCUMENTED: ICD-10-PCS | Mod: CPTII,S$GLB,, | Performed by: FAMILY MEDICINE

## 2023-05-25 PROCEDURE — 1159F MED LIST DOCD IN RCRD: CPT | Mod: CPTII,S$GLB,, | Performed by: FAMILY MEDICINE

## 2023-05-25 PROCEDURE — 99999 PR PBB SHADOW E&M-EST. PATIENT-LVL IV: ICD-10-PCS | Mod: PBBFAC,,, | Performed by: NURSE PRACTITIONER

## 2023-05-25 PROCEDURE — 1126F AMNT PAIN NOTED NONE PRSNT: CPT | Mod: CPTII,S$GLB,, | Performed by: NURSE PRACTITIONER

## 2023-05-25 RX ORDER — FLUTICASONE PROPIONATE 50 MCG
1 SPRAY, SUSPENSION (ML) NASAL 2 TIMES DAILY
Qty: 16 G | Refills: 12 | Status: SHIPPED | OUTPATIENT
Start: 2023-05-25 | End: 2024-05-24

## 2023-05-25 RX ORDER — AZELASTINE 1 MG/ML
1 SPRAY, METERED NASAL 2 TIMES DAILY
Qty: 30 ML | Refills: 12 | Status: SHIPPED | OUTPATIENT
Start: 2023-05-25 | End: 2024-05-24

## 2023-05-25 NOTE — PROGRESS NOTES
Subjective     Patient ID: Shandra Velez is a 82 y.o. female.    Chief Complaint: Ear Fullness (Since Feb.)    HPI  Patient is new to ENT, referred by Dr. Aguirre for consultation for vertigo. Patient states she is here for aural fullness which started in February. Seeing oncology and pulmonology for metastatic lung cancer. Patient states her ears blocked up in February and she has been unable to hear well since. Antibiotics and valsalva have not helped. Patient states when she moves too quickly, she feels lightheaded. Denies any true vertigo: no spinning or swirling. Clear rhinorrhea constant, blows nose all day.     Review of Systems   Constitutional: Negative.    HENT: Negative.     Eyes: Negative.    Respiratory: Negative.     Cardiovascular: Negative.    Gastrointestinal: Negative.    Musculoskeletal: Negative.    Integumentary:  Negative.   Neurological: Negative.    Hematological: Negative.    Psychiatric/Behavioral: Negative.          Objective     Physical Exam  Vitals and nursing note reviewed.   Constitutional:       General: She is not in acute distress.     Appearance: She is well-developed. She is not ill-appearing or diaphoretic.   HENT:      Head: Normocephalic and atraumatic.      Right Ear: Hearing, tympanic membrane, ear canal and external ear normal. No middle ear effusion. Tympanic membrane is not erythematous. Tympanic membrane has normal mobility.      Left Ear: Hearing, ear canal and external ear normal. A middle ear effusion is present. Tympanic membrane is not erythematous. Tympanic membrane has decreased mobility.      Nose: Septal deviation (right) and rhinorrhea (clear) present. No mucosal edema or congestion.      Mouth/Throat:      Mouth: Mucous membranes are not pale, not dry and not cyanotic. No oral lesions.      Dentition: Has dentures.      Tongue: No lesions.      Palate: No lesions.      Pharynx: Uvula midline. No oropharyngeal exudate or posterior oropharyngeal erythema.    Eyes:      General: Lids are normal. No scleral icterus.        Right eye: No discharge.         Left eye: No discharge.   Neck:      Thyroid: No thyroid mass or thyromegaly.      Trachea: Trachea normal. No tracheal deviation.   Cardiovascular:      Rate and Rhythm: Normal rate.   Pulmonary:      Effort: Pulmonary effort is normal. No respiratory distress.      Breath sounds: Normal air entry. No stridor. No wheezing.   Musculoskeletal:         General: Normal range of motion.      Cervical back: Normal range of motion and neck supple.   Lymphadenopathy:      Head:      Right side of head: No submental, submandibular, tonsillar, preauricular or posterior auricular adenopathy.      Left side of head: No submental, submandibular, tonsillar, preauricular or posterior auricular adenopathy.      Cervical: No cervical adenopathy.      Right cervical: No superficial or posterior cervical adenopathy.     Left cervical: No superficial or posterior cervical adenopathy.   Skin:     General: Skin is warm and dry.      Coloration: Skin is not pale.      Findings: No lesion or rash.   Neurological:      Mental Status: She is alert and oriented to person, place, and time.      Motor: Motor function is intact.      Coordination: Coordination is intact. Coordination normal.      Gait: Gait normal.   Psychiatric:         Attention and Perception: Attention normal.         Mood and Affect: Mood normal.         Speech: Speech normal.         Behavior: Behavior normal. Behavior is cooperative.     Negative Spring-Hallpike AU     Assessment and Plan     Problem List Items Addressed This Visit    None  Visit Diagnoses       Non-recurrent acute serous otitis media of left ear    -  Primary    Relevant Medications    fluticasone propionate (FLONASE) 50 mcg/actuation nasal spray    azelastine (ASTELIN) 137 mcg (0.1 %) nasal spray    Nasal septal deviation        Lightheadedness             No evidence of any BPPV bilaterally. Patient denies  true vertigo (no spinning or swirling). Discussed dysequilibrium versus true vertigo. Discussed VNG for comprehensive vestibular system diagnostic testing indicated for those patients experiencing true vertigo but likely little to no yield if not vertiginous. Discussed dysequilibrium typically multifactorial: anemia, anxiety, dehydration, hypo-/hyper-glycemia, hypo-/hyper-tension, thyroid, arrhthymia, side effects of medication, gait/mobility issues, age, headaches and other neurological issues, etc. Discuss with PCP to determine stages of workup.    Left serous effusion effusion: Flonase & Astelin BID. Nasal valsalva several times each day. Return in 6 weeks for ear recheck. Audiogram prior.   Patient encouraged to return to clinic if symptoms worsen/persist and as needed for further ENT symptoms or concerns.

## 2023-05-25 NOTE — PATIENT INSTRUCTIONS
"You have fluid behind your ear drum.    Antibiotics are only effective at resolving infection; however they cannot get rid of fluid. The fluid can sometimes remain for another 4-12 weeks following an ear infection.     There are only two ways to get rid of fluid stuck behind the ear drum:    (1) The conservative approach -- nasal sprays twice daily (Flonase & Astelin), nasal valsalva/popping, and time (sometimes several weeks).     (2) The invasive approach -- your ear drum is pierced with a sharp tool and the fluid is suctioned out by one of our ear surgeons. This procedure is done in the office and provides instant relief; however there is the risk that the ear drum may not heal properly or that the fluid may return requiring repeating the procedure or putting a drainage tube in your ear drum.     DIFFERENT TYPES OF "ENT-APPROVED" NASAL SPRAYS:  Flonase / fluticasone / Nasacort / Rhinocort (steroid spray) is best for stuffy, pressure, fullness.  Astelin / azelastine (antihistamine spray) is best for itchy, drippy, sneezy.    Use as directed, spraying 1-2 times in each nostril each day. It may take 2-3 days to 2-3 weeks to begin seeing improvement. This medication needs to be taken consistently to see results.     Nasal spray instructions:  Blow nose first gently to clean. Keep chin level with the floor (do not tilt head forward or back). Using the opposite hand (example: right hand for left nostril, left hand for right nostril) insert nasal spray taking caution to direct it AWAY from the middle wall inside the nose (septum) to avoid irritating nasal septum which could cause nosebleed.  Do not tilt spray up but rather flat and out along the roof of your mouth to spray. Angle the tip of the spray out slightly toward the direction of the ears; then spray. Do not take quick vigorous sniff but rather slow gentle inhalation while waiting for medication to absorb into nasal passages. Then administer second spray in same " "way.     Overall, this is a well-tolerated medication with low side effects. The benefit of nasal steroids as opposed to oral steroids is that the nasal steroid spray works primarily in the nose. Common side effects can include: headache, nasal dryness, minor nose bleed.  Rare side effects may include:  septal perforation, elevation in eye pressure, dry eyes, change in smell, allergic reaction.  Notify your provider if you have any concerns or experience these symptoms.     EUSTACHIAN TUBE INSTRUCTIONS:  Nasal valsalva:  Pinch nose and with closed mouth try to "pop" air into ears through the back of the nose. Attempt this several times a day. The more popping you have, the more likely the fluid will resolve.     Nasal saline rinse kit (use Neti pot or Skyfiber sinus rinse kit) -- Rinse your sinuses one to two times daily to reduce the allergen burden in your nose. Use sterile water (boiled tap water which has cooled) or distilled bottled water. Add 1/4 teaspoon sea salt and a pinch of baking soda or a mixture packet from the maker of your sinus rinse kit.  Rinse through both sides of nose to cleanse sinus and nasal passages, bending forward with head tilted down. Keep your mouth open, without holding your breath. Squeeze bottle gently until solution starts draining from the opposite nasal passage. After bottle is empty, blow nose very gently, without pinching nose completely, to avoid pressure on eardrums.  There are useful YouTube videos that show demonstration of how to do these properly.     Ponaris Nasal Emollient is used for the relief of: nasal congestion due to colds, nasal irritation, allergy exacerbations, nasal crusting. Specifically prepared iodized organic oils of pine, eucalyptus, peppermint, cajeput, and cottonseed. To order Ponaris: ask your pharmacist to order it for you or we carry it in our pharmacy downstairs on the first floor.     "

## 2023-05-25 NOTE — PROGRESS NOTES
Tympanometry testing was requested by ENT to evaluate tympanic mobility.      Results revealed:   Right Ear:  Type A tymps  Left Ear:  Type A tymps    Recommend f/u with ENT for further evaluation of symptoms and to discuss treatment options as appropriate.

## 2023-05-25 NOTE — PROGRESS NOTES
Subjective:       Patient ID: Shandra Velez is a 82 y.o. female.    Chief Complaint: Preventative Health Care (Physical)    Here for annual exam    Trying IB Guard OTC after she thought she had some IBS  Non-Hodgkins lymphoma, renal mass, lung cancer - following with oncology; in remission with f/u in 2 months  S/p PE - taking Eliquis BID for life  HTN - tolerating nifedipine Xl 60mg daily; stopped HCTZ and lisinopril due to renal functions; she has upcoming appt with nephrology  HLD - tolerating Zocor 10mg daily  Allergies: taking Zyrtec 10mg daily  Insomnia - using restoril 30mg at bedtime  Hypothyroidism - taking levothyroxine 100mcg daily    Past Medical History:    Hypertension                                                  Pulmonary nodule                                              Smoker                                                        Osteopenia                                                    Hypertension                                                  Hypercholesterolemia                                          Breast cancer                                                 Lumbar spondylosis                                            Past Surgical History:    MASTECTOMY                                       1985            Comment:right    CHOLECYSTECTOMY                                                APPENDECTOMY                                                   UMBILICAL HERNIA REPAIR                                        TUBAL LIGATION                                                 BREAST RECONSTRUCTION                                            Comment:right    Allergies:   -- No Known Drug Allergies     Social History    Marital Status:              Spouse Name:                       Years of Education:                 Number of children: 3             Occupational History  Occupation          Employer            Comment               retired marketing *                          Social History Main Topics    Smoking Status: Current Every Day Smoker        Packs/Day: 0.50  Years: 53         Types: Cigarettes    Smokeless Status: Never Used                        Alcohol Use: Yes             Drug Use: No              Sexual Activity: Not Currently        Other Topics            Concern    None on file    Social History Narrative    Lives alone      Hobbies: skyler      From [place]    Current Outpatient Medications on File Prior to Visit:  acetaminophen (TYLENOL) 325 MG tablet, Take 2 tablets (650 mg total) by mouth every 4 (four) hours as needed (pain score 1-2/10)., Disp: , Rfl: 0  cetirizine (ZYRTEC) 10 MG tablet, Take 10 mg by mouth once daily. Every day, Disp: , Rfl:   hydroCHLOROthiazide (HYDRODIURIL) 25 MG tablet, Take 1 tablet (25 mg total) by mouth once daily., Disp: 90 tablet, Rfl: 3  lisinopriL (PRINIVIL,ZESTRIL) 40 MG tablet, Take 1 tablet (40 mg total) by mouth once daily., Disp: 90 tablet, Rfl: 3  multivitamin (ONE DAILY MULTIVITAMIN) per tablet, Take 1 tablet by mouth once daily., Disp: , Rfl:   NIFEdipine (ADALAT CC) 60 MG TbSR, Take 1 tablet (60 mg total) by mouth once daily., Disp: 90 tablet, Rfl: 3  omeprazole (PRILOSEC) 40 MG capsule, Take 1 capsule (40 mg total) by mouth once daily., Disp: 30 capsule, Rfl: 11  ondansetron (ZOFRAN) 8 MG tablet, Take 1 tablet (8 mg total) by mouth every 8 (eight) hours as needed for Nausea., Disp: 30 tablet, Rfl: 1  ondansetron (ZOFRAN-ODT) 8 MG TbDL, Take 1 tablet (8 mg total) by mouth every 8 (eight) hours as needed (Take 1 tablet (8 mg total) by mouth every 8 (eight) hours as needed for nausea.)., Disp: 30 tablet, Rfl: 1  prochlorperazine (COMPAZINE) 10 MG tablet, Take 1 tablet (10 mg total) by mouth every 6 (six) hours as needed., Disp: 60 tablet, Rfl: 5  simvastatin (ZOCOR) 10 MG tablet, Take 1 tablet (10 mg total) by mouth every evening., Disp: 90 tablet, Rfl: 3  temazepam (RESTORIL) 15 mg Cap, TAKE 1 CAPSULE AT BEDTIME  AS  NEEDED FOR INSOMNIA, Disp: 90 capsule, Rfl: 1  allopurinoL (ZYLOPRIM) 100 MG tablet, Take 1 tablet (100 mg total) by mouth once daily. (Patient not taking: Reported on 3/31/2021), Disp: 90 tablet, Rfl: 3  cholecalciferol, vitamin D3, 1,250 mcg (50,000 unit) capsule, , Disp: , Rfl:   cholecalciferol, vitamin D3, 1,250 mcg (50,000 unit) Tab, Take 1 tablet by mouth every 7 days. (Patient not taking: Reported on 3/31/2021), Disp: 12 tablet, Rfl: 3  predniSONE (DELTASONE) 50 MG Tab, Take 2 tablets (100 mg total) by mouth once daily. Days 1-5 of each chemo cycle (Patient not taking: Reported on 3/31/2021), Disp: 10 tablet, Rfl: 5  [DISCONTINUED] HYDROcodone-acetaminophen (NORCO) 5-325 mg per tablet, Take 1 tablet by mouth every 6 (six) hours as needed for Pain. (Patient not taking: Reported on 11/5/2020), Disp: 20 tablet, Rfl: 0    No current facility-administered medications on file prior to visit.        Review of patient's family history indicates:    Cancer                         Sister                      Comment: uterine    Diabetes                       Brother                       Fatigue  Associated symptoms include fatigue and nausea. Pertinent negatives include no arthralgias, chest pain, chills, coughing, fever, headaches, neck pain, numbness, rash, sore throat, vomiting or weakness.   Follow-up  Associated symptoms include fatigue and nausea. Pertinent negatives include no arthralgias, chest pain, chills, coughing, fever, headaches, neck pain, numbness, rash, sore throat, vomiting or weakness.   Hyperlipidemia  Pertinent negatives include no chest pain or shortness of breath.   Review of Systems   Constitutional:  Positive for appetite change and fatigue. Negative for chills, fever and unexpected weight change.   HENT:  Negative for sore throat and trouble swallowing.    Eyes:  Negative for pain and visual disturbance.   Respiratory:  Negative for cough, shortness of breath and wheezing.    Cardiovascular:  " Negative for chest pain and palpitations.   Gastrointestinal:  Positive for nausea. Negative for abdominal distention, blood in stool, diarrhea and vomiting.   Genitourinary:  Negative for difficulty urinating, dysuria and hematuria.   Musculoskeletal:  Negative for arthralgias, back pain, gait problem and neck pain.   Skin:  Negative for rash and wound.   Neurological:  Positive for tremors (left hand). Negative for dizziness, weakness, numbness and headaches.   Hematological:  Negative for adenopathy.   Psychiatric/Behavioral:  Negative for dysphoric mood.      Objective:       /74   Pulse 94   Temp 97.9 °F (36.6 °C) (Oral)   Resp 18   Ht 5' 4" (1.626 m)   Wt 87.8 kg (193 lb 9 oz)   SpO2 97%   BMI 33.23 kg/m²     Physical Exam  Constitutional:       Appearance: Normal appearance. She is well-developed.   HENT:      Head: Normocephalic.      Mouth/Throat:      Pharynx: No oropharyngeal exudate or posterior oropharyngeal erythema.   Eyes:      Conjunctiva/sclera: Conjunctivae normal.      Pupils: Pupils are equal, round, and reactive to light.   Neck:      Thyroid: No thyromegaly.   Cardiovascular:      Rate and Rhythm: Normal rate and regular rhythm.      Heart sounds: Normal heart sounds, S1 normal and S2 normal. No murmur heard.    No friction rub. No gallop.   Pulmonary:      Effort: Pulmonary effort is normal.      Breath sounds: Normal breath sounds. No wheezing or rales.   Abdominal:      General: Bowel sounds are normal. There is no distension.      Palpations: Abdomen is soft.      Tenderness: There is no abdominal tenderness.   Musculoskeletal:      Cervical back: Normal range of motion and neck supple.      Lumbar back: No deformity or tenderness. Normal range of motion.      Right lower leg: No edema.      Left lower leg: No edema.   Lymphadenopathy:      Cervical: No cervical adenopathy.   Skin:     General: Skin is warm.      Findings: No rash.   Neurological:      Mental Status: She is " alert and oriented to person, place, and time.      Cranial Nerves: No cranial nerve deficit.      Motor: Tremor present.      Gait: Gait normal.       Results for orders placed or performed in visit on 05/10/23   CBC w/ DIFF   Result Value Ref Range    WBC 12.61 3.90 - 12.70 K/uL    RBC 4.00 4.00 - 5.40 M/uL    Hemoglobin 11.9 (L) 12.0 - 16.0 g/dL    Hematocrit 36.2 (L) 37.0 - 48.5 %    MCV 91 82 - 98 fL    MCH 29.8 27.0 - 31.0 pg    MCHC 32.9 32.0 - 36.0 g/dL    RDW 15.6 (H) 11.5 - 14.5 %    Platelets 163 150 - 450 K/uL    MPV 10.1 9.2 - 12.9 fL    Immature Granulocytes CANCELED 0.0 - 0.5 %    Immature Grans (Abs) CANCELED 0.00 - 0.04 K/uL    Lymph # CANCELED 1.0 - 4.8 K/uL    Mono # CANCELED 0.3 - 1.0 K/uL    Eos # CANCELED 0.0 - 0.5 K/uL    Baso # CANCELED 0.00 - 0.20 K/uL    nRBC 0 0 /100 WBC    Gran % 76.0 (H) 38.0 - 73.0 %    Lymph % 11.0 (L) 18.0 - 48.0 %    Mono % 6.0 4.0 - 15.0 %    Eosinophil % 5.0 0.0 - 8.0 %    Basophil % 0.0 0.0 - 1.9 %    Bands 2.0 %    Platelet Estimate Appears normal     Differential Method Manual    CMP   Result Value Ref Range    Sodium 141 136 - 145 mmol/L    Potassium 3.8 3.5 - 5.1 mmol/L    Chloride 106 95 - 110 mmol/L    CO2 21 (L) 23 - 29 mmol/L    Glucose 131 (H) 70 - 110 mg/dL    BUN 55 (H) 8 - 23 mg/dL    Creatinine 1.6 (H) 0.5 - 1.4 mg/dL    Calcium 8.8 8.7 - 10.5 mg/dL    Total Protein 5.9 (L) 6.0 - 8.4 g/dL    Albumin 3.1 (L) 3.5 - 5.2 g/dL    Total Bilirubin 0.3 0.1 - 1.0 mg/dL    Alkaline Phosphatase 76 55 - 135 U/L    AST 13 10 - 40 U/L    ALT 5 (L) 10 - 44 U/L    Anion Gap 14 8 - 16 mmol/L    eGFR 32 (A) >60 mL/min/1.73 m^2   TSH   Result Value Ref Range    TSH 0.952 0.400 - 4.000 uIU/mL     *Note: Due to a large number of results and/or encounters for the requested time period, some results have not been displayed. A complete set of results can be found in Results Review.         Assessment:       1. Essential hypertension    2. Malignant neoplasm of right upper  lobe of lung    3. Hypothyroidism, unspecified type    4. Hyperlipidemia, unspecified hyperlipidemia type    5. Insomnia, unspecified type          Plan:       Essential hypertension    Malignant neoplasm of right upper lobe of lung    Hypothyroidism, unspecified type    Hyperlipidemia, unspecified hyperlipidemia type    Insomnia, unspecified type            Stop simvistatin  Agree with holding lisinopril and HCT  Overall looks good  Continue present meds  Counseled on regular exercise, maintenance of a healthy weight, balanced diet rich in fruits/vegetables and lean protein, and avoidance of unhealthy habits like smoking and excessive alcohol intake.  F/u with nephrology as planned

## 2023-05-30 ENCOUNTER — PATIENT MESSAGE (OUTPATIENT)
Dept: NEPHROLOGY | Facility: CLINIC | Age: 83
End: 2023-05-30
Payer: MEDICARE

## 2023-05-31 ENCOUNTER — PATIENT MESSAGE (OUTPATIENT)
Dept: HEMATOLOGY/ONCOLOGY | Facility: CLINIC | Age: 83
End: 2023-05-31
Payer: MEDICARE

## 2023-05-31 DIAGNOSIS — E55.9 VITAMIN D DEFICIENCY: ICD-10-CM

## 2023-05-31 DIAGNOSIS — C82.01 GRADE 1 FOLLICULAR LYMPHOMA OF LYMPH NODES OF NECK: ICD-10-CM

## 2023-05-31 DIAGNOSIS — E83.42 HYPOMAGNESEMIA: ICD-10-CM

## 2023-05-31 DIAGNOSIS — E87.6 HYPOKALEMIA: ICD-10-CM

## 2023-05-31 RX ORDER — POTASSIUM CHLORIDE 750 MG/1
20 CAPSULE, EXTENDED RELEASE ORAL DAILY
Qty: 180 CAPSULE | Refills: 3 | Status: SHIPPED | OUTPATIENT
Start: 2023-05-31 | End: 2024-03-15

## 2023-06-01 ENCOUNTER — OFFICE VISIT (OUTPATIENT)
Dept: NEPHROLOGY | Facility: CLINIC | Age: 83
End: 2023-06-01
Payer: MEDICARE

## 2023-06-01 VITALS
BODY MASS INDEX: 33.23 KG/M2 | HEIGHT: 64 IN | SYSTOLIC BLOOD PRESSURE: 118 MMHG | HEART RATE: 82 BPM | DIASTOLIC BLOOD PRESSURE: 66 MMHG | OXYGEN SATURATION: 93 %

## 2023-06-01 DIAGNOSIS — N28.89 RIGHT RENAL MASS: ICD-10-CM

## 2023-06-01 DIAGNOSIS — N28.89 LEFT RENAL MASS: ICD-10-CM

## 2023-06-01 DIAGNOSIS — I10 HYPERTENSION, UNSPECIFIED TYPE: Primary | ICD-10-CM

## 2023-06-01 DIAGNOSIS — N18.31 STAGE 3A CHRONIC KIDNEY DISEASE: ICD-10-CM

## 2023-06-01 DIAGNOSIS — E66.01 MORBID (SEVERE) OBESITY DUE TO EXCESS CALORIES: ICD-10-CM

## 2023-06-01 DIAGNOSIS — C34.11 MALIGNANT NEOPLASM OF RIGHT UPPER LOBE OF LUNG: ICD-10-CM

## 2023-06-01 DIAGNOSIS — N17.9 AKI (ACUTE KIDNEY INJURY): ICD-10-CM

## 2023-06-01 PROCEDURE — 1160F RVW MEDS BY RX/DR IN RCRD: CPT | Mod: CPTII,S$GLB,, | Performed by: INTERNAL MEDICINE

## 2023-06-01 PROCEDURE — 3288F FALL RISK ASSESSMENT DOCD: CPT | Mod: CPTII,S$GLB,, | Performed by: INTERNAL MEDICINE

## 2023-06-01 PROCEDURE — 1159F MED LIST DOCD IN RCRD: CPT | Mod: CPTII,S$GLB,, | Performed by: INTERNAL MEDICINE

## 2023-06-01 PROCEDURE — 99999 PR PBB SHADOW E&M-EST. PATIENT-LVL IV: CPT | Mod: PBBFAC,,, | Performed by: INTERNAL MEDICINE

## 2023-06-01 PROCEDURE — 3074F SYST BP LT 130 MM HG: CPT | Mod: CPTII,S$GLB,, | Performed by: INTERNAL MEDICINE

## 2023-06-01 PROCEDURE — 99214 PR OFFICE/OUTPT VISIT, EST, LEVL IV, 30-39 MIN: ICD-10-PCS | Mod: S$GLB,,, | Performed by: INTERNAL MEDICINE

## 2023-06-01 PROCEDURE — 3074F PR MOST RECENT SYSTOLIC BLOOD PRESSURE < 130 MM HG: ICD-10-PCS | Mod: CPTII,S$GLB,, | Performed by: INTERNAL MEDICINE

## 2023-06-01 PROCEDURE — 3288F PR FALLS RISK ASSESSMENT DOCUMENTED: ICD-10-PCS | Mod: CPTII,S$GLB,, | Performed by: INTERNAL MEDICINE

## 2023-06-01 PROCEDURE — 3078F PR MOST RECENT DIASTOLIC BLOOD PRESSURE < 80 MM HG: ICD-10-PCS | Mod: CPTII,S$GLB,, | Performed by: INTERNAL MEDICINE

## 2023-06-01 PROCEDURE — 1101F PT FALLS ASSESS-DOCD LE1/YR: CPT | Mod: CPTII,S$GLB,, | Performed by: INTERNAL MEDICINE

## 2023-06-01 PROCEDURE — 1160F PR REVIEW ALL MEDS BY PRESCRIBER/CLIN PHARMACIST DOCUMENTED: ICD-10-PCS | Mod: CPTII,S$GLB,, | Performed by: INTERNAL MEDICINE

## 2023-06-01 PROCEDURE — 99214 OFFICE O/P EST MOD 30 MIN: CPT | Mod: S$GLB,,, | Performed by: INTERNAL MEDICINE

## 2023-06-01 PROCEDURE — 1101F PR PT FALLS ASSESS DOC 0-1 FALLS W/OUT INJ PAST YR: ICD-10-PCS | Mod: CPTII,S$GLB,, | Performed by: INTERNAL MEDICINE

## 2023-06-01 PROCEDURE — 99999 PR PBB SHADOW E&M-EST. PATIENT-LVL IV: ICD-10-PCS | Mod: PBBFAC,,, | Performed by: INTERNAL MEDICINE

## 2023-06-01 PROCEDURE — 3078F DIAST BP <80 MM HG: CPT | Mod: CPTII,S$GLB,, | Performed by: INTERNAL MEDICINE

## 2023-06-01 PROCEDURE — 1159F PR MEDICATION LIST DOCUMENTED IN MEDICAL RECORD: ICD-10-PCS | Mod: CPTII,S$GLB,, | Performed by: INTERNAL MEDICINE

## 2023-06-01 NOTE — PROGRESS NOTES
Subjective:       Patient ID: Shandra Velez is a 82 y.o. White female who presents for follow up on CKD.     Since last seen at nephrology clinic, Shandra Velez denies any new hospitalizations or new medications. Discharged from hospital for PE.  No uremic symptoms, not volume overloaded.    Reports taking their medications on time, without missed doses. As to their chronic conditions:  - CKD: Denies use of NSAIDs.   2023: baseline sr cr of 1.2 - 1.4 mg/dL with out proteinuria. ZAINA in 1/2023 pre-renal and PHILL 3/2023 with return to baseline for sr cr.   - HTN: well controlled, follows low salt diet. Denies uncontrolled HTN, dizziness/lightheadedness or hypotension/syncopal episodes.  - Lung malignancy followed by dr Mills s/p Joce in March of 2023.    Echocardiogram:  The left ventricle is normal in size with concentric hypertrophy and normal systolic function.  Normal left ventricular diastolic function.  The estimated ejection fraction is 60%.  Intermediate central venous pressure (8 mmHg).  The estimated PA systolic pressure is 15 mmHg.  There is abnormal septal wall motion. There is systolic flattening of the interventricular septum consistent with right ventricle pressure overload.    Review of Systems   Constitutional:  Positive for fatigue. Negative for chills and fever.   HENT:  Negative for hearing loss, trouble swallowing and voice change.    Respiratory:  Positive for shortness of breath. Negative for cough and wheezing.    Cardiovascular:  Positive for leg swelling. Negative for chest pain and palpitations.   Gastrointestinal:  Negative for abdominal distention, abdominal pain, constipation and diarrhea.   Endocrine: Negative for polydipsia, polyphagia and polyuria.   Genitourinary:  Negative for dysuria, flank pain, frequency and hematuria.   Musculoskeletal:  Negative for arthralgias, back pain and myalgias.   Skin:  Negative for rash and wound.   Neurological:  Negative for dizziness, syncope,  "weakness and light-headedness.   Hematological:  Negative for adenopathy. Does not bruise/bleed easily.     The past medical, family and social histories were reviewed for this encounter.     /66 (BP Location: Left arm, Patient Position: Sitting, BP Method: Large (Manual))   Pulse 82   Ht 5' 4" (1.626 m)   SpO2 (!) 93%   BMI 33.23 kg/m²     Objective:      Physical Exam  Vitals and nursing note reviewed.   Constitutional:       Appearance: Normal appearance. She is obese.   HENT:      Head: Normocephalic and atraumatic.      Mouth/Throat:      Mouth: Mucous membranes are moist.      Pharynx: Oropharynx is clear.   Eyes:      Extraocular Movements: Extraocular movements intact.      Conjunctiva/sclera: Conjunctivae normal.   Neck:      Vascular: No carotid bruit.   Cardiovascular:      Rate and Rhythm: Normal rate and regular rhythm.      Heart sounds: Normal heart sounds.   Pulmonary:      Effort: Pulmonary effort is normal. No respiratory distress.      Breath sounds: Stridor present. Rhonchi present.   Abdominal:      General: Bowel sounds are normal. There is no distension.      Palpations: Abdomen is soft.      Tenderness: There is no abdominal tenderness.   Musculoskeletal:         General: No tenderness. Normal range of motion.      Cervical back: Normal range of motion. No tenderness.      Right lower leg: Edema present.      Left lower leg: Edema present.   Lymphadenopathy:      Cervical: No cervical adenopathy.   Skin:     General: Skin is warm and dry.      Capillary Refill: Capillary refill takes less than 2 seconds.      Coloration: Skin is not jaundiced.   Neurological:      General: No focal deficit present.      Mental Status: She is alert and oriented to person, place, and time.   Psychiatric:         Mood and Affect: Mood normal.         Behavior: Behavior normal.         Judgment: Judgment normal.       Assessment:       1. Hypertension, unspecified type    2. ZAINA (acute kidney injury)  "   3. Stage 3a chronic kidney disease    4. Right renal mass    5. Left renal mass    6. 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 Morbid (severe) obesity due to excess calories        Plan:   Return to clinic in 3 months    CKD in a setting of aging, HTN and chemotherapy  Baseline creatinine is 1.2 - 1.4 mg/dL and no proteinuria  Lab Results   Component Value Date    CREATININE 1.6 (H) 05/10/2023      - Euvolemic   - Pt understands importance of blood pressure control and is aware that uncontrolled HTN leads to progression of CKD   - Complete avoidance of NSAIDs   - Low salt diet with < 2000 mg/day   - Dose adjust medications to GFR of 45 - 60   - Avoid nephrotoxins including IV contrast    HTN   - BP well controlled: continue current medications, avoid hypotension and dehydration    Lung CA   - S/P keytruda    Renal lesion    - Needs renal US    Obesity    - Calorie restriction    Med changes: none    I have personally review notes from referring physician, laboratory findings and appropriate images.  I have spent more then 60 min on this encounter with 50% of my time spent on face to face patient interaction.

## 2023-06-02 ENCOUNTER — PATIENT MESSAGE (OUTPATIENT)
Dept: HEMATOLOGY/ONCOLOGY | Facility: CLINIC | Age: 83
End: 2023-06-02
Payer: MEDICARE

## 2023-06-06 ENCOUNTER — HOSPITAL ENCOUNTER (OUTPATIENT)
Dept: RADIOLOGY | Facility: HOSPITAL | Age: 83
Discharge: HOME | End: 2023-06-06
Attending: INTERNAL MEDICINE
Payer: MEDICARE

## 2023-06-06 DIAGNOSIS — N17.9 AKI (ACUTE KIDNEY INJURY): ICD-10-CM

## 2023-06-06 DIAGNOSIS — I10 HYPERTENSION, UNSPECIFIED TYPE: ICD-10-CM

## 2023-06-06 DIAGNOSIS — N28.89 LEFT RENAL MASS: ICD-10-CM

## 2023-06-06 DIAGNOSIS — C34.11 MALIGNANT NEOPLASM OF RIGHT UPPER LOBE OF LUNG: ICD-10-CM

## 2023-06-06 DIAGNOSIS — N28.89 RIGHT RENAL MASS: ICD-10-CM

## 2023-06-06 DIAGNOSIS — E66.01 MORBID (SEVERE) OBESITY DUE TO EXCESS CALORIES: ICD-10-CM

## 2023-06-06 DIAGNOSIS — N18.31 STAGE 3A CHRONIC KIDNEY DISEASE: ICD-10-CM

## 2023-06-06 PROCEDURE — 76770 US EXAM ABDO BACK WALL COMP: CPT | Mod: TC,PO

## 2023-06-06 PROCEDURE — 76770 US EXAM ABDO BACK WALL COMP: CPT | Mod: 26,,, | Performed by: RADIOLOGY

## 2023-06-06 PROCEDURE — 76770 US RETROPERITONEAL COMPLETE: ICD-10-PCS | Mod: 26,,, | Performed by: RADIOLOGY

## 2023-06-07 ENCOUNTER — DOCUMENTATION ONLY (OUTPATIENT)
Dept: HEMATOLOGY/ONCOLOGY | Facility: CLINIC | Age: 83
End: 2023-06-07
Payer: MEDICARE

## 2023-06-07 ENCOUNTER — TELEPHONE (OUTPATIENT)
Dept: HEMATOLOGY/ONCOLOGY | Facility: CLINIC | Age: 83
End: 2023-06-07
Payer: MEDICARE

## 2023-06-07 ENCOUNTER — PATIENT MESSAGE (OUTPATIENT)
Dept: HEMATOLOGY/ONCOLOGY | Facility: CLINIC | Age: 83
End: 2023-06-07
Payer: MEDICARE

## 2023-06-08 ENCOUNTER — TELEPHONE (OUTPATIENT)
Dept: NEPHROLOGY | Facility: CLINIC | Age: 83
End: 2023-06-08
Payer: MEDICARE

## 2023-06-09 DIAGNOSIS — N28.89 RIGHT RENAL MASS: Primary | ICD-10-CM

## 2023-06-12 ENCOUNTER — DOCUMENTATION ONLY (OUTPATIENT)
Dept: HEMATOLOGY/ONCOLOGY | Facility: CLINIC | Age: 83
End: 2023-06-12
Payer: MEDICARE

## 2023-06-12 ENCOUNTER — PATIENT MESSAGE (OUTPATIENT)
Dept: UROLOGY | Facility: CLINIC | Age: 83
End: 2023-06-12
Payer: MEDICARE

## 2023-06-13 ENCOUNTER — OFFICE VISIT (OUTPATIENT)
Dept: UROLOGY | Facility: CLINIC | Age: 83
End: 2023-06-13
Payer: MEDICARE

## 2023-06-13 VITALS — WEIGHT: 196 LBS | HEIGHT: 64 IN | BODY MASS INDEX: 33.46 KG/M2

## 2023-06-13 DIAGNOSIS — N28.89 RIGHT RENAL MASS: Primary | ICD-10-CM

## 2023-06-13 PROCEDURE — 1101F PT FALLS ASSESS-DOCD LE1/YR: CPT | Mod: CPTII,S$GLB,, | Performed by: STUDENT IN AN ORGANIZED HEALTH CARE EDUCATION/TRAINING PROGRAM

## 2023-06-13 PROCEDURE — 99999 PR PBB SHADOW E&M-EST. PATIENT-LVL IV: ICD-10-PCS | Mod: PBBFAC,,, | Performed by: STUDENT IN AN ORGANIZED HEALTH CARE EDUCATION/TRAINING PROGRAM

## 2023-06-13 PROCEDURE — 3288F FALL RISK ASSESSMENT DOCD: CPT | Mod: CPTII,S$GLB,, | Performed by: STUDENT IN AN ORGANIZED HEALTH CARE EDUCATION/TRAINING PROGRAM

## 2023-06-13 PROCEDURE — 1126F PR PAIN SEVERITY QUANTIFIED, NO PAIN PRESENT: ICD-10-PCS | Mod: CPTII,S$GLB,, | Performed by: STUDENT IN AN ORGANIZED HEALTH CARE EDUCATION/TRAINING PROGRAM

## 2023-06-13 PROCEDURE — 1159F PR MEDICATION LIST DOCUMENTED IN MEDICAL RECORD: ICD-10-PCS | Mod: CPTII,S$GLB,, | Performed by: STUDENT IN AN ORGANIZED HEALTH CARE EDUCATION/TRAINING PROGRAM

## 2023-06-13 PROCEDURE — 99205 OFFICE O/P NEW HI 60 MIN: CPT | Mod: S$GLB,,, | Performed by: STUDENT IN AN ORGANIZED HEALTH CARE EDUCATION/TRAINING PROGRAM

## 2023-06-13 PROCEDURE — 1101F PR PT FALLS ASSESS DOC 0-1 FALLS W/OUT INJ PAST YR: ICD-10-PCS | Mod: CPTII,S$GLB,, | Performed by: STUDENT IN AN ORGANIZED HEALTH CARE EDUCATION/TRAINING PROGRAM

## 2023-06-13 PROCEDURE — 99205 PR OFFICE/OUTPT VISIT, NEW, LEVL V, 60-74 MIN: ICD-10-PCS | Mod: S$GLB,,, | Performed by: STUDENT IN AN ORGANIZED HEALTH CARE EDUCATION/TRAINING PROGRAM

## 2023-06-13 PROCEDURE — 1160F PR REVIEW ALL MEDS BY PRESCRIBER/CLIN PHARMACIST DOCUMENTED: ICD-10-PCS | Mod: CPTII,S$GLB,, | Performed by: STUDENT IN AN ORGANIZED HEALTH CARE EDUCATION/TRAINING PROGRAM

## 2023-06-13 PROCEDURE — 1126F AMNT PAIN NOTED NONE PRSNT: CPT | Mod: CPTII,S$GLB,, | Performed by: STUDENT IN AN ORGANIZED HEALTH CARE EDUCATION/TRAINING PROGRAM

## 2023-06-13 PROCEDURE — 99999 PR PBB SHADOW E&M-EST. PATIENT-LVL IV: CPT | Mod: PBBFAC,,, | Performed by: STUDENT IN AN ORGANIZED HEALTH CARE EDUCATION/TRAINING PROGRAM

## 2023-06-13 PROCEDURE — 3288F PR FALLS RISK ASSESSMENT DOCUMENTED: ICD-10-PCS | Mod: CPTII,S$GLB,, | Performed by: STUDENT IN AN ORGANIZED HEALTH CARE EDUCATION/TRAINING PROGRAM

## 2023-06-13 PROCEDURE — 1160F RVW MEDS BY RX/DR IN RCRD: CPT | Mod: CPTII,S$GLB,, | Performed by: STUDENT IN AN ORGANIZED HEALTH CARE EDUCATION/TRAINING PROGRAM

## 2023-06-13 PROCEDURE — 1159F MED LIST DOCD IN RCRD: CPT | Mod: CPTII,S$GLB,, | Performed by: STUDENT IN AN ORGANIZED HEALTH CARE EDUCATION/TRAINING PROGRAM

## 2023-06-13 NOTE — PROGRESS NOTES
"[facility]   Clinic Note    Subjective:     Chief Complaint: No chief complaint on file.    History of Present Illness:  Shandra Velez is a 82 y.o. female who presents to clinic for evaluation and management of a right renal mass. She is new to our clinic referred by Dr. Chad Mills    She has a history of follicular lymphoma s/p rituxan, squamous cell carcinoma of the lung s/p SBRT completed 2021 with recurrent nodes and started on Keytruda 6/2022. Admitted with pneumonia 1/2023. Also recently diagnosed and hospitalized 3/2023 with a saddle PE and cor pulmonale requiring thrombectomy. She is on lifelong AC. She follows with Dr. Kinney for COPD and pneumonitis. She is on 2L home oxygen since early part of this year.   She has CKD. Renal US recently demonstrated a solid right renal mass measuring 4.1 cm in the largest dimension on US. The mass was visualized on imaging dating back to at least 1/2022.   Most recent CT AP 6/2022 showed a 3.1 cm lower pole anterior renal mass.     History of umbilical hernia repair with mesh. History of appendectomy and lap ta. History of breast reconstruction with flap and large transverse lower abdominal scar.     Past medical, family, surgical and social history reviewed as documented in chart with pertinent positive medical, family, surgical and social history detailed in HPI.    A review systems was conducted with pertinent positive and negative findings documented in HPI.    Anticoagulation/Antiplatelets:  Yes eliquis    Objective:     Estimated body mass index is 33.64 kg/m² as calculated from the following:    Height as of this encounter: 5' 4" (1.626 m).    Weight as of this encounter: 88.9 kg (195 lb 15.8 oz).    Vital Signs (Most Recent)       Physical Exam  Constitutional:       General: She is not in acute distress.     Appearance: She is well-developed. She is not ill-appearing or toxic-appearing.   Pulmonary:      Effort: Pulmonary effort is normal. No " accessory muscle usage or respiratory distress.   Abdominal:       Neurological:      Mental Status: She is alert.       Labs reviewed below:  Lab Results   Component Value Date    BUN 25 (H) 06/06/2023    CREATININE 0.8 06/06/2023    WBC 6.64 06/06/2023    HGB 9.7 (L) 06/06/2023    HCT 32.7 (L) 06/06/2023     06/06/2023    AST 13 05/10/2023    ALT 5 (L) 05/10/2023    ALKPHOS 76 05/10/2023    ALBUMIN 3.4 (L) 06/06/2023    HGBA1C 5.7 (H) 03/23/2018      Assessment:     1. Right renal mass      Plan:     She has a cT1b right renal mass.     She has been completely staged.    We discussed the nature and natural history of renal masses and reviewed her imaging in detail. We also discussed renal mass ablation for masses < 3 cm and renal mass biopsy along with indications and rationale for its role. Masses >4 cm are more likely to be malignant (up to 90%). We discussed partial nephrectomy and radical nephrectomy with a robotic approach. We discussed the possible need for conversion to an open procedure.   We also spent some time discussing the concept of active surveillance of renal masses, including its risks and benefits. In general, active surveillance is usually recommended for older and/or more co-morbid patients who have a higher surgical risk. Fortunately, there is long-term data to suggest that the risk of metastasis with a mass less than 4 cm while on active surveillance approaches 0%.     I think she has a relatively high surgical risk, and I would recommend a more conservative approach.     Repeat CT AP for more accurate characterization. Strongly consider surveillance. IR Ablation would be an option but less effective in masses >3 cm.    Noel Priest MD     Total time for today's visit was 60 minutes. This includes face to face time and non-face to face time preparing to see the patient (eg, review of tests), obtaining and/or reviewing separately obtained history, documenting clinical information in the  electronic or other health record, independently interpreting results and communicating results to the patient/family/caregiver, or care coordinator.

## 2023-06-14 ENCOUNTER — PATIENT MESSAGE (OUTPATIENT)
Dept: UROLOGY | Facility: CLINIC | Age: 83
End: 2023-06-14
Payer: MEDICARE

## 2023-06-26 PROBLEM — N17.9 AKI (ACUTE KIDNEY INJURY): Status: RESOLVED | Noted: 2023-01-16 | Resolved: 2023-06-26

## 2023-06-26 PROBLEM — K62.5 RECTAL BLEEDING: Status: RESOLVED | Noted: 2023-03-21 | Resolved: 2023-06-26

## 2023-07-06 ENCOUNTER — OFFICE VISIT (OUTPATIENT)
Dept: OTOLARYNGOLOGY | Facility: CLINIC | Age: 83
End: 2023-07-06
Payer: MEDICARE

## 2023-07-06 ENCOUNTER — CLINICAL SUPPORT (OUTPATIENT)
Dept: AUDIOLOGY | Facility: CLINIC | Age: 83
End: 2023-07-06
Payer: MEDICARE

## 2023-07-06 VITALS — WEIGHT: 198.19 LBS | BODY MASS INDEX: 34.02 KG/M2

## 2023-07-06 DIAGNOSIS — H90.A22 SENSORINEURAL HEARING LOSS (SNHL) OF LEFT EAR WITH RESTRICTED HEARING OF RIGHT EAR: Primary | ICD-10-CM

## 2023-07-06 DIAGNOSIS — H90.A31 MIXED CONDUCTIVE AND SENSORINEURAL HEARING LOSS OF RIGHT EAR WITH RESTRICTED HEARING OF LEFT EAR: ICD-10-CM

## 2023-07-06 DIAGNOSIS — H91.90 HEARING DIFFICULTY, UNSPECIFIED LATERALITY: Primary | ICD-10-CM

## 2023-07-06 DIAGNOSIS — H90.3 BILATERAL SENSORINEURAL HEARING LOSS: ICD-10-CM

## 2023-07-06 DIAGNOSIS — H91.90 HEARING DIFFICULTY, UNSPECIFIED LATERALITY: ICD-10-CM

## 2023-07-06 PROCEDURE — 1101F PT FALLS ASSESS-DOCD LE1/YR: CPT | Mod: CPTII,S$GLB,, | Performed by: NURSE PRACTITIONER

## 2023-07-06 PROCEDURE — 99214 OFFICE O/P EST MOD 30 MIN: CPT | Mod: S$GLB,,, | Performed by: NURSE PRACTITIONER

## 2023-07-06 PROCEDURE — 99214 PR OFFICE/OUTPT VISIT, EST, LEVL IV, 30-39 MIN: ICD-10-PCS | Mod: S$GLB,,, | Performed by: NURSE PRACTITIONER

## 2023-07-06 PROCEDURE — 1159F PR MEDICATION LIST DOCUMENTED IN MEDICAL RECORD: ICD-10-PCS | Mod: CPTII,S$GLB,, | Performed by: NURSE PRACTITIONER

## 2023-07-06 PROCEDURE — 1126F PR PAIN SEVERITY QUANTIFIED, NO PAIN PRESENT: ICD-10-PCS | Mod: CPTII,S$GLB,, | Performed by: NURSE PRACTITIONER

## 2023-07-06 PROCEDURE — 92557 PR COMPREHENSIVE HEARING TEST: ICD-10-PCS | Mod: S$GLB,,, | Performed by: AUDIOLOGIST

## 2023-07-06 PROCEDURE — 1101F PR PT FALLS ASSESS DOC 0-1 FALLS W/OUT INJ PAST YR: ICD-10-PCS | Mod: CPTII,S$GLB,, | Performed by: NURSE PRACTITIONER

## 2023-07-06 PROCEDURE — 99999 PR PBB SHADOW E&M-EST. PATIENT-LVL III: CPT | Mod: PBBFAC,,, | Performed by: NURSE PRACTITIONER

## 2023-07-06 PROCEDURE — 99999 PR PBB SHADOW E&M-EST. PATIENT-LVL II: CPT | Mod: PBBFAC,,, | Performed by: AUDIOLOGIST

## 2023-07-06 PROCEDURE — 99999 PR PBB SHADOW E&M-EST. PATIENT-LVL II: ICD-10-PCS | Mod: PBBFAC,,, | Performed by: AUDIOLOGIST

## 2023-07-06 PROCEDURE — 1126F AMNT PAIN NOTED NONE PRSNT: CPT | Mod: CPTII,S$GLB,, | Performed by: NURSE PRACTITIONER

## 2023-07-06 PROCEDURE — 92567 PR TYMPA2METRY: ICD-10-PCS | Mod: S$GLB,,, | Performed by: AUDIOLOGIST

## 2023-07-06 PROCEDURE — 1159F MED LIST DOCD IN RCRD: CPT | Mod: CPTII,S$GLB,, | Performed by: NURSE PRACTITIONER

## 2023-07-06 PROCEDURE — 92557 COMPREHENSIVE HEARING TEST: CPT | Mod: S$GLB,,, | Performed by: AUDIOLOGIST

## 2023-07-06 PROCEDURE — 92567 TYMPANOMETRY: CPT | Mod: S$GLB,,, | Performed by: AUDIOLOGIST

## 2023-07-06 PROCEDURE — 3288F FALL RISK ASSESSMENT DOCD: CPT | Mod: CPTII,S$GLB,, | Performed by: NURSE PRACTITIONER

## 2023-07-06 PROCEDURE — 99999 PR PBB SHADOW E&M-EST. PATIENT-LVL III: ICD-10-PCS | Mod: PBBFAC,,, | Performed by: NURSE PRACTITIONER

## 2023-07-06 PROCEDURE — 3288F PR FALLS RISK ASSESSMENT DOCUMENTED: ICD-10-PCS | Mod: CPTII,S$GLB,, | Performed by: NURSE PRACTITIONER

## 2023-07-06 NOTE — PROGRESS NOTES
The patient was referred by Shauna Baird NP for a hearing evaluation.    Report from the patient was as follows:    Difficulty Hearing/Understanding - no previous hearing evaluation, Patient noticed a decline in hearing bilaterally after she was in the hospital in January of this year. She has noticed the hearing in the left ear to improve more than the hearing in her right ear.  Tinnitus - negative  History of Loud Noise Exposure -  negative  Family History of Hearing Loss - negative  Dizziness - negative    Otoscopic screening revealed a clear view of TM AU    A hearing evaluation was performed today. Test results indicated a slight to moderately severe sensorineural hearing loss in the left ear and a slight to moderately severe mixed hearing loss in the right ear. Impedance testing showed a Type As tympanogram in each ear.    The recommendations were as follows:    (1)  Medical evaluation due to asymmetry, possible sudden decrease in hearing and conductive component  (2)  Hearing aid consult pending medical clearance  (3)  Ear protection in loud noise   (4)  Hearing evaluation in one year or sooner if hearing decrease is noted       Today's test results and recommendations were discussed with the patient.

## 2023-07-06 NOTE — PROGRESS NOTES
Subjective     Patient ID: Shandra Velez is a 82 y.o. female.    Chief Complaint: No chief complaint on file.    HPI  Patient seen by me 6 weeks ago for serous OME AS. This was treated conservatively with nasal sprays and nasal valsalva. Patient reports left ear feels 100% better now, back to baseline, no more fluid.  Patient states her hearing has improved significantly.   Patient treated for metastatic lung cancer with immunotherapy.        Review of Systems   Constitutional: Negative.    HENT: Negative.     Eyes: Negative.    Respiratory: Negative.     Cardiovascular: Negative.    Gastrointestinal: Negative.    Musculoskeletal: Negative.    Integumentary:  Negative.   Neurological: Negative.    Hematological: Negative.    Psychiatric/Behavioral: Negative.          Objective     Physical Exam  Vitals and nursing note reviewed.   Constitutional:       General: She is not in acute distress.     Appearance: She is well-developed. She is not ill-appearing or diaphoretic.   HENT:      Head: Normocephalic and atraumatic.      Right Ear: Hearing, tympanic membrane, ear canal and external ear normal. No middle ear effusion. Tympanic membrane is not erythematous. Tympanic membrane has normal mobility.      Left Ear: Hearing, ear canal and external ear normal.  No middle ear effusion. Tympanic membrane is not erythematous. Tympanic membrane has normal mobility.      Nose: Septal deviation (right) present. No mucosal edema or congestion.      Mouth/Throat:      Mouth: Mucous membranes are not pale, not dry and not cyanotic.      Dentition: Has dentures.   Eyes:      General: Lids are normal. No scleral icterus.        Right eye: No discharge.         Left eye: No discharge.   Neck:      Thyroid: No thyroid mass or thyromegaly.      Trachea: Trachea normal. No tracheal deviation.   Cardiovascular:      Rate and Rhythm: Normal rate.   Pulmonary:      Effort: Pulmonary effort is normal. No respiratory distress.      Breath  sounds: Normal air entry. No stridor. No wheezing.   Musculoskeletal:         General: Normal range of motion.      Cervical back: Normal range of motion and neck supple.   Lymphadenopathy:      Head:      Right side of head: No submental, submandibular, tonsillar, preauricular or posterior auricular adenopathy.      Left side of head: No submental, submandibular, tonsillar, preauricular or posterior auricular adenopathy.      Cervical: No cervical adenopathy.      Right cervical: No superficial or posterior cervical adenopathy.     Left cervical: No superficial or posterior cervical adenopathy.   Skin:     General: Skin is warm and dry.      Coloration: Skin is not pale.      Findings: No lesion or rash.   Neurological:      Mental Status: She is alert and oriented to person, place, and time.      Motor: Motor function is intact.      Coordination: Coordination is intact. Coordination normal.      Gait: Gait normal.   Psychiatric:         Attention and Perception: Attention normal.         Mood and Affect: Mood normal.         Speech: Speech normal.         Behavior: Behavior normal. Behavior is cooperative.          Assessment and Plan     Problem List Items Addressed This Visit    None  Visit Diagnoses       Bilateral sensorineural hearing loss            PATIENT IS MEDICALLY CLEARED FOR HEARING AIDS. The patient's hearing loss is not due to temporarily, correctable physical condition. There are no contraindications to hearing aid candidacy. Patient's audiogram reveals the patient is a candidate for amplification. Audiogram is reviewed in detail with the patient. The audiologist's recommendation that the patient have amplification/hearing aids is discussed and questions answered. Patient has been given information by the audiologist on how to schedule a hearing aid consultation. Patient is encouraged to wear ear protection in loud noise and return annually for hearing test. Return to clinic as needed for further  ENT concerns.

## 2023-07-10 ENCOUNTER — HOSPITAL ENCOUNTER (OUTPATIENT)
Dept: RADIOLOGY | Facility: HOSPITAL | Age: 83
Discharge: HOME | End: 2023-07-10
Attending: INTERNAL MEDICINE
Payer: MEDICARE

## 2023-07-10 ENCOUNTER — HOSPITAL ENCOUNTER (OUTPATIENT)
Dept: RADIOLOGY | Facility: HOSPITAL | Age: 83
Discharge: HOME | End: 2023-07-10
Attending: STUDENT IN AN ORGANIZED HEALTH CARE EDUCATION/TRAINING PROGRAM
Payer: MEDICARE

## 2023-07-10 DIAGNOSIS — C34.90 MALIGNANT NEOPLASM OF UNSPECIFIED PART OF UNSPECIFIED BRONCHUS OR LUNG: ICD-10-CM

## 2023-07-10 DIAGNOSIS — N28.89 RIGHT RENAL MASS: ICD-10-CM

## 2023-07-10 PROCEDURE — 25500020 PHARM REV CODE 255: Mod: PO | Performed by: STUDENT IN AN ORGANIZED HEALTH CARE EDUCATION/TRAINING PROGRAM

## 2023-07-10 PROCEDURE — 74170 CT ABD WO CNTRST FLWD CNTRST: CPT | Mod: 26,,, | Performed by: RADIOLOGY

## 2023-07-10 PROCEDURE — 74170 CT ABD WO CNTRST FLWD CNTRST: CPT | Mod: TC,PO

## 2023-07-10 PROCEDURE — 71260 CT CHEST WITH CONTRAST: ICD-10-PCS | Mod: 26,,, | Performed by: RADIOLOGY

## 2023-07-10 PROCEDURE — 71260 CT THORAX DX C+: CPT | Mod: 26,,, | Performed by: RADIOLOGY

## 2023-07-10 PROCEDURE — 71260 CT THORAX DX C+: CPT | Mod: TC,PO

## 2023-07-10 PROCEDURE — 74170 CT ABDOMEN W WO CONTRAST: ICD-10-PCS | Mod: 26,,, | Performed by: RADIOLOGY

## 2023-07-10 RX ADMIN — IOHEXOL 100 ML: 350 INJECTION, SOLUTION INTRAVENOUS at 11:07

## 2023-07-11 NOTE — PROGRESS NOTES
PATIENT: Shandra Velez  MRN: [id number]  DATE: 7/12/2023      Diagnosis:   1. Malignant neoplasm of right upper lobe of lung    2. Acute saddle pulmonary embolism with acute cor pulmonale    3. Acquired hypothyroidism    4. Normocytic anemia    5. Essential hypertension    6. Pneumonitis    7. Follicular lymphoma grade I, unspecified body region    8. Hypertension, unspecified type    9. Hyperlipidemia, unspecified hyperlipidemia type                  Chief Complaint: malignant neoplasm of unspecified part of unspecified bronc (2 month follow up )        Oncologic History:     Pt initially underwent right inguinal LN biopsy 5/25/22 showing grade 1 follicular lymphoma.  Bone marrow biopsy done 06/24/2020 showed involvement with follicular lymphoma.  PET/CT 7/07/20 showed lymphadenopathy above and below the diaphragm as well as the spleen.  Also seen wasa RUL lesion.  The patient was started on CVP-R followed by maintenance rituxan every 8 weeks.  Biopsy of the RUL nodule on 6/18/21 showed SCC with PD-L1 at 86%.  EBUS 7/09/21 showed no malignant cells at station 7, 4R or 11RS.  The patient was treated with SBRT under the care of Dr Barnes with 50Gy in 4 fractions completed 10/28/21.  The patient then developed new pulmonary nodules on CT scan 6/02/22.  WBUS 6/16/22 showed positive SCC in 4R LN.  Pt was discussed at tumor board on 6/21/22 with recommendation for Keytruda.  The patient underwent PET/CT on 9/23/22 after 2 doses of treatment showing a 1.5 cm precarinal lymph node; stable right apical mass measuring 1.8 x 1.5 cm; stable 8 mm right apical lung nodule with adjacent post radiation change measuring 2 x 2.7 cm; stable 4 mm subpleural nodule in the lateral right upper lobe; fluid/mucus in the right lower lobe bronchus tree; 6 mm anterior left upper lobe nodule which is new; 2.3 cm simple cyst in the midpole of the left kidney; 3.1 x 2.3 cm rim enhancing complex cystic and solid mass in the mid to lower pole the  right kidney; infrarenal abdominal aortic aneurysm measuring 3.8 x 3.5 cm; and stable a left para-aortic lymph node measuring 11 mm. 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   The patient underwent PET-CT on 11/29/2022 showing an irregular hypermetabolic right upper lobe tumor which is stable measuring 1.6 x 1.2 cm; hypermetabolic subpleural right upper lobe consolidation diminished in extent; unchanged linear zone of hypermetabolic atelectasis in the superior segment of the left lower lobe; new poorly metabolic ill-defined infiltrate in the posterior right lower lobe; 4 mm left upper lobe nodule which is stable; hypermetabolic precarinal mediastinal lymph node stable measuring 1 cm; and hypermetabolic aortopulmonary lymph node stable measuring 1.1 x 1.1 cm.   The patient was admitted to the hospital from 1/16/23 to 1/23/23 for pneumonia.  She underwent bronchoscopy with Dr Kinney on 1/18/23 showing significant mucus plug impaction int the pharynx, left mainstem bronchus and right and left lower lobes.  The patient was discharged on 3L O2.   The patient underwent PET-CT on 03/08/2023 showing thickening of the pleura; new ground-glass opacities within the left upper lobe; patchy airspace opacities in the right lower lobe; right lower lobe pulmonary artery hypermetabolic activity possibly secondary to bronchial wall thickening or thrombus in the pulmonary artery; diffuse hypermetabolic activity identified throughout the axial skeleton.  The patient was then admitted to the hospital on 03/18/23-4/3/23 after presenting with shortness of breath and being found to have a saddle pulmonary embolus on CTA 03/18/2023.  The patient underwent treatment with a heparin drip and thrombectomy on 03/18/2023 with eventual transition to Eliquis.   The patient underwent CT chest on 04/14/2023 showing areas of air trapping in subpleural bleb formation in  both jarek thoraces with areas of interstitial scarring and bibasilar bronchiectasis; stable solid nodule in left upper lobe measuring 6 mm; masslike area measuring 3 x 1.3 x 1.5 cm in the right upper lobe stable.    Subjective:    Interval History: Ms. Velez is a 82 y.o. female with HLD, HTN, osteopenia, follicular lymphoma who presents for follow up of NSCLC.  Since since the last clinic visit the patient underwent CT chest, abdomen, and pelvis on 07/10/2023 showing more consolidative appearance of previously described right apical pulmonary nodules without discretely measurable nodule on today's exam, 8 mm left apical nodule, stable consolidation and superior segment left lower lobe; 3 mm right lower lobe pulmonary nodule; heterogeneously enhancing mass in the inferior pole of the right kidney measuring 3.9 x 3.7 x 3.9 cm; unchanged left periaortic lymph nodes.    The patient continues to endorse shortness of breath with activity.  The patient denies CP, cough, abdominal pain, nausea, vomiting, constipation, diarrhea.  The patient denies fever, chills, night sweats, weight loss, new lumps or bumps, easy bruising or bleeding.    Past Medical History:   Past Medical History:   Diagnosis Date    Breast cancer 1985    right mastectomy    Digestive disorder     Encounter for blood transfusion     History of pneumonia     Hypercholesterolemia     Hypertension     Indigestion     Lumbar spondylosis     Lung cancer     2022 - currently in treatment as of 7/27/22    Lymphoma     Osteopenia     Pulmonary nodule     Smoker 06/11/2022       Past Surgical HIstory:   Past Surgical History:   Procedure Laterality Date    APPENDECTOMY      BONE MARROW BIOPSY Bilateral 06/24/2020    Procedure: Biopsy-bone marrow;  Surgeon: Rod Montero MD;  Location: [facility] OR;  Service: Oncology;  Laterality: Bilateral;    BREAST RECONSTRUCTION  1990    right    BRONCHOSCOPY N/A 1/18/2023    Procedure: BRONCHOSCOPY;  Surgeon: Gregorio Kinney  MD;  Location: [facility] ENDO;  Service: Pulmonary;  Laterality: N/A;    BRONCHOSCOPY Bilateral 2/10/2023    Procedure: Bronchoscopy;  Surgeon: Gregorio Kinney MD;  Location: [facility];  Service: Pulmonary;  Laterality: Bilateral;  for airway clearance. Purple top tube for cell count diff    BRONCHOSCOPY N/A 3/13/2023    Procedure: Bronchoscopy;  Surgeon: Gregorio Kinney MD;  Location: [facility] ENDO;  Service: Pulmonary;  Laterality: N/A;  therapeutic scope. Please have purple top tube and iced saline    CATARACT EXTRACTION W/  INTRAOCULAR LENS IMPLANT Bilateral     CHOLECYSTECTOMY      ENDOBRONCHIAL ULTRASOUND Bilateral 07/09/2021    Procedure: ENDOBRONCHIAL ULTRASOUND (EBUS);  Surgeon: Gregorio Kinney MD;  Location: [facility];  Service: Pulmonary;  Laterality: Bilateral;  NEED LMA to  eval right upper paratracheal LN.     ENDOBRONCHIAL ULTRASOUND Bilateral 6/16/2022    Procedure: ENDOBRONCHIAL ULTRASOUND (EBUS);  Surgeon: Gregorio Kinney MD;  Location: [facility];  Service: Pulmonary;  Laterality: Bilateral;    INJECTION OF FACET JOINT Left 6/30/2022    Procedure: FACET JOINT Cyst Aspiration L3/4;  Surgeon: Ronnell Baeza MD;  Location: [facility] OR;  Service: Pain Management;  Laterality: Left;    INSERTION OF TUNNELED CENTRAL VENOUS CATHETER (CVC) WITH SUBCUTANEOUS PORT Left 11/04/2020    Procedure: [id number];  Surgeon: Kody Pretty MD;  Location: [facility] OR;  Service: General;  Laterality: Left;    JOINT REPLACEMENT  2008    right knee     LUMBAR LAMINECTOMY      LYMPH NODE BIOPSY      MASTECTOMY Right 1985    NEEDLE LOCALIZATION N/A 05/25/2020    Procedure: NEEDLE LOCALIZATION - lymph node bx;  Surgeon: Steve Weaver MD;  Location: [facility] CATH;  Service: Interventional Radiology;  Laterality: N/A;    RIGHT HEART CATHETERIZATION Right 3/18/2023    Procedure: INSERTION, CATHETER, RIGHT HEART;  Surgeon: Rukhsana Lang MD;  Location: [facility] CATH;  Service: Cardiology;  Laterality: Right;    SELECTIVE UNILATERAL  PULMONARY ANGIOGRAPHY  3/18/2023    Procedure: [facility] angiography - unilateral;  Surgeon: Rukhsana Lang MD;  Location: [facility] CATH;  Service: Cardiology;;    THROMBECTOMY N/A 3/18/2023    Procedure: THROMBECTOMY;  Surgeon: Rukhsana Lang MD;  Location: [facility] CATH;  Service: Cardiology;  Laterality: N/A;    TRANSFORAMINAL EPIDURAL INJECTION OF STEROID Left 05/14/2020    Procedure: Injection,steroid,epidural,transforaminal approach, l3/4;  Surgeon: Ronnell Baeza MD;  Location: [facility] OR;  Service: Pain Management;  Laterality: Left;    TRANSFORAMINAL EPIDURAL INJECTION OF STEROID Left 03/23/2022    Procedure: Injection,steroid,epidural,transforaminal approach L3/4;  Surgeon: Ronnell Baeza MD;  Location: [facility] OR;  Service: Pain Management;  Laterality: Left;    TRANSFORAMINAL EPIDURAL INJECTION OF STEROID Left 8/1/2022    Procedure: Injection,steroid,epidural,transforaminal approach L3/4;  Surgeon: Ronnell Baeza MD;  Location: [facility] OR;  Service: Pain Management;  Laterality: Left;    TUBAL LIGATION      UMBILICAL HERNIA REPAIR         Family History:   Family History   Problem Relation Age of Onset    Cancer Sister         uterine    Diabetes Brother     Cancer Sister         Uterine cancer     Breast cancer Other 42       Social History:  reports that she quit smoking about 13 months ago. Her smoking use included cigarettes. She has a 26.50 pack-year smoking history. She has never used smokeless tobacco. She reports that she does not drink alcohol and does not use drugs.    Allergies:  Review of patient's allergies indicates:   Allergen Reactions    Opioids - morphine analogues Other (See Comments)     Hypotension       Medications:  Current Outpatient Medications   Medication Sig Dispense Refill    albuterol (ACCUNEB) 1.25 mg/3 mL Nebu Take 3 mLs (1.25 mg total) by nebulization 2 (two) times a day. Rescue 280 mL 3    albuterol (VENTOLIN HFA) 90 mcg/actuation inhaler Inhale 2 puffs into the lungs every  6 (six) hours as needed for Wheezing. Rescue 50 g 0    albuterol-ipratropium (DUO-NEB) 2.5 mg-0.5 mg/3 mL nebulizer solution Take 3 mLs by nebulization every 6 (six) hours as needed for Wheezing. Rescue 75 mL 2    azelastine (ASTELIN) 137 mcg (0.1 %) nasal spray 1 spray (137 mcg total) by Nasal route 2 (two) times daily. 30 mL 12    budesonide (PULMICORT) 0.5 mg/2 mL nebulizer solution Take 2 mLs (0.5 mg total) by nebulization 2 (two) times a day. Controller 120 mL 11    cetirizine (ZYRTEC) 10 MG tablet Take 10 mg by mouth once daily.      cholecalciferol, vitamin D3, 1,250 mcg (50,000 unit) Tab Take 1 tablet by mouth every 7 days. 12 tablet 6    docusate sodium (COLACE) 100 MG capsule Take 2 capsules (200 mg total) by mouth 2 (two) times daily. 120 capsule 0    ferrous sulfate 324 mg (65 mg iron) TbEC Take 1 tablet (324 mg total) by mouth once daily. 30 tablet 0    fluticasone propionate (FLONASE) 50 mcg/actuation nasal spray 1 spray (50 mcg total) by Each Nostril route 2 (two) times a day. 16 g 12    Lactobacillus rhamnosus GG (CULTURELLE) 10 billion cell capsule Take 1 capsule by mouth once daily. 30 capsule 5    mucus clearing device (ACAPELLA, FLUTTER) by Misc.(Non-Drug; Combo Route) route once daily. 100 each 0    multivitamin (THERAGRAN) per tablet Take 1 tablet by mouth once daily.      NIFEdipine (ADALAT CC) 60 MG TbSR TAKE 1 TABLET BY MOUTH ONCE DAILY (Patient taking differently: Take 60 mg by mouth once daily.) 90 tablet 3    omeprazole (PRILOSEC) 40 MG capsule TAKE 1 CAPSULE BY MOUTH  ONCE DAILY (Patient taking differently: Take 40 mg by mouth once daily.) 30 capsule 11    OXYGEN-AIR DELIVERY SYSTEMS MISC by [facility].(Non-Drug; Combo Route) route.      potassium chloride (MICRO-K) 10 MEQ CpSR Take 2 capsules (20 mEq total) by mouth once daily. 180 capsule 3    sodium chloride 3% 3 % nebulizer solution Take 4 mLs by nebulization 2 (two) times a day. 240 mL 3    temazepam (RESTORIL) 30 mg capsule Take 30 mg by  "mouth every evening.      apixaban (ELIQUIS) 5 mg Tab Take 1 tablet (5 mg total) by mouth 2 (two) times daily. 180 tablet 3    levothyroxine (SYNTHROID) 100 MCG tablet Take 1 tablet (100 mcg total) by mouth once daily. 90 tablet 3     No current facility-administered medications for this visit.       Review of Systems   Constitutional:  Negative for appetite change, chills, fatigue, fever and unexpected weight change.   Eyes:  Negative for visual disturbance.   Respiratory:  Positive for shortness of breath (with exertion). Negative for cough.    Cardiovascular:  Negative for chest pain and palpitations.   Gastrointestinal:  Negative for abdominal pain, constipation, diarrhea, nausea and vomiting.   Musculoskeletal:  Negative for back pain.   Skin:  Negative for rash.   Neurological:  Negative for headaches.   Hematological:  Negative for adenopathy. Does not bruise/bleed easily.   Psychiatric/Behavioral:  The patient is not nervous/anxious.      ECOG Performance Status: 2   Objective:      Vitals:   Vitals:    07/12/23 1340   BP: 134/80   BP Location: Right arm   Patient Position: Sitting   BP Method: Large (Manual)   Pulse: 83   Resp: (!) 22   Temp: 96.6 °F (35.9 °C)   TempSrc: Temporal   SpO2: (!) 93%   Weight: 90.9 kg (200 lb 6.4 oz)   Height: 5' 4" (1.626 m)               Physical Exam  Constitutional:       General: She is not in acute distress.     Appearance: She is well-developed. She is not diaphoretic.   HENT:      Head: Normocephalic and atraumatic.   Cardiovascular:      Rate and Rhythm: Normal rate and regular rhythm.      Heart sounds: Normal heart sounds. No murmur heard.    No friction rub. No gallop.   Pulmonary:      Effort: Pulmonary effort is normal. No respiratory distress.      Breath sounds: No wheezing or rales.   Chest:      Chest wall: No tenderness.   Abdominal:      General: Bowel sounds are normal. There is no distension.      Palpations: Abdomen is soft. There is no mass.      " Tenderness: There is no abdominal tenderness. There is no guarding or rebound.   Lymphadenopathy:      Cervical: No cervical adenopathy.      Upper Body:      Right upper body: No supraclavicular or axillary adenopathy.      Left upper body: No supraclavicular or axillary adenopathy.   Skin:     Findings: No erythema or rash.   Neurological:      Mental Status: She is alert and oriented to person, place, and time.   Psychiatric:         Behavior: Behavior normal.       Laboratory Data:  Lab Visit on 07/10/2023   Component Date Value Ref Range Status    WBC 07/10/2023 7.00  3.90 - 12.70 K/uL Final    RBC 07/10/2023 3.96 (L)  4.00 - 5.40 M/uL Final    Hemoglobin 07/10/2023 11.8 (L)  12.0 - 16.0 g/dL Final    Hematocrit 07/10/2023 37.1  37.0 - 48.5 % Final    MCV 07/10/2023 94  82 - 98 fL Final    MCH 07/10/2023 29.8  27.0 - 31.0 pg Final    MCHC 07/10/2023 31.8 (L)  32.0 - 36.0 g/dL Final    RDW 07/10/2023 14.3  11.5 - 14.5 % Final    Platelets 07/10/2023 135 (L)  150 - 450 K/uL Final    MPV 07/10/2023 10.2  9.2 - 12.9 fL Final    Immature Granulocytes 07/10/2023 0.3  0.0 - 0.5 % Final    Gran # (ANC) 07/10/2023 4.2  1.8 - 7.7 K/uL Final    Immature Grans (Abs) 07/10/2023 0.02  0.00 - 0.04 K/uL Final    Comment: Mild elevation in immature granulocytes is non specific and   can be seen in a variety of conditions including stress response,   acute inflammation, trauma and pregnancy. Correlation with other   laboratory and clinical findings is essential.      Lymph # 07/10/2023 1.7  1.0 - 4.8 K/uL Final    Mono # 07/10/2023 0.6  0.3 - 1.0 K/uL Final    Eos # 07/10/2023 0.4  0.0 - 0.5 K/uL Final    Baso # 07/10/2023 0.07  0.00 - 0.20 K/uL Final    nRBC 07/10/2023 0  0 /100 WBC Final    Gran % 07/10/2023 60.3  38.0 - 73.0 % Final    Lymph % 07/10/2023 24.3  18.0 - 48.0 % Final    Mono % 07/10/2023 8.4  4.0 - 15.0 % Final    Eosinophil % 07/10/2023 5.7  0.0 - 8.0 % Final    Basophil % 07/10/2023 1.0  0.0 - 1.9 % Final     Differential Method 07/10/2023 Automated   Final    Sodium 07/10/2023 141  136 - 145 mmol/L Final    Potassium 07/10/2023 4.8  3.5 - 5.1 mmol/L Final    Chloride 07/10/2023 110  95 - 110 mmol/L Final    CO2 07/10/2023 23  23 - 29 mmol/L Final    Glucose 07/10/2023 110  70 - 110 mg/dL Final    BUN 07/10/2023 27 (H)  8 - 23 mg/dL Final    Creatinine 07/10/2023 1.0  0.5 - 1.4 mg/dL Final    Calcium 07/10/2023 9.0  8.7 - 10.5 mg/dL Final    Total Protein 07/10/2023 6.4  6.0 - 8.4 g/dL Final    Albumin 07/10/2023 3.7  3.5 - 5.2 g/dL Final    Total Bilirubin 07/10/2023 0.2  0.1 - 1.0 mg/dL Final    Comment: For infants and newborns, interpretation of results should be based  on gestational age, weight and in agreement with clinical  observations.    Premature Infant recommended reference ranges:  Up to 24 hours.............<8.0 mg/dL  Up to 48 hours............<12.0 mg/dL  3-5 days..................<15.0 mg/dL  6-29 days.................<15.0 mg/dL      Alkaline Phosphatase 07/10/2023 62  55 - 135 U/L Final    AST 07/10/2023 17  10 - 40 U/L Final    ALT 07/10/2023 9 (L)  10 - 44 U/L Final    eGFR 07/10/2023 57 (A)  >60 mL/min/1.73 m^2 Final    Anion Gap 07/10/2023 8  8 - 16 mmol/L Final         Imaging:    CT Chest, abdomen and pelvis 7/10/23  Heart size is normal.  No pericardial effusion.  The thoracic aorta is normal in caliber with atherosclerotic calcification.  The main pulmonary artery is normal in caliber.  Coronary artery and mitral annular calcifications.  No enlarged axillary, mediastinal or hilar lymph nodes.     The central airways are clear.  Lower lobe predominant bronchial wall thickening and mild bronchiectasis.  Upper lobe predominant centrilobular and paraseptal emphysema.  No pleural effusion or pneumothorax.     More consolidative appearance of the previously described right apical pulmonary nodules without discretely measurable nodule on today's exam.  Left apical 8 mm pulmonary nodule (series 4,  image 56), unchanged.  Stable superior segment left lower lobe consolidation and volume loss.  Right lower lobe 3 mm pulmonary nodule (series 4, image 152), not definitely seen on prior exams.     Liver is normal in morphology and with smooth contour.  Focal fat along the gallbladder fossa.  Gallbladder surgically absent.  No focal hepatic lesion.  The portal, splenic and superior mesenteric veins are patent.     Spleen, adrenal glands and pancreas are normal.  No intra or extrahepatic biliary ductal dilatation.     No hydronephrosis.  Left renal 21 mm cyst.  Heterogeneously enhancing inferior right renal pole mass measuring 3.9 x 3.7 x 3.9 cm (series 7, image 132; series 5, image 77). No Hydronephrosis.     Infrarenal aortic aneurysm with mural thrombosis measuring 4.1 cm, previously 3.8 cm (image 147, series 7).  Unchanged left periaortic lymph nodes, largest measuring 11 mm in short axis (series 7, image 129).     Colonic diverticula.  No bowel obstruction or inflammatory change where imaged.  No free fluid or free air.  Complex left lower quadrant fat containing ventral hernia.     Postsurgical changes of posterior lumbar fusion L4 through S1 without evidence of hardware complication.        Assessment:       1. Malignant neoplasm of right upper lobe of lung    2. Acute saddle pulmonary embolism with acute cor pulmonale    3. Acquired hypothyroidism    4. Normocytic anemia    5. Essential hypertension    6. Pneumonitis    7. Follicular lymphoma grade I, unspecified body region    8. Hypertension, unspecified type    9. Hyperlipidemia, unspecified hyperlipidemia type                     Plan:     NSCLC - Pt has NSCLC SCC with involvement of the right lung and mediastinum  -Pt with prior radiation to a RUL nodule  -PD-L1 was 86% on 6/18/21  -Pt was on Keytruda with 6 week cycles and completed 6 tx's  -PET/CT on 9/23/22 showed possible pseudoprogression  -PET/CT on 11/29/22 showed stable disease with RLL infiltrate  concerning for resolving PNA  -PET-CT on 03/08/2023 showed resolution of prior disease   -CT Chest 4/14/23 and 7/10/23 with stable findings  -Will monitor pt with labs and clinic visit in 1 months    Pneumonitis - from Keytruda   -Patient completed steroid taper and now off of oxygen  -Management per Pulmonary    Saddle Pulmonary Embolus - seen on CTA 03/18/2023   -Patient is status post thrombectomy and currently on Eliquis   -Patient will need to be on Eliquis lifelong given lung cancer    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 for 9/12 cycles with good response  -Will monitor    Right Renal Mass - PT followed by urology with appt with Dr Priest 7/25/23  -Will monitor    HTN - pt on nifedipine  -BP stable  -Will monitor    HLD - Pt on statin   -Mangement per PCP    Hypothyroidism - pt on levothyroxine  -Management per PCP    Route Chart for Scheduling    Med Onc Chart Routing      Follow up with physician 4 weeks. Pt needs a ferritin, iron/TIBC CBC, TSH and CMP in 4 weeks with a return appt with em aat least a day after the labs.   Follow up with DAYAM[facility]    Infusion scheduling note    Injection scheduling note    Labs    Imaging    Pharmacy appointment    Other referrals            Treatment Plan Information   OP PEMBROLIZUMAB 400MG Q6W   Chad Mills MD   Upcoming Treatment Dates - OP PEMBROLIZUMAB 400MG Q6W    5/12/2023       Pre-Medications       acetaminophen tablet 1,000 mg       diphenhydrAMINE (BENADRYL) 25 mg in NS 50 mL IVPB       Chemotherapy       pembrolizumab (KEYTRUDA) 400 mg in sodium chloride 0.9% SolP 116 mL infusion  6/23/2023       Pre-Medications       acetaminophen tablet 1,000 mg       diphenhydrAMINE (BENADRYL) 25 mg in NS 50 mL IVPB       Chemotherapy       pembrolizumab (KEYTRUDA) 400 mg in sodium chloride 0.9% SolP 116 mL infusion  8/4/2023       Pre-Medications       acetaminophen tablet 1,000 mg       diphenhydrAMINE (BENADRYL) 25 mg in NS 50 mL  IVPB       Chemotherapy       pembrolizumab (KEYTRUDA) 400 mg in sodium chloride 0.9% SolP 116 mL infusion  9/15/2023       Pre-Medications       acetaminophen tablet 1,000 mg       diphenhydrAMINE (BENADRYL) 25 mg in NS 50 mL IVPB       Chemotherapy       pembrolizumab (KEYTRUDA) 400 mg in sodium chloride 0.9% SolP 116 mL infusion    Therapy Plan Information  EVUSHELD (TIXAGEVIMAB/CILGAVIMAB)  diphenhydrAMINE capsule 25 mg  25 mg, Oral, PRN  predniSONE tablet 40 mg  40 mg, Oral, PRN  EPINEPHrine (EPIPEN) 0.3 mg/0.3 mL pen injection 0.3 mg  0.3 mg, Intramuscular, PRN  ondansetron disintegrating tablet 4 mg  4 mg, Oral, PRN  acetaminophen tablet 650 mg  650 mg, Oral, PRN  albuterol inhaler 2 puff  2 puff, Inhalation, PRN    INF PEGFILGRASTIM (NEULASTA)  pegfilgrastim injection 6 mg  6 mg, Subcutaneous, Every visit        Chad Mills MD  [facility]  Hematology and Oncology  [facility]   [address] [place] [address]   O: [phone]  F: [phone]

## 2023-07-12 ENCOUNTER — OFFICE VISIT (OUTPATIENT)
Dept: HEMATOLOGY/ONCOLOGY | Facility: CLINIC | Age: 83
End: 2023-07-12
Payer: MEDICARE

## 2023-07-12 VITALS
WEIGHT: 200.38 LBS | HEIGHT: 64 IN | TEMPERATURE: 97 F | RESPIRATION RATE: 22 BRPM | BODY MASS INDEX: 34.21 KG/M2 | DIASTOLIC BLOOD PRESSURE: 80 MMHG | SYSTOLIC BLOOD PRESSURE: 134 MMHG | HEART RATE: 83 BPM | OXYGEN SATURATION: 93 %

## 2023-07-12 DIAGNOSIS — I26.02 ACUTE SADDLE PULMONARY EMBOLISM WITH ACUTE COR PULMONALE: ICD-10-CM

## 2023-07-12 DIAGNOSIS — C82.00 FOLLICULAR LYMPHOMA GRADE I, UNSPECIFIED BODY REGION: ICD-10-CM

## 2023-07-12 DIAGNOSIS — E78.5 HYPERLIPIDEMIA, UNSPECIFIED HYPERLIPIDEMIA TYPE: ICD-10-CM

## 2023-07-12 DIAGNOSIS — D64.9 NORMOCYTIC ANEMIA: ICD-10-CM

## 2023-07-12 DIAGNOSIS — E03.9 ACQUIRED HYPOTHYROIDISM: ICD-10-CM

## 2023-07-12 DIAGNOSIS — I10 HYPERTENSION, UNSPECIFIED TYPE: ICD-10-CM

## 2023-07-12 DIAGNOSIS — J98.4 PNEUMONITIS: ICD-10-CM

## 2023-07-12 DIAGNOSIS — C34.11 MALIGNANT NEOPLASM OF RIGHT UPPER LOBE OF LUNG: Primary | ICD-10-CM

## 2023-07-12 DIAGNOSIS — I10 ESSENTIAL HYPERTENSION: ICD-10-CM

## 2023-07-12 PROCEDURE — 3288F PR FALLS RISK ASSESSMENT DOCUMENTED: ICD-10-PCS | Mod: CPTII,S$GLB,, | Performed by: INTERNAL MEDICINE

## 2023-07-12 PROCEDURE — 99999 PR PBB SHADOW E&M-EST. PATIENT-LVL IV: ICD-10-PCS | Mod: PBBFAC,,, | Performed by: INTERNAL MEDICINE

## 2023-07-12 PROCEDURE — 3079F DIAST BP 80-89 MM HG: CPT | Mod: CPTII,S$GLB,, | Performed by: INTERNAL MEDICINE

## 2023-07-12 PROCEDURE — 1101F PT FALLS ASSESS-DOCD LE1/YR: CPT | Mod: CPTII,S$GLB,, | Performed by: INTERNAL MEDICINE

## 2023-07-12 PROCEDURE — 99214 PR OFFICE/OUTPT VISIT, EST, LEVL IV, 30-39 MIN: ICD-10-PCS | Mod: S$GLB,,, | Performed by: INTERNAL MEDICINE

## 2023-07-12 PROCEDURE — 3288F FALL RISK ASSESSMENT DOCD: CPT | Mod: CPTII,S$GLB,, | Performed by: INTERNAL MEDICINE

## 2023-07-12 PROCEDURE — 99999 PR PBB SHADOW E&M-EST. PATIENT-LVL IV: CPT | Mod: PBBFAC,,, | Performed by: INTERNAL MEDICINE

## 2023-07-12 PROCEDURE — 1159F PR MEDICATION LIST DOCUMENTED IN MEDICAL RECORD: ICD-10-PCS | Mod: CPTII,S$GLB,, | Performed by: INTERNAL MEDICINE

## 2023-07-12 PROCEDURE — 1126F AMNT PAIN NOTED NONE PRSNT: CPT | Mod: CPTII,S$GLB,, | Performed by: INTERNAL MEDICINE

## 2023-07-12 PROCEDURE — 3075F SYST BP GE 130 - 139MM HG: CPT | Mod: CPTII,S$GLB,, | Performed by: INTERNAL MEDICINE

## 2023-07-12 PROCEDURE — 1101F PR PT FALLS ASSESS DOC 0-1 FALLS W/OUT INJ PAST YR: ICD-10-PCS | Mod: CPTII,S$GLB,, | Performed by: INTERNAL MEDICINE

## 2023-07-12 PROCEDURE — 1159F MED LIST DOCD IN RCRD: CPT | Mod: CPTII,S$GLB,, | Performed by: INTERNAL MEDICINE

## 2023-07-12 PROCEDURE — 3079F PR MOST RECENT DIASTOLIC BLOOD PRESSURE 80-89 MM HG: ICD-10-PCS | Mod: CPTII,S$GLB,, | Performed by: INTERNAL MEDICINE

## 2023-07-12 PROCEDURE — 99214 OFFICE O/P EST MOD 30 MIN: CPT | Mod: S$GLB,,, | Performed by: INTERNAL MEDICINE

## 2023-07-12 PROCEDURE — 1126F PR PAIN SEVERITY QUANTIFIED, NO PAIN PRESENT: ICD-10-PCS | Mod: CPTII,S$GLB,, | Performed by: INTERNAL MEDICINE

## 2023-07-12 PROCEDURE — 3075F PR MOST RECENT SYSTOLIC BLOOD PRESS GE 130-139MM HG: ICD-10-PCS | Mod: CPTII,S$GLB,, | Performed by: INTERNAL MEDICINE

## 2023-07-12 RX ORDER — LEVOTHYROXINE SODIUM 100 UG/1
100 TABLET ORAL DAILY
Qty: 90 TABLET | Refills: 3 | Status: SHIPPED | OUTPATIENT
Start: 2023-07-12 | End: 2024-07-11

## 2023-07-13 ENCOUNTER — OFFICE VISIT (OUTPATIENT)
Dept: OPTOMETRY | Facility: CLINIC | Age: 83
End: 2023-07-13
Payer: MEDICARE

## 2023-07-13 DIAGNOSIS — Z96.1 PSEUDOPHAKIA OF BOTH EYES: ICD-10-CM

## 2023-07-13 DIAGNOSIS — H02.9 EYELID LESION: Primary | ICD-10-CM

## 2023-07-13 DIAGNOSIS — H04.123 BILATERAL DRY EYES: ICD-10-CM

## 2023-07-13 DIAGNOSIS — H52.7 REFRACTIVE ERROR: ICD-10-CM

## 2023-07-13 PROCEDURE — 99999 PR PBB SHADOW E&M-EST. PATIENT-LVL III: ICD-10-PCS | Mod: PBBFAC,,, | Performed by: OPTOMETRIST

## 2023-07-13 PROCEDURE — 1101F PR PT FALLS ASSESS DOC 0-1 FALLS W/OUT INJ PAST YR: ICD-10-PCS | Mod: CPTII,S$GLB,, | Performed by: OPTOMETRIST

## 2023-07-13 PROCEDURE — 1160F PR REVIEW ALL MEDS BY PRESCRIBER/CLIN PHARMACIST DOCUMENTED: ICD-10-PCS | Mod: CPTII,S$GLB,, | Performed by: OPTOMETRIST

## 2023-07-13 PROCEDURE — 1126F AMNT PAIN NOTED NONE PRSNT: CPT | Mod: CPTII,S$GLB,, | Performed by: OPTOMETRIST

## 2023-07-13 PROCEDURE — 99999 PR PBB SHADOW E&M-EST. PATIENT-LVL III: CPT | Mod: PBBFAC,,, | Performed by: OPTOMETRIST

## 2023-07-13 PROCEDURE — 3288F FALL RISK ASSESSMENT DOCD: CPT | Mod: CPTII,S$GLB,, | Performed by: OPTOMETRIST

## 2023-07-13 PROCEDURE — 92004 COMPRE OPH EXAM NEW PT 1/>: CPT | Mod: S$GLB,,, | Performed by: OPTOMETRIST

## 2023-07-13 PROCEDURE — 1159F MED LIST DOCD IN RCRD: CPT | Mod: CPTII,S$GLB,, | Performed by: OPTOMETRIST

## 2023-07-13 PROCEDURE — 1101F PT FALLS ASSESS-DOCD LE1/YR: CPT | Mod: CPTII,S$GLB,, | Performed by: OPTOMETRIST

## 2023-07-13 PROCEDURE — 1160F RVW MEDS BY RX/DR IN RCRD: CPT | Mod: CPTII,S$GLB,, | Performed by: OPTOMETRIST

## 2023-07-13 PROCEDURE — 92004 PR EYE EXAM, NEW PATIENT,COMPREHESV: ICD-10-PCS | Mod: S$GLB,,, | Performed by: OPTOMETRIST

## 2023-07-13 PROCEDURE — 3288F PR FALLS RISK ASSESSMENT DOCUMENTED: ICD-10-PCS | Mod: CPTII,S$GLB,, | Performed by: OPTOMETRIST

## 2023-07-13 PROCEDURE — 92015 PR REFRACTION: ICD-10-PCS | Mod: S$GLB,,, | Performed by: OPTOMETRIST

## 2023-07-13 PROCEDURE — 1126F PR PAIN SEVERITY QUANTIFIED, NO PAIN PRESENT: ICD-10-PCS | Mod: CPTII,S$GLB,, | Performed by: OPTOMETRIST

## 2023-07-13 PROCEDURE — 1159F PR MEDICATION LIST DOCUMENTED IN MEDICAL RECORD: ICD-10-PCS | Mod: CPTII,S$GLB,, | Performed by: OPTOMETRIST

## 2023-07-13 PROCEDURE — 92015 DETERMINE REFRACTIVE STATE: CPT | Mod: S$GLB,,, | Performed by: OPTOMETRIST

## 2023-07-13 NOTE — PROGRESS NOTES
HPI     Concerns About Ocular Health     Additional comments: Ocular health exam           Comments    DLE: x 2 yrs By Dr Webb    Pt states not seeing as well at dist over past few months and eyes get   tired from reading. No floaters or flashes. No burn, tearing or itching.   RD/tear in 1989 with laser treatment. + h/o cat sx.           Last edited by Cheryle Quintana on 7/13/2023  2:04 PM.            Assessment /Plan     For exam results, see Encounter Report.    Eyelid lesion    Pseudophakia of both eyes    Bilateral dry eyes    Refractive error      Looks like cyst, bigger recently, RTC with oculoplastics for eval  Monitor condition. Patient to report any changes. RTC 1 year recheck.   3. Recommend artificial tears. 1 drop 2x per day. Chronicity of disease and treatment discussed.   4. New Spectacle Rx given, discussed different options for glasses. RTC 1 year routine eye exam.

## 2023-07-17 PROBLEM — J96.21 ACUTE ON CHRONIC RESPIRATORY FAILURE WITH HYPOXIA: Status: RESOLVED | Noted: 2023-03-18 | Resolved: 2023-07-17

## 2023-07-17 PROBLEM — I26.02 ACUTE SADDLE PULMONARY EMBOLISM WITH ACUTE COR PULMONALE: Status: RESOLVED | Noted: 2023-03-18 | Resolved: 2023-07-17

## 2023-07-25 ENCOUNTER — OFFICE VISIT (OUTPATIENT)
Dept: UROLOGY | Facility: CLINIC | Age: 83
End: 2023-07-25
Payer: MEDICARE

## 2023-07-25 VITALS — HEIGHT: 64 IN | WEIGHT: 202.38 LBS | BODY MASS INDEX: 34.55 KG/M2

## 2023-07-25 DIAGNOSIS — N28.89 RIGHT RENAL MASS: Primary | ICD-10-CM

## 2023-07-25 PROCEDURE — 1126F PR PAIN SEVERITY QUANTIFIED, NO PAIN PRESENT: ICD-10-PCS | Mod: CPTII,S$GLB,, | Performed by: STUDENT IN AN ORGANIZED HEALTH CARE EDUCATION/TRAINING PROGRAM

## 2023-07-25 PROCEDURE — 3288F FALL RISK ASSESSMENT DOCD: CPT | Mod: CPTII,S$GLB,, | Performed by: STUDENT IN AN ORGANIZED HEALTH CARE EDUCATION/TRAINING PROGRAM

## 2023-07-25 PROCEDURE — 99999 PR PBB SHADOW E&M-EST. PATIENT-LVL IV: CPT | Mod: PBBFAC,,, | Performed by: STUDENT IN AN ORGANIZED HEALTH CARE EDUCATION/TRAINING PROGRAM

## 2023-07-25 PROCEDURE — 1101F PR PT FALLS ASSESS DOC 0-1 FALLS W/OUT INJ PAST YR: ICD-10-PCS | Mod: CPTII,S$GLB,, | Performed by: STUDENT IN AN ORGANIZED HEALTH CARE EDUCATION/TRAINING PROGRAM

## 2023-07-25 PROCEDURE — 1159F MED LIST DOCD IN RCRD: CPT | Mod: CPTII,S$GLB,, | Performed by: STUDENT IN AN ORGANIZED HEALTH CARE EDUCATION/TRAINING PROGRAM

## 2023-07-25 PROCEDURE — 99215 PR OFFICE/OUTPT VISIT, EST, LEVL V, 40-54 MIN: ICD-10-PCS | Mod: S$GLB,,, | Performed by: STUDENT IN AN ORGANIZED HEALTH CARE EDUCATION/TRAINING PROGRAM

## 2023-07-25 PROCEDURE — 99215 OFFICE O/P EST HI 40 MIN: CPT | Mod: S$GLB,,, | Performed by: STUDENT IN AN ORGANIZED HEALTH CARE EDUCATION/TRAINING PROGRAM

## 2023-07-25 PROCEDURE — 1159F PR MEDICATION LIST DOCUMENTED IN MEDICAL RECORD: ICD-10-PCS | Mod: CPTII,S$GLB,, | Performed by: STUDENT IN AN ORGANIZED HEALTH CARE EDUCATION/TRAINING PROGRAM

## 2023-07-25 PROCEDURE — 99999 PR PBB SHADOW E&M-EST. PATIENT-LVL IV: ICD-10-PCS | Mod: PBBFAC,,, | Performed by: STUDENT IN AN ORGANIZED HEALTH CARE EDUCATION/TRAINING PROGRAM

## 2023-07-25 PROCEDURE — 1101F PT FALLS ASSESS-DOCD LE1/YR: CPT | Mod: CPTII,S$GLB,, | Performed by: STUDENT IN AN ORGANIZED HEALTH CARE EDUCATION/TRAINING PROGRAM

## 2023-07-25 PROCEDURE — 1126F AMNT PAIN NOTED NONE PRSNT: CPT | Mod: CPTII,S$GLB,, | Performed by: STUDENT IN AN ORGANIZED HEALTH CARE EDUCATION/TRAINING PROGRAM

## 2023-07-25 PROCEDURE — 3288F PR FALLS RISK ASSESSMENT DOCUMENTED: ICD-10-PCS | Mod: CPTII,S$GLB,, | Performed by: STUDENT IN AN ORGANIZED HEALTH CARE EDUCATION/TRAINING PROGRAM

## 2023-07-25 NOTE — PROGRESS NOTES
"[facility]   Clinic Note    Subjective:     Chief Complaint: Follow-up    History of Present Illness:  Shandra Velez is a 82 y.o. female who presents to clinic for evaluation and management of a right renal mass. She is established to our clinic.     She has a history of follicular lymphoma s/p rituxan, squamous cell carcinoma of the lung s/p SBRT completed 2021 with recurrent nodes and started on Keytruda 6/2022. Admitted with pneumonia 1/2023. Hospitalized 3/2023 with a saddle PE and cor pulmonale requiring thrombectomy. She is on lifelong AC. She follows with Dr. Kinney for COPD and pneumonitis. She was on 2L home oxygen but recently was able to stop it.   She has CKD. Renal US 6/2023 demonstrated a solid right renal mass measuring 4.1 cm in the largest dimension on US. The mass was visualized on imaging dating back to at least 1/2022. Most recent CT AP 6/2022 showed a 3.1 cm lower pole anterior renal mass. She presents today with updated CT renal mass protocol from 7/10/2023 which was independently reviewed and interpreted    History of umbilical hernia repair with mesh. History of appendectomy and lap ta. History of breast reconstruction with flap and large transverse lower abdominal scar.     Past medical, family, surgical and social history reviewed as documented in chart with pertinent positive medical, family, surgical and social history detailed in HPI.    A review systems was conducted with pertinent positive and negative findings documented in HPI.    Anticoagulation/Antiplatelets:  Yes eliquis    Objective:     Estimated body mass index is 34.74 kg/m² as calculated from the following:    Height as of this encounter: 5' 4" (1.626 m).    Weight as of this encounter: 91.8 kg (202 lb 6.4 oz).    Vital Signs (Most Recent)       Physical Exam  Constitutional:       General: She is not in acute distress.     Appearance: She is well-developed. She is not ill-appearing or toxic-appearing. "   Pulmonary:      Effort: Pulmonary effort is normal. No accessory muscle usage or respiratory distress.   Abdominal:       Neurological:      Mental Status: She is alert.       Labs reviewed below:  Lab Results   Component Value Date    BUN 27 (H) 07/10/2023    CREATININE 1.0 07/10/2023    WBC 7.00 07/10/2023    HGB 11.8 (L) 07/10/2023    HCT 37.1 07/10/2023     (L) 07/10/2023    AST 17 07/10/2023    ALT 9 (L) 07/10/2023    ALKPHOS 62 07/10/2023    ALBUMIN 3.7 07/10/2023    HGBA1C 5.7 (H) 03/23/2018      Assessment:     1. Right renal mass      Plan:     She has a cT1a right renal mass.     She has been completely staged.    We discussed the nature and natural history of renal masses and reviewed her imaging in detail. We also discussed renal mass ablation for masses < 3 cm and renal mass biopsy along with indications and rationale for its role. Masses >4 cm are more likely to be malignant (up to 90%). We discussed partial nephrectomy and radical nephrectomy with a robotic approach. We discussed the possible need for conversion to an open procedure.   We also spent some time discussing the concept of active surveillance of renal masses, including its risks and benefits. In general, active surveillance is usually recommended for older and/or more co-morbid patients who have a higher surgical risk. Fortunately, there is long-term data to suggest that the risk of metastasis with a mass less than 4 cm while on active surveillance approaches 0%.     I think she has a relatively high surgical risk, and I would recommend a more conservative approach.  Specifically I discussed the risks of her pulmonary disease, anticoagulation status, history of significant previous abdominal surgeries.  She would also like to avoid surgery given her age and medical comorbidities.    Follow-up in 6 months with a renal ultrasound.    Noel Priest MD     Total time for today's visit was 45 minutes. This includes face to face time and  non-face to face time preparing to see the patient (eg, review of tests), obtaining and/or reviewing separately obtained history, documenting clinical information in the electronic or other health record, independently interpreting results and communicating results to the patient/family/caregiver, or care coordinator.

## 2023-07-31 ENCOUNTER — PATIENT MESSAGE (OUTPATIENT)
Dept: FAMILY MEDICINE | Facility: CLINIC | Age: 83
End: 2023-07-31
Payer: MEDICARE

## 2023-07-31 NOTE — TELEPHONE ENCOUNTER
No care due was identified.  [facility] Embedded Care Due Messages. Reference number: [id number].   7/31/2023 5:12:45 PM CDT

## 2023-07-31 NOTE — TELEPHONE ENCOUNTER
Patient needing a new prescription for temazepam. She is saying 15 mg was sent in instead of 30 mg. Can you please resend 30 mg. Medication pended.

## 2023-08-01 RX ORDER — TEMAZEPAM 30 MG/1
30 CAPSULE ORAL NIGHTLY
Qty: 30 CAPSULE | Refills: 5 | Status: SHIPPED | OUTPATIENT
Start: 2023-08-01 | End: 2024-01-13 | Stop reason: SDUPTHER

## 2023-08-02 ENCOUNTER — PATIENT MESSAGE (OUTPATIENT)
Dept: FAMILY MEDICINE | Facility: CLINIC | Age: 83
End: 2023-08-02
Payer: MEDICARE

## 2023-08-09 ENCOUNTER — LAB VISIT (OUTPATIENT)
Dept: LAB | Facility: HOSPITAL | Age: 83
End: 2023-08-09
Attending: INTERNAL MEDICINE
Payer: MEDICARE

## 2023-08-09 DIAGNOSIS — D64.9 NORMOCYTIC ANEMIA: ICD-10-CM

## 2023-08-09 DIAGNOSIS — E03.9 ACQUIRED HYPOTHYROIDISM: ICD-10-CM

## 2023-08-09 DIAGNOSIS — C34.11 MALIGNANT NEOPLASM OF RIGHT UPPER LOBE OF LUNG: ICD-10-CM

## 2023-08-09 LAB
ALBUMIN SERPL BCP-MCNC: 3.7 G/DL (ref 3.5–5.2)
ALP SERPL-CCNC: 76 U/L (ref 55–135)
ALT SERPL W/O P-5'-P-CCNC: 8 U/L (ref 10–44)
ANION GAP SERPL CALC-SCNC: 11 MMOL/L (ref 8–16)
AST SERPL-CCNC: 15 U/L (ref 10–40)
BASOPHILS # BLD AUTO: 0.05 K/UL (ref 0–0.2)
BASOPHILS NFR BLD: 0.7 % (ref 0–1.9)
BILIRUB SERPL-MCNC: 0.2 MG/DL (ref 0.1–1)
BUN SERPL-MCNC: 21 MG/DL (ref 8–23)
CALCIUM SERPL-MCNC: 8.9 MG/DL (ref 8.7–10.5)
CHLORIDE SERPL-SCNC: 113 MMOL/L (ref 95–110)
CO2 SERPL-SCNC: 18 MMOL/L (ref 23–29)
CREAT SERPL-MCNC: 1.8 MG/DL (ref 0.5–1.4)
DIFFERENTIAL METHOD: ABNORMAL
EOSINOPHIL # BLD AUTO: 0.2 K/UL (ref 0–0.5)
EOSINOPHIL NFR BLD: 3 % (ref 0–8)
ERYTHROCYTE [DISTWIDTH] IN BLOOD BY AUTOMATED COUNT: 14.4 % (ref 11.5–14.5)
EST. GFR  (NO RACE VARIABLE): 28.1 ML/MIN/1.73 M^2
FERRITIN SERPL-MCNC: 100 NG/ML (ref 20–300)
GLUCOSE SERPL-MCNC: 119 MG/DL (ref 70–110)
HCT VFR BLD AUTO: 32.4 % (ref 37–48.5)
HGB BLD-MCNC: 10.3 G/DL (ref 12–16)
IMM GRANULOCYTES # BLD AUTO: 0.03 K/UL (ref 0–0.04)
IMM GRANULOCYTES NFR BLD AUTO: 0.4 % (ref 0–0.5)
IRON SERPL-MCNC: 82 UG/DL (ref 30–160)
LYMPHOCYTES # BLD AUTO: 1.7 K/UL (ref 1–4.8)
LYMPHOCYTES NFR BLD: 25.7 % (ref 18–48)
MCH RBC QN AUTO: 30.6 PG (ref 27–31)
MCHC RBC AUTO-ENTMCNC: 31.8 G/DL (ref 32–36)
MCV RBC AUTO: 96 FL (ref 82–98)
MONOCYTES # BLD AUTO: 0.5 K/UL (ref 0.3–1)
MONOCYTES NFR BLD: 8 % (ref 4–15)
NEUTROPHILS # BLD AUTO: 4.2 K/UL (ref 1.8–7.7)
NEUTROPHILS NFR BLD: 62.2 % (ref 38–73)
NRBC BLD-RTO: 0 /100 WBC
PLATELET # BLD AUTO: 159 K/UL (ref 150–450)
PMV BLD AUTO: 10.3 FL (ref 9.2–12.9)
POTASSIUM SERPL-SCNC: 4.8 MMOL/L (ref 3.5–5.1)
PROT SERPL-MCNC: 6.3 G/DL (ref 6–8.4)
RBC # BLD AUTO: 3.37 M/UL (ref 4–5.4)
SATURATED IRON: 26 % (ref 20–50)
SODIUM SERPL-SCNC: 142 MMOL/L (ref 136–145)
TOTAL IRON BINDING CAPACITY: 321 UG/DL (ref 250–450)
TRANSFERRIN SERPL-MCNC: 217 MG/DL (ref 200–375)
TSH SERPL DL<=0.005 MIU/L-ACNC: 2.54 UIU/ML (ref 0.4–4)
WBC # BLD AUTO: 6.74 K/UL (ref 3.9–12.7)

## 2023-08-09 PROCEDURE — 80053 COMPREHEN METABOLIC PANEL: CPT | Mod: PN | Performed by: INTERNAL MEDICINE

## 2023-08-09 PROCEDURE — 84443 ASSAY THYROID STIM HORMONE: CPT | Performed by: INTERNAL MEDICINE

## 2023-08-09 PROCEDURE — 83540 ASSAY OF IRON: CPT | Performed by: INTERNAL MEDICINE

## 2023-08-09 PROCEDURE — 36415 COLL VENOUS BLD VENIPUNCTURE: CPT | Mod: PN | Performed by: INTERNAL MEDICINE

## 2023-08-09 PROCEDURE — 82728 ASSAY OF FERRITIN: CPT | Performed by: INTERNAL MEDICINE

## 2023-08-09 PROCEDURE — 84466 ASSAY OF TRANSFERRIN: CPT | Performed by: INTERNAL MEDICINE

## 2023-08-09 PROCEDURE — 85025 COMPLETE CBC W/AUTO DIFF WBC: CPT | Mod: PN | Performed by: INTERNAL MEDICINE

## 2023-08-13 NOTE — PROGRESS NOTES
PATIENT: Shandra Velez  MRN: [id number]  DATE: 8/14/2023      Diagnosis:   1. Malignant neoplasm of right upper lobe of lung    2. Thrombophilia    3. ZAINA (acute kidney injury)    4. Acquired hypothyroidism    5. Normocytic anemia    6. Essential hypertension    7. Pneumonitis    8. Follicular lymphoma grade I, unspecified body region    9. Hypertension, unspecified type    10. Hyperlipidemia, unspecified hyperlipidemia type                    Chief Complaint: Establish Care (2 months follow up CT)        Oncologic History:     Pt initially underwent right inguinal LN biopsy 5/25/22 showing grade 1 follicular lymphoma.  Bone marrow biopsy done 06/24/2020 showed involvement with follicular lymphoma.  PET/CT 7/07/20 showed lymphadenopathy above and below the diaphragm as well as the spleen.  Also seen wasa RUL lesion.  The patient was started on CVP-R followed by maintenance rituxan every 8 weeks.  Biopsy of the RUL nodule on 6/18/21 showed SCC with PD-L1 at 86%.  EBUS 7/09/21 showed no malignant cells at station 7, 4R or 11RS.  The patient was treated with SBRT under the care of Dr Barnes with 50Gy in 4 fractions completed 10/28/21.  The patient then developed new pulmonary nodules on CT scan 6/02/22.  WBUS 6/16/22 showed positive SCC in 4R LN.  Pt was discussed at tumor board on 6/21/22 with recommendation for Keytruda.  The patient underwent PET/CT on 9/23/22 after 2 doses of treatment showing a 1.5 cm precarinal lymph node; stable right apical mass measuring 1.8 x 1.5 cm; stable 8 mm right apical lung nodule with adjacent post radiation change measuring 2 x 2.7 cm; stable 4 mm subpleural nodule in the lateral right upper lobe; fluid/mucus in the right lower lobe bronchus tree; 6 mm anterior left upper lobe nodule which is new; 2.3 cm simple cyst in the midpole of the left kidney; 3.1 x 2.3 cm rim enhancing complex cystic and solid mass in the mid to lower pole the right kidney; infrarenal abdominal aortic aneurysm  measuring 3.8 x 3.5 cm; and stable a left para-aortic lymph node measuring 11 mm. 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   The patient underwent PET-CT on 11/29/2022 showing an irregular hypermetabolic right upper lobe tumor which is stable measuring 1.6 x 1.2 cm; hypermetabolic subpleural right upper lobe consolidation diminished in extent; unchanged linear zone of hypermetabolic atelectasis in the superior segment of the left lower lobe; new poorly metabolic ill-defined infiltrate in the posterior right lower lobe; 4 mm left upper lobe nodule which is stable; hypermetabolic precarinal mediastinal lymph node stable measuring 1 cm; and hypermetabolic aortopulmonary lymph node stable measuring 1.1 x 1.1 cm.   The patient was admitted to the hospital from 1/16/23 to 1/23/23 for pneumonia.  She underwent bronchoscopy with Dr Kinney on 1/18/23 showing significant mucus plug impaction int the pharynx, left mainstem bronchus and right and left lower lobes.  The patient was discharged on 3L O2.   The patient underwent PET-CT on 03/08/2023 showing thickening of the pleura; new ground-glass opacities within the left upper lobe; patchy airspace opacities in the right lower lobe; right lower lobe pulmonary artery hypermetabolic activity possibly secondary to bronchial wall thickening or thrombus in the pulmonary artery; diffuse hypermetabolic activity identified throughout the axial skeleton.  The patient was then admitted to the hospital on 03/18/23-4/3/23 after presenting with shortness of breath and being found to have a saddle pulmonary embolus on CTA 03/18/2023.  The patient underwent treatment with a heparin drip and thrombectomy on 03/18/2023 with eventual transition to Eliquis.   The patient underwent CT chest on 04/14/2023 showing areas of air trapping in subpleural bleb formation in both jarek thoraces with areas of interstitial  scarring and bibasilar bronchiectasis; stable solid nodule in left upper lobe measuring 6 mm; masslike area measuring 3 x 1.3 x 1.5 cm in the right upper lobe stable.   The patient underwent CT chest, abdomen, and pelvis on 07/10/2023 showing more consolidative appearance of previously described right apical pulmonary nodules without discretely measurable nodule on today's exam, 8 mm left apical nodule, stable consolidation and superior segment left lower lobe; 3 mm right lower lobe pulmonary nodule; heterogeneously enhancing mass in the inferior pole of the right kidney measuring 3.9 x 3.7 x 3.9 cm; unchanged left periaortic lymph nodes.    Subjective:    Interval History: Ms. Velez is a 82 y.o. female with HLD, HTN, osteopenia, follicular lymphoma who presents for follow up of NSCLC.  Since since the last clinic visit the patient states she has felt well.  The patient denies CP, cough, SOB, abdominal pain, nausea, vomiting, constipation, diarrhea.  The patient denies fever, chills, night sweats, weight loss, new lumps or bumps, easy bruising or bleeding.    Past Medical History:   Past Medical History:   Diagnosis Date    Breast cancer 1985    right mastectomy    Digestive disorder     Encounter for blood transfusion     History of pneumonia     Hypercholesterolemia     Hypertension     Indigestion     Lumbar spondylosis     Lung cancer     2022 - currently in treatment as of 7/27/22    Lymphoma     Osteopenia     Pulmonary nodule     Retinal detachment     Smoker 06/11/2022       Past Surgical HIstory:   Past Surgical History:   Procedure Laterality Date    APPENDECTOMY      BONE MARROW BIOPSY Bilateral 06/24/2020    Procedure: Biopsy-bone marrow;  Surgeon: Rod Montero MD;  Location: [facility] OR;  Service: Oncology;  Laterality: Bilateral;    BREAST RECONSTRUCTION  1990    right    BRONCHOSCOPY N/A 01/18/2023    Procedure: BRONCHOSCOPY;  Surgeon: Gregorio Kinney MD;  Location: [facility];  Service: Pulmonary;   Laterality: N/A;    BRONCHOSCOPY Bilateral 02/10/2023    Procedure: Bronchoscopy;  Surgeon: Gregorio Kinney MD;  Location: [facility] ENDO;  Service: Pulmonary;  Laterality: Bilateral;  for airway clearance. Purple top tube for cell count diff    BRONCHOSCOPY N/A 03/13/2023    Procedure: Bronchoscopy;  Surgeon: Gregorio Kinney MD;  Location: [facility];  Service: Pulmonary;  Laterality: N/A;  therapeutic scope. Please have purple top tube and iced saline    CATARACT EXTRACTION W/  INTRAOCULAR LENS IMPLANT Bilateral     CHOLECYSTECTOMY      ENDOBRONCHIAL ULTRASOUND Bilateral 07/09/2021    Procedure: ENDOBRONCHIAL ULTRASOUND (EBUS);  Surgeon: Gregorio Kinney MD;  Location: [facility];  Service: Pulmonary;  Laterality: Bilateral;  NEED LMA to  eval right upper paratracheal LN.     ENDOBRONCHIAL ULTRASOUND Bilateral 06/16/2022    Procedure: ENDOBRONCHIAL ULTRASOUND (EBUS);  Surgeon: Gregorio Kinney MD;  Location: [facility];  Service: Pulmonary;  Laterality: Bilateral;    INJECTION OF FACET JOINT Left 06/30/2022    Procedure: FACET JOINT Cyst Aspiration L3/4;  Surgeon: Ronnell Baeza MD;  Location: [facility] OR;  Service: Pain Management;  Laterality: Left;    INSERTION OF TUNNELED CENTRAL VENOUS CATHETER (CVC) WITH SUBCUTANEOUS PORT Left 11/04/2020    Procedure: [id number];  Surgeon: Kody Pretty MD;  Location: [facility] OR;  Service: General;  Laterality: Left;    JOINT REPLACEMENT  2008    right knee     LUMBAR LAMINECTOMY      LYMPH NODE BIOPSY      MASTECTOMY Right 1985    NEEDLE LOCALIZATION N/A 05/25/2020    Procedure: NEEDLE LOCALIZATION - lymph node bx;  Surgeon: Steve Weaver MD;  Location: [facility] CATH;  Service: Interventional Radiology;  Laterality: N/A;    RETINAL DETACHMENT SURGERY  1989    RIGHT HEART CATHETERIZATION Right 03/18/2023    Procedure: INSERTION, CATHETER, RIGHT HEART;  Surgeon: Rukhsana Lnag MD;  Location: [facility] CATH;  Service: Cardiology;  Laterality: Right;    SELECTIVE UNILATERAL PULMONARY  ANGIOGRAPHY  03/18/2023    Procedure: [facility] angiography - unilateral;  Surgeon: Rukhsana Lang MD;  Location: [facility] CATH;  Service: Cardiology;;    THROMBECTOMY N/A 03/18/2023    Procedure: THROMBECTOMY;  Surgeon: Rukhsana Lang MD;  Location: [facility] CATH;  Service: Cardiology;  Laterality: N/A;    TRANSFORAMINAL EPIDURAL INJECTION OF STEROID Left 05/14/2020    Procedure: Injection,steroid,epidural,transforaminal approach, l3/4;  Surgeon: Ronnell Baeza MD;  Location: [facility] OR;  Service: Pain Management;  Laterality: Left;    TRANSFORAMINAL EPIDURAL INJECTION OF STEROID Left 03/23/2022    Procedure: Injection,steroid,epidural,transforaminal approach L3/4;  Surgeon: Ronnell Baeza MD;  Location: [facility] OR;  Service: Pain Management;  Laterality: Left;    TRANSFORAMINAL EPIDURAL INJECTION OF STEROID Left 08/01/2022    Procedure: Injection,steroid,epidural,transforaminal approach L3/4;  Surgeon: Ronnell Baeza MD;  Location: [facility] OR;  Service: Pain Management;  Laterality: Left;    TUBAL LIGATION      UMBILICAL HERNIA REPAIR         Family History:   Family History   Problem Relation Age of Onset    Cancer Sister         uterine    Cancer Sister         Uterine cancer     Diabetes Brother     Breast cancer Other 42    Glaucoma Neg Hx     Macular degeneration Neg Hx        Social History:  reports that she quit smoking about 14 months ago. Her smoking use included cigarettes. She started smoking about 54 years ago. She has a 26.5 pack-year smoking history. She has never used smokeless tobacco. She reports that she does not drink alcohol and does not use drugs.    Allergies:  Review of patient's allergies indicates:   Allergen Reactions    Opioids - morphine analogues Other (See Comments)     Hypotension       Medications:  Current Outpatient Medications   Medication Sig Dispense Refill    albuterol (ACCUNEB) 1.25 mg/3 mL Nebu Take 3 mLs (1.25 mg total) by nebulization 2 (two) times a day. Rescue 280 mL 3     albuterol (VENTOLIN HFA) 90 mcg/actuation inhaler Inhale 2 puffs into the lungs every 6 (six) hours as needed for Wheezing. Rescue 50 g 0    albuterol-ipratropium (DUO-NEB) 2.5 mg-0.5 mg/3 mL nebulizer solution Take 3 mLs by nebulization every 6 (six) hours as needed for Wheezing. Rescue 75 mL 2    apixaban (ELIQUIS) 5 mg Tab Take 1 tablet (5 mg total) by mouth 2 (two) times daily. 180 tablet 3    azelastine (ASTELIN) 137 mcg (0.1 %) nasal spray 1 spray (137 mcg total) by Nasal route 2 (two) times daily. 30 mL 12    budesonide (PULMICORT) 0.5 mg/2 mL nebulizer solution Take 2 mLs (0.5 mg total) by nebulization 2 (two) times a day. Controller 120 mL 11    cetirizine (ZYRTEC) 10 MG tablet Take 10 mg by mouth once daily.      cholecalciferol, vitamin D3, 1,250 mcg (50,000 unit) Tab Take 1 tablet by mouth every 7 days. 12 tablet 6    docusate sodium (COLACE) 100 MG capsule Take 2 capsules (200 mg total) by mouth 2 (two) times daily. 120 capsule 0    fluticasone propionate (FLONASE) 50 mcg/actuation nasal spray 1 spray (50 mcg total) by Each Nostril route 2 (two) times a day. 16 g 12    Lactobacillus rhamnosus GG (CULTURELLE) 10 billion cell capsule Take 1 capsule by mouth once daily. 30 capsule 5    levothyroxine (SYNTHROID) 100 MCG tablet Take 1 tablet (100 mcg total) by mouth once daily. 90 tablet 3    mucus clearing device (ACAPELLA, FLUTTER) by Misc.(Non-Drug; Combo Route) route once daily. 100 each 0    multivitamin (THERAGRAN) per tablet Take 1 tablet by mouth once daily.      NIFEdipine (ADALAT CC) 60 MG TbSR TAKE 1 TABLET BY MOUTH ONCE DAILY (Patient taking differently: Take 60 mg by mouth once daily.) 90 tablet 3    OXYGEN-AIR DELIVERY SYSTEMS MISC by Misc.(Non-Drug; Combo Route) route.      potassium chloride (MICRO-K) 10 MEQ CpSR Take 2 capsules (20 mEq total) by mouth once daily. 180 capsule 3    sodium chloride 3% 3 % nebulizer solution Take 4 mLs by nebulization 3 (three) times a week. 48 mL 3    temazepam  "(RESTORIL) 30 mg capsule Take 1 capsule (30 mg total) by mouth every evening. 30 capsule 5    ferrous sulfate 324 mg (65 mg iron) TbEC Take 1 tablet (324 mg total) by mouth once daily. 30 tablet 0    omeprazole (PRILOSEC) 40 MG capsule Take 1 capsule (40 mg total) by mouth once daily. 90 capsule 4     No current facility-administered medications for this visit.       Review of Systems   Constitutional:  Negative for appetite change, chills, fatigue, fever and unexpected weight change.   Eyes:  Negative for visual disturbance.   Respiratory:  Negative for cough and shortness of breath.    Cardiovascular:  Negative for chest pain and palpitations.   Gastrointestinal:  Negative for abdominal pain, constipation, diarrhea, nausea and vomiting.   Musculoskeletal:  Negative for back pain.   Skin:  Negative for rash.   Neurological:  Negative for headaches.   Hematological:  Negative for adenopathy. Does not bruise/bleed easily.   Psychiatric/Behavioral:  The patient is not nervous/anxious.        ECOG Performance Status: 2   Objective:      Vitals:   Vitals:    08/14/23 1139   BP: 130/82   BP Location: Left arm   Patient Position: Sitting   BP Method: Medium (Automatic)   Pulse: 80   Resp: 18   Temp: 96.6 °F (35.9 °C)   TempSrc: Temporal   SpO2: 97%   Weight: 93 kg (205 lb 0.4 oz)   Height: 5' 4" (1.626 m)                 Physical Exam  Constitutional:       General: She is not in acute distress.     Appearance: She is well-developed. She is not diaphoretic.   HENT:      Head: Normocephalic and atraumatic.   Cardiovascular:      Rate and Rhythm: Normal rate and regular rhythm.      Heart sounds: Normal heart sounds. No murmur heard.     No friction rub. No gallop.   Pulmonary:      Effort: Pulmonary effort is normal. No respiratory distress.      Breath sounds: No wheezing or rales.   Chest:      Chest wall: No tenderness.   Abdominal:      General: Bowel sounds are normal. There is no distension.      Palpations: Abdomen " is soft. There is no mass.      Tenderness: There is no abdominal tenderness. There is no guarding or rebound.   Lymphadenopathy:      Cervical: No cervical adenopathy.      Upper Body:      Right upper body: No supraclavicular or axillary adenopathy.      Left upper body: No supraclavicular or axillary adenopathy.   Skin:     Findings: No erythema or rash.   Neurological:      Mental Status: She is alert and oriented to person, place, and time.   Psychiatric:         Behavior: Behavior normal.         Laboratory Data:  Infusion on 08/14/2023   Component Date Value Ref Range Status    Glucose 08/14/2023 99  70 - 110 mg/dL Final    Sodium 08/14/2023 142  136 - 145 mmol/L Final    Potassium 08/14/2023 4.6  3.5 - 5.1 mmol/L Final    Chloride 08/14/2023 114 (H)  95 - 110 mmol/L Final    CO2 08/14/2023 16 (L)  23 - 29 mmol/L Final    BUN 08/14/2023 24 (H)  8 - 23 mg/dL Final    Calcium 08/14/2023 9.1  8.7 - 10.5 mg/dL Final    Creatinine 08/14/2023 1.3  0.5 - 1.4 mg/dL Final    Albumin 08/14/2023 4.0  3.5 - 5.2 g/dL Final    Phosphorus 08/14/2023 3.6  2.7 - 4.5 mg/dL Final    eGFR 08/14/2023 41.6 (A)  >60 mL/min/1.73 m^2 Final    Anion Gap 08/14/2023 12  8 - 16 mmol/L Final   Lab Visit on 08/09/2023   Component Date Value Ref Range Status    WBC 08/09/2023 6.74  3.90 - 12.70 K/uL Final    RBC 08/09/2023 3.37 (L)  4.00 - 5.40 M/uL Final    Hemoglobin 08/09/2023 10.3 (L)  12.0 - 16.0 g/dL Final    Hematocrit 08/09/2023 32.4 (L)  37.0 - 48.5 % Final    MCV 08/09/2023 96  82 - 98 fL Final    MCH 08/09/2023 30.6  27.0 - 31.0 pg Final    MCHC 08/09/2023 31.8 (L)  32.0 - 36.0 g/dL Final    RDW 08/09/2023 14.4  11.5 - 14.5 % Final    Platelets 08/09/2023 159  150 - 450 K/uL Final    MPV 08/09/2023 10.3  9.2 - 12.9 fL Final    Immature Granulocytes 08/09/2023 0.4  0.0 - 0.5 % Final    Gran # (ANC) 08/09/2023 4.2  1.8 - 7.7 K/uL Final    Immature Grans (Abs) 08/09/2023 0.03  0.00 - 0.04 K/uL Final    Comment: Mild elevation in  immature granulocytes is non specific and   can be seen in a variety of conditions including stress response,   acute inflammation, trauma and pregnancy. Correlation with other   laboratory and clinical findings is essential.      Lymph # 08/09/2023 1.7  1.0 - 4.8 K/uL Final    Mono # 08/09/2023 0.5  0.3 - 1.0 K/uL Final    Eos # 08/09/2023 0.2  0.0 - 0.5 K/uL Final    Baso # 08/09/2023 0.05  0.00 - 0.20 K/uL Final    nRBC 08/09/2023 0  0 /100 WBC Final    Gran % 08/09/2023 62.2  38.0 - 73.0 % Final    Lymph % 08/09/2023 25.7  18.0 - 48.0 % Final    Mono % 08/09/2023 8.0  4.0 - 15.0 % Final    Eosinophil % 08/09/2023 3.0  0.0 - 8.0 % Final    Basophil % 08/09/2023 0.7  0.0 - 1.9 % Final    Differential Method 08/09/2023 Automated   Final    Sodium 08/09/2023 142  136 - 145 mmol/L Final    Potassium 08/09/2023 4.8  3.5 - 5.1 mmol/L Final    Chloride 08/09/2023 113 (H)  95 - 110 mmol/L Final    CO2 08/09/2023 18 (L)  23 - 29 mmol/L Final    Glucose 08/09/2023 119 (H)  70 - 110 mg/dL Final    BUN 08/09/2023 21  8 - 23 mg/dL Final    Creatinine 08/09/2023 1.8 (H)  0.5 - 1.4 mg/dL Final    Calcium 08/09/2023 8.9  8.7 - 10.5 mg/dL Final    Total Protein 08/09/2023 6.3  6.0 - 8.4 g/dL Final    Albumin 08/09/2023 3.7  3.5 - 5.2 g/dL Final    Total Bilirubin 08/09/2023 0.2  0.1 - 1.0 mg/dL Final    Comment: For infants and newborns, interpretation of results should be based  on gestational age, weight and in agreement with clinical  observations.    Premature Infant recommended reference ranges:  Up to 24 hours.............<8.0 mg/dL  Up to 48 hours............<12.0 mg/dL  3-5 days..................<15.0 mg/dL  6-29 days.................<15.0 mg/dL      Alkaline Phosphatase 08/09/2023 76  55 - 135 U/L Final    AST 08/09/2023 15  10 - 40 U/L Final    ALT 08/09/2023 8 (L)  10 - 44 U/L Final    eGFR 08/09/2023 28.1 (A)  >60 mL/min/1.73 m^2 Final    Anion Gap 08/09/2023 11  8 - 16 mmol/L Final    TSH 08/09/2023 2.535  0.400 -  4.000 uIU/mL Final    Ferritin 08/09/2023 100  20.0 - 300.0 ng/mL Final    Iron 08/09/2023 82  30 - 160 ug/dL Final    Transferrin 08/09/2023 217  200 - 375 mg/dL Final    TIBC 08/09/2023 321  250 - 450 ug/dL Final    Saturated Iron 08/09/2023 26  20 - 50 % Final         Imaging:    CT Chest, abdomen and pelvis 7/10/23  Heart size is normal.  No pericardial effusion.  The thoracic aorta is normal in caliber with atherosclerotic calcification.  The main pulmonary artery is normal in caliber.  Coronary artery and mitral annular calcifications.  No enlarged axillary, mediastinal or hilar lymph nodes.     The central airways are clear.  Lower lobe predominant bronchial wall thickening and mild bronchiectasis.  Upper lobe predominant centrilobular and paraseptal emphysema.  No pleural effusion or pneumothorax.     More consolidative appearance of the previously described right apical pulmonary nodules without discretely measurable nodule on today's exam.  Left apical 8 mm pulmonary nodule (series 4, image 56), unchanged.  Stable superior segment left lower lobe consolidation and volume loss.  Right lower lobe 3 mm pulmonary nodule (series 4, image 152), not definitely seen on prior exams.     Liver is normal in morphology and with smooth contour.  Focal fat along the gallbladder fossa.  Gallbladder surgically absent.  No focal hepatic lesion.  The portal, splenic and superior mesenteric veins are patent.     Spleen, adrenal glands and pancreas are normal.  No intra or extrahepatic biliary ductal dilatation.     No hydronephrosis.  Left renal 21 mm cyst.  Heterogeneously enhancing inferior right renal pole mass measuring 3.9 x 3.7 x 3.9 cm (series 7, image 132; series 5, image 77). No Hydronephrosis.     Infrarenal aortic aneurysm with mural thrombosis measuring 4.1 cm, previously 3.8 cm (image 147, series 7).  Unchanged left periaortic lymph nodes, largest measuring 11 mm in short axis (series 7, image 129).     Colonic  diverticula.  No bowel obstruction or inflammatory change where imaged.  No free fluid or free air.  Complex left lower quadrant fat containing ventral hernia.     Postsurgical changes of posterior lumbar fusion L4 through S1 without evidence of hardware complication.        Assessment:       1. Malignant neoplasm of right upper lobe of lung    2. Thrombophilia    3. ZAINA (acute kidney injury)    4. Acquired hypothyroidism    5. Normocytic anemia    6. Essential hypertension    7. Pneumonitis    8. Follicular lymphoma grade I, unspecified body region    9. Hypertension, unspecified type    10. Hyperlipidemia, unspecified hyperlipidemia type                       Plan:     NSCLC - Pt has NSCLC SCC with involvement of the right lung and mediastinum  -Pt with prior radiation to a RUL nodule  -PD-L1 was 86% on 6/18/21  -Pt was on Keytruda with 6 week cycles and completed 6 tx's  -PET/CT on 9/23/22 showed possible pseudoprogression  -PET/CT on 11/29/22 showed stable disease with RLL infiltrate concerning for resolving PNA  -PET-CT on 03/08/2023 showed resolution of prior disease   -CT Chest 4/14/23 and 7/10/23 with stable findings  -Will monitor pt with labs and clinic visit in 4 weeks  -Will consider repeating scans 10/2023    Pneumonitis - from Keytruda   -Patient completed steroid taper and now off of oxygen  -Management per Pulmonary    ZAINA - pt's creatinine was up to 1.8 on 8/09/23  -Creatinine repeated today and is 1.3  -concern is for dehydration  -Pt instructed to drink plenty of fluids    Saddle Pulmonary Embolus - seen on CTA 03/18/2023   -Patient is status post thrombectomy and currently on Eliquis   -Patient will need to be on Eliquis lifelong given lung cancer    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 for 9/12 cycles with good response  -Will monitor    Right Renal Mass - PT followed by urology Dr Priest with recommendation on 7/25/23 for repeat renal US in 6  months  -Will monitor    HTN - pt on nifedipine  -BP stable  _pt previously taken off HCTZ and lisinopril  -Will monitor    HLD - Pt on statin   -Mangement per PCP    Hypothyroidism - pt on levothyroxine  -Management per PCP    Route Chart for Scheduling    Med Onc Chart Routing      Follow up with physician 4 weeks. The patient  needs her PORT flushed today.  She needs a renal function panel from her PORT today.  She needs a CBC and CMP with MD visit in 4 weeks.   Follow up with DAYAMI    Infusion scheduling note    Injection scheduling note    Labs    Imaging    Pharmacy appointment    Other referrals              Treatment Plan Information   OP PEMBROLIZUMAB 400MG Q6W   Chad Mills MD   Upcoming Treatment Dates - OP PEMBROLIZUMAB 400MG Q6W    5/12/2023       Pre-Medications       acetaminophen tablet 1,000 mg       diphenhydrAMINE (BENADRYL) 25 mg in NS 50 mL IVPB       Chemotherapy       pembrolizumab (KEYTRUDA) 400 mg in sodium chloride 0.9% SolP 116 mL infusion  6/23/2023       Pre-Medications       acetaminophen tablet 1,000 mg       diphenhydrAMINE (BENADRYL) 25 mg in NS 50 mL IVPB       Chemotherapy       pembrolizumab (KEYTRUDA) 400 mg in sodium chloride 0.9% SolP 116 mL infusion  8/4/2023       Pre-Medications       acetaminophen tablet 1,000 mg       diphenhydrAMINE (BENADRYL) 25 mg in NS 50 mL IVPB       Chemotherapy       pembrolizumab (KEYTRUDA) 400 mg in sodium chloride 0.9% SolP 116 mL infusion  9/15/2023       Pre-Medications       acetaminophen tablet 1,000 mg       diphenhydrAMINE (BENADRYL) 25 mg in NS 50 mL IVPB       Chemotherapy       pembrolizumab (KEYTRUDA) 400 mg in sodium chloride 0.9% SolP 116 mL infusion    Therapy Plan Information  PORT FLUSH  Flushes  heparin, porcine (PF) 100 unit/mL injection flush 500 Units  500 Units, Intravenous, Every visit  sodium chloride 0.9% flush 10 mL  10 mL, Intravenous, Every visit    INF PEGFILGRASTIM (NEULASTA)  pegfilgrastim (NEULASTA) injection 6  mg  6 mg, Subcutaneous, Every visit        Chad Mills MD  [facility]  Hematology and Oncology  [facility]   [address]   [place] [address]   O: [phone]  F: [phone]

## 2023-08-14 ENCOUNTER — INFUSION (OUTPATIENT)
Dept: INFUSION THERAPY | Facility: HOSPITAL | Age: 83
End: 2023-08-14
Attending: INTERNAL MEDICINE
Payer: MEDICARE

## 2023-08-14 ENCOUNTER — OFFICE VISIT (OUTPATIENT)
Dept: HEMATOLOGY/ONCOLOGY | Facility: CLINIC | Age: 83
End: 2023-08-14
Payer: MEDICARE

## 2023-08-14 VITALS
SYSTOLIC BLOOD PRESSURE: 130 MMHG | OXYGEN SATURATION: 97 % | WEIGHT: 205 LBS | BODY MASS INDEX: 35 KG/M2 | HEART RATE: 80 BPM | TEMPERATURE: 97 F | RESPIRATION RATE: 18 BRPM | HEIGHT: 64 IN | DIASTOLIC BLOOD PRESSURE: 82 MMHG

## 2023-08-14 DIAGNOSIS — D68.59 THROMBOPHILIA: ICD-10-CM

## 2023-08-14 DIAGNOSIS — E78.5 HYPERLIPIDEMIA, UNSPECIFIED HYPERLIPIDEMIA TYPE: ICD-10-CM

## 2023-08-14 DIAGNOSIS — E03.9 ACQUIRED HYPOTHYROIDISM: ICD-10-CM

## 2023-08-14 DIAGNOSIS — J98.4 PNEUMONITIS: ICD-10-CM

## 2023-08-14 DIAGNOSIS — D64.9 NORMOCYTIC ANEMIA: ICD-10-CM

## 2023-08-14 DIAGNOSIS — C82.01 GRADE 1 FOLLICULAR LYMPHOMA OF LYMPH NODES OF NECK: ICD-10-CM

## 2023-08-14 DIAGNOSIS — I10 ESSENTIAL HYPERTENSION: ICD-10-CM

## 2023-08-14 DIAGNOSIS — I10 HYPERTENSION, UNSPECIFIED TYPE: ICD-10-CM

## 2023-08-14 DIAGNOSIS — M48.061 SPINAL STENOSIS OF LUMBAR REGION WITHOUT NEUROGENIC CLAUDICATION: ICD-10-CM

## 2023-08-14 DIAGNOSIS — N17.9 AKI (ACUTE KIDNEY INJURY): Primary | ICD-10-CM

## 2023-08-14 DIAGNOSIS — N17.9 AKI (ACUTE KIDNEY INJURY): ICD-10-CM

## 2023-08-14 DIAGNOSIS — C82.00 FOLLICULAR LYMPHOMA GRADE I, UNSPECIFIED BODY REGION: ICD-10-CM

## 2023-08-14 DIAGNOSIS — C34.11 MALIGNANT NEOPLASM OF RIGHT UPPER LOBE OF LUNG: Primary | ICD-10-CM

## 2023-08-14 LAB
ALBUMIN SERPL BCP-MCNC: 4 G/DL (ref 3.5–5.2)
ANION GAP SERPL CALC-SCNC: 12 MMOL/L (ref 8–16)
BUN SERPL-MCNC: 24 MG/DL (ref 8–23)
CALCIUM SERPL-MCNC: 9.1 MG/DL (ref 8.7–10.5)
CHLORIDE SERPL-SCNC: 114 MMOL/L (ref 95–110)
CO2 SERPL-SCNC: 16 MMOL/L (ref 23–29)
CREAT SERPL-MCNC: 1.3 MG/DL (ref 0.5–1.4)
EST. GFR  (NO RACE VARIABLE): 41.6 ML/MIN/1.73 M^2
GLUCOSE SERPL-MCNC: 99 MG/DL (ref 70–110)
PHOSPHATE SERPL-MCNC: 3.6 MG/DL (ref 2.7–4.5)
POTASSIUM SERPL-SCNC: 4.6 MMOL/L (ref 3.5–5.1)
SODIUM SERPL-SCNC: 142 MMOL/L (ref 136–145)

## 2023-08-14 PROCEDURE — 3075F SYST BP GE 130 - 139MM HG: CPT | Mod: CPTII,S$GLB,, | Performed by: INTERNAL MEDICINE

## 2023-08-14 PROCEDURE — 63600175 PHARM REV CODE 636 W HCPCS: Mod: PN | Performed by: INTERNAL MEDICINE

## 2023-08-14 PROCEDURE — 99999 PR PBB SHADOW E&M-EST. PATIENT-LVL IV: CPT | Mod: PBBFAC,,, | Performed by: INTERNAL MEDICINE

## 2023-08-14 PROCEDURE — 99999 PR PBB SHADOW E&M-EST. PATIENT-LVL IV: ICD-10-PCS | Mod: PBBFAC,,, | Performed by: INTERNAL MEDICINE

## 2023-08-14 PROCEDURE — 96523 IRRIG DRUG DELIVERY DEVICE: CPT | Mod: PN

## 2023-08-14 PROCEDURE — 99215 OFFICE O/P EST HI 40 MIN: CPT | Mod: S$GLB,,, | Performed by: INTERNAL MEDICINE

## 2023-08-14 PROCEDURE — 3288F FALL RISK ASSESSMENT DOCD: CPT | Mod: CPTII,S$GLB,, | Performed by: INTERNAL MEDICINE

## 2023-08-14 PROCEDURE — 1101F PT FALLS ASSESS-DOCD LE1/YR: CPT | Mod: CPTII,S$GLB,, | Performed by: INTERNAL MEDICINE

## 2023-08-14 PROCEDURE — 80069 RENAL FUNCTION PANEL: CPT | Mod: PN | Performed by: INTERNAL MEDICINE

## 2023-08-14 PROCEDURE — 99215 PR OFFICE/OUTPT VISIT, EST, LEVL V, 40-54 MIN: ICD-10-PCS | Mod: S$GLB,,, | Performed by: INTERNAL MEDICINE

## 2023-08-14 PROCEDURE — 3288F PR FALLS RISK ASSESSMENT DOCUMENTED: ICD-10-PCS | Mod: CPTII,S$GLB,, | Performed by: INTERNAL MEDICINE

## 2023-08-14 PROCEDURE — 3079F PR MOST RECENT DIASTOLIC BLOOD PRESSURE 80-89 MM HG: ICD-10-PCS | Mod: CPTII,S$GLB,, | Performed by: INTERNAL MEDICINE

## 2023-08-14 PROCEDURE — 25000003 PHARM REV CODE 250: Mod: PN | Performed by: INTERNAL MEDICINE

## 2023-08-14 PROCEDURE — A4216 STERILE WATER/SALINE, 10 ML: HCPCS | Mod: PN | Performed by: INTERNAL MEDICINE

## 2023-08-14 PROCEDURE — 3075F PR MOST RECENT SYSTOLIC BLOOD PRESS GE 130-139MM HG: ICD-10-PCS | Mod: CPTII,S$GLB,, | Performed by: INTERNAL MEDICINE

## 2023-08-14 PROCEDURE — 3079F DIAST BP 80-89 MM HG: CPT | Mod: CPTII,S$GLB,, | Performed by: INTERNAL MEDICINE

## 2023-08-14 PROCEDURE — 1101F PR PT FALLS ASSESS DOC 0-1 FALLS W/OUT INJ PAST YR: ICD-10-PCS | Mod: CPTII,S$GLB,, | Performed by: INTERNAL MEDICINE

## 2023-08-14 RX ORDER — SODIUM CHLORIDE 0.9 % (FLUSH) 0.9 %
10 SYRINGE (ML) INJECTION
Status: DISCONTINUED | OUTPATIENT
Start: 2023-08-14 | End: 2023-08-14 | Stop reason: HOSPADM

## 2023-08-14 RX ORDER — HEPARIN 100 UNIT/ML
500 SYRINGE INTRAVENOUS
OUTPATIENT
Start: 2023-08-14

## 2023-08-14 RX ORDER — HEPARIN 100 UNIT/ML
500 SYRINGE INTRAVENOUS
Status: DISCONTINUED | OUTPATIENT
Start: 2023-08-14 | End: 2023-08-14 | Stop reason: HOSPADM

## 2023-08-14 RX ORDER — SODIUM CHLORIDE 0.9 % (FLUSH) 0.9 %
10 SYRINGE (ML) INJECTION
OUTPATIENT
Start: 2023-08-14

## 2023-08-14 RX ORDER — SODIUM CHLORIDE 0.9 % (FLUSH) 0.9 %
10 SYRINGE (ML) INJECTION
Status: CANCELLED | OUTPATIENT
Start: 2023-08-14

## 2023-08-14 RX ORDER — OMEPRAZOLE 40 MG/1
40 CAPSULE, DELAYED RELEASE ORAL DAILY
Qty: 90 CAPSULE | Refills: 4 | Status: SHIPPED | OUTPATIENT
Start: 2023-08-14

## 2023-08-14 RX ORDER — HEPARIN 100 UNIT/ML
500 SYRINGE INTRAVENOUS
Status: CANCELLED | OUTPATIENT
Start: 2023-08-14

## 2023-08-14 RX ADMIN — Medication 500 UNITS: at 01:08

## 2023-08-14 RX ADMIN — Medication 10 ML: at 01:08

## 2023-08-29 ENCOUNTER — LAB VISIT (OUTPATIENT)
Dept: LAB | Facility: HOSPITAL | Age: 83
End: 2023-08-29
Attending: INTERNAL MEDICINE
Payer: MEDICARE

## 2023-08-29 DIAGNOSIS — N17.9 AKI (ACUTE KIDNEY INJURY): ICD-10-CM

## 2023-08-29 DIAGNOSIS — E66.01 MORBID (SEVERE) OBESITY DUE TO EXCESS CALORIES: ICD-10-CM

## 2023-08-29 DIAGNOSIS — N28.89 RIGHT RENAL MASS: ICD-10-CM

## 2023-08-29 DIAGNOSIS — I10 HYPERTENSION, UNSPECIFIED TYPE: ICD-10-CM

## 2023-08-29 DIAGNOSIS — N18.31 STAGE 3A CHRONIC KIDNEY DISEASE: ICD-10-CM

## 2023-08-29 DIAGNOSIS — N28.89 LEFT RENAL MASS: ICD-10-CM

## 2023-08-29 DIAGNOSIS — C34.11 MALIGNANT NEOPLASM OF RIGHT UPPER LOBE OF LUNG: ICD-10-CM

## 2023-08-29 LAB
ALBUMIN SERPL BCP-MCNC: 3.4 G/DL (ref 3.5–5.2)
ANION GAP SERPL CALC-SCNC: 10 MMOL/L (ref 8–16)
BASOPHILS # BLD AUTO: 0.03 K/UL (ref 0–0.2)
BASOPHILS NFR BLD: 0.5 % (ref 0–1.9)
BUN SERPL-MCNC: 21 MG/DL (ref 8–23)
CALCIUM SERPL-MCNC: 8.5 MG/DL (ref 8.7–10.5)
CHLORIDE SERPL-SCNC: 113 MMOL/L (ref 95–110)
CO2 SERPL-SCNC: 20 MMOL/L (ref 23–29)
CREAT SERPL-MCNC: 1.5 MG/DL (ref 0.5–1.4)
DIFFERENTIAL METHOD: ABNORMAL
EOSINOPHIL # BLD AUTO: 0.2 K/UL (ref 0–0.5)
EOSINOPHIL NFR BLD: 3.2 % (ref 0–8)
ERYTHROCYTE [DISTWIDTH] IN BLOOD BY AUTOMATED COUNT: 14.6 % (ref 11.5–14.5)
EST. GFR  (NO RACE VARIABLE): 35 ML/MIN/1.73 M^2
GLUCOSE SERPL-MCNC: 115 MG/DL (ref 70–110)
HCT VFR BLD AUTO: 26.4 % (ref 37–48.5)
HGB BLD-MCNC: 8.3 G/DL (ref 12–16)
IMM GRANULOCYTES # BLD AUTO: 0.02 K/UL (ref 0–0.04)
IMM GRANULOCYTES NFR BLD AUTO: 0.3 % (ref 0–0.5)
LYMPHOCYTES # BLD AUTO: 1.3 K/UL (ref 1–4.8)
LYMPHOCYTES NFR BLD: 22 % (ref 18–48)
MCH RBC QN AUTO: 31.2 PG (ref 27–31)
MCHC RBC AUTO-ENTMCNC: 31.4 G/DL (ref 32–36)
MCV RBC AUTO: 99 FL (ref 82–98)
MONOCYTES # BLD AUTO: 0.5 K/UL (ref 0.3–1)
MONOCYTES NFR BLD: 8 % (ref 4–15)
NEUTROPHILS # BLD AUTO: 3.9 K/UL (ref 1.8–7.7)
NEUTROPHILS NFR BLD: 66 % (ref 38–73)
NRBC BLD-RTO: 0 /100 WBC
PHOSPHATE SERPL-MCNC: 3.4 MG/DL (ref 2.7–4.5)
PLATELET # BLD AUTO: 161 K/UL (ref 150–450)
PMV BLD AUTO: 9.9 FL (ref 9.2–12.9)
POTASSIUM SERPL-SCNC: 4.6 MMOL/L (ref 3.5–5.1)
PTH-INTACT SERPL-MCNC: 97.8 PG/ML (ref 9–77)
RBC # BLD AUTO: 2.66 M/UL (ref 4–5.4)
SODIUM SERPL-SCNC: 143 MMOL/L (ref 136–145)
URATE SERPL-MCNC: 6.9 MG/DL (ref 2.4–5.7)
WBC # BLD AUTO: 5.91 K/UL (ref 3.9–12.7)

## 2023-08-29 PROCEDURE — 80069 RENAL FUNCTION PANEL: CPT | Mod: PN | Performed by: INTERNAL MEDICINE

## 2023-08-29 PROCEDURE — 85025 COMPLETE CBC W/AUTO DIFF WBC: CPT | Mod: PN | Performed by: INTERNAL MEDICINE

## 2023-08-29 PROCEDURE — 36415 COLL VENOUS BLD VENIPUNCTURE: CPT | Mod: PN | Performed by: INTERNAL MEDICINE

## 2023-08-29 PROCEDURE — 83970 ASSAY OF PARATHORMONE: CPT | Performed by: INTERNAL MEDICINE

## 2023-08-29 PROCEDURE — 84550 ASSAY OF BLOOD/URIC ACID: CPT | Mod: PN | Performed by: INTERNAL MEDICINE

## 2023-09-05 ENCOUNTER — OFFICE VISIT (OUTPATIENT)
Dept: NEPHROLOGY | Facility: CLINIC | Age: 83
End: 2023-09-05
Payer: MEDICARE

## 2023-09-05 VITALS
BODY MASS INDEX: 35 KG/M2 | SYSTOLIC BLOOD PRESSURE: 136 MMHG | HEIGHT: 64 IN | OXYGEN SATURATION: 98 % | HEART RATE: 80 BPM | DIASTOLIC BLOOD PRESSURE: 70 MMHG | WEIGHT: 205 LBS

## 2023-09-05 DIAGNOSIS — E66.01 MORBID (SEVERE) OBESITY DUE TO EXCESS CALORIES: ICD-10-CM

## 2023-09-05 DIAGNOSIS — N18.31 STAGE 3A CHRONIC KIDNEY DISEASE: ICD-10-CM

## 2023-09-05 DIAGNOSIS — C34.32 PRIMARY MALIGNANT NEOPLASM OF LEFT LOWER LOBE OF LUNG: ICD-10-CM

## 2023-09-05 DIAGNOSIS — D62 ACUTE BLOOD LOSS ANEMIA: ICD-10-CM

## 2023-09-05 DIAGNOSIS — N28.89 RIGHT RENAL MASS: ICD-10-CM

## 2023-09-05 DIAGNOSIS — D53.9 NUTRITIONAL ANEMIA, UNSPECIFIED: ICD-10-CM

## 2023-09-05 DIAGNOSIS — D52.1 DRUG-INDUCED FOLATE DEFICIENCY ANEMIA: ICD-10-CM

## 2023-09-05 DIAGNOSIS — I10 ESSENTIAL HYPERTENSION: ICD-10-CM

## 2023-09-05 DIAGNOSIS — I10 HYPERTENSION, UNSPECIFIED TYPE: ICD-10-CM

## 2023-09-05 DIAGNOSIS — C34.91 NSCLC OF RIGHT LUNG: Primary | ICD-10-CM

## 2023-09-05 PROCEDURE — 3078F PR MOST RECENT DIASTOLIC BLOOD PRESSURE < 80 MM HG: ICD-10-PCS | Mod: CPTII,S$GLB,, | Performed by: INTERNAL MEDICINE

## 2023-09-05 PROCEDURE — 1160F RVW MEDS BY RX/DR IN RCRD: CPT | Mod: CPTII,S$GLB,, | Performed by: INTERNAL MEDICINE

## 2023-09-05 PROCEDURE — 99999 PR PBB SHADOW E&M-EST. PATIENT-LVL IV: ICD-10-PCS | Mod: PBBFAC,,, | Performed by: INTERNAL MEDICINE

## 2023-09-05 PROCEDURE — 1101F PT FALLS ASSESS-DOCD LE1/YR: CPT | Mod: CPTII,S$GLB,, | Performed by: INTERNAL MEDICINE

## 2023-09-05 PROCEDURE — 1159F MED LIST DOCD IN RCRD: CPT | Mod: CPTII,S$GLB,, | Performed by: INTERNAL MEDICINE

## 2023-09-05 PROCEDURE — 3075F PR MOST RECENT SYSTOLIC BLOOD PRESS GE 130-139MM HG: ICD-10-PCS | Mod: CPTII,S$GLB,, | Performed by: INTERNAL MEDICINE

## 2023-09-05 PROCEDURE — 3288F PR FALLS RISK ASSESSMENT DOCUMENTED: ICD-10-PCS | Mod: CPTII,S$GLB,, | Performed by: INTERNAL MEDICINE

## 2023-09-05 PROCEDURE — 99999 PR PBB SHADOW E&M-EST. PATIENT-LVL IV: CPT | Mod: PBBFAC,,, | Performed by: INTERNAL MEDICINE

## 2023-09-05 PROCEDURE — 99214 OFFICE O/P EST MOD 30 MIN: CPT | Mod: S$GLB,,, | Performed by: INTERNAL MEDICINE

## 2023-09-05 PROCEDURE — 3075F SYST BP GE 130 - 139MM HG: CPT | Mod: CPTII,S$GLB,, | Performed by: INTERNAL MEDICINE

## 2023-09-05 PROCEDURE — 1160F PR REVIEW ALL MEDS BY PRESCRIBER/CLIN PHARMACIST DOCUMENTED: ICD-10-PCS | Mod: CPTII,S$GLB,, | Performed by: INTERNAL MEDICINE

## 2023-09-05 PROCEDURE — 3078F DIAST BP <80 MM HG: CPT | Mod: CPTII,S$GLB,, | Performed by: INTERNAL MEDICINE

## 2023-09-05 PROCEDURE — 3288F FALL RISK ASSESSMENT DOCD: CPT | Mod: CPTII,S$GLB,, | Performed by: INTERNAL MEDICINE

## 2023-09-05 PROCEDURE — 1159F PR MEDICATION LIST DOCUMENTED IN MEDICAL RECORD: ICD-10-PCS | Mod: CPTII,S$GLB,, | Performed by: INTERNAL MEDICINE

## 2023-09-05 PROCEDURE — 99214 PR OFFICE/OUTPT VISIT, EST, LEVL IV, 30-39 MIN: ICD-10-PCS | Mod: S$GLB,,, | Performed by: INTERNAL MEDICINE

## 2023-09-05 PROCEDURE — 1101F PR PT FALLS ASSESS DOC 0-1 FALLS W/OUT INJ PAST YR: ICD-10-PCS | Mod: CPTII,S$GLB,, | Performed by: INTERNAL MEDICINE

## 2023-09-05 RX ORDER — NIFEDIPINE 60 MG/1
60 TABLET, EXTENDED RELEASE ORAL DAILY
Qty: 90 TABLET | Refills: 3 | Status: SHIPPED | OUTPATIENT
Start: 2023-09-05 | End: 2023-11-20 | Stop reason: SDUPTHER

## 2023-09-05 NOTE — PROGRESS NOTES
Subjective:       Patient ID: Shandra Velez is a 82 y.o. White female who presents for follow up on CKD.     Since last seen at nephrology clinic, Shandra Velez denies any new hospitalizations or new medications.  No uremic symptoms, not volume overloaded.    Reports taking their medications on time, without missed doses. As to their chronic conditions:  - CKD: Denies use of NSAIDs.   2023: baseline sr cr of 1.2 - 1.4 mg/dL with out proteinuria. ZAINA in 1/2023 pre-renal and PHILL 3/2023 with return to baseline for sr cr.   - HTN: well controlled, follows low salt diet. Denies uncontrolled HTN, dizziness/lightheadedness or hypotension/syncopal episodes.  - Lung malignancy followed by dr Mills s/p Keytruda in March of 2023, now off after having PNA and blood clot.    Echocardiogram:  The left ventricle is normal in size with concentric hypertrophy and normal systolic function.  Normal left ventricular diastolic function.  The estimated ejection fraction is 60%.  Intermediate central venous pressure (8 mmHg).  The estimated PA systolic pressure is 15 mmHg.  There is abnormal septal wall motion. There is systolic flattening of the interventricular septum consistent with right ventricle pressure overload.    Review of Systems   Constitutional:  Positive for fatigue. Negative for chills and fever.   HENT:  Negative for hearing loss, trouble swallowing and voice change.    Respiratory:  Positive for shortness of breath. Negative for cough and wheezing.    Cardiovascular:  Positive for leg swelling. Negative for chest pain and palpitations.   Gastrointestinal:  Negative for abdominal distention, abdominal pain, constipation and diarrhea.   Endocrine: Negative for polydipsia, polyphagia and polyuria.   Genitourinary:  Negative for dysuria, flank pain, frequency and hematuria.   Musculoskeletal:  Negative for arthralgias, back pain and myalgias.   Skin:  Negative for rash and wound.   Neurological:  Negative for dizziness,  "syncope, weakness and light-headedness.   Hematological:  Negative for adenopathy. Does not bruise/bleed easily.       The past medical, family and social histories were reviewed for this encounter.     /70 (BP Location: Left arm, Patient Position: Sitting, BP Method: Large (Manual))   Pulse 80   Ht 5' 4" (1.626 m)   Wt 93 kg (205 lb 0.4 oz)   SpO2 98%   BMI 35.19 kg/m²     Objective:      Physical Exam  Vitals and nursing note reviewed.   Constitutional:       Appearance: Normal appearance. She is obese.   HENT:      Head: Normocephalic and atraumatic.      Mouth/Throat:      Mouth: Mucous membranes are moist.      Pharynx: Oropharynx is clear.   Eyes:      Extraocular Movements: Extraocular movements intact.      Conjunctiva/sclera: Conjunctivae normal.   Neck:      Vascular: No carotid bruit.   Cardiovascular:      Rate and Rhythm: Normal rate and regular rhythm.      Heart sounds: Normal heart sounds.   Pulmonary:      Effort: Pulmonary effort is normal. No respiratory distress.      Breath sounds: Stridor present. Rhonchi present.   Abdominal:      General: Bowel sounds are normal. There is no distension.      Palpations: Abdomen is soft.      Tenderness: There is no abdominal tenderness.   Musculoskeletal:         General: No tenderness. Normal range of motion.      Cervical back: Normal range of motion. No tenderness.      Right lower leg: Edema present.      Left lower leg: Edema present.   Lymphadenopathy:      Cervical: No cervical adenopathy.   Skin:     General: Skin is warm and dry.      Capillary Refill: Capillary refill takes less than 2 seconds.      Coloration: Skin is not jaundiced.   Neurological:      General: No focal deficit present.      Mental Status: She is alert and oriented to person, place, and time.   Psychiatric:         Mood and Affect: Mood normal.         Behavior: Behavior normal.         Judgment: Judgment normal.         Assessment:       1. NSCLC of right lung    2. " Essential hypertension    3. Hypertension, unspecified type    4. Stage 3a chronic kidney disease    5. Right renal mass    6. Primary malignant neoplasm of left lower lobe of lung    7. Acute blood loss anemia    8. Morbid (severe) obesity due to excess calories          Plan:   Return to clinic in 3 months    CKD in a setting of aging, HTN and chemotherapy  Baseline creatinine is 1.2 - 1.4 mg/dL and no proteinuria  Lab Results   Component Value Date    CREATININE 1.5 (H) 08/29/2023      - Euvolemic   - Pt understands importance of blood pressure control and is aware that uncontrolled HTN leads to progression of CKD   - Complete avoidance of NSAIDs   - Low salt diet with < 2000 mg/day   - Dose adjust medications to GFR of 45 - 60   - Avoid nephrotoxins including IV contrast    HTN   - BP well controlled: continue current medications, avoid hypotension and dehydration    Lung CA   - S/P keytruda    Renal lesion    - Follow up renal US in January 2024 with Dr Priest    Obesity    - Calorie restriction    Med changes: none

## 2023-09-20 DIAGNOSIS — Z78.0 ASYMPTOMATIC MENOPAUSAL STATE: ICD-10-CM

## 2023-09-28 ENCOUNTER — PATIENT MESSAGE (OUTPATIENT)
Dept: ADMINISTRATIVE | Facility: HOSPITAL | Age: 83
End: 2023-09-28
Payer: MEDICARE

## 2023-09-28 ENCOUNTER — PATIENT OUTREACH (OUTPATIENT)
Dept: ADMINISTRATIVE | Facility: HOSPITAL | Age: 83
End: 2023-09-28
Payer: MEDICARE

## 2023-10-02 ENCOUNTER — LAB VISIT (OUTPATIENT)
Dept: LAB | Facility: HOSPITAL | Age: 83
End: 2023-10-02
Attending: INTERNAL MEDICINE
Payer: MEDICARE

## 2023-10-02 ENCOUNTER — TELEPHONE (OUTPATIENT)
Dept: HEMATOLOGY/ONCOLOGY | Facility: CLINIC | Age: 83
End: 2023-10-02
Payer: MEDICARE

## 2023-10-02 ENCOUNTER — OFFICE VISIT (OUTPATIENT)
Dept: HEMATOLOGY/ONCOLOGY | Facility: CLINIC | Age: 83
End: 2023-10-02
Payer: MEDICARE

## 2023-10-02 VITALS
BODY MASS INDEX: 35.53 KG/M2 | OXYGEN SATURATION: 98 % | RESPIRATION RATE: 22 BRPM | DIASTOLIC BLOOD PRESSURE: 60 MMHG | TEMPERATURE: 96 F | HEIGHT: 64 IN | WEIGHT: 208.13 LBS | HEART RATE: 73 BPM | SYSTOLIC BLOOD PRESSURE: 110 MMHG

## 2023-10-02 DIAGNOSIS — E78.5 HYPERLIPIDEMIA, UNSPECIFIED HYPERLIPIDEMIA TYPE: ICD-10-CM

## 2023-10-02 DIAGNOSIS — C82.00 FOLLICULAR LYMPHOMA GRADE I, UNSPECIFIED BODY REGION: ICD-10-CM

## 2023-10-02 DIAGNOSIS — D68.59 THROMBOPHILIA: ICD-10-CM

## 2023-10-02 DIAGNOSIS — N17.9 AKI (ACUTE KIDNEY INJURY): ICD-10-CM

## 2023-10-02 DIAGNOSIS — I10 HYPERTENSION, UNSPECIFIED TYPE: ICD-10-CM

## 2023-10-02 DIAGNOSIS — D50.9 IRON DEFICIENCY ANEMIA, UNSPECIFIED IRON DEFICIENCY ANEMIA TYPE: ICD-10-CM

## 2023-10-02 DIAGNOSIS — N28.89 RIGHT RENAL MASS: ICD-10-CM

## 2023-10-02 DIAGNOSIS — E03.9 ACQUIRED HYPOTHYROIDISM: ICD-10-CM

## 2023-10-02 DIAGNOSIS — C34.11 MALIGNANT NEOPLASM OF RIGHT UPPER LOBE OF LUNG: Primary | ICD-10-CM

## 2023-10-02 DIAGNOSIS — D50.0 IRON DEFICIENCY ANEMIA DUE TO CHRONIC BLOOD LOSS: ICD-10-CM

## 2023-10-02 DIAGNOSIS — K92.2 GASTROINTESTINAL HEMORRHAGE, UNSPECIFIED GASTROINTESTINAL HEMORRHAGE TYPE: ICD-10-CM

## 2023-10-02 LAB
ALBUMIN SERPL BCP-MCNC: 3.5 G/DL (ref 3.5–5.2)
ALP SERPL-CCNC: 73 U/L (ref 55–135)
ALT SERPL W/O P-5'-P-CCNC: 6 U/L (ref 10–44)
ANION GAP SERPL CALC-SCNC: 7 MMOL/L (ref 8–16)
AST SERPL-CCNC: 16 U/L (ref 10–40)
BASOPHILS # BLD AUTO: 0.04 K/UL (ref 0–0.2)
BASOPHILS NFR BLD: 0.7 % (ref 0–1.9)
BILIRUB SERPL-MCNC: 0.2 MG/DL (ref 0.1–1)
BUN SERPL-MCNC: 26 MG/DL (ref 8–23)
CALCIUM SERPL-MCNC: 8.4 MG/DL (ref 8.7–10.5)
CHLORIDE SERPL-SCNC: 113 MMOL/L (ref 95–110)
CO2 SERPL-SCNC: 20 MMOL/L (ref 23–29)
CREAT SERPL-MCNC: 1.4 MG/DL (ref 0.5–1.4)
DIFFERENTIAL METHOD: ABNORMAL
EOSINOPHIL # BLD AUTO: 0.2 K/UL (ref 0–0.5)
EOSINOPHIL NFR BLD: 4.1 % (ref 0–8)
ERYTHROCYTE [DISTWIDTH] IN BLOOD BY AUTOMATED COUNT: 13.5 % (ref 11.5–14.5)
EST. GFR  (NO RACE VARIABLE): 38 ML/MIN/1.73 M^2
GLUCOSE SERPL-MCNC: 120 MG/DL (ref 70–110)
HCT VFR BLD AUTO: 30.5 % (ref 37–48.5)
HGB BLD-MCNC: 9.2 G/DL (ref 12–16)
IMM GRANULOCYTES # BLD AUTO: 0.02 K/UL (ref 0–0.04)
IMM GRANULOCYTES NFR BLD AUTO: 0.3 % (ref 0–0.5)
LYMPHOCYTES # BLD AUTO: 1.4 K/UL (ref 1–4.8)
LYMPHOCYTES NFR BLD: 23.1 % (ref 18–48)
MCH RBC QN AUTO: 28.8 PG (ref 27–31)
MCHC RBC AUTO-ENTMCNC: 30.2 G/DL (ref 32–36)
MCV RBC AUTO: 96 FL (ref 82–98)
MONOCYTES # BLD AUTO: 0.6 K/UL (ref 0.3–1)
MONOCYTES NFR BLD: 10.3 % (ref 4–15)
NEUTROPHILS # BLD AUTO: 3.6 K/UL (ref 1.8–7.7)
NEUTROPHILS NFR BLD: 61.5 % (ref 38–73)
NRBC BLD-RTO: 0 /100 WBC
PLATELET # BLD AUTO: 172 K/UL (ref 150–450)
PMV BLD AUTO: 10.3 FL (ref 9.2–12.9)
POTASSIUM SERPL-SCNC: 4.8 MMOL/L (ref 3.5–5.1)
PROT SERPL-MCNC: 6.2 G/DL (ref 6–8.4)
RBC # BLD AUTO: 3.19 M/UL (ref 4–5.4)
SODIUM SERPL-SCNC: 140 MMOL/L (ref 136–145)
WBC # BLD AUTO: 5.9 K/UL (ref 3.9–12.7)

## 2023-10-02 PROCEDURE — 99999 PR PBB SHADOW E&M-EST. PATIENT-LVL V: CPT | Mod: PBBFAC,,, | Performed by: INTERNAL MEDICINE

## 2023-10-02 PROCEDURE — 3078F PR MOST RECENT DIASTOLIC BLOOD PRESSURE < 80 MM HG: ICD-10-PCS | Mod: CPTII,S$GLB,, | Performed by: INTERNAL MEDICINE

## 2023-10-02 PROCEDURE — 99214 OFFICE O/P EST MOD 30 MIN: CPT | Mod: S$GLB,,, | Performed by: INTERNAL MEDICINE

## 2023-10-02 PROCEDURE — 1101F PR PT FALLS ASSESS DOC 0-1 FALLS W/OUT INJ PAST YR: ICD-10-PCS | Mod: CPTII,S$GLB,, | Performed by: INTERNAL MEDICINE

## 2023-10-02 PROCEDURE — 85025 COMPLETE CBC W/AUTO DIFF WBC: CPT | Mod: PN | Performed by: INTERNAL MEDICINE

## 2023-10-02 PROCEDURE — 1125F PR PAIN SEVERITY QUANTIFIED, PAIN PRESENT: ICD-10-PCS | Mod: CPTII,S$GLB,, | Performed by: INTERNAL MEDICINE

## 2023-10-02 PROCEDURE — 3288F FALL RISK ASSESSMENT DOCD: CPT | Mod: CPTII,S$GLB,, | Performed by: INTERNAL MEDICINE

## 2023-10-02 PROCEDURE — 1125F AMNT PAIN NOTED PAIN PRSNT: CPT | Mod: CPTII,S$GLB,, | Performed by: INTERNAL MEDICINE

## 2023-10-02 PROCEDURE — 80053 COMPREHEN METABOLIC PANEL: CPT | Mod: PN | Performed by: INTERNAL MEDICINE

## 2023-10-02 PROCEDURE — 3074F PR MOST RECENT SYSTOLIC BLOOD PRESSURE < 130 MM HG: ICD-10-PCS | Mod: CPTII,S$GLB,, | Performed by: INTERNAL MEDICINE

## 2023-10-02 PROCEDURE — 3078F DIAST BP <80 MM HG: CPT | Mod: CPTII,S$GLB,, | Performed by: INTERNAL MEDICINE

## 2023-10-02 PROCEDURE — 1159F PR MEDICATION LIST DOCUMENTED IN MEDICAL RECORD: ICD-10-PCS | Mod: CPTII,S$GLB,, | Performed by: INTERNAL MEDICINE

## 2023-10-02 PROCEDURE — 36415 COLL VENOUS BLD VENIPUNCTURE: CPT | Mod: PN | Performed by: INTERNAL MEDICINE

## 2023-10-02 PROCEDURE — 3288F PR FALLS RISK ASSESSMENT DOCUMENTED: ICD-10-PCS | Mod: CPTII,S$GLB,, | Performed by: INTERNAL MEDICINE

## 2023-10-02 PROCEDURE — 1101F PT FALLS ASSESS-DOCD LE1/YR: CPT | Mod: CPTII,S$GLB,, | Performed by: INTERNAL MEDICINE

## 2023-10-02 PROCEDURE — 99999 PR PBB SHADOW E&M-EST. PATIENT-LVL V: ICD-10-PCS | Mod: PBBFAC,,, | Performed by: INTERNAL MEDICINE

## 2023-10-02 PROCEDURE — 1159F MED LIST DOCD IN RCRD: CPT | Mod: CPTII,S$GLB,, | Performed by: INTERNAL MEDICINE

## 2023-10-02 PROCEDURE — 99214 PR OFFICE/OUTPT VISIT, EST, LEVL IV, 30-39 MIN: ICD-10-PCS | Mod: S$GLB,,, | Performed by: INTERNAL MEDICINE

## 2023-10-02 PROCEDURE — 3074F SYST BP LT 130 MM HG: CPT | Mod: CPTII,S$GLB,, | Performed by: INTERNAL MEDICINE

## 2023-10-02 NOTE — TELEPHONE ENCOUNTER
----- Message from Lilliana Davis LPN sent at 10/2/2023 12:25 PM CDT -----  Contact: [facility]  GI has an open schedule for you to schedule your patient on. Please do not go through patient access to schedule appointments this way.    Thanks  ----- Message -----  From: Alexsander Hdz  Sent: 10/2/2023  12:16 PM CDT  To: [facility] Clinical Staff    Type:  Sooner Appointment Request    Caller is requesting a sooner appointment.  Caller declined first available appointment listed below.  Caller will not accept being placed on the waitlist and is requesting a message be sent to doctor.    Name of Caller:  Viki/[facility]   When is the first available appointment?  10/3  Symptoms:  GI Bleed   Best Call Back Number:  [phone]    Additional Information:  States pt need to dale an appt.Please call back

## 2023-10-02 NOTE — PROGRESS NOTES
PATIENT: Shandra Velez  MRN: [id number]  DATE: 10/2/2023      Diagnosis:   1. Malignant neoplasm of right upper lobe of lung    2. Iron deficiency anemia, unspecified iron deficiency anemia type    3. Iron deficiency anemia due to chronic blood loss    4. Thrombophilia    5. Gastrointestinal hemorrhage, unspecified gastrointestinal hemorrhage type    6. Follicular lymphoma grade I, unspecified body region    7. Hypertension, unspecified type    8. Hyperlipidemia, unspecified hyperlipidemia type    9. Right renal mass    10. Acquired hypothyroidism                      Chief Complaint: malignant neoplasm of right upper lobe of lung (1 month follow up )        Oncologic History:     Pt initially underwent right inguinal LN biopsy 5/25/22 showing grade 1 follicular lymphoma.  Bone marrow biopsy done 06/24/2020 showed involvement with follicular lymphoma.  PET/CT 7/07/20 showed lymphadenopathy above and below the diaphragm as well as the spleen.  Also seen wasa RUL lesion.  The patient was started on CVP-R followed by maintenance rituxan every 8 weeks.  Biopsy of the RUL nodule on 6/18/21 showed SCC with PD-L1 at 86%.  EBUS 7/09/21 showed no malignant cells at station 7, 4R or 11RS.  The patient was treated with SBRT under the care of Dr Barnes with 50Gy in 4 fractions completed 10/28/21.  The patient then developed new pulmonary nodules on CT scan 6/02/22.  WBUS 6/16/22 showed positive SCC in 4R LN.  Pt was discussed at tumor board on 6/21/22 with recommendation for Keytruda.  The patient underwent PET/CT on 9/23/22 after 2 doses of treatment showing a 1.5 cm precarinal lymph node; stable right apical mass measuring 1.8 x 1.5 cm; stable 8 mm right apical lung nodule with adjacent post radiation change measuring 2 x 2.7 cm; stable 4 mm subpleural nodule in the lateral right upper lobe; fluid/mucus in the right lower lobe bronchus tree; 6 mm anterior left upper lobe nodule which is new; 2.3 cm simple cyst in the midpole  of the left kidney; 3.1 x 2.3 cm rim enhancing complex cystic and solid mass in the mid to lower pole the right kidney; infrarenal abdominal aortic aneurysm measuring 3.8 x 3.5 cm; and stable a left para-aortic lymph node measuring 11 mm. 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   The patient underwent PET-CT on 11/29/2022 showing an irregular hypermetabolic right upper lobe tumor which is stable measuring 1.6 x 1.2 cm; hypermetabolic subpleural right upper lobe consolidation diminished in extent; unchanged linear zone of hypermetabolic atelectasis in the superior segment of the left lower lobe; new poorly metabolic ill-defined infiltrate in the posterior right lower lobe; 4 mm left upper lobe nodule which is stable; hypermetabolic precarinal mediastinal lymph node stable measuring 1 cm; and hypermetabolic aortopulmonary lymph node stable measuring 1.1 x 1.1 cm.   The patient was admitted to the hospital from 1/16/23 to 1/23/23 for pneumonia.  She underwent bronchoscopy with Dr Kinney on 1/18/23 showing significant mucus plug impaction int the pharynx, left mainstem bronchus and right and left lower lobes.  The patient was discharged on 3L O2.   The patient underwent PET-CT on 03/08/2023 showing thickening of the pleura; new ground-glass opacities within the left upper lobe; patchy airspace opacities in the right lower lobe; right lower lobe pulmonary artery hypermetabolic activity possibly secondary to bronchial wall thickening or thrombus in the pulmonary artery; diffuse hypermetabolic activity identified throughout the axial skeleton.  The patient was then admitted to the hospital on 03/18/23-4/3/23 after presenting with shortness of breath and being found to have a saddle pulmonary embolus on CTA 03/18/2023.  The patient underwent treatment with a heparin drip and thrombectomy on 03/18/2023 with eventual transition to Eliquis.   The  patient underwent CT chest on 04/14/2023 showing areas of air trapping in subpleural bleb formation in both jarek thoraces with areas of interstitial scarring and bibasilar bronchiectasis; stable solid nodule in left upper lobe measuring 6 mm; masslike area measuring 3 x 1.3 x 1.5 cm in the right upper lobe stable.   The patient underwent CT chest, abdomen, and pelvis on 07/10/2023 showing more consolidative appearance of previously described right apical pulmonary nodules without discretely measurable nodule on today's exam, 8 mm left apical nodule, stable consolidation and superior segment left lower lobe; 3 mm right lower lobe pulmonary nodule; heterogeneously enhancing mass in the inferior pole of the right kidney measuring 3.9 x 3.7 x 3.9 cm; unchanged left periaortic lymph nodes.    Subjective:    Interval History: Ms. Velez is a 82 y.o. female with HLD, HTN, osteopenia, follicular lymphoma who presents for follow up of NSCLC.  Since since the last clinic visit the patient states she had black color.  She states she had her eliquis decreased to half the dose.  The patient denies CP, cough, SOB, abdominal pain, nausea, vomiting, constipation, diarrhea.  The patient denies fever, chills, night sweats, weight loss, new lumps or bumps.    Past Medical History:   Past Medical History:   Diagnosis Date    Breast cancer 1985    right mastectomy    Digestive disorder     Encounter for blood transfusion     History of pneumonia     Hypercholesterolemia     Hypertension     Indigestion     Lumbar spondylosis     Lung cancer     2022 - currently in treatment as of 7/27/22    Lymphoma     Osteopenia     Pulmonary nodule     Retinal detachment     Smoker 06/11/2022       Past Surgical HIstory:   Past Surgical History:   Procedure Laterality Date    APPENDECTOMY      BONE MARROW BIOPSY Bilateral 06/24/2020    Procedure: Biopsy-bone marrow;  Surgeon: Rod Montero MD;  Location: [facility] OR;  Service: Oncology;  Laterality:  Bilateral;    BREAST RECONSTRUCTION  1990    right    BRONCHOSCOPY N/A 01/18/2023    Procedure: BRONCHOSCOPY;  Surgeon: Gregorio Kinney MD;  Location: [facility];  Service: Pulmonary;  Laterality: N/A;    BRONCHOSCOPY Bilateral 02/10/2023    Procedure: Bronchoscopy;  Surgeon: Gregorio Kinney MD;  Location: [facility];  Service: Pulmonary;  Laterality: Bilateral;  for airway clearance. Purple top tube for cell count diff    BRONCHOSCOPY N/A 03/13/2023    Procedure: Bronchoscopy;  Surgeon: Gregorio Kinney MD;  Location: [facility];  Service: Pulmonary;  Laterality: N/A;  therapeutic scope. Please have purple top tube and iced saline    CATARACT EXTRACTION W/  INTRAOCULAR LENS IMPLANT Bilateral     CHOLECYSTECTOMY      ENDOBRONCHIAL ULTRASOUND Bilateral 07/09/2021    Procedure: ENDOBRONCHIAL ULTRASOUND (EBUS);  Surgeon: Gregorio Kinney MD;  Location: [facility];  Service: Pulmonary;  Laterality: Bilateral;  NEED LMA to  eval right upper paratracheal LN.     ENDOBRONCHIAL ULTRASOUND Bilateral 06/16/2022    Procedure: ENDOBRONCHIAL ULTRASOUND (EBUS);  Surgeon: Gregorio Kinney MD;  Location: [facility];  Service: Pulmonary;  Laterality: Bilateral;    INJECTION OF FACET JOINT Left 06/30/2022    Procedure: FACET JOINT Cyst Aspiration L3/4;  Surgeon: Ronnell Baeza MD;  Location: [facility] OR;  Service: Pain Management;  Laterality: Left;    INSERTION OF TUNNELED CENTRAL VENOUS CATHETER (CVC) WITH SUBCUTANEOUS PORT Left 11/04/2020    Procedure: [id number];  Surgeon: Kody Pretty MD;  Location: [facility] OR;  Service: General;  Laterality: Left;    JOINT REPLACEMENT  2008    right knee     LUMBAR LAMINECTOMY      LYMPH NODE BIOPSY      MASTECTOMY Right 1985    NEEDLE LOCALIZATION N/A 05/25/2020    Procedure: NEEDLE LOCALIZATION - lymph node bx;  Surgeon: Steve Weaver MD;  Location: [facility];  Service: Interventional Radiology;  Laterality: N/A;    RETINAL DETACHMENT SURGERY  1989    RIGHT HEART CATHETERIZATION Right  03/18/2023    Procedure: INSERTION, CATHETER, RIGHT HEART;  Surgeon: Rukhsana Lang MD;  Location: [facility] CATH;  Service: Cardiology;  Laterality: Right;    SELECTIVE UNILATERAL PULMONARY ANGIOGRAPHY  03/18/2023    Procedure: Pumonary angiography - unilateral;  Surgeon: Rukhsana Lang MD;  Location: [facility] CATH;  Service: Cardiology;;    THROMBECTOMY N/A 03/18/2023    Procedure: THROMBECTOMY;  Surgeon: Rukhsana Lang MD;  Location: [facility] CATH;  Service: Cardiology;  Laterality: N/A;    TRANSFORAMINAL EPIDURAL INJECTION OF STEROID Left 05/14/2020    Procedure: Injection,steroid,epidural,transforaminal approach, l3/4;  Surgeon: Ronnell Baeza MD;  Location: [facility] OR;  Service: Pain Management;  Laterality: Left;    TRANSFORAMINAL EPIDURAL INJECTION OF STEROID Left 03/23/2022    Procedure: Injection,steroid,epidural,transforaminal approach L3/4;  Surgeon: Ronnell Baeza MD;  Location: [facility] OR;  Service: Pain Management;  Laterality: Left;    TRANSFORAMINAL EPIDURAL INJECTION OF STEROID Left 08/01/2022    Procedure: Injection,steroid,epidural,transforaminal approach L3/4;  Surgeon: Ronnell Baeza MD;  Location: [facility] OR;  Service: Pain Management;  Laterality: Left;    TUBAL LIGATION      UMBILICAL HERNIA REPAIR         Family History:   Family History   Problem Relation Age of Onset    Cancer Sister         uterine    Cancer Sister         Uterine cancer     Diabetes Brother     Breast cancer Other 42    Glaucoma Neg Hx     Macular degeneration Neg Hx        Social History:  reports that she quit smoking about 15 months ago. Her smoking use included cigarettes. She started smoking about 54 years ago. She has a 26.5 pack-year smoking history. She has never used smokeless tobacco. She reports that she does not drink alcohol and does not use drugs.    Allergies:  Review of patient's allergies indicates:   Allergen Reactions    Opioids - morphine analogues Other (See Comments)     Hypotension        Medications:  Current Outpatient Medications   Medication Sig Dispense Refill    albuterol (ACCUNEB) 1.25 mg/3 mL Nebu Take 3 mLs (1.25 mg total) by nebulization 2 (two) times a day. Rescue 280 mL 3    albuterol (VENTOLIN HFA) 90 mcg/actuation inhaler Inhale 2 puffs into the lungs every 6 (six) hours as needed for Wheezing. Rescue 50 g 0    albuterol-ipratropium (DUO-NEB) 2.5 mg-0.5 mg/3 mL nebulizer solution Take 3 mLs by nebulization every 6 (six) hours as needed for Wheezing. Rescue 75 mL 2    apixaban (ELIQUIS) 5 mg Tab Take 1 tablet (5 mg total) by mouth 2 (two) times daily. (Patient taking differently: Take 2.5 mg by mouth 2 (two) times daily.) 180 tablet 3    azelastine (ASTELIN) 137 mcg (0.1 %) nasal spray 1 spray (137 mcg total) by Nasal route 2 (two) times daily. 30 mL 12    budesonide (PULMICORT) 0.5 mg/2 mL nebulizer solution Take 2 mLs (0.5 mg total) by nebulization 2 (two) times a day. Controller 120 mL 11    cetirizine (ZYRTEC) 10 MG tablet Take 10 mg by mouth once daily.      cholecalciferol, vitamin D3, 1,250 mcg (50,000 unit) Tab Take 1 tablet by mouth every 7 days. 12 tablet 6    fluticasone propionate (FLONASE) 50 mcg/actuation nasal spray 1 spray (50 mcg total) by Each Nostril route 2 (two) times a day. 16 g 12    levothyroxine (SYNTHROID) 100 MCG tablet Take 1 tablet (100 mcg total) by mouth once daily. 90 tablet 3    mucus clearing device (ACAPELLA, FLUTTER) by Misc.(Non-Drug; Combo Route) route once daily. 100 each 0    multivitamin (THERAGRAN) per tablet Take 1 tablet by mouth once daily.      NIFEdipine (ADALAT CC) 60 MG TbSR Take 1 tablet (60 mg total) by mouth once daily. 90 tablet 3    omeprazole (PRILOSEC) 40 MG capsule Take 1 capsule (40 mg total) by mouth once daily. 90 capsule 4    OXYGEN-AIR DELIVERY SYSTEMS MISC by [facility].(Non-Drug; Combo Route) route.      potassium chloride (MICRO-K) 10 MEQ CpSR Take 2 capsules (20 mEq total) by mouth once daily. 180 capsule 3    sodium  "chloride 3% 3 % nebulizer solution Take 4 mLs by nebulization 3 (three) times a week. 48 mL 3    temazepam (RESTORIL) 30 mg capsule Take 1 capsule (30 mg total) by mouth every evening. 30 capsule 5     No current facility-administered medications for this visit.       Review of Systems   Constitutional:  Negative for appetite change, chills, fatigue, fever and unexpected weight change.   HENT:  Negative for mouth sores.    Eyes:  Negative for visual disturbance.   Respiratory:  Negative for cough and shortness of breath.    Cardiovascular:  Negative for chest pain and palpitations.   Gastrointestinal:  Negative for abdominal pain, constipation, diarrhea, nausea and vomiting.        Black stool on occasion   Genitourinary:  Negative for frequency.   Musculoskeletal:  Negative for back pain.   Skin:  Negative for rash.   Neurological:  Negative for headaches.   Hematological:  Negative for adenopathy. Does not bruise/bleed easily.   Psychiatric/Behavioral:  The patient is not nervous/anxious.        ECOG Performance Status: 2   Objective:      Vitals:   Vitals:    10/02/23 1058   BP: 110/60   BP Location: Right arm   Patient Position: Sitting   BP Method: Large (Manual)   Pulse: 73   Resp: (!) 22   Temp: 96.3 °F (35.7 °C)   TempSrc: Temporal   SpO2: 98%   Weight: 94.4 kg (208 lb 1.8 oz)   Height: 5' 4" (1.626 m)                   Physical Exam  Constitutional:       General: She is not in acute distress.     Appearance: She is well-developed. She is not diaphoretic.   HENT:      Head: Normocephalic and atraumatic.   Cardiovascular:      Rate and Rhythm: Normal rate and regular rhythm.      Heart sounds: Normal heart sounds. No murmur heard.     No friction rub. No gallop.   Pulmonary:      Effort: Pulmonary effort is normal. No respiratory distress.      Breath sounds: No wheezing or rales.   Chest:      Chest wall: No tenderness.   Abdominal:      General: Bowel sounds are normal. There is no distension.      " Palpations: Abdomen is soft. There is no mass.      Tenderness: There is no abdominal tenderness. There is no guarding or rebound.   Lymphadenopathy:      Cervical: No cervical adenopathy.      Upper Body:      Right upper body: Supraclavicular adenopathy present. No axillary adenopathy.      Left upper body: No supraclavicular or axillary adenopathy.   Skin:     Findings: No erythema or rash.   Neurological:      Mental Status: She is alert and oriented to person, place, and time.   Psychiatric:         Behavior: Behavior normal.         Laboratory Data:  Lab Visit on 10/02/2023   Component Date Value Ref Range Status    WBC 10/02/2023 5.90  3.90 - 12.70 K/uL Final    RBC 10/02/2023 3.19 (L)  4.00 - 5.40 M/uL Final    Hemoglobin 10/02/2023 9.2 (L)  12.0 - 16.0 g/dL Final    Hematocrit 10/02/2023 30.5 (L)  37.0 - 48.5 % Final    MCV 10/02/2023 96  82 - 98 fL Final    MCH 10/02/2023 28.8  27.0 - 31.0 pg Final    MCHC 10/02/2023 30.2 (L)  32.0 - 36.0 g/dL Final    RDW 10/02/2023 13.5  11.5 - 14.5 % Final    Platelets 10/02/2023 172  150 - 450 K/uL Final    MPV 10/02/2023 10.3  9.2 - 12.9 fL Final    Immature Granulocytes 10/02/2023 0.3  0.0 - 0.5 % Final    Gran # (ANC) 10/02/2023 3.6  1.8 - 7.7 K/uL Final    Immature Grans (Abs) 10/02/2023 0.02  0.00 - 0.04 K/uL Final    Comment: Mild elevation in immature granulocytes is non specific and   can be seen in a variety of conditions including stress response,   acute inflammation, trauma and pregnancy. Correlation with other   laboratory and clinical findings is essential.      Lymph # 10/02/2023 1.4  1.0 - 4.8 K/uL Final    Mono # 10/02/2023 0.6  0.3 - 1.0 K/uL Final    Eos # 10/02/2023 0.2  0.0 - 0.5 K/uL Final    Baso # 10/02/2023 0.04  0.00 - 0.20 K/uL Final    nRBC 10/02/2023 0  0 /100 WBC Final    Gran % 10/02/2023 61.5  38.0 - 73.0 % Final    Lymph % 10/02/2023 23.1  18.0 - 48.0 % Final    Mono % 10/02/2023 10.3  4.0 - 15.0 % Final    Eosinophil % 10/02/2023 4.1   0.0 - 8.0 % Final    Basophil % 10/02/2023 0.7  0.0 - 1.9 % Final    Differential Method 10/02/2023 Automated   Final    Sodium 10/02/2023 140  136 - 145 mmol/L Final    Potassium 10/02/2023 4.8  3.5 - 5.1 mmol/L Final    Chloride 10/02/2023 113 (H)  95 - 110 mmol/L Final    CO2 10/02/2023 20 (L)  23 - 29 mmol/L Final    Glucose 10/02/2023 120 (H)  70 - 110 mg/dL Final    BUN 10/02/2023 26 (H)  8 - 23 mg/dL Final    Creatinine 10/02/2023 1.4  0.5 - 1.4 mg/dL Final    Calcium 10/02/2023 8.4 (L)  8.7 - 10.5 mg/dL Final    Total Protein 10/02/2023 6.2  6.0 - 8.4 g/dL Final    Albumin 10/02/2023 3.5  3.5 - 5.2 g/dL Final    Total Bilirubin 10/02/2023 0.2  0.1 - 1.0 mg/dL Final    Comment: For infants and newborns, interpretation of results should be based  on gestational age, weight and in agreement with clinical  observations.    Premature Infant recommended reference ranges:  Up to 24 hours.............<8.0 mg/dL  Up to 48 hours............<12.0 mg/dL  3-5 days..................<15.0 mg/dL  6-29 days.................<15.0 mg/dL      Alkaline Phosphatase 10/02/2023 73  55 - 135 U/L Final    AST 10/02/2023 16  10 - 40 U/L Final    ALT 10/02/2023 6 (L)  10 - 44 U/L Final    eGFR 10/02/2023 38.0 (A)  >60 mL/min/1.73 m^2 Final    Anion Gap 10/02/2023 7 (L)  8 - 16 mmol/L Final         Imaging:    CT Chest, abdomen and pelvis 7/10/23  Heart size is normal.  No pericardial effusion.  The thoracic aorta is normal in caliber with atherosclerotic calcification.  The main pulmonary artery is normal in caliber.  Coronary artery and mitral annular calcifications.  No enlarged axillary, mediastinal or hilar lymph nodes.     The central airways are clear.  Lower lobe predominant bronchial wall thickening and mild bronchiectasis.  Upper lobe predominant centrilobular and paraseptal emphysema.  No pleural effusion or pneumothorax.     More consolidative appearance of the previously described right apical pulmonary nodules without  discretely measurable nodule on today's exam.  Left apical 8 mm pulmonary nodule (series 4, image 56), unchanged.  Stable superior segment left lower lobe consolidation and volume loss.  Right lower lobe 3 mm pulmonary nodule (series 4, image 152), not definitely seen on prior exams.     Liver is normal in morphology and with smooth contour.  Focal fat along the gallbladder fossa.  Gallbladder surgically absent.  No focal hepatic lesion.  The portal, splenic and superior mesenteric veins are patent.     Spleen, adrenal glands and pancreas are normal.  No intra or extrahepatic biliary ductal dilatation.     No hydronephrosis.  Left renal 21 mm cyst.  Heterogeneously enhancing inferior right renal pole mass measuring 3.9 x 3.7 x 3.9 cm (series 7, image 132; series 5, image 77). No Hydronephrosis.     Infrarenal aortic aneurysm with mural thrombosis measuring 4.1 cm, previously 3.8 cm (image 147, series 7).  Unchanged left periaortic lymph nodes, largest measuring 11 mm in short axis (series 7, image 129).     Colonic diverticula.  No bowel obstruction or inflammatory change where imaged.  No free fluid or free air.  Complex left lower quadrant fat containing ventral hernia.     Postsurgical changes of posterior lumbar fusion L4 through S1 without evidence of hardware complication.        Assessment:       1. Malignant neoplasm of right upper lobe of lung    2. Iron deficiency anemia, unspecified iron deficiency anemia type    3. Iron deficiency anemia due to chronic blood loss    4. Thrombophilia    5. Gastrointestinal hemorrhage, unspecified gastrointestinal hemorrhage type    6. Follicular lymphoma grade I, unspecified body region    7. Hypertension, unspecified type    8. Hyperlipidemia, unspecified hyperlipidemia type    9. Right renal mass    10. Acquired hypothyroidism                         Plan:     NSCLC - Pt has NSCLC SCC with involvement of the right lung and mediastinum  -Pt with prior radiation to a RUL  nodule  -PD-L1 was 86% on 6/18/21  -Pt was on Keytruda with 6 week cycles and completed 6 tx's and then developed pneumonitis  -PET/CT on 9/23/22 showed possible pseudoprogression  -PET/CT on 11/29/22 showed stable disease with RLL infiltrate concerning for resolving PNA  -PET-CT on 03/08/2023 showed resolution of prior disease   -CT Chest 4/14/23 and 7/10/23 with stable findings  -Pt with new right supraclavicular lymph node  -Will repeat scans this month    GI bleed - Pt with bleeding several weeks back which improved after she switched to a prophylactic dose of Eliquis  -pt to see gastroenterology ASAP    Saddle Pulmonary Embolus - seen on CTA 03/18/2023   -Patient is status post thrombectomy and currently on Eliquis 2.5mg BID  -Patient will need to be on Eliquis lifelong given lung cancer    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 for 9/12 cycles with good response  -Will monitor    Right Renal Mass - PT followed by urology Dr Priest with recommendation on 7/25/23 for repeat renal US in 6 months  -Will assess on PET/CT  -Will monitor    HTN - pt on nifedipine  -BP stable  -pt previously taken off HCTZ and lisinopril  -Will monitor    Hypothyroidism - pt on levothyroxine  -Management per PCP    Normocytic Anemia - Hemoglobin 9.2g/dL on 10/02/23  -Ferritin was 22ng/mL on 9/05/23  -    Route Chart for Scheduling    Med Onc Chart Routing      Follow up with physician Other. Pt needs approval for iron infusion and to be scheduled.  She eneds an appt ASAP with gastroenterology for GI bleed.  Pt needs a PET/CT in the next week with an appt with me once it is completed.   Follow up with DAYAMI    Infusion scheduling note    Injection scheduling note    Labs    Imaging    Pharmacy appointment    Other referrals                  Treatment Plan Information   OP PEMBROLIZUMAB 400MG Q6W   Chad Mills MD   Upcoming Treatment Dates - OP PEMBROLIZUMAB 400MG Q6W    5/12/2023        Pre-Medications       acetaminophen tablet 1,000 mg       diphenhydrAMINE (BENADRYL) 25 mg in NS 50 mL IVPB       Chemotherapy       pembrolizumab (KEYTRUDA) 400 mg in sodium chloride 0.9% SolP 116 mL infusion  6/23/2023       Pre-Medications       acetaminophen tablet 1,000 mg       diphenhydrAMINE (BENADRYL) 25 mg in NS 50 mL IVPB       Chemotherapy       pembrolizumab (KEYTRUDA) 400 mg in sodium chloride 0.9% SolP 116 mL infusion  8/4/2023       Pre-Medications       acetaminophen tablet 1,000 mg       diphenhydrAMINE (BENADRYL) 25 mg in NS 50 mL IVPB       Chemotherapy       pembrolizumab (KEYTRUDA) 400 mg in sodium chloride 0.9% SolP 116 mL infusion  9/15/2023       Pre-Medications       acetaminophen tablet 1,000 mg       diphenhydrAMINE (BENADRYL) 25 mg in NS 50 mL IVPB       Chemotherapy       pembrolizumab (KEYTRUDA) 400 mg in sodium chloride 0.9% SolP 116 mL infusion    Supportive Plan Information  OP FERRIC CARBOXYMALTOSE Q2W   Chad Mills MD   Upcoming Treatment Dates - OP FERRIC CARBOXYMALTOSE Q2W    10/5/2023       Supportive Care       ferric carboxymaltose (INJECTAFER) 750 mg in sodium chloride 0.9% 265 mL IVPB (ready to mix)  10/12/2023       Supportive Care       ferric carboxymaltose (INJECTAFER) 750 mg in sodium chloride 0.9% 265 mL IVPB (ready to mix)    Therapy Plan Information  PORT FLUSH  Flushes  heparin, porcine (PF) 100 unit/mL injection flush 500 Units  500 Units, Intravenous, Every visit  sodium chloride 0.9% flush 10 mL  10 mL, Intravenous, Every visit    INF PEGFILGRASTIM (NEULASTA)  pegfilgrastim (NEULASTA) injection 6 mg  6 mg, Subcutaneous, Every visit        Chad Mills MD  [facility]  Hematology and Oncology  [facility]   [address] [place] [address]   O: [phone]  F: [phone]

## 2023-10-09 ENCOUNTER — HOSPITAL ENCOUNTER (OUTPATIENT)
Dept: RADIOLOGY | Facility: HOSPITAL | Age: 83
Discharge: HOME | End: 2023-10-09
Attending: INTERNAL MEDICINE
Payer: MEDICARE

## 2023-10-09 DIAGNOSIS — C34.11 MALIGNANT NEOPLASM OF RIGHT UPPER LOBE OF LUNG: ICD-10-CM

## 2023-10-09 LAB — GLUCOSE SERPL-MCNC: 113 MG/DL (ref 70–110)

## 2023-10-09 PROCEDURE — A9552 F18 FDG: HCPCS | Mod: PN

## 2023-10-09 PROCEDURE — 78815 NM PET CT ROUTINE: ICD-10-PCS | Mod: 26,PS,, | Performed by: RADIOLOGY

## 2023-10-09 PROCEDURE — 78815 PET IMAGE W/CT SKULL-THIGH: CPT | Mod: 26,PS,, | Performed by: RADIOLOGY

## 2023-10-09 NOTE — PROGRESS NOTES
PET Imaging Questionnaire    Are you a Diabetic? Recent Blood Sugar level? No    Are you anemic? Bone Marrow Stimulation Meds? Yes    Have you had a CT Scan, if so when & where was your last one? Yes -     Have you had a PET Scan, if so when & where was your last one? Yes -     Chemotherapy or currently on Chemotherapy? Yes    Radiation therapy? Yes    Surgical History:   Past Surgical History:   Procedure Laterality Date    APPENDECTOMY      BONE MARROW BIOPSY Bilateral 06/24/2020    Procedure: Biopsy-bone marrow;  Surgeon: Rod Montero MD;  Location: [facility];  Service: Oncology;  Laterality: Bilateral;    BREAST RECONSTRUCTION  1990    right    BRONCHOSCOPY N/A 01/18/2023    Procedure: BRONCHOSCOPY;  Surgeon: Gregorio Kinney MD;  Location: [facility];  Service: Pulmonary;  Laterality: N/A;    BRONCHOSCOPY Bilateral 02/10/2023    Procedure: Bronchoscopy;  Surgeon: Gregorio Kinney MD;  Location: [facility];  Service: Pulmonary;  Laterality: Bilateral;  for airway clearance. Purple top tube for cell count diff    BRONCHOSCOPY N/A 03/13/2023    Procedure: Bronchoscopy;  Surgeon: Gregorio Kinney MD;  Location: [facility];  Service: Pulmonary;  Laterality: N/A;  therapeutic scope. Please have purple top tube and iced saline    CATARACT EXTRACTION W/  INTRAOCULAR LENS IMPLANT Bilateral     CHOLECYSTECTOMY      ENDOBRONCHIAL ULTRASOUND Bilateral 07/09/2021    Procedure: ENDOBRONCHIAL ULTRASOUND (EBUS);  Surgeon: Gregorio Kinney MD;  Location: [facility];  Service: Pulmonary;  Laterality: Bilateral;  NEED LMA to  eval right upper paratracheal LN.     ENDOBRONCHIAL ULTRASOUND Bilateral 06/16/2022    Procedure: ENDOBRONCHIAL ULTRASOUND (EBUS);  Surgeon: Gregorio Kinney MD;  Location: [facility];  Service: Pulmonary;  Laterality: Bilateral;    INJECTION OF FACET JOINT Left 06/30/2022    Procedure: FACET JOINT Cyst Aspiration L3/4;  Surgeon: Ronnell Baeza MD;  Location: [facility];  Service: Pain Management;  Laterality: Left;     INSERTION OF TUNNELED CENTRAL VENOUS CATHETER (CVC) WITH SUBCUTANEOUS PORT Left 11/04/2020    Procedure: [id number];  Surgeon: Kody Pretty MD;  Location: [facility] OR;  Service: General;  Laterality: Left;    JOINT REPLACEMENT  2008    right knee     LUMBAR LAMINECTOMY      LYMPH NODE BIOPSY      MASTECTOMY Right 1985    NEEDLE LOCALIZATION N/A 05/25/2020    Procedure: NEEDLE LOCALIZATION - lymph node bx;  Surgeon: Steve Weaver MD;  Location: [facility] CATH;  Service: Interventional Radiology;  Laterality: N/A;    RETINAL DETACHMENT SURGERY  1989    RIGHT HEART CATHETERIZATION Right 03/18/2023    Procedure: INSERTION, CATHETER, RIGHT HEART;  Surgeon: Rukhsana Lang MD;  Location: [facility];  Service: Cardiology;  Laterality: Right;    SELECTIVE UNILATERAL PULMONARY ANGIOGRAPHY  03/18/2023    Procedure: Pumonary angiography - unilateral;  Surgeon: Rukhsana Lang MD;  Location: [facility];  Service: Cardiology;;    THROMBECTOMY N/A 03/18/2023    Procedure: THROMBECTOMY;  Surgeon: Rukhsana Lang MD;  Location: [facility];  Service: Cardiology;  Laterality: N/A;    TRANSFORAMINAL EPIDURAL INJECTION OF STEROID Left 05/14/2020    Procedure: Injection,steroid,epidural,transforaminal approach, l3/4;  Surgeon: Ronnell Baeza MD;  Location: [facility] OR;  Service: Pain Management;  Laterality: Left;    TRANSFORAMINAL EPIDURAL INJECTION OF STEROID Left 03/23/2022    Procedure: Injection,steroid,epidural,transforaminal approach L3/4;  Surgeon: Ronnell Baeza MD;  Location: [facility] OR;  Service: Pain Management;  Laterality: Left;    TRANSFORAMINAL EPIDURAL INJECTION OF STEROID Left 08/01/2022    Procedure: Injection,steroid,epidural,transforaminal approach L3/4;  Surgeon: Ronnell Baeza MD;  Location: [facility] OR;  Service: Pain Management;  Laterality: Left;    TUBAL LIGATION      UMBILICAL HERNIA REPAIR          Have you been fasting for at least 6 hours? Yes    Is there any chance you may be pregnant or  breastfeeding? No    Assay: 11.72 MCi@:9:53   Injection Site:LT AC    Residual: .98 mCi@: 9:55   Technologist: Jurgen Mccartney Injected:10.74 mCi

## 2023-10-16 NOTE — PROGRESS NOTES
PATIENT: Shandra Velez  MRN: [id number]  DATE: 10/17/2023      Diagnosis:   1. Malignant neoplasm of right upper lobe of lung    2. Subcutaneous nodule of back    3. Iron deficiency anemia, unspecified iron deficiency anemia type    4. Thrombophilia    5. Follicular lymphoma grade I, unspecified body region    6. Hypertension, unspecified type    7. Hyperlipidemia, unspecified hyperlipidemia type    8. Right renal mass    9. Acquired hypothyroidism                        Chief Complaint: Malignant neoplasm of right upper lobe of lung (1 week follow up )        Oncologic History:     Pt initially underwent right inguinal LN biopsy 5/25/22 showing grade 1 follicular lymphoma.  Bone marrow biopsy done 06/24/2020 showed involvement with follicular lymphoma.  PET/CT 7/07/20 showed lymphadenopathy above and below the diaphragm as well as the spleen.  Also seen wasa RUL lesion.  The patient was started on CVP-R followed by maintenance rituxan every 8 weeks.  Biopsy of the RUL nodule on 6/18/21 showed SCC with PD-L1 at 86%.  EBUS 7/09/21 showed no malignant cells at station 7, 4R or 11RS.  The patient was treated with SBRT under the care of Dr Barnes with 50Gy in 4 fractions completed 10/28/21.  The patient then developed new pulmonary nodules on CT scan 6/02/22.  WBUS 6/16/22 showed positive SCC in 4R LN.  Pt was discussed at tumor board on 6/21/22 with recommendation for Keytruda.  The patient underwent PET/CT on 9/23/22 after 2 doses of treatment showing a 1.5 cm precarinal lymph node; stable right apical mass measuring 1.8 x 1.5 cm; stable 8 mm right apical lung nodule with adjacent post radiation change measuring 2 x 2.7 cm; stable 4 mm subpleural nodule in the lateral right upper lobe; fluid/mucus in the right lower lobe bronchus tree; 6 mm anterior left upper lobe nodule which is new; 2.3 cm simple cyst in the midpole of the left kidney; 3.1 x 2.3 cm rim enhancing complex cystic and solid mass in the mid to lower  pole the right kidney; infrarenal abdominal aortic aneurysm measuring 3.8 x 3.5 cm; and stable a left para-aortic lymph node measuring 11 mm. 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   The patient underwent PET-CT on 11/29/2022 showing an irregular hypermetabolic right upper lobe tumor which is stable measuring 1.6 x 1.2 cm; hypermetabolic subpleural right upper lobe consolidation diminished in extent; unchanged linear zone of hypermetabolic atelectasis in the superior segment of the left lower lobe; new poorly metabolic ill-defined infiltrate in the posterior right lower lobe; 4 mm left upper lobe nodule which is stable; hypermetabolic precarinal mediastinal lymph node stable measuring 1 cm; and hypermetabolic aortopulmonary lymph node stable measuring 1.1 x 1.1 cm.   The patient was admitted to the hospital from 1/16/23 to 1/23/23 for pneumonia.  She underwent bronchoscopy with Dr Kinney on 1/18/23 showing significant mucus plug impaction int the pharynx, left mainstem bronchus and right and left lower lobes.  The patient was discharged on 3L O2.   The patient underwent PET-CT on 03/08/2023 showing thickening of the pleura; new ground-glass opacities within the left upper lobe; patchy airspace opacities in the right lower lobe; right lower lobe pulmonary artery hypermetabolic activity possibly secondary to bronchial wall thickening or thrombus in the pulmonary artery; diffuse hypermetabolic activity identified throughout the axial skeleton.  The patient was then admitted to the hospital on 03/18/23-4/3/23 after presenting with shortness of breath and being found to have a saddle pulmonary embolus on CTA 03/18/2023.  The patient underwent treatment with a heparin drip and thrombectomy on 03/18/2023 with eventual transition to Eliquis.   The patient underwent CT chest on 04/14/2023 showing areas of air trapping in subpleural bleb  formation in both jarek thoraces with areas of interstitial scarring and bibasilar bronchiectasis; stable solid nodule in left upper lobe measuring 6 mm; masslike area measuring 3 x 1.3 x 1.5 cm in the right upper lobe stable.   The patient underwent CT chest, abdomen, and pelvis on 07/10/2023 showing more consolidative appearance of previously described right apical pulmonary nodules without discretely measurable nodule on today's exam, 8 mm left apical nodule, stable consolidation and superior segment left lower lobe; 3 mm right lower lobe pulmonary nodule; heterogeneously enhancing mass in the inferior pole of the right kidney measuring 3.9 x 3.7 x 3.9 cm; unchanged left periaortic lymph nodes.    Subjective:    Interval History: Ms. Velez is a 83 y.o. female with HLD, HTN, osteopenia, follicular lymphoma who presents for follow up of NSCLC.  Since since the last clinic visit the patient underwent a PET-CT on 10/09/2023 showing a new oval-shaped hypermetabolic nodular mass measuring 1.6 x 1 cm in the right upper lobe consistent with tumor recurrence; disappearance of ground-glass opacities anterior left upper lobe; disappearance of subpleural posterior right lower lobe consolidation; new hypermetabolic bilateral inguinal lymph nodes; new rounded nodular hypermetabolic nodule within the posterior right lumbar subcutaneous fat measuring 1.5 x 1.5 cm and a 3.8cm abdominal aortic aneurysm.    The patient denies CP, cough, SOB, abdominal pain, nausea, vomiting, constipation, diarrhea.  The patient denies fever, chills, night sweats, weight loss, new lumps or bumps, easy bruising or bleeding.    Past Medical History:   Past Medical History:   Diagnosis Date    Breast cancer 1985    right mastectomy    Digestive disorder     Encounter for blood transfusion     History of pneumonia     Hypercholesterolemia     Hypertension     Indigestion     Lumbar spondylosis     Lung cancer     2022 - currently in treatment as of  7/27/22    Lymphoma     Osteopenia     Pulmonary nodule     Retinal detachment     Smoker 06/11/2022       Past Surgical HIstory:   Past Surgical History:   Procedure Laterality Date    APPENDECTOMY      BONE MARROW BIOPSY Bilateral 06/24/2020    Procedure: Biopsy-bone marrow;  Surgeon: Rod Montero MD;  Location: [facility];  Service: Oncology;  Laterality: Bilateral;    BREAST RECONSTRUCTION  1990    right    BRONCHOSCOPY N/A 01/18/2023    Procedure: BRONCHOSCOPY;  Surgeon: Gregorio Kinney MD;  Location: [facility];  Service: Pulmonary;  Laterality: N/A;    BRONCHOSCOPY Bilateral 02/10/2023    Procedure: Bronchoscopy;  Surgeon: Gregorio Kinney MD;  Location: [facility];  Service: Pulmonary;  Laterality: Bilateral;  for airway clearance. Purple top tube for cell count diff    BRONCHOSCOPY N/A 03/13/2023    Procedure: Bronchoscopy;  Surgeon: Gregorio Kinney MD;  Location: [facility];  Service: Pulmonary;  Laterality: N/A;  therapeutic scope. Please have purple top tube and iced saline    CATARACT EXTRACTION W/  INTRAOCULAR LENS IMPLANT Bilateral     CHOLECYSTECTOMY      ENDOBRONCHIAL ULTRASOUND Bilateral 07/09/2021    Procedure: ENDOBRONCHIAL ULTRASOUND (EBUS);  Surgeon: Gregorio Kinney MD;  Location: [facility];  Service: Pulmonary;  Laterality: Bilateral;  NEED LMA to  eval right upper paratracheal LN.     ENDOBRONCHIAL ULTRASOUND Bilateral 06/16/2022    Procedure: ENDOBRONCHIAL ULTRASOUND (EBUS);  Surgeon: Gregorio Kinney MD;  Location: [facility];  Service: Pulmonary;  Laterality: Bilateral;    INJECTION OF FACET JOINT Left 06/30/2022    Procedure: FACET JOINT Cyst Aspiration L3/4;  Surgeon: Ronnell Baeza MD;  Location: [facility] OR;  Service: Pain Management;  Laterality: Left;    INSERTION OF TUNNELED CENTRAL VENOUS CATHETER (CVC) WITH SUBCUTANEOUS PORT Left 11/04/2020    Procedure: [id number];  Surgeon: Kody Pretty MD;  Location: [facility] OR;  Service: General;  Laterality: Left;    JOINT REPLACEMENT   2008    right knee     LUMBAR LAMINECTOMY      LYMPH NODE BIOPSY      MASTECTOMY Right 1985    NEEDLE LOCALIZATION N/A 05/25/2020    Procedure: NEEDLE LOCALIZATION - lymph node bx;  Surgeon: Steve Weaver MD;  Location: [facility] CATH;  Service: Interventional Radiology;  Laterality: N/A;    RETINAL DETACHMENT SURGERY  1989    RIGHT HEART CATHETERIZATION Right 03/18/2023    Procedure: INSERTION, CATHETER, RIGHT HEART;  Surgeon: Rukhsana Lang MD;  Location: [facility] CATH;  Service: Cardiology;  Laterality: Right;    SELECTIVE UNILATERAL PULMONARY ANGIOGRAPHY  03/18/2023    Procedure: Pumonary angiography - unilateral;  Surgeon: Rukhsana Lang MD;  Location: [facility] CATH;  Service: Cardiology;;    THROMBECTOMY N/A 03/18/2023    Procedure: THROMBECTOMY;  Surgeon: Rukhsana Lang MD;  Location: [facility] CATH;  Service: Cardiology;  Laterality: N/A;    TRANSFORAMINAL EPIDURAL INJECTION OF STEROID Left 05/14/2020    Procedure: Injection,steroid,epidural,transforaminal approach, l3/4;  Surgeon: Ronnell Baeza MD;  Location: [facility] OR;  Service: Pain Management;  Laterality: Left;    TRANSFORAMINAL EPIDURAL INJECTION OF STEROID Left 03/23/2022    Procedure: Injection,steroid,epidural,transforaminal approach L3/4;  Surgeon: Ronnell Baeza MD;  Location: [facility] OR;  Service: Pain Management;  Laterality: Left;    TRANSFORAMINAL EPIDURAL INJECTION OF STEROID Left 08/01/2022    Procedure: Injection,steroid,epidural,transforaminal approach L3/4;  Surgeon: Ronnell Baeza MD;  Location: [facility] OR;  Service: Pain Management;  Laterality: Left;    TUBAL LIGATION      UMBILICAL HERNIA REPAIR         Family History:   Family History   Problem Relation Age of Onset    Cancer Sister         uterine    Cancer Sister         Uterine cancer     Diabetes Brother     Breast cancer Other 42    Glaucoma Neg Hx     Macular degeneration Neg Hx        Social History:  reports that she quit smoking about 16 months ago. Her smoking use included  cigarettes. She started smoking about 54 years ago. She has a 26.5 pack-year smoking history. She has never used smokeless tobacco. She reports that she does not drink alcohol and does not use drugs.    Allergies:  Review of patient's allergies indicates:   Allergen Reactions    Opioids - morphine analogues Other (See Comments)     Hypotension       Medications:  Current Outpatient Medications   Medication Sig Dispense Refill    albuterol (ACCUNEB) 1.25 mg/3 mL Nebu Take 3 mLs (1.25 mg total) by nebulization 2 (two) times a day. Rescue 280 mL 3    albuterol (VENTOLIN HFA) 90 mcg/actuation inhaler Inhale 2 puffs into the lungs every 6 (six) hours as needed for Wheezing. Rescue 50 g 0    albuterol-ipratropium (DUO-NEB) 2.5 mg-0.5 mg/3 mL nebulizer solution Take 3 mLs by nebulization every 6 (six) hours as needed for Wheezing. Rescue 75 mL 2    apixaban (ELIQUIS) 5 mg Tab Take 1 tablet (5 mg total) by mouth 2 (two) times daily. (Patient taking differently: Take 2.5 mg by mouth 2 (two) times daily.) 180 tablet 3    azelastine (ASTELIN) 137 mcg (0.1 %) nasal spray 1 spray (137 mcg total) by Nasal route 2 (two) times daily. 30 mL 12    budesonide (PULMICORT) 0.5 mg/2 mL nebulizer solution Take 2 mLs (0.5 mg total) by nebulization 2 (two) times a day. Controller 120 mL 11    cetirizine (ZYRTEC) 10 MG tablet Take 10 mg by mouth once daily.      cholecalciferol, vitamin D3, 1,250 mcg (50,000 unit) Tab Take 1 tablet by mouth every 7 days. 12 tablet 6    fluticasone propionate (FLONASE) 50 mcg/actuation nasal spray 1 spray (50 mcg total) by Each Nostril route 2 (two) times a day. 16 g 12    levothyroxine (SYNTHROID) 100 MCG tablet Take 1 tablet (100 mcg total) by mouth once daily. 90 tablet 3    mucus clearing device (ACAPELLA, FLUTTER) by Misc.(Non-Drug; Combo Route) route once daily. 100 each 0    multivitamin (THERAGRAN) per tablet Take 1 tablet by mouth once daily.      NIFEdipine (ADALAT CC) 60 MG TbSR Take 1 tablet (60  "mg total) by mouth once daily. 90 tablet 3    omeprazole (PRILOSEC) 40 MG capsule Take 1 capsule (40 mg total) by mouth once daily. 90 capsule 4    OXYGEN-AIR DELIVERY SYSTEMS MISC by [facility] [place], [place].(Non-Drug; Combo Route) route.      potassium chloride (MICRO-K) 10 MEQ CpSR Take 2 capsules (20 mEq total) by mouth once daily. 180 capsule 3    sodium chloride 3% 3 % nebulizer solution Take 4 mLs by nebulization 3 (three) times a week. 48 mL 3    temazepam (RESTORIL) 30 mg capsule Take 1 capsule (30 mg total) by mouth every evening. 30 capsule 5     No current facility-administered medications for this visit.       Review of Systems   Constitutional:  Negative for appetite change, chills, fatigue, fever and unexpected weight change.   HENT:  Negative for mouth sores.    Eyes:  Negative for visual disturbance.   Respiratory:  Negative for cough and shortness of breath.    Cardiovascular:  Negative for chest pain and palpitations.   Gastrointestinal:  Negative for abdominal pain, constipation, diarrhea, nausea and vomiting.   Genitourinary:  Negative for frequency.   Musculoskeletal:  Negative for back pain.   Skin:  Negative for rash.   Neurological:  Negative for headaches.   Hematological:  Negative for adenopathy. Does not bruise/bleed easily.   Psychiatric/Behavioral:  The patient is not nervous/anxious.        ECOG Performance Status: 2   Objective:      Vitals:   Vitals:    10/17/23 1524   BP: 124/60   BP Location: Right arm   Patient Position: Sitting   BP Method: Large (Manual)   Pulse: 90   Resp: 16   Temp: 97.2 °F (36.2 °C)   TempSrc: Temporal   SpO2: 96%   Weight: 95.3 kg (210 lb 1.6 oz)   Height: 5' 4" (1.626 m)                     Physical Exam  Constitutional:       General: She is not in acute distress.     Appearance: She is well-developed. She is not diaphoretic.   HENT:      Head: Normocephalic and atraumatic.   Cardiovascular:      Rate and Rhythm: Normal rate and regular rhythm.      Heart sounds: Normal " heart sounds. No murmur heard.     No friction rub. No gallop.   Pulmonary:      Effort: Pulmonary effort is normal. No respiratory distress.      Breath sounds: No wheezing or rales.   Chest:      Chest wall: No tenderness.   Abdominal:      General: Bowel sounds are normal. There is no distension.      Palpations: Abdomen is soft. There is no mass.      Tenderness: There is no abdominal tenderness. There is no guarding or rebound.   Musculoskeletal:      Comments: Palpable subcutaneous nodule lower lumbar region.   Lymphadenopathy:      Cervical: No cervical adenopathy.      Upper Body:      Right upper body: Supraclavicular adenopathy present. No axillary adenopathy.      Left upper body: No supraclavicular or axillary adenopathy.   Skin:     Findings: No erythema or rash.   Neurological:      Mental Status: She is alert and oriented to person, place, and time.   Psychiatric:         Behavior: Behavior normal.         Laboratory Data:  No visits with results within 1 Week(s) from this visit.   Latest known visit with results is:   Hospital Outpatient Visit on 10/09/2023   Component Date Value Ref Range Status    POC Glucose 10/09/2023 113 (A)  70 - 110 MG/DL Final         Imaging:    PET/CT 10/09/23    Head/neck:     No significant abnormal hypermetabolic foci are identified in the head and neck region. No lymphadenopathy is present.     Chest:     Non metabolic fibrotic changes are again noted in the right upper lobe apical region. Within this zone of fibrosis, there is a new oval-shaped hypermetabolic nodular mass consistent with tumor recurrence. The mass can only be measured on the PET-CT fused images. On axial PET-CT image 58 the mass measures 1.6 x 1.0 cm and has a max SUV of 5.9. Hypermetabolic ground-glass opacities in the anterior left upper lobe have disappeared since the prior study. Hypermetabolic subpleural posterior right lower lobe consolidation has also disappeared. No hypermetabolic  lymphadenopathy or pleural effusion are present.     Abdomen/pelvis:     There are new hypermetabolic bilateral inguinal lymph nodes which could be reactive or neoplastic in origin. On image 244 a right inguinal node measures 1.1 x 1.1 cm with a max SUV of 3.5. On image 236 a left inguinal node measures 1.4 x 0.8 cm with a max SUV of 6.4. On image 222 a left inguinal node measures 2.8 x 1.3 cm with a max SUV of 6.7. There is a new rounded nodular hypermetabolic nodule within the posterior right lumbar subcutaneous fat at the level of the L5-S1 disc. On image 174, this nodule measures 1.5 x 1.5 cm with a max SUV of 4.2. A 3.8 cm abdominal aortic aneurysm and a left renal cysts remain unchanged.     Skeleton:     No significant abnormal hypermetabolic foci are identified within the skeleton. There are no findings to suggest osseous metastatic disease.        Assessment:       1. Malignant neoplasm of right upper lobe of lung    2. Subcutaneous nodule of back    3. Iron deficiency anemia, unspecified iron deficiency anemia type    4. Thrombophilia    5. Follicular lymphoma grade I, unspecified body region    6. Hypertension, unspecified type    7. Hyperlipidemia, unspecified hyperlipidemia type    8. Right renal mass    9. Acquired hypothyroidism                           Plan:     NSCLC - Pt has NSCLC SCC with involvement of the right lung and mediastinum  -Pt with prior radiation to a RUL nodule  -PD-L1 was 86% on 6/18/21  -Pt was on Keytruda with 6 week cycles and completed 6 tx's and then developed pneumonitis  -PET/CT on 9/23/22 showed possible pseudoprogression  -PET/CT on 11/29/22 showed stable disease with RLL infiltrate concerning for resolving PNA  -PET-CT on 03/08/2023 showed resolution of prior disease   -CT Chest 4/14/23 and 7/10/23 with stable findings  -PET/CT 10/09/23 showed avidity in the RUL mass, inguinal LAD and subcutaneous nodule in the lumbar region  -Treatment with paclitaxel discussed; however,  pt is hesitant to undergo chemotherapy  -Pt to undergo biopsy of lumbar subcutaneous nodule  -Will disucss at tumor board  -TEMPUS to be sent in blood and prior core biopsy    Saddle Pulmonary Embolus - seen on CTA 03/18/2023   -Patient is status post thrombectomy and currently on Eliquis 2.5mg BID given prior GI bleed  -Patient will need to be on Eliquis lifelong given lung cancer    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 for 9/12 cycles with good response  -PT with recurrent inguinal LAD  -Will assess subcutaneous nodule on the back  -Will monitor    Right Renal Mass - PT followed by urology Dr Priest with recommendation on 7/25/23 for repeat renal US in 6 months  -Not mentioned on PET/CT  -Will monitor    HTN - pt on nifedipine  -BP stable  -pt previously taken off HCTZ and lisinopril  -Will monitor    Hypothyroidism - pt on levothyroxine  -Management per PCP    Normocytic Anemia - Hemoglobin 9.2g/dL on 10/02/23  -Ferritin was 22ng/mL on 9/05/23  -Will monitor    Route Chart for Scheduling    Med Onc Chart Routing      Follow up with physician Other. The patient needs a biopsy of the nodule in her left lower back with IR with an appt with me a week after the biopsy.  Pt needs to eb scheduled for IV iron.  Pt needs TEMPUS blood testing today.   Follow up with DAYAMI    Infusion scheduling note    Injection scheduling note    Labs    Imaging    Pharmacy appointment    Other referrals                  Treatment Plan Information   OP PEMBROLIZUMAB 400MG Q6W   Chad Mills MD   Upcoming Treatment Dates - OP PEMBROLIZUMAB 400MG Q6W    5/12/2023       Pre-Medications       acetaminophen tablet 1,000 mg       diphenhydrAMINE (BENADRYL) 25 mg in NS 50 mL IVPB       Chemotherapy       pembrolizumab (KEYTRUDA) 400 mg in sodium chloride 0.9% SolP 116 mL infusion  6/23/2023       Pre-Medications       acetaminophen tablet 1,000 mg       diphenhydrAMINE (BENADRYL) 25 mg in NS 50 mL  IVPB       Chemotherapy       pembrolizumab (KEYTRUDA) 400 mg in sodium chloride 0.9% SolP 116 mL infusion  8/4/2023       Pre-Medications       acetaminophen tablet 1,000 mg       diphenhydrAMINE (BENADRYL) 25 mg in NS 50 mL IVPB       Chemotherapy       pembrolizumab (KEYTRUDA) 400 mg in sodium chloride 0.9% SolP 116 mL infusion  9/15/2023       Pre-Medications       acetaminophen tablet 1,000 mg       diphenhydrAMINE (BENADRYL) 25 mg in NS 50 mL IVPB       Chemotherapy       pembrolizumab (KEYTRUDA) 400 mg in sodium chloride 0.9% SolP 116 mL infusion    Supportive Plan Information  OP IRON SUCROSE IVPB TWICE WEEKLY   Chad Mills MD   Upcoming Treatment Dates - OP IRON SUCROSE IVPB TWICE WEEKLY    10/13/2023       Medications       iron sucrose injection 200 mg  10/16/2023       Medications       iron sucrose injection 200 mg  10/20/2023       Medications       iron sucrose injection 200 mg  10/23/2023       Medications       iron sucrose injection 200 mg    Therapy Plan Information  PORT FLUSH  Flushes  heparin, porcine (PF) 100 unit/mL injection flush 500 Units  500 Units, Intravenous, Every visit  sodium chloride 0.9% flush 10 mL  10 mL, Intravenous, Every visit    INF PEGFILGRASTIM (NEULASTA)  pegfilgrastim (NEULASTA) injection 6 mg  6 mg, Subcutaneous, Every visit        Chad Mills MD  [facility]  Hematology and Oncology  [facility]   [address] [place] [address]   O: [phone]  F: [phone]

## 2023-10-17 ENCOUNTER — OFFICE VISIT (OUTPATIENT)
Dept: HEMATOLOGY/ONCOLOGY | Facility: CLINIC | Age: 83
End: 2023-10-17
Payer: MEDICARE

## 2023-10-17 VITALS
TEMPERATURE: 97 F | BODY MASS INDEX: 35.87 KG/M2 | SYSTOLIC BLOOD PRESSURE: 124 MMHG | OXYGEN SATURATION: 96 % | DIASTOLIC BLOOD PRESSURE: 60 MMHG | RESPIRATION RATE: 16 BRPM | WEIGHT: 210.13 LBS | HEIGHT: 64 IN | HEART RATE: 90 BPM

## 2023-10-17 DIAGNOSIS — E03.9 ACQUIRED HYPOTHYROIDISM: ICD-10-CM

## 2023-10-17 DIAGNOSIS — D68.59 THROMBOPHILIA: ICD-10-CM

## 2023-10-17 DIAGNOSIS — D50.9 IRON DEFICIENCY ANEMIA, UNSPECIFIED IRON DEFICIENCY ANEMIA TYPE: ICD-10-CM

## 2023-10-17 DIAGNOSIS — C82.00 FOLLICULAR LYMPHOMA GRADE I, UNSPECIFIED BODY REGION: ICD-10-CM

## 2023-10-17 DIAGNOSIS — C34.11 MALIGNANT NEOPLASM OF RIGHT UPPER LOBE OF LUNG: Primary | ICD-10-CM

## 2023-10-17 DIAGNOSIS — E78.5 HYPERLIPIDEMIA, UNSPECIFIED HYPERLIPIDEMIA TYPE: ICD-10-CM

## 2023-10-17 DIAGNOSIS — R22.2 SUBCUTANEOUS NODULE OF BACK: ICD-10-CM

## 2023-10-17 DIAGNOSIS — I10 HYPERTENSION, UNSPECIFIED TYPE: ICD-10-CM

## 2023-10-17 DIAGNOSIS — N28.89 RIGHT RENAL MASS: ICD-10-CM

## 2023-10-17 PROCEDURE — 3074F SYST BP LT 130 MM HG: CPT | Mod: CPTII,S$GLB,, | Performed by: INTERNAL MEDICINE

## 2023-10-17 PROCEDURE — 1159F MED LIST DOCD IN RCRD: CPT | Mod: CPTII,S$GLB,, | Performed by: INTERNAL MEDICINE

## 2023-10-17 PROCEDURE — 99999 PR PBB SHADOW E&M-EST. PATIENT-LVL IV: ICD-10-PCS | Mod: PBBFAC,,, | Performed by: INTERNAL MEDICINE

## 2023-10-17 PROCEDURE — 99214 OFFICE O/P EST MOD 30 MIN: CPT | Mod: S$GLB,,, | Performed by: INTERNAL MEDICINE

## 2023-10-17 PROCEDURE — 99999 PR PBB SHADOW E&M-EST. PATIENT-LVL IV: CPT | Mod: PBBFAC,,, | Performed by: INTERNAL MEDICINE

## 2023-10-17 PROCEDURE — 1101F PT FALLS ASSESS-DOCD LE1/YR: CPT | Mod: CPTII,S$GLB,, | Performed by: INTERNAL MEDICINE

## 2023-10-17 PROCEDURE — 1159F PR MEDICATION LIST DOCUMENTED IN MEDICAL RECORD: ICD-10-PCS | Mod: CPTII,S$GLB,, | Performed by: INTERNAL MEDICINE

## 2023-10-17 PROCEDURE — 3288F PR FALLS RISK ASSESSMENT DOCUMENTED: ICD-10-PCS | Mod: CPTII,S$GLB,, | Performed by: INTERNAL MEDICINE

## 2023-10-17 PROCEDURE — 3288F FALL RISK ASSESSMENT DOCD: CPT | Mod: CPTII,S$GLB,, | Performed by: INTERNAL MEDICINE

## 2023-10-17 PROCEDURE — 3078F DIAST BP <80 MM HG: CPT | Mod: CPTII,S$GLB,, | Performed by: INTERNAL MEDICINE

## 2023-10-17 PROCEDURE — 99214 PR OFFICE/OUTPT VISIT, EST, LEVL IV, 30-39 MIN: ICD-10-PCS | Mod: S$GLB,,, | Performed by: INTERNAL MEDICINE

## 2023-10-17 PROCEDURE — 3074F PR MOST RECENT SYSTOLIC BLOOD PRESSURE < 130 MM HG: ICD-10-PCS | Mod: CPTII,S$GLB,, | Performed by: INTERNAL MEDICINE

## 2023-10-17 PROCEDURE — 1101F PR PT FALLS ASSESS DOC 0-1 FALLS W/OUT INJ PAST YR: ICD-10-PCS | Mod: CPTII,S$GLB,, | Performed by: INTERNAL MEDICINE

## 2023-10-17 PROCEDURE — 1125F PR PAIN SEVERITY QUANTIFIED, PAIN PRESENT: ICD-10-PCS | Mod: CPTII,S$GLB,, | Performed by: INTERNAL MEDICINE

## 2023-10-17 PROCEDURE — 1125F AMNT PAIN NOTED PAIN PRSNT: CPT | Mod: CPTII,S$GLB,, | Performed by: INTERNAL MEDICINE

## 2023-10-17 PROCEDURE — 3078F PR MOST RECENT DIASTOLIC BLOOD PRESSURE < 80 MM HG: ICD-10-PCS | Mod: CPTII,S$GLB,, | Performed by: INTERNAL MEDICINE

## 2023-10-24 ENCOUNTER — PATIENT MESSAGE (OUTPATIENT)
Dept: FAMILY MEDICINE | Facility: CLINIC | Age: 83
End: 2023-10-24
Payer: MEDICARE

## 2023-10-24 ENCOUNTER — PATIENT MESSAGE (OUTPATIENT)
Dept: HEMATOLOGY/ONCOLOGY | Facility: CLINIC | Age: 83
End: 2023-10-24
Payer: MEDICARE

## 2023-10-25 ENCOUNTER — TUMOR BOARD CONFERENCE (OUTPATIENT)
Dept: HEMATOLOGY/ONCOLOGY | Facility: CLINIC | Age: 83
End: 2023-10-25
Payer: MEDICARE

## 2023-10-25 LAB
DNA RANGE(S) EXAMINED NAR: NORMAL
GENE DIS ANL INTERP-IMP: NORMAL
GENE DIS ASSESSED: NORMAL
REASON FOR STUDY: NORMAL
TEMPUS LCA: NORMAL
TEMPUS PORTAL: NORMAL

## 2023-10-25 NOTE — PROGRESS NOTES
[facility]      THORACIC MULTIDISCIPLINARY TUMOR BOARD  PATIENT REVIEW FORM  ___________________________________________________________________    CLINIC #: [id number]  DATE: 10/24/2023    TUMOR SITE:   Lung/inguinal lymph nodes/posterior right lumbar subcutaneous fat    Presenting Hospital / Clinic: [facility]     Virtual Tumor Board Conference: In person       PRESENTER:   Presenter: Dr. Chad Mills     Specialties Present: Medical Oncology; Hematology; Radiation Oncology; Surgical Oncology; Pathology; Navigation; Pulmonology       PATIENT SUMMARY:   Ms. Velez is a 83 y.o. female with HLD, HTN, osteopenia, follicular lymphoma who presents for follow up of NSCLC.  Since since the last clinic visit the patient underwent a PET-CT on 10/09/2023 showing a new oval-shaped hypermetabolic nodular mass measuring 1.6 x 1 cm in the right upper lobe consistent with tumor recurrence; disappearance of ground-glass opacities anterior left upper lobe; disappearance of subpleural posterior right lower lobe consolidation; new hypermetabolic bilateral inguinal lymph nodes; new rounded nodular hypermetabolic nodule within the posterior right lumbar subcutaneous fat measuring 1.5 x 1.5 cm and a 3.8cm abdominal aortic aneurysm.     The patient denies CP, cough, SOB, abdominal pain, nausea, vomiting, constipation, diarrhea.  The patient denies fever, chills, night sweats, weight loss, new lumps or bumps, easy bruising or bleeding.    Background Information  Patient Status: a current patient (Review scan from 10/9/23/showed avidity in the RUL mass, inguinal LAD and subcutaneous nodule in the lumbar region)  Biopsy Results: Cancer  Treatment to Date: Adjuvant Radiation; Adjuvant Chemotherapy (Immunotherapy)         BOARD RECOMMENDATIONS:   --Proceed with biopsy of the nodule in her lower back with IR                [x] Farheen'l Treatment  Guidelines reviewed and care planned is consistent with guidelines.         (i.e., NCCN, NCI, PD, ACO, AUA, etc.)    PRESENTATION AT CANCER CONFERENCE:   Presentation at Cancer Conference: Prospective       CLINICAL TRIAL ELIGIBILITY:   No data recorded     Marietta Rios

## 2023-10-30 ENCOUNTER — INFUSION (OUTPATIENT)
Dept: INFUSION THERAPY | Facility: HOSPITAL | Age: 83
End: 2023-10-30
Attending: INTERNAL MEDICINE
Payer: MEDICARE

## 2023-10-30 VITALS
SYSTOLIC BLOOD PRESSURE: 132 MMHG | RESPIRATION RATE: 18 BRPM | TEMPERATURE: 98 F | OXYGEN SATURATION: 97 % | HEART RATE: 73 BPM | DIASTOLIC BLOOD PRESSURE: 62 MMHG

## 2023-10-30 DIAGNOSIS — D50.0 IRON DEFICIENCY ANEMIA DUE TO CHRONIC BLOOD LOSS: Primary | ICD-10-CM

## 2023-10-30 PROCEDURE — 25000003 PHARM REV CODE 250: Mod: PN | Performed by: INTERNAL MEDICINE

## 2023-10-30 PROCEDURE — 96374 THER/PROPH/DIAG INJ IV PUSH: CPT | Mod: PN

## 2023-10-30 PROCEDURE — 63600175 PHARM REV CODE 636 W HCPCS: Mod: PN | Performed by: INTERNAL MEDICINE

## 2023-10-30 RX ORDER — EPINEPHRINE 0.3 MG/.3ML
0.3 INJECTION SUBCUTANEOUS ONCE AS NEEDED
Status: DISCONTINUED | OUTPATIENT
Start: 2023-10-30 | End: 2023-10-30 | Stop reason: HOSPADM

## 2023-10-30 RX ORDER — DIPHENHYDRAMINE HYDROCHLORIDE 50 MG/ML
50 INJECTION INTRAMUSCULAR; INTRAVENOUS ONCE AS NEEDED
Status: DISCONTINUED | OUTPATIENT
Start: 2023-10-30 | End: 2023-10-30 | Stop reason: HOSPADM

## 2023-10-30 RX ORDER — SODIUM CHLORIDE 0.9 % (FLUSH) 0.9 %
10 SYRINGE (ML) INJECTION
Status: DISCONTINUED | OUTPATIENT
Start: 2023-10-30 | End: 2023-10-30 | Stop reason: HOSPADM

## 2023-10-30 RX ORDER — HEPARIN 100 UNIT/ML
500 SYRINGE INTRAVENOUS
Status: DISCONTINUED | OUTPATIENT
Start: 2023-10-30 | End: 2023-10-30 | Stop reason: HOSPADM

## 2023-10-30 RX ADMIN — Medication 500 UNITS: at 11:10

## 2023-10-30 RX ADMIN — IRON SUCROSE 200 MG: 20 INJECTION, SOLUTION INTRAVENOUS at 10:10

## 2023-10-30 RX ADMIN — SODIUM CHLORIDE: 9 INJECTION, SOLUTION INTRAVENOUS at 10:10

## 2023-10-30 NOTE — PLAN OF CARE
Problem: Adult Inpatient Plan of Care  Goal: Patient-Specific Goal (Individualized)  Outcome: Ongoing, Progressing  Flowsheets (Taken 10/30/2023 1027)  Anxieties, Fears or Concerns: none  Individualized Care Needs: recliner, warm blanket     Problem: Fatigue  Goal: Improved Activity Tolerance  Outcome: Ongoing, Progressing  Intervention: Promote Improved Energy  Flowsheets (Taken 10/30/2023 1027)  Fatigue Management:   activity schedule adjusted   frequent rest breaks encouraged  Sleep/Rest Enhancement:   awakenings minimized   regular sleep/rest pattern promoted  Activity Management: Walk with assistive devise and /or staff member - L3

## 2023-10-30 NOTE — PLAN OF CARE
Problem: Adult Inpatient Plan of Care  Goal: Plan of Care Review  Outcome: Ongoing, Progressing  Flowsheets (Taken 10/30/2023 1051)  Plan of Care Reviewed With: patient   Pt tolerated venofer 200mg iv push well.  No adverse reaction noted.   PAD flushed with Heparin and de-accessed per protocol.  Patient left clinic in no acute distress.

## 2023-11-02 ENCOUNTER — INFUSION (OUTPATIENT)
Dept: INFUSION THERAPY | Facility: HOSPITAL | Age: 83
End: 2023-11-02
Attending: INTERNAL MEDICINE
Payer: MEDICARE

## 2023-11-02 VITALS
RESPIRATION RATE: 18 BRPM | TEMPERATURE: 98 F | DIASTOLIC BLOOD PRESSURE: 77 MMHG | HEART RATE: 78 BPM | SYSTOLIC BLOOD PRESSURE: 120 MMHG

## 2023-11-02 DIAGNOSIS — D50.0 IRON DEFICIENCY ANEMIA DUE TO CHRONIC BLOOD LOSS: Primary | ICD-10-CM

## 2023-11-02 DIAGNOSIS — C34.11 MALIGNANT NEOPLASM OF RIGHT UPPER LOBE OF LUNG: Primary | ICD-10-CM

## 2023-11-02 PROCEDURE — A4216 STERILE WATER/SALINE, 10 ML: HCPCS | Mod: PN | Performed by: INTERNAL MEDICINE

## 2023-11-02 PROCEDURE — 63600175 PHARM REV CODE 636 W HCPCS: Mod: PN | Performed by: INTERNAL MEDICINE

## 2023-11-02 PROCEDURE — 25000003 PHARM REV CODE 250: Mod: PN | Performed by: INTERNAL MEDICINE

## 2023-11-02 PROCEDURE — 96374 THER/PROPH/DIAG INJ IV PUSH: CPT | Mod: PN

## 2023-11-02 PROCEDURE — 36593 DECLOT VASCULAR DEVICE: CPT | Mod: PN

## 2023-11-02 RX ORDER — SODIUM CHLORIDE 0.9 % (FLUSH) 0.9 %
10 SYRINGE (ML) INJECTION
Status: DISCONTINUED | OUTPATIENT
Start: 2023-11-02 | End: 2023-11-02 | Stop reason: HOSPADM

## 2023-11-02 RX ORDER — WATER FOR INJECTION,STERILE
VIAL (ML) INJECTION
Status: DISCONTINUED
Start: 2023-11-02 | End: 2023-11-02 | Stop reason: HOSPADM

## 2023-11-02 RX ORDER — DIPHENHYDRAMINE HYDROCHLORIDE 50 MG/ML
50 INJECTION INTRAMUSCULAR; INTRAVENOUS ONCE AS NEEDED
Status: DISCONTINUED | OUTPATIENT
Start: 2023-11-02 | End: 2023-11-02 | Stop reason: HOSPADM

## 2023-11-02 RX ORDER — HEPARIN 100 UNIT/ML
500 SYRINGE INTRAVENOUS
Status: DISCONTINUED | OUTPATIENT
Start: 2023-11-02 | End: 2023-11-02 | Stop reason: HOSPADM

## 2023-11-02 RX ORDER — EPINEPHRINE 0.3 MG/.3ML
0.3 INJECTION SUBCUTANEOUS ONCE AS NEEDED
Status: DISCONTINUED | OUTPATIENT
Start: 2023-11-02 | End: 2023-11-02 | Stop reason: HOSPADM

## 2023-11-02 RX ADMIN — ALTEPLASE 2 MG: 2.2 INJECTION, POWDER, LYOPHILIZED, FOR SOLUTION INTRAVENOUS at 12:11

## 2023-11-02 RX ADMIN — SODIUM CHLORIDE: 0.9 INJECTION, SOLUTION INTRAVENOUS at 12:11

## 2023-11-02 RX ADMIN — Medication 10 ML: at 02:11

## 2023-11-02 RX ADMIN — IRON SUCROSE 200 MG: 20 INJECTION, SOLUTION INTRAVENOUS at 12:11

## 2023-11-02 RX ADMIN — Medication 500 UNITS: at 02:11

## 2023-11-02 NOTE — PLAN OF CARE
Problem: Adult Inpatient Plan of Care  Goal: Plan of Care Review  Outcome: Ongoing, Progressing  Flowsheets (Taken 11/2/2023 1140)  Plan of Care Reviewed With: patient  Goal: Patient-Specific Goal (Individualized)  Outcome: Ongoing, Progressing  Flowsheets (Taken 11/2/2023 1140)  Anxieties, Fears or Concerns: havent had blood return from port in a long time  Individualized Care Needs: recliner, blanket, pillow     Problem: Fatigue  Goal: Improved Activity Tolerance  Outcome: Ongoing, Progressing  Intervention: Promote Improved Energy  Flowsheets (Taken 11/2/2023 1140)  Fatigue Management: paced activity encouraged  Sleep/Rest Enhancement:   natural light exposure provided   noise level reduced   relaxation techniques promoted   room darkened  Activity Management:   Ambulated -L4   Up in chair - L3   Walk with assistive devise and /or staff member - L3    Pt tolerated venofer to PIV, PAC with no blood return, flushes without difficulty but unable to flow to gravity. Cathflo instilled for 2hrs without blood return. Msg send to provider per charge nurse for port study referral. Pt completed 30 mins post obs period with no signs of reaction. NAD noted. Pt d/c home.

## 2023-11-03 RX ORDER — HEPARIN 100 UNIT/ML
500 SYRINGE INTRAVENOUS
Status: CANCELLED | OUTPATIENT
Start: 2023-11-06

## 2023-11-03 RX ORDER — SODIUM CHLORIDE 0.9 % (FLUSH) 0.9 %
10 SYRINGE (ML) INJECTION
Status: CANCELLED | OUTPATIENT
Start: 2023-11-06

## 2023-11-03 RX ORDER — DIPHENHYDRAMINE HYDROCHLORIDE 50 MG/ML
50 INJECTION INTRAMUSCULAR; INTRAVENOUS ONCE AS NEEDED
Status: CANCELLED | OUTPATIENT
Start: 2023-11-06

## 2023-11-03 RX ORDER — EPINEPHRINE 0.3 MG/.3ML
0.3 INJECTION SUBCUTANEOUS ONCE AS NEEDED
Status: CANCELLED | OUTPATIENT
Start: 2023-11-06

## 2023-11-06 ENCOUNTER — DOCUMENTATION ONLY (OUTPATIENT)
Dept: INFUSION THERAPY | Facility: HOSPITAL | Age: 83
End: 2023-11-06
Payer: MEDICARE

## 2023-11-06 ENCOUNTER — INFUSION (OUTPATIENT)
Dept: INFUSION THERAPY | Facility: HOSPITAL | Age: 83
End: 2023-11-06
Attending: INTERNAL MEDICINE
Payer: MEDICARE

## 2023-11-06 VITALS
OXYGEN SATURATION: 96 % | DIASTOLIC BLOOD PRESSURE: 58 MMHG | RESPIRATION RATE: 16 BRPM | SYSTOLIC BLOOD PRESSURE: 117 MMHG | HEART RATE: 71 BPM

## 2023-11-06 DIAGNOSIS — D50.0 IRON DEFICIENCY ANEMIA DUE TO CHRONIC BLOOD LOSS: Primary | ICD-10-CM

## 2023-11-06 PROCEDURE — 25000003 PHARM REV CODE 250: Mod: PN | Performed by: INTERNAL MEDICINE

## 2023-11-06 PROCEDURE — 63600175 PHARM REV CODE 636 W HCPCS: Mod: PN | Performed by: INTERNAL MEDICINE

## 2023-11-06 PROCEDURE — 96374 THER/PROPH/DIAG INJ IV PUSH: CPT | Mod: PN

## 2023-11-06 RX ORDER — EPINEPHRINE 0.3 MG/.3ML
0.3 INJECTION SUBCUTANEOUS ONCE AS NEEDED
Status: DISCONTINUED | OUTPATIENT
Start: 2023-11-06 | End: 2023-11-06 | Stop reason: HOSPADM

## 2023-11-06 RX ORDER — SODIUM CHLORIDE 0.9 % (FLUSH) 0.9 %
10 SYRINGE (ML) INJECTION
Status: DISCONTINUED | OUTPATIENT
Start: 2023-11-06 | End: 2023-11-06 | Stop reason: HOSPADM

## 2023-11-06 RX ORDER — DIPHENHYDRAMINE HYDROCHLORIDE 50 MG/ML
50 INJECTION INTRAMUSCULAR; INTRAVENOUS ONCE AS NEEDED
Status: DISCONTINUED | OUTPATIENT
Start: 2023-11-06 | End: 2023-11-06 | Stop reason: HOSPADM

## 2023-11-06 RX ORDER — HEPARIN 100 UNIT/ML
500 SYRINGE INTRAVENOUS
Status: DISCONTINUED | OUTPATIENT
Start: 2023-11-06 | End: 2023-11-06 | Stop reason: HOSPADM

## 2023-11-06 RX ADMIN — SODIUM CHLORIDE: 9 INJECTION, SOLUTION INTRAVENOUS at 11:11

## 2023-11-06 RX ADMIN — Medication 500 UNITS: at 12:11

## 2023-11-06 RX ADMIN — IRON SUCROSE 200 MG: 20 INJECTION, SOLUTION INTRAVENOUS at 11:11

## 2023-11-06 NOTE — PLAN OF CARE
Problem: Adult Inpatient Plan of Care  Goal: Patient-Specific Goal (Individualized)  Outcome: Ongoing, Progressing  Flowsheets (Taken 11/6/2023 1200)  Anxieties, Fears or Concerns: recent port study  Individualized Care Needs:   recliner, conversation   offered refreshments     Problem: Fatigue  Goal: Improved Activity Tolerance  Intervention: Promote Improved Energy  Flowsheets (Taken 11/6/2023 1200)  Fatigue Management: fatigue-related activity identified  Sleep/Rest Enhancement:   natural light exposure provided   noise level reduced  Activity Management:   Ambulated -L4   Ambulated in [place]oks - L4   Walk with assistive devise and /or staff member - L3

## 2023-11-06 NOTE — PLAN OF CARE
Problem: Adult Inpatient Plan of Care  Goal: Plan of Care Review  Outcome: Ongoing, Progressing  Flowsheets (Taken 11/6/2023 1240)  Plan of Care Reviewed With: patient     Patient tolerated venofer infusion. Observed for 30 minutes following completion of infusion. IV flushed and removed. Bleeding stopped and secured with gauze and coban. AVS provided and reviewed. Patient ambulated per self upon discharge from infusion center. No acute distress noted.

## 2023-11-06 NOTE — PROGRESS NOTES
Oncology Nutrition   Chemotherapy Infusion Visit    Nutrition Follow Up   RD met with patient at chairside during infusion treatment. Pt reports continues to do well nutritionally- eating without difficulty, maintaining weight, and denies nutrition related side effects.      Wt Readings from Last 10 Encounters:   11/03/23 101.2 kg (223 lb)   10/26/23 101.2 kg (223 lb)   10/17/23 95.3 kg (210 lb 1.6 oz)   10/02/23 94.4 kg (208 lb 1.8 oz)   09/05/23 93 kg (205 lb 0.4 oz)   08/14/23 93 kg (205 lb 0.4 oz)   07/25/23 91.8 kg (202 lb 6.4 oz)   07/18/23 92.8 kg (204 lb 8 oz)   07/12/23 90.9 kg (200 lb 6.4 oz)   07/06/23 89.9 kg (198 lb 3.1 oz)       All other nutrition questions/concerns addressed as appropriate. Will continue to follow and monitor throughout treatment PRN.     Belen Lyons, MS, RD, LDN  11/06/2023  12:17 PM

## 2023-11-08 RX ORDER — HEPARIN 100 UNIT/ML
500 SYRINGE INTRAVENOUS
Status: CANCELLED | OUTPATIENT
Start: 2023-11-09

## 2023-11-08 RX ORDER — SODIUM CHLORIDE 0.9 % (FLUSH) 0.9 %
10 SYRINGE (ML) INJECTION
Status: CANCELLED | OUTPATIENT
Start: 2023-11-09

## 2023-11-08 RX ORDER — DIPHENHYDRAMINE HYDROCHLORIDE 50 MG/ML
50 INJECTION INTRAMUSCULAR; INTRAVENOUS ONCE AS NEEDED
Status: CANCELLED | OUTPATIENT
Start: 2023-11-09

## 2023-11-08 RX ORDER — EPINEPHRINE 0.3 MG/.3ML
0.3 INJECTION SUBCUTANEOUS ONCE AS NEEDED
Status: CANCELLED | OUTPATIENT
Start: 2023-11-09

## 2023-11-09 ENCOUNTER — INFUSION (OUTPATIENT)
Dept: INFUSION THERAPY | Facility: HOSPITAL | Age: 83
End: 2023-11-09
Attending: INTERNAL MEDICINE
Payer: MEDICARE

## 2023-11-09 VITALS
RESPIRATION RATE: 16 BRPM | HEART RATE: 81 BPM | TEMPERATURE: 98 F | SYSTOLIC BLOOD PRESSURE: 141 MMHG | DIASTOLIC BLOOD PRESSURE: 70 MMHG

## 2023-11-09 DIAGNOSIS — D50.0 IRON DEFICIENCY ANEMIA DUE TO CHRONIC BLOOD LOSS: Primary | ICD-10-CM

## 2023-11-09 PROCEDURE — 63600175 PHARM REV CODE 636 W HCPCS: Mod: PN | Performed by: INTERNAL MEDICINE

## 2023-11-09 PROCEDURE — 96374 THER/PROPH/DIAG INJ IV PUSH: CPT | Mod: PN

## 2023-11-09 RX ORDER — SODIUM CHLORIDE 0.9 % (FLUSH) 0.9 %
10 SYRINGE (ML) INJECTION
Status: DISCONTINUED | OUTPATIENT
Start: 2023-11-09 | End: 2023-11-09 | Stop reason: HOSPADM

## 2023-11-09 RX ORDER — DIPHENHYDRAMINE HYDROCHLORIDE 50 MG/ML
50 INJECTION INTRAMUSCULAR; INTRAVENOUS ONCE AS NEEDED
Status: DISCONTINUED | OUTPATIENT
Start: 2023-11-09 | End: 2023-11-09 | Stop reason: HOSPADM

## 2023-11-09 RX ORDER — EPINEPHRINE 0.3 MG/.3ML
0.3 INJECTION SUBCUTANEOUS ONCE AS NEEDED
Status: DISCONTINUED | OUTPATIENT
Start: 2023-11-09 | End: 2023-11-09 | Stop reason: HOSPADM

## 2023-11-09 RX ORDER — HEPARIN 100 UNIT/ML
500 SYRINGE INTRAVENOUS
Status: DISCONTINUED | OUTPATIENT
Start: 2023-11-09 | End: 2023-11-09 | Stop reason: HOSPADM

## 2023-11-09 RX ADMIN — IRON SUCROSE 200 MG: 20 INJECTION, SOLUTION INTRAVENOUS at 11:11

## 2023-11-09 RX ADMIN — Medication 500 UNITS: at 12:11

## 2023-11-09 NOTE — PLAN OF CARE
Problem: Adult Inpatient Plan of Care  Goal: Plan of Care Review  Outcome: Ongoing, Progressing  Flowsheets (Taken 11/9/2023 1149)  Plan of Care Reviewed With: patient  Goal: Patient-Specific Goal (Individualized)  Outcome: Ongoing, Progressing  Flowsheets (Taken 11/9/2023 1149)  Anxieties, Fears or Concerns: none  Individualized Care Needs: recliner, warm blanket, tv, dim lights     Problem: Fatigue  Goal: Improved Activity Tolerance  Outcome: Ongoing, Progressing  Intervention: Promote Improved Energy  Flowsheets (Taken 11/9/2023 1149)  Fatigue Management: frequent rest breaks encouraged  Sleep/Rest Enhancement:   noise level reduced   room darkened  Activity Management:   Ambulated -L4   Ambulated to bathroom - L4   Ambulated in [place]oks - L4   Pt tolerated venofer infusion well.  Pt stayed for 30min observation time.  No adverse reaction noted.  PAC flushed with Heparin and de-accessed per protocal.  Pt left clinic in no acute distress.

## 2023-11-10 NOTE — TELEPHONE ENCOUNTER
----- Message from Lacey Marsh, Patient Care Assistant sent at 6/8/2023 12:46 PM CDT -----  Contact: self  Type:  Patient Returning Call    Who Called:  self  Who Left Message for Patient:  Dr De La Paz  Does the patient know what this is regarding?: dont know   Best Call Back Number:  [phone]  Additional Information:  thanks       Day 9 of outpatient radiation to the prostate. From a prostate standpoint, he is doing well. No  or GI issues.

## 2023-11-13 ENCOUNTER — INFUSION (OUTPATIENT)
Dept: INFUSION THERAPY | Facility: HOSPITAL | Age: 83
End: 2023-11-13
Attending: INTERNAL MEDICINE
Payer: MEDICARE

## 2023-11-13 VITALS
HEART RATE: 73 BPM | HEIGHT: 65 IN | RESPIRATION RATE: 18 BRPM | BODY MASS INDEX: 35.93 KG/M2 | DIASTOLIC BLOOD PRESSURE: 75 MMHG | TEMPERATURE: 98 F | SYSTOLIC BLOOD PRESSURE: 130 MMHG | WEIGHT: 215.63 LBS

## 2023-11-13 DIAGNOSIS — D50.0 IRON DEFICIENCY ANEMIA DUE TO CHRONIC BLOOD LOSS: Primary | ICD-10-CM

## 2023-11-13 PROCEDURE — 25000003 PHARM REV CODE 250: Mod: PN | Performed by: INTERNAL MEDICINE

## 2023-11-13 PROCEDURE — 63600175 PHARM REV CODE 636 W HCPCS: Mod: PN | Performed by: INTERNAL MEDICINE

## 2023-11-13 PROCEDURE — 96374 THER/PROPH/DIAG INJ IV PUSH: CPT | Mod: PN

## 2023-11-13 RX ORDER — SODIUM CHLORIDE 0.9 % (FLUSH) 0.9 %
10 SYRINGE (ML) INJECTION
Status: DISCONTINUED | OUTPATIENT
Start: 2023-11-13 | End: 2023-11-13 | Stop reason: HOSPADM

## 2023-11-13 RX ORDER — HEPARIN 100 UNIT/ML
500 SYRINGE INTRAVENOUS
Status: CANCELLED | OUTPATIENT
Start: 2023-11-16

## 2023-11-13 RX ORDER — EPINEPHRINE 0.3 MG/.3ML
0.3 INJECTION SUBCUTANEOUS ONCE AS NEEDED
Status: CANCELLED | OUTPATIENT
Start: 2023-11-13

## 2023-11-13 RX ORDER — DIPHENHYDRAMINE HYDROCHLORIDE 50 MG/ML
50 INJECTION INTRAMUSCULAR; INTRAVENOUS ONCE AS NEEDED
Status: CANCELLED | OUTPATIENT
Start: 2023-11-16

## 2023-11-13 RX ORDER — DIPHENHYDRAMINE HYDROCHLORIDE 50 MG/ML
50 INJECTION INTRAMUSCULAR; INTRAVENOUS ONCE AS NEEDED
Status: DISCONTINUED | OUTPATIENT
Start: 2023-11-13 | End: 2023-11-13 | Stop reason: HOSPADM

## 2023-11-13 RX ORDER — SODIUM CHLORIDE 0.9 % (FLUSH) 0.9 %
10 SYRINGE (ML) INJECTION
Status: CANCELLED | OUTPATIENT
Start: 2023-11-13

## 2023-11-13 RX ORDER — HEPARIN 100 UNIT/ML
500 SYRINGE INTRAVENOUS
Status: DISCONTINUED | OUTPATIENT
Start: 2023-11-13 | End: 2023-11-13 | Stop reason: HOSPADM

## 2023-11-13 RX ORDER — EPINEPHRINE 0.3 MG/.3ML
0.3 INJECTION SUBCUTANEOUS ONCE AS NEEDED
Status: DISCONTINUED | OUTPATIENT
Start: 2023-11-13 | End: 2023-11-13 | Stop reason: HOSPADM

## 2023-11-13 RX ORDER — SODIUM CHLORIDE 0.9 % (FLUSH) 0.9 %
10 SYRINGE (ML) INJECTION
Status: CANCELLED | OUTPATIENT
Start: 2023-11-16

## 2023-11-13 RX ORDER — HEPARIN 100 UNIT/ML
500 SYRINGE INTRAVENOUS
Status: CANCELLED | OUTPATIENT
Start: 2023-11-13

## 2023-11-13 RX ORDER — DIPHENHYDRAMINE HYDROCHLORIDE 50 MG/ML
50 INJECTION INTRAMUSCULAR; INTRAVENOUS ONCE AS NEEDED
Status: CANCELLED | OUTPATIENT
Start: 2023-11-13

## 2023-11-13 RX ORDER — EPINEPHRINE 0.3 MG/.3ML
0.3 INJECTION SUBCUTANEOUS ONCE AS NEEDED
Status: CANCELLED | OUTPATIENT
Start: 2023-11-16

## 2023-11-13 RX ADMIN — IRON SUCROSE 200 MG: 20 INJECTION, SOLUTION INTRAVENOUS at 12:11

## 2023-11-13 RX ADMIN — SODIUM CHLORIDE: 9 INJECTION, SOLUTION INTRAVENOUS at 12:11

## 2023-11-13 NOTE — PLAN OF CARE
Problem: Adult Inpatient Plan of Care  Goal: Plan of Care Review  Outcome: Ongoing, Progressing  Flowsheets (Taken 11/13/2023 1200)  Plan of Care Reviewed With: patient  Goal: Patient-Specific Goal (Individualized)  Outcome: Ongoing, Progressing  Flowsheets (Taken 11/13/2023 1200)  Anxieties, Fears or Concerns: none  Individualized Care Needs: recliner/warm blanket, tv     Problem: Fatigue  Goal: Improved Activity Tolerance  Outcome: Ongoing, Progressing  Intervention: Promote Improved Energy  Flowsheets (Taken 11/13/2023 1200)  Fatigue Management:   activity schedule adjusted   activity assistance provided   fatigue-related activity identified   frequent rest breaks encouraged   paced activity encouraged  Sleep/Rest Enhancement:   noise level reduced   regular sleep/rest pattern promoted   relaxation techniques promoted  Activity Management:   Ambulated -L4   Ambulated to bathroom - L4   Ambulated in [place]oks - L4   Walk with assistive devise and /or staff member - L3   Up in stretcher chair - L1   Pt tolerated Venofer well today. Monitored for 30 min post infusion. NAD/no concerns voiced. Reviewed follow-up appointments. All questions were answered, ambulated independently at d/c with walker.

## 2023-11-13 NOTE — PROGRESS NOTES
PATIENT: Shandra Velez  MRN: [id number]  DATE: 11/14/2023      Diagnosis:   1. Malignant neoplasm of right upper lobe of lung    2. Localized enlarged lymph nodes    3. Iron deficiency anemia, unspecified iron deficiency anemia type    4. Thrombophilia    5. Follicular lymphoma grade I, unspecified body region    6. Hypertension, unspecified type    7. Hyperlipidemia, unspecified hyperlipidemia type    8. Right renal mass    9. Acquired hypothyroidism                          Chief Complaint: Malignant neoplasm of right lobe of lung (Follow up biopsy result)        Oncologic History:     Pt initially underwent right inguinal LN biopsy 5/25/22 showing grade 1 follicular lymphoma.  Bone marrow biopsy done 06/24/2020 showed involvement with follicular lymphoma.  PET/CT 7/07/20 showed lymphadenopathy above and below the diaphragm as well as the spleen.  Also seen wasa RUL lesion.  The patient was started on CVP-R followed by maintenance rituxan every 8 weeks.  Biopsy of the RUL nodule on 6/18/21 showed SCC with PD-L1 at 86%.  EBUS 7/09/21 showed no malignant cells at station 7, 4R or 11RS.  The patient was treated with SBRT under the care of Dr Barnes with 50Gy in 4 fractions completed 10/28/21.  The patient then developed new pulmonary nodules on CT scan 6/02/22.  WBUS 6/16/22 showed positive SCC in 4R LN.  Pt was discussed at tumor board on 6/21/22 with recommendation for Keytruda.  The patient underwent PET/CT on 9/23/22 after 2 doses of treatment showing a 1.5 cm precarinal lymph node; stable right apical mass measuring 1.8 x 1.5 cm; stable 8 mm right apical lung nodule with adjacent post radiation change measuring 2 x 2.7 cm; stable 4 mm subpleural nodule in the lateral right upper lobe; fluid/mucus in the right lower lobe bronchus tree; 6 mm anterior left upper lobe nodule which is new; 2.3 cm simple cyst in the midpole of the left kidney; 3.1 x 2.3 cm rim enhancing complex cystic and solid mass in the mid to  lower pole the right kidney; infrarenal abdominal aortic aneurysm measuring 3.8 x 3.5 cm; and stable a left para-aortic lymph node measuring 11 mm. 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   The patient underwent PET-CT on 11/29/2022 showing an irregular hypermetabolic right upper lobe tumor which is stable measuring 1.6 x 1.2 cm; hypermetabolic subpleural right upper lobe consolidation diminished in extent; unchanged linear zone of hypermetabolic atelectasis in the superior segment of the left lower lobe; new poorly metabolic ill-defined infiltrate in the posterior right lower lobe; 4 mm left upper lobe nodule which is stable; hypermetabolic precarinal mediastinal lymph node stable measuring 1 cm; and hypermetabolic aortopulmonary lymph node stable measuring 1.1 x 1.1 cm.   The patient was admitted to the hospital from 1/16/23 to 1/23/23 for pneumonia.  She underwent bronchoscopy with Dr Kinney on 1/18/23 showing significant mucus plug impaction int the pharynx, left mainstem bronchus and right and left lower lobes.  The patient was discharged on 3L O2.   The patient underwent PET-CT on 03/08/2023 showing thickening of the pleura; new ground-glass opacities within the left upper lobe; patchy airspace opacities in the right lower lobe; right lower lobe pulmonary artery hypermetabolic activity possibly secondary to bronchial wall thickening or thrombus in the pulmonary artery; diffuse hypermetabolic activity identified throughout the axial skeleton.  The patient was then admitted to the hospital on 03/18/23-4/3/23 after presenting with shortness of breath and being found to have a saddle pulmonary embolus on CTA 03/18/2023.  The patient underwent treatment with a heparin drip and thrombectomy on 03/18/2023 with eventual transition to Eliquis.   The patient underwent CT chest on 04/14/2023 showing areas of air trapping in subpleural bleb  formation in both jarek thoraces with areas of interstitial scarring and bibasilar bronchiectasis; stable solid nodule in left upper lobe measuring 6 mm; masslike area measuring 3 x 1.3 x 1.5 cm in the right upper lobe stable.   The patient underwent CT chest, abdomen, and pelvis on 07/10/2023 showing more consolidative appearance of previously described right apical pulmonary nodules without discretely measurable nodule on today's exam, 8 mm left apical nodule, stable consolidation and superior segment left lower lobe; 3 mm right lower lobe pulmonary nodule; heterogeneously enhancing mass in the inferior pole of the right kidney measuring 3.9 x 3.7 x 3.9 cm; unchanged left periaortic lymph nodes.   The patient underwent a PET-CT on 10/09/2023 showing a new oval-shaped hypermetabolic nodular mass measuring 1.6 x 1 cm in the right upper lobe consistent with tumor recurrence; disappearance of ground-glass opacities anterior left upper lobe; disappearance of subpleural posterior right lower lobe consolidation; new hypermetabolic bilateral inguinal lymph nodes; new rounded nodular hypermetabolic nodule within the posterior right lumbar subcutaneous fat measuring 1.5 x 1.5 cm and a 3.8cm abdominal aortic aneurysm.    Subjective:    Interval History: Ms. Velez is a 83 y.o. female with HLD, HTN, osteopenia, follicular lymphoma who presents for follow up of NSCLC.  Since since the last clinic visit the patient was discussed at Thoracic tumor board on 10/25/23 with recommendation to proceed with biopsy of the lesion in the back via IR.  Biopsy done on 11/03/23 showed fibroadipose tissue.  Pt endorses history of lipomas.  The patient denies CP, cough, SOB, abdominal pain, nausea, vomiting, constipation, diarrhea.  The patient denies fever, chills, night sweats, weight loss, new lumps or bumps, easy bruising or bleeding.    Past Medical History:   Past Medical History:   Diagnosis Date    Breast cancer 1985    right mastectomy     Digestive disorder     Encounter for blood transfusion     History of pneumonia     Hypercholesterolemia     Hypertension     Indigestion     Lumbar spondylosis     Lung cancer     2022 - currently in treatment as of 7/27/22    Lymphoma     Osteopenia     Pulmonary nodule     Retinal detachment     Smoker 06/11/2022       Past Surgical HIstory:   Past Surgical History:   Procedure Laterality Date    APPENDECTOMY      BONE MARROW BIOPSY Bilateral 06/24/2020    Procedure: Biopsy-bone marrow;  Surgeon: Rod Montero MD;  Location: [facility] OR;  Service: Oncology;  Laterality: Bilateral;    BREAST RECONSTRUCTION  1990    right    BRONCHOSCOPY N/A 01/18/2023    Procedure: BRONCHOSCOPY;  Surgeon: Gregorio Kinney MD;  Location: [facility];  Service: Pulmonary;  Laterality: N/A;    BRONCHOSCOPY Bilateral 02/10/2023    Procedure: Bronchoscopy;  Surgeon: Gregorio Kinney MD;  Location: [facility];  Service: Pulmonary;  Laterality: Bilateral;  for airway clearance. Purple top tube for cell count diff    BRONCHOSCOPY N/A 03/13/2023    Procedure: Bronchoscopy;  Surgeon: Gregorio Kinney MD;  Location: [facility];  Service: Pulmonary;  Laterality: N/A;  therapeutic scope. Please have purple top tube and iced saline    CATARACT EXTRACTION W/  INTRAOCULAR LENS IMPLANT Bilateral     CHOLECYSTECTOMY      ENDOBRONCHIAL ULTRASOUND Bilateral 07/09/2021    Procedure: ENDOBRONCHIAL ULTRASOUND (EBUS);  Surgeon: Gregorio Kinney MD;  Location: [facility];  Service: Pulmonary;  Laterality: Bilateral;  NEED LMA to  eval right upper paratracheal LN.     ENDOBRONCHIAL ULTRASOUND Bilateral 06/16/2022    Procedure: ENDOBRONCHIAL ULTRASOUND (EBUS);  Surgeon: Gregorio Kinney MD;  Location: [facility];  Service: Pulmonary;  Laterality: Bilateral;    INJECTION OF FACET JOINT Left 06/30/2022    Procedure: FACET JOINT Cyst Aspiration L3/4;  Surgeon: Ronnell Baeza MD;  Location: [facility];  Service: Pain Management;  Laterality: Left;    INSERTION OF  TUNNELED CENTRAL VENOUS CATHETER (CVC) WITH SUBCUTANEOUS PORT Left 11/04/2020    Procedure: [id number];  Surgeon: Kody Pretty MD;  Location: [facility] OR;  Service: General;  Laterality: Left;    JOINT REPLACEMENT  2008    right knee     LUMBAR LAMINECTOMY      LYMPH NODE BIOPSY      MASTECTOMY Right 1985    NEEDLE LOCALIZATION N/A 05/25/2020    Procedure: NEEDLE LOCALIZATION - lymph node bx;  Surgeon: Steve Weaver MD;  Location: [facility] CATH;  Service: Interventional Radiology;  Laterality: N/A;    RETINAL DETACHMENT SURGERY  1989    RIGHT HEART CATHETERIZATION Right 03/18/2023    Procedure: INSERTION, CATHETER, RIGHT HEART;  Surgeon: Rukhsana Lang MD;  Location: [facility] CATH;  Service: Cardiology;  Laterality: Right;    SELECTIVE UNILATERAL PULMONARY ANGIOGRAPHY  03/18/2023    Procedure: Pumonary angiography - unilateral;  Surgeon: Rukhsana Lang MD;  Location: [facility] CATH;  Service: Cardiology;;    THROMBECTOMY N/A 03/18/2023    Procedure: THROMBECTOMY;  Surgeon: Rukhsana Lang MD;  Location: [facility] CATH;  Service: Cardiology;  Laterality: N/A;    TRANSFORAMINAL EPIDURAL INJECTION OF STEROID Left 05/14/2020    Procedure: Injection,steroid,epidural,transforaminal approach, l3/4;  Surgeon: Ronnell Baeza MD;  Location: [facility] OR;  Service: Pain Management;  Laterality: Left;    TRANSFORAMINAL EPIDURAL INJECTION OF STEROID Left 03/23/2022    Procedure: Injection,steroid,epidural,transforaminal approach L3/4;  Surgeon: Ronnell Baeza MD;  Location: [facility] OR;  Service: Pain Management;  Laterality: Left;    TRANSFORAMINAL EPIDURAL INJECTION OF STEROID Left 08/01/2022    Procedure: Injection,steroid,epidural,transforaminal approach L3/4;  Surgeon: Ronnell Baeza MD;  Location: [facility] OR;  Service: Pain Management;  Laterality: Left;    TUBAL LIGATION      UMBILICAL HERNIA REPAIR         Family History:   Family History   Problem Relation Age of Onset    Cancer Sister         uterine    Cancer Sister          Uterine cancer     Diabetes Brother     Breast cancer Other 42    Glaucoma Neg Hx     Macular degeneration Neg Hx        Social History:  reports that she quit smoking about 17 months ago. Her smoking use included cigarettes. She started smoking about 54 years ago. She has a 26.5 pack-year smoking history. She has never used smokeless tobacco. She reports that she does not drink alcohol and does not use drugs.    Allergies:  Review of patient's allergies indicates:   Allergen Reactions    Opioids - morphine analogues Other (See Comments)     Hypotension       Medications:  Current Outpatient Medications   Medication Sig Dispense Refill    albuterol (ACCUNEB) 1.25 mg/3 mL Nebu Take 3 mLs (1.25 mg total) by nebulization 2 (two) times a day. Rescue 280 mL 3    albuterol (VENTOLIN HFA) 90 mcg/actuation inhaler Inhale 2 puffs into the lungs every 6 (six) hours as needed for Wheezing. Rescue 50 g 0    albuterol-ipratropium (DUO-NEB) 2.5 mg-0.5 mg/3 mL nebulizer solution Take 3 mLs by nebulization every 6 (six) hours as needed for Wheezing. Rescue 75 mL 2    amoxicillin-clavulanate 875-125mg (AUGMENTIN) 875-125 mg per tablet Take 1 tablet by mouth every 12 (twelve) hours. 16 tablet 0    apixaban (ELIQUIS) 2.5 mg Tab Take 1 tablet (2.5 mg total) by mouth 2 (two) times daily. 180 tablet 3    azelastine (ASTELIN) 137 mcg (0.1 %) nasal spray 1 spray (137 mcg total) by Nasal route 2 (two) times daily. 30 mL 12    budesonide (PULMICORT) 0.5 mg/2 mL nebulizer solution Take 2 mLs (0.5 mg total) by nebulization 2 (two) times a day. Controller 120 mL 11    cetirizine (ZYRTEC) 10 MG tablet Take 10 mg by mouth once daily.      cholecalciferol, vitamin D3, 1,250 mcg (50,000 unit) Tab Take 1 tablet by mouth every 7 days. 12 tablet 6    fluticasone propionate (FLONASE) 50 mcg/actuation nasal spray 1 spray (50 mcg total) by Each Nostril route 2 (two) times a day. 16 g 12    levothyroxine (SYNTHROID) 100 MCG tablet Take 1 tablet (100 mcg  "total) by mouth once daily. 90 tablet 3    mucus clearing device (ACAPELLA, FLUTTER) by Misc.(Non-Drug; Combo Route) route once daily. 100 each 0    multivitamin (THERAGRAN) per tablet Take 1 tablet by mouth once daily.      NIFEdipine (ADALAT CC) 60 MG TbSR Take 1 tablet (60 mg total) by mouth once daily. 90 tablet 3    omeprazole (PRILOSEC) 40 MG capsule Take 1 capsule (40 mg total) by mouth once daily. 90 capsule 4    OXYGEN-AIR DELIVERY SYSTEMS MISC by [facility].(Non-Drug; Combo Route) route.      potassium chloride (MICRO-K) 10 MEQ CpSR Take 2 capsules (20 mEq total) by mouth once daily. 180 capsule 3    sodium chloride 3% 3 % nebulizer solution Take 4 mLs by nebulization 3 (three) times a week. 48 mL 3    temazepam (RESTORIL) 30 mg capsule Take 1 capsule (30 mg total) by mouth every evening. 30 capsule 5     No current facility-administered medications for this visit.       Review of Systems   Constitutional:  Negative for appetite change, chills, fatigue, fever and unexpected weight change.   HENT:  Negative for mouth sores.    Eyes:  Negative for visual disturbance.   Respiratory:  Negative for cough and shortness of breath.    Cardiovascular:  Negative for chest pain and palpitations.   Gastrointestinal:  Negative for abdominal pain, constipation, diarrhea, nausea and vomiting.   Genitourinary:  Negative for frequency.   Musculoskeletal:  Negative for back pain.   Skin:  Negative for rash.   Neurological:  Negative for headaches.   Hematological:  Negative for adenopathy. Does not bruise/bleed easily.   Psychiatric/Behavioral:  The patient is not nervous/anxious.        ECOG Performance Status: 2   Objective:      Vitals:   Vitals:    11/14/23 1031   BP: (!) 167/91   BP Location: Left arm   Patient Position: Sitting   BP Method: Medium (Automatic)   Pulse: 80   Resp: 17   Temp: 96.5 °F (35.8 °C)   TempSrc: Temporal   SpO2: (!) 94%   Weight: 96 kg (211 lb 10.3 oz)   Height: 5' 4" (1.626 m)       Physical " Exam  Constitutional:       General: She is not in acute distress.     Appearance: She is well-developed. She is not diaphoretic.   HENT:      Head: Normocephalic and atraumatic.   Cardiovascular:      Rate and Rhythm: Normal rate and regular rhythm.      Heart sounds: Normal heart sounds. No murmur heard.     No friction rub. No gallop.   Pulmonary:      Effort: Pulmonary effort is normal. No respiratory distress.      Breath sounds: Normal breath sounds. No wheezing or rales.   Chest:      Chest wall: No tenderness.   Abdominal:      General: Bowel sounds are normal. There is no distension.      Palpations: Abdomen is soft. There is no mass.      Tenderness: There is no abdominal tenderness. There is no guarding or rebound.   Lymphadenopathy:      Cervical: No cervical adenopathy.      Upper Body:      Right upper body: No supraclavicular or axillary adenopathy.      Left upper body: No supraclavicular or axillary adenopathy.   Skin:     Findings: No erythema or rash.   Neurological:      Mental Status: She is alert and oriented to person, place, and time.   Psychiatric:         Behavior: Behavior normal.         Laboratory Data:  No visits with results within 1 Week(s) from this visit.   Latest known visit with results is:   Hospital Outpatient Visit on 11/03/2023   Component Date Value Ref Range Status    WBC 11/03/2023 4.92  3.90 - 12.70 K/uL Final    RBC 11/03/2023 3.34 (L)  4.00 - 5.40 M/uL Final    Hemoglobin 11/03/2023 9.1 (L)  12.0 - 16.0 g/dL Final    Hematocrit 11/03/2023 30.8 (L)  37.0 - 48.5 % Final    MCV 11/03/2023 92  82 - 98 fL Final    MCH 11/03/2023 27.2  27.0 - 31.0 pg Final    MCHC 11/03/2023 29.5 (L)  32.0 - 36.0 g/dL Final    RDW 11/03/2023 14.2  11.5 - 14.5 % Final    Platelets 11/03/2023 158  150 - 450 K/uL Final    MPV 11/03/2023 10.2  9.2 - 12.9 fL Final    PT 11/03/2023 13.6  11.8 - 14.7 sec Final    PT normal range is not established for pediatrics.    INR 11/03/2023 1.0   Final    aPTT  11/03/2023 26.0  24.6 - 36.7 sec Final    PTT normal range is not established for pediatrics.         Imaging:    PET/CT 10/09/23    Head/neck:     No significant abnormal hypermetabolic foci are identified in the head and neck region. No lymphadenopathy is present.     Chest:     Non metabolic fibrotic changes are again noted in the right upper lobe apical region. Within this zone of fibrosis, there is a new oval-shaped hypermetabolic nodular mass consistent with tumor recurrence. The mass can only be measured on the PET-CT fused images. On axial PET-CT image 58 the mass measures 1.6 x 1.0 cm and has a max SUV of 5.9. Hypermetabolic ground-glass opacities in the anterior left upper lobe have disappeared since the prior study. Hypermetabolic subpleural posterior right lower lobe consolidation has also disappeared. No hypermetabolic lymphadenopathy or pleural effusion are present.     Abdomen/pelvis:     There are new hypermetabolic bilateral inguinal lymph nodes which could be reactive or neoplastic in origin. On image 244 a right inguinal node measures 1.1 x 1.1 cm with a max SUV of 3.5. On image 236 a left inguinal node measures 1.4 x 0.8 cm with a max SUV of 6.4. On image 222 a left inguinal node measures 2.8 x 1.3 cm with a max SUV of 6.7. There is a new rounded nodular hypermetabolic nodule within the posterior right lumbar subcutaneous fat at the level of the L5-S1 disc. On image 174, this nodule measures 1.5 x 1.5 cm with a max SUV of 4.2. A 3.8 cm abdominal aortic aneurysm and a left renal cysts remain unchanged.     Skeleton:     No significant abnormal hypermetabolic foci are identified within the skeleton. There are no findings to suggest osseous metastatic disease.        Assessment:       1. Malignant neoplasm of right upper lobe of lung    2. Localized enlarged lymph nodes    3. Iron deficiency anemia, unspecified iron deficiency anemia type    4. Thrombophilia    5. Follicular lymphoma grade I,  unspecified body region    6. Hypertension, unspecified type    7. Hyperlipidemia, unspecified hyperlipidemia type    8. Right renal mass    9. Acquired hypothyroidism                             Plan:     NSCLC - Pt has NSCLC SCC with involvement of the right lung and mediastinum  -Pt with prior radiation to a RUL nodule  -PD-L1 was 86% on 6/18/21  -Pt was on Keytruda with 6 week cycles and completed 6 tx's and then developed pneumonitis  -PET/CT on 9/23/22 showed possible pseudoprogression  -PET/CT on 11/29/22 showed stable disease with RLL infiltrate concerning for resolving PNA  -PET-CT on 03/08/2023 showed resolution of prior disease   -CT Chest 4/14/23 and 7/10/23 with stable findings  -PET/CT 10/09/23 showed avidity in the RUL mass, inguinal LAD and subcutaneous nodule in the lumbar region  -Treatment with paclitaxel discussed; however, pt is hesitant to undergo chemotherapy  -Biopsy of lumbar subcutaneous nodule showed fibroadipose tissue  -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  -Pt would like to frogo biopsy of RUL nodule and inguinal LAD and reassess on later scans  -Pt to undergo repeat CT chest and CT abdomen 12/12/23    Saddle Pulmonary Embolus - seen on CTA 03/18/2023   -Patient is status post thrombectomy and currently on Eliquis 2.5mg BID given prior GI bleed  -Patient will need to be on Eliquis lifelong given lung cancer    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 for 9/12 cycles with good response  -PT with recurrent inguinal LAD which could be lymphoma  -Will monitor on repeat scans    Right Renal Mass - PT followed by urology Dr Priest with recommendation on 7/25/23 for repeat renal US in 6 months  -Not mentioned on PET/CT  -Will monitor on Ct abdomen 12/12/23    HTN - pt on nifedipine  -BP elevated  -Pt previously taken off HCTZ and lisinopril  -Will monitor    Hypothyroidism - pt on levothyroxine  -Management per PCP    Normocytic Anemia - Hemoglobin  9.1g/dL on 11/03/23  -Ferritin was 22ng/mL on 9/05/23  -Will repeat iron studies with next labs  -Will monitor    Route Chart for Scheduling    Med Onc Chart Routing      Follow up with physician Other. Pt needs a CBC, CMP, ferritin, iron/TIBC as well as CT abdomen on 12/12/23 with an appt with me later that week.   Follow up with DAYAMI    Infusion scheduling note    Injection scheduling note    Labs    Imaging    Pharmacy appointment    Other referrals                  Treatment Plan Information   OP PEMBROLIZUMAB 400MG Q6W   Chad Mills MD   Upcoming Treatment Dates - OP PEMBROLIZUMAB 400MG Q6W    5/12/2023       Pre-Medications       acetaminophen tablet 1,000 mg       diphenhydrAMINE (BENADRYL) 25 mg in NS 50 mL IVPB       Chemotherapy       pembrolizumab (KEYTRUDA) 400 mg in sodium chloride 0.9% SolP 116 mL infusion  6/23/2023       Pre-Medications       acetaminophen tablet 1,000 mg       diphenhydrAMINE (BENADRYL) 25 mg in NS 50 mL IVPB       Chemotherapy       pembrolizumab (KEYTRUDA) 400 mg in sodium chloride 0.9% SolP 116 mL infusion  8/4/2023       Pre-Medications       acetaminophen tablet 1,000 mg       diphenhydrAMINE (BENADRYL) 25 mg in NS 50 mL IVPB       Chemotherapy       pembrolizumab (KEYTRUDA) 400 mg in sodium chloride 0.9% SolP 116 mL infusion  9/15/2023       Pre-Medications       acetaminophen tablet 1,000 mg       diphenhydrAMINE (BENADRYL) 25 mg in NS 50 mL IVPB       Chemotherapy       pembrolizumab (KEYTRUDA) 400 mg in sodium chloride 0.9% SolP 116 mL infusion    Supportive Plan Information  OP IRON SUCROSE IVPB TWICE WEEKLY   Chad Mills MD   Upcoming Treatment Dates - OP IRON SUCROSE IVPB TWICE WEEKLY    11/16/2023       Medications       iron sucrose injection 200 mg  11/20/2023       Medications       iron sucrose injection 200 mg  11/23/2023       Medications       iron sucrose injection 200 mg    Therapy Plan Information  PORT FLUSH  Flushes  heparin, porcine (PF) 100  unit/mL injection flush 500 Units  500 Units, Intravenous, Every visit  sodium chloride 0.9% flush 10 mL  10 mL, Intravenous, Every visit    INF PEGFILGRASTIM (NEULASTA)  pegfilgrastim (NEULASTA) injection 6 mg  6 mg, Subcutaneous, Every visit        Chad Mills MD  [facility]  Hematology and Oncology  [facility]   [address] [place] [address]   O: [phone]  F: [phone]

## 2023-11-14 ENCOUNTER — PATIENT MESSAGE (OUTPATIENT)
Dept: FAMILY MEDICINE | Facility: CLINIC | Age: 83
End: 2023-11-14
Payer: MEDICARE

## 2023-11-14 ENCOUNTER — OFFICE VISIT (OUTPATIENT)
Dept: HEMATOLOGY/ONCOLOGY | Facility: CLINIC | Age: 83
End: 2023-11-14
Payer: MEDICARE

## 2023-11-14 VITALS
WEIGHT: 211.63 LBS | HEIGHT: 64 IN | SYSTOLIC BLOOD PRESSURE: 167 MMHG | DIASTOLIC BLOOD PRESSURE: 91 MMHG | OXYGEN SATURATION: 94 % | TEMPERATURE: 97 F | BODY MASS INDEX: 36.13 KG/M2 | HEART RATE: 80 BPM | RESPIRATION RATE: 17 BRPM

## 2023-11-14 DIAGNOSIS — R59.0 LOCALIZED ENLARGED LYMPH NODES: ICD-10-CM

## 2023-11-14 DIAGNOSIS — E78.5 HYPERLIPIDEMIA, UNSPECIFIED HYPERLIPIDEMIA TYPE: Primary | ICD-10-CM

## 2023-11-14 DIAGNOSIS — D68.59 THROMBOPHILIA: ICD-10-CM

## 2023-11-14 DIAGNOSIS — D50.9 IRON DEFICIENCY ANEMIA, UNSPECIFIED IRON DEFICIENCY ANEMIA TYPE: ICD-10-CM

## 2023-11-14 DIAGNOSIS — C82.00 FOLLICULAR LYMPHOMA GRADE I, UNSPECIFIED BODY REGION: ICD-10-CM

## 2023-11-14 DIAGNOSIS — C34.11 MALIGNANT NEOPLASM OF RIGHT UPPER LOBE OF LUNG: Primary | ICD-10-CM

## 2023-11-14 DIAGNOSIS — I10 HYPERTENSION, UNSPECIFIED TYPE: ICD-10-CM

## 2023-11-14 DIAGNOSIS — E03.9 ACQUIRED HYPOTHYROIDISM: ICD-10-CM

## 2023-11-14 DIAGNOSIS — E78.5 HYPERLIPIDEMIA, UNSPECIFIED HYPERLIPIDEMIA TYPE: ICD-10-CM

## 2023-11-14 DIAGNOSIS — N28.89 RIGHT RENAL MASS: ICD-10-CM

## 2023-11-14 PROCEDURE — 1125F PR PAIN SEVERITY QUANTIFIED, PAIN PRESENT: ICD-10-PCS | Mod: CPTII,S$GLB,, | Performed by: INTERNAL MEDICINE

## 2023-11-14 PROCEDURE — 3077F SYST BP >= 140 MM HG: CPT | Mod: CPTII,S$GLB,, | Performed by: INTERNAL MEDICINE

## 2023-11-14 PROCEDURE — 99999 PR PBB SHADOW E&M-EST. PATIENT-LVL IV: CPT | Mod: PBBFAC,,, | Performed by: INTERNAL MEDICINE

## 2023-11-14 PROCEDURE — 3288F FALL RISK ASSESSMENT DOCD: CPT | Mod: CPTII,S$GLB,, | Performed by: INTERNAL MEDICINE

## 2023-11-14 PROCEDURE — 99213 OFFICE O/P EST LOW 20 MIN: CPT | Mod: S$GLB,,, | Performed by: INTERNAL MEDICINE

## 2023-11-14 PROCEDURE — 1101F PR PT FALLS ASSESS DOC 0-1 FALLS W/OUT INJ PAST YR: ICD-10-PCS | Mod: CPTII,S$GLB,, | Performed by: INTERNAL MEDICINE

## 2023-11-14 PROCEDURE — 3077F PR MOST RECENT SYSTOLIC BLOOD PRESSURE >= 140 MM HG: ICD-10-PCS | Mod: CPTII,S$GLB,, | Performed by: INTERNAL MEDICINE

## 2023-11-14 PROCEDURE — 99213 PR OFFICE/OUTPT VISIT, EST, LEVL III, 20-29 MIN: ICD-10-PCS | Mod: S$GLB,,, | Performed by: INTERNAL MEDICINE

## 2023-11-14 PROCEDURE — 99999 PR PBB SHADOW E&M-EST. PATIENT-LVL IV: ICD-10-PCS | Mod: PBBFAC,,, | Performed by: INTERNAL MEDICINE

## 2023-11-14 PROCEDURE — 3080F DIAST BP >= 90 MM HG: CPT | Mod: CPTII,S$GLB,, | Performed by: INTERNAL MEDICINE

## 2023-11-14 PROCEDURE — 1125F AMNT PAIN NOTED PAIN PRSNT: CPT | Mod: CPTII,S$GLB,, | Performed by: INTERNAL MEDICINE

## 2023-11-14 PROCEDURE — 3080F PR MOST RECENT DIASTOLIC BLOOD PRESSURE >= 90 MM HG: ICD-10-PCS | Mod: CPTII,S$GLB,, | Performed by: INTERNAL MEDICINE

## 2023-11-14 PROCEDURE — 1101F PT FALLS ASSESS-DOCD LE1/YR: CPT | Mod: CPTII,S$GLB,, | Performed by: INTERNAL MEDICINE

## 2023-11-14 PROCEDURE — 3288F PR FALLS RISK ASSESSMENT DOCUMENTED: ICD-10-PCS | Mod: CPTII,S$GLB,, | Performed by: INTERNAL MEDICINE

## 2023-11-14 PROCEDURE — 1159F MED LIST DOCD IN RCRD: CPT | Mod: CPTII,S$GLB,, | Performed by: INTERNAL MEDICINE

## 2023-11-14 PROCEDURE — 1159F PR MEDICATION LIST DOCUMENTED IN MEDICAL RECORD: ICD-10-PCS | Mod: CPTII,S$GLB,, | Performed by: INTERNAL MEDICINE

## 2023-11-14 NOTE — TELEPHONE ENCOUNTER
I spoke with Ms. Shandra and she will be getting her labs at the [facility].  She just needs to ask them to add Dr. Chavez lab to Dr. Hobbs's labs before her appointment with us.She verbalized understanding.

## 2023-11-16 ENCOUNTER — INFUSION (OUTPATIENT)
Dept: INFUSION THERAPY | Facility: HOSPITAL | Age: 83
End: 2023-11-16
Attending: INTERNAL MEDICINE
Payer: MEDICARE

## 2023-11-16 VITALS
RESPIRATION RATE: 16 BRPM | TEMPERATURE: 98 F | DIASTOLIC BLOOD PRESSURE: 65 MMHG | SYSTOLIC BLOOD PRESSURE: 121 MMHG | HEART RATE: 76 BPM

## 2023-11-16 DIAGNOSIS — D50.0 IRON DEFICIENCY ANEMIA DUE TO CHRONIC BLOOD LOSS: Primary | ICD-10-CM

## 2023-11-16 PROCEDURE — 63600175 PHARM REV CODE 636 W HCPCS: Mod: PN | Performed by: INTERNAL MEDICINE

## 2023-11-16 PROCEDURE — 96374 THER/PROPH/DIAG INJ IV PUSH: CPT | Mod: PN

## 2023-11-16 PROCEDURE — 25000003 PHARM REV CODE 250: Mod: PN | Performed by: INTERNAL MEDICINE

## 2023-11-16 PROCEDURE — A4216 STERILE WATER/SALINE, 10 ML: HCPCS | Mod: PN | Performed by: INTERNAL MEDICINE

## 2023-11-16 RX ORDER — DIPHENHYDRAMINE HYDROCHLORIDE 50 MG/ML
50 INJECTION INTRAMUSCULAR; INTRAVENOUS ONCE AS NEEDED
Status: DISCONTINUED | OUTPATIENT
Start: 2023-11-16 | End: 2023-11-16 | Stop reason: HOSPADM

## 2023-11-16 RX ORDER — EPINEPHRINE 0.3 MG/.3ML
0.3 INJECTION SUBCUTANEOUS ONCE AS NEEDED
Status: DISCONTINUED | OUTPATIENT
Start: 2023-11-16 | End: 2023-11-16 | Stop reason: HOSPADM

## 2023-11-16 RX ORDER — SODIUM CHLORIDE 0.9 % (FLUSH) 0.9 %
10 SYRINGE (ML) INJECTION
Status: DISCONTINUED | OUTPATIENT
Start: 2023-11-16 | End: 2023-11-16 | Stop reason: HOSPADM

## 2023-11-16 RX ORDER — HEPARIN 100 UNIT/ML
500 SYRINGE INTRAVENOUS
Status: DISCONTINUED | OUTPATIENT
Start: 2023-11-16 | End: 2023-11-16 | Stop reason: HOSPADM

## 2023-11-16 RX ADMIN — Medication 500 UNITS: at 12:11

## 2023-11-16 RX ADMIN — Medication 10 ML: at 12:11

## 2023-11-16 RX ADMIN — IRON SUCROSE 200 MG: 20 INJECTION, SOLUTION INTRAVENOUS at 11:11

## 2023-11-16 NOTE — PLAN OF CARE
Problem: Adult Inpatient Plan of Care  Goal: Plan of Care Review  Outcome: Ongoing, Progressing  Flowsheets (Taken 11/16/2023 1240)  Plan of Care Reviewed With: patient  Goal: Patient-Specific Goal (Individualized)  Outcome: Ongoing, Progressing  Flowsheets (Taken 11/16/2023 1240)  Anxieties, Fears or Concerns: none  Individualized Care Needs: recliner, warm blanket, tv     Problem: Fatigue  Goal: Improved Activity Tolerance  Outcome: Ongoing, Progressing  Intervention: Promote Improved Energy  Flowsheets (Taken 11/16/2023 1240)  Fatigue Management: paced activity encouraged  Sleep/Rest Enhancement: natural light exposure provided  Activity Management:   Ambulated -L4   Ambulated to bathroom - L4   Ambulated in brooks - L4   Pt tolerated venofer push well.  Pt stayed for 30min observation time.  No adverse reaction noted.  PIV de-accessed per protocol.  Pt left clinic in no acute distress.

## 2023-11-18 ENCOUNTER — PATIENT MESSAGE (OUTPATIENT)
Dept: FAMILY MEDICINE | Facility: CLINIC | Age: 83
End: 2023-11-18
Payer: MEDICARE

## 2023-11-18 DIAGNOSIS — I10 ESSENTIAL HYPERTENSION: ICD-10-CM

## 2023-11-20 ENCOUNTER — INFUSION (OUTPATIENT)
Dept: INFUSION THERAPY | Facility: HOSPITAL | Age: 83
End: 2023-11-20
Attending: INTERNAL MEDICINE
Payer: MEDICARE

## 2023-11-20 VITALS
TEMPERATURE: 98 F | HEART RATE: 75 BPM | SYSTOLIC BLOOD PRESSURE: 107 MMHG | DIASTOLIC BLOOD PRESSURE: 60 MMHG | RESPIRATION RATE: 18 BRPM

## 2023-11-20 DIAGNOSIS — D50.0 IRON DEFICIENCY ANEMIA DUE TO CHRONIC BLOOD LOSS: Primary | ICD-10-CM

## 2023-11-20 LAB
DNA RANGE(S) EXAMINED NAR: NORMAL
GENE DIS ANL INTERP-IMP: NORMAL
GENE DIS ASSESSED: NORMAL
REASON FOR STUDY: NORMAL
TEMPUS PORTAL: NORMAL

## 2023-11-20 PROCEDURE — 96374 THER/PROPH/DIAG INJ IV PUSH: CPT | Mod: PN

## 2023-11-20 PROCEDURE — 63600175 PHARM REV CODE 636 W HCPCS: Mod: PN | Performed by: INTERNAL MEDICINE

## 2023-11-20 PROCEDURE — 25000003 PHARM REV CODE 250: Mod: PN | Performed by: INTERNAL MEDICINE

## 2023-11-20 RX ORDER — HEPARIN 100 UNIT/ML
500 SYRINGE INTRAVENOUS
Status: DISCONTINUED | OUTPATIENT
Start: 2023-11-20 | End: 2023-11-20 | Stop reason: HOSPADM

## 2023-11-20 RX ORDER — SODIUM CHLORIDE 0.9 % (FLUSH) 0.9 %
10 SYRINGE (ML) INJECTION
Status: DISCONTINUED | OUTPATIENT
Start: 2023-11-20 | End: 2023-11-20 | Stop reason: HOSPADM

## 2023-11-20 RX ORDER — NIFEDIPINE 60 MG/1
60 TABLET, EXTENDED RELEASE ORAL DAILY
Qty: 90 TABLET | Refills: 1 | Status: SHIPPED | OUTPATIENT
Start: 2023-11-20

## 2023-11-20 RX ADMIN — IRON SUCROSE 200 MG: 20 INJECTION, SOLUTION INTRAVENOUS at 12:11

## 2023-11-20 RX ADMIN — SODIUM CHLORIDE: 9 INJECTION, SOLUTION INTRAVENOUS at 12:11

## 2023-11-20 RX ADMIN — Medication 500 UNITS: at 12:11

## 2023-11-20 NOTE — PLAN OF CARE
Problem: Adult Inpatient Plan of Care  Goal: Plan of Care Review  Outcome: Ongoing, Progressing  Flowsheets (Taken 11/20/2023 1200)  Plan of Care Reviewed With: patient  Goal: Patient-Specific Goal (Individualized)  Outcome: Ongoing, Progressing  Flowsheets (Taken 11/20/2023 1200)  Anxieties, Fears or Concerns: none  Individualized Care Needs: recliner, warm blanket, conversation     Problem: Fatigue  Goal: Improved Activity Tolerance  Outcome: Ongoing, Progressing  Intervention: Promote Improved Energy  Flowsheets (Taken 11/20/2023 1200)  Fatigue Management: frequent rest breaks encouraged  Sleep/Rest Enhancement:   natural light exposure provided   noise level reduced   relaxation techniques promoted  Activity Management:   Ambulated -L4   Up in chair - L3   Pt tolerated venofer, VSS. Pt voiced no new complaints or concerns at this time. NAD noted. Pt d/c home.

## 2023-11-20 NOTE — TELEPHONE ENCOUNTER
No care due was identified.  [facility] Embedded Care Due Messages. Reference number: [id number].   11/20/2023 9:19:51 AM CST

## 2023-11-20 NOTE — TELEPHONE ENCOUNTER
Refill Decision Note   Shandra Velez  is requesting a refill authorization.  Brief Assessment and Rationale for Refill:  Approve     Medication Therapy Plan:         Comments:     Note composed:2:00 PM 11/20/2023

## 2023-11-22 ENCOUNTER — OFFICE VISIT (OUTPATIENT)
Dept: FAMILY MEDICINE | Facility: CLINIC | Age: 83
End: 2023-11-22
Payer: MEDICARE

## 2023-11-22 VITALS
OXYGEN SATURATION: 99 % | SYSTOLIC BLOOD PRESSURE: 128 MMHG | DIASTOLIC BLOOD PRESSURE: 74 MMHG | BODY MASS INDEX: 36.25 KG/M2 | RESPIRATION RATE: 18 BRPM | HEIGHT: 64 IN | WEIGHT: 212.31 LBS | HEART RATE: 83 BPM

## 2023-11-22 DIAGNOSIS — E78.5 HYPERLIPIDEMIA, UNSPECIFIED HYPERLIPIDEMIA TYPE: ICD-10-CM

## 2023-11-22 DIAGNOSIS — G47.00 INSOMNIA, UNSPECIFIED TYPE: ICD-10-CM

## 2023-11-22 DIAGNOSIS — C34.11 MALIGNANT NEOPLASM OF RIGHT UPPER LOBE OF LUNG: ICD-10-CM

## 2023-11-22 DIAGNOSIS — I10 ESSENTIAL HYPERTENSION: Primary | ICD-10-CM

## 2023-11-22 DIAGNOSIS — E03.9 HYPOTHYROIDISM, UNSPECIFIED TYPE: ICD-10-CM

## 2023-11-22 PROCEDURE — 1159F PR MEDICATION LIST DOCUMENTED IN MEDICAL RECORD: ICD-10-PCS | Mod: CPTII,S$GLB,, | Performed by: FAMILY MEDICINE

## 2023-11-22 PROCEDURE — 1159F MED LIST DOCD IN RCRD: CPT | Mod: CPTII,S$GLB,, | Performed by: FAMILY MEDICINE

## 2023-11-22 PROCEDURE — 3078F DIAST BP <80 MM HG: CPT | Mod: CPTII,S$GLB,, | Performed by: FAMILY MEDICINE

## 2023-11-22 PROCEDURE — 1101F PT FALLS ASSESS-DOCD LE1/YR: CPT | Mod: CPTII,S$GLB,, | Performed by: FAMILY MEDICINE

## 2023-11-22 PROCEDURE — 99999 PR PBB SHADOW E&M-EST. PATIENT-LVL III: CPT | Mod: PBBFAC,,, | Performed by: FAMILY MEDICINE

## 2023-11-22 PROCEDURE — 99214 OFFICE O/P EST MOD 30 MIN: CPT | Mod: S$GLB,,, | Performed by: FAMILY MEDICINE

## 2023-11-22 PROCEDURE — 3078F PR MOST RECENT DIASTOLIC BLOOD PRESSURE < 80 MM HG: ICD-10-PCS | Mod: CPTII,S$GLB,, | Performed by: FAMILY MEDICINE

## 2023-11-22 PROCEDURE — 3288F PR FALLS RISK ASSESSMENT DOCUMENTED: ICD-10-PCS | Mod: CPTII,S$GLB,, | Performed by: FAMILY MEDICINE

## 2023-11-22 PROCEDURE — 1125F AMNT PAIN NOTED PAIN PRSNT: CPT | Mod: CPTII,S$GLB,, | Performed by: FAMILY MEDICINE

## 2023-11-22 PROCEDURE — 3074F SYST BP LT 130 MM HG: CPT | Mod: CPTII,S$GLB,, | Performed by: FAMILY MEDICINE

## 2023-11-22 PROCEDURE — 3288F FALL RISK ASSESSMENT DOCD: CPT | Mod: CPTII,S$GLB,, | Performed by: FAMILY MEDICINE

## 2023-11-22 PROCEDURE — 99214 PR OFFICE/OUTPT VISIT, EST, LEVL IV, 30-39 MIN: ICD-10-PCS | Mod: S$GLB,,, | Performed by: FAMILY MEDICINE

## 2023-11-22 PROCEDURE — 99999 PR PBB SHADOW E&M-EST. PATIENT-LVL III: ICD-10-PCS | Mod: PBBFAC,,, | Performed by: FAMILY MEDICINE

## 2023-11-22 PROCEDURE — 3074F PR MOST RECENT SYSTOLIC BLOOD PRESSURE < 130 MM HG: ICD-10-PCS | Mod: CPTII,S$GLB,, | Performed by: FAMILY MEDICINE

## 2023-11-22 PROCEDURE — 1125F PR PAIN SEVERITY QUANTIFIED, PAIN PRESENT: ICD-10-PCS | Mod: CPTII,S$GLB,, | Performed by: FAMILY MEDICINE

## 2023-11-22 PROCEDURE — 1101F PR PT FALLS ASSESS DOC 0-1 FALLS W/OUT INJ PAST YR: ICD-10-PCS | Mod: CPTII,S$GLB,, | Performed by: FAMILY MEDICINE

## 2023-11-22 NOTE — PROGRESS NOTES
Subjective:       Patient ID: Shandra Velez is a 83 y.o. female.    Chief Complaint: Health follow up (6 month follow up)    Patient presents with:  Health follow up    Here for 6 month f/u    Trying IB Guard OTC after she thought she had some IBS  Non-Hodgkins lymphoma, renal mass, lung cancer - following with oncology; in remission presently  S/p PE - taking Eliquis BID for life  HTN - tolerating nifedipine Xl 60mg daily; stopped HCTZ and lisinopril due to renal functions;   HLD - stopped Zocor 10mg daily  Allergies: taking Zyrtec 10mg daily  Insomnia - using restoril 30mg at bedtime  Hypothyroidism - taking levothyroxine 100mcg daily  Renal mass - following with urology    Past Medical History:    Hypertension                                                  Pulmonary nodule                                              Smoker                                                        Osteopenia                                                    Hypertension                                                  Hypercholesterolemia                                          Breast cancer                                                 Lumbar spondylosis                                            Past Surgical History:    MASTECTOMY                                       1985            Comment:right    CHOLECYSTECTOMY                                                APPENDECTOMY                                                   UMBILICAL HERNIA REPAIR                                        TUBAL LIGATION                                                 BREAST RECONSTRUCTION                                            Comment:right    Allergies:   -- No Known Drug Allergies     Social History    Marital Status:              Spouse Name:                       Years of Education:                 Number of children: 3             Occupational History  Occupation          Employer            Comment               retired  marketing *                         Social History Main Topics    Smoking Status: Current Every Day Smoker        Packs/Day: 0.50  Years: 53         Types: Cigarettes    Smokeless Status: Never Used                        Alcohol Use: Yes             Drug Use: No              Sexual Activity: Not Currently        Other Topics            Concern    None on file    Social History Narrative    Lives alone      Hobbies: skyler      From [place]    Current Outpatient Medications on File Prior to Visit:  albuterol (ACCUNEB) 1.25 mg/3 mL Nebu, TAKE 3 MLS (1.25 MG TOTAL) BY NEBULIZATION 2 (TWO) TIMES A DAY. RESCUE, Disp: 280 mL, Rfl: 3  albuterol (VENTOLIN HFA) 90 mcg/actuation inhaler, Inhale 2 puffs into the lungs every 6 (six) hours as needed for Wheezing. Rescue, Disp: 50 g, Rfl: 0  albuterol-ipratropium (DUO-NEB) 2.5 mg-0.5 mg/3 mL nebulizer solution, Take 3 mLs by nebulization every 6 (six) hours as needed for Wheezing. Rescue, Disp: 75 mL, Rfl: 2  apixaban (ELIQUIS) 2.5 mg Tab, Take 1 tablet (2.5 mg total) by mouth 2 (two) times daily., Disp: 180 tablet, Rfl: 3  azelastine (ASTELIN) 137 mcg (0.1 %) nasal spray, 1 spray (137 mcg total) by Nasal route 2 (two) times daily., Disp: 30 mL, Rfl: 12  budesonide (PULMICORT) 0.5 mg/2 mL nebulizer solution, Take 2 mLs (0.5 mg total) by nebulization 2 (two) times a day. Controller, Disp: 120 mL, Rfl: 11  cetirizine (ZYRTEC) 10 MG tablet, Take 10 mg by mouth once daily., Disp: , Rfl:   cholecalciferol, vitamin D3, 1,250 mcg (50,000 unit) Tab, Take 1 tablet by mouth every 7 days., Disp: 12 tablet, Rfl: 6  fluticasone propionate (FLONASE) 50 mcg/actuation nasal spray, 1 spray (50 mcg total) by Each Nostril route 2 (two) times a day., Disp: 16 g, Rfl: 12  levothyroxine (SYNTHROID) 100 MCG tablet, Take 1 tablet (100 mcg total) by mouth once daily., Disp: 90 tablet, Rfl: 3  multivitamin (THERAGRAN) per tablet, Take 1 tablet by mouth once daily., Disp: , Rfl:   NIFEdipine (ADALAT  CC) 60 MG TbSR, Take 1 tablet (60 mg total) by mouth once daily., Disp: 90 tablet, Rfl: 1  omeprazole (PRILOSEC) 40 MG capsule, Take 1 capsule (40 mg total) by mouth once daily., Disp: 90 capsule, Rfl: 4  potassium chloride (MICRO-K) 10 MEQ CpSR, Take 2 capsules (20 mEq total) by mouth once daily., Disp: 180 capsule, Rfl: 3  sodium chloride 3% 3 % nebulizer solution, Take 4 mLs by nebulization 3 (three) times a week., Disp: 48 mL, Rfl: 3  temazepam (RESTORIL) 30 mg capsule, Take 1 capsule (30 mg total) by mouth every evening., Disp: 30 capsule, Rfl: 5  mucus clearing device (ACAPELLA, FLUTTER), by Misc.(Non-Drug; Combo Route) route once daily. (Patient not taking: Reported on 11/22/2023), Disp: 100 each, Rfl: 0  OXYGEN-AIR DELIVERY SYSTEMS MISC, by Misc.(Non-Drug; Combo Route) route., Disp: , Rfl:   [DISCONTINUED] amoxicillin-clavulanate 875-125mg (AUGMENTIN) 875-125 mg per tablet, Take 1 tablet by mouth every 12 (twelve) hours. (Patient not taking: Reported on 11/22/2023), Disp: 16 tablet, Rfl: 0    No current facility-administered medications on file prior to visit.          Review of patient's family history indicates:    Cancer                         Sister                      Comment: uterine    Diabetes                       Brother                       Fatigue  Associated symptoms include fatigue and nausea. Pertinent negatives include no arthralgias, chest pain, chills, coughing, fever, headaches, neck pain, numbness, rash, sore throat, vomiting or weakness.   Follow-up  Associated symptoms include fatigue and nausea. Pertinent negatives include no arthralgias, chest pain, chills, coughing, fever, headaches, neck pain, numbness, rash, sore throat, vomiting or weakness.   Hyperlipidemia  Pertinent negatives include no chest pain or shortness of breath.     Review of Systems   Constitutional:  Positive for appetite change and fatigue. Negative for chills, fever and unexpected weight change.   HENT:   "Negative for sore throat and trouble swallowing.    Eyes:  Negative for pain and visual disturbance.   Respiratory:  Negative for cough, shortness of breath and wheezing.    Cardiovascular:  Negative for chest pain and palpitations.   Gastrointestinal:  Positive for nausea. Negative for abdominal distention, blood in stool, diarrhea and vomiting.   Genitourinary:  Negative for difficulty urinating, dysuria and hematuria.   Musculoskeletal:  Negative for arthralgias, back pain, gait problem and neck pain.   Skin:  Negative for rash and wound.   Neurological:  Positive for tremors (left hand). Negative for dizziness, weakness, numbness and headaches.   Hematological:  Negative for adenopathy.   Psychiatric/Behavioral:  Negative for dysphoric mood.        Objective:       /74   Pulse 83   Resp 18   Ht 5' 4" (1.626 m)   Wt 96.3 kg (212 lb 4.9 oz)   SpO2 99%   BMI 36.44 kg/m²     Physical Exam  Constitutional:       Appearance: Normal appearance. She is well-developed.   HENT:      Head: Normocephalic.      Mouth/Throat:      Pharynx: No oropharyngeal exudate or posterior oropharyngeal erythema.   Eyes:      Conjunctiva/sclera: Conjunctivae normal.      Pupils: Pupils are equal, round, and reactive to light.   Neck:      Thyroid: No thyromegaly.   Cardiovascular:      Rate and Rhythm: Normal rate and regular rhythm.      Heart sounds: Normal heart sounds, S1 normal and S2 normal. No murmur heard.     No friction rub. No gallop.   Pulmonary:      Effort: Pulmonary effort is normal.      Breath sounds: Normal breath sounds. No wheezing or rales.   Abdominal:      General: Bowel sounds are normal. There is no distension.      Palpations: Abdomen is soft.      Tenderness: There is no abdominal tenderness.   Musculoskeletal:      Cervical back: Normal range of motion and neck supple.      Lumbar back: No deformity or tenderness. Normal range of motion.      Right lower leg: No edema.      Left lower leg: No edema. "   Lymphadenopathy:      Cervical: No cervical adenopathy.   Skin:     General: Skin is warm.      Findings: No rash.   Neurological:      Mental Status: She is alert and oriented to person, place, and time.      Cranial Nerves: No cranial nerve deficit.      Motor: Tremor present.      Gait: Gait normal.         Results for orders placed or performed during the hospital encounter of 11/03/23   CBC Without Differential   Result Value Ref Range    WBC 4.92 3.90 - 12.70 K/uL    RBC 3.34 (L) 4.00 - 5.40 M/uL    Hemoglobin 9.1 (L) 12.0 - 16.0 g/dL    Hematocrit 30.8 (L) 37.0 - 48.5 %    MCV 92 82 - 98 fL    MCH 27.2 27.0 - 31.0 pg    MCHC 29.5 (L) 32.0 - 36.0 g/dL    RDW 14.2 11.5 - 14.5 %    Platelets 158 150 - 450 K/uL    MPV 10.2 9.2 - 12.9 fL   Protime-INR   Result Value Ref Range    PT 13.6 11.8 - 14.7 sec    INR 1.0    APTT   Result Value Ref Range    aPTT 26.0 24.6 - 36.7 sec     *Note: Due to a large number of results and/or encounters for the requested time period, some results have not been displayed. A complete set of results can be found in Results Review.         Assessment:       1. Essential hypertension    2. Hyperlipidemia, unspecified hyperlipidemia type    3. Malignant neoplasm of right upper lobe of lung    4. Hypothyroidism, unspecified type    5. Insomnia, unspecified type            Plan:       Essential hypertension    Hyperlipidemia, unspecified hyperlipidemia type    Malignant neoplasm of right upper lobe of lung    Hypothyroidism, unspecified type    Insomnia, unspecified type            Doing well  Remain off simvistatin  continue holding lisinopril and HCT  Overall looks good  Continue present meds  Counseled on regular exercise, maintenance of a healthy weight, balanced diet rich in fruits/vegetables and lean protein, and avoidance of unhealthy habits like smoking and excessive alcohol intake.  F/u with urology as planned  F/u 6 months

## 2023-11-24 ENCOUNTER — INFUSION (OUTPATIENT)
Dept: INFUSION THERAPY | Facility: HOSPITAL | Age: 83
End: 2023-11-24
Attending: INTERNAL MEDICINE
Payer: MEDICARE

## 2023-11-24 VITALS
DIASTOLIC BLOOD PRESSURE: 52 MMHG | HEIGHT: 64 IN | RESPIRATION RATE: 16 BRPM | BODY MASS INDEX: 36.25 KG/M2 | WEIGHT: 212.31 LBS | HEART RATE: 74 BPM | TEMPERATURE: 98 F | SYSTOLIC BLOOD PRESSURE: 110 MMHG

## 2023-11-24 DIAGNOSIS — D50.0 IRON DEFICIENCY ANEMIA DUE TO CHRONIC BLOOD LOSS: Primary | ICD-10-CM

## 2023-11-24 PROCEDURE — 96374 THER/PROPH/DIAG INJ IV PUSH: CPT | Mod: PN

## 2023-11-24 PROCEDURE — 25000003 PHARM REV CODE 250: Mod: PN | Performed by: INTERNAL MEDICINE

## 2023-11-24 PROCEDURE — A4216 STERILE WATER/SALINE, 10 ML: HCPCS | Mod: PN | Performed by: INTERNAL MEDICINE

## 2023-11-24 PROCEDURE — 63600175 PHARM REV CODE 636 W HCPCS: Mod: PN | Performed by: INTERNAL MEDICINE

## 2023-11-24 RX ORDER — SODIUM CHLORIDE 0.9 % (FLUSH) 0.9 %
10 SYRINGE (ML) INJECTION
Status: DISCONTINUED | OUTPATIENT
Start: 2023-11-24 | End: 2023-11-24 | Stop reason: HOSPADM

## 2023-11-24 RX ORDER — HEPARIN 100 UNIT/ML
500 SYRINGE INTRAVENOUS
Status: DISCONTINUED | OUTPATIENT
Start: 2023-11-24 | End: 2023-11-24 | Stop reason: HOSPADM

## 2023-11-24 RX ADMIN — IRON SUCROSE 200 MG: 20 INJECTION, SOLUTION INTRAVENOUS at 11:11

## 2023-11-24 RX ADMIN — Medication 10 ML: at 12:11

## 2023-11-24 RX ADMIN — Medication 500 UNITS: at 12:11

## 2023-11-24 RX ADMIN — SODIUM CHLORIDE: 9 INJECTION, SOLUTION INTRAVENOUS at 11:11

## 2023-12-13 NOTE — PROGRESS NOTES
PATIENT: Shandra Velez  MRN: [id number]  DATE: 12/14/2023      Diagnosis:   1. Malignant neoplasm of right upper lobe of lung    2. Right renal mass    3. Lymphadenopathy    4. Localized enlarged lymph nodes    5. Iron deficiency anemia due to chronic blood loss    6. Thrombophilia    7. Follicular lymphoma grade I, unspecified body region    8. Hypertension, unspecified type    9. Hyperlipidemia, unspecified hyperlipidemia type    10. Acquired hypothyroidism      Chief Complaint: Malignant neoplasm of right upper lobe of lung (1 month follow up)    Oncologic History:     Pt initially underwent right inguinal LN biopsy 5/25/22 showing grade 1 follicular lymphoma.  Bone marrow biopsy done 06/24/2020 showed involvement with follicular lymphoma.  PET/CT 7/07/20 showed lymphadenopathy above and below the diaphragm as well as the spleen.  Also seen wasa RUL lesion.  The patient was started on CVP-R followed by maintenance rituxan every 8 weeks.  Biopsy of the RUL nodule on 6/18/21 showed SCC with PD-L1 at 86%.  EBUS 7/09/21 showed no malignant cells at station 7, 4R or 11RS.  The patient was treated with SBRT under the care of Dr Barnes with 50Gy in 4 fractions completed 10/28/21.  The patient then developed new pulmonary nodules on CT scan 6/02/22.  WBUS 6/16/22 showed positive SCC in 4R LN.  Pt was discussed at tumor board on 6/21/22 with recommendation for Keytruda.  The patient underwent PET/CT on 9/23/22 after 2 doses of treatment showing a 1.5 cm precarinal lymph node; stable right apical mass measuring 1.8 x 1.5 cm; stable 8 mm right apical lung nodule with adjacent post radiation change measuring 2 x 2.7 cm; stable 4 mm subpleural nodule in the lateral right upper lobe; fluid/mucus in the right lower lobe bronchus tree; 6 mm anterior left upper lobe nodule which is new; 2.3 cm simple cyst in the midpole of the left kidney; 3.1 x 2.3 cm rim enhancing complex cystic and solid mass in the mid to lower pole the right  kidney; infrarenal abdominal aortic aneurysm measuring 3.8 x 3.5 cm; and stable a left para-aortic lymph node measuring 11 mm. 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   The patient underwent PET-CT on 11/29/2022 showing an irregular hypermetabolic right upper lobe tumor which is stable measuring 1.6 x 1.2 cm; hypermetabolic subpleural right upper lobe consolidation diminished in extent; unchanged linear zone of hypermetabolic atelectasis in the superior segment of the left lower lobe; new poorly metabolic ill-defined infiltrate in the posterior right lower lobe; 4 mm left upper lobe nodule which is stable; hypermetabolic precarinal mediastinal lymph node stable measuring 1 cm; and hypermetabolic aortopulmonary lymph node stable measuring 1.1 x 1.1 cm.   The patient was admitted to the hospital from 1/16/23 to 1/23/23 for pneumonia.  She underwent bronchoscopy with Dr Kinney on 1/18/23 showing significant mucus plug impaction int the pharynx, left mainstem bronchus and right and left lower lobes.  The patient was discharged on 3L O2.   The patient underwent PET-CT on 03/08/2023 showing thickening of the pleura; new ground-glass opacities within the left upper lobe; patchy airspace opacities in the right lower lobe; right lower lobe pulmonary artery hypermetabolic activity possibly secondary to bronchial wall thickening or thrombus in the pulmonary artery; diffuse hypermetabolic activity identified throughout the axial skeleton.  The patient was then admitted to the hospital on 03/18/23-4/3/23 after presenting with shortness of breath and being found to have a saddle pulmonary embolus on CTA 03/18/2023.  The patient underwent treatment with a heparin drip and thrombectomy on 03/18/2023 with eventual transition to Eliquis.   The patient underwent CT chest on 04/14/2023 showing areas of air trapping in subpleural bleb formation in both  jarek thoraces with areas of interstitial scarring and bibasilar bronchiectasis; stable solid nodule in left upper lobe measuring 6 mm; masslike area measuring 3 x 1.3 x 1.5 cm in the right upper lobe stable.   The patient underwent CT chest, abdomen, and pelvis on 07/10/2023 showing more consolidative appearance of previously described right apical pulmonary nodules without discretely measurable nodule on today's exam, 8 mm left apical nodule, stable consolidation and superior segment left lower lobe; 3 mm right lower lobe pulmonary nodule; heterogeneously enhancing mass in the inferior pole of the right kidney measuring 3.9 x 3.7 x 3.9 cm; unchanged left periaortic lymph nodes.   The patient underwent a PET-CT on 10/09/2023 showing a new oval-shaped hypermetabolic nodular mass measuring 1.6 x 1 cm in the right upper lobe consistent with tumor recurrence; disappearance of ground-glass opacities anterior left upper lobe; disappearance of subpleural posterior right lower lobe consolidation; new hypermetabolic bilateral inguinal lymph nodes; new rounded nodular hypermetabolic nodule within the posterior right lumbar subcutaneous fat measuring 1.5 x 1.5 cm and a 3.8cm abdominal aortic aneurysm.    The patient was discussed at Thoracic tumor board on 10/25/23 with recommendation to proceed with biopsy of the lesion in the back via IR.  Biopsy done on 11/03/23 showed fibroadipose tissue.    Subjective:    Interval History: Ms. Velez is a 83 y.o. female with HLD, HTN, osteopenia, follicular lymphoma who presents for follow up of NSCLC.  Since since the last clinic visit the patient underwent CT of the chest, abdomen, and pelvis on 12/12/2023 showing pleural parenchymal thickening in the right lung apex; 6 mm left upper lobe nodule stable; solid mass inferior right kidney measuring 4.3 x 4.3 cm; enlarged bilateral periaortic lymphadenopathy measuring 17 x 14 mm; enlarged bilateral inguinal hemipelvic lymphadenopathy with  left hemipelvic lymph node measuring 4.4 x 1.8 cm and left inguinal lymph node measuring 3.5 x 1.7 cm.  The patient denies CP, cough, SOB, abdominal pain, nausea, vomiting, constipation, diarrhea.  The patient denies fever, chills, night sweats, weight loss, new lumps or bumps, easy bruising or bleeding.    Past Medical History:   Past Medical History:   Diagnosis Date    Breast cancer 1985    right mastectomy    Digestive disorder     Encounter for blood transfusion     History of pneumonia     Hypercholesterolemia     Hypertension     Indigestion     Lumbar spondylosis     Lung cancer     2022 - currently in treatment as of 7/27/22    Lymphoma     Osteopenia     Pulmonary nodule     Retinal detachment     Smoker 06/11/2022       Past Surgical HIstory:   Past Surgical History:   Procedure Laterality Date    APPENDECTOMY      BONE MARROW BIOPSY Bilateral 06/24/2020    Procedure: Biopsy-bone marrow;  Surgeon: Rod Montero MD;  Location: [facility] OR;  Service: Oncology;  Laterality: Bilateral;    BREAST RECONSTRUCTION  1990    right    BRONCHOSCOPY N/A 01/18/2023    Procedure: BRONCHOSCOPY;  Surgeon: Gregorio Kinney MD;  Location: [facility];  Service: Pulmonary;  Laterality: N/A;    BRONCHOSCOPY Bilateral 02/10/2023    Procedure: Bronchoscopy;  Surgeon: Gregorio Kinney MD;  Location: [facility];  Service: Pulmonary;  Laterality: Bilateral;  for airway clearance. Purple top tube for cell count diff    BRONCHOSCOPY N/A 03/13/2023    Procedure: Bronchoscopy;  Surgeon: Gregorio Kinney MD;  Location: [facility];  Service: Pulmonary;  Laterality: N/A;  therapeutic scope. Please have purple top tube and iced saline    CATARACT EXTRACTION W/  INTRAOCULAR LENS IMPLANT Bilateral     CHOLECYSTECTOMY      ENDOBRONCHIAL ULTRASOUND Bilateral 07/09/2021    Procedure: ENDOBRONCHIAL ULTRASOUND (EBUS);  Surgeon: Gregorio Kinney MD;  Location: [facility];  Service: Pulmonary;  Laterality: Bilateral;  NEED LMA to  eval right upper  paratracheal LN.     ENDOBRONCHIAL ULTRASOUND Bilateral 06/16/2022    Procedure: ENDOBRONCHIAL ULTRASOUND (EBUS);  Surgeon: Gregorio Kinney MD;  Location: [facility];  Service: Pulmonary;  Laterality: Bilateral;    INJECTION OF FACET JOINT Left 06/30/2022    Procedure: FACET JOINT Cyst Aspiration L3/4;  Surgeon: Ronnell Baeza MD;  Location: [facility] OR;  Service: Pain Management;  Laterality: Left;    INSERTION OF TUNNELED CENTRAL VENOUS CATHETER (CVC) WITH SUBCUTANEOUS PORT Left 11/04/2020    Procedure: [id number];  Surgeon: Kody Pretty MD;  Location: [facility] OR;  Service: General;  Laterality: Left;    JOINT REPLACEMENT  2008    right knee     LUMBAR LAMINECTOMY      LYMPH NODE BIOPSY      MASTECTOMY Right 1985    NEEDLE LOCALIZATION N/A 05/25/2020    Procedure: NEEDLE LOCALIZATION - lymph node bx;  Surgeon: Steve Weaver MD;  Location: [facility];  Service: Interventional Radiology;  Laterality: N/A;    RETINAL DETACHMENT SURGERY  1989    RIGHT HEART CATHETERIZATION Right 03/18/2023    Procedure: INSERTION, CATHETER, RIGHT HEART;  Surgeon: Rukhsana Lang MD;  Location: [facility] CATH;  Service: Cardiology;  Laterality: Right;    SELECTIVE UNILATERAL PULMONARY ANGIOGRAPHY  03/18/2023    Procedure: Pumonary angiography - unilateral;  Surgeon: Rukhsana Lang MD;  Location: [facility] CATH;  Service: Cardiology;;    THROMBECTOMY N/A 03/18/2023    Procedure: THROMBECTOMY;  Surgeon: Rukhsana Lang MD;  Location: [facility] CATH;  Service: Cardiology;  Laterality: N/A;    TRANSFORAMINAL EPIDURAL INJECTION OF STEROID Left 05/14/2020    Procedure: Injection,steroid,epidural,transforaminal approach, l3/4;  Surgeon: Ronnell Baeza MD;  Location: [facility] OR;  Service: Pain Management;  Laterality: Left;    TRANSFORAMINAL EPIDURAL INJECTION OF STEROID Left 03/23/2022    Procedure: Injection,steroid,epidural,transforaminal approach L3/4;  Surgeon: Ronnell Baeza MD;  Location: [facility] OR;  Service: Pain Management;   Laterality: Left;    TRANSFORAMINAL EPIDURAL INJECTION OF STEROID Left 08/01/2022    Procedure: Injection,steroid,epidural,transforaminal approach L3/4;  Surgeon: Ronnell Baeza MD;  Location: [facility] OR;  Service: Pain Management;  Laterality: Left;    TUBAL LIGATION      UMBILICAL HERNIA REPAIR         Family History:   Family History   Problem Relation Age of Onset    Cancer Sister         uterine    Cancer Sister         Uterine cancer     Diabetes Brother     Breast cancer Other 42    Glaucoma Neg Hx     Macular degeneration Neg Hx        Social History:  reports that she quit smoking about 18 months ago. Her smoking use included cigarettes. She started smoking about 54 years ago. She has a 26.5 pack-year smoking history. She has never used smokeless tobacco. She reports that she does not drink alcohol and does not use drugs.    Allergies:  Review of patient's allergies indicates:   Allergen Reactions    Opioids - morphine analogues Other (See Comments)     Hypotension       Medications:  Current Outpatient Medications   Medication Sig Dispense Refill    albuterol (VENTOLIN HFA) 90 mcg/actuation inhaler Inhale 2 puffs into the lungs every 6 (six) hours as needed for Wheezing. Rescue 50 g 0    albuterol-ipratropium (DUO-NEB) 2.5 mg-0.5 mg/3 mL nebulizer solution Take 3 mLs by nebulization every 6 (six) hours as needed for Wheezing. Rescue 75 mL 2    apixaban (ELIQUIS) 2.5 mg Tab Take 1 tablet (2.5 mg total) by mouth 2 (two) times daily. 180 tablet 3    azelastine (ASTELIN) 137 mcg (0.1 %) nasal spray 1 spray (137 mcg total) by Nasal route 2 (two) times daily. 30 mL 12    budesonide (PULMICORT) 0.5 mg/2 mL nebulizer solution Take 2 mLs (0.5 mg total) by nebulization 2 (two) times a day. Controller 120 mL 11    cetirizine (ZYRTEC) 10 MG tablet Take 10 mg by mouth once daily.      cholecalciferol, vitamin D3, 1,250 mcg (50,000 unit) Tab Take 1 tablet by mouth every 7 days. 12 tablet 6    fluticasone propionate  (FLONASE) 50 mcg/actuation nasal spray 1 spray (50 mcg total) by Each Nostril route 2 (two) times a day. 16 g 12    levothyroxine (SYNTHROID) 100 MCG tablet Take 1 tablet (100 mcg total) by mouth once daily. 90 tablet 3    mucus clearing device (ACAPELLA, FLUTTER) by Misc.(Non-Drug; Combo Route) route once daily. 100 each 0    multivitamin (THERAGRAN) per tablet Take 1 tablet by mouth once daily.      NIFEdipine (ADALAT CC) 60 MG TbSR Take 1 tablet (60 mg total) by mouth once daily. 90 tablet 1    omeprazole (PRILOSEC) 40 MG capsule Take 1 capsule (40 mg total) by mouth once daily. 90 capsule 4    OXYGEN-AIR DELIVERY SYSTEMS MISC by [facility]Non-Drug; Combo Route) route.      potassium chloride (MICRO-K) 10 MEQ CpSR Take 2 capsules (20 mEq total) by mouth once daily. 180 capsule 3    sodium chloride 3% 3 % nebulizer solution Take 4 mLs by nebulization 3 (three) times a week. 48 mL 3    temazepam (RESTORIL) 30 mg capsule Take 1 capsule (30 mg total) by mouth every evening. 30 capsule 5     No current facility-administered medications for this visit.       Review of Systems   Constitutional:  Negative for appetite change, chills, fatigue, fever and unexpected weight change.   HENT:  Negative for mouth sores.    Eyes:  Negative for visual disturbance.   Respiratory:  Negative for cough and shortness of breath.    Cardiovascular:  Negative for chest pain and palpitations.   Gastrointestinal:  Negative for abdominal pain, constipation, diarrhea, nausea and vomiting.   Genitourinary:  Negative for frequency.   Musculoskeletal:  Negative for back pain.   Skin:  Negative for rash.   Neurological:  Negative for headaches.   Hematological:  Negative for adenopathy. Does not bruise/bleed easily.   Psychiatric/Behavioral:  The patient is not nervous/anxious.        ECOG Performance Status: 2   Objective:      Vitals:   Vitals:    12/14/23 1346   BP: 108/66   BP Location: Left arm   Patient Position: Sitting   BP Method: Large  "(Manual)   Pulse: 84   Resp: 18   Temp: 96.6 °F (35.9 °C)   TempSrc: Temporal   SpO2: 98%   Weight: 96.6 kg (212 lb 15.4 oz)   Height: 5' 4" (1.626 m)         Physical Exam  Constitutional:       General: She is not in acute distress.     Appearance: She is well-developed. She is not diaphoretic.   HENT:      Head: Normocephalic and atraumatic.   Cardiovascular:      Rate and Rhythm: Normal rate and regular rhythm.      Heart sounds: Normal heart sounds. No murmur heard.     No friction rub. No gallop.   Pulmonary:      Effort: Pulmonary effort is normal. No respiratory distress.      Breath sounds: Normal breath sounds. No wheezing or rales.   Chest:      Chest wall: No tenderness.   Abdominal:      General: Bowel sounds are normal. There is no distension.      Palpations: Abdomen is soft. There is no mass.      Tenderness: There is no abdominal tenderness. There is no guarding or rebound.   Lymphadenopathy:      Cervical: No cervical adenopathy.      Upper Body:      Right upper body: No supraclavicular or axillary adenopathy.      Left upper body: No supraclavicular or axillary adenopathy.   Skin:     Findings: No erythema or rash.   Neurological:      Mental Status: She is alert and oriented to person, place, and time.   Psychiatric:         Behavior: Behavior normal.         Laboratory Data:  Lab Visit on 12/12/2023   Component Date Value Ref Range Status    WBC 12/12/2023 6.08  3.90 - 12.70 K/uL Final    RBC 12/12/2023 4.39  4.00 - 5.40 M/uL Final    Hemoglobin 12/12/2023 12.8  12.0 - 16.0 g/dL Final    Hematocrit 12/12/2023 41.9  37.0 - 48.5 % Final    MCV 12/12/2023 95  82 - 98 fL Final    MCH 12/12/2023 29.2  27.0 - 31.0 pg Final    MCHC 12/12/2023 30.5 (L)  32.0 - 36.0 g/dL Final    RDW 12/12/2023 17.0 (H)  11.5 - 14.5 % Final    Platelets 12/12/2023 157  150 - 450 K/uL Final    MPV 12/12/2023 11.0  9.2 - 12.9 fL Final    Immature Granulocytes 12/12/2023 0.8 (H)  0.0 - 0.5 % Final    Gran # (ANC) " 12/12/2023 3.9  1.8 - 7.7 K/uL Final    Immature Grans (Abs) 12/12/2023 0.05 (H)  0.00 - 0.04 K/uL Final    Comment: Mild elevation in immature granulocytes is non specific and   can be seen in a variety of conditions including stress response,   acute inflammation, trauma and pregnancy. Correlation with other   laboratory and clinical findings is essential.      Lymph # 12/12/2023 1.3  1.0 - 4.8 K/uL Final    Mono # 12/12/2023 0.5  0.3 - 1.0 K/uL Final    Eos # 12/12/2023 0.2  0.0 - 0.5 K/uL Final    Baso # 12/12/2023 0.08  0.00 - 0.20 K/uL Final    nRBC 12/12/2023 0  0 /100 WBC Final    Gran % 12/12/2023 63.9  38.0 - 73.0 % Final    Lymph % 12/12/2023 21.5  18.0 - 48.0 % Final    Mono % 12/12/2023 8.9  4.0 - 15.0 % Final    Eosinophil % 12/12/2023 3.6  0.0 - 8.0 % Final    Basophil % 12/12/2023 1.3  0.0 - 1.9 % Final    Differential Method 12/12/2023 Automated   Final    Sodium 12/12/2023 143  136 - 145 mmol/L Final    Potassium 12/12/2023 5.1  3.5 - 5.1 mmol/L Final    Anion Gap reference range revised on 4/28/2023    Chloride 12/12/2023 107  95 - 110 mmol/L Final    CO2 12/12/2023 24  22 - 31 mmol/L Final    Glucose 12/12/2023 104  70 - 110 mg/dL Final    Comment: The ADA recommends the following guidelines for fasting glucose:    Normal:       less than 100 mg/dL    Prediabetes:  100 mg/dL to 125 mg/dL    Diabetes:     126 mg/dL or higher      BUN 12/12/2023 22 (H)  7 - 18 mg/dL Final    Creatinine 12/12/2023 1.24  0.50 - 1.40 mg/dL Final    Calcium 12/12/2023 9.1  8.4 - 10.2 mg/dL Final    Total Protein 12/12/2023 6.9  6.0 - 8.4 g/dL Final    Albumin 12/12/2023 4.5  3.5 - 5.2 g/dL Final    Total Bilirubin 12/12/2023 0.3  0.2 - 1.3 mg/dL Final    Alkaline Phosphatase 12/12/2023 91  38 - 145 U/L Final    AST 12/12/2023 36  14 - 36 U/L Final    ALT 12/12/2023 16  0 - 35 U/L Final    Anion Gap 12/12/2023 12  5 - 12 mmol/L Final    Anion Gap reference range revised on 4/28/2023    eGFR 12/12/2023 44 (A)  >60  mL/min/1.73 m^2 Final    Ferritin 12/12/2023 316 (H)  12 - 264 ng/mL Final    Comment: WARNING: Heterophilic antibodies in the serum or plasma of   certain individuals are known to cause interference with   immunoassays. These antibodies may be present in blood samples   from individuals regularly exposed to animals or who have been   treated with animal serum products.     Note: Patients taking very high Biotin doses of >300 mcg/day may   cause a negative bias in this assay.      Iron 12/12/2023 87  30 - 160 ug/dL Final    TIBC 12/12/2023 264 (L)  265 - 497 ug/dL Final    Iron Saturation 12/12/2023 33  20 - 50 % Final    Cholesterol 12/12/2023 240 (H)  120 - 199 mg/dL Final    Comment: The National Cholesterol Education Program (NCEP) has set the  following guidelines (reference ranges) for Cholesterol:  Optimal.....................<200 mg/dL  Borderline High.............200-239 mg/dL  High........................> or = 240 mg/dL      Triglycerides 12/12/2023 199 (H)  30 - 150 mg/dL Final    Comment: The National Cholesterol Education Program (NCEP) has set the  following guidelines (reference values) for triglycerides:  Normal......................<150 mg/dL  Borderline High.............150-199 mg/dL  High........................200-499 mg/dL      HDL 12/12/2023 67  40 - 75 mg/dL Final    Comment: The National Cholesterol Education Program (NCEP) has set the  following guidelines (reference values) for HDL Cholesterol:  Low...............<40 mg/dL  Optimal...........>60 mg/dL      LDL Cholesterol 12/12/2023 133.2  63.0 - 159.0 mg/dL Final    Comment: The National Cholesterol Education Program (NCEP) has set the  following guidelines (reference values) for LDL Cholesterol:  Optimal.......................<130 mg/dL  Borderline High...............130-159 mg/dL  High..........................160-189 mg/dL  Very High.....................>190 mg/dL      HDL/Cholesterol Ratio 12/12/2023 27.9  20.0 - 50.0 % Final    Total  Cholesterol/HDL Ratio 2023 3.6  2.0 - 5.0 Final    Non-HDL Cholesterol 2023 173  mg/dL Final    Comment: Risk category and Non-HDL cholesterol goals:  Coronary heart disease (CHD)or equivalent (10-year risk of CHD >20%):  Non-HDL cholesterol goal     <130 mg/dL  Two or more CHD risk factors and 10-year risk of CHD <= 20%:  Non-HDL cholesterol goal     <160 mg/dL  0 to 1 CHD risk factor:  Non-HDL cholesterol goal     <190 mg/dL     Hospital Outpatient Visit on 2023   Component Date Value Ref Range Status    POC Creatinine 2023 1.3  0.5 - 1.4 mg/dL Final         Imagin/12/23 CT Chest  Pleuroparenchymal thickening in the right lung apex appears slightly more prominent than on the prior study. No measurable right apical lung nodule is present. There is a background of emphysema. 6 mm left upper lobe nodule on series 14, image 90 is stable. Superior left lower lobe atelectasis is unchanged. Mild subpleural fibrosis and bronchiectasis is present primarily in the right lower lung. No pneumothorax.     Heart is enlarged. Thoracic aorta and coronary arteries are partially calcified. Central airways are clear.     No enlarged lymph nodes.     No acute osseous findings.     Visualized portions of the upper abdomen show no acute findings.    CT Abdomen and Pelvis 23  The bilateral lung bases are clear. The heart is normal in size.     The liver is homogeneous in attenuation. The portal vein is patent. The gallbladder is surgically absent. The spleen, pancreas, and bilateral adrenal glands are normal. There is a simple cyst in the left kidney. The solid mass in the inferior right kidney is better visualized on this exam with IV contrast and measures 4.3 x 4.3 cm. There is enlarged bilateral periaortic lymph adenopathy which has progressed from the prior exam. A representative left periaortic lymph node best visualized on series 9, image 156 measures 17 x 14 mm compared with 11 x 8 mm  previously.     The stomach and small bowel are decompressed. The appendix is not visualized. There is colonic diverticulosis without diverticulitis. The urinary bladder is normal. The uterus and adnexa are normal for age. There is enlarged bilateral inguinal and jarek pelvic lymphadenopathy. A representative left hemipelvic node best visualized on series 9, image 349 measures 4.4 x 1.8 cm. There is enlarged bilateral inguinal lymphadenopathy. A representative left inguinal lymph node best visualized on image 381 measures 3.5 x 1.7 cm.     There is no lytic or blastic osseous lesion. There are postoperative changes in the lumbar spine.        Assessment:       1. Malignant neoplasm of right upper lobe of lung    2. Right renal mass    3. Lymphadenopathy    4. Localized enlarged lymph nodes    5. Iron deficiency anemia due to chronic blood loss    6. Thrombophilia    7. Follicular lymphoma grade I, unspecified body region    8. Hypertension, unspecified type    9. Hyperlipidemia, unspecified hyperlipidemia type    10. Acquired hypothyroidism                               Plan:     NSCLC - Pt has NSCLC SCC with involvement of the right lung and mediastinum  -Pt with prior radiation to a RUL nodule  -PD-L1 was 86% on 6/18/21  -Pt was on Keytruda with 6 week cycles and completed 6 tx's and then developed pneumonitis  -PET/CT on 9/23/22 showed possible pseudoprogression  -PET/CT on 11/29/22 showed stable disease with RLL infiltrate concerning for resolving PNA  -PET-CT on 03/08/2023 showed resolution of prior disease   -CT Chest 4/14/23 and 7/10/23 with stable findings  -PET/CT 10/09/23 showed avidity in the RUL mass, inguinal LAD and subcutaneous nodule in the lumbar region  -Treatment with paclitaxel discussed; however, pt is hesitant to undergo chemotherapy  -Biopsy of lumbar subcutaneous nodule showed fibroadipose tissue  -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  -Ct chest 12/12/23 stable  -Will monitor on repeat  PET/CT in 2 months    [facility]addle Pulmonary Embolus - seen on CTA 03/18/2023   -Patient is status post thrombectomy and currently on Eliquis 2.5mg BID given prior GI bleed  -Patient will need to be on Eliquis lifelong given lung cancer    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 for 9/12 cycles with good response  -PT with recurrent inguinal LAD which could be lymphoma  -LN's increasing in size  -Biopsy discussed with the patient; however, she would like to be monitored  -Will repeat PET/Ct in 2 months    Right Renal Mass - PT followed by urology Dr Priest with recommendation on 7/25/23 for repeat renal US in 6 months  -Not mentioned on PET/CT  -Increasing in size on repeat CT  -Pt would like to discuss renal mass with Dr Priest in urology before biopsy  -Pt to see Dr Priest 1/31/24    HTN - pt on nifedipine  -BP normal  -Will monitor    Hypothyroidism - pt on levothyroxine  -Management per PCP    Iron Deficiency Anemia - Hemoglobin improved to 12.8g/dL on 12/12/23  -Ferritin 316ng/mL 12/12/23  -Ferritin was 22ng/mL on 9/05/23  -Pt received 4 doses of venofer  -Pt wants to postpone GI appt until after the new year  -Will monitor CBC and iron studies in 2 months    Route Chart for Scheduling    Med Onc Chart Routing      Follow up with physician 2 months. Pt needs a CBC, CMP, ferritin, iron/TIBC, LDH, uric acid and PEt/Ct at the beginning of FFebruary 2024 with an appt with me at least a day after the labs.   Follow up with DAYAMI    Infusion scheduling note    Injection scheduling note    Labs    Imaging    Pharmacy appointment    Other referrals                Treatment Plan Information   OP PEMBROLIZUMAB 400MG Q6W   Chad Mills MD   Upcoming Treatment Dates - OP PEMBROLIZUMAB 400MG Q6W    5/12/2023       Pre-Medications       acetaminophen tablet 1,000 mg       diphenhydrAMINE (BENADRYL) 25 mg in NS 50 mL IVPB       Chemotherapy       pembrolizumab (KEYTRUDA) 400 mg in sodium  chloride 0.9% SolP 116 mL infusion  6/23/2023       Pre-Medications       acetaminophen tablet 1,000 mg       diphenhydrAMINE (BENADRYL) 25 mg in NS 50 mL IVPB       Chemotherapy       pembrolizumab (KEYTRUDA) 400 mg in sodium chloride 0.9% SolP 116 mL infusion  8/4/2023       Pre-Medications       acetaminophen tablet 1,000 mg       diphenhydrAMINE (BENADRYL) 25 mg in NS 50 mL IVPB       Chemotherapy       pembrolizumab (KEYTRUDA) 400 mg in sodium chloride 0.9% SolP 116 mL infusion  9/15/2023       Pre-Medications       acetaminophen tablet 1,000 mg       diphenhydrAMINE (BENADRYL) 25 mg in NS 50 mL IVPB       Chemotherapy       pembrolizumab (KEYTRUDA) 400 mg in sodium chloride 0.9% SolP 116 mL infusion    Supportive Plan Information  OP IRON SUCROSE IVPB TWICE WEEKLY   Chad Mills MD   Upcoming Treatment Dates - OP IRON SUCROSE IVPB TWICE WEEKLY    No upcoming days in selected categories.    Therapy Plan Information  PORT FLUSH  Flushes  heparin, porcine (PF) 100 unit/mL injection flush 500 Units  500 Units, Intravenous, Every visit  sodium chloride 0.9% flush 10 mL  10 mL, Intravenous, Every visit    INF PEGFILGRASTIM (NEULASTA)  pegfilgrastim (NEULASTA) injection 6 mg  6 mg, Subcutaneous, Every visit        Chad Mills MD  [facility]  Hematology and Oncology  [facility]   [address] [place] [address]   O: [phone]  F: [phone]

## 2023-12-14 ENCOUNTER — OFFICE VISIT (OUTPATIENT)
Dept: HEMATOLOGY/ONCOLOGY | Facility: CLINIC | Age: 83
End: 2023-12-14
Payer: MEDICARE

## 2023-12-14 VITALS
HEART RATE: 84 BPM | DIASTOLIC BLOOD PRESSURE: 66 MMHG | BODY MASS INDEX: 36.35 KG/M2 | TEMPERATURE: 97 F | WEIGHT: 212.94 LBS | OXYGEN SATURATION: 98 % | SYSTOLIC BLOOD PRESSURE: 108 MMHG | HEIGHT: 64 IN | RESPIRATION RATE: 18 BRPM

## 2023-12-14 DIAGNOSIS — D50.0 IRON DEFICIENCY ANEMIA DUE TO CHRONIC BLOOD LOSS: ICD-10-CM

## 2023-12-14 DIAGNOSIS — R59.0 LOCALIZED ENLARGED LYMPH NODES: ICD-10-CM

## 2023-12-14 DIAGNOSIS — N28.89 RIGHT RENAL MASS: ICD-10-CM

## 2023-12-14 DIAGNOSIS — E03.9 ACQUIRED HYPOTHYROIDISM: ICD-10-CM

## 2023-12-14 DIAGNOSIS — C82.00 FOLLICULAR LYMPHOMA GRADE I, UNSPECIFIED BODY REGION: ICD-10-CM

## 2023-12-14 DIAGNOSIS — D68.59 THROMBOPHILIA: ICD-10-CM

## 2023-12-14 DIAGNOSIS — R59.1 LYMPHADENOPATHY: ICD-10-CM

## 2023-12-14 DIAGNOSIS — I10 HYPERTENSION, UNSPECIFIED TYPE: ICD-10-CM

## 2023-12-14 DIAGNOSIS — C34.11 MALIGNANT NEOPLASM OF RIGHT UPPER LOBE OF LUNG: Primary | ICD-10-CM

## 2023-12-14 DIAGNOSIS — E78.5 HYPERLIPIDEMIA, UNSPECIFIED HYPERLIPIDEMIA TYPE: ICD-10-CM

## 2023-12-14 PROCEDURE — 3074F SYST BP LT 130 MM HG: CPT | Mod: CPTII,S$GLB,, | Performed by: INTERNAL MEDICINE

## 2023-12-14 PROCEDURE — 1159F MED LIST DOCD IN RCRD: CPT | Mod: CPTII,S$GLB,, | Performed by: INTERNAL MEDICINE

## 2023-12-14 PROCEDURE — 3074F PR MOST RECENT SYSTOLIC BLOOD PRESSURE < 130 MM HG: ICD-10-PCS | Mod: CPTII,S$GLB,, | Performed by: INTERNAL MEDICINE

## 2023-12-14 PROCEDURE — 99999 PR PBB SHADOW E&M-EST. PATIENT-LVL IV: ICD-10-PCS | Mod: PBBFAC,,, | Performed by: INTERNAL MEDICINE

## 2023-12-14 PROCEDURE — 3078F PR MOST RECENT DIASTOLIC BLOOD PRESSURE < 80 MM HG: ICD-10-PCS | Mod: CPTII,S$GLB,, | Performed by: INTERNAL MEDICINE

## 2023-12-14 PROCEDURE — 1159F PR MEDICATION LIST DOCUMENTED IN MEDICAL RECORD: ICD-10-PCS | Mod: CPTII,S$GLB,, | Performed by: INTERNAL MEDICINE

## 2023-12-14 PROCEDURE — 1125F AMNT PAIN NOTED PAIN PRSNT: CPT | Mod: CPTII,S$GLB,, | Performed by: INTERNAL MEDICINE

## 2023-12-14 PROCEDURE — 1125F PR PAIN SEVERITY QUANTIFIED, PAIN PRESENT: ICD-10-PCS | Mod: CPTII,S$GLB,, | Performed by: INTERNAL MEDICINE

## 2023-12-14 PROCEDURE — 99999 PR PBB SHADOW E&M-EST. PATIENT-LVL IV: CPT | Mod: PBBFAC,,, | Performed by: INTERNAL MEDICINE

## 2023-12-14 PROCEDURE — 99214 PR OFFICE/OUTPT VISIT, EST, LEVL IV, 30-39 MIN: ICD-10-PCS | Mod: S$GLB,,, | Performed by: INTERNAL MEDICINE

## 2023-12-14 PROCEDURE — 3078F DIAST BP <80 MM HG: CPT | Mod: CPTII,S$GLB,, | Performed by: INTERNAL MEDICINE

## 2023-12-14 PROCEDURE — 1101F PT FALLS ASSESS-DOCD LE1/YR: CPT | Mod: CPTII,S$GLB,, | Performed by: INTERNAL MEDICINE

## 2023-12-14 PROCEDURE — 3288F PR FALLS RISK ASSESSMENT DOCUMENTED: ICD-10-PCS | Mod: CPTII,S$GLB,, | Performed by: INTERNAL MEDICINE

## 2023-12-14 PROCEDURE — 1101F PR PT FALLS ASSESS DOC 0-1 FALLS W/OUT INJ PAST YR: ICD-10-PCS | Mod: CPTII,S$GLB,, | Performed by: INTERNAL MEDICINE

## 2023-12-14 PROCEDURE — 3288F FALL RISK ASSESSMENT DOCD: CPT | Mod: CPTII,S$GLB,, | Performed by: INTERNAL MEDICINE

## 2023-12-14 PROCEDURE — 99214 OFFICE O/P EST MOD 30 MIN: CPT | Mod: S$GLB,,, | Performed by: INTERNAL MEDICINE

## 2023-12-20 ENCOUNTER — HOSPITAL ENCOUNTER (OUTPATIENT)
Dept: RADIOLOGY | Facility: HOSPITAL | Age: 83
Discharge: HOME | End: 2023-12-20
Attending: FAMILY MEDICINE
Payer: MEDICARE

## 2023-12-20 DIAGNOSIS — Z78.0 ASYMPTOMATIC MENOPAUSAL STATE: ICD-10-CM

## 2023-12-20 PROCEDURE — 77080 DXA BONE DENSITY AXIAL: CPT | Mod: TC,PO

## 2023-12-20 PROCEDURE — 77080 DXA BONE DENSITY AXIAL SKELETON 1 OR MORE SITES: ICD-10-PCS | Mod: 26,,, | Performed by: RADIOLOGY

## 2023-12-20 PROCEDURE — 77080 DXA BONE DENSITY AXIAL: CPT | Mod: 26,,, | Performed by: RADIOLOGY

## 2024-01-13 ENCOUNTER — PATIENT MESSAGE (OUTPATIENT)
Dept: FAMILY MEDICINE | Facility: CLINIC | Age: 84
End: 2024-01-13
Payer: MEDICARE

## 2024-01-14 NOTE — TELEPHONE ENCOUNTER
No care due was identified.  [facility] Embedded Care Due Messages. Reference number: [id number].   1/13/2024 8:56:26 PM CST

## 2024-01-15 RX ORDER — TEMAZEPAM 30 MG/1
30 CAPSULE ORAL NIGHTLY
Qty: 30 CAPSULE | Refills: 5 | Status: SHIPPED | OUTPATIENT
Start: 2024-01-15

## 2024-01-25 ENCOUNTER — HOSPITAL ENCOUNTER (OUTPATIENT)
Dept: RADIOLOGY | Facility: HOSPITAL | Age: 84
Discharge: HOME | End: 2024-01-25
Attending: STUDENT IN AN ORGANIZED HEALTH CARE EDUCATION/TRAINING PROGRAM
Payer: MEDICARE

## 2024-01-25 DIAGNOSIS — N28.89 RIGHT RENAL MASS: ICD-10-CM

## 2024-01-25 PROCEDURE — 76770 US EXAM ABDO BACK WALL COMP: CPT | Mod: TC,PO

## 2024-01-25 PROCEDURE — 76770 US EXAM ABDO BACK WALL COMP: CPT | Mod: 26,,, | Performed by: RADIOLOGY

## 2024-01-28 NOTE — PROGRESS NOTES
"[place] - [facility]rology   Clinic Note    Subjective:     Chief Complaint: Follow-up    History of Present Illness:  Shandra Velez is a 83 y.o. female who presents to clinic for evaluation and management of a right renal mass. She is established to our clinic.     She has a history of follicular lymphoma s/p rituxan, squamous cell carcinoma of the lung s/p SBRT completed 2021 with recurrent nodes and started on Keytruda 6/2022 - she has been off more recently and is HUMAIRA.  Admitted with pneumonia 1/2023. Hospitalized 3/2023 with a saddle PE and cor pulmonale requiring thrombectomy. She is on lifelong AC. She follows with Dr. Kinney for COPD and pneumonitis. She was on home oxygen but has been able to stop it.     She has CKD, recent creatinine was 1.24 with GFR of 44. Renal US 6/2023 demonstrated a solid right renal mass measuring 4.1 cm in the largest dimension on US. The mass was visualized on imaging dating back to at least 1/2022. CT AP 6/2022 showed a 3.1 cm lower pole anterior renal mass. CT Abd 7/2023 showed 3.9 cm right renal mass. CT 12/2023 showed 4.3 cm right renal mass. Progressive bilateral periaortic LAD measuring 1.7 cm, and inguinal LAD with 4.4 cm and 3.5 cm nodes.     History of umbilical hernia repair with mesh. History of appendectomy and lap ta. History of breast reconstruction with flap and large transverse lower abdominal scar.     Past medical, family, surgical and social history reviewed as documented in chart with pertinent positive medical, family, surgical and social history detailed in HPI.    A review systems was conducted with pertinent positive and negative findings documented in HPI.    Anticoagulation/Antiplatelets:  Yes eliquis    Objective:     Estimated body mass index is 36.86 kg/m² as calculated from the following:    Height as of this encounter: 5' 4" (1.626 m).    Weight as of this encounter: 97.4 kg (214 lb 11.7 oz).    Vital Signs (Most Recent)       Physical " Exam  Constitutional:       General: She is not in acute distress.     Appearance: She is well-developed. She is not ill-appearing or toxic-appearing.   Pulmonary:      Effort: Pulmonary effort is normal. No accessory muscle usage or respiratory distress.   Abdominal:       Neurological:      Mental Status: She is alert.         Labs reviewed below:  Lab Results   Component Value Date    BUN 22 (H) 12/12/2023    CREATININE 1.24 12/12/2023    WBC 6.08 12/12/2023    HGB 12.8 12/12/2023    HCT 41.9 12/12/2023     12/12/2023    AST 36 12/12/2023    ALT 16 12/12/2023    ALKPHOS 91 12/12/2023    ALBUMIN 4.5 12/12/2023    HGBA1C 5.7 (H) 03/23/2018      Assessment:     1. Right renal mass      Plan:     She has a cT1b right renal mass.     We discussed the nature and natural history of renal masses and reviewed her imaging in detail. We also discussed renal mass ablation for masses < 3 cm and renal mass biopsy along with indications and rationale for its role. Masses >4 cm are more likely to be malignant (up to 90%). We discussed options including renal mass biopsy, surgical removal, ablation, active surveillance.     I think she has a relatively high surgical risk. Specifically I discussed the risks of her pulmonary disease, history of PE, anticoagulation status, history of significant previous abdominal surgeries.      I will discuss further with Dr. Mills regarding the status of her other malignancies especially in light of progressive LAD  Tentatively plan on IR guided biopsy of the renal mass  Plan for presentation at  [facility]    Noel Priest MD     Total time today for this encounter was 40 minutes. This includes preparing to see the patient (eg, review of tests), obtaining and/or reviewing separately obtained history, documenting clinical information in the electronic or other health record, independently interpreting results and communicating results to the patient/family/caregiver, and discussing the  plan with other providers when necessary.

## 2024-01-30 ENCOUNTER — OFFICE VISIT (OUTPATIENT)
Dept: UROLOGY | Facility: CLINIC | Age: 84
End: 2024-01-30
Payer: MEDICARE

## 2024-01-30 VITALS — BODY MASS INDEX: 36.66 KG/M2 | WEIGHT: 214.75 LBS | HEIGHT: 64 IN

## 2024-01-30 DIAGNOSIS — N28.89 RIGHT RENAL MASS: Primary | ICD-10-CM

## 2024-01-30 LAB
BILIRUBIN, UA POC OHS: NEGATIVE
BLOOD, UA POC OHS: NEGATIVE
CLARITY, UA POC OHS: CLEAR
COLOR, UA POC OHS: YELLOW
GLUCOSE, UA POC OHS: NEGATIVE
KETONES, UA POC OHS: NEGATIVE
LEUKOCYTES, UA POC OHS: NEGATIVE
NITRITE, UA POC OHS: NEGATIVE
PH, UA POC OHS: 6.5
PROTEIN, UA POC OHS: NEGATIVE
SPECIFIC GRAVITY, UA POC OHS: 1.01
UROBILINOGEN, UA POC OHS: 0.2

## 2024-01-30 PROCEDURE — G2211 COMPLEX E/M VISIT ADD ON: HCPCS | Mod: S$GLB,,, | Performed by: STUDENT IN AN ORGANIZED HEALTH CARE EDUCATION/TRAINING PROGRAM

## 2024-01-30 PROCEDURE — 99999 PR PBB SHADOW E&M-EST. PATIENT-LVL III: CPT | Mod: PBBFAC,,, | Performed by: STUDENT IN AN ORGANIZED HEALTH CARE EDUCATION/TRAINING PROGRAM

## 2024-01-30 PROCEDURE — 1101F PT FALLS ASSESS-DOCD LE1/YR: CPT | Mod: CPTII,S$GLB,, | Performed by: STUDENT IN AN ORGANIZED HEALTH CARE EDUCATION/TRAINING PROGRAM

## 2024-01-30 PROCEDURE — 1159F MED LIST DOCD IN RCRD: CPT | Mod: CPTII,S$GLB,, | Performed by: STUDENT IN AN ORGANIZED HEALTH CARE EDUCATION/TRAINING PROGRAM

## 2024-01-30 PROCEDURE — 3288F FALL RISK ASSESSMENT DOCD: CPT | Mod: CPTII,S$GLB,, | Performed by: STUDENT IN AN ORGANIZED HEALTH CARE EDUCATION/TRAINING PROGRAM

## 2024-01-30 PROCEDURE — 81003 URINALYSIS AUTO W/O SCOPE: CPT | Mod: QW,S$GLB,, | Performed by: STUDENT IN AN ORGANIZED HEALTH CARE EDUCATION/TRAINING PROGRAM

## 2024-01-30 PROCEDURE — 99215 OFFICE O/P EST HI 40 MIN: CPT | Mod: S$GLB,,, | Performed by: STUDENT IN AN ORGANIZED HEALTH CARE EDUCATION/TRAINING PROGRAM

## 2024-01-31 ENCOUNTER — PATIENT MESSAGE (OUTPATIENT)
Dept: UROLOGY | Facility: CLINIC | Age: 84
End: 2024-01-31
Payer: MEDICARE

## 2024-02-07 ENCOUNTER — PATIENT MESSAGE (OUTPATIENT)
Dept: UROLOGY | Facility: CLINIC | Age: 84
End: 2024-02-07
Payer: MEDICARE

## 2024-02-14 DIAGNOSIS — C34.90 MALIGNANT NEOPLASM OF UNSPECIFIED PART OF UNSPECIFIED BRONCHUS OR LUNG: Primary | ICD-10-CM

## 2024-02-14 RX ORDER — IPRATROPIUM BROMIDE AND ALBUTEROL SULFATE 2.5; .5 MG/3ML; MG/3ML
3 SOLUTION RESPIRATORY (INHALATION) EVERY 6 HOURS PRN
Qty: 75 ML | Refills: 2 | Status: SHIPPED | OUTPATIENT
Start: 2024-02-14 | End: 2024-03-23 | Stop reason: CLARIF

## 2024-02-15 ENCOUNTER — HOSPITAL ENCOUNTER (OUTPATIENT)
Dept: RADIOLOGY | Facility: HOSPITAL | Age: 84
Discharge: HOME | End: 2024-02-15
Attending: INTERNAL MEDICINE
Payer: MEDICARE

## 2024-02-15 DIAGNOSIS — R59.1 LYMPHADENOPATHY: ICD-10-CM

## 2024-02-15 DIAGNOSIS — C34.11 MALIGNANT NEOPLASM OF RIGHT UPPER LOBE OF LUNG: ICD-10-CM

## 2024-02-15 LAB — GLUCOSE SERPL-MCNC: 159 MG/DL (ref 70–110)

## 2024-02-15 PROCEDURE — A9552 F18 FDG: HCPCS | Mod: PN

## 2024-02-15 PROCEDURE — 78815 PET IMAGE W/CT SKULL-THIGH: CPT | Mod: 26,PS,, | Performed by: STUDENT IN AN ORGANIZED HEALTH CARE EDUCATION/TRAINING PROGRAM

## 2024-02-15 PROCEDURE — 78815 PET IMAGE W/CT SKULL-THIGH: CPT | Mod: TC,PN

## 2024-02-15 NOTE — PROGRESS NOTES
PET Imaging Questionnaire    Are you a Diabetic? Recent Blood Sugar level? No    Are you anemic? Bone Marrow Stimulation Meds? No    Have you had a CT Scan, if so when & where was your last one? Yes -     Have you had a PET Scan, if so when & where was your last one? Yes -     Chemotherapy or currently on Chemotherapy? Yes    Radiation therapy? Yes    Surgical History:   Past Surgical History:   Procedure Laterality Date    APPENDECTOMY      BONE MARROW BIOPSY Bilateral 06/24/2020    Procedure: Biopsy-bone marrow;  Surgeon: Rod Montero MD;  Location: [facility];  Service: Oncology;  Laterality: Bilateral;    BREAST RECONSTRUCTION  1990    right    BRONCHOSCOPY N/A 01/18/2023    Procedure: BRONCHOSCOPY;  Surgeon: Gregorio Kinney MD;  Location: [facility];  Service: Pulmonary;  Laterality: N/A;    BRONCHOSCOPY Bilateral 02/10/2023    Procedure: Bronchoscopy;  Surgeon: Gregorio Kinney MD;  Location: [facility];  Service: Pulmonary;  Laterality: Bilateral;  for airway clearance. Purple top tube for cell count diff    BRONCHOSCOPY N/A 03/13/2023    Procedure: Bronchoscopy;  Surgeon: Gregorio Kinney MD;  Location: [facility];  Service: Pulmonary;  Laterality: N/A;  therapeutic scope. Please have purple top tube and iced saline    CATARACT EXTRACTION W/  INTRAOCULAR LENS IMPLANT Bilateral     CHOLECYSTECTOMY      ENDOBRONCHIAL ULTRASOUND Bilateral 07/09/2021    Procedure: ENDOBRONCHIAL ULTRASOUND (EBUS);  Surgeon: Gregorio Kinney MD;  Location: [facility];  Service: Pulmonary;  Laterality: Bilateral;  NEED LMA to  eval right upper paratracheal LN.     ENDOBRONCHIAL ULTRASOUND Bilateral 06/16/2022    Procedure: ENDOBRONCHIAL ULTRASOUND (EBUS);  Surgeon: Gregorio Kinney MD;  Location: [facility];  Service: Pulmonary;  Laterality: Bilateral;    INJECTION OF FACET JOINT Left 06/30/2022    Procedure: FACET JOINT Cyst Aspiration L3/4;  Surgeon: Ronnell Baeza MD;  Location: [facility];  Service: Pain Management;  Laterality: Left;     INSERTION OF TUNNELED CENTRAL VENOUS CATHETER (CVC) WITH SUBCUTANEOUS PORT Left 11/04/2020    Procedure: LALSCODEP-DUYO-T-CATH;  Surgeon: Kody Pretty MD;  Location[facility];  Service: General;  Laterality: Left;    JOINT REPLACEMENT  2008    right knee     LUMBAR LAMINECTOMY      LYMPH NODE BIOPSY      MASTECTOMY Right 1985    NEEDLE LOCALIZATION N/A 05/25/2020    Procedure: NEEDLE LOCALIZATION - lymph node bx;  Surgeon: Steve Weaver MD;  Location: [facility];  Service: Interventional Radiology;  Laterality: N/A;    RETINAL DETACHMENT SURGERY  1989    RIGHT HEART CATHETERIZATION Right 03/18/2023    Procedure: INSERTION, CATHETER, RIGHT HEART;  Surgeon: Rukhsana Lang MD;  Location: [facility];  Service: Cardiology;  Laterality: Right;    SELECTIVE UNILATERAL PULMONARY ANGIOGRAPHY  03/18/2023    Procedure: Pumonary angiography - unilateral;  Surgeon: Rukhsana Lang MD;  Location: [facility];  Service: Cardiology;;    THROMBECTOMY N/A 03/18/2023    Procedure: THROMBECTOMY;  Surgeon: Rukhsana Lang MD;  Location: [facility];  Service: Cardiology;  Laterality: N/A;    TRANSFORAMINAL EPIDURAL INJECTION OF STEROID Left 05/14/2020    Procedure: Injection,steroid,epidural,transforaminal approach, l3/4;  Surgeon: Ronnell Baeza MD;  Location: [facility];  Service: Pain Management;  Laterality: Left;    TRANSFORAMINAL EPIDURAL INJECTION OF STEROID Left 03/23/2022    Procedure: Injection,steroid,epidural,transforaminal approach L3/4;  Surgeon: Ronnell Baeza MD;  Location: [facility];  Service: Pain Management;  Laterality: Left;    TRANSFORAMINAL EPIDURAL INJECTION OF STEROID Left 08/01/2022    Procedure: Injection,steroid,epidural,transforaminal approach L3/4;  Surgeon: Ronnell Baeza MD;  Location: [facility];  Service: Pain Management;  Laterality: Left;    TUBAL LIGATION      UMBILICAL HERNIA REPAIR          Have you been fasting for at least 6 hours? Yes    Is there any chance you may be pregnant or  breastfeeding? No    Assay: 12.7 MCi@:11.56   Injection Site:lt ac     Residual: .8 mCi@: 11.58   Technologist: Vaishali Mccartney Injected:11.9mCi

## 2024-02-20 PROBLEM — R94.8 ABNORMAL PET SCAN OF RETROPERITONEUM: Status: ACTIVE | Noted: 2024-02-20

## 2024-02-22 NOTE — PROGRESS NOTES
PATIENT: Shandra Velez  MRN: [id number]  DATE: 2/23/2024      Diagnosis:   1. Malignant neoplasm of right upper lobe of lung    2. Right renal mass    3. Lymphadenopathy    4. Localized enlarged lymph nodes    5. Iron deficiency anemia due to chronic blood loss    6. Thrombophilia    7. Follicular lymphoma grade I, unspecified body region    8. Hypertension, unspecified type    9. Hyperlipidemia, unspecified hyperlipidemia type    10. Acquired hypothyroidism        Chief Complaint: Malignant neoplasm of right upper lobe of lung (2 month follow up)    Oncologic History:     Pt initially underwent right inguinal LN biopsy 5/25/22 showing grade 1 follicular lymphoma.  Bone marrow biopsy done 06/24/2020 showed involvement with follicular lymphoma.  PET/CT 7/07/20 showed lymphadenopathy above and below the diaphragm as well as the spleen.  Also seen wasa RUL lesion.  The patient was started on CVP-R followed by maintenance rituxan every 8 weeks.  Biopsy of the RUL nodule on 6/18/21 showed SCC with PD-L1 at 86%.  EBUS 7/09/21 showed no malignant cells at station 7, 4R or 11RS.  The patient was treated with SBRT under the care of Dr Barnes with 50Gy in 4 fractions completed 10/28/21.  The patient then developed new pulmonary nodules on CT scan 6/02/22.  WBUS 6/16/22 showed positive SCC in 4R LN.  Pt was discussed at tumor board on 6/21/22 with recommendation for Keytruda.  The patient underwent PET/CT on 9/23/22 after 2 doses of treatment showing a 1.5 cm precarinal lymph node; stable right apical mass measuring 1.8 x 1.5 cm; stable 8 mm right apical lung nodule with adjacent post radiation change measuring 2 x 2.7 cm; stable 4 mm subpleural nodule in the lateral right upper lobe; fluid/mucus in the right lower lobe bronchus tree; 6 mm anterior left upper lobe nodule which is new; 2.3 cm simple cyst in the midpole of the left kidney; 3.1 x 2.3 cm rim enhancing complex cystic and solid mass in the mid to lower pole the  right kidney; infrarenal abdominal aortic aneurysm measuring 3.8 x 3.5 cm; and stable a left para-aortic lymph node measuring 11 mm. 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   The patient underwent PET-CT on 11/29/2022 showing an irregular hypermetabolic right upper lobe tumor which is stable measuring 1.6 x 1.2 cm; hypermetabolic subpleural right upper lobe consolidation diminished in extent; unchanged linear zone of hypermetabolic atelectasis in the superior segment of the left lower lobe; new poorly metabolic ill-defined infiltrate in the posterior right lower lobe; 4 mm left upper lobe nodule which is stable; hypermetabolic precarinal mediastinal lymph node stable measuring 1 cm; and hypermetabolic aortopulmonary lymph node stable measuring 1.1 x 1.1 cm.   The patient was admitted to the hospital from 1/16/23 to 1/23/23 for pneumonia.  She underwent bronchoscopy with Dr Kinney on 1/18/23 showing significant mucus plug impaction int the pharynx, left mainstem bronchus and right and left lower lobes.  The patient was discharged on 3L O2.   The patient underwent PET-CT on 03/08/2023 showing thickening of the pleura; new ground-glass opacities within the left upper lobe; patchy airspace opacities in the right lower lobe; right lower lobe pulmonary artery hypermetabolic activity possibly secondary to bronchial wall thickening or thrombus in the pulmonary artery; diffuse hypermetabolic activity identified throughout the axial skeleton.  The patient was then admitted to the hospital on 03/18/23-4/3/23 after presenting with shortness of breath and being found to have a saddle pulmonary embolus on CTA 03/18/2023.  The patient underwent treatment with a heparin drip and thrombectomy on 03/18/2023 with eventual transition to Eliquis.   The patient underwent CT chest on 04/14/2023 showing areas of air trapping in subpleural bleb formation in  both jarek thoraces with areas of interstitial scarring and bibasilar bronchiectasis; stable solid nodule in left upper lobe measuring 6 mm; masslike area measuring 3 x 1.3 x 1.5 cm in the right upper lobe stable.   The patient underwent CT chest, abdomen, and pelvis on 07/10/2023 showing more consolidative appearance of previously described right apical pulmonary nodules without discretely measurable nodule on today's exam, 8 mm left apical nodule, stable consolidation and superior segment left lower lobe; 3 mm right lower lobe pulmonary nodule; heterogeneously enhancing mass in the inferior pole of the right kidney measuring 3.9 x 3.7 x 3.9 cm; unchanged left periaortic lymph nodes.   The patient underwent a PET-CT on 10/09/2023 showing a new oval-shaped hypermetabolic nodular mass measuring 1.6 x 1 cm in the right upper lobe consistent with tumor recurrence; disappearance of ground-glass opacities anterior left upper lobe; disappearance of subpleural posterior right lower lobe consolidation; new hypermetabolic bilateral inguinal lymph nodes; new rounded nodular hypermetabolic nodule within the posterior right lumbar subcutaneous fat measuring 1.5 x 1.5 cm and a 3.8cm abdominal aortic aneurysm.    The patient was discussed at Thoracic tumor board on 10/25/23 with recommendation to proceed with biopsy of the lesion in the back via IR.  Biopsy done on 11/03/23 showed fibroadipose tissue.   The patient underwent CT of the chest, abdomen, and pelvis on 12/12/2023 showing pleural parenchymal thickening in the right lung apex; 6 mm left upper lobe nodule stable; solid mass inferior right kidney measuring 4.3 x 4.3 cm; enlarged bilateral periaortic lymphadenopathy measuring 17 x 14 mm; enlarged bilateral inguinal hemipelvic lymphadenopathy with left hemipelvic lymph node measuring 4.4 x 1.8 cm and left inguinal lymph node measuring 3.5 x 1.7 cm.    Subjective:    Interval History: Ms. Velez is a 83 y.o. female with HLD,  HTN, osteopenia, follicular lymphoma who presents for follow up of NSCLC.  Since since the last clinic visit the patient underwent PET-CT on 02/15/2024 showing a stable nodular focus of hypermetabolic activity in the right apical soft tissue area of consolidation; new area of ground-glass opacity more anterior in the right lung apex measuring 2.6 x 2.2 cm; multiple metastatic periaortic and bilateral iliac lymph nodes with progression from prior with a new left para-aortic lymph node measuring 19 x 17 mm SUV max 6.5, left hemipelvic node measuring 4.1 x 1.5 cm with SUV max of 9.4 enlarged inguinal lymph node measuring 3 x 2.2 with SUV max of 9.2.      The patient states that she went back on her home oxygen last Thursday after her oxygen at rest was found to be 87%.  Patient endorses shortness of breath with exertion.  The patient denies CP, cough, abdominal pain, nausea, vomiting, constipation, diarrhea.  The patient denies fever, chills, night sweats, weight loss, new lumps or bumps, easy bruising or bleeding.    Past Medical History:   Past Medical History:   Diagnosis Date    Breast cancer 1985    right mastectomy    Digestive disorder     Encounter for blood transfusion     History of pneumonia     Hypercholesterolemia     Hypertension     Indigestion     Lumbar spondylosis     Lung cancer     2022 - currently in treatment as of 7/27/22    Lymphoma     Osteopenia     Pulmonary nodule     Retinal detachment     Smoker 06/11/2022       Past Surgical HIstory:   Past Surgical History:   Procedure Laterality Date    APPENDECTOMY      BONE MARROW BIOPSY Bilateral 06/24/2020    Procedure: Biopsy-bone marrow;  Surgeon: Rod Montero MD;  Location: [facility] OR;  Service: Oncology;  Laterality: Bilateral;    BREAST RECONSTRUCTION  1990    right    BRONCHOSCOPY N/A 01/18/2023    Procedure: BRONCHOSCOPY;  Surgeon: Gregorio Kinney MD;  Location: [facility];  Service: Pulmonary;  Laterality: N/A;    BRONCHOSCOPY Bilateral  02/10/2023    Procedure: Bronchoscopy;  Surgeon: Gregorio Kinney MD;  Location: [facility];  Service: Pulmonary;  Laterality: Bilateral;  for airway clearance. Purple top tube for cell count diff    BRONCHOSCOPY N/A 03/13/2023    Procedure: Bronchoscopy;  Surgeon: Gregorio Kinney MD;  Location: [facility];  Service: Pulmonary;  Laterality: N/A;  therapeutic scope. Please have purple top tube and iced saline    CATARACT EXTRACTION W/  INTRAOCULAR LENS IMPLANT Bilateral     CHOLECYSTECTOMY      ENDOBRONCHIAL ULTRASOUND Bilateral 07/09/2021    Procedure: ENDOBRONCHIAL ULTRASOUND (EBUS);  Surgeon: Gregorio Kinney MD;  Location: [facility];  Service: Pulmonary;  Laterality: Bilateral;  NEED LMA to  eval right upper paratracheal LN.     ENDOBRONCHIAL ULTRASOUND Bilateral 06/16/2022    Procedure: ENDOBRONCHIAL ULTRASOUND (EBUS);  Surgeon: Gregorio Kinney MD;  Location: [facility];  Service: Pulmonary;  Laterality: Bilateral;    INJECTION OF FACET JOINT Left 06/30/2022    Procedure: FACET JOINT Cyst Aspiration L3/4;  Surgeon: Ronnell Baeza MD;  Location: [facility] OR;  Service: Pain Management;  Laterality: Left;    INSERTION OF TUNNELED CENTRAL VENOUS CATHETER (CVC) WITH SUBCUTANEOUS PORT Left 11/04/2020    Procedure: [id number];  Surgeon: Kody Pretty MD;  Location: [facility] OR;  Service: General;  Laterality: Left;    JOINT REPLACEMENT  2008    right knee     LUMBAR LAMINECTOMY      LYMPH NODE BIOPSY      MASTECTOMY Right 1985    NEEDLE LOCALIZATION N/A 05/25/2020    Procedure: NEEDLE LOCALIZATION - lymph node bx;  Surgeon: Steve Weaver MD;  Location: [facility] CATH;  Service: Interventional Radiology;  Laterality: N/A;    RETINAL DETACHMENT SURGERY  1989    RIGHT HEART CATHETERIZATION Right 03/18/2023    Procedure: INSERTION, CATHETER, RIGHT HEART;  Surgeon: Rukhsana Lang MD;  Location: [facility] CATH;  Service: Cardiology;  Laterality: Right;    SELECTIVE UNILATERAL PULMONARY ANGIOGRAPHY  03/18/2023    Procedure:  [facility] angiography - unilateral;  Surgeon: Rukhsana Lang MD;  [facility];  Service: Cardiology;;    THROMBECTOMY N/A 03/18/2023    Procedure: THROMBECTOMY;  Surgeon: Rukhsana Lang MD;  [facility];  Service: Cardiology;  Laterality: N/A;    TRANSFORAMINAL EPIDURAL INJECTION OF STEROID Left 05/14/2020    Procedure: Injection,steroid,epidural,transforaminal approach, l3/4;  Surgeon: Ronnell Baeza MD;  Location: [facility] OR;  Service: Pain Management;  Laterality: Left;    TRANSFORAMINAL EPIDURAL INJECTION OF STEROID Left 03/23/2022    Procedure: Injection,steroid,epidural,transforaminal approach L3/4;  Surgeon: Ronnell Baeza MD;  Location: [facility] OR;  Service: Pain Management;  Laterality: Left;    TRANSFORAMINAL EPIDURAL INJECTION OF STEROID Left 08/01/2022    Procedure: Injection,steroid,epidural,transforaminal approach L3/4;  Surgeon: Ronnell Baeza MD;  Location: [facility] OR;  Service: Pain Management;  Laterality: Left;    TUBAL LIGATION      UMBILICAL HERNIA REPAIR         Family History:   Family History   Problem Relation Age of Onset    Cancer Sister         uterine    Cancer Sister         Uterine cancer     Diabetes Brother     Breast cancer Other 42    Glaucoma Neg Hx     Macular degeneration Neg Hx        Social History:  reports that she quit smoking about 20 months ago. Her smoking use included cigarettes. She started smoking about 54 years ago. She has a 26.5 pack-year smoking history. She has never used smokeless tobacco. She reports that she does not drink alcohol and does not use drugs.    Allergies:  Review of patient's allergies indicates:   Allergen Reactions    Opioids - morphine analogues Other (See Comments)     Hypotension       Medications:  Current Outpatient Medications   Medication Sig Dispense Refill    albuterol (VENTOLIN HFA) 90 mcg/actuation inhaler Inhale 2 puffs into the lungs every 6 (six) hours as needed for Wheezing or Shortness of Breath (or before  exercise). Rescue 18 g 2    albuterol-ipratropium (DUO-NEB) 2.5 mg-0.5 mg/3 mL nebulizer solution Take 3 mLs by nebulization every 6 (six) hours as needed for Wheezing. Rescue 75 mL 2    apixaban (ELIQUIS) 2.5 mg Tab Take 1 tablet (2.5 mg total) by mouth 2 (two) times daily. 180 tablet 3    azelastine (ASTELIN) 137 mcg (0.1 %) nasal spray 1 spray (137 mcg total) by Nasal route 2 (two) times daily. 30 mL 12    budesonide (PULMICORT) 0.5 mg/2 mL nebulizer solution Take 2 mLs (0.5 mg total) by nebulization 2 (two) times a day. Controller 120 mL 11    cetirizine (ZYRTEC) 10 MG tablet Take 10 mg by mouth once daily.      cholecalciferol, vitamin D3, 1,250 mcg (50,000 unit) Tab Take 1 tablet by mouth every 7 days. 12 tablet 6    fluticasone propionate (FLONASE) 50 mcg/actuation nasal spray 1 spray (50 mcg total) by Each Nostril route 2 (two) times a day. 16 g 12    levothyroxine (SYNTHROID) 100 MCG tablet Take 1 tablet (100 mcg total) by mouth once daily. 90 tablet 3    mucus clearing device (ACAPELLA, FLUTTER) by Misc.(Non-Drug; Combo Route) route once daily. 100 each 0    multivitamin (THERAGRAN) per tablet Take 1 tablet by mouth once daily.      NIFEdipine (ADALAT CC) 60 MG TbSR Take 1 tablet (60 mg total) by mouth once daily. 90 tablet 1    omeprazole (PRILOSEC) 40 MG capsule Take 1 capsule (40 mg total) by mouth once daily. 90 capsule 4    OXYGEN-AIR DELIVERY SYSTEMS MISC by [facility].(Non-Drug; Combo Route) route.      potassium chloride (MICRO-K) 10 MEQ CpSR Take 2 capsules (20 mEq total) by mouth once daily. 180 capsule 3    sodium chloride 3% 3 % nebulizer solution Take 4 mLs by nebulization 2 (two) times a day. 240 mL 3    temazepam (RESTORIL) 30 mg capsule Take 1 capsule (30 mg total) by mouth every evening. 30 capsule 5    methylPREDNISolone (MEDROL DOSEPACK) 4 mg tablet use as directed 1 each 1     No current facility-administered medications for this visit.       Review of Systems   Constitutional:  Negative for  "appetite change, chills, fatigue, fever and unexpected weight change.   HENT:  Negative for mouth sores.    Eyes:  Negative for visual disturbance.   Respiratory:  Positive for shortness of breath. Negative for cough.    Cardiovascular:  Negative for chest pain and palpitations.   Gastrointestinal:  Negative for abdominal pain, constipation, diarrhea, nausea and vomiting.   Genitourinary:  Negative for frequency.   Musculoskeletal:  Negative for back pain.   Skin:  Negative for rash.   Neurological:  Negative for headaches.   Hematological:  Negative for adenopathy. Does not bruise/bleed easily.   Psychiatric/Behavioral:  The patient is not nervous/anxious.        ECOG Performance Status: 2   Objective:      Vitals:   Vitals:    02/23/24 1550   BP: 116/72   BP Location: Right arm   Patient Position: Sitting   BP Method: Large (Manual)   Pulse: 83   Resp: 18   Temp: 97.4 °F (36.3 °C)   TempSrc: Temporal   SpO2: 96%   Weight: 94.7 kg (208 lb 12.4 oz)   Height: 5' 4" (1.626 m)           Physical Exam  Constitutional:       General: She is not in acute distress.     Appearance: She is well-developed. She is not diaphoretic.   HENT:      Head: Normocephalic and atraumatic.   Cardiovascular:      Rate and Rhythm: Normal rate and regular rhythm.      Heart sounds: Normal heart sounds. No murmur heard.     No friction rub. No gallop.   Pulmonary:      Effort: Pulmonary effort is normal. No respiratory distress.      Breath sounds: Normal breath sounds. No wheezing or rales.   Chest:      Chest wall: No tenderness.   Abdominal:      General: Bowel sounds are normal. There is no distension.      Palpations: Abdomen is soft. There is no mass.      Tenderness: There is no abdominal tenderness. There is no guarding or rebound.   Lymphadenopathy:      Cervical: No cervical adenopathy.      Upper Body:      Right upper body: No supraclavicular or axillary adenopathy.      Left upper body: No supraclavicular or axillary adenopathy. "   Skin:     Findings: No erythema or rash.   Neurological:      Mental Status: She is alert and oriented to person, place, and time.   Psychiatric:         Behavior: Behavior normal.         Laboratory Data:  Lab Visit on 02/20/2024   Component Date Value Ref Range Status    WBC 02/20/2024 8.22  3.90 - 12.70 K/uL Final    RBC 02/20/2024 3.58 (L)  4.00 - 5.40 M/uL Final    Hemoglobin 02/20/2024 10.6 (L)  12.0 - 16.0 g/dL Final    Hematocrit 02/20/2024 33.9 (L)  37.0 - 48.5 % Final    MCV 02/20/2024 95  82 - 98 fL Final    MCH 02/20/2024 29.6  27.0 - 31.0 pg Final    MCHC 02/20/2024 31.3 (L)  32.0 - 36.0 g/dL Final    RDW 02/20/2024 14.3  11.5 - 14.5 % Final    Platelets 02/20/2024 210  150 - 450 K/uL Final    MPV 02/20/2024 10.3  9.2 - 12.9 fL Final    Immature Granulocytes 02/20/2024 0.4  0.0 - 0.5 % Final    Gran # (ANC) 02/20/2024 6.1  1.8 - 7.7 K/uL Final    Immature Grans (Abs) 02/20/2024 0.03  0.00 - 0.04 K/uL Final    Comment: Mild elevation in immature granulocytes is non specific and   can be seen in a variety of conditions including stress response,   acute inflammation, trauma and pregnancy. Correlation with other   laboratory and clinical findings is essential.      Lymph # 02/20/2024 0.9 (L)  1.0 - 4.8 K/uL Final    Mono # 02/20/2024 0.8  0.3 - 1.0 K/uL Final    Eos # 02/20/2024 0.4  0.0 - 0.5 K/uL Final    Baso # 02/20/2024 0.04  0.00 - 0.20 K/uL Final    nRBC 02/20/2024 0  0 /100 WBC Final    Gran % 02/20/2024 73.9 (H)  38.0 - 73.0 % Final    Lymph % 02/20/2024 10.6 (L)  18.0 - 48.0 % Final    Mono % 02/20/2024 9.4  4.0 - 15.0 % Final    Eosinophil % 02/20/2024 5.2  0.0 - 8.0 % Final    Basophil % 02/20/2024 0.5  0.0 - 1.9 % Final    Differential Method 02/20/2024 Automated   Final    Procalcitonin 02/20/2024 0.09  <0.25 ng/mL Final    Comment: A concentration < 0.25 ng/mL represents a low risk of bacterial   infection.  Procalcitonin may not be accurate among patients with localized   infection,  recent trauma or major surgery, immunosuppressed state,   invasive fungal infection, renal dysfunction. Decisions regarding   initiation or continuation of antibiotic therapy should not be based   solely on procalcitonin levels.      D-Dimer 02/20/2024 1.33 (H)  <0.50 ug/mL FEU Final    <0.50 ug/mL FEU cut-off value for exclusion of venous thromboembolism.    Sodium 02/20/2024 140  136 - 145 mmol/L Final    Potassium 02/20/2024 4.6  3.5 - 5.1 mmol/L Final    Anion Gap reference range revised on 4/28/2023    Chloride 02/20/2024 107  95 - 110 mmol/L Final    CO2 02/20/2024 24  22 - 31 mmol/L Final    Glucose 02/20/2024 134 (H)  70 - 110 mg/dL Final    Comment: The ADA recommends the following guidelines for fasting glucose:    Normal:       less than 100 mg/dL    Prediabetes:  100 mg/dL to 125 mg/dL    Diabetes:     126 mg/dL or higher      BUN 02/20/2024 19 (H)  7 - 18 mg/dL Final    Creatinine 02/20/2024 1.26  0.50 - 1.40 mg/dL Final    Calcium 02/20/2024 9.0  8.4 - 10.2 mg/dL Final    Total Protein 02/20/2024 6.1  6.0 - 8.4 g/dL Final    Albumin 02/20/2024 3.7  3.5 - 5.2 g/dL Final    Total Bilirubin 02/20/2024 0.5  0.2 - 1.3 mg/dL Final    Alkaline Phosphatase 02/20/2024 90  38 - 145 U/L Final    AST 02/20/2024 25  14 - 36 U/L Final    ALT 02/20/2024 12  0 - 35 U/L Final    Anion Gap 02/20/2024 9  5 - 12 mmol/L Final    Anion Gap reference range revised on 4/28/2023    eGFR 02/20/2024 43 (A)  >60 mL/min/1.73 m^2 Final    Troponin I 02/20/2024 <0.012  0.012 - 0.034 ng/mL Final    Comment: Warning:  Samples from patients receiving preparations of   mouse monoclonal antibodies for therapy or diagnosis may   contain Human Anti-Mouse Antibodies (HAMA). Such samples may   show either falsely elevated or falsely depressed values when   tested with this method.    Patients taking very high Biotin doses of >300 mcg/day may   cause a negative bias in this assay.           Imaging:    PET/CT 2/15/24  Head and neck: There is  physiologic distribution of radiotracer throughout the included brain. There is no hemorrhage, hydrocephalus, or midline shift. There is no FDG avid cervical lymphadenopathy.     Chest: There is a left subclavian port in place. There is no FDG avid mediastinal or hilar lymphadenopathy. The nodular focus of hypermetabolic activity within the right apical soft tissue area of consolidation is grossly unchanged. This demonstrates an SUV max of 6.3 compared with 5.9 previously. There is a new area of ground-glass opacity more anterior in the right lung apex best visualized on image 67 which measures 2.6 x 2.2 cm with an SUV max of 4.0. This was not present on the prior exam. There is no pleural effusion.     Abdomen and pelvis: There is physiologic radiotracer throughout the liver, spleen, and collecting system. There are multiple metastatic periaortic and bilateral iliac lymph nodes which have progressed from the prior exam. The inguinal adenopathy has progressed. A representative new left periaortic lymph node best visualized on image 157 measures 19 x 17 mm with an SUV max 6.5. A left hemipelvic node best visualized on image 233 measures 4.1 x 1.5 cm. This demonstrates an SUV max 9.4. Enlarged left inguinal node best visualized on image 232 measures 3.0 x 2.2 cm compared with 2.7 x 1.1 cm previously. There is an SUV max of 9.2 compared with 6.7 previously.     Musculoskeletal: There is no FDG avid lytic or blastic osseous lesion.        Assessment:       1. Malignant neoplasm of right upper lobe of lung    2. Right renal mass    3. Lymphadenopathy    4. Localized enlarged lymph nodes    5. Iron deficiency anemia due to chronic blood loss    6. Thrombophilia    7. Follicular lymphoma grade I, unspecified body region    8. Hypertension, unspecified type    9. Hyperlipidemia, unspecified hyperlipidemia type    10. Acquired hypothyroidism                                 Plan:     NSCLC - Pt has NSCLC SCC with involvement  of the right lung and mediastinum  -Pt with prior radiation to a RUL nodule  -PD-L1 was 86% on 6/18/21  -Pt was on Keytruda with 6 week cycles and completed 6 tx's and then developed pneumonitis  -PET/CT on 9/23/22 showed possible pseudoprogression  -PET/CT on 11/29/22 showed stable disease with RLL infiltrate concerning for resolving PNA  -PET-CT on 03/08/2023 showed resolution of prior disease   -CT Chest 4/14/23 and 7/10/23 with stable findings  -PET/CT 10/09/23 showed avidity in the RUL mass, inguinal LAD and subcutaneous nodule in the lumbar region  -Treatment with paclitaxel discussed; however, pt is hesitant to undergo chemotherapy  -Biopsy of lumbar subcutaneous nodule showed fibroadipose tissue  -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  -Ct chest 12/12/23 stable  -PET/CT 2/15/24 showed continued nodular focus of hypermetabolic activity in the right apex with a new adjacent ground-glass area  Patient was to undergo treatment with steroids under the care of Pulmonary for concern for possible pneumonitis  Patient instructed to hold off on steroids until she can undergo inguinal lymph node excision    [facility] Pulmonary Embolus - seen on CTA 03/18/2023   -Patient is status post thrombectomy and currently on Eliquis 2.5mg BID given prior GI bleed  -Patient will need to be on Eliquis lifelong given lung cancer    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 for 9/12 cycles with good response  -PT with recurrent inguinal LAD which could be lymphoma  -LN's increasing in size on CT 12/12/23  -PET-CT on 02/15/2024 showed continued increasing size of left inguinal lymph node with SUV max now 9.2  Patient to undergo excisional biopsy with General surgery  Patient to see Dr. Cyrus Reis next week    Right Renal Mass - PT followed by urology Dr Priest with recommendation on 7/25/23 for repeat renal US in 6 months  -Not mentioned on PET/CT  -Increasing in size on repeat CT 12/12/23  -Mass  not mentioned on PET/CT 2/15/24  -Will discuss at tumor board    HTN - pt on nifedipine  -BP normal  -Will monitor    Hypothyroidism - pt on levothyroxine  -Management per PCP    Iron Deficiency Anemia - Hemoglobin improved to 12.8g/dL on 12/12/23  -Ferritin 316ng/mL 12/12/23  -Ferritin was 22ng/mL on 9/05/23  -Pt received 4 doses of venofer  -Hemoglobin 10.6 on 2/20/24 with ferritin of 360ng/mL on 2/15/24  -Will monitor    Route Chart for Scheduling    Med Onc Chart Routing      Follow up with physician Other. Pt needs an appt with me a week after biopsy of the left inguinal LN with Dr Reis.   Follow up with DAYAMI    Infusion scheduling note    Injection scheduling note    Labs    Imaging    Pharmacy appointment    Other referrals                Treatment Plan Information   OP PEMBROLIZUMAB 400MG Q6W   Chad Mills MD   Upcoming Treatment Dates - OP PEMBROLIZUMAB 400MG Q6W    5/12/2023       Pre-Medications       acetaminophen tablet 1,000 mg       diphenhydrAMINE (BENADRYL) 25 mg in NS 50 mL IVPB       Chemotherapy       pembrolizumab (KEYTRUDA) 400 mg in sodium chloride 0.9% SolP 116 mL infusion  6/23/2023       Pre-Medications       acetaminophen tablet 1,000 mg       diphenhydrAMINE (BENADRYL) 25 mg in NS 50 mL IVPB       Chemotherapy       pembrolizumab (KEYTRUDA) 400 mg in sodium chloride 0.9% SolP 116 mL infusion  8/4/2023       Pre-Medications       acetaminophen tablet 1,000 mg       diphenhydrAMINE (BENADRYL) 25 mg in NS 50 mL IVPB       Chemotherapy       pembrolizumab (KEYTRUDA) 400 mg in sodium chloride 0.9% SolP 116 mL infusion  9/15/2023       Pre-Medications       acetaminophen tablet 1,000 mg       diphenhydrAMINE (BENADRYL) 25 mg in NS 50 mL IVPB       Chemotherapy       pembrolizumab (KEYTRUDA) 400 mg in sodium chloride 0.9% SolP 116 mL infusion    Supportive Plan Information  OP IRON SUCROSE IVPB TWICE WEEKLY   Chad Mills MD   Upcoming Treatment Dates - OP IRON SUCROSE IVPB TWICE  WEEKLY    No upcoming days in selected categories.    Therapy Plan Information  PORT FLUSH  Flushes  heparin, porcine (PF) 100 unit/mL injection flush 500 Units  500 Units, Intravenous, Every visit  sodium chloride 0.9% flush 10 mL  10 mL, Intravenous, Every visit    INF PEGFILGRASTIM (NEULASTA)  pegfilgrastim (NEULASTA) injection 6 mg  6 mg, Subcutaneous, Every visit        Chad Mills MD  [facility]  Hematology and Oncology  [facility] [address]   [place] [address]   O: [phone]  F: [phone]

## 2024-02-23 ENCOUNTER — OFFICE VISIT (OUTPATIENT)
Dept: HEMATOLOGY/ONCOLOGY | Facility: CLINIC | Age: 84
End: 2024-02-23
Payer: MEDICARE

## 2024-02-23 VITALS
HEIGHT: 64 IN | SYSTOLIC BLOOD PRESSURE: 116 MMHG | BODY MASS INDEX: 35.64 KG/M2 | WEIGHT: 208.75 LBS | OXYGEN SATURATION: 96 % | RESPIRATION RATE: 18 BRPM | TEMPERATURE: 97 F | HEART RATE: 83 BPM | DIASTOLIC BLOOD PRESSURE: 72 MMHG

## 2024-02-23 DIAGNOSIS — D50.0 IRON DEFICIENCY ANEMIA DUE TO CHRONIC BLOOD LOSS: ICD-10-CM

## 2024-02-23 DIAGNOSIS — C82.00 FOLLICULAR LYMPHOMA GRADE I, UNSPECIFIED BODY REGION: ICD-10-CM

## 2024-02-23 DIAGNOSIS — I10 HYPERTENSION, UNSPECIFIED TYPE: ICD-10-CM

## 2024-02-23 DIAGNOSIS — R59.1 LYMPHADENOPATHY: ICD-10-CM

## 2024-02-23 DIAGNOSIS — D68.59 THROMBOPHILIA: ICD-10-CM

## 2024-02-23 DIAGNOSIS — N28.89 RIGHT RENAL MASS: ICD-10-CM

## 2024-02-23 DIAGNOSIS — R59.0 LOCALIZED ENLARGED LYMPH NODES: ICD-10-CM

## 2024-02-23 DIAGNOSIS — C34.11 MALIGNANT NEOPLASM OF RIGHT UPPER LOBE OF LUNG: Primary | ICD-10-CM

## 2024-02-23 DIAGNOSIS — E78.5 HYPERLIPIDEMIA, UNSPECIFIED HYPERLIPIDEMIA TYPE: ICD-10-CM

## 2024-02-23 DIAGNOSIS — E03.9 ACQUIRED HYPOTHYROIDISM: ICD-10-CM

## 2024-02-23 PROCEDURE — 3078F DIAST BP <80 MM HG: CPT | Mod: CPTII,S$GLB,, | Performed by: INTERNAL MEDICINE

## 2024-02-23 PROCEDURE — 99999 PR PBB SHADOW E&M-EST. PATIENT-LVL IV: CPT | Mod: PBBFAC,,, | Performed by: INTERNAL MEDICINE

## 2024-02-23 PROCEDURE — 3288F FALL RISK ASSESSMENT DOCD: CPT | Mod: CPTII,S$GLB,, | Performed by: INTERNAL MEDICINE

## 2024-02-23 PROCEDURE — 3074F SYST BP LT 130 MM HG: CPT | Mod: CPTII,S$GLB,, | Performed by: INTERNAL MEDICINE

## 2024-02-23 PROCEDURE — 1101F PT FALLS ASSESS-DOCD LE1/YR: CPT | Mod: CPTII,S$GLB,, | Performed by: INTERNAL MEDICINE

## 2024-02-23 PROCEDURE — 1126F AMNT PAIN NOTED NONE PRSNT: CPT | Mod: CPTII,S$GLB,, | Performed by: INTERNAL MEDICINE

## 2024-02-23 PROCEDURE — 1159F MED LIST DOCD IN RCRD: CPT | Mod: CPTII,S$GLB,, | Performed by: INTERNAL MEDICINE

## 2024-02-23 PROCEDURE — 99212 OFFICE O/P EST SF 10 MIN: CPT | Mod: S$GLB,,, | Performed by: INTERNAL MEDICINE

## 2024-02-27 ENCOUNTER — PATIENT MESSAGE (OUTPATIENT)
Dept: HEMATOLOGY/ONCOLOGY | Facility: CLINIC | Age: 84
End: 2024-02-27
Payer: MEDICARE

## 2024-02-27 DIAGNOSIS — E55.9 VITAMIN D DEFICIENCY: ICD-10-CM

## 2024-02-27 DIAGNOSIS — R53.83 FATIGUE, UNSPECIFIED TYPE: ICD-10-CM

## 2024-02-27 DIAGNOSIS — C82.90 FOLLICULAR LYMPHOMA, UNSPECIFIED FOLLICULAR LYMPHOMA TYPE, UNSPECIFIED BODY REGION: ICD-10-CM

## 2024-02-27 DIAGNOSIS — R61 NIGHT SWEAT: ICD-10-CM

## 2024-02-27 DIAGNOSIS — N28.89 LEFT RENAL MASS: ICD-10-CM

## 2024-02-27 DIAGNOSIS — R63.0 LOSS OF APPETITE: ICD-10-CM

## 2024-02-27 RX ORDER — CHOLECALCIFEROL (VITAMIN D3) 1250 MCG
1 TABLET ORAL
Qty: 12 TABLET | Refills: 6 | Status: SHIPPED | OUTPATIENT
Start: 2024-02-27 | End: 2024-03-23 | Stop reason: CLARIF

## 2024-03-04 ENCOUNTER — PATIENT MESSAGE (OUTPATIENT)
Dept: HEMATOLOGY/ONCOLOGY | Facility: CLINIC | Age: 84
End: 2024-03-04
Payer: MEDICARE

## 2024-03-14 ENCOUNTER — TUMOR BOARD CONFERENCE (OUTPATIENT)
Dept: HEMATOLOGY/ONCOLOGY | Facility: CLINIC | Age: 84
End: 2024-03-14
Payer: MEDICARE

## 2024-03-14 NOTE — NURSING
[facility]     Tumor Board Note      Date: 03/14/2024  MRN: [id number]  Site: Renal Pelvic  Presenter: Noel Priest MD    Summary: Pt initially underwent right inguinal LN biopsy 5/25/22 showing grade 1 follicular lymphoma.  Bone marrow biopsy done 06/24/2020 showed involvement with follicular lymphoma.  PET/CT 7/07/20 showed lymphadenopathy above and below the diaphragm as well as the spleen.  Also seen wasa RUL lesion.  The patient was started on CVP-R followed by maintenance rituxan every 8 weeks.  Biopsy of the RUL nodule on 6/18/21 showed SCC with PD-L1 at 86%.  EBUS 7/09/21 showed no malignant cells at station 7, 4R or 11RS.  The patient was treated with SBRT under the care of Dr Barnes with 50Gy in 4 fractions completed 10/28/21.  The patient then developed new pulmonary nodules on CT scan 6/02/22.  WBUS 6/16/22 showed positive SCC in 4R LN.  Pt was discussed at tumor board on 6/21/22 with recommendation for Keytruda.  The patient underwent PET/CT on 9/23/22 after 2 doses of treatment showing a 1.5 cm precarinal lymph node; stable right apical mass measuring 1.8 x 1.5 cm; stable 8 mm right apical lung nodule with adjacent post radiation change measuring 2 x 2.7 cm; stable 4 mm subpleural nodule in the lateral right upper lobe; fluid/mucus in the right lower lobe bronchus tree; 6 mm anterior left upper lobe nodule which is new; 2.3 cm simple cyst in the midpole of the left kidney; 3.1 x 2.3 cm rim enhancing complex cystic and solid mass in the mid to lower pole the right kidney; infrarenal abdominal aortic aneurysm measuring 3.8 x 3.5 cm; and stable a left para-aortic lymph node measuring 11 mm. 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  The patient underwent PET-CT on 11/29/2022 showing an irregular hypermetabolic right upper lobe tumor which is stable measuring 1.6 x 1.2 cm; hypermetabolic subpleural right upper lobe  consolidation diminished in extent; unchanged linear zone of hypermetabolic atelectasis in the superior segment of the left lower lobe; new poorly metabolic ill-defined infiltrate in the posterior right lower lobe; 4 mm left upper lobe nodule which is stable; hypermetabolic precarinal mediastinal lymph node stable measuring 1 cm; and hypermetabolic aortopulmonary lymph node stable measuring 1.1 x 1.1 cm.  The patient was admitted to the hospital from 1/16/23 to 1/23/23 for pneumonia.  She underwent bronchoscopy with Dr Kinney on 1/18/23 showing significant mucus plug impaction int the pharynx, left mainstem bronchus and right and left lower lobes.  The patient was discharged on 3L O2.  The patient underwent PET-CT on 03/08/2023 showing thickening of the pleura; new ground-glass opacities within the left upper lobe; patchy airspace opacities in the right lower lobe; right lower lobe pulmonary artery hypermetabolic activity possibly secondary to bronchial wall thickening or thrombus in the pulmonary artery; diffuse hypermetabolic activity identified throughout the axial skeleton.  The patient was then admitted to the hospital on 03/18/23-4/3/23 after presenting with shortness of breath and being found to have a saddle pulmonary embolus on CTA 03/18/2023.  The patient underwent treatment with a heparin drip and thrombectomy on 03/18/2023 with eventual transition to Eliquis.  The patient underwent CT chest on 04/14/2023 showing areas of air trapping in subpleural bleb formation in both jarek thoraces with areas of interstitial scarring and bibasilar bronchiectasis; stable solid nodule in left upper lobe measuring 6 mm; masslike area measuring 3 x 1.3 x 1.5 cm in the right upper lobe stable.  The patient underwent CT chest, abdomen, and pelvis on 07/10/2023 showing more consolidative appearance of previously described right apical pulmonary nodules without discretely measurable nodule on today's exam, 8 mm left apical  nodule, stable consolidation and superior segment left lower lobe; 3 mm right lower lobe pulmonary nodule; heterogeneously enhancing mass in the inferior pole of the right kidney measuring 3.9 x 3.7 x 3.9 cm; unchanged left periaortic lymph nodes.  The patient underwent a PET-CT on 10/09/2023 showing a new oval-shaped hypermetabolic nodular mass measuring 1.6 x 1 cm in the right upper lobe consistent with tumor recurrence; disappearance of ground-glass opacities anterior left upper lobe; disappearance of subpleural posterior right lower lobe consolidation; new hypermetabolic bilateral inguinal lymph nodes; new rounded nodular hypermetabolic nodule within the posterior right lumbar subcutaneous fat measuring 1.5 x 1.5 cm and a 3.8cm abdominal aortic aneurysm.  The patient was discussed at Thoracic tumor board on 10/25/23 with recommendation to proceed with biopsy of the lesion in the back via IR.  Biopsy done on 11/03/23 showed fibroadipose tissue.  The patient underwent CT of the chest, abdomen, and pelvis on 12/12/2023 showing pleural parenchymal thickening in the right lung apex; 6 mm left upper lobe nodule stable; solid mass inferior right kidney measuring 4.3 x 4.3 cm; enlarged bilateral periaortic lymphadenopathy measuring 17 x 14 mm; enlarged bilateral inguinal hemipelvic lymphadenopathy with left hemipelvic lymph node measuring 4.4 x 1.8 cm and left inguinal lymph node measuring 3.5 x 1.7 cm.    Recommendations: Consult IR for bx of renal mass and possible IR intervention.     Consult: Surgery, Hem/Onc, and Radiology    Stage: Not staged    Tumor:  Node(s):  Metastasis:      Group Stage:     Unstageable: No    Metastatic site(s):     [facility] Treatment Guidelines reviewed and care plan is consistent with guidelines, i.e., NCCN, NCL, PDQ, ASCO, AUA, etc.    Presentation at Cancer Conference: Retrospective    Discussed possible entry into Clinical Trial: No  Physician Name: Noel Priest MD

## 2024-03-15 ENCOUNTER — TELEPHONE (OUTPATIENT)
Dept: UROLOGY | Facility: CLINIC | Age: 84
End: 2024-03-15
Payer: MEDICARE

## 2024-03-15 DIAGNOSIS — E87.6 HYPOKALEMIA: ICD-10-CM

## 2024-03-15 DIAGNOSIS — E83.42 HYPOMAGNESEMIA: ICD-10-CM

## 2024-03-15 DIAGNOSIS — C82.01 GRADE 1 FOLLICULAR LYMPHOMA OF LYMPH NODES OF NECK: ICD-10-CM

## 2024-03-15 DIAGNOSIS — N28.89 RIGHT RENAL MASS: Primary | ICD-10-CM

## 2024-03-15 DIAGNOSIS — E55.9 VITAMIN D DEFICIENCY: ICD-10-CM

## 2024-03-15 RX ORDER — POTASSIUM CHLORIDE 750 MG/1
20 CAPSULE, EXTENDED RELEASE ORAL
Qty: 180 CAPSULE | Refills: 3 | Status: SHIPPED | OUTPATIENT
Start: 2024-03-15 | End: 2024-04-04

## 2024-03-15 NOTE — TELEPHONE ENCOUNTER
Spoke to Moisés at [facility] IR dept, she will take care of scheduling the renal BX on this pt per Dr Priest.

## 2024-03-15 NOTE — TELEPHONE ENCOUNTER
----- Message from Noel Priest MD sent at 3/15/2024  2:44 PM CDT -----  Please contact the IR department to let them know about this referral for renal mass biopsy. I discussed the case with Dr. Weaver.

## 2024-03-17 NOTE — PROGRESS NOTES
PATIENT: Shandra Velez  MRN: [id number]  DATE: 3/18/2024      Diagnosis:   1. 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 Malignant neoplasm of right upper lobe of lung    3. Right renal mass    4. Iron deficiency anemia due to chronic blood loss    5. Thrombophilia    6. Hypertension, unspecified type    7. Hyperlipidemia, unspecified hyperlipidemia type    8. Acquired hypothyroidism          Chief Complaint: Follow-up (Follicular Lymphoma)    Oncologic History:     Pt initially underwent right inguinal LN biopsy 5/25/22 showing grade 1 follicular lymphoma.  Bone marrow biopsy done 06/24/2020 showed involvement with follicular lymphoma.  PET/CT 7/07/20 showed lymphadenopathy above and below the diaphragm as well as the spleen.  Also seen wasa RUL lesion.  The patient was started on CVP-R followed by maintenance rituxan every 8 weeks.  Biopsy of the RUL nodule on 6/18/21 showed SCC with PD-L1 at 86%.  EBUS 7/09/21 showed no malignant cells at station 7, 4R or 11RS.  The patient was treated with SBRT under the care of Dr Barnes with 50Gy in 4 fractions completed 10/28/21.  The patient then developed new pulmonary nodules on CT scan 6/02/22.  EBUS 6/16/22 showed positive SCC in 4R LN.  Pt was discussed at tumor board on 6/21/22 with recommendation for Keytruda.  The patient underwent PET/CT on 9/23/22 after 2 doses of treatment showing a 1.5 cm precarinal lymph node; stable right apical mass measuring 1.8 x 1.5 cm; stable 8 mm right apical lung nodule with adjacent post radiation change measuring 2 x 2.7 cm; stable 4 mm subpleural nodule in the lateral right upper lobe; fluid/mucus in the right lower lobe bronchus tree; 6 mm anterior left upper lobe nodule which is new; 2.3 cm simple cyst in the midpole of the left kidney; 3.1 x 2.3 cm rim enhancing complex cystic and solid mass in the mid to lower pole the right kidney; infrarenal abdominal aortic aneurysm measuring 3.8 x 3.5 cm; and stable a  left para-aortic lymph node measuring 11 mm. 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   The patient underwent PET-CT on 11/29/2022 showing an irregular hypermetabolic right upper lobe tumor which is stable measuring 1.6 x 1.2 cm; hypermetabolic subpleural right upper lobe consolidation diminished in extent; unchanged linear zone of hypermetabolic atelectasis in the superior segment of the left lower lobe; new poorly metabolic ill-defined infiltrate in the posterior right lower lobe; 4 mm left upper lobe nodule which is stable; hypermetabolic precarinal mediastinal lymph node stable measuring 1 cm; and hypermetabolic aortopulmonary lymph node stable measuring 1.1 x 1.1 cm.   The patient was admitted to the hospital from 1/16/23 to 1/23/23 for pneumonia.  She underwent bronchoscopy with Dr Kinney on 1/18/23 showing significant mucus plug impaction int the pharynx, left mainstem bronchus and right and left lower lobes.  The patient was discharged on 3L O2.   The patient underwent PET-CT on 03/08/2023 showing thickening of the pleura; new ground-glass opacities within the left upper lobe; patchy airspace opacities in the right lower lobe; right lower lobe pulmonary artery hypermetabolic activity possibly secondary to bronchial wall thickening or thrombus in the pulmonary artery; diffuse hypermetabolic activity identified throughout the axial skeleton.  The patient was then admitted to the hospital on 03/18/23-4/3/23 after presenting with shortness of breath and being found to have a saddle pulmonary embolus on CTA 03/18/2023.  The patient underwent treatment with a heparin drip and thrombectomy on 03/18/2023 with eventual transition to Eliquis.   The patient underwent CT chest on 04/14/2023 showing areas of air trapping in subpleural bleb formation in both jarek thoraces with areas of interstitial scarring and bibasilar bronchiectasis;  stable solid nodule in left upper lobe measuring 6 mm; masslike area measuring 3 x 1.3 x 1.5 cm in the right upper lobe stable.   The patient underwent CT chest, abdomen, and pelvis on 07/10/2023 showing more consolidative appearance of previously described right apical pulmonary nodules without discretely measurable nodule on today's exam, 8 mm left apical nodule, stable consolidation and superior segment left lower lobe; 3 mm right lower lobe pulmonary nodule; heterogeneously enhancing mass in the inferior pole of the right kidney measuring 3.9 x 3.7 x 3.9 cm; unchanged left periaortic lymph nodes.   The patient underwent a PET-CT on 10/09/2023 showing a new oval-shaped hypermetabolic nodular mass measuring 1.6 x 1 cm in the right upper lobe consistent with tumor recurrence; disappearance of ground-glass opacities anterior left upper lobe; disappearance of subpleural posterior right lower lobe consolidation; new hypermetabolic bilateral inguinal lymph nodes; new rounded nodular hypermetabolic nodule within the posterior right lumbar subcutaneous fat measuring 1.5 x 1.5 cm and a 3.8cm abdominal aortic aneurysm.    The patient was discussed at Thoracic tumor board on 10/25/23 with recommendation to proceed with biopsy of the lesion in the back via IR.  Biopsy done on 11/03/23 showed fibroadipose tissue.   The patient underwent CT of the chest, abdomen, and pelvis on 12/12/2023 showing pleural parenchymal thickening in the right lung apex; 6 mm left upper lobe nodule stable; solid mass inferior right kidney measuring 4.3 x 4.3 cm; enlarged bilateral periaortic lymphadenopathy measuring 17 x 14 mm; enlarged bilateral inguinal hemipelvic lymphadenopathy with left hemipelvic lymph node measuring 4.4 x 1.8 cm and left inguinal lymph node measuring 3.5 x 1.7 cm.   The patient underwent PET-CT on 02/15/2024 showing a stable nodular focus of hypermetabolic activity in the right apical soft tissue area of consolidation; new  area of ground-glass opacity more anterior in the right lung apex measuring 2.6 x 2.2 cm; multiple metastatic periaortic and bilateral iliac lymph nodes with progression from prior with a new left para-aortic lymph node measuring 19 x 17 mm SUV max 6.5, left hemipelvic node measuring 4.1 x 1.5 cm with SUV max of 9.4 enlarged inguinal lymph node measuring 3 x 2.2 with SUV max of 9.2.      Subjective:    Interval History: Ms. Velez is a 83 y.o. female with HLD, HTN, osteopenia, follicular lymphoma who presents for follow up of NSCLC.  Since since the last clinic visit the patient underwent biopsy of left inguinal lymph node with path showing follicular lymphoma grade 3A.  Patient was discussed at  Tumor Board on 3/14/24 with recommendation for biopsy of the right renal lesion followed by potential ablation.    The patient continues to endorse shortness of breath with exertion.  She continues to use 3 L of oxygen via nasal cannula.  Patient endorses 1 episode night sweats in the last week.  The patient denies CP, cough, abdominal pain, nausea, vomiting, constipation, diarrhea.  The patient denies fever, chills, weight loss, new lumps or bumps, easy bruising or bleeding.    Past Medical History:   Past Medical History:   Diagnosis Date    Breast cancer 1985    right mastectomy    Digestive disorder     Encounter for blood transfusion     History of pneumonia     Hypercholesterolemia     Hypertension     Indigestion     Lumbar spondylosis     Lung cancer     2022 - currently in treatment as of 7/27/22    Lymphoma     Osteopenia     Pulmonary nodule     Retinal detachment     Smoker 06/11/2022       Past Surgical HIstory:   Past Surgical History:   Procedure Laterality Date    APPENDECTOMY      BONE MARROW BIOPSY Bilateral 06/24/2020    Procedure: Biopsy-bone marrow;  Surgeon: Rod Montero MD;  Location: [facility] OR;  Service: Oncology;  Laterality: Bilateral;    BREAST RECONSTRUCTION  1990    right    BRONCHOSCOPY N/A  01/18/2023    Procedure: BRONCHOSCOPY;  Surgeon: Gregorio Kinney MD;  Location: [facility];  Service: Pulmonary;  Laterality: N/A;    BRONCHOSCOPY Bilateral 02/10/2023    Procedure: Bronchoscopy;  Surgeon: Gregorio Kinney MD;  Location: [facility];  Service: Pulmonary;  Laterality: Bilateral;  for airway clearance. Purple top tube for cell count diff    BRONCHOSCOPY N/A 03/13/2023    Procedure: Bronchoscopy;  Surgeon: Gregorio Kinney MD;  Location: [facility];  Service: Pulmonary;  Laterality: N/A;  therapeutic scope. Please have purple top tube and iced saline    CATARACT EXTRACTION W/  INTRAOCULAR LENS IMPLANT Bilateral     CHOLECYSTECTOMY      ENDOBRONCHIAL ULTRASOUND Bilateral 07/09/2021    Procedure: ENDOBRONCHIAL ULTRASOUND (EBUS);  Surgeon: Gregorio Kinney MD;  Location: [facility];  Service: Pulmonary;  Laterality: Bilateral;  NEED LMA to  eval right upper paratracheal LN.     ENDOBRONCHIAL ULTRASOUND Bilateral 06/16/2022    Procedure: ENDOBRONCHIAL ULTRASOUND (EBUS);  Surgeon: Gregorio Kinney MD;  Location: [facility];  Service: Pulmonary;  Laterality: Bilateral;    INJECTION OF FACET JOINT Left 06/30/2022    Procedure: FACET JOINT Cyst Aspiration L3/4;  Surgeon: Ronnell Baeza MD;  Location: [facility] OR;  Service: Pain Management;  Laterality: Left;    INSERTION OF TUNNELED CENTRAL VENOUS CATHETER (CVC) WITH SUBCUTANEOUS PORT Left 11/04/2020    Procedure: [id number];  Surgeon: Kody Pretty MD;  Location: [facility] OR;  Service: General;  Laterality: Left;    JOINT REPLACEMENT  2008    right knee     LUMBAR LAMINECTOMY      LYMPH NODE BIOPSY      MASTECTOMY Right 1985    NEEDLE LOCALIZATION N/A 05/25/2020    Procedure: NEEDLE LOCALIZATION - lymph node bx;  Surgeon: Steve Weaver MD;  Location: [facility] CATH;  Service: Interventional Radiology;  Laterality: N/A;    RETINAL DETACHMENT SURGERY  1989    RIGHT HEART CATHETERIZATION Right 03/18/2023    Procedure: INSERTION, CATHETER, RIGHT HEART;  Surgeon: Rukhsana  MIKKI Lang MD;  Location: [facility] CATH;  Service: Cardiology;  Laterality: Right;    SELECTIVE UNILATERAL PULMONARY ANGIOGRAPHY  03/18/2023    Procedure: Pumonary angiography - unilateral;  Surgeon: Rukhsana Lang MD;  Location: [facility] CATH;  Service: Cardiology;;    THROMBECTOMY N/A 03/18/2023    Procedure: THROMBECTOMY;  Surgeon: Rukhsana Lang MD;  Location: [facility] CATH;  Service: Cardiology;  Laterality: N/A;    TRANSFORAMINAL EPIDURAL INJECTION OF STEROID Left 05/14/2020    Procedure: Injection,steroid,epidural,transforaminal approach, l3/4;  Surgeon: Ronnell Baeza MD;  Location: [facility] OR;  Service: Pain Management;  Laterality: Left;    TRANSFORAMINAL EPIDURAL INJECTION OF STEROID Left 03/23/2022    Procedure: Injection,steroid,epidural,transforaminal approach L3/4;  Surgeon: Ronnell Baeza MD;  Location: [facility] OR;  Service: Pain Management;  Laterality: Left;    TRANSFORAMINAL EPIDURAL INJECTION OF STEROID Left 08/01/2022    Procedure: Injection,steroid,epidural,transforaminal approach L3/4;  Surgeon: Ronnell Baeza MD;  Location: [facility] OR;  Service: Pain Management;  Laterality: Left;    TUBAL LIGATION      UMBILICAL HERNIA REPAIR         Family History:   Family History   Problem Relation Age of Onset    Cancer Sister         uterine    Cancer Sister         Uterine cancer     Diabetes Brother     Breast cancer Other 42    Glaucoma Neg Hx     Macular degeneration Neg Hx        Social History:  reports that she quit smoking about 21 months ago. Her smoking use included cigarettes. She started smoking about 54 years ago. She has a 26.5 pack-year smoking history. She has never used smokeless tobacco. She reports that she does not drink alcohol and does not use drugs.    Allergies:  Review of patient's allergies indicates:   Allergen Reactions    Opioids - morphine analogues Other (See Comments)     Hypotension       Medications:  Current Outpatient Medications   Medication Sig Dispense Refill     albuterol (VENTOLIN HFA) 90 mcg/actuation inhaler Inhale 2 puffs into the lungs every 6 (six) hours as needed for Wheezing or Shortness of Breath (or before exercise). Rescue 18 g 2    albuterol-ipratropium (DUO-NEB) 2.5 mg-0.5 mg/3 mL nebulizer solution Take 3 mLs by nebulization every 6 (six) hours as needed for Wheezing. Rescue 75 mL 2    apixaban (ELIQUIS) 2.5 mg Tab Take 1 tablet (2.5 mg total) by mouth 2 (two) times daily. 180 tablet 3    azelastine (ASTELIN) 137 mcg (0.1 %) nasal spray 1 spray (137 mcg total) by Nasal route 2 (two) times daily. 30 mL 12    budesonide (PULMICORT) 0.5 mg/2 mL nebulizer solution Take 2 mLs (0.5 mg total) by nebulization 2 (two) times a day. 120 mL 11    cetirizine (ZYRTEC) 10 MG tablet Take 10 mg by mouth once daily.      cholecalciferol, vitamin D3, 1,250 mcg (50,000 unit) Tab Take 1 tablet by mouth every 7 days. 12 tablet 6    fluticasone propionate (FLONASE) 50 mcg/actuation nasal spray 1 spray (50 mcg total) by Each Nostril route 2 (two) times a day. 16 g 12    levoFLOXacin (LEVAQUIN) 500 MG tablet Take 1 tablet (500 mg total) by mouth once daily. 7 tablet 0    levothyroxine (SYNTHROID) 100 MCG tablet Take 1 tablet (100 mcg total) by mouth once daily. 90 tablet 3    mucus clearing device (ACAPELLA, FLUTTER) by Misc.(Non-Drug; Combo Route) route once daily. 100 each 0    multivitamin (THERAGRAN) per tablet Take 1 tablet by mouth once daily.      NIFEdipine (ADALAT CC) 60 MG TbSR Take 1 tablet (60 mg total) by mouth once daily. 90 tablet 1    omeprazole (PRILOSEC) 40 MG capsule Take 1 capsule (40 mg total) by mouth once daily. 90 capsule 4    OXYGEN-AIR DELIVERY SYSTEMS MISC by [facility].(Non-Drug; Combo Route) route.      potassium chloride (MICRO-K) 10 MEQ CpSR TAKE 2 CAPSULES BY MOUTH ONCE  DAILY 180 capsule 3    sodium chloride 3% 3 % nebulizer solution Take 4 mLs by nebulization 2 (two) times a day. 240 mL 3    temazepam (RESTORIL) 30 mg capsule Take 1 capsule (30 mg total) by  "mouth every evening. 30 capsule 5     No current facility-administered medications for this visit.       Review of Systems   Constitutional:  Positive for diaphoresis. Negative for appetite change, chills, fatigue, fever and unexpected weight change.   HENT:  Negative for mouth sores.    Eyes:  Negative for visual disturbance.   Respiratory:  Positive for shortness of breath. Negative for cough.    Cardiovascular:  Negative for chest pain and palpitations.   Gastrointestinal:  Negative for abdominal pain, constipation, diarrhea, nausea and vomiting.   Genitourinary:  Negative for frequency.   Musculoskeletal:  Negative for back pain.   Skin:  Negative for rash.   Neurological:  Negative for headaches.   Hematological:  Negative for adenopathy. Does not bruise/bleed easily.   Psychiatric/Behavioral:  The patient is not nervous/anxious.        ECOG Performance Status: 2   Objective:      Vitals:   Vitals:    03/18/24 1458   BP: 106/80   BP Location: Left arm   Patient Position: Sitting   BP Method: Large (Manual)   Pulse: 87   Resp: (!) 22   Temp: 96.4 °F (35.8 °C)   TempSrc: Temporal   SpO2: 97%   Weight: 93.7 kg (206 lb 9.1 oz)   Height: 5' 4" (1.626 m)             Physical Exam  Constitutional:       General: She is not in acute distress.     Appearance: She is well-developed. She is not diaphoretic.   HENT:      Head: Normocephalic and atraumatic.   Cardiovascular:      Rate and Rhythm: Normal rate and regular rhythm.      Heart sounds: Normal heart sounds. No murmur heard.     No friction rub. No gallop.   Pulmonary:      Effort: Pulmonary effort is normal. No respiratory distress.      Breath sounds: Normal breath sounds. No wheezing or rales.   Chest:      Chest wall: No tenderness.   Abdominal:      General: Bowel sounds are normal. There is no distension.      Palpations: Abdomen is soft. There is no mass.      Tenderness: There is no abdominal tenderness. There is no guarding or rebound.   Lymphadenopathy: "      Cervical: No cervical adenopathy.      Upper Body:      Right upper body: No supraclavicular or axillary adenopathy.      Left upper body: No supraclavicular or axillary adenopathy.   Skin:     Findings: No erythema or rash.   Neurological:      Mental Status: She is alert and oriented to person, place, and time.   Psychiatric:         Behavior: Behavior normal.         Laboratory Data:  No visits with results within 1 Week(s) from this visit.   Latest known visit with results is:   Hospital Outpatient Visit on 03/08/2024   Component Date Value Ref Range Status    WBC 03/08/2024 8.91  3.90 - 12.70 K/uL Final    RBC 03/08/2024 3.20 (L)  4.00 - 5.40 M/uL Final    Hemoglobin 03/08/2024 9.4 (L)  12.0 - 16.0 g/dL Final    Hematocrit 03/08/2024 30.0 (L)  37.0 - 48.5 % Final    MCV 03/08/2024 94  82 - 98 fL Final    MCH 03/08/2024 29.4  27.0 - 31.0 pg Final    MCHC 03/08/2024 31.3 (L)  32.0 - 36.0 g/dL Final    RDW 03/08/2024 14.4  11.5 - 14.5 % Final    Platelets 03/08/2024 154  150 - 450 K/uL Final    MPV 03/08/2024 10.4  9.2 - 12.9 fL Final    PT 03/08/2024 14.1  11.8 - 14.7 sec Final    PT normal range is not established for pediatrics.    INR 03/08/2024 1.1   Final    aPTT 03/08/2024 28.7  24.6 - 36.7 sec Final    PTT normal range is not established for pediatrics.         Imaging:    PET/CT 2/15/24  Head and neck: There is physiologic distribution of radiotracer throughout the included brain. There is no hemorrhage, hydrocephalus, or midline shift. There is no FDG avid cervical lymphadenopathy.     Chest: There is a left subclavian port in place. There is no FDG avid mediastinal or hilar lymphadenopathy. The nodular focus of hypermetabolic activity within the right apical soft tissue area of consolidation is grossly unchanged. This demonstrates an SUV max of 6.3 compared with 5.9 previously. There is a new area of ground-glass opacity more anterior in the right lung apex best visualized on image 67 which  measures 2.6 x 2.2 cm with an SUV max of 4.0. This was not present on the prior exam. There is no pleural effusion.     Abdomen and pelvis: There is physiologic radiotracer throughout the liver, spleen, and collecting system. There are multiple metastatic periaortic and bilateral iliac lymph nodes which have progressed from the prior exam. The inguinal adenopathy has progressed. A representative new left periaortic lymph node best visualized on image 157 measures 19 x 17 mm with an SUV max 6.5. A left hemipelvic node best visualized on image 233 measures 4.1 x 1.5 cm. This demonstrates an SUV max 9.4. Enlarged left inguinal node best visualized on image 232 measures 3.0 x 2.2 cm compared with 2.7 x 1.1 cm previously. There is an SUV max of 9.2 compared with 6.7 previously.     Musculoskeletal: There is no FDG avid lytic or blastic osseous lesion.        Assessment:       1. 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 Malignant neoplasm of right upper lobe of lung    3. Right renal mass    4. Iron deficiency anemia due to chronic blood loss    5. Thrombophilia    6. Hypertension, unspecified type    7. Hyperlipidemia, unspecified hyperlipidemia type    8. Acquired hypothyroidism                                   Plan:     NSCLC - Pt has NSCLC SCC with involvement of the right lung and mediastinum  -Pt with prior radiation to a RUL nodule  -PD-L1 was 86% on 6/18/21  -Pt was on Keytruda with 6 week cycles and completed 6 tx's and then developed pneumonitis  -PET/CT on 9/23/22 showed possible pseudoprogression  -PET/CT on 11/29/22 showed stable disease with RLL infiltrate concerning for resolving PNA  -PET-CT on 03/08/2023 showed resolution of prior disease   -CT Chest 4/14/23 and 7/10/23 with stable findings  -PET/CT 10/09/23 showed avidity in the RUL mass, inguinal LAD and subcutaneous nodule in the lumbar region  -Treatment with paclitaxel discussed; however, pt is hesitant to undergo  chemotherapy  -Biopsy of lumbar subcutaneous nodule showed fibroadipose tissue  -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  -Ct chest 12/12/23 stable  -PET/CT 2/15/24 showed continued nodular focus of hypermetabolic activity in the right apex with a new adjacent ground-glass area  -Pt being managed by pulmonary for potential surrounding infection  -o clear evidence of progression    [place] Pulmonary Embolus - seen on CTA 03/18/2023   -Patient is status post thrombectomy and currently on Eliquis 2.5mg BID given prior GI bleed  -Patient will need to be on Eliquis lifelong given lung cancer    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 for 9/12 cycles with good response  -PT with recurrent inguinal LAD which could be lymphoma  -LN's increasing in size on CT 12/12/23  -PET-CT on 02/15/2024 showed continued increasing size of left inguinal lymph node with SUV max now 9.2  -Left inguinal LN biopsy 3/08/24 showed Follicular Lymphoma Grade 3a  -Pt to see [facility]  -No concern for systemic symptoms currently    Right Renal Mass - Pt discussed at tumor board on 3/14/24 with recommendation for biopsy and potential ablation  -PT to undergo biopsy 3/28/24  -Will see pt after biopsy  -Will discuss possible ablation with IR at same time of biopsy    HTN - pt on nifedipine  -BP normal  -Will monitor    Hypothyroidism - pt on levothyroxine  -Management per PCP    Iron Deficiency Anemia - Hemoglobin improved to 12.8g/dL on 12/12/23  -Ferritin 316ng/mL 12/12/23  -Ferritin was 22ng/mL on 9/05/23  -Pt received 4 doses of venofer  -Hemoglobin 9.4g/dL on 3/08/24  -Will monitor    Route Chart for Scheduling    Med Onc Chart Routing      Follow up with physician 3 weeks. The patient needs an abraham with Dr Hansen for Follicular lymphoma.  Pt needs an appt with me the week of 4/08/24.   Follow up with ABRAHAM    Infusion scheduling note    Injection scheduling note    Labs    Imaging     Pharmacy appointment    Other referrals              Treatment Plan Information   OP PEMBROLIZUMAB 400MG Q6W   Chad Mills MD   Upcoming Treatment Dates - OP PEMBROLIZUMAB 400MG Q6W    5/12/2023       Pre-Medications       acetaminophen tablet 1,000 mg       diphenhydrAMINE (BENADRYL) 25 mg in NS 50 mL IVPB       Chemotherapy       pembrolizumab (KEYTRUDA) 400 mg in sodium chloride 0.9% SolP 116 mL infusion  6/23/2023       Pre-Medications       acetaminophen tablet 1,000 mg       diphenhydrAMINE (BENADRYL) 25 mg in NS 50 mL IVPB       Chemotherapy       pembrolizumab (KEYTRUDA) 400 mg in sodium chloride 0.9% SolP 116 mL infusion  8/4/2023       Pre-Medications       acetaminophen tablet 1,000 mg       diphenhydrAMINE (BENADRYL) 25 mg in NS 50 mL IVPB       Chemotherapy       pembrolizumab (KEYTRUDA) 400 mg in sodium chloride 0.9% SolP 116 mL infusion  9/15/2023       Pre-Medications       acetaminophen tablet 1,000 mg       diphenhydrAMINE (BENADRYL) 25 mg in NS 50 mL IVPB       Chemotherapy       pembrolizumab (KEYTRUDA) 400 mg in sodium chloride 0.9% SolP 116 mL infusion    Supportive Plan Information  OP IRON SUCROSE IVPB TWICE WEEKLY   Chad Mills MD   Upcoming Treatment Dates - OP IRON SUCROSE IVPB TWICE WEEKLY    No upcoming days in selected categories.    Therapy Plan Information  PORT FLUSH  Flushes  heparin, porcine (PF) 100 unit/mL injection flush 500 Units  500 Units, Intravenous, Every visit  sodium chloride 0.9% flush 10 mL  10 mL, Intravenous, Every visit    INF PEGFILGRASTIM (NEULASTA)  pegfilgrastim (NEULASTA) injection 6 mg  6 mg, Subcutaneous, Every visit        Chad Mills MD  [facility]  Hematology and Oncology  [facility] [address] [place] [address]   O: [phone]  F: [phone]

## 2024-03-18 ENCOUNTER — TELEPHONE (OUTPATIENT)
Dept: HEMATOLOGY/ONCOLOGY | Facility: CLINIC | Age: 84
End: 2024-03-18
Payer: MEDICARE

## 2024-03-18 ENCOUNTER — OFFICE VISIT (OUTPATIENT)
Dept: HEMATOLOGY/ONCOLOGY | Facility: CLINIC | Age: 84
End: 2024-03-18
Payer: MEDICARE

## 2024-03-18 VITALS
SYSTOLIC BLOOD PRESSURE: 106 MMHG | RESPIRATION RATE: 22 BRPM | OXYGEN SATURATION: 97 % | TEMPERATURE: 96 F | HEART RATE: 87 BPM | BODY MASS INDEX: 35.26 KG/M2 | HEIGHT: 64 IN | DIASTOLIC BLOOD PRESSURE: 80 MMHG | WEIGHT: 206.56 LBS

## 2024-03-18 DIAGNOSIS — D50.0 IRON DEFICIENCY ANEMIA DUE TO CHRONIC BLOOD LOSS: ICD-10-CM

## 2024-03-18 DIAGNOSIS — I10 HYPERTENSION, UNSPECIFIED TYPE: ICD-10-CM

## 2024-03-18 DIAGNOSIS — C82.35 GRADE 3A FOLLICULAR LYMPHOMA OF LYMPH NODES OF INGUINAL REGION: Primary | ICD-10-CM

## 2024-03-18 DIAGNOSIS — E03.9 ACQUIRED HYPOTHYROIDISM: ICD-10-CM

## 2024-03-18 DIAGNOSIS — E78.5 HYPERLIPIDEMIA, UNSPECIFIED HYPERLIPIDEMIA TYPE: ICD-10-CM

## 2024-03-18 DIAGNOSIS — N28.89 RIGHT RENAL MASS: ICD-10-CM

## 2024-03-18 DIAGNOSIS — C34.11 MALIGNANT NEOPLASM OF RIGHT UPPER LOBE OF LUNG: ICD-10-CM

## 2024-03-18 DIAGNOSIS — D68.59 THROMBOPHILIA: ICD-10-CM

## 2024-03-18 PROCEDURE — 1159F MED LIST DOCD IN RCRD: CPT | Mod: CPTII,S$GLB,, | Performed by: INTERNAL MEDICINE

## 2024-03-18 PROCEDURE — 3074F SYST BP LT 130 MM HG: CPT | Mod: CPTII,S$GLB,, | Performed by: INTERNAL MEDICINE

## 2024-03-18 PROCEDURE — 1101F PT FALLS ASSESS-DOCD LE1/YR: CPT | Mod: CPTII,S$GLB,, | Performed by: INTERNAL MEDICINE

## 2024-03-18 PROCEDURE — 99999 PR PBB SHADOW E&M-EST. PATIENT-LVL IV: CPT | Mod: PBBFAC,,, | Performed by: INTERNAL MEDICINE

## 2024-03-18 PROCEDURE — 99213 OFFICE O/P EST LOW 20 MIN: CPT | Mod: S$GLB,,, | Performed by: INTERNAL MEDICINE

## 2024-03-18 PROCEDURE — 1126F AMNT PAIN NOTED NONE PRSNT: CPT | Mod: CPTII,S$GLB,, | Performed by: INTERNAL MEDICINE

## 2024-03-18 PROCEDURE — 3288F FALL RISK ASSESSMENT DOCD: CPT | Mod: CPTII,S$GLB,, | Performed by: INTERNAL MEDICINE

## 2024-03-18 PROCEDURE — 3079F DIAST BP 80-89 MM HG: CPT | Mod: CPTII,S$GLB,, | Performed by: INTERNAL MEDICINE

## 2024-03-18 NOTE — TELEPHONE ENCOUNTER
----- Message from Zoraida Marquez sent at 3/18/2024  2:15 PM CDT -----  Contact: self  Type:  Patient Returning Call    Who Called:Pt  Who Left Message for Patient: Benjamín FLETCHER   Does the patient know what this is regarding?: Appt today after the lights came back on...   Pt is coming back to the clinic...   Best Call Back Number: [phone]  Please call to advise... Thank you...

## 2024-03-19 ENCOUNTER — PATIENT MESSAGE (OUTPATIENT)
Dept: HEMATOLOGY/ONCOLOGY | Facility: CLINIC | Age: 84
End: 2024-03-19
Payer: MEDICARE

## 2024-03-19 ENCOUNTER — DOCUMENTATION ONLY (OUTPATIENT)
Dept: HEMATOLOGY/ONCOLOGY | Facility: CLINIC | Age: 84
End: 2024-03-19
Payer: MEDICARE

## 2024-03-19 NOTE — PROGRESS NOTES
Ms. Velez to see Dr. Hansen as established ext patient on 4/18 for follicular lymphoma. Added self to care team. Will f/u after apt with Dr. Hansen.

## 2024-03-19 NOTE — TELEPHONE ENCOUNTER
I called the patient informed her that I would communicated with Interventional Radiology.  The interventional radiologist wanted to wait to have a definitive biopsy results before pursuing ablation. The patient expressed understanding.  All questions were answered to her satisfaction.    Chad Mills MD  [facility]  Hematology and Oncology  [facility]   [address] [place] [address]   O: [phone]  F: [phone]

## 2024-03-24 PROBLEM — R42 ORTHOSTATIC LIGHTHEADEDNESS: Status: RESOLVED | Noted: 2024-03-24 | Resolved: 2024-03-24

## 2024-03-24 PROBLEM — K92.2 LOWER GI BLEED: Status: ACTIVE | Noted: 2024-03-24

## 2024-03-24 PROBLEM — R42 ORTHOSTATIC LIGHTHEADEDNESS: Status: ACTIVE | Noted: 2024-03-24

## 2024-03-24 PROBLEM — E83.42 HYPOMAGNESEMIA: Status: RESOLVED | Noted: 2020-12-14 | Resolved: 2024-03-24

## 2024-03-25 ENCOUNTER — PATIENT MESSAGE (OUTPATIENT)
Dept: HEMATOLOGY/ONCOLOGY | Facility: CLINIC | Age: 84
End: 2024-03-25
Payer: MEDICARE

## 2024-03-26 DIAGNOSIS — K62.89 RECTAL MASS: Primary | ICD-10-CM

## 2024-03-26 NOTE — TELEPHONE ENCOUNTER
I called the patient and stated that she would have a kidney biopsy and MRI of the rectum as an outpatient.  I also instructed her that she was scheduled to have biopsy of the rectal mass tomorrow with surgery.    Chad Mills MD  [facility]  Hematology and Oncology  [facility]   [address] [place] [address]   O: [phone]  F: [phone]

## 2024-03-27 PROBLEM — Z86.711 HISTORY OF PULMONARY EMBOLISM: Status: ACTIVE | Noted: 2024-03-27

## 2024-03-29 ENCOUNTER — DOCUMENTATION ONLY (OUTPATIENT)
Dept: HEMATOLOGY/ONCOLOGY | Facility: CLINIC | Age: 84
End: 2024-03-29
Payer: MEDICARE

## 2024-04-04 ENCOUNTER — TELEPHONE (OUTPATIENT)
Dept: UROLOGY | Facility: CLINIC | Age: 84
End: 2024-04-04
Payer: MEDICARE

## 2024-04-04 ENCOUNTER — OFFICE VISIT (OUTPATIENT)
Dept: FAMILY MEDICINE | Facility: CLINIC | Age: 84
End: 2024-04-04
Payer: MEDICARE

## 2024-04-04 VITALS
SYSTOLIC BLOOD PRESSURE: 120 MMHG | HEART RATE: 80 BPM | OXYGEN SATURATION: 98 % | DIASTOLIC BLOOD PRESSURE: 74 MMHG | BODY MASS INDEX: 34.82 KG/M2 | WEIGHT: 203.94 LBS | RESPIRATION RATE: 18 BRPM | HEIGHT: 64 IN

## 2024-04-04 DIAGNOSIS — J43.2 CENTRILOBULAR EMPHYSEMA: ICD-10-CM

## 2024-04-04 DIAGNOSIS — I77.819 ECTATIC AORTA: ICD-10-CM

## 2024-04-04 DIAGNOSIS — J47.9 BRONCHIECTASIS WITHOUT COMPLICATION: ICD-10-CM

## 2024-04-04 DIAGNOSIS — N18.32 CHRONIC KIDNEY DISEASE, STAGE 3B: ICD-10-CM

## 2024-04-04 DIAGNOSIS — R00.2 PALPITATIONS: Primary | ICD-10-CM

## 2024-04-04 DIAGNOSIS — C77.9 SECONDARY AND UNSPECIFIED MALIGNANT NEOPLASM OF LYMPH NODE, UNSPECIFIED: ICD-10-CM

## 2024-04-04 DIAGNOSIS — E66.01 MORBID (SEVERE) OBESITY DUE TO EXCESS CALORIES: ICD-10-CM

## 2024-04-04 DIAGNOSIS — D89.89 OTHER SPECIFIED DISORDERS INVOLVING THE IMMUNE MECHANISM, NOT ELSEWHERE CLASSIFIED: ICD-10-CM

## 2024-04-04 DIAGNOSIS — J96.11 CHRONIC RESPIRATORY FAILURE WITH HYPOXIA: ICD-10-CM

## 2024-04-04 PROCEDURE — 3078F DIAST BP <80 MM HG: CPT | Mod: CPTII,S$GLB,, | Performed by: FAMILY MEDICINE

## 2024-04-04 PROCEDURE — 1160F RVW MEDS BY RX/DR IN RCRD: CPT | Mod: CPTII,S$GLB,, | Performed by: FAMILY MEDICINE

## 2024-04-04 PROCEDURE — 1159F MED LIST DOCD IN RCRD: CPT | Mod: CPTII,S$GLB,, | Performed by: FAMILY MEDICINE

## 2024-04-04 PROCEDURE — 99214 OFFICE O/P EST MOD 30 MIN: CPT | Mod: S$GLB,,, | Performed by: FAMILY MEDICINE

## 2024-04-04 PROCEDURE — 99999 PR PBB SHADOW E&M-EST. PATIENT-LVL III: CPT | Mod: PBBFAC,,, | Performed by: FAMILY MEDICINE

## 2024-04-04 PROCEDURE — 1101F PT FALLS ASSESS-DOCD LE1/YR: CPT | Mod: CPTII,S$GLB,, | Performed by: FAMILY MEDICINE

## 2024-04-04 PROCEDURE — 1111F DSCHRG MED/CURRENT MED MERGE: CPT | Mod: CPTII,S$GLB,, | Performed by: FAMILY MEDICINE

## 2024-04-04 PROCEDURE — 3074F SYST BP LT 130 MM HG: CPT | Mod: CPTII,S$GLB,, | Performed by: FAMILY MEDICINE

## 2024-04-04 PROCEDURE — 1126F AMNT PAIN NOTED NONE PRSNT: CPT | Mod: CPTII,S$GLB,, | Performed by: FAMILY MEDICINE

## 2024-04-04 PROCEDURE — 3288F FALL RISK ASSESSMENT DOCD: CPT | Mod: CPTII,S$GLB,, | Performed by: FAMILY MEDICINE

## 2024-04-04 PROCEDURE — G2211 COMPLEX E/M VISIT ADD ON: HCPCS | Mod: S$GLB,,, | Performed by: FAMILY MEDICINE

## 2024-04-04 NOTE — TELEPHONE ENCOUNTER
----- Message from Adriana Lan LPN sent at 4/4/2024 11:16 AM CDT -----    ----- Message -----  From: Thea Fermin LPN  Sent: 4/3/2024   5:03 PM CDT  To: Adriana Lan LPN      ----- Message -----  From: Noel Priest MD  Sent: 4/3/2024   4:44 PM CDT  To: Thea Fermin LPN    I put an order for embolization. I do not see a TACE order. Please verify the order with her. You can order it and send it to me. Thanks  ----- Message -----  From: Thea Fermin LPN  Sent: 4/3/2024   3:45 PM CDT  To: Noel Priest MD    Per Roxie with IR,    Please place your order for TACE for patient. She said the order is not there  ----- Message -----  From: Adriana Lan LPN  Sent: 4/3/2024   1:41 PM CDT  To: Thea Fermin LPN      ----- Message -----  From: Thea Fermin LPN  Sent: 4/2/2024   3:58 PM CDT  To: Adriana Lan LPN      ----- Message -----  From: Noel Priest MD  Sent: 4/2/2024  12:27 PM CDT  To: C[facility] Cohen Staff    Discussed results with the patient. Discussed with Dr. Weaver plan for TACE with re-evaluation and possible cryoablation in the future. Referral and orders placed. Please contact the IR team to help coordinate. Thanks.

## 2024-04-04 NOTE — PROGRESS NOTES
Subjective:       Patient ID: Shandra Velez is a 83 y.o. female.    Chief Complaint: Hospital Follow Up ( [facility] Elevated heart rate)    Patient presents with:  Health follow up    Admission Date: 3/23/2024  Hospital Length of Stay: 5 days  Discharge Date and Time: 3/28/2024  7:58 AM  Attending Physician: Jemima att. providers found   Discharging Provider: Linda Moreno MD  Primary Care Provider: Jemima, Primary Doctor  Primary Care Team: Networkedreference to record PCT      HPI:  84yo female with follicular lymphoma, lung cancer, renal mass, and history of pulmonary embolus on eliquis presented to the ED for palpitations and lightheadedness upon standing. Patient reports of developing these symptoms for the past day and only occur when she goes from sitting to standing. She checked her pulse earlier tonight which was noted to be 120, prompting her to come to the ED. In the ED she also endorsed recent bright red blood per rectum and was found to have a drop in hemoglobin from 12.8 to 8.5 over the past three months. Due to concern for GI bleed, hospital medicine consulted for admission.  Procedure(s) (LRB):  BIOPSY, RECTUM (N/A)    Hospital Course:   Acute on chronic anemia in the setting of follicular lymphoma requiring  2 u blood transfusion. No further bleeding.  GI consultation with colonoscopy with  circumferential mass exterior to the anal channel found on perianal exam with some ulceration General surgery consultation underwent with rectal mass biopsy, no active bleeding. Known multiple cancers of squamous cell carcinoma of the lung, follicular lymphoma, and pending right renal mass biopsy and s/p rectal mass biopsy done on 3/27.  Coordinating medical care with oncology and pulmonary.  Hold of Eliquis in the setting of lower GI bleed and biopsy.  Ideal would be to have the right right renal mass biopsy scheduled on 3/28 outpatient, but it was denied by administration intervention Radiology.  Coordinate plan with  oncology and Pulmonary to have it done outpatient along with MRI surveillance.  Plan is to resume Eliquis post biopsy has patient has high burden saddle emboli March 2023, at this point she is high risk with multiple malignancies and will be on it for lifetime anticoagulation.  Plan to resume Eliquis 3/29/2024. 3/7/24 repeat CTA no saddle emboli could not comment on the peripheral segmental arteries Has contrasted not go through, did not want a re contrast secondary to CKD. 3/27 V/Q scan low probability PE. Hg stable on discharge 11     No further GI bleeding.  Plan is for IR embolization for renal mass.    Trying IB Guard OTC after she thought she had some IBS  Non-Hodgkins lymphoma, renal mass, lung cancer - following with oncology; in remission presently  S/p PE - taking Eliquis 2.5mg BID for life  HTN - tolerating nifedipine Xl 60mg daily; stopped HCTZ and lisinopril due to renal functions;   HLD - stopped Zocor 10mg daily  Allergies: taking Zyrtec 10mg daily  Insomnia - using restoril 30mg at bedtime  Hypothyroidism - taking levothyroxine 100mcg daily  Renal mass - following with urology    Past Medical History:    Hypertension                                                  Pulmonary nodule                                              Smoker                                                        Osteopenia                                                    Hypertension                                                  Hypercholesterolemia                                          Breast cancer                                                 Lumbar spondylosis                                            Past Surgical History:    MASTECTOMY                                       1985            Comment:right    CHOLECYSTECTOMY                                                APPENDECTOMY                                                   UMBILICAL HERNIA REPAIR                                        TUBAL LIGATION                                                  BREAST RECONSTRUCTION                                            Comment:right    Allergies:   -- No Known Drug Allergies     Social History    Marital Status:              Spouse Name:                       Years of Education:                 Number of children: 3             Occupational History  Occupation          Employer            Comment               retired marketing *                         Social History Main Topics    Smoking Status: Current Every Day Smoker        Packs/Day: 0.50  Years: 53         Types: Cigarettes    Smokeless Status: Never Used                        Alcohol Use: Yes             Drug Use: No              Sexual Activity: Not Currently        Other Topics            Concern    None on file    Social History Narrative    Lives alone      Hobbies: skyler      From [place]    Current Outpatient Medications on File Prior to Visit:  albuterol (ACCUNEB) 1.25 mg/3 mL Nebu, TAKE 3 MLS (1.25 MG TOTAL) BY NEBULIZATION 2 (TWO) TIMES A DAY. RESCUE, Disp: 280 mL, Rfl: 3  albuterol (VENTOLIN HFA) 90 mcg/actuation inhaler, Inhale 2 puffs into the lungs every 6 (six) hours as needed for Wheezing. Rescue, Disp: 50 g, Rfl: 0  albuterol-ipratropium (DUO-NEB) 2.5 mg-0.5 mg/3 mL nebulizer solution, Take 3 mLs by nebulization every 6 (six) hours as needed for Wheezing. Rescue, Disp: 75 mL, Rfl: 2  apixaban (ELIQUIS) 2.5 mg Tab, Take 1 tablet (2.5 mg total) by mouth 2 (two) times daily., Disp: 180 tablet, Rfl: 3  azelastine (ASTELIN) 137 mcg (0.1 %) nasal spray, 1 spray (137 mcg total) by Nasal route 2 (two) times daily., Disp: 30 mL, Rfl: 12  budesonide (PULMICORT) 0.5 mg/2 mL nebulizer solution, Take 2 mLs (0.5 mg total) by nebulization 2 (two) times a day. Controller, Disp: 120 mL, Rfl: 11  cetirizine (ZYRTEC) 10 MG tablet, Take 10 mg by mouth once daily., Disp: , Rfl:   cholecalciferol, vitamin D3, 1,250 mcg (50,000 unit) Tab, Take 1 tablet by  mouth every 7 days., Disp: 12 tablet, Rfl: 6  fluticasone propionate (FLONASE) 50 mcg/actuation nasal spray, 1 spray (50 mcg total) by Each Nostril route 2 (two) times a day., Disp: 16 g, Rfl: 12  levothyroxine (SYNTHROID) 100 MCG tablet, Take 1 tablet (100 mcg total) by mouth once daily., Disp: 90 tablet, Rfl: 3  multivitamin (THERAGRAN) per tablet, Take 1 tablet by mouth once daily., Disp: , Rfl:   NIFEdipine (ADALAT CC) 60 MG TbSR, Take 1 tablet (60 mg total) by mouth once daily., Disp: 90 tablet, Rfl: 1  omeprazole (PRILOSEC) 40 MG capsule, Take 1 capsule (40 mg total) by mouth once daily., Disp: 90 capsule, Rfl: 4  potassium chloride (MICRO-K) 10 MEQ CpSR, Take 2 capsules (20 mEq total) by mouth once daily., Disp: 180 capsule, Rfl: 3  sodium chloride 3% 3 % nebulizer solution, Take 4 mLs by nebulization 3 (three) times a week., Disp: 48 mL, Rfl: 3  temazepam (RESTORIL) 30 mg capsule, Take 1 capsule (30 mg total) by mouth every evening., Disp: 30 capsule, Rfl: 5  mucus clearing device (ACAPELLA, FLUTTER), by Misc.(Non-Drug; Combo Route) route once daily. (Patient not taking: Reported on 11/22/2023), Disp: 100 each, Rfl: 0  OXYGEN-AIR DELIVERY SYSTEMS MIS, by Misc.(Non-Drug; Combo Route) route., Disp: , Rfl:   [DISCONTINUED] amoxicillin-clavulanate 875-125mg (AUGMENTIN) 875-125 mg per tablet, Take 1 tablet by mouth every 12 (twelve) hours. (Patient not taking: Reported on 11/22/2023), Disp: 16 tablet, Rfl: 0    No current facility-administered medications on file prior to visit.      Review of patient's family history indicates:    Cancer                         Sister                      Comment: uterine    Diabetes                       Brother                       Fatigue  Associated symptoms include fatigue and nausea. Pertinent negatives include no arthralgias, chest pain, chills, coughing, fever, headaches, neck pain, numbness, rash, sore throat, vomiting or weakness.   Follow-up  Associated symptoms  "include fatigue and nausea. Pertinent negatives include no arthralgias, chest pain, chills, coughing, fever, headaches, neck pain, numbness, rash, sore throat, vomiting or weakness.   Hyperlipidemia  Pertinent negatives include no chest pain or shortness of breath.     Review of Systems   Constitutional:  Positive for appetite change and fatigue. Negative for chills, fever and unexpected weight change.   HENT:  Negative for sore throat and trouble swallowing.    Eyes:  Negative for pain and visual disturbance.   Respiratory:  Negative for cough, shortness of breath and wheezing.    Cardiovascular:  Negative for chest pain and palpitations.   Gastrointestinal:  Positive for nausea. Negative for abdominal distention, blood in stool, diarrhea and vomiting.   Genitourinary:  Negative for difficulty urinating, dysuria and hematuria.   Musculoskeletal:  Negative for arthralgias, back pain, gait problem and neck pain.   Skin:  Negative for rash and wound.   Neurological:  Positive for tremors (left hand). Negative for dizziness, weakness, numbness and headaches.   Hematological:  Negative for adenopathy.   Psychiatric/Behavioral:  Negative for dysphoric mood.        Objective:       /74   Pulse 80   Resp 18   Ht 5' 4" (1.626 m)   Wt 92.5 kg (203 lb 14.8 oz)   SpO2 98% Comment: on oxygen  BMI 35.00 kg/m²     Physical Exam  Constitutional:       Appearance: Normal appearance. She is well-developed.   HENT:      Head: Normocephalic.      Mouth/Throat:      Pharynx: No oropharyngeal exudate or posterior oropharyngeal erythema.   Eyes:      Conjunctiva/sclera: Conjunctivae normal.      Pupils: Pupils are equal, round, and reactive to light.   Neck:      Thyroid: No thyromegaly.   Cardiovascular:      Rate and Rhythm: Normal rate and regular rhythm.      Heart sounds: Normal heart sounds, S1 normal and S2 normal. No murmur heard.     No friction rub. No gallop.   Pulmonary:      Effort: Pulmonary effort is normal.    "   Breath sounds: Normal breath sounds. No wheezing or rales.   Abdominal:      General: Bowel sounds are normal. There is no distension.      Palpations: Abdomen is soft.      Tenderness: There is no abdominal tenderness.   Musculoskeletal:      Cervical back: Normal range of motion and neck supple.      Lumbar back: No deformity or tenderness. Normal range of motion.      Right lower leg: No edema.      Left lower leg: No edema.   Lymphadenopathy:      Cervical: No cervical adenopathy.   Skin:     General: Skin is warm.      Findings: No rash.   Neurological:      Mental Status: She is alert and oriented to person, place, and time.      Cranial Nerves: No cranial nerve deficit.      Motor: Tremor present.      Gait: Gait normal.         Results for orders placed or performed during the hospital encounter of 03/28/24   CBC Without Differential   Result Value Ref Range    WBC 6.67 3.90 - 12.70 K/uL    RBC 3.77 (L) 4.00 - 5.40 M/uL    Hemoglobin 11.0 (L) 12.0 - 16.0 g/dL    Hematocrit 35.1 (L) 37.0 - 48.5 %    MCV 93 82 - 98 fL    MCH 29.2 27.0 - 31.0 pg    MCHC 31.3 (L) 32.0 - 36.0 g/dL    RDW 15.7 (H) 11.5 - 14.5 %    Platelets 138 (L) 150 - 450 K/uL    MPV 9.8 9.2 - 12.9 fL   Protime-INR   Result Value Ref Range    PT 13.1 11.8 - 14.7 sec    INR 1.0    APTT   Result Value Ref Range    aPTT 25.9 24.6 - 36.7 sec     *Note: Due to a large number of results and/or encounters for the requested time period, some results have not been displayed. A complete set of results can be found in Results Review.       Hospital records reviewed    Assessment:       1. Palpitations    2. Secondary and unspecified malignant neoplasm of lymph node, unspecified    3. Chronic respiratory failure with hypoxia    4. Chronic kidney disease, stage 3b    5. Morbid (severe) obesity due to excess calories    6. Other specified disorders involving the immune mechanism, not elsewhere classified    7. Ectatic aorta    8. Bronchiectasis without  complication    9. Centrilobular emphysema              Plan:       Palpitations    Secondary and unspecified malignant neoplasm of lymph node, unspecified    Chronic respiratory failure with hypoxia    Chronic kidney disease, stage 3b    Morbid (severe) obesity due to excess calories    Other specified disorders involving the immune mechanism, not elsewhere classified    Ectatic aorta    Bronchiectasis without complication    Centrilobular emphysema              Stable  Overall looks good  Continue present meds  Counseled on regular exercise, maintenance of a healthy weight, balanced diet rich in fruits/vegetables and lean protein, and avoidance of unhealthy habits like smoking and excessive alcohol intake.  F/u with urology as planned        Visit today included increased complexity associated with the care of the episodic problems listed above addressed and managing the longitudinal care of the patient due to the serious and/or complex managed problem(s) listed above.

## 2024-04-04 NOTE — TELEPHONE ENCOUNTER
Called place to IR at [facility], orders have been placed by Dr Cem Cole in IR. No new orders are necessary at this time. Dr Mahoneya to be contacted by IR in regards to patient.

## 2024-04-08 DIAGNOSIS — I10 ESSENTIAL HYPERTENSION: ICD-10-CM

## 2024-04-08 RX ORDER — NIFEDIPINE 60 MG/1
60 TABLET, EXTENDED RELEASE ORAL
Qty: 90 TABLET | Refills: 3 | Status: SHIPPED | OUTPATIENT
Start: 2024-04-08

## 2024-04-08 NOTE — TELEPHONE ENCOUNTER
No care due was identified.  [facility] Embedded Care Due Messages. Reference number: [id number].   4/08/2024 2:56:48 PM CDT

## 2024-04-09 ENCOUNTER — PATIENT MESSAGE (OUTPATIENT)
Dept: FAMILY MEDICINE | Facility: CLINIC | Age: 84
End: 2024-04-09
Payer: MEDICARE

## 2024-04-09 NOTE — TELEPHONE ENCOUNTER
Refill Routing Note   Medication(s) are not appropriate for processing by [facility] for the following reason(s):        Outside of protocol    [facility] action(s):  Route               Appointments  past 12m or future 3m with PCP    Date Provider   Last Visit   4/4/2024 Akil Chavez MD   Next Visit   5/22/2024 Akil Chavez MD   ED visits in past 90 days: 0        Note composed:8:06 PM 04/08/2024

## 2024-04-10 ENCOUNTER — TELEPHONE (OUTPATIENT)
Dept: HEMATOLOGY/ONCOLOGY | Facility: CLINIC | Age: 84
End: 2024-04-10
Payer: MEDICARE

## 2024-04-10 PROBLEM — N18.31 STAGE 3A CHRONIC KIDNEY DISEASE: Status: RESOLVED | Noted: 2022-04-05 | Resolved: 2024-04-10

## 2024-04-10 PROBLEM — C77.9 SECONDARY AND UNSPECIFIED MALIGNANT NEOPLASM OF LYMPH NODE, UNSPECIFIED: Status: ACTIVE | Noted: 2024-04-10

## 2024-04-10 PROBLEM — N18.32 CHRONIC KIDNEY DISEASE, STAGE 3B: Status: ACTIVE | Noted: 2024-04-10

## 2024-04-10 PROBLEM — J96.11 CHRONIC RESPIRATORY FAILURE WITH HYPOXIA: Status: ACTIVE | Noted: 2024-04-10

## 2024-04-10 NOTE — TELEPHONE ENCOUNTER
----- Message from Crispin Rizzo sent at 4/9/2024  5:50 PM CDT -----  Type: General Call Back         Name of Caller:pt  Would the patient rather a call back or a response via [facility]? call  Best Call Back Number:[phone]   Additional Information: Pt would like to reschedule appt set for 04/10 due to weather. Please call pt with further info and assistance. Thank you.

## 2024-04-12 ENCOUNTER — OFFICE VISIT (OUTPATIENT)
Dept: HEMATOLOGY/ONCOLOGY | Facility: CLINIC | Age: 84
End: 2024-04-12
Payer: MEDICARE

## 2024-04-12 VITALS
SYSTOLIC BLOOD PRESSURE: 114 MMHG | DIASTOLIC BLOOD PRESSURE: 66 MMHG | HEIGHT: 64 IN | RESPIRATION RATE: 18 BRPM | BODY MASS INDEX: 35.34 KG/M2 | WEIGHT: 207 LBS | TEMPERATURE: 98 F | HEART RATE: 86 BPM | OXYGEN SATURATION: 96 %

## 2024-04-12 DIAGNOSIS — C82.35 GRADE 3A FOLLICULAR LYMPHOMA OF LYMPH NODES OF INGUINAL REGION: ICD-10-CM

## 2024-04-12 DIAGNOSIS — K62.9 RECTAL LESION: ICD-10-CM

## 2024-04-12 DIAGNOSIS — C34.11 MALIGNANT NEOPLASM OF RIGHT UPPER LOBE OF LUNG: Primary | ICD-10-CM

## 2024-04-12 DIAGNOSIS — C64.9 RENAL CELL CARCINOMA, UNSPECIFIED LATERALITY: ICD-10-CM

## 2024-04-12 DIAGNOSIS — E78.5 HYPERLIPIDEMIA, UNSPECIFIED HYPERLIPIDEMIA TYPE: ICD-10-CM

## 2024-04-12 DIAGNOSIS — I10 HYPERTENSION, UNSPECIFIED TYPE: ICD-10-CM

## 2024-04-12 DIAGNOSIS — D50.0 IRON DEFICIENCY ANEMIA DUE TO CHRONIC BLOOD LOSS: ICD-10-CM

## 2024-04-12 DIAGNOSIS — D68.59 THROMBOPHILIA: ICD-10-CM

## 2024-04-12 PROCEDURE — 1126F AMNT PAIN NOTED NONE PRSNT: CPT | Mod: CPTII,S$GLB,, | Performed by: INTERNAL MEDICINE

## 2024-04-12 PROCEDURE — 99999 PR PBB SHADOW E&M-EST. PATIENT-LVL IV: CPT | Mod: PBBFAC,,, | Performed by: INTERNAL MEDICINE

## 2024-04-12 PROCEDURE — 99215 OFFICE O/P EST HI 40 MIN: CPT | Mod: S$GLB,,, | Performed by: INTERNAL MEDICINE

## 2024-04-12 PROCEDURE — 1111F DSCHRG MED/CURRENT MED MERGE: CPT | Mod: CPTII,S$GLB,, | Performed by: INTERNAL MEDICINE

## 2024-04-12 PROCEDURE — 1101F PT FALLS ASSESS-DOCD LE1/YR: CPT | Mod: CPTII,S$GLB,, | Performed by: INTERNAL MEDICINE

## 2024-04-12 PROCEDURE — 3078F DIAST BP <80 MM HG: CPT | Mod: CPTII,S$GLB,, | Performed by: INTERNAL MEDICINE

## 2024-04-12 PROCEDURE — 3074F SYST BP LT 130 MM HG: CPT | Mod: CPTII,S$GLB,, | Performed by: INTERNAL MEDICINE

## 2024-04-12 PROCEDURE — 1159F MED LIST DOCD IN RCRD: CPT | Mod: CPTII,S$GLB,, | Performed by: INTERNAL MEDICINE

## 2024-04-12 PROCEDURE — 3288F FALL RISK ASSESSMENT DOCD: CPT | Mod: CPTII,S$GLB,, | Performed by: INTERNAL MEDICINE

## 2024-04-12 NOTE — PROGRESS NOTES
PATIENT: Shandra Velez  MRN: [id number]  DATE: 4/14/2024      Diagnosis:   1. Malignant neoplasm of right upper lobe of lung    2. Renal cell carcinoma, unspecified laterality    3. Rectal lesion    4. Thrombophilia    5. 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 Hypertension, unspecified type    7. Hyperlipidemia, unspecified hyperlipidemia type    8. Iron deficiency anemia due to chronic blood loss            Chief Complaint: Malignant neoplasm of right upper lobe of lung    Oncologic History:     Pt initially underwent right inguinal LN biopsy 5/25/22 showing grade 1 follicular lymphoma.  Bone marrow biopsy done 06/24/2020 showed involvement with follicular lymphoma.  PET/CT 7/07/20 showed lymphadenopathy above and below the diaphragm as well as the spleen.  Also seen wasa RUL lesion.  The patient was started on CVP-R followed by maintenance rituxan every 8 weeks.  Biopsy of the RUL nodule on 6/18/21 showed SCC with PD-L1 at 86%.  EBUS 7/09/21 showed no malignant cells at station 7, 4R or 11RS.  The patient was treated with SBRT under the care of Dr Barnes with 50Gy in 4 fractions completed 10/28/21.  The patient then developed new pulmonary nodules on CT scan 6/02/22.  EBUS 6/16/22 showed positive SCC in 4R LN.  Pt was discussed at tumor board on 6/21/22 with recommendation for Keytruda.  The patient underwent PET/CT on 9/23/22 after 2 doses of treatment showing a 1.5 cm precarinal lymph node; stable right apical mass measuring 1.8 x 1.5 cm; stable 8 mm right apical lung nodule with adjacent post radiation change measuring 2 x 2.7 cm; stable 4 mm subpleural nodule in the lateral right upper lobe; fluid/mucus in the right lower lobe bronchus tree; 6 mm anterior left upper lobe nodule which is new; 2.3 cm simple cyst in the midpole of the left kidney; 3.1 x 2.3 cm rim enhancing complex cystic and solid mass in the mid to lower pole the right kidney; infrarenal abdominal aortic aneurysm  measuring 3.8 x 3.5 cm; and stable a left para-aortic lymph node measuring 11 mm. 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   The patient underwent PET-CT on 11/29/2022 showing an irregular hypermetabolic right upper lobe tumor which is stable measuring 1.6 x 1.2 cm; hypermetabolic subpleural right upper lobe consolidation diminished in extent; unchanged linear zone of hypermetabolic atelectasis in the superior segment of the left lower lobe; new poorly metabolic ill-defined infiltrate in the posterior right lower lobe; 4 mm left upper lobe nodule which is stable; hypermetabolic precarinal mediastinal lymph node stable measuring 1 cm; and hypermetabolic aortopulmonary lymph node stable measuring 1.1 x 1.1 cm.   The patient was admitted to the hospital from 1/16/23 to 1/23/23 for pneumonia.  She underwent bronchoscopy with Dr Kinney on 1/18/23 showing significant mucus plug impaction int the pharynx, left mainstem bronchus and right and left lower lobes.  The patient was discharged on 3L O2.   The patient underwent PET-CT on 03/08/2023 showing thickening of the pleura; new ground-glass opacities within the left upper lobe; patchy airspace opacities in the right lower lobe; right lower lobe pulmonary artery hypermetabolic activity possibly secondary to bronchial wall thickening or thrombus in the pulmonary artery; diffuse hypermetabolic activity identified throughout the axial skeleton.  The patient was then admitted to the hospital on 03/18/23-4/3/23 after presenting with shortness of breath and being found to have a saddle pulmonary embolus on CTA 03/18/2023.  The patient underwent treatment with a heparin drip and thrombectomy on 03/18/2023 with eventual transition to Eliquis.   The patient underwent CT chest on 04/14/2023 showing areas of air trapping in subpleural bleb formation in both jarek thoraces with areas of interstitial  scarring and bibasilar bronchiectasis; stable solid nodule in left upper lobe measuring 6 mm; masslike area measuring 3 x 1.3 x 1.5 cm in the right upper lobe stable.   The patient underwent CT chest, abdomen, and pelvis on 07/10/2023 showing more consolidative appearance of previously described right apical pulmonary nodules without discretely measurable nodule on today's exam, 8 mm left apical nodule, stable consolidation and superior segment left lower lobe; 3 mm right lower lobe pulmonary nodule; heterogeneously enhancing mass in the inferior pole of the right kidney measuring 3.9 x 3.7 x 3.9 cm; unchanged left periaortic lymph nodes.   The patient underwent a PET-CT on 10/09/2023 showing a new oval-shaped hypermetabolic nodular mass measuring 1.6 x 1 cm in the right upper lobe consistent with tumor recurrence; disappearance of ground-glass opacities anterior left upper lobe; disappearance of subpleural posterior right lower lobe consolidation; new hypermetabolic bilateral inguinal lymph nodes; new rounded nodular hypermetabolic nodule within the posterior right lumbar subcutaneous fat measuring 1.5 x 1.5 cm and a 3.8cm abdominal aortic aneurysm.    The patient was discussed at Thoracic tumor board on 10/25/23 with recommendation to proceed with biopsy of the lesion in the back via IR.  Biopsy done on 11/03/23 showed fibroadipose tissue.   The patient underwent CT of the chest, abdomen, and pelvis on 12/12/2023 showing pleural parenchymal thickening in the right lung apex; 6 mm left upper lobe nodule stable; solid mass inferior right kidney measuring 4.3 x 4.3 cm; enlarged bilateral periaortic lymphadenopathy measuring 17 x 14 mm; enlarged bilateral inguinal hemipelvic lymphadenopathy with left hemipelvic lymph node measuring 4.4 x 1.8 cm and left inguinal lymph node measuring 3.5 x 1.7 cm.   The patient underwent PET-CT on 02/15/2024 showing a stable nodular focus of hypermetabolic activity in the right apical  soft tissue area of consolidation; new area of ground-glass opacity more anterior in the right lung apex measuring 2.6 x 2.2 cm; multiple metastatic periaortic and bilateral iliac lymph nodes with progression from prior with a new left para-aortic lymph node measuring 19 x 17 mm SUV max 6.5, left hemipelvic node measuring 4.1 x 1.5 cm with SUV max of 9.4 enlarged inguinal lymph node measuring 3 x 2.2 with SUV max of 9.2.      The patient underwent biopsy of left inguinal lymph node with path showing follicular lymphoma grade 3A.  Patient was discussed at  Tumor Board on 3/14/24 with recommendation for biopsy of the right renal lesion followed by potential ablation.    Subjective:    Interval History: Ms. Velez is a 83 y.o. female with HLD, HTN, osteopenia, follicular lymphoma who presents for follow up of NSCLC.  Since since the last clinic visit the patient was admitted to the hospital from 03/23/2024 until 03/28/2024 for acute on chronic anemia.  Hemoglobin on admission was 7.3 grams/deciliter.  The patient received 2 units of PRBC's on 03/24/2024.  Colonoscopy on 03/25/2024 showed a circumferential mass posterior to the anal canal found on perianal exam which looked ulcerated and arising from the anus; one 2-3 mm polyp in the mid rectum; and moderate colonic spasm consistent with irritable bowel syndrome.  Patient underwent rectal tissue biopsy on 03/27/2024 with path showing mucosal prolapse with ulceration and granulation tissue.  MRI rectum on 03/28/2024 showed the suspected perianal mass at the invasion to adjacent structures and enlarged pelvic lymph nodes.  The patient underwent biopsy of the right renal lesion showing clear cell carcinoma on pathology.    The patient endorses shortness of breath with exertion and only uses oxygen with exertion.  The patient denies CP, cough, abdominal pain, nausea, vomiting, constipation, diarrhea.  The patient denies fever, chills, night sweats, weight loss, new lumps  or bumps, easy bruising or bleeding.    Past Medical History:   Past Medical History:   Diagnosis Date    Breast cancer 1985    right mastectomy    Digestive disorder     Encounter for blood transfusion     History of pneumonia     Hypercholesterolemia     Hypertension     Indigestion     Lumbar spondylosis     Lung cancer     2022 - currently in treatment as of 7/27/22    Lymphoma     Osteopenia     Pulmonary nodule     Retinal detachment     Smoker 06/11/2022       Past Surgical HIstory:   Past Surgical History:   Procedure Laterality Date    APPENDECTOMY      BONE MARROW BIOPSY Bilateral 06/24/2020    Procedure: Biopsy-bone marrow;  Surgeon: Rod Montero MD;  Location: [facility] OR;  Service: Oncology;  Laterality: Bilateral;    BREAST RECONSTRUCTION  1990    right    BRONCHOSCOPY N/A 01/18/2023    Procedure: BRONCHOSCOPY;  Surgeon: Gregorio Kinney MD;  Location: [facility];  Service: Pulmonary;  Laterality: N/A;    BRONCHOSCOPY Bilateral 02/10/2023    Procedure: Bronchoscopy;  Surgeon: Gregorio Kinney MD;  Location: [facility];  Service: Pulmonary;  Laterality: Bilateral;  for airway clearance. Purple top tube for cell count diff    BRONCHOSCOPY N/A 03/13/2023    Procedure: Bronchoscopy;  Surgeon: Gregorio Kinney MD;  Location: [facility];  Service: Pulmonary;  Laterality: N/A;  therapeutic scope. Please have purple top tube and iced saline    CATARACT EXTRACTION W/  INTRAOCULAR LENS IMPLANT Bilateral     CHOLECYSTECTOMY      COLONOSCOPY N/A 3/25/2024    Procedure: COLONOSCOPY;  Surgeon: Brigido Calvillo Jr., MD;  Location: [facility];  Service: Endoscopy;  Laterality: N/A;    ENDOBRONCHIAL ULTRASOUND Bilateral 07/09/2021    Procedure: ENDOBRONCHIAL ULTRASOUND (EBUS);  Surgeon: Gregorio Kinney MD;  Location: [facility];  Service: Pulmonary;  Laterality: Bilateral;  NEED LMA to  eval right upper paratracheal LN.     ENDOBRONCHIAL ULTRASOUND Bilateral 06/16/2022    Procedure: ENDOBRONCHIAL ULTRASOUND (EBUS);  Surgeon:  Gregorio Kinney MD;  Location: [facility];  Service: Pulmonary;  Laterality: Bilateral;    INJECTION OF FACET JOINT Left 06/30/2022    Procedure: FACET JOINT Cyst Aspiration L3/4;  Surgeon: Ronnell Baeza MD;  Location: [facility] OR;  Service: Pain Management;  Laterality: Left;    INSERTION OF TUNNELED CENTRAL VENOUS CATHETER (CVC) WITH SUBCUTANEOUS PORT Left 11/04/2020    Procedure: [id number];  Surgeon: Kody Pretty MD;  Location: [facility] OR;  Service: General;  Laterality: Left;    JOINT REPLACEMENT  2008    right knee     LUMBAR LAMINECTOMY      LYMPH NODE BIOPSY      MASTECTOMY Right 1985    NEEDLE LOCALIZATION N/A 05/25/2020    Procedure: NEEDLE LOCALIZATION - lymph node bx;  Surgeon: Steve Weaver MD;  Location: [facility] CATH;  Service: Interventional Radiology;  Laterality: N/A;    RECTAL BIOPSY N/A 3/27/2024    Procedure: BIOPSY, RECTUM;  Surgeon: Isabel Choi MD;  Location: [facility] OR;  Service: General;  Laterality: N/A;    RETINAL DETACHMENT SURGERY  1989    RIGHT HEART CATHETERIZATION Right 03/18/2023    Procedure: INSERTION, CATHETER, RIGHT HEART;  Surgeon: Rukhsana Lang MD;  Location: [facility] CATH;  Service: Cardiology;  Laterality: Right;    SELECTIVE UNILATERAL PULMONARY ANGIOGRAPHY  03/18/2023    Procedure: Pumonary angiography - unilateral;  Surgeon: Rukhsana Lang MD;  Location: [facility] CATH;  Service: Cardiology;;    THROMBECTOMY N/A 03/18/2023    Procedure: THROMBECTOMY;  Surgeon: Rukhsana Lang MD;  Location: [facility] CATH;  Service: Cardiology;  Laterality: N/A;    TRANSFORAMINAL EPIDURAL INJECTION OF STEROID Left 05/14/2020    Procedure: Injection,steroid,epidural,transforaminal approach, l3/4;  Surgeon: Ronnell Baeza MD;  Location: [facility] OR;  Service: Pain Management;  Laterality: Left;    TRANSFORAMINAL EPIDURAL INJECTION OF STEROID Left 03/23/2022    Procedure: Injection,steroid,epidural,transforaminal approach L3/4;  Surgeon: Ronnell Baeza MD;  Location: [facility] OR;  Service:  Pain Management;  Laterality: Left;    TRANSFORAMINAL EPIDURAL INJECTION OF STEROID Left 08/01/2022    Procedure: Injection,steroid,epidural,transforaminal approach L3/4;  Surgeon: Ronnell Baeza MD;  Location: [facility] OR;  Service: Pain Management;  Laterality: Left;    TUBAL LIGATION      UMBILICAL HERNIA REPAIR         Family History:   Family History   Problem Relation Name Age of Onset    Cancer Sister          uterine    Cancer Sister          Uterine cancer     Diabetes Brother      Breast cancer Other niece 42    Glaucoma Neg Hx      Macular degeneration Neg Hx         Social History:  reports that she quit smoking about 22 months ago. Her smoking use included cigarettes. She started smoking about 54 years ago. She has a 26.5 pack-year smoking history. She has never used smokeless tobacco. She reports that she does not drink alcohol and does not use drugs.    Allergies:  Review of patient's allergies indicates:   Allergen Reactions    Opioids - morphine analogues Other (See Comments)     Hypotension       Medications:  Current Outpatient Medications   Medication Sig Dispense Refill    albuterol (ACCUNEB) 1.25 mg/3 mL Nebu Take 1.25 mg by nebulization 2 (two) times a day.      albuterol (VENTOLIN HFA) 90 mcg/actuation inhaler Inhale 2 puffs into the lungs every 6 (six) hours as needed for Wheezing or Shortness of Breath (or before exercise). Rescue 18 g 2    apixaban (ELIQUIS) 2.5 mg Tab Take 1 tablet (2.5 mg total) by mouth 2 (two) times daily. 180 tablet 3    budesonide (PULMICORT) 0.5 mg/2 mL nebulizer solution Take 2 mLs (0.5 mg total) by nebulization 2 (two) times a day. 120 mL 11    cetirizine (ZYRTEC) 10 MG tablet Take 10 mg by mouth once daily.      ergocalciferol (ERGOCALCIFEROL) 50,000 unit Cap Take 50,000 Units by mouth every Tuesday.      levothyroxine (SYNTHROID) 100 MCG tablet Take 1 tablet (100 mcg total) by mouth once daily. 90 tablet 3    mucus clearing device (ACAPELLA, FLUTTER) by  Misc.(Non-Drug; Combo Route) route once daily. 100 each 0    multivitamin (THERAGRAN) per tablet Take 1 tablet by mouth once daily.      NIFEdipine (ADALAT CC) 60 MG TbSR TAKE 1 TABLET BY MOUTH ONCE  DAILY 90 tablet 3    omeprazole (PRILOSEC) 40 MG capsule Take 1 capsule (40 mg total) by mouth once daily. 90 capsule 4    OXYGEN-AIR DELIVERY SYSTEMS MISC by Misc.(Non-Drug; Combo Route) route.      sodium chloride 3% 3 % nebulizer solution Take 4 mLs by nebulization 2 (two) times a day. 240 mL 3    temazepam (RESTORIL) 30 mg capsule Take 1 capsule (30 mg total) by mouth every evening. 30 capsule 5    azelastine (ASTELIN) 137 mcg (0.1 %) nasal spray 1 spray (137 mcg total) by Nasal route 2 (two) times daily. (Patient not taking: Reported on 4/4/2024) 30 mL 12    fluticasone propionate (FLONASE) 50 mcg/actuation nasal spray 1 spray (50 mcg total) by Each Nostril route 2 (two) times a day. (Patient not taking: Reported on 4/4/2024) 16 g 12     No current facility-administered medications for this visit.       Review of Systems   Constitutional:  Negative for appetite change, chills, diaphoresis, fatigue, fever and unexpected weight change.   Eyes:  Negative for visual disturbance.   Respiratory:  Positive for shortness of breath. Negative for cough.    Cardiovascular:  Negative for chest pain and palpitations.   Gastrointestinal:  Negative for abdominal pain, constipation, diarrhea, nausea and vomiting.   Genitourinary:  Negative for frequency.   Musculoskeletal:  Negative for back pain.   Skin:  Negative for rash.   Neurological:  Negative for headaches.   Hematological:  Negative for adenopathy. Does not bruise/bleed easily.   Psychiatric/Behavioral:  The patient is not nervous/anxious.        ECOG Performance Status: 2   Objective:      Vitals:   Vitals:    04/12/24 1147   BP: 114/66   BP Location: Right arm   Patient Position: Sitting   BP Method: Large (Manual)   Pulse: 86   Resp: 18   Temp: 97.6 °F (36.4 °C)  "  TempSrc: Temporal   SpO2: 96%   Weight: 93.9 kg (207 lb 0.2 oz)   Height: 5' 4" (1.626 m)               Physical Exam  Constitutional:       General: She is not in acute distress.     Appearance: She is well-developed. She is not diaphoretic.   HENT:      Head: Normocephalic and atraumatic.   Cardiovascular:      Rate and Rhythm: Normal rate and regular rhythm.      Heart sounds: Normal heart sounds. No murmur heard.     No friction rub. No gallop.   Pulmonary:      Effort: Pulmonary effort is normal. No respiratory distress.      Breath sounds: Normal breath sounds. No wheezing or rales.   Chest:      Chest wall: No tenderness.   Abdominal:      General: Bowel sounds are normal. There is no distension.      Palpations: Abdomen is soft. There is no mass.      Tenderness: There is no abdominal tenderness. There is no guarding or rebound.   Lymphadenopathy:      Cervical: No cervical adenopathy.      Upper Body:      Right upper body: No supraclavicular or axillary adenopathy.      Left upper body: No supraclavicular or axillary adenopathy.   Skin:     Findings: No erythema or rash.   Neurological:      Mental Status: She is alert and oriented to person, place, and time.   Psychiatric:         Behavior: Behavior normal.         Laboratory Data:  No visits with results within 1 Week(s) from this visit.   Latest known visit with results is:   Hospital Outpatient Visit on 03/28/2024   Component Date Value Ref Range Status    WBC 03/28/2024 6.67  3.90 - 12.70 K/uL Final    RBC 03/28/2024 3.77 (L)  4.00 - 5.40 M/uL Final    Hemoglobin 03/28/2024 11.0 (L)  12.0 - 16.0 g/dL Final    Hematocrit 03/28/2024 35.1 (L)  37.0 - 48.5 % Final    MCV 03/28/2024 93  82 - 98 fL Final    MCH 03/28/2024 29.2  27.0 - 31.0 pg Final    MCHC 03/28/2024 31.3 (L)  32.0 - 36.0 g/dL Final    RDW 03/28/2024 15.7 (H)  11.5 - 14.5 % Final    Platelets 03/28/2024 138 (L)  150 - 450 K/uL Final    MPV 03/28/2024 9.8  9.2 - 12.9 fL Final    PT " 03/28/2024 13.1  11.8 - 14.7 sec Final    PT normal range is not established for pediatrics.    INR 03/28/2024 1.0   Final    aPTT 03/28/2024 25.9  24.6 - 36.7 sec Final    PTT normal range is not established for pediatrics.         Imaging:    PET/CT 2/15/24  Head and neck: There is physiologic distribution of radiotracer throughout the included brain. There is no hemorrhage, hydrocephalus, or midline shift. There is no FDG avid cervical lymphadenopathy.     Chest: There is a left subclavian port in place. There is no FDG avid mediastinal or hilar lymphadenopathy. The nodular focus of hypermetabolic activity within the right apical soft tissue area of consolidation is grossly unchanged. This demonstrates an SUV max of 6.3 compared with 5.9 previously. There is a new area of ground-glass opacity more anterior in the right lung apex best visualized on image 67 which measures 2.6 x 2.2 cm with an SUV max of 4.0. This was not present on the prior exam. There is no pleural effusion.     Abdomen and pelvis: There is physiologic radiotracer throughout the liver, spleen, and collecting system. There are multiple metastatic periaortic and bilateral iliac lymph nodes which have progressed from the prior exam. The inguinal adenopathy has progressed. A representative new left periaortic lymph node best visualized on image 157 measures 19 x 17 mm with an SUV max 6.5. A left hemipelvic node best visualized on image 233 measures 4.1 x 1.5 cm. This demonstrates an SUV max 9.4. Enlarged left inguinal node best visualized on image 232 measures 3.0 x 2.2 cm compared with 2.7 x 1.1 cm previously. There is an SUV max of 9.2 compared with 6.7 previously.     Musculoskeletal: There is no FDG avid lytic or blastic osseous lesion.        Assessment:       1. Malignant neoplasm of right upper lobe of lung    2. Renal cell carcinoma, unspecified laterality    3. Rectal lesion    4. Thrombophilia    5. Grade 3a follicular lymphoma of lymph  nodes of inguinal region    6. Hypertension, unspecified type    7. Hyperlipidemia, unspecified hyperlipidemia type    8. Iron deficiency anemia due to chronic blood loss         Plan:     NSCLC - Pt has NSCLC SCC with involvement of the right lung and mediastinum  -Pt with prior radiation to a RUL nodule  -PD-L1 was 86% on 6/18/21  -Pt was on Keytruda with 6 week cycles and completed 6 tx's and then developed pneumonitis  -PET/CT on 9/23/22 showed possible pseudoprogression  -PET/CT on 11/29/22 showed stable disease with RLL infiltrate concerning for resolving PNA  -PET-CT on 03/08/2023 showed resolution of prior disease   -CT Chest 4/14/23 and 7/10/23 with stable findings  -PET/CT 10/09/23 showed avidity in the RUL mass, inguinal LAD and subcutaneous nodule in the lumbar region  -Treatment with paclitaxel discussed; however, pt is hesitant to undergo chemotherapy  -Biopsy of lumbar subcutaneous nodule showed fibroadipose tissue  -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  -Ct chest 12/12/23 stable  -PET/CT 2/15/24 showed continued nodular focus of hypermetabolic activity in the right apex with a new adjacent ground-glass area  -Pt being managed by pulmonary for potential surrounding infection  -No clear evidence of progression on scans    Renal Cell Carcinoma - 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 based on biopsy 3/28/24  -Pt not a surgical candidate per Urology  -PT to undergo ablation with IR  -Will hold off on systemic treatment currently as treatment of follicular lymphoma takes precedence     Rectal Lesion - seen on colonoscopy 03/25/2024   Rectal tissue biopsy on 03/27/2024 with path showing mucosal prolapse with ulceration and granulation tissue  -MRI rectum on 03/28/2024 showed the suspected perianal mass at the invasion to adjacent structures and enlarged pelvic lymph nodes  -LAD likely form follicular lymphoma  -Referral to Colorectal Surgeon recommended but pt  declined    Saddle Pulmonary Embolus - seen on CTA 03/18/2023   -PT on prophylactic ELiquis 2.5mg BID given prior GI bleed    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 for 9/12 cycles with good response  -PT with recurrent inguinal LAD which could be lymphoma  -LN's increasing in size on CT 12/12/23  -PET-CT on 02/15/2024 showed continued increasing size of left inguinal lymph node with SUV max now 9.2  -Left inguinal LN biopsy 3/08/24 showed Follicular Lymphoma Grade 3a  -Pt to see Dr Hansen on 4/18/24    HTN - pt on nifedipine  -BP normal  -Will monitor    Hypothyroidism - pt on levothyroxine  -Management per PCP    Iron Deficiency Anemia - Hemoglobin improved to 12.8g/dL on 12/12/23  -Ferritin 316ng/mL 12/12/23  -Ferritin was 22ng/mL on 9/05/23  -Pt received 4 doses of venofer  -PT also received 2 units of PRBC's during recent hospital stay  -Hemoglobin 11g/dL on 3/28/24  -Will check iron studies prior to next appt  -Will monitor    Route Chart for Scheduling    Med Onc Chart Routing      Follow up with physician Other. Pt needs ablation of the right renal carcinoma with IR.  Pt needs a CBC, CMP, ferritin and iron/TIBC a week after the ablation with an appt with me a day after the labs.   Follow up with DAYAMI    Infusion scheduling note    Injection scheduling note    Labs    Imaging    Pharmacy appointment    Other referrals              Treatment Plan Information   OP PEMBROLIZUMAB 400MG Q6W   Chad Mills MD   Upcoming Treatment Dates - OP PEMBROLIZUMAB 400MG Q6W    5/12/2023       Pre-Medications       acetaminophen tablet 1,000 mg       diphenhydrAMINE (BENADRYL) 25 mg in NS 50 mL IVPB       Chemotherapy       pembrolizumab (KEYTRUDA) 400 mg in sodium chloride 0.9% SolP 116 mL infusion  6/23/2023       Pre-Medications       acetaminophen tablet 1,000 mg       diphenhydrAMINE (BENADRYL) 25 mg in NS 50 mL IVPB       Chemotherapy       pembrolizumab (KEYTRUDA) 400 mg in  sodium chloride 0.9% SolP 116 mL infusion  8/4/2023       Pre-Medications       acetaminophen tablet 1,000 mg       diphenhydrAMINE (BENADRYL) 25 mg in NS 50 mL IVPB       Chemotherapy       pembrolizumab (KEYTRUDA) 400 mg in sodium chloride 0.9% SolP 116 mL infusion  9/15/2023       Pre-Medications       acetaminophen tablet 1,000 mg       diphenhydrAMINE (BENADRYL) 25 mg in NS 50 mL IVPB       Chemotherapy       pembrolizumab (KEYTRUDA) 400 mg in sodium chloride 0.9% SolP 116 mL infusion    Supportive Plan Information  OP IRON SUCROSE IVPB TWICE WEEKLY   Chad Mills MD   Upcoming Treatment Dates - OP IRON SUCROSE IVPB TWICE WEEKLY    No upcoming days in selected categories.    Therapy Plan Information  PORT FLUSH  Flushes  heparin, porcine (PF) 100 unit/mL injection flush 500 Units  500 Units, Intravenous, Every visit  sodium chloride 0.9% flush 10 mL  10 mL, Intravenous, Every visit    INF PEGFILGRASTIM (NEULASTA)  pegfilgrastim (NEULASTA) injection 6 mg  6 mg, Subcutaneous, Every visit        Chad Mills MD  [facility]  Hematology and Oncology  [facility]   [address] [place] [address]   O: [phone]  F: [phone]

## 2024-04-16 ENCOUNTER — LAB VISIT (OUTPATIENT)
Dept: LAB | Facility: HOSPITAL | Age: 84
End: 2024-04-16
Attending: INTERNAL MEDICINE
Payer: MEDICARE

## 2024-04-16 ENCOUNTER — PATIENT MESSAGE (OUTPATIENT)
Dept: FAMILY MEDICINE | Facility: CLINIC | Age: 84
End: 2024-04-16
Payer: MEDICARE

## 2024-04-16 ENCOUNTER — PATIENT MESSAGE (OUTPATIENT)
Dept: HEMATOLOGY/ONCOLOGY | Facility: CLINIC | Age: 84
End: 2024-04-16
Payer: MEDICARE

## 2024-04-16 DIAGNOSIS — D50.0 IRON DEFICIENCY ANEMIA DUE TO CHRONIC BLOOD LOSS: ICD-10-CM

## 2024-04-16 LAB
ALBUMIN SERPL BCP-MCNC: 3.7 G/DL (ref 3.5–5.2)
ALP SERPL-CCNC: 216 U/L (ref 55–135)
ALT SERPL W/O P-5'-P-CCNC: 15 U/L (ref 10–44)
ANION GAP SERPL CALC-SCNC: 12 MMOL/L (ref 8–16)
AST SERPL-CCNC: 24 U/L (ref 10–40)
BASOPHILS # BLD AUTO: 0.06 K/UL (ref 0–0.2)
BASOPHILS NFR BLD: 0.8 % (ref 0–1.9)
BILIRUB SERPL-MCNC: 0.4 MG/DL (ref 0.1–1)
BUN SERPL-MCNC: 20 MG/DL (ref 8–23)
CALCIUM SERPL-MCNC: 9.8 MG/DL (ref 8.7–10.5)
CHLORIDE SERPL-SCNC: 105 MMOL/L (ref 95–110)
CO2 SERPL-SCNC: 24 MMOL/L (ref 23–29)
CREAT SERPL-MCNC: 1.2 MG/DL (ref 0.5–1.4)
DIFFERENTIAL METHOD BLD: ABNORMAL
EOSINOPHIL # BLD AUTO: 0.3 K/UL (ref 0–0.5)
EOSINOPHIL NFR BLD: 3.5 % (ref 0–8)
ERYTHROCYTE [DISTWIDTH] IN BLOOD BY AUTOMATED COUNT: 16.3 % (ref 11.5–14.5)
EST. GFR  (NO RACE VARIABLE): 45.5 ML/MIN/1.73 M^2
FERRITIN SERPL-MCNC: 152 NG/ML (ref 20–300)
GLUCOSE SERPL-MCNC: 104 MG/DL (ref 70–110)
HCT VFR BLD AUTO: 36.7 % (ref 37–48.5)
HGB BLD-MCNC: 11.4 G/DL (ref 12–16)
IMM GRANULOCYTES # BLD AUTO: 0.05 K/UL (ref 0–0.04)
IMM GRANULOCYTES NFR BLD AUTO: 0.7 % (ref 0–0.5)
IRON SERPL-MCNC: 57 UG/DL (ref 30–160)
LYMPHOCYTES # BLD AUTO: 1.4 K/UL (ref 1–4.8)
LYMPHOCYTES NFR BLD: 19.6 % (ref 18–48)
MCH RBC QN AUTO: 29.2 PG (ref 27–31)
MCHC RBC AUTO-ENTMCNC: 31.1 G/DL (ref 32–36)
MCV RBC AUTO: 94 FL (ref 82–98)
MONOCYTES # BLD AUTO: 0.7 K/UL (ref 0.3–1)
MONOCYTES NFR BLD: 9.5 % (ref 4–15)
NEUTROPHILS # BLD AUTO: 4.7 K/UL (ref 1.8–7.7)
NEUTROPHILS NFR BLD: 65.9 % (ref 38–73)
NRBC BLD-RTO: 0 /100 WBC
PLATELET # BLD AUTO: 162 K/UL (ref 150–450)
PMV BLD AUTO: 10.6 FL (ref 9.2–12.9)
POTASSIUM SERPL-SCNC: 4 MMOL/L (ref 3.5–5.1)
PROT SERPL-MCNC: 6.7 G/DL (ref 6–8.4)
RBC # BLD AUTO: 3.9 M/UL (ref 4–5.4)
SATURATED IRON: 14 % (ref 20–50)
SODIUM SERPL-SCNC: 141 MMOL/L (ref 136–145)
TOTAL IRON BINDING CAPACITY: 397 UG/DL (ref 250–450)
TRANSFERRIN SERPL-MCNC: 268 MG/DL (ref 200–375)
WBC # BLD AUTO: 7.14 K/UL (ref 3.9–12.7)

## 2024-04-16 PROCEDURE — 82728 ASSAY OF FERRITIN: CPT | Performed by: INTERNAL MEDICINE

## 2024-04-16 PROCEDURE — 36415 COLL VENOUS BLD VENIPUNCTURE: CPT | Mod: PN | Performed by: INTERNAL MEDICINE

## 2024-04-16 PROCEDURE — 83540 ASSAY OF IRON: CPT | Performed by: INTERNAL MEDICINE

## 2024-04-16 PROCEDURE — 85025 COMPLETE CBC W/AUTO DIFF WBC: CPT | Mod: PN | Performed by: INTERNAL MEDICINE

## 2024-04-16 PROCEDURE — 80053 COMPREHEN METABOLIC PANEL: CPT | Mod: PN | Performed by: INTERNAL MEDICINE

## 2024-04-17 ENCOUNTER — PATIENT MESSAGE (OUTPATIENT)
Dept: HEMATOLOGY/ONCOLOGY | Facility: CLINIC | Age: 84
End: 2024-04-17
Payer: MEDICARE

## 2024-04-18 ENCOUNTER — OFFICE VISIT (OUTPATIENT)
Dept: HEMATOLOGY/ONCOLOGY | Facility: CLINIC | Age: 84
End: 2024-04-18
Payer: MEDICARE

## 2024-04-18 ENCOUNTER — LAB VISIT (OUTPATIENT)
Dept: LAB | Facility: HOSPITAL | Age: 84
End: 2024-04-18
Attending: FAMILY MEDICINE
Payer: MEDICARE

## 2024-04-18 VITALS
HEIGHT: 64 IN | RESPIRATION RATE: 16 BRPM | HEART RATE: 77 BPM | TEMPERATURE: 98 F | BODY MASS INDEX: 35.41 KG/M2 | WEIGHT: 207.44 LBS | SYSTOLIC BLOOD PRESSURE: 138 MMHG | DIASTOLIC BLOOD PRESSURE: 86 MMHG | OXYGEN SATURATION: 95 %

## 2024-04-18 DIAGNOSIS — C34.11 MALIGNANT NEOPLASM OF RIGHT UPPER LOBE OF LUNG: ICD-10-CM

## 2024-04-18 DIAGNOSIS — C82.38 GRADE 3A FOLLICULAR LYMPHOMA OF LYMPH NODES OF MULTIPLE REGIONS: ICD-10-CM

## 2024-04-18 DIAGNOSIS — N28.89 RIGHT RENAL MASS: ICD-10-CM

## 2024-04-18 DIAGNOSIS — D50.9 IRON DEFICIENCY ANEMIA, UNSPECIFIED IRON DEFICIENCY ANEMIA TYPE: Primary | ICD-10-CM

## 2024-04-18 DIAGNOSIS — Z86.711 HISTORY OF PULMONARY EMBOLISM: Primary | ICD-10-CM

## 2024-04-18 DIAGNOSIS — C34.32 PRIMARY MALIGNANT NEOPLASM OF LEFT LOWER LOBE OF LUNG: ICD-10-CM

## 2024-04-18 DIAGNOSIS — I10 PRIMARY HYPERTENSION: ICD-10-CM

## 2024-04-18 LAB
B2 MICROGLOB SERPL-MCNC: 5 UG/ML (ref 0–2.5)
LDH SERPL L TO P-CCNC: 233 U/L (ref 110–260)
URATE SERPL-MCNC: 6.7 MG/DL (ref 2.4–5.7)

## 2024-04-18 PROCEDURE — 84550 ASSAY OF BLOOD/URIC ACID: CPT | Mod: PN | Performed by: INTERNAL MEDICINE

## 2024-04-18 PROCEDURE — 83615 LACTATE (LD) (LDH) ENZYME: CPT | Mod: PN | Performed by: INTERNAL MEDICINE

## 2024-04-18 PROCEDURE — 99214 OFFICE O/P EST MOD 30 MIN: CPT | Mod: S$GLB,,, | Performed by: INTERNAL MEDICINE

## 2024-04-18 PROCEDURE — 1101F PT FALLS ASSESS-DOCD LE1/YR: CPT | Mod: CPTII,S$GLB,, | Performed by: INTERNAL MEDICINE

## 2024-04-18 PROCEDURE — 1126F AMNT PAIN NOTED NONE PRSNT: CPT | Mod: CPTII,S$GLB,, | Performed by: INTERNAL MEDICINE

## 2024-04-18 PROCEDURE — 3288F FALL RISK ASSESSMENT DOCD: CPT | Mod: CPTII,S$GLB,, | Performed by: INTERNAL MEDICINE

## 2024-04-18 PROCEDURE — 3079F DIAST BP 80-89 MM HG: CPT | Mod: CPTII,S$GLB,, | Performed by: INTERNAL MEDICINE

## 2024-04-18 PROCEDURE — 36415 COLL VENOUS BLD VENIPUNCTURE: CPT | Mod: PN | Performed by: INTERNAL MEDICINE

## 2024-04-18 PROCEDURE — 99999 PR PBB SHADOW E&M-EST. PATIENT-LVL IV: CPT | Mod: PBBFAC,,, | Performed by: INTERNAL MEDICINE

## 2024-04-18 PROCEDURE — 1159F MED LIST DOCD IN RCRD: CPT | Mod: CPTII,S$GLB,, | Performed by: INTERNAL MEDICINE

## 2024-04-18 PROCEDURE — 82232 ASSAY OF BETA-2 PROTEIN: CPT | Performed by: INTERNAL MEDICINE

## 2024-04-18 PROCEDURE — 1111F DSCHRG MED/CURRENT MED MERGE: CPT | Mod: CPTII,S$GLB,, | Performed by: INTERNAL MEDICINE

## 2024-04-18 PROCEDURE — 3075F SYST BP GE 130 - 139MM HG: CPT | Mod: CPTII,S$GLB,, | Performed by: INTERNAL MEDICINE

## 2024-04-18 RX ORDER — LEVOTHYROXINE SODIUM 100 UG/1
100 TABLET ORAL DAILY
Qty: 90 TABLET | Refills: 3 | Status: SHIPPED | OUTPATIENT
Start: 2024-04-18 | End: 2025-04-18

## 2024-04-18 NOTE — PROGRESS NOTES
PATIENT: Shandra Velez  MRN: [id number]  DATE: 4/18/2024      Diagnosis:   No diagnosis found.          Chief Complaint: No chief complaint on file.    Oncologic History:     Pt initially underwent right inguinal LN biopsy 5/25/22 showing grade 1 follicular lymphoma.  Bone marrow biopsy done 06/24/2020 showed involvement with follicular lymphoma.  PET/CT 7/07/20 showed lymphadenopathy above and below the diaphragm as well as the spleen.  Also seen wasa RUL lesion.  The patient was started on CVP-R followed by maintenance rituxan every 8 weeks.  Biopsy of the RUL nodule on 6/18/21 showed SCC with PD-L1 at 86%.  EBUS 7/09/21 showed no malignant cells at station 7, 4R or 11RS.  The patient was treated with SBRT under the care of Dr Barnes with 50Gy in 4 fractions completed 10/28/21.  The patient then developed new pulmonary nodules on CT scan 6/02/22.  EBUS 6/16/22 showed positive SCC in 4R LN.  Pt was discussed at tumor board on 6/21/22 with recommendation for Keytruda.  The patient underwent PET/CT on 9/23/22 after 2 doses of treatment showing a 1.5 cm precarinal lymph node; stable right apical mass measuring 1.8 x 1.5 cm; stable 8 mm right apical lung nodule with adjacent post radiation change measuring 2 x 2.7 cm; stable 4 mm subpleural nodule in the lateral right upper lobe; fluid/mucus in the right lower lobe bronchus tree; 6 mm anterior left upper lobe nodule which is new; 2.3 cm simple cyst in the midpole of the left kidney; 3.1 x 2.3 cm rim enhancing complex cystic and solid mass in the mid to lower pole the right kidney; infrarenal abdominal aortic aneurysm measuring 3.8 x 3.5 cm; and stable a left para-aortic lymph node measuring 11 mm. 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   The patient underwent PET-CT on 11/29/2022 showing an irregular hypermetabolic right upper lobe tumor which is stable measuring 1.6 x 1.2 cm;  hypermetabolic subpleural right upper lobe consolidation diminished in extent; unchanged linear zone of hypermetabolic atelectasis in the superior segment of the left lower lobe; new poorly metabolic ill-defined infiltrate in the posterior right lower lobe; 4 mm left upper lobe nodule which is stable; hypermetabolic precarinal mediastinal lymph node stable measuring 1 cm; and hypermetabolic aortopulmonary lymph node stable measuring 1.1 x 1.1 cm.   The patient was admitted to the hospital from 1/16/23 to 1/23/23 for pneumonia.  She underwent bronchoscopy with Dr Kinney on 1/18/23 showing significant mucus plug impaction int the pharynx, left mainstem bronchus and right and left lower lobes.  The patient was discharged on 3L O2.   The patient underwent PET-CT on 03/08/2023 showing thickening of the pleura; new ground-glass opacities within the left upper lobe; patchy airspace opacities in the right lower lobe; right lower lobe pulmonary artery hypermetabolic activity possibly secondary to bronchial wall thickening or thrombus in the pulmonary artery; diffuse hypermetabolic activity identified throughout the axial skeleton.  The patient was then admitted to the hospital on 03/18/23-4/3/23 after presenting with shortness of breath and being found to have a saddle pulmonary embolus on CTA 03/18/2023.  The patient underwent treatment with a heparin drip and thrombectomy on 03/18/2023 with eventual transition to Eliquis.   The patient underwent CT chest on 04/14/2023 showing areas of air trapping in subpleural bleb formation in both jarek thoraces with areas of interstitial scarring and bibasilar bronchiectasis; stable solid nodule in left upper lobe measuring 6 mm; masslike area measuring 3 x 1.3 x 1.5 cm in the right upper lobe stable.   The patient underwent CT chest, abdomen, and pelvis on 07/10/2023 showing more consolidative appearance of previously described right apical pulmonary nodules without discretely  measurable nodule on today's exam, 8 mm left apical nodule, stable consolidation and superior segment left lower lobe; 3 mm right lower lobe pulmonary nodule; heterogeneously enhancing mass in the inferior pole of the right kidney measuring 3.9 x 3.7 x 3.9 cm; unchanged left periaortic lymph nodes.   The patient underwent a PET-CT on 10/09/2023 showing a new oval-shaped hypermetabolic nodular mass measuring 1.6 x 1 cm in the right upper lobe consistent with tumor recurrence; disappearance of ground-glass opacities anterior left upper lobe; disappearance of subpleural posterior right lower lobe consolidation; new hypermetabolic bilateral inguinal lymph nodes; new rounded nodular hypermetabolic nodule within the posterior right lumbar subcutaneous fat measuring 1.5 x 1.5 cm and a 3.8cm abdominal aortic aneurysm.    The patient was discussed at Thoracic tumor board on 10/25/23 with recommendation to proceed with biopsy of the lesion in the back via IR.  Biopsy done on 11/03/23 showed fibroadipose tissue.   The patient underwent CT of the chest, abdomen, and pelvis on 12/12/2023 showing pleural parenchymal thickening in the right lung apex; 6 mm left upper lobe nodule stable; solid mass inferior right kidney measuring 4.3 x 4.3 cm; enlarged bilateral periaortic lymphadenopathy measuring 17 x 14 mm; enlarged bilateral inguinal hemipelvic lymphadenopathy with left hemipelvic lymph node measuring 4.4 x 1.8 cm and left inguinal lymph node measuring 3.5 x 1.7 cm.   The patient underwent PET-CT on 02/15/2024 showing a stable nodular focus of hypermetabolic activity in the right apical soft tissue area of consolidation; new area of ground-glass opacity more anterior in the right lung apex measuring 2.6 x 2.2 cm; multiple metastatic periaortic and bilateral iliac lymph nodes with progression from prior with a new left para-aortic lymph node measuring 19 x 17 mm SUV max 6.5, left hemipelvic node measuring 4.1 x 1.5 cm with SUV  max of 9.4 enlarged inguinal lymph node measuring 3 x 2.2 with SUV max of 9.2.      The patient underwent biopsy of left inguinal lymph node with path showing follicular lymphoma grade 3A.  Patient was discussed at  Tumor Board on 3/14/24 with recommendation for biopsy of the right renal lesion followed by potential ablation.    Subjective:    Interval History: Ms. Velez is a 83 y.o. female with HLD, HTN, osteopenia, follicular lymphoma who presents for follow up of NSCLC.  Since since the last clinic visit the patient was admitted to the hospital from 03/23/2024 until 03/28/2024 for acute on chronic anemia.  Hemoglobin on admission was 7.3 grams/deciliter.  The patient received 2 units of PRBC's on 03/24/2024.  Colonoscopy on 03/25/2024 showed a circumferential mass posterior to the anal canal found on perianal exam which looked ulcerated and arising from the anus; one 2-3 mm polyp in the mid rectum; and moderate colonic spasm consistent with irritable bowel syndrome.  Patient underwent rectal tissue biopsy on 03/27/2024 with path showing mucosal prolapse with ulceration and granulation tissue.  MRI rectum on 03/28/2024 showed the suspected perianal mass at the invasion to adjacent structures and enlarged pelvic lymph nodes.  The patient underwent biopsy of the right renal lesion showing clear cell carcinoma on pathology.    Past Medical History:   Past Medical History:   Diagnosis Date    Breast cancer 1985    right mastectomy    Digestive disorder     Encounter for blood transfusion     History of pneumonia     Hypercholesterolemia     Hypertension     Indigestion     Lumbar spondylosis     Lung cancer     2022 - currently in treatment as of 7/27/22    Lymphoma     Osteopenia     Pulmonary nodule     Retinal detachment     Smoker 06/11/2022       Past Surgical HIstory:   Past Surgical History:   Procedure Laterality Date    APPENDECTOMY      BONE MARROW BIOPSY Bilateral 06/24/2020    Procedure: Biopsy-bone  marrow;  Surgeon: Rod Montero MD;  Location: [facility] OR;  Service: Oncology;  Laterality: Bilateral;    BREAST RECONSTRUCTION  1990    right    BRONCHOSCOPY N/A 01/18/2023    Procedure: BRONCHOSCOPY;  Surgeon: Gregorio Kinney MD;  Location: [facility];  Service: Pulmonary;  Laterality: N/A;    BRONCHOSCOPY Bilateral 02/10/2023    Procedure: Bronchoscopy;  Surgeon: Gregorio Kinney MD;  Location: [facility];  Service: Pulmonary;  Laterality: Bilateral;  for airway clearance. Purple top tube for cell count diff    BRONCHOSCOPY N/A 03/13/2023    Procedure: Bronchoscopy;  Surgeon: Gregorio Kinney MD;  Location: [facility];  Service: Pulmonary;  Laterality: N/A;  therapeutic scope. Please have purple top tube and iced saline    CATARACT EXTRACTION W/  INTRAOCULAR LENS IMPLANT Bilateral     CHOLECYSTECTOMY      COLONOSCOPY N/A 3/25/2024    Procedure: COLONOSCOPY;  Surgeon: Brigido Calvillo Jr., MD;  Location: [facility];  Service: Endoscopy;  Laterality: N/A;    ENDOBRONCHIAL ULTRASOUND Bilateral 07/09/2021    Procedure: ENDOBRONCHIAL ULTRASOUND (EBUS);  Surgeon: Gregorio Kinney MD;  Location: [facility];  Service: Pulmonary;  Laterality: Bilateral;  NEED LMA to  eval right upper paratracheal LN.     ENDOBRONCHIAL ULTRASOUND Bilateral 06/16/2022    Procedure: ENDOBRONCHIAL ULTRASOUND (EBUS);  Surgeon: Gregorio Kinney MD;  Location: [facility];  Service: Pulmonary;  Laterality: Bilateral;    INJECTION OF FACET JOINT Left 06/30/2022    Procedure: FACET JOINT Cyst Aspiration L3/4;  Surgeon: Ronnell Baeza MD;  Location: [facility] OR;  Service: Pain Management;  Laterality: Left;    INSERTION OF TUNNELED CENTRAL VENOUS CATHETER (CVC) WITH SUBCUTANEOUS PORT Left 11/04/2020    Procedure: [id number];  Surgeon: Kody Pretty MD;  Location: [facility] OR;  Service: General;  Laterality: Left;    JOINT REPLACEMENT  2008    right knee     LUMBAR LAMINECTOMY      LYMPH NODE BIOPSY      MASTECTOMY Right 1985    NEEDLE LOCALIZATION  N/A 05/25/2020    Procedure: NEEDLE LOCALIZATION - lymph node bx;  Surgeon: Steve Weaver MD;  Location: [facility] CATH;  Service: Interventional Radiology;  Laterality: N/A;    RECTAL BIOPSY N/A 3/27/2024    Procedure: BIOPSY, RECTUM;  Surgeon: Isabel Choi MD;  [facility];  Service: General;  Laterality: N/A;    RETINAL DETACHMENT SURGERY  1989    RIGHT HEART CATHETERIZATION Right 03/18/2023    Procedure: INSERTION, CATHETER, RIGHT HEART;  Surgeon: Rukhsana Lang MD;  Location: [facility] CATH;  Service: Cardiology;  Laterality: Right;    SELECTIVE UNILATERAL PULMONARY ANGIOGRAPHY  03/18/2023    Procedure: Pumonary angiography - unilateral;  Surgeon: Rukhsana Lang MD;  Location: [facility] CATH;  Service: Cardiology;;    THROMBECTOMY N/A 03/18/2023    Procedure: THROMBECTOMY;  Surgeon: Rukhsana Lang MD;  Location: [facility] CATH;  Service: Cardiology;  Laterality: N/A;    TRANSFORAMINAL EPIDURAL INJECTION OF STEROID Left 05/14/2020    Procedure: Injection,steroid,epidural,transforaminal approach, l3/4;  Surgeon: Ronnell Baeza MD;  Location: [facility] OR;  Service: Pain Management;  Laterality: Left;    TRANSFORAMINAL EPIDURAL INJECTION OF STEROID Left 03/23/2022    Procedure: Injection,steroid,epidural,transforaminal approach L3/4;  Surgeon: Ronnell Baeza MD;  Location: [facility] OR;  Service: Pain Management;  Laterality: Left;    TRANSFORAMINAL EPIDURAL INJECTION OF STEROID Left 08/01/2022    Procedure: Injection,steroid,epidural,transforaminal approach L3/4;  Surgeon: Ronnell Baeza MD;  Location: [facility] OR;  Service: Pain Management;  Laterality: Left;    TUBAL LIGATION      UMBILICAL HERNIA REPAIR         Family History:   Family History   Problem Relation Name Age of Onset    Cancer Sister          uterine    Cancer Sister          Uterine cancer     Diabetes Brother      Breast cancer Other niece 42    Glaucoma Neg Hx      Macular degeneration Neg Hx         Social History:  reports that she quit smoking  about 22 months ago. Her smoking use included cigarettes. She started smoking about 54 years ago. She has a 26.5 pack-year smoking history. She has never used smokeless tobacco. She reports that she does not drink alcohol and does not use drugs.    Allergies:  Review of patient's allergies indicates:   Allergen Reactions    Opioids - morphine analogues Other (See Comments)     Hypotension       Medications:  Current Outpatient Medications   Medication Sig Dispense Refill    albuterol (ACCUNEB) 1.25 mg/3 mL Nebu Take 1.25 mg by nebulization 2 (two) times a day.      albuterol (VENTOLIN HFA) 90 mcg/actuation inhaler Inhale 2 puffs into the lungs every 6 (six) hours as needed for Wheezing or Shortness of Breath (or before exercise). Rescue 18 g 2    apixaban (ELIQUIS) 2.5 mg Tab Take 1 tablet (2.5 mg total) by mouth 2 (two) times daily. 180 tablet 3    azelastine (ASTELIN) 137 mcg (0.1 %) nasal spray 1 spray (137 mcg total) by Nasal route 2 (two) times daily. (Patient not taking: Reported on 4/4/2024) 30 mL 12    budesonide (PULMICORT) 0.5 mg/2 mL nebulizer solution Take 2 mLs (0.5 mg total) by nebulization 2 (two) times a day. 120 mL 11    cetirizine (ZYRTEC) 10 MG tablet Take 10 mg by mouth once daily.      ergocalciferol (ERGOCALCIFEROL) 50,000 unit Cap Take 50,000 Units by mouth every Tuesday.      fluticasone propionate (FLONASE) 50 mcg/actuation nasal spray 1 spray (50 mcg total) by Each Nostril route 2 (two) times a day. (Patient not taking: Reported on 4/4/2024) 16 g 12    levothyroxine (SYNTHROID) 100 MCG tablet Take 1 tablet (100 mcg total) by mouth once daily. 90 tablet 3    mucus clearing device (ACAPELLA, FLUTTER) by Misc.(Non-Drug; Combo Route) route once daily. 100 each 0    multivitamin (THERAGRAN) per tablet Take 1 tablet by mouth once daily.      NIFEdipine (ADALAT CC) 60 MG TbSR TAKE 1 TABLET BY MOUTH ONCE  DAILY 90 tablet 3    omeprazole (PRILOSEC) 40 MG capsule Take 1 capsule (40 mg total) by mouth  once daily. 90 capsule 4    OXYGEN-AIR DELIVERY SYSTEMS MISC by [facility].(Non-Drug; Combo Route) route.      sodium chloride 3% 3 % nebulizer solution Take 4 mLs by nebulization 2 (two) times a day. 240 mL 3    temazepam (RESTORIL) 30 mg capsule Take 1 capsule (30 mg total) by mouth every evening. 30 capsule 5     No current facility-administered medications for this visit.       Review of Systems   Constitutional:  Negative for appetite change, chills, diaphoresis, fatigue, fever and unexpected weight change.   HENT:  Negative for mouth sores.    Eyes:  Negative for visual disturbance.   Respiratory:  Positive for shortness of breath. Negative for cough.    Cardiovascular:  Negative for chest pain and palpitations.   Gastrointestinal:  Negative for abdominal pain, constipation, diarrhea, nausea and vomiting.   Genitourinary:  Negative for frequency.   Musculoskeletal:  Negative for back pain.   Skin:  Negative for rash.   Neurological:  Negative for headaches.   Hematological:  Negative for adenopathy. Does not bruise/bleed easily.   Psychiatric/Behavioral:  The patient is not nervous/anxious.        ECOG Performance Status: 2   Objective:      Vitals:       Vitals:    04/18/24 1029   BP: 138/86   Pulse: 77   Resp: 16   Temp: 98.4 °F (36.9 °C)            Physical Exam  Constitutional:       General: She is not in acute distress.     Appearance: She is well-developed. She is not diaphoretic.   HENT:      Head: Normocephalic and atraumatic.   Cardiovascular:      Rate and Rhythm: Normal rate and regular rhythm.      Heart sounds: Normal heart sounds. No murmur heard.     No friction rub. No gallop.   Pulmonary:      Effort: Pulmonary effort is normal. No respiratory distress.      Breath sounds: Normal breath sounds. No wheezing or rales.   Chest:      Chest wall: No tenderness.   Abdominal:      General: Bowel sounds are normal. There is no distension.      Palpations: Abdomen is soft. There is no mass.      Tenderness:  There is no abdominal tenderness. There is no guarding or rebound.   Lymphadenopathy:      Cervical: No cervical adenopathy.      Upper Body:      Right upper body: No supraclavicular or axillary adenopathy.      Left upper body: No supraclavicular or axillary adenopathy.   Skin:     Findings: No erythema or rash.   Neurological:      Mental Status: She is alert and oriented to person, place, and time.   Psychiatric:         Behavior: Behavior normal.         Laboratory Data:  Lab Visit on 04/16/2024   Component Date Value Ref Range Status    WBC 04/16/2024 7.14  3.90 - 12.70 K/uL Final    RBC 04/16/2024 3.90 (L)  4.00 - 5.40 M/uL Final    Hemoglobin 04/16/2024 11.4 (L)  12.0 - 16.0 g/dL Final    Hematocrit 04/16/2024 36.7 (L)  37.0 - 48.5 % Final    MCV 04/16/2024 94  82 - 98 fL Final    MCH 04/16/2024 29.2  27.0 - 31.0 pg Final    MCHC 04/16/2024 31.1 (L)  32.0 - 36.0 g/dL Final    RDW 04/16/2024 16.3 (H)  11.5 - 14.5 % Final    Platelets 04/16/2024 162  150 - 450 K/uL Final    MPV 04/16/2024 10.6  9.2 - 12.9 fL Final    Immature Granulocytes 04/16/2024 0.7 (H)  0.0 - 0.5 % Final    Gran # (ANC) 04/16/2024 4.7  1.8 - 7.7 K/uL Final    Immature Grans (Abs) 04/16/2024 0.05 (H)  0.00 - 0.04 K/uL Final    Comment: Mild elevation in immature granulocytes is non specific and   can be seen in a variety of conditions including stress response,   acute inflammation, trauma and pregnancy. Correlation with other   laboratory and clinical findings is essential.      Lymph # 04/16/2024 1.4  1.0 - 4.8 K/uL Final    Mono # 04/16/2024 0.7  0.3 - 1.0 K/uL Final    Eos # 04/16/2024 0.3  0.0 - 0.5 K/uL Final    Baso # 04/16/2024 0.06  0.00 - 0.20 K/uL Final    nRBC 04/16/2024 0  0 /100 WBC Final    Gran % 04/16/2024 65.9  38.0 - 73.0 % Final    Lymph % 04/16/2024 19.6  18.0 - 48.0 % Final    Mono % 04/16/2024 9.5  4.0 - 15.0 % Final    Eosinophil % 04/16/2024 3.5  0.0 - 8.0 % Final    Basophil % 04/16/2024 0.8  0.0 - 1.9 % Final     Differential Method 04/16/2024 Automated   Final    Sodium 04/16/2024 141  136 - 145 mmol/L Final    Potassium 04/16/2024 4.0  3.5 - 5.1 mmol/L Final    Chloride 04/16/2024 105  95 - 110 mmol/L Final    CO2 04/16/2024 24  23 - 29 mmol/L Final    Glucose 04/16/2024 104  70 - 110 mg/dL Final    BUN 04/16/2024 20  8 - 23 mg/dL Final    Creatinine 04/16/2024 1.2  0.5 - 1.4 mg/dL Final    Calcium 04/16/2024 9.8  8.7 - 10.5 mg/dL Final    Total Protein 04/16/2024 6.7  6.0 - 8.4 g/dL Final    Albumin 04/16/2024 3.7  3.5 - 5.2 g/dL Final    Total Bilirubin 04/16/2024 0.4  0.1 - 1.0 mg/dL Final    Comment: For infants and newborns, interpretation of results should be based  on gestational age, weight and in agreement with clinical  observations.    Premature Infant recommended reference ranges:  Up to 24 hours.............<8.0 mg/dL  Up to 48 hours............<12.0 mg/dL  3-5 days..................<15.0 mg/dL  6-29 days.................<15.0 mg/dL      Alkaline Phosphatase 04/16/2024 216 (H)  55 - 135 U/L Final    AST 04/16/2024 24  10 - 40 U/L Final    ALT 04/16/2024 15  10 - 44 U/L Final    eGFR 04/16/2024 45.5 (A)  >60 mL/min/1.73 m^2 Final    Anion Gap 04/16/2024 12  8 - 16 mmol/L Final    Ferritin 04/16/2024 152  20.0 - 300.0 ng/mL Final    Iron 04/16/2024 57  30 - 160 ug/dL Final    Transferrin 04/16/2024 268  200 - 375 mg/dL Final    TIBC 04/16/2024 397  250 - 450 ug/dL Final    Saturated Iron 04/16/2024 14 (L)  20 - 50 % Final         Imaging:    PET/CT 2/15/24  Head and neck: There is physiologic distribution of radiotracer throughout the included brain. There is no hemorrhage, hydrocephalus, or midline shift. There is no FDG avid cervical lymphadenopathy.     Chest: There is a left subclavian port in place. There is no FDG avid mediastinal or hilar lymphadenopathy. The nodular focus of hypermetabolic activity within the right apical soft tissue area of consolidation is grossly unchanged. This demonstrates an SUV  max of 6.3 compared with 5.9 previously. There is a new area of ground-glass opacity more anterior in the right lung apex best visualized on image 67 which measures 2.6 x 2.2 cm with an SUV max of 4.0. This was not present on the prior exam. There is no pleural effusion.     Abdomen and pelvis: There is physiologic radiotracer throughout the liver, spleen, and collecting system. There are multiple metastatic periaortic and bilateral iliac lymph nodes which have progressed from the prior exam. The inguinal adenopathy has progressed. A representative new left periaortic lymph node best visualized on image 157 measures 19 x 17 mm with an SUV max 6.5. A left hemipelvic node best visualized on image 233 measures 4.1 x 1.5 cm. This demonstrates an SUV max 9.4. Enlarged left inguinal node best visualized on image 232 measures 3.0 x 2.2 cm compared with 2.7 x 1.1 cm previously. There is an SUV max of 9.2 compared with 6.7 previously.     Musculoskeletal: There is no FDG avid lytic or blastic osseous lesion.  Component      Latest Ref Rng 3/24/2024 4/16/2024   WBC      3.90 - 12.70 K/uL  7.14    RBC      4.00 - 5.40 M/uL  3.90 (L)    Hemoglobin      12.0 - 16.0 g/dL  11.4 (L)    Hematocrit      37.0 - 48.5 %  36.7 (L)    MCV      82 - 98 fL  94    MCH      27.0 - 31.0 pg  29.2    MCHC      32.0 - 36.0 g/dL  31.1 (L)    RDW      11.5 - 14.5 %  16.3 (H)    Platelet Count      150 - 450 K/uL  162    MPV      9.2 - 12.9 fL  10.6    Immature Granulocytes      0.0 - 0.5 %  0.7 (H)    Gran # (ANC)      1.8 - 7.7 K/uL  4.7    Immature Grans (Abs)      0.00 - 0.04 K/uL  0.05 (H)    Lymph #      1.0 - 4.8 K/uL  1.4    Mono #      0.3 - 1.0 K/uL  0.7    Eos #      0.0 - 0.5 K/uL  0.3    Baso #      0.00 - 0.20 K/uL  0.06    nRBC      0 /100 WBC  0    Gran %      38.0 - 73.0 %  65.9    Lymph %      18.0 - 48.0 %  19.6    Mono %      4.0 - 15.0 %  9.5    Eos %      0.0 - 8.0 %  3.5    Basophil %      0.0 - 1.9 %  0.8    Differential  Method  Automated    Sodium      136 - 145 mmol/L  141    Potassium      3.5 - 5.1 mmol/L  4.0    Chloride      95 - 110 mmol/L  105    CO2      23 - 29 mmol/L  24    Glucose      70 - 110 mg/dL  104    BUN      8 - 23 mg/dL  20    Creatinine      0.5 - 1.4 mg/dL  1.2    Calcium      8.7 - 10.5 mg/dL  9.8    PROTEIN TOTAL      6.0 - 8.4 g/dL  6.7    Albumin      3.5 - 5.2 g/dL  3.7    BILIRUBIN TOTAL      0.1 - 1.0 mg/dL  0.4    ALP      55 - 135 U/L  216 (H)    AST      10 - 40 U/L  24    ALT      10 - 44 U/L  15    eGFR      >60 mL/min/1.73 m^2  45.5 !    Anion Gap      8 - 16 mmol/L  12    Iron      30 - 160 ug/dL  57    Transferrin      200 - 375 mg/dL  268    TIBC      250 - 450 ug/dL  397    Saturated Iron      20 - 50 %  14 (L)    Lactate Dehydrogenase      110 - 260 U/L 214     Haptoglobin      30 - 200 mg/dL 177     Ferritin      20.0 - 300.0 ng/mL  152       Legend:  (L) Low  (H) High  ! Abnormal      Assessment:       No diagnosis found.       Plan:     1. NSCLC - She has NSCLC SCC with involvement of the right lung and mediastinum  -Pt with prior radiation to a RUL nodule  -PD-L1 was 86% on 6/18/21  -Pt was on Keytruda with 6 week cycles and completed 6 tx's and then developed pneumonitis  -PET/CT on 9/23/22 showed possible pseudoprogression  -PET/CT on 11/29/22 showed stable disease with RLL infiltrate concerning for resolving PNA  -PET-CT on 03/08/2023 showed resolution of prior disease   -CT Chest 4/14/23 and 7/10/23 with stable findings  -PET/CT 10/09/23 showed avidity in the RUL mass, inguinal LAD and subcutaneous nodule in the lumbar region  -Treatment with paclitaxel discussed; however, pt is hesitant to undergo chemotherapy  -Biopsy of lumbar subcutaneous nodule showed fibroadipose tissue  -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 Renal Cell Carcinoma - 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 based on biopsy 3/28/24  -Pt not a surgical candidate per Urology  -PT to undergo ablation with IR      3. Rectal Lesion - seen on colonoscopy 03/25/2024   Rectal tissue biopsy on 03/27/2024 with path showing mucosal prolapse with ulceration and granulation tissue  -MRI rectum on 03/28/2024 showed the suspected perianal mass at the invasion to adjacent structures and enlarged pelvic lymph nodes  -LAD likely from follicular lymphoma  -Referral to Colorectal Surgeon recommended but pt declined    4. Saddle pulmonary embolus - seen on CTA 03/18/2023   -She is on prophylactic eliquis 2.5mg BID given prior GI bleed    5. Follicular lymphoma     - She was previously treated with CVP-R  and then with maintenance Rituxan for 9/12 cycles with good response  --LN's increasing in size on CT 12/12/23  -PET-CT on 02/15/2024 showed continued increasing size of left inguinal lymph node with SUV max now 9.2  -Left inguinal LN biopsy 3/08/24 showed follicular lymphoma Grade 3a  -she has no B symptoms currently  -she has mild anemia, no other cytopenias  -she does not meet GELF criteria  at this time  -she will have repeat imaging in 8 weeks and if size of the LN are increasing, she will start chemotherapy, most likely with bendamustine-rituximab       6. HTN - pt on nifedipine  -BP normal  -Will monitor    7. Hypothyroidism - pt on levothyroxine  -Management per PCP    8. Iron Deficiency Anemia - Hemoglobin improved to 12.8g/dL on 12/12/23    -Ferritin 316ng/mL 12/12/23  -Ferritin was 22ng/mL on 9/05/23  -Pt received 4 doses of venofer  -PT also received 2 units of PRBC's during recent hospital stay  -Hemoglobin 11g/dL on 3/28/24  -Will check iron studies prior to next appt  -Will monitor

## 2024-05-06 ENCOUNTER — LAB VISIT (OUTPATIENT)
Dept: LAB | Facility: HOSPITAL | Age: 84
End: 2024-05-06
Payer: MEDICARE

## 2024-05-06 DIAGNOSIS — D50.9 IRON DEFICIENCY ANEMIA, UNSPECIFIED IRON DEFICIENCY ANEMIA TYPE: ICD-10-CM

## 2024-05-06 LAB
ALBUMIN SERPL BCP-MCNC: 3.5 G/DL (ref 3.5–5.2)
ALP SERPL-CCNC: 88 U/L (ref 55–135)
ALT SERPL W/O P-5'-P-CCNC: 6 U/L (ref 10–44)
ANION GAP SERPL CALC-SCNC: 10 MMOL/L (ref 8–16)
AST SERPL-CCNC: 13 U/L (ref 10–40)
BASOPHILS # BLD AUTO: 0.05 K/UL (ref 0–0.2)
BASOPHILS NFR BLD: 0.9 % (ref 0–1.9)
BILIRUB SERPL-MCNC: 0.3 MG/DL (ref 0.1–1)
BUN SERPL-MCNC: 21 MG/DL (ref 8–23)
CALCIUM SERPL-MCNC: 8.9 MG/DL (ref 8.7–10.5)
CHLORIDE SERPL-SCNC: 109 MMOL/L (ref 95–110)
CO2 SERPL-SCNC: 22 MMOL/L (ref 23–29)
CREAT SERPL-MCNC: 1.4 MG/DL (ref 0.5–1.4)
DIFFERENTIAL METHOD BLD: ABNORMAL
EOSINOPHIL # BLD AUTO: 0.3 K/UL (ref 0–0.5)
EOSINOPHIL NFR BLD: 4.6 % (ref 0–8)
ERYTHROCYTE [DISTWIDTH] IN BLOOD BY AUTOMATED COUNT: 15.2 % (ref 11.5–14.5)
EST. GFR  (NO RACE VARIABLE): 37.8 ML/MIN/1.73 M^2
FERRITIN SERPL-MCNC: 136 NG/ML (ref 20–300)
GLUCOSE SERPL-MCNC: 83 MG/DL (ref 70–110)
HCT VFR BLD AUTO: 32 % (ref 37–48.5)
HGB BLD-MCNC: 10.2 G/DL (ref 12–16)
IMM GRANULOCYTES # BLD AUTO: 0.04 K/UL (ref 0–0.04)
IMM GRANULOCYTES NFR BLD AUTO: 0.7 % (ref 0–0.5)
IRON SERPL-MCNC: 40 UG/DL (ref 30–160)
LYMPHOCYTES # BLD AUTO: 0.9 K/UL (ref 1–4.8)
LYMPHOCYTES NFR BLD: 16.8 % (ref 18–48)
MCH RBC QN AUTO: 29.4 PG (ref 27–31)
MCHC RBC AUTO-ENTMCNC: 31.9 G/DL (ref 32–36)
MCV RBC AUTO: 92 FL (ref 82–98)
MONOCYTES # BLD AUTO: 0.7 K/UL (ref 0.3–1)
MONOCYTES NFR BLD: 12.6 % (ref 4–15)
NEUTROPHILS # BLD AUTO: 3.5 K/UL (ref 1.8–7.7)
NEUTROPHILS NFR BLD: 64.4 % (ref 38–73)
NRBC BLD-RTO: 0 /100 WBC
PLATELET # BLD AUTO: 126 K/UL (ref 150–450)
PMV BLD AUTO: 10.2 FL (ref 9.2–12.9)
POTASSIUM SERPL-SCNC: 4.1 MMOL/L (ref 3.5–5.1)
PROT SERPL-MCNC: 6.1 G/DL (ref 6–8.4)
RBC # BLD AUTO: 3.47 M/UL (ref 4–5.4)
SATURATED IRON: 12 % (ref 20–50)
SODIUM SERPL-SCNC: 141 MMOL/L (ref 136–145)
TOTAL IRON BINDING CAPACITY: 330 UG/DL (ref 250–450)
TRANSFERRIN SERPL-MCNC: 223 MG/DL (ref 200–375)
WBC # BLD AUTO: 5.48 K/UL (ref 3.9–12.7)

## 2024-05-06 PROCEDURE — 83540 ASSAY OF IRON: CPT | Performed by: INTERNAL MEDICINE

## 2024-05-06 PROCEDURE — 80053 COMPREHEN METABOLIC PANEL: CPT | Mod: PN | Performed by: INTERNAL MEDICINE

## 2024-05-06 PROCEDURE — 82728 ASSAY OF FERRITIN: CPT | Performed by: INTERNAL MEDICINE

## 2024-05-06 PROCEDURE — 36415 COLL VENOUS BLD VENIPUNCTURE: CPT | Mod: PN | Performed by: INTERNAL MEDICINE

## 2024-05-06 PROCEDURE — 85025 COMPLETE CBC W/AUTO DIFF WBC: CPT | Mod: PN | Performed by: INTERNAL MEDICINE

## 2024-05-13 NOTE — PROGRESS NOTES
PATIENT: Shandra Velez  MRN: [id number]  DATE: 5/14/2024      Diagnosis:   1. Malignant neoplasm of right upper lobe of lung    2. Renal cell carcinoma, unspecified laterality    3. Rectal lesion    4. Thrombophilia    5. 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 Hypertension, unspecified type    7. Hyperlipidemia, unspecified hyperlipidemia type    8. Iron deficiency anemia, unspecified iron deficiency anemia type              Chief Complaint: Malignant neoplasm of right upper lobe of lung (1 week follow up )    Oncologic History:     Pt initially underwent right inguinal LN biopsy 5/25/22 showing grade 1 follicular lymphoma.  Bone marrow biopsy done 06/24/2020 showed involvement with follicular lymphoma.  PET/CT 7/07/20 showed lymphadenopathy above and below the diaphragm as well as the spleen.  Also seen wasa RUL lesion.  The patient was started on CVP-R followed by maintenance rituxan every 8 weeks.  Biopsy of the RUL nodule on 6/18/21 showed SCC with PD-L1 at 86%.  EBUS 7/09/21 showed no malignant cells at station 7, 4R or 11RS.  The patient was treated with SBRT under the care of Dr Barnes with 50Gy in 4 fractions completed 10/28/21.  The patient then developed new pulmonary nodules on CT scan 6/02/22.  EBUS 6/16/22 showed positive SCC in 4R LN.  Pt was discussed at tumor board on 6/21/22 with recommendation for Keytruda.  The patient underwent PET/CT on 9/23/22 after 2 doses of treatment showing a 1.5 cm precarinal lymph node; stable right apical mass measuring 1.8 x 1.5 cm; stable 8 mm right apical lung nodule with adjacent post radiation change measuring 2 x 2.7 cm; stable 4 mm subpleural nodule in the lateral right upper lobe; fluid/mucus in the right lower lobe bronchus tree; 6 mm anterior left upper lobe nodule which is new; 2.3 cm simple cyst in the midpole of the left kidney; 3.1 x 2.3 cm rim enhancing complex cystic and solid mass in the mid to lower pole the right kidney;  infrarenal abdominal aortic aneurysm measuring 3.8 x 3.5 cm; and stable a left para-aortic lymph node measuring 11 mm. 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   The patient underwent PET-CT on 11/29/2022 showing an irregular hypermetabolic right upper lobe tumor which is stable measuring 1.6 x 1.2 cm; hypermetabolic subpleural right upper lobe consolidation diminished in extent; unchanged linear zone of hypermetabolic atelectasis in the superior segment of the left lower lobe; new poorly metabolic ill-defined infiltrate in the posterior right lower lobe; 4 mm left upper lobe nodule which is stable; hypermetabolic precarinal mediastinal lymph node stable measuring 1 cm; and hypermetabolic aortopulmonary lymph node stable measuring 1.1 x 1.1 cm.   The patient was admitted to the hospital from 1/16/23 to 1/23/23 for pneumonia.  She underwent bronchoscopy with Dr Kinney on 1/18/23 showing significant mucus plug impaction int the pharynx, left mainstem bronchus and right and left lower lobes.  The patient was discharged on 3L O2.   The patient underwent PET-CT on 03/08/2023 showing thickening of the pleura; new ground-glass opacities within the left upper lobe; patchy airspace opacities in the right lower lobe; right lower lobe pulmonary artery hypermetabolic activity possibly secondary to bronchial wall thickening or thrombus in the pulmonary artery; diffuse hypermetabolic activity identified throughout the axial skeleton.  The patient was then admitted to the hospital on 03/18/23-4/3/23 after presenting with shortness of breath and being found to have a saddle pulmonary embolus on CTA 03/18/2023.  The patient underwent treatment with a heparin drip and thrombectomy on 03/18/2023 with eventual transition to Eliquis.   The patient underwent CT chest on 04/14/2023 showing areas of air trapping in subpleural bleb formation in both jarek  thoraces with areas of interstitial scarring and bibasilar bronchiectasis; stable solid nodule in left upper lobe measuring 6 mm; masslike area measuring 3 x 1.3 x 1.5 cm in the right upper lobe stable.   The patient underwent CT chest, abdomen, and pelvis on 07/10/2023 showing more consolidative appearance of previously described right apical pulmonary nodules without discretely measurable nodule on today's exam, 8 mm left apical nodule, stable consolidation and superior segment left lower lobe; 3 mm right lower lobe pulmonary nodule; heterogeneously enhancing mass in the inferior pole of the right kidney measuring 3.9 x 3.7 x 3.9 cm; unchanged left periaortic lymph nodes.   The patient underwent a PET-CT on 10/09/2023 showing a new oval-shaped hypermetabolic nodular mass measuring 1.6 x 1 cm in the right upper lobe consistent with tumor recurrence; disappearance of ground-glass opacities anterior left upper lobe; disappearance of subpleural posterior right lower lobe consolidation; new hypermetabolic bilateral inguinal lymph nodes; new rounded nodular hypermetabolic nodule within the posterior right lumbar subcutaneous fat measuring 1.5 x 1.5 cm and a 3.8cm abdominal aortic aneurysm.    The patient was discussed at Thoracic tumor board on 10/25/23 with recommendation to proceed with biopsy of the lesion in the back via IR.  Biopsy done on 11/03/23 showed fibroadipose tissue.   The patient underwent CT of the chest, abdomen, and pelvis on 12/12/2023 showing pleural parenchymal thickening in the right lung apex; 6 mm left upper lobe nodule stable; solid mass inferior right kidney measuring 4.3 x 4.3 cm; enlarged bilateral periaortic lymphadenopathy measuring 17 x 14 mm; enlarged bilateral inguinal hemipelvic lymphadenopathy with left hemipelvic lymph node measuring 4.4 x 1.8 cm and left inguinal lymph node measuring 3.5 x 1.7 cm.   The patient underwent PET-CT on 02/15/2024 showing a stable nodular focus of  hypermetabolic activity in the right apical soft tissue area of consolidation; new area of ground-glass opacity more anterior in the right lung apex measuring 2.6 x 2.2 cm; multiple metastatic periaortic and bilateral iliac lymph nodes with progression from prior with a new left para-aortic lymph node measuring 19 x 17 mm SUV max 6.5, left hemipelvic node measuring 4.1 x 1.5 cm with SUV max of 9.4 enlarged inguinal lymph node measuring 3 x 2.2 with SUV max of 9.2.      The patient underwent biopsy of left inguinal lymph node with path showing follicular lymphoma grade 3A.  Patient was discussed at  Tumor Board on 3/14/24 with recommendation for biopsy of the right renal lesion followed by potential ablation.   The patient was admitted to the hospital from 03/23/2024 until 03/28/2024 for acute on chronic anemia.  Hemoglobin on admission was 7.3 grams/deciliter.  The patient received 2 units of PRBC's on 03/24/2024.  Colonoscopy on 03/25/2024 showed a circumferential mass posterior to the anal canal found on perianal exam which looked ulcerated and arising from the anus; one 2-3 mm polyp in the mid rectum; and moderate colonic spasm consistent with irritable bowel syndrome.  Patient underwent rectal tissue biopsy on 03/27/2024 with path showing mucosal prolapse with ulceration and granulation tissue.  MRI rectum on 03/28/2024 showed the suspected perianal mass at the invasion to adjacent structures and enlarged pelvic lymph nodes.  The patient underwent biopsy of the right renal lesion showing clear cell carcinoma on pathology.    Subjective:    Interval History: Ms. Velez is a 83 y.o. female with HLD, HTN, osteopenia, follicular lymphoma who presents for follow up of NSCLC.  Since since the last clinic visit the patient saw Dr. Hansen 04/18/2024 whom felt the patient did not meet GELF criteria currently with plan on repeating PET-CT on 8 weeks and consideration of bendamustine and rituximab if lymph node size was  increasing.  Patient underwent embolization to the right renal lesion on 04/29/2024.  The patient endorses continued shortness of breath with exertion.  The patient denies CP, cough, abdominal pain, nausea, vomiting, constipation, diarrhea.  The patient denies fever, chills, night sweats, weight loss, new lumps or bumps, easy bruising or bleeding.    Past Medical History:   Past Medical History:   Diagnosis Date    Breast cancer 1985    right mastectomy    Digestive disorder     Encounter for blood transfusion     History of pneumonia     Hypercholesterolemia     Hypertension     Indigestion     Lumbar spondylosis     Lung cancer     2022 - currently in treatment as of 7/27/22    Lymphoma     Osteopenia     Pulmonary nodule     Renal cancer     Renal disorder     renal cancer    Retinal detachment     Smoker 06/11/2022       Past Surgical HIstory:   Past Surgical History:   Procedure Laterality Date    ANGIOGRAPHY  4/29/2024    Procedure: Right Renal Angiogram;  Surgeon: Steve Weaver MD;  Location: [facility] CATH;  Service: Interventional Radiology;;    APPENDECTOMY      BONE MARROW BIOPSY Bilateral 06/24/2020    Procedure: Biopsy-bone marrow;  Surgeon: Rod Montero MD;  Location: [facility] OR;  Service: Oncology;  Laterality: Bilateral;    BREAST RECONSTRUCTION  1990    right    BRONCHOSCOPY N/A 01/18/2023    Procedure: BRONCHOSCOPY;  Surgeon: Gregorio Kinney MD;  Location: [facility] ENDO;  Service: Pulmonary;  Laterality: N/A;    BRONCHOSCOPY Bilateral 02/10/2023    Procedure: Bronchoscopy;  Surgeon: Gregorio Kinney MD;  Location: [facility] ENDO;  Service: Pulmonary;  Laterality: Bilateral;  for airway clearance. Purple top tube for cell count diff    BRONCHOSCOPY N/A 03/13/2023    Procedure: Bronchoscopy;  Surgeon: Gregorio Kinney MD;  Location: [facility] ENDO;  Service: Pulmonary;  Laterality: N/A;  therapeutic scope. Please have purple top tube and iced saline    CATARACT EXTRACTION W/  INTRAOCULAR LENS IMPLANT Bilateral      CHOLECYSTECTOMY      COLONOSCOPY N/A 3/25/2024    Procedure: COLONOSCOPY;  Surgeon: Brigido Calvillo Jr., MD;  Location: [facility] ENDO;  Service: Endoscopy;  Laterality: N/A;    EMBOLIZATION  4/29/2024    Procedure: Rt. Renal Embolization;  Surgeon: Steve Weaver MD;  Location: [facility] CATH;  Service: Interventional Radiology;;    ENDOBRONCHIAL ULTRASOUND Bilateral 07/09/2021    Procedure: ENDOBRONCHIAL ULTRASOUND (EBUS);  Surgeon: Gregorio Kinney MD;  Location: [facility];  Service: Pulmonary;  Laterality: Bilateral;  NEED LMA to  eval right upper paratracheal LN.     ENDOBRONCHIAL ULTRASOUND Bilateral 06/16/2022    Procedure: ENDOBRONCHIAL ULTRASOUND (EBUS);  Surgeon: Gregorio Kinney MD;  Location: [facility];  Service: Pulmonary;  Laterality: Bilateral;    INJECTION OF FACET JOINT Left 06/30/2022    Procedure: FACET JOINT Cyst Aspiration L3/4;  Surgeon: Ronnell Baeza MD;  Location: [facility] OR;  Service: Pain Management;  Laterality: Left;    INSERTION OF TUNNELED CENTRAL VENOUS CATHETER (CVC) WITH SUBCUTANEOUS PORT Left 11/04/2020    Procedure: [id number];  Surgeon: Kody Pretty MD;  Location: [facility] OR;  Service: General;  Laterality: Left;    JOINT REPLACEMENT  2008    right knee     LUMBAR LAMINECTOMY      LYMPH NODE BIOPSY      MASTECTOMY Right 1985    NEEDLE LOCALIZATION N/A 05/25/2020    Procedure: NEEDLE LOCALIZATION - lymph node bx;  Surgeon: Steve Weaver MD;  Location: [facility] CATH;  Service: Interventional Radiology;  Laterality: N/A;    RECTAL BIOPSY N/A 3/27/2024    Procedure: BIOPSY, RECTUM;  Surgeon: Isabel Choi MD;  Location: [facility] OR;  Service: General;  Laterality: N/A;    RETINAL DETACHMENT SURGERY  1989    RIGHT HEART CATHETERIZATION Right 03/18/2023    Procedure: INSERTION, CATHETER, RIGHT HEART;  Surgeon: Rukhsana Lang MD;  Location: [facility] CATH;  Service: Cardiology;  Laterality: Right;    SELECTIVE UNILATERAL PULMONARY ANGIOGRAPHY  03/18/2023    Procedure: Pumonary angiography  - unilateral;  Surgeon: Rukhsana Lang MD;  [facility];  Service: Cardiology;;    THROMBECTOMY N/A 03/18/2023    Procedure: THROMBECTOMY;  Surgeon: Rukhsana Lang MD;  Location: [facility] CATH;  Service: Cardiology;  Laterality: N/A;    TRANSFORAMINAL EPIDURAL INJECTION OF STEROID Left 05/14/2020    Procedure: Injection,steroid,epidural,transforaminal approach, l3/4;  Surgeon: Ronnell Baeza MD;  Location: [facility] OR;  Service: Pain Management;  Laterality: Left;    TRANSFORAMINAL EPIDURAL INJECTION OF STEROID Left 03/23/2022    Procedure: Injection,steroid,epidural,transforaminal approach L3/4;  Surgeon: Ronnell Baeza MD;  Location: [facility] OR;  Service: Pain Management;  Laterality: Left;    TRANSFORAMINAL EPIDURAL INJECTION OF STEROID Left 08/01/2022    Procedure: Injection,steroid,epidural,transforaminal approach L3/4;  Surgeon: Ronnell Baeza MD;  Location: [facility] OR;  Service: Pain Management;  Laterality: Left;    TUBAL LIGATION      UMBILICAL HERNIA REPAIR         Family History:   Family History   Problem Relation Name Age of Onset    Cancer Sister          uterine    Cancer Sister          Uterine cancer     Diabetes Brother      Breast cancer Other niece 42    Glaucoma Neg Hx      Macular degeneration Neg Hx         Social History:  reports that she quit smoking about 23 months ago. Her smoking use included cigarettes. She started smoking about 54 years ago. She has a 26.5 pack-year smoking history. She has never used smokeless tobacco. She reports that she does not drink alcohol and does not use drugs.    Allergies:  Review of patient's allergies indicates:   Allergen Reactions    Opioids - morphine analogues Other (See Comments)     Hypotension       Medications:  Current Outpatient Medications   Medication Sig Dispense Refill    albuterol (ACCUNEB) 1.25 mg/3 mL Nebu Take 1.25 mg by nebulization 2 (two) times a day.      albuterol (VENTOLIN HFA) 90 mcg/actuation inhaler Inhale 2 puffs into  the lungs every 6 (six) hours as needed for Wheezing or Shortness of Breath (or before exercise). Rescue 18 g 2    apixaban (ELIQUIS) 2.5 mg Tab Take 1 tablet (2.5 mg total) by mouth 2 (two) times daily. 180 tablet 3    budesonide (PULMICORT) 0.5 mg/2 mL nebulizer solution Take 2 mLs (0.5 mg total) by nebulization 2 (two) times a day. 120 mL 11    cetirizine (ZYRTEC) 10 MG tablet Take 10 mg by mouth once daily.      ergocalciferol (ERGOCALCIFEROL) 50,000 unit Cap Take 50,000 Units by mouth every Tuesday.      fluticasone propionate (FLONASE) 50 mcg/actuation nasal spray 1 spray (50 mcg total) by Each Nostril route 2 (two) times a day. 16 g 12    levothyroxine (SYNTHROID) 100 MCG tablet Take 1 tablet (100 mcg total) by mouth once daily. 90 tablet 3    mucus clearing device (ACAPELLA, FLUTTER) by Misc.(Non-Drug; Combo Route) route once daily. 100 each 0    multivitamin (THERAGRAN) per tablet Take 1 tablet by mouth once daily.      NIFEdipine (ADALAT CC) 60 MG TbSR TAKE 1 TABLET BY MOUTH ONCE  DAILY 90 tablet 3    omeprazole (PRILOSEC) 40 MG capsule Take 1 capsule (40 mg total) by mouth once daily. 90 capsule 4    OXYGEN-AIR DELIVERY SYSTEMS MISC by [facility].(Non-Drug; Combo Route) route.      sodium chloride 3% 3 % nebulizer solution Take 4 mLs by nebulization 2 (two) times a day. 240 mL 3    temazepam (RESTORIL) 30 mg capsule Take 1 capsule (30 mg total) by mouth every evening. 30 capsule 5     No current facility-administered medications for this visit.       Review of Systems   Constitutional:  Negative for appetite change, chills, diaphoresis, fatigue, fever and unexpected weight change.   Eyes:  Negative for visual disturbance.   Respiratory:  Positive for shortness of breath. Negative for cough.    Cardiovascular:  Negative for chest pain and palpitations.   Gastrointestinal:  Negative for abdominal pain, constipation, diarrhea, nausea and vomiting.   Genitourinary:  Negative for frequency.   Musculoskeletal:   "Negative for back pain.   Skin:  Negative for rash.   Neurological:  Negative for headaches.   Hematological:  Negative for adenopathy. Does not bruise/bleed easily.   Psychiatric/Behavioral:  The patient is not nervous/anxious.        ECOG Performance Status: 2   Objective:      Vitals:   Vitals:    05/14/24 1510   BP: 114/68   BP Location: Right arm   Patient Position: Sitting   BP Method: Large (Manual)   Pulse: 88   Resp: 20   Temp: 97.2 °F (36.2 °C)   TempSrc: Temporal   SpO2: 95%   Weight: 95.8 kg (211 lb 3.2 oz)   Height: 5' 4" (1.626 m)                 Physical Exam  Constitutional:       General: She is not in acute distress.     Appearance: She is well-developed. She is not diaphoretic.   HENT:      Head: Normocephalic and atraumatic.   Cardiovascular:      Rate and Rhythm: Normal rate and regular rhythm.      Heart sounds: Normal heart sounds. No murmur heard.     No friction rub. No gallop.   Pulmonary:      Effort: Pulmonary effort is normal. No respiratory distress.      Breath sounds: Normal breath sounds. No wheezing or rales.   Chest:      Chest wall: No tenderness.   Abdominal:      General: Bowel sounds are normal. There is no distension.      Palpations: Abdomen is soft. There is no mass.      Tenderness: There is no abdominal tenderness. There is no guarding or rebound.   Lymphadenopathy:      Cervical: No cervical adenopathy.      Upper Body:      Right upper body: No supraclavicular or axillary adenopathy.      Left upper body: No supraclavicular or axillary adenopathy.   Skin:     Findings: No erythema or rash.   Neurological:      Mental Status: She is alert and oriented to person, place, and time.   Psychiatric:         Behavior: Behavior normal.         Laboratory Data:  No visits with results within 1 Week(s) from this visit.   Latest known visit with results is:   Lab Visit on 05/06/2024   Component Date Value Ref Range Status    WBC 05/06/2024 5.48  3.90 - 12.70 K/uL Final    RBC " 05/06/2024 3.47 (L)  4.00 - 5.40 M/uL Final    Hemoglobin 05/06/2024 10.2 (L)  12.0 - 16.0 g/dL Final    Hematocrit 05/06/2024 32.0 (L)  37.0 - 48.5 % Final    MCV 05/06/2024 92  82 - 98 fL Final    MCH 05/06/2024 29.4  27.0 - 31.0 pg Final    MCHC 05/06/2024 31.9 (L)  32.0 - 36.0 g/dL Final    RDW 05/06/2024 15.2 (H)  11.5 - 14.5 % Final    Platelets 05/06/2024 126 (L)  150 - 450 K/uL Final    MPV 05/06/2024 10.2  9.2 - 12.9 fL Final    Immature Granulocytes 05/06/2024 0.7 (H)  0.0 - 0.5 % Final    Gran # (ANC) 05/06/2024 3.5  1.8 - 7.7 K/uL Final    Immature Grans (Abs) 05/06/2024 0.04  0.00 - 0.04 K/uL Final    Comment: Mild elevation in immature granulocytes is non specific and   can be seen in a variety of conditions including stress response,   acute inflammation, trauma and pregnancy. Correlation with other   laboratory and clinical findings is essential.      Lymph # 05/06/2024 0.9 (L)  1.0 - 4.8 K/uL Final    Mono # 05/06/2024 0.7  0.3 - 1.0 K/uL Final    Eos # 05/06/2024 0.3  0.0 - 0.5 K/uL Final    Baso # 05/06/2024 0.05  0.00 - 0.20 K/uL Final    nRBC 05/06/2024 0  0 /100 WBC Final    Gran % 05/06/2024 64.4  38.0 - 73.0 % Final    Lymph % 05/06/2024 16.8 (L)  18.0 - 48.0 % Final    Mono % 05/06/2024 12.6  4.0 - 15.0 % Final    Eosinophil % 05/06/2024 4.6  0.0 - 8.0 % Final    Basophil % 05/06/2024 0.9  0.0 - 1.9 % Final    Differential Method 05/06/2024 Automated   Final    Sodium 05/06/2024 141  136 - 145 mmol/L Final    Potassium 05/06/2024 4.1  3.5 - 5.1 mmol/L Final    Chloride 05/06/2024 109  95 - 110 mmol/L Final    CO2 05/06/2024 22 (L)  23 - 29 mmol/L Final    Glucose 05/06/2024 83  70 - 110 mg/dL Final    BUN 05/06/2024 21  8 - 23 mg/dL Final    Creatinine 05/06/2024 1.4  0.5 - 1.4 mg/dL Final    Calcium 05/06/2024 8.9  8.7 - 10.5 mg/dL Final    Total Protein 05/06/2024 6.1  6.0 - 8.4 g/dL Final    Albumin 05/06/2024 3.5  3.5 - 5.2 g/dL Final    Total Bilirubin 05/06/2024 0.3  0.1 - 1.0 mg/dL  Final    Comment: For infants and newborns, interpretation of results should be based  on gestational age, weight and in agreement with clinical  observations.    Premature Infant recommended reference ranges:  Up to 24 hours.............<8.0 mg/dL  Up to 48 hours............<12.0 mg/dL  3-5 days..................<15.0 mg/dL  6-29 days.................<15.0 mg/dL      Alkaline Phosphatase 05/06/2024 88  55 - 135 U/L Final    AST 05/06/2024 13  10 - 40 U/L Final    ALT 05/06/2024 6 (L)  10 - 44 U/L Final    eGFR 05/06/2024 37.8 (A)  >60 mL/min/1.73 m^2 Final    Anion Gap 05/06/2024 10  8 - 16 mmol/L Final    Iron 05/06/2024 40  30 - 160 ug/dL Final    Transferrin 05/06/2024 223  200 - 375 mg/dL Final    TIBC 05/06/2024 330  250 - 450 ug/dL Final    Saturated Iron 05/06/2024 12 (L)  20 - 50 % Final    Ferritin 05/06/2024 136  20.0 - 300.0 ng/mL Final         Imaging:    PET/CT 2/15/24  Head and neck: There is physiologic distribution of radiotracer throughout the included brain. There is no hemorrhage, hydrocephalus, or midline shift. There is no FDG avid cervical lymphadenopathy.     Chest: There is a left subclavian port in place. There is no FDG avid mediastinal or hilar lymphadenopathy. The nodular focus of hypermetabolic activity within the right apical soft tissue area of consolidation is grossly unchanged. This demonstrates an SUV max of 6.3 compared with 5.9 previously. There is a new area of ground-glass opacity more anterior in the right lung apex best visualized on image 67 which measures 2.6 x 2.2 cm with an SUV max of 4.0. This was not present on the prior exam. There is no pleural effusion.     Abdomen and pelvis: There is physiologic radiotracer throughout the liver, spleen, and collecting system. There are multiple metastatic periaortic and bilateral iliac lymph nodes which have progressed from the prior exam. The inguinal adenopathy has progressed. A representative new left periaortic lymph node best  visualized on image 157 measures 19 x 17 mm with an SUV max 6.5. A left hemipelvic node best visualized on image 233 measures 4.1 x 1.5 cm. This demonstrates an SUV max 9.4. Enlarged left inguinal node best visualized on image 232 measures 3.0 x 2.2 cm compared with 2.7 x 1.1 cm previously. There is an SUV max of 9.2 compared with 6.7 previously.     Musculoskeletal: There is no FDG avid lytic or blastic osseous lesion.        Assessment:       1. Malignant neoplasm of right upper lobe of lung    2. Renal cell carcinoma, unspecified laterality    3. Rectal lesion    4. Thrombophilia    5. 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 Hypertension, unspecified type    7. Hyperlipidemia, unspecified hyperlipidemia type    8. Iron deficiency anemia, unspecified iron deficiency anemia type           Plan:     NSCLC - Pt has NSCLC SCC with involvement of the right lung and mediastinum  -Pt with prior radiation to a RUL nodule  -PD-L1 was 86% on 6/18/21  -Pt was on Keytruda with 6 week cycles and completed 6 tx's and then developed pneumonitis  -PET/CT on 9/23/22 showed possible pseudoprogression  -PET/CT on 11/29/22 showed stable disease with RLL infiltrate concerning for resolving PNA  -PET-CT on 03/08/2023 showed resolution of prior disease   -CT Chest 4/14/23 and 7/10/23 with stable findings  -PET/CT 10/09/23 showed avidity in the RUL mass, inguinal LAD and subcutaneous nodule in the lumbar region  -Treatment with paclitaxel discussed; however, pt is hesitant to undergo chemotherapy  -Biopsy of lumbar subcutaneous nodule showed fibroadipose tissue  -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 for 9/12 cycles with good response  -PT with recurrent inguinal LAD which could be lymphoma  -LN's increasing in size on CT 12/12/23  -PET-CT on 02/15/2024 showed continued increasing size of left inguinal lymph node with SUV max now 9.2  -Left inguinal LN biopsy 3/08/24 showed Follicular Lymphoma Grade 3a  -Pt saw Dr Hansen on 4/18/24 whom recommended repeating scans in 8 weeks with consideration of bendamustine and rituximab if patient progresses    HTN - pt on nifedipine  -BP normal  -Will monitor    Hypothyroidism - pt on levothyroxine  -Management per PCP    Iron Deficiency Anemia - Hemoglobin improved to 12.8g/dL on 12/12/23  -Ferritin 316ng/mL 12/12/23  -Ferritin was 22ng/mL on 9/05/23  -Pt received 4 doses of venofer  -PT also received 2 units of PRBC's during recent hospital stay  -Hemoglobin 10.2g/dL on 5/06/24  -iron studies show normal ferritin and TIBC 05/06/2024 suggesting chronic disease  -Will monitor    Route Chart for Scheduling    Med Onc Chart Routing      Follow up with physician Other. Pt needs an appt with me on 6/24/24 at 11:40 to dsicuss PET/Ct form prior week.   Follow up with DAYAMI     Infusion scheduling note    Injection scheduling note    Labs    Imaging    Pharmacy appointment    Other referrals              Treatment Plan Information   OP PEMBROLIZUMAB 400MG Q6W   Chad Mills MD   Upcoming Treatment Dates - OP PEMBROLIZUMAB 400MG Q6W    5/12/2023       Pre-Medications       acetaminophen tablet 1,000 mg       diphenhydrAMINE (BENADRYL) 25 mg in NS 50 mL IVPB       Chemotherapy       pembrolizumab (KEYTRUDA) 400 mg in sodium chloride 0.9% SolP 116 mL infusion  6/23/2023       Pre-Medications       acetaminophen tablet 1,000 mg       diphenhydrAMINE (BENADRYL) 25 mg in NS 50 mL IVPB       Chemotherapy       pembrolizumab (KEYTRUDA) 400 mg in sodium chloride 0.9% SolP 116 mL infusion  8/4/2023       Pre-Medications       acetaminophen tablet 1,000 mg       diphenhydrAMINE (BENADRYL) 25 mg in NS 50 mL IVPB       Chemotherapy       pembrolizumab (KEYTRUDA) 400 mg in sodium chloride 0.9% SolP 116 mL infusion  9/15/2023       Pre-Medications       acetaminophen tablet 1,000 mg       diphenhydrAMINE (BENADRYL) 25 mg in NS 50 mL IVPB       Chemotherapy       pembrolizumab (KEYTRUDA) 400 mg in sodium chloride 0.9% SolP 116 mL infusion    Supportive Plan Information  OP IRON SUCROSE IVPB TWICE WEEKLY   Chad Mills MD   Upcoming Treatment Dates - OP IRON SUCROSE IVPB TWICE WEEKLY    No upcoming days in selected categories.    Therapy Plan Information  PORT FLUSH  Flushes  heparin, porcine (PF) 100 unit/mL injection flush 500 Units  500 Units, Intravenous, Every visit  sodium chloride 0.9% flush 10 mL  10 mL, Intravenous, Every visit    INF PEGFILGRASTIM (NEULASTA)  pegfilgrastim (NEULASTA) injection 6 mg  6 mg, Subcutaneous, Every visit        Chad Mills MD  [facility]  Hematology and Oncology  [facility] [address]   [place] [address]   O: [phone]  F: [phone]

## 2024-05-14 ENCOUNTER — OFFICE VISIT (OUTPATIENT)
Dept: HEMATOLOGY/ONCOLOGY | Facility: CLINIC | Age: 84
End: 2024-05-14
Payer: MEDICARE

## 2024-05-14 VITALS
BODY MASS INDEX: 36.05 KG/M2 | WEIGHT: 211.19 LBS | RESPIRATION RATE: 20 BRPM | SYSTOLIC BLOOD PRESSURE: 114 MMHG | TEMPERATURE: 97 F | DIASTOLIC BLOOD PRESSURE: 68 MMHG | HEIGHT: 64 IN | HEART RATE: 88 BPM | OXYGEN SATURATION: 95 %

## 2024-05-14 DIAGNOSIS — C82.35 GRADE 3A FOLLICULAR LYMPHOMA OF LYMPH NODES OF INGUINAL REGION: ICD-10-CM

## 2024-05-14 DIAGNOSIS — E78.5 HYPERLIPIDEMIA, UNSPECIFIED HYPERLIPIDEMIA TYPE: ICD-10-CM

## 2024-05-14 DIAGNOSIS — D50.9 IRON DEFICIENCY ANEMIA, UNSPECIFIED IRON DEFICIENCY ANEMIA TYPE: ICD-10-CM

## 2024-05-14 DIAGNOSIS — K62.9 RECTAL LESION: ICD-10-CM

## 2024-05-14 DIAGNOSIS — C64.9 RENAL CELL CARCINOMA, UNSPECIFIED LATERALITY: ICD-10-CM

## 2024-05-14 DIAGNOSIS — C34.11 MALIGNANT NEOPLASM OF RIGHT UPPER LOBE OF LUNG: Primary | ICD-10-CM

## 2024-05-14 DIAGNOSIS — D68.59 THROMBOPHILIA: ICD-10-CM

## 2024-05-14 DIAGNOSIS — I10 HYPERTENSION, UNSPECIFIED TYPE: ICD-10-CM

## 2024-05-14 PROCEDURE — 3078F DIAST BP <80 MM HG: CPT | Mod: CPTII,S$GLB,, | Performed by: INTERNAL MEDICINE

## 2024-05-14 PROCEDURE — 1101F PT FALLS ASSESS-DOCD LE1/YR: CPT | Mod: CPTII,S$GLB,, | Performed by: INTERNAL MEDICINE

## 2024-05-14 PROCEDURE — 99999 PR PBB SHADOW E&M-EST. PATIENT-LVL IV: CPT | Mod: PBBFAC,,, | Performed by: INTERNAL MEDICINE

## 2024-05-14 PROCEDURE — 99213 OFFICE O/P EST LOW 20 MIN: CPT | Mod: S$GLB,,, | Performed by: INTERNAL MEDICINE

## 2024-05-14 PROCEDURE — 3288F FALL RISK ASSESSMENT DOCD: CPT | Mod: CPTII,S$GLB,, | Performed by: INTERNAL MEDICINE

## 2024-05-14 PROCEDURE — 1159F MED LIST DOCD IN RCRD: CPT | Mod: CPTII,S$GLB,, | Performed by: INTERNAL MEDICINE

## 2024-05-14 PROCEDURE — 3074F SYST BP LT 130 MM HG: CPT | Mod: CPTII,S$GLB,, | Performed by: INTERNAL MEDICINE

## 2024-05-14 PROCEDURE — 1126F AMNT PAIN NOTED NONE PRSNT: CPT | Mod: CPTII,S$GLB,, | Performed by: INTERNAL MEDICINE

## 2024-05-15 ENCOUNTER — PATIENT MESSAGE (OUTPATIENT)
Dept: HEMATOLOGY/ONCOLOGY | Facility: CLINIC | Age: 84
End: 2024-05-15
Payer: MEDICARE

## 2024-05-15 ENCOUNTER — TELEPHONE (OUTPATIENT)
Dept: HEMATOLOGY/ONCOLOGY | Facility: CLINIC | Age: 84
End: 2024-05-15
Payer: MEDICARE

## 2024-05-15 NOTE — NURSING
Chart reviewed. Spoke with Ms. Velez to check in. Explained my role as oncology navigator.     She states she is feeling well recently, no complaints. Reviewed upcoming apts for PET scan and f/u visit with Dr. Mills.     Encouraged to contact care team with any concerns moving forward. Contact information provided. She verbalized understanding and thanked me for my call.

## 2024-05-22 PROBLEM — C64.9 RENAL CELL CARCINOMA: Status: ACTIVE | Noted: 2024-05-22

## 2024-05-22 PROBLEM — C64.1 CLEAR CELL CARCINOMA OF KIDNEY, RIGHT: Status: ACTIVE | Noted: 2024-05-22

## 2024-05-23 ENCOUNTER — TELEPHONE (OUTPATIENT)
Dept: FAMILY MEDICINE | Facility: CLINIC | Age: 84
End: 2024-05-23
Payer: MEDICARE

## 2024-05-23 ENCOUNTER — TELEPHONE (OUTPATIENT)
Dept: HEMATOLOGY/ONCOLOGY | Facility: CLINIC | Age: 84
End: 2024-05-23
Payer: MEDICARE

## 2024-05-23 NOTE — TELEPHONE ENCOUNTER
----- Message from Marie Matos, Patient Care Assistant sent at 5/23/2024 10:17 AM CDT -----  Regarding: hospiral follow    Type: Needs Medical Advice  Who Called:  leigh  Shawn Call Back Number: [phone]    Additional Information: leigh is being discharged from hospital today and needs a sooner follow up with either Dr krishnan or primary doctor , please call to further discuss ,thank you .

## 2024-05-23 NOTE — TELEPHONE ENCOUNTER
----- Message from Marie Matos, Patient Care Assistant sent at 5/23/2024 10:20 AM CDT -----  Type: Needs Medical Advice  Who Called:  leigh Escobar Call Back Number: [phone]    Additional Information: leigh is being discharged from hospital today and needs a sooner follow up with either Dr krishnan or primary doctor , please call to further discuss ,thank you .

## 2024-05-23 NOTE — TELEPHONE ENCOUNTER
----- Message from Marie Matos, Patient Care Assistant sent at 5/23/2024 10:20 AM CDT -----  Type: Needs Medical Advice  Who Called:  leigh Escobar Call Back Number: [phone]    Additional Information: leigh is being discharged from hospital today and needs a sooner follow up with either Dr krishnan or primary doctor , please call to further discuss ,thank you .

## 2024-05-31 ENCOUNTER — OFFICE VISIT (OUTPATIENT)
Dept: FAMILY MEDICINE | Facility: CLINIC | Age: 84
End: 2024-05-31
Payer: MEDICARE

## 2024-05-31 VITALS
WEIGHT: 207.25 LBS | HEART RATE: 80 BPM | RESPIRATION RATE: 18 BRPM | BODY MASS INDEX: 35.38 KG/M2 | OXYGEN SATURATION: 96 % | HEIGHT: 64 IN | DIASTOLIC BLOOD PRESSURE: 74 MMHG | SYSTOLIC BLOOD PRESSURE: 132 MMHG

## 2024-05-31 DIAGNOSIS — R10.9 ACUTE RIGHT FLANK PAIN: ICD-10-CM

## 2024-05-31 DIAGNOSIS — I10 HYPERTENSION, UNSPECIFIED TYPE: ICD-10-CM

## 2024-05-31 DIAGNOSIS — I27.20 PULMONARY HYPERTENSION, UNSPECIFIED: ICD-10-CM

## 2024-05-31 DIAGNOSIS — C64.1 CLEAR CELL CARCINOMA OF KIDNEY, RIGHT: Primary | ICD-10-CM

## 2024-05-31 DIAGNOSIS — C34.01 MALIGNANT NEOPLASM OF HILUS OF RIGHT LUNG: ICD-10-CM

## 2024-05-31 PROCEDURE — 3075F SYST BP GE 130 - 139MM HG: CPT | Mod: CPTII,S$GLB,, | Performed by: FAMILY MEDICINE

## 2024-05-31 PROCEDURE — 1159F MED LIST DOCD IN RCRD: CPT | Mod: CPTII,S$GLB,, | Performed by: FAMILY MEDICINE

## 2024-05-31 PROCEDURE — 1101F PT FALLS ASSESS-DOCD LE1/YR: CPT | Mod: CPTII,S$GLB,, | Performed by: FAMILY MEDICINE

## 2024-05-31 PROCEDURE — 99214 OFFICE O/P EST MOD 30 MIN: CPT | Mod: S$GLB,,, | Performed by: FAMILY MEDICINE

## 2024-05-31 PROCEDURE — 1160F RVW MEDS BY RX/DR IN RCRD: CPT | Mod: CPTII,S$GLB,, | Performed by: FAMILY MEDICINE

## 2024-05-31 PROCEDURE — 1125F AMNT PAIN NOTED PAIN PRSNT: CPT | Mod: CPTII,S$GLB,, | Performed by: FAMILY MEDICINE

## 2024-05-31 PROCEDURE — 99999 PR PBB SHADOW E&M-EST. PATIENT-LVL III: CPT | Mod: PBBFAC,,, | Performed by: FAMILY MEDICINE

## 2024-05-31 PROCEDURE — 3078F DIAST BP <80 MM HG: CPT | Mod: CPTII,S$GLB,, | Performed by: FAMILY MEDICINE

## 2024-05-31 PROCEDURE — 3288F FALL RISK ASSESSMENT DOCD: CPT | Mod: CPTII,S$GLB,, | Performed by: FAMILY MEDICINE

## 2024-05-31 NOTE — PROGRESS NOTES
Subjective:       Patient ID: Shandra Velez is a 83 y.o. female.    Chief Complaint: Hospital Follow Up ([facility]  Ablation on kidney)    Patient presents with:  Health follow up    [place] [facility]  Discharge Summary  Patient Name: Shandra Velez  MRN: [id number]  XIOMARA: [id number]  Patient Class: OP- Outpatient Recovery  Admission Date: 5/22/2024  Hospital Length of Stay: 0 days  Discharge Date and Time:  05/23/2024 12:01 PM  Attending Physician: Eric Garcia MD   Discharging Provider: Eric Garcia MD  Primary Care Provider: Akil Chavez MD  Primary Care Team: Networked reference to record PCT   HPI:   Shandra Velez is an 83yoF with h/o mulitple malignancies (follicular lymphoma, NSC lung cancer, and recent diagnosis renal clear cell carcinoma, followed by Dr. Mills), HTN, hypothyroid, ANISHA, and history of pulmonary embolus on eliquis who presented to [facility] on 5/22/24 for IR cryoablation of right renal mass. 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  Not a surgical candidate per urology.  Presented today for cryoablation of tumor by IR.  Reportedly a difficult intubation and asked to monitor overnight.  She is currently drowsy from anesthesia but wakes appropriately; on supplemental oxygen; c/o nausea and pain.     Procedure(s) (LRB):  cryoablation renal in CT with anesthesia argon DENIZ rep (N/A)    Hospital Course:   She was placed in observation overnight after right renal cryoablation.  She did well. She continues to require supplemental oxygen however this is near her baseline. She is in no distress. Pain tolerable. She is ambulating and tolerating diet. Will discharge home with home oxygen (she already has) and close outpatient follow-up with her oncologist.  Ok to resume eliquis tomorrow per IR.       Some residual pain in right flank.  Using Tylenol 600mg QID to manage this.    PEt scan and f/u with oncology are pending.    S/p  IR  embolization for right renal mass 9stage 1 clear cell).  Trying IB Guard OTC after she thought she had some IBS  Non-Hodgkins lymphoma, renal mass, lung cancer - following with oncology; in remission presently  S/p PE - taking Eliquis 2.5mg BID for life  HTN - tolerating nifedipine Xl 60mg daily; stopped HCTZ and lisinopril due to renal functions;   HLD - stopped Zocor 10mg daily  Allergies: taking Zyrtec 10mg daily  Insomnia - using restoril 30mg at bedtime  Hypothyroidism - taking levothyroxine 100mcg daily  Renal mass - following with urology    Past Medical History:    Hypertension                                                  Pulmonary nodule                                              Smoker                                                        Osteopenia                                                    Hypertension                                                  Hypercholesterolemia                                          Breast cancer                                                 Lumbar spondylosis                                            Past Surgical History:    MASTECTOMY                                       1985            Comment:right    CHOLECYSTECTOMY                                                APPENDECTOMY                                                   UMBILICAL HERNIA REPAIR                                        TUBAL LIGATION                                                 BREAST RECONSTRUCTION                                            Comment:right    Allergies:   -- No Known Drug Allergies     Social History    Marital Status:              Spouse Name:                       Years of Education:                 Number of children: 3             Occupational History  Occupation          Employer            Comment               retired marketing *                         Social History Main Topics    Smoking Status: Current Every Day Smoker        Packs/Day: 0.50   Years: 53         Types: Cigarettes    Smokeless Status: Never Used                        Alcohol Use: Yes             Drug Use: No              Sexual Activity: Not Currently        Other Topics            Concern    None on file    Social History Narrative    Lives alone      Hobbies: skyler      From [place]    Current Outpatient Medications on File Prior to Visit:  albuterol (ACCUNEB) 1.25 mg/3 mL Nebu, TAKE 3 MLS (1.25 MG TOTAL) BY NEBULIZATION 2 (TWO) TIMES A DAY. RESCUE, Disp: 280 mL, Rfl: 3  albuterol (VENTOLIN HFA) 90 mcg/actuation inhaler, Inhale 2 puffs into the lungs every 6 (six) hours as needed for Wheezing. Rescue, Disp: 50 g, Rfl: 0  albuterol-ipratropium (DUO-NEB) 2.5 mg-0.5 mg/3 mL nebulizer solution, Take 3 mLs by nebulization every 6 (six) hours as needed for Wheezing. Rescue, Disp: 75 mL, Rfl: 2  apixaban (ELIQUIS) 2.5 mg Tab, Take 1 tablet (2.5 mg total) by mouth 2 (two) times daily., Disp: 180 tablet, Rfl: 3  azelastine (ASTELIN) 137 mcg (0.1 %) nasal spray, 1 spray (137 mcg total) by Nasal route 2 (two) times daily., Disp: 30 mL, Rfl: 12  budesonide (PULMICORT) 0.5 mg/2 mL nebulizer solution, Take 2 mLs (0.5 mg total) by nebulization 2 (two) times a day. Controller, Disp: 120 mL, Rfl: 11  cetirizine (ZYRTEC) 10 MG tablet, Take 10 mg by mouth once daily., Disp: , Rfl:   cholecalciferol, vitamin D3, 1,250 mcg (50,000 unit) Tab, Take 1 tablet by mouth every 7 days., Disp: 12 tablet, Rfl: 6  fluticasone propionate (FLONASE) 50 mcg/actuation nasal spray, 1 spray (50 mcg total) by Each Nostril route 2 (two) times a day., Disp: 16 g, Rfl: 12  levothyroxine (SYNTHROID) 100 MCG tablet, Take 1 tablet (100 mcg total) by mouth once daily., Disp: 90 tablet, Rfl: 3  multivitamin (THERAGRAN) per tablet, Take 1 tablet by mouth once daily., Disp: , Rfl:   NIFEdipine (ADALAT CC) 60 MG TbSR, Take 1 tablet (60 mg total) by mouth once daily., Disp: 90 tablet, Rfl: 1  omeprazole (PRILOSEC) 40 MG capsule,  Take 1 capsule (40 mg total) by mouth once daily., Disp: 90 capsule, Rfl: 4  potassium chloride (MICRO-K) 10 MEQ CpSR, Take 2 capsules (20 mEq total) by mouth once daily., Disp: 180 capsule, Rfl: 3  sodium chloride 3% 3 % nebulizer solution, Take 4 mLs by nebulization 3 (three) times a week., Disp: 48 mL, Rfl: 3  temazepam (RESTORIL) 30 mg capsule, Take 1 capsule (30 mg total) by mouth every evening., Disp: 30 capsule, Rfl: 5  mucus clearing device (ACAPELLA, FLUTTER), by Misc.(Non-Drug; Combo Route) route once daily. (Patient not taking: Reported on 11/22/2023), Disp: 100 each, Rfl: 0  OXYGEN-AIR DELIVERY SYSTEMS MISC, by Misc.(Non-Drug; Combo Route) route., Disp: , Rfl:   [DISCONTINUED] amoxicillin-clavulanate 875-125mg (AUGMENTIN) 875-125 mg per tablet, Take 1 tablet by mouth every 12 (twelve) hours. (Patient not taking: Reported on 11/22/2023), Disp: 16 tablet, Rfl: 0    No current facility-administered medications on file prior to visit.      Review of patient's family history indicates:    Cancer                         Sister                      Comment: uterine    Diabetes                       Brother                       Fatigue  Associated symptoms include fatigue and nausea. Pertinent negatives include no arthralgias, chest pain, chills, coughing, fever, headaches, neck pain, numbness, rash, sore throat, vomiting or weakness.   Follow-up  Associated symptoms include fatigue and nausea. Pertinent negatives include no arthralgias, chest pain, chills, coughing, fever, headaches, neck pain, numbness, rash, sore throat, vomiting or weakness.   Hyperlipidemia  Pertinent negatives include no chest pain or shortness of breath.     Review of Systems   Constitutional:  Positive for appetite change and fatigue. Negative for chills, fever and unexpected weight change.   HENT:  Negative for sore throat and trouble swallowing.    Eyes:  Negative for pain and visual disturbance.   Respiratory:  Negative for cough,  "shortness of breath and wheezing.    Cardiovascular:  Negative for chest pain and palpitations.   Gastrointestinal:  Positive for nausea. Negative for abdominal distention, blood in stool, diarrhea and vomiting.   Genitourinary:  Positive for flank pain. Negative for difficulty urinating, dysuria and hematuria.   Musculoskeletal:  Negative for arthralgias, back pain, gait problem and neck pain.   Skin:  Negative for rash and wound.   Neurological:  Positive for tremors (left hand). Negative for dizziness, weakness, numbness and headaches.   Hematological:  Negative for adenopathy.   Psychiatric/Behavioral:  Negative for dysphoric mood.        Objective:       /74   Pulse 80   Resp 18   Ht 5' 4" (1.626 m)   Wt 94 kg (207 lb 3.7 oz)   SpO2 96%   BMI 35.57 kg/m²     Physical Exam  Constitutional:       Appearance: Normal appearance. She is well-developed.   HENT:      Head: Normocephalic.      Mouth/Throat:      Pharynx: No oropharyngeal exudate or posterior oropharyngeal erythema.   Eyes:      Conjunctiva/sclera: Conjunctivae normal.      Pupils: Pupils are equal, round, and reactive to light.   Neck:      Thyroid: No thyromegaly.   Cardiovascular:      Rate and Rhythm: Normal rate and regular rhythm.      Heart sounds: Normal heart sounds, S1 normal and S2 normal. No murmur heard.     No friction rub. No gallop.   Pulmonary:      Effort: Pulmonary effort is normal.      Breath sounds: Normal breath sounds. No wheezing or rales.   Abdominal:      General: Bowel sounds are normal. There is no distension.      Palpations: Abdomen is soft.      Tenderness: There is no abdominal tenderness.   Musculoskeletal:        Arms:       Cervical back: Normal range of motion and neck supple.      Lumbar back: No deformity or tenderness. Normal range of motion.      Right lower leg: No edema.      Left lower leg: No edema.   Lymphadenopathy:      Cervical: No cervical adenopathy.   Skin:     General: Skin is warm.      " Findings: No rash.   Neurological:      Mental Status: She is alert and oriented to person, place, and time.      Cranial Nerves: No cranial nerve deficit.      Motor: Tremor present.      Gait: Gait normal.         Results for orders placed or performed during the hospital encounter of 05/22/24   CBC Auto Differential   Result Value Ref Range    WBC 6.56 3.90 - 12.70 K/uL    RBC 3.41 (L) 4.00 - 5.40 M/uL    Hemoglobin 9.9 (L) 12.0 - 16.0 g/dL    Hematocrit 31.9 (L) 37.0 - 48.5 %    MCV 94 82 - 98 fL    MCH 29.0 27.0 - 31.0 pg    MCHC 31.0 (L) 32.0 - 36.0 g/dL    RDW 15.3 (H) 11.5 - 14.5 %    Platelets 78 (L) 150 - 450 K/uL    MPV 11.3 9.2 - 12.9 fL    Immature Granulocytes 0.6 (H) 0.0 - 0.5 %    Gran # (ANC) 5.0 1.8 - 7.7 K/uL    Immature Grans (Abs) 0.04 0.00 - 0.04 K/uL    Lymph # 0.7 (L) 1.0 - 4.8 K/uL    Mono # 0.7 0.3 - 1.0 K/uL    Eos # 0.1 0.0 - 0.5 K/uL    Baso # 0.01 0.00 - 0.20 K/uL    nRBC 0 0 /100 WBC    Gran % 76.5 (H) 38.0 - 73.0 %    Lymph % 10.1 (L) 18.0 - 48.0 %    Mono % 10.5 4.0 - 15.0 %    Eosinophil % 2.1 0.0 - 8.0 %    Basophil % 0.2 0.0 - 1.9 %    Differential Method Automated    Comprehensive Metabolic Panel   Result Value Ref Range    Sodium 138 136 - 145 mmol/L    Potassium 4.4 3.5 - 5.1 mmol/L    Chloride 108 95 - 110 mmol/L    CO2 27 22 - 31 mmol/L    Glucose 103 70 - 110 mg/dL    BUN 25 (H) 7 - 18 mg/dL    Creatinine 1.72 (H) 0.50 - 1.40 mg/dL    Calcium 7.6 (L) 8.4 - 10.2 mg/dL    Total Protein 5.6 (L) 6.0 - 8.4 g/dL    Albumin 3.3 (L) 3.5 - 5.2 g/dL    Total Bilirubin 0.5 0.2 - 1.3 mg/dL    Alkaline Phosphatase 72 38 - 145 U/L     (H) 14 - 36 U/L    ALT 50 (H) 0 - 35 U/L    Anion Gap 3 (L) 5 - 12 mmol/L    eGFR 30 (A) >60 mL/min/1.73 m^2     *Note: Due to a large number of results and/or encounters for the requested time period, some results have not been displayed. A complete set of results can be found in Results Review.       Hospital records reviewed    Assessment:       1.  Clear cell carcinoma of kidney, right    2. Acute right flank pain    3. Pulmonary hypertension, unspecified    4. Hypertension, unspecified type    5. 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  F/u with urology as planned        Visit today included increased complexity associated with the care of the episodic problems listed above addressed and managing the longitudinal care of the patient due to the serious and/or complex managed problem(s) listed above.

## 2024-06-18 ENCOUNTER — HOSPITAL ENCOUNTER (OUTPATIENT)
Dept: RADIOLOGY | Facility: HOSPITAL | Age: 84
Discharge: HOME | End: 2024-06-18
Attending: INTERNAL MEDICINE
Payer: MEDICARE

## 2024-06-18 ENCOUNTER — PATIENT MESSAGE (OUTPATIENT)
Dept: FAMILY MEDICINE | Facility: CLINIC | Age: 84
End: 2024-06-18
Payer: MEDICARE

## 2024-06-18 DIAGNOSIS — C82.38 GRADE 3A FOLLICULAR LYMPHOMA OF LYMPH NODES OF MULTIPLE REGIONS: ICD-10-CM

## 2024-06-18 LAB — GLUCOSE SERPL-MCNC: 116 MG/DL (ref 70–110)

## 2024-06-18 PROCEDURE — A9552 F18 FDG: HCPCS | Mod: PN | Performed by: INTERNAL MEDICINE

## 2024-06-18 PROCEDURE — 78815 PET IMAGE W/CT SKULL-THIGH: CPT | Mod: TC,PN

## 2024-06-18 PROCEDURE — 78815 PET IMAGE W/CT SKULL-THIGH: CPT | Mod: 26,PS,, | Performed by: RADIOLOGY

## 2024-06-18 RX ORDER — FLUDEOXYGLUCOSE F18 500 MCI/ML
12 INJECTION INTRAVENOUS
Status: COMPLETED | OUTPATIENT
Start: 2024-06-18 | End: 2024-06-18

## 2024-06-18 RX ADMIN — FLUDEOXYGLUCOSE F-18 11.3 MILLICURIE: 500 INJECTION INTRAVENOUS at 11:06

## 2024-06-18 NOTE — PROGRESS NOTES
PET Imaging Questionnaire    Are you a Diabetic? Recent Blood Sugar level? No    Are you anemic? Bone Marrow Stimulation Meds? Yes    Have you had a CT Scan, if so when & where was your last one? Yes -     Have you had a PET Scan, if so when & where was your last one? Yes -     Chemotherapy or currently on Chemotherapy? Yes    Radiation therapy? Yes    Surgical History:   Past Surgical History:   Procedure Laterality Date    ANGIOGRAPHY  4/29/2024    Procedure: Right Renal Angiogram;  Surgeon: Steve Weaver MD;  Location: [facility] CATH;  Service: Interventional Radiology;;    APPENDECTOMY      BONE MARROW BIOPSY Bilateral 06/24/2020    Procedure: Biopsy-bone marrow;  Surgeon: Rod Montero MD;  Location: [facility] OR;  Service: Oncology;  Laterality: Bilateral;    BREAST RECONSTRUCTION  1990    right    BRONCHOSCOPY N/A 01/18/2023    Procedure: BRONCHOSCOPY;  Surgeon: Gregorio Kinney MD;  Location: [facility] ENDO;  Service: Pulmonary;  Laterality: N/A;    BRONCHOSCOPY Bilateral 02/10/2023    Procedure: Bronchoscopy;  Surgeon: Gregorio Kinney MD;  Location: [facility];  Service: Pulmonary;  Laterality: Bilateral;  for airway clearance. Purple top tube for cell count diff    BRONCHOSCOPY N/A 03/13/2023    Procedure: Bronchoscopy;  Surgeon: Gregorio Kinney MD;  Location: [facility];  Service: Pulmonary;  Laterality: N/A;  therapeutic scope. Please have purple top tube and iced saline    CATARACT EXTRACTION W/  INTRAOCULAR LENS IMPLANT Bilateral     CHOLECYSTECTOMY      COLONOSCOPY N/A 3/25/2024    Procedure: COLONOSCOPY;  Surgeon: rBigido Calvillo Jr., MD;  Location: [facility];  Service: Endoscopy;  Laterality: N/A;    EMBOLIZATION  4/29/2024    Procedure: Rt. Renal Embolization;  Surgeon: Steve Weaver MD;  Location: [facility] CATH;  Service: Interventional Radiology;;    ENDOBRONCHIAL ULTRASOUND Bilateral 07/09/2021    Procedure: ENDOBRONCHIAL ULTRASOUND (EBUS);  Surgeon: Gregorio Kinney MD;  Location: [facility];  Service:  Pulmonary;  Laterality: Bilateral;  NEED LMA to  eval right upper paratracheal LN.     ENDOBRONCHIAL ULTRASOUND Bilateral 06/16/2022    Procedure: ENDOBRONCHIAL ULTRASOUND (EBUS);  Surgeon: Gregorio Kinney MD;  Location: [facility];  Service: Pulmonary;  Laterality: Bilateral;    INJECTION OF FACET JOINT Left 06/30/2022    Procedure: FACET JOINT Cyst Aspiration L3/4;  Surgeon: Ronnell Baeza MD;  Location: [facility] OR;  Service: Pain Management;  Laterality: Left;    INSERTION OF TUNNELED CENTRAL VENOUS CATHETER (CVC) WITH SUBCUTANEOUS PORT Left 11/04/2020    Procedure: [id number];  Surgeon: Kody Pretty MD;  Location: [facility] OR;  Service: General;  Laterality: Left;    JOINT REPLACEMENT  2008    right knee     LUMBAR LAMINECTOMY      LYMPH NODE BIOPSY      MASTECTOMY Right 1985    NEEDLE LOCALIZATION N/A 05/25/2020    Procedure: NEEDLE LOCALIZATION - lymph node bx;  Surgeon: Steve Weaver MD;  Location: [facility] CATH;  Service: Interventional Radiology;  Laterality: N/A;    NEEDLE LOCALIZATION N/A 5/22/2024    Procedure: cryoablation renal in CT with anesthesia argon IAM rep;  Surgeon: Steve Weaver MD;  Location: [facility] CATH;  Service: Interventional Radiology;  Laterality: N/A;  cryoablation renal in CT with anesthesia argon Iam rep    RECTAL BIOPSY N/A 3/27/2024    Procedure: BIOPSY, RECTUM;  Surgeon: Isabel Choi MD;  Location: [facility] OR;  Service: General;  Laterality: N/A;    RETINAL DETACHMENT SURGERY  1989    RIGHT HEART CATHETERIZATION Right 03/18/2023    Procedure: INSERTION, CATHETER, RIGHT HEART;  Surgeon: Rukhsana Lang MD;  Location: [facility] CATH;  Service: Cardiology;  Laterality: Right;    SELECTIVE UNILATERAL PULMONARY ANGIOGRAPHY  03/18/2023    Procedure: Pumonary angiography - unilateral;  Surgeon: Rukhsana Lang MD;  Location: [facility] CATH;  Service: Cardiology;;    THROMBECTOMY N/A 03/18/2023    Procedure: THROMBECTOMY;  Surgeon: Rukhsana Lang MD;  Location: [facility] CATH;  Service:  Cardiology;  Laterality: N/A;    TRANSFORAMINAL EPIDURAL INJECTION OF STEROID Left 05/14/2020    Procedure: Injection,steroid,epidural,transforaminal approach, l3/4;  Surgeon: Ronnell Baeza MD;  Location: [facility] OR;  Service: Pain Management;  Laterality: Left;    TRANSFORAMINAL EPIDURAL INJECTION OF STEROID Left 03/23/2022    Procedure: Injection,steroid,epidural,transforaminal approach L3/4;  Surgeon: Ronnell Baeza MD;  Location: [facility] OR;  Service: Pain Management;  Laterality: Left;    TRANSFORAMINAL EPIDURAL INJECTION OF STEROID Left 08/01/2022    Procedure: Injection,steroid,epidural,transforaminal approach L3/4;  Surgeon: Ronnell Baeza MD;  Location: [facility] OR;  Service: Pain Management;  Laterality: Left;    TUBAL LIGATION      UMBILICAL HERNIA REPAIR          Have you been fasting for at least 6 hours? Yes    Is there any chance you may be pregnant or breastfeeding? No    Assay: 11.87 MCi@:11:31   Injection Site:LT AC    Residual: .597 mCi@: 11:33   Technologist: Jurgen Mccartney Injected:11.3 mCi

## 2024-06-20 ENCOUNTER — LAB VISIT (OUTPATIENT)
Dept: LAB | Facility: HOSPITAL | Age: 84
End: 2024-06-20
Attending: INTERNAL MEDICINE
Payer: MEDICARE

## 2024-06-20 ENCOUNTER — OFFICE VISIT (OUTPATIENT)
Dept: FAMILY MEDICINE | Facility: CLINIC | Age: 84
End: 2024-06-20
Payer: MEDICARE

## 2024-06-20 ENCOUNTER — OFFICE VISIT (OUTPATIENT)
Dept: HEMATOLOGY/ONCOLOGY | Facility: CLINIC | Age: 84
End: 2024-06-20
Payer: MEDICARE

## 2024-06-20 ENCOUNTER — TELEPHONE (OUTPATIENT)
Dept: HEMATOLOGY/ONCOLOGY | Facility: CLINIC | Age: 84
End: 2024-06-20
Payer: MEDICARE

## 2024-06-20 VITALS
BODY MASS INDEX: 34.44 KG/M2 | HEIGHT: 64 IN | HEART RATE: 87 BPM | OXYGEN SATURATION: 95 % | TEMPERATURE: 97 F | DIASTOLIC BLOOD PRESSURE: 80 MMHG | WEIGHT: 201.75 LBS | SYSTOLIC BLOOD PRESSURE: 106 MMHG | RESPIRATION RATE: 20 BRPM

## 2024-06-20 VITALS
OXYGEN SATURATION: 95 % | RESPIRATION RATE: 18 BRPM | HEIGHT: 64 IN | HEART RATE: 89 BPM | DIASTOLIC BLOOD PRESSURE: 68 MMHG | WEIGHT: 201.75 LBS | SYSTOLIC BLOOD PRESSURE: 126 MMHG | BODY MASS INDEX: 34.44 KG/M2

## 2024-06-20 DIAGNOSIS — D50.9 IRON DEFICIENCY ANEMIA, UNSPECIFIED IRON DEFICIENCY ANEMIA TYPE: ICD-10-CM

## 2024-06-20 DIAGNOSIS — N12 PYELONEPHRITIS: Primary | ICD-10-CM

## 2024-06-20 DIAGNOSIS — R50.9 FEVER, UNSPECIFIED FEVER CAUSE: ICD-10-CM

## 2024-06-20 DIAGNOSIS — E03.9 ACQUIRED HYPOTHYROIDISM: ICD-10-CM

## 2024-06-20 DIAGNOSIS — C82.35 GRADE 3A FOLLICULAR LYMPHOMA OF LYMPH NODES OF INGUINAL REGION: ICD-10-CM

## 2024-06-20 DIAGNOSIS — K62.9 RECTAL LESION: ICD-10-CM

## 2024-06-20 DIAGNOSIS — I10 HYPERTENSION, UNSPECIFIED TYPE: ICD-10-CM

## 2024-06-20 DIAGNOSIS — C34.11 MALIGNANT NEOPLASM OF RIGHT UPPER LOBE OF LUNG: ICD-10-CM

## 2024-06-20 DIAGNOSIS — C64.9 RENAL CELL CARCINOMA, UNSPECIFIED LATERALITY: ICD-10-CM

## 2024-06-20 DIAGNOSIS — E78.5 HYPERLIPIDEMIA, UNSPECIFIED HYPERLIPIDEMIA TYPE: ICD-10-CM

## 2024-06-20 DIAGNOSIS — D68.59 THROMBOPHILIA: ICD-10-CM

## 2024-06-20 PROBLEM — E79.0 HYPERURICEMIA: Status: ACTIVE | Noted: 2024-06-20

## 2024-06-20 LAB
ALBUMIN SERPL BCP-MCNC: 2.8 G/DL (ref 3.5–5.2)
ALP SERPL-CCNC: 99 U/L (ref 55–135)
ALT SERPL W/O P-5'-P-CCNC: <5 U/L (ref 10–44)
ANION GAP SERPL CALC-SCNC: 14 MMOL/L (ref 8–16)
AST SERPL-CCNC: 13 U/L (ref 10–40)
BACTERIA #/AREA URNS HPF: ABNORMAL /HPF
BASOPHILS # BLD AUTO: 0.07 K/UL (ref 0–0.2)
BASOPHILS NFR BLD: 0.5 % (ref 0–1.9)
BILIRUB SERPL-MCNC: 0.3 MG/DL (ref 0.1–1)
BILIRUB UR QL STRIP: NEGATIVE
BUN SERPL-MCNC: 31 MG/DL (ref 8–23)
CALCIUM SERPL-MCNC: 9 MG/DL (ref 8.7–10.5)
CHLORIDE SERPL-SCNC: 106 MMOL/L (ref 95–110)
CLARITY UR: CLEAR
CO2 SERPL-SCNC: 17 MMOL/L (ref 23–29)
COLOR UR: YELLOW
CREAT SERPL-MCNC: 1.9 MG/DL (ref 0.5–1.4)
DIFFERENTIAL METHOD BLD: ABNORMAL
EOSINOPHIL # BLD AUTO: 0.1 K/UL (ref 0–0.5)
EOSINOPHIL NFR BLD: 1 % (ref 0–8)
ERYTHROCYTE [DISTWIDTH] IN BLOOD BY AUTOMATED COUNT: 15 % (ref 11.5–14.5)
EST. GFR  (NO RACE VARIABLE): 26.2 ML/MIN/1.73 M^2
GLUCOSE SERPL-MCNC: 116 MG/DL (ref 70–110)
GLUCOSE UR QL STRIP: NEGATIVE
HCT VFR BLD AUTO: 30 % (ref 37–48.5)
HGB BLD-MCNC: 9.5 G/DL (ref 12–16)
HGB UR QL STRIP: ABNORMAL
IMM GRANULOCYTES # BLD AUTO: 0.18 K/UL (ref 0–0.04)
IMM GRANULOCYTES NFR BLD AUTO: 1.3 % (ref 0–0.5)
KETONES UR QL STRIP: NEGATIVE
LDH SERPL L TO P-CCNC: 206 U/L (ref 110–260)
LEUKOCYTE ESTERASE UR QL STRIP: ABNORMAL
LYMPHOCYTES # BLD AUTO: 1.3 K/UL (ref 1–4.8)
LYMPHOCYTES NFR BLD: 9.2 % (ref 18–48)
MCH RBC QN AUTO: 28.2 PG (ref 27–31)
MCHC RBC AUTO-ENTMCNC: 31.7 G/DL (ref 32–36)
MCV RBC AUTO: 89 FL (ref 82–98)
MICROSCOPIC COMMENT: ABNORMAL
MONOCYTES # BLD AUTO: 0.9 K/UL (ref 0.3–1)
MONOCYTES NFR BLD: 6.9 % (ref 4–15)
NEUTROPHILS # BLD AUTO: 11 K/UL (ref 1.8–7.7)
NEUTROPHILS NFR BLD: 81.1 % (ref 38–73)
NITRITE UR QL STRIP: NEGATIVE
NON-SQ EPI CELLS #/AREA URNS HPF: 10 /HPF
NRBC BLD-RTO: 0 /100 WBC
PH UR STRIP: 6 [PH] (ref 5–8)
PLATELET # BLD AUTO: 310 K/UL (ref 150–450)
PMV BLD AUTO: 9.5 FL (ref 9.2–12.9)
POTASSIUM SERPL-SCNC: 3.8 MMOL/L (ref 3.5–5.1)
PROT SERPL-MCNC: 7 G/DL (ref 6–8.4)
PROT UR QL STRIP: NEGATIVE
RBC # BLD AUTO: 3.37 M/UL (ref 4–5.4)
RBC #/AREA URNS HPF: 10 /HPF (ref 0–4)
SODIUM SERPL-SCNC: 137 MMOL/L (ref 136–145)
SP GR UR STRIP: 1.01 (ref 1–1.03)
SQUAMOUS #/AREA URNS HPF: 10 /HPF
URATE SERPL-MCNC: 6.8 MG/DL (ref 2.4–5.7)
URN SPEC COLLECT METH UR: ABNORMAL
WBC # BLD AUTO: 13.59 K/UL (ref 3.9–12.7)
WBC #/AREA URNS HPF: 80 /HPF (ref 0–5)

## 2024-06-20 PROCEDURE — 1125F AMNT PAIN NOTED PAIN PRSNT: CPT | Mod: CPTII,S$GLB,, | Performed by: FAMILY MEDICINE

## 2024-06-20 PROCEDURE — 84550 ASSAY OF BLOOD/URIC ACID: CPT | Mod: PN | Performed by: INTERNAL MEDICINE

## 2024-06-20 PROCEDURE — 99215 OFFICE O/P EST HI 40 MIN: CPT | Mod: S$GLB,,, | Performed by: INTERNAL MEDICINE

## 2024-06-20 PROCEDURE — 1125F AMNT PAIN NOTED PAIN PRSNT: CPT | Mod: CPTII,S$GLB,, | Performed by: INTERNAL MEDICINE

## 2024-06-20 PROCEDURE — 99214 OFFICE O/P EST MOD 30 MIN: CPT | Mod: S$GLB,,, | Performed by: FAMILY MEDICINE

## 2024-06-20 PROCEDURE — 1160F RVW MEDS BY RX/DR IN RCRD: CPT | Mod: CPTII,S$GLB,, | Performed by: FAMILY MEDICINE

## 2024-06-20 PROCEDURE — 3288F FALL RISK ASSESSMENT DOCD: CPT | Mod: CPTII,S$GLB,, | Performed by: FAMILY MEDICINE

## 2024-06-20 PROCEDURE — 3079F DIAST BP 80-89 MM HG: CPT | Mod: CPTII,S$GLB,, | Performed by: INTERNAL MEDICINE

## 2024-06-20 PROCEDURE — 1101F PT FALLS ASSESS-DOCD LE1/YR: CPT | Mod: CPTII,S$GLB,, | Performed by: FAMILY MEDICINE

## 2024-06-20 PROCEDURE — 1159F MED LIST DOCD IN RCRD: CPT | Mod: CPTII,S$GLB,, | Performed by: FAMILY MEDICINE

## 2024-06-20 PROCEDURE — 85025 COMPLETE CBC W/AUTO DIFF WBC: CPT | Mod: PN | Performed by: INTERNAL MEDICINE

## 2024-06-20 PROCEDURE — 3074F SYST BP LT 130 MM HG: CPT | Mod: CPTII,S$GLB,, | Performed by: FAMILY MEDICINE

## 2024-06-20 PROCEDURE — 3288F FALL RISK ASSESSMENT DOCD: CPT | Mod: CPTII,S$GLB,, | Performed by: INTERNAL MEDICINE

## 2024-06-20 PROCEDURE — 3074F SYST BP LT 130 MM HG: CPT | Mod: CPTII,S$GLB,, | Performed by: INTERNAL MEDICINE

## 2024-06-20 PROCEDURE — 1101F PT FALLS ASSESS-DOCD LE1/YR: CPT | Mod: CPTII,S$GLB,, | Performed by: INTERNAL MEDICINE

## 2024-06-20 PROCEDURE — 83615 LACTATE (LD) (LDH) ENZYME: CPT | Mod: PN | Performed by: INTERNAL MEDICINE

## 2024-06-20 PROCEDURE — 99999 PR PBB SHADOW E&M-EST. PATIENT-LVL III: CPT | Mod: PBBFAC,,, | Performed by: FAMILY MEDICINE

## 2024-06-20 PROCEDURE — 3078F DIAST BP <80 MM HG: CPT | Mod: CPTII,S$GLB,, | Performed by: FAMILY MEDICINE

## 2024-06-20 PROCEDURE — 87086 URINE CULTURE/COLONY COUNT: CPT | Mod: 59 | Performed by: INTERNAL MEDICINE

## 2024-06-20 PROCEDURE — 99999 PR PBB SHADOW E&M-EST. PATIENT-LVL IV: CPT | Mod: PBBFAC,,, | Performed by: INTERNAL MEDICINE

## 2024-06-20 PROCEDURE — 81000 URINALYSIS NONAUTO W/SCOPE: CPT | Mod: PN | Performed by: INTERNAL MEDICINE

## 2024-06-20 PROCEDURE — 36415 COLL VENOUS BLD VENIPUNCTURE: CPT | Mod: PN | Performed by: INTERNAL MEDICINE

## 2024-06-20 PROCEDURE — 80053 COMPREHEN METABOLIC PANEL: CPT | Mod: PN | Performed by: INTERNAL MEDICINE

## 2024-06-20 NOTE — TELEPHONE ENCOUNTER
----- Message from Zoraida Marquez sent at 6/20/2024  2:14 PM CDT -----  Contact: self  Type:  Needs Medical Advice    Who Called:  Pt  Symptoms (please be specific):  Pt is awaiting a call from [facility] regarding her being admitted to a hosp per her conversation w/Dr. Mills at her visit today...   Best Call Back Number: [phone]  Please call to advise... Thank you...

## 2024-06-20 NOTE — PROGRESS NOTES
Subjective:       Patient ID: Shandra Velez is a 83 y.o. female.    Chief Complaint: Not feeling well since kidney procedure (May 22 was the procedure)    Patient presents with:  Not feeling well since kidney procedure: May 22 was the procedure    C/o feeling bad since procedure.  C/o low grade fever (Tmax 101), right flank pain, decreased appetite, nausea, vomiting, loose stools (3 -6 times a day), night sweats, chills.  Urine appears normal.    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  Trying IB Guard OTC after she thought she had some IBS  Non-Hodgkins lymphoma, renal mass, lung cancer - following with oncology; in remission presently  S/p PE - taking Eliquis 2.5mg BID for life  HTN - tolerating nifedipine Xl 60mg daily; stopped HCTZ and lisinopril due to renal functions;   HLD - stopped Zocor 10mg daily  Allergies: taking Zyrtec 10mg daily  Insomnia - using restoril 30mg at bedtime  Hypothyroidism - taking levothyroxine 100mcg daily  Renal mass - following with urology    Past Medical History:    Hypertension                                                  Pulmonary nodule                                              Smoker                                                        Osteopenia                                                    Hypertension                                                  Hypercholesterolemia                                          Breast cancer                                                 Lumbar spondylosis                                            Past Surgical History:    MASTECTOMY                                       1985            Comment:right    CHOLECYSTECTOMY                                                APPENDECTOMY                                                   UMBILICAL HERNIA REPAIR                                        TUBAL  LIGATION                                                 BREAST RECONSTRUCTION                                            Comment:right    Allergies:   -- No Known Drug Allergies     Social History    Marital Status:              Spouse Name:                       Years of Education:                 Number of children: 3             Occupational History  Occupation          Employer            Comment               retired marketing *                         Social History Main Topics    Smoking Status: Current Every Day Smoker        Packs/Day: 0.50  Years: 53         Types: Cigarettes    Smokeless Status: Never Used                        Alcohol Use: Yes             Drug Use: No              Sexual Activity: Not Currently        Other Topics            Concern    None on file    Social History Narrative    Lives alone      Hobbies: skyler      From [place]    Current Outpatient Medications on File Prior to Visit:  albuterol (ACCUNEB) 1.25 mg/3 mL Nebu, Take 1.25 mg by nebulization 2 (two) times a day., Disp: , Rfl:   albuterol (VENTOLIN HFA) 90 mcg/actuation inhaler, Inhale 2 puffs into the lungs every 6 (six) hours as needed for Wheezing or Shortness of Breath (or before exercise). Rescue, Disp: 18 g, Rfl: 2  apixaban (ELIQUIS) 2.5 mg Tab, Take 1 tablet (2.5 mg total) by mouth 2 (two) times daily., Disp: , Rfl:   budesonide (PULMICORT) 0.5 mg/2 mL nebulizer solution, Take 2 mLs (0.5 mg total) by nebulization 2 (two) times a day., Disp: 120 mL, Rfl: 11  cetirizine (ZYRTEC) 10 MG tablet, Take 10 mg by mouth once daily., Disp: , Rfl:   ergocalciferol (ERGOCALCIFEROL) 50,000 unit Cap, Take 50,000 Units by mouth every Tuesday., Disp: , Rfl:   levothyroxine (SYNTHROID) 100 MCG tablet, Take 1 tablet (100 mcg total) by mouth once daily., Disp: 90 tablet, Rfl: 3  mucus clearing device (ACAPELLA, FLUTTER), by Misc.(Non-Drug; Combo Route) route once daily., Disp: 100 each, Rfl: 0  multivitamin (THERAGRAN) per  tablet, Take 1 tablet by mouth once daily., Disp: , Rfl:   NIFEdipine (ADALAT CC) 60 MG TbSR, TAKE 1 TABLET BY MOUTH ONCE  DAILY, Disp: 90 tablet, Rfl: 3  omeprazole (PRILOSEC) 40 MG capsule, Take 1 capsule (40 mg total) by mouth once daily., Disp: 90 capsule, Rfl: 4  OXYGEN-AIR DELIVERY SYSTEMS MISC, 2-3 L by Misc.(Non-Drug; Combo Route) route as needed., Disp: , Rfl:   sodium chloride 3% 3 % nebulizer solution, Take 4 mLs by nebulization 2 (two) times a day., Disp: 240 mL, Rfl: 3  temazepam (RESTORIL) 30 mg capsule, Take 1 capsule (30 mg total) by mouth every evening., Disp: 30 capsule, Rfl: 5    No current facility-administered medications on file prior to visit.              Review of patient's family history indicates:    Cancer                         Sister                      Comment: uterine    Diabetes                       Brother                       Fatigue  Associated symptoms include fatigue, a fever and nausea. Pertinent negatives include no arthralgias, chest pain, chills, coughing, headaches, neck pain, numbness, rash, sore throat, vomiting or weakness.   Follow-up  Associated symptoms include fatigue, a fever and nausea. Pertinent negatives include no arthralgias, chest pain, chills, coughing, headaches, neck pain, numbness, rash, sore throat, vomiting or weakness.   Hyperlipidemia  Pertinent negatives include no chest pain or shortness of breath.     Review of Systems   Constitutional:  Positive for appetite change, fatigue and fever. Negative for chills and unexpected weight change.   HENT:  Negative for sore throat and trouble swallowing.    Eyes:  Negative for pain and visual disturbance.   Respiratory:  Negative for cough, shortness of breath and wheezing.    Cardiovascular:  Negative for chest pain and palpitations.   Gastrointestinal:  Positive for diarrhea and nausea. Negative for abdominal distention, blood in stool and vomiting.   Genitourinary:  Positive for flank pain. Negative for  "difficulty urinating, dysuria and hematuria.   Musculoskeletal:  Negative for arthralgias, back pain, gait problem and neck pain.   Skin:  Negative for rash and wound.   Neurological:  Positive for tremors (left hand). Negative for dizziness, weakness, numbness and headaches.   Hematological:  Negative for adenopathy.   Psychiatric/Behavioral:  Negative for dysphoric mood.        Objective:       /68   Pulse 89   Resp 18   Ht 5' 4" (1.626 m)   Wt 91.5 kg (201 lb 11.5 oz)   SpO2 95%   BMI 34.63 kg/m²     Physical Exam  Constitutional:       Appearance: Normal appearance. She is well-developed.   HENT:      Head: Normocephalic.      Mouth/Throat:      Pharynx: No oropharyngeal exudate or posterior oropharyngeal erythema.   Eyes:      Conjunctiva/sclera: Conjunctivae normal.      Pupils: Pupils are equal, round, and reactive to light.   Neck:      Thyroid: No thyromegaly.   Cardiovascular:      Rate and Rhythm: Normal rate and regular rhythm.      Heart sounds: Normal heart sounds, S1 normal and S2 normal. No murmur heard.     No friction rub. No gallop.   Pulmonary:      Effort: Pulmonary effort is normal.      Breath sounds: Normal breath sounds. No wheezing or rales.   Abdominal:      General: Bowel sounds are normal. There is no distension.      Palpations: Abdomen is soft.      Tenderness: There is no abdominal tenderness. There is right CVA tenderness. There is no guarding.   Musculoskeletal:        Arms:       Cervical back: Normal range of motion and neck supple.      Lumbar back: No deformity or tenderness. Normal range of motion.      Right lower leg: No edema.      Left lower leg: No edema.   Lymphadenopathy:      Cervical: No cervical adenopathy.   Skin:     General: Skin is warm.      Findings: No rash.   Neurological:      Mental Status: She is alert and oriented to person, place, and time.      Cranial Nerves: No cranial nerve deficit.      Motor: Tremor present.      Gait: Gait normal.       "   Results for orders placed or performed during the hospital encounter of 06/18/24   POCT glucose   Result Value Ref Range    POC Glucose 116 (A) 70 - 110 MG/DL     *Note: Due to a large number of results and/or encounters for the requested time period, some results have not been displayed. A complete set of results can be found in Results Review.     PET scan and today labs reviewed      Assessment:       1. Pyelonephritis    2. Hypertension, unspecified type                  Plan:       Pyelonephritis    Hypertension, unspecified type                    After conferring with Dr. Mills and reviewing today's labs, patient directed to ER for management of likely pyelonephritis  Continue other present meds        Visit today included increased complexity associated with the care of the episodic problems listed above addressed and managing the longitudinal care of the patient due to the serious and/or complex managed problem(s) listed above.

## 2024-06-20 NOTE — PROGRESS NOTES
PATIENT: Shandra Velez  MRN: [id number]  DATE: 6/20/2024      Diagnosis:   1. Pyelonephritis    2. Malignant neoplasm of right upper lobe of lung    3. Renal cell carcinoma, unspecified laterality    4. Rectal lesion    5. Thrombophilia    6. 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 Hyperlipidemia, unspecified hyperlipidemia type    8. Iron deficiency anemia, unspecified iron deficiency anemia type    9. Acquired hypothyroidism                Chief Complaint: Malignant neoplasm of right upper lobe of lung (1 month follow up)    Oncologic History:     Pt initially underwent right inguinal LN biopsy 5/25/22 showing grade 1 follicular lymphoma.  Bone marrow biopsy done 06/24/2020 showed involvement with follicular lymphoma.  PET/CT 7/07/20 showed lymphadenopathy above and below the diaphragm as well as the spleen.  Also seen wasa RUL lesion.  The patient was started on CVP-R followed by maintenance rituxan every 8 weeks.  Biopsy of the RUL nodule on 6/18/21 showed SCC with PD-L1 at 86%.  EBUS 7/09/21 showed no malignant cells at station 7, 4R or 11RS.  The patient was treated with SBRT under the care of Dr Barnes with 50Gy in 4 fractions completed 10/28/21.  The patient then developed new pulmonary nodules on CT scan 6/02/22.  EBUS 6/16/22 showed positive SCC in 4R LN.  Pt was discussed at tumor board on 6/21/22 with recommendation for Keytruda.  The patient underwent PET/CT on 9/23/22 after 2 doses of treatment showing a 1.5 cm precarinal lymph node; stable right apical mass measuring 1.8 x 1.5 cm; stable 8 mm right apical lung nodule with adjacent post radiation change measuring 2 x 2.7 cm; stable 4 mm subpleural nodule in the lateral right upper lobe; fluid/mucus in the right lower lobe bronchus tree; 6 mm anterior left upper lobe nodule which is new; 2.3 cm simple cyst in the midpole of the left kidney; 3.1 x 2.3 cm rim enhancing complex cystic and solid mass in the mid to lower pole the  right kidney; infrarenal abdominal aortic aneurysm measuring 3.8 x 3.5 cm; and stable a left para-aortic lymph node measuring 11 mm. 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   The patient underwent PET-CT on 11/29/2022 showing an irregular hypermetabolic right upper lobe tumor which is stable measuring 1.6 x 1.2 cm; hypermetabolic subpleural right upper lobe consolidation diminished in extent; unchanged linear zone of hypermetabolic atelectasis in the superior segment of the left lower lobe; new poorly metabolic ill-defined infiltrate in the posterior right lower lobe; 4 mm left upper lobe nodule which is stable; hypermetabolic precarinal mediastinal lymph node stable measuring 1 cm; and hypermetabolic aortopulmonary lymph node stable measuring 1.1 x 1.1 cm.   The patient was admitted to the hospital from 1/16/23 to 1/23/23 for pneumonia.  She underwent bronchoscopy with Dr Kinney on 1/18/23 showing significant mucus plug impaction int the pharynx, left mainstem bronchus and right and left lower lobes.  The patient was discharged on 3L O2.   The patient underwent PET-CT on 03/08/2023 showing thickening of the pleura; new ground-glass opacities within the left upper lobe; patchy airspace opacities in the right lower lobe; right lower lobe pulmonary artery hypermetabolic activity possibly secondary to bronchial wall thickening or thrombus in the pulmonary artery; diffuse hypermetabolic activity identified throughout the axial skeleton.  The patient was then admitted to the hospital on 03/18/23-4/3/23 after presenting with shortness of breath and being found to have a saddle pulmonary embolus on CTA 03/18/2023.  The patient underwent treatment with a heparin drip and thrombectomy on 03/18/2023 with eventual transition to Eliquis.   The patient underwent CT chest on 04/14/2023 showing areas of air trapping in subpleural bleb formation in  both jarek thoraces with areas of interstitial scarring and bibasilar bronchiectasis; stable solid nodule in left upper lobe measuring 6 mm; masslike area measuring 3 x 1.3 x 1.5 cm in the right upper lobe stable.   The patient underwent CT chest, abdomen, and pelvis on 07/10/2023 showing more consolidative appearance of previously described right apical pulmonary nodules without discretely measurable nodule on today's exam, 8 mm left apical nodule, stable consolidation and superior segment left lower lobe; 3 mm right lower lobe pulmonary nodule; heterogeneously enhancing mass in the inferior pole of the right kidney measuring 3.9 x 3.7 x 3.9 cm; unchanged left periaortic lymph nodes.   The patient underwent a PET-CT on 10/09/2023 showing a new oval-shaped hypermetabolic nodular mass measuring 1.6 x 1 cm in the right upper lobe consistent with tumor recurrence; disappearance of ground-glass opacities anterior left upper lobe; disappearance of subpleural posterior right lower lobe consolidation; new hypermetabolic bilateral inguinal lymph nodes; new rounded nodular hypermetabolic nodule within the posterior right lumbar subcutaneous fat measuring 1.5 x 1.5 cm and a 3.8cm abdominal aortic aneurysm.    The patient was discussed at Thoracic tumor board on 10/25/23 with recommendation to proceed with biopsy of the lesion in the back via IR.  Biopsy done on 11/03/23 showed fibroadipose tissue.   The patient underwent CT of the chest, abdomen, and pelvis on 12/12/2023 showing pleural parenchymal thickening in the right lung apex; 6 mm left upper lobe nodule stable; solid mass inferior right kidney measuring 4.3 x 4.3 cm; enlarged bilateral periaortic lymphadenopathy measuring 17 x 14 mm; enlarged bilateral inguinal hemipelvic lymphadenopathy with left hemipelvic lymph node measuring 4.4 x 1.8 cm and left inguinal lymph node measuring 3.5 x 1.7 cm.   The patient underwent PET-CT on 02/15/2024 showing a stable nodular focus  of hypermetabolic activity in the right apical soft tissue area of consolidation; new area of ground-glass opacity more anterior in the right lung apex measuring 2.6 x 2.2 cm; multiple metastatic periaortic and bilateral iliac lymph nodes with progression from prior with a new left para-aortic lymph node measuring 19 x 17 mm SUV max 6.5, left hemipelvic node measuring 4.1 x 1.5 cm with SUV max of 9.4 enlarged inguinal lymph node measuring 3 x 2.2 with SUV max of 9.2.      The patient underwent biopsy of left inguinal lymph node with path showing follicular lymphoma grade 3A.  Patient was discussed at  Tumor Board on 3/14/24 with recommendation for biopsy of the right renal lesion followed by potential ablation.   The patient was admitted to the hospital from 03/23/2024 until 03/28/2024 for acute on chronic anemia.  Hemoglobin on admission was 7.3 grams/deciliter.  The patient received 2 units of PRBC's on 03/24/2024.  Colonoscopy on 03/25/2024 showed a circumferential mass posterior to the anal canal found on perianal exam which looked ulcerated and arising from the anus; one 2-3 mm polyp in the mid rectum; and moderate colonic spasm consistent with irritable bowel syndrome.  Patient underwent rectal tissue biopsy on 03/27/2024 with path showing mucosal prolapse with ulceration and granulation tissue.  MRI rectum on 03/28/2024 showed the suspected perianal mass at the invasion to adjacent structures and enlarged pelvic lymph nodes.  The patient underwent biopsy of the right renal lesion showing clear cell carcinoma on pathology.   The patient saw Dr. Hansen 04/18/2024 whom felt the patient did not meet GELF criteria currently with plan on repeating PET-CT on 8 weeks and consideration of bendamustine and rituximab if lymph node size was increasing.  Patient underwent embolization to the right renal lesion on 04/29/2024.     Subjective:    Interval History: Ms. Velez is a 83 y.o. female with HLD, HTN, osteopenia,  follicular lymphoma who presents for follow up of NSCLC.  Since since the last clinic visit the patient underwent cryoablation to the right renal mass on 5/22/24.  PT underwent PET-CT on 06/18/2024 showing lesion in right upper lobe with soft tissue component measuring 1.5 x 1.6 cm increased in compared to prior; peripheral hypermetabolic activity in the right renal lesion with hyperdense material; hypermetabolic activity in the rectum; diffuse stable hypermetabolic activity in the retroperitoneum extending into the bilateral iliac regions greater on the left in the right.  The patient states she has had night sweats last 3 weeks as well as fever and right flank pain for the last week.  The patient endorses weakness, decreased appetite, and intermittent constipation and diarrhea.  The patient denies CP, cough, SOB, abdominal pain, nausea, vomiting.  The patient denies weight loss, new lumps or bumps, easy bruising or bleeding.    The patient endorses continued shortness of breath with exertion.  The patient denies CP, cough, abdominal pain, nausea, vomiting.  The patient denies weight loss, new lumps or bumps, easy bruising or bleeding.    Past Medical History:   Past Medical History:   Diagnosis Date    Breast cancer 1985    right mastectomy    Digestive disorder     Encounter for blood transfusion     History of pneumonia     Hypercholesterolemia     Hypertension     Indigestion     Lumbar spondylosis     Lung cancer     2022 - currently in treatment as of 7/27/22    Lymphoma     Osteopenia     Pulmonary nodule     Renal cancer     Renal disorder     renal cancer    Retinal detachment     Smoker 06/11/2022    Thyroid disease        Past Surgical HIstory:   Past Surgical History:   Procedure Laterality Date    ANGIOGRAPHY  4/29/2024    Procedure: Right Renal Angiogram;  Surgeon: Steve Weaver MD;  Location: [facility];  Service: Interventional Radiology;;    APPENDECTOMY      BONE MARROW BIOPSY  Bilateral 06/24/2020    Procedure: Biopsy-bone marrow;  Surgeon: Rod Montero MD;  Location: [facility] OR;  Service: Oncology;  Laterality: Bilateral;    BREAST RECONSTRUCTION  1990    right    BRONCHOSCOPY N/A 01/18/2023    Procedure: BRONCHOSCOPY;  Surgeon: Gregorio Kinney MD;  Location: [facility] ENDO;  Service: Pulmonary;  Laterality: N/A;    BRONCHOSCOPY Bilateral 02/10/2023    Procedure: Bronchoscopy;  Surgeon: Gregorio Kinney MD;  Location: [facility] ENDO;  Service: Pulmonary;  Laterality: Bilateral;  for airway clearance. Purple top tube for cell count diff    BRONCHOSCOPY N/A 03/13/2023    Procedure: Bronchoscopy;  Surgeon: Gregorio Kinney MD;  Location: [facility] ENDO;  Service: Pulmonary;  Laterality: N/A;  therapeutic scope. Please have purple top tube and iced saline    CATARACT EXTRACTION W/  INTRAOCULAR LENS IMPLANT Bilateral     CHOLECYSTECTOMY      COLONOSCOPY N/A 3/25/2024    Procedure: COLONOSCOPY;  Surgeon: Brigido Calvillo Jr., MD;  Location: [facility] ENDO;  Service: Endoscopy;  Laterality: N/A;    EMBOLIZATION  4/29/2024    Procedure: Rt. Renal Embolization;  Surgeon: Steve Weaver MD;  Location: [facility] CATH;  Service: Interventional Radiology;;    ENDOBRONCHIAL ULTRASOUND Bilateral 07/09/2021    Procedure: ENDOBRONCHIAL ULTRASOUND (EBUS);  Surgeon: Gregorio Kinney MD;  Location: [facility];  Service: Pulmonary;  Laterality: Bilateral;  NEED LMA to  eval right upper paratracheal LN.     ENDOBRONCHIAL ULTRASOUND Bilateral 06/16/2022    Procedure: ENDOBRONCHIAL ULTRASOUND (EBUS);  Surgeon: Gregorio Kinney MD;  Location: [facility];  Service: Pulmonary;  Laterality: Bilateral;    INJECTION OF FACET JOINT Left 06/30/2022    Procedure: FACET JOINT Cyst Aspiration L3/4;  Surgeon: Ronnell Baeza MD;  Location: [facility] OR;  Service: Pain Management;  Laterality: Left;    INSERTION OF TUNNELED CENTRAL VENOUS CATHETER (CVC) WITH SUBCUTANEOUS PORT Left 11/04/2020    Procedure: [id number];  Surgeon:  Kody Pretty MD;  Location: [facility] OR;  Service: General;  Laterality: Left;    JOINT REPLACEMENT  2008    right knee     LUMBAR LAMINECTOMY      LYMPH NODE BIOPSY      MASTECTOMY Right 1985    NEEDLE LOCALIZATION N/A 05/25/2020    Procedure: NEEDLE LOCALIZATION - lymph node bx;  Surgeon: Steve Weaver MD;  Location: [facility] CATH;  Service: Interventional Radiology;  Laterality: N/A;    NEEDLE LOCALIZATION N/A 5/22/2024    Procedure: cryoablation renal in CT with anesthesia argon IAM rep;  Surgeon: Steve Weaver MD;  Location: [facility] CATH;  Service: Interventional Radiology;  Laterality: N/A;  cryoablation renal in CT with anesthesia argon Iam rep    RECTAL BIOPSY N/A 3/27/2024    Procedure: BIOPSY, RECTUM;  Surgeon: Isabel Choi MD;  Location: [facility] OR;  Service: General;  Laterality: N/A;    RETINAL DETACHMENT SURGERY  1989    RIGHT HEART CATHETERIZATION Right 03/18/2023    Procedure: INSERTION, CATHETER, RIGHT HEART;  Surgeon: Rukhsana Lang MD;  Location: [facility] CATH;  Service: Cardiology;  Laterality: Right;    SELECTIVE UNILATERAL PULMONARY ANGIOGRAPHY  03/18/2023    Procedure: Pumonary angiography - unilateral;  Surgeon: Rukhsana Lang MD;  Location: [facility] CATH;  Service: Cardiology;;    THROMBECTOMY N/A 03/18/2023    Procedure: THROMBECTOMY;  Surgeon: Rukhsana Lang MD;  Location: [facility] CATH;  Service: Cardiology;  Laterality: N/A;    TRANSFORAMINAL EPIDURAL INJECTION OF STEROID Left 05/14/2020    Procedure: Injection,steroid,epidural,transforaminal approach, l3/4;  Surgeon: Ronnell Baeza MD;  Location: [facility] OR;  Service: Pain Management;  Laterality: Left;    TRANSFORAMINAL EPIDURAL INJECTION OF STEROID Left 03/23/2022    Procedure: Injection,steroid,epidural,transforaminal approach L3/4;  Surgeon: Ronnell Baeza MD;  Location: [facility] OR;  Service: Pain Management;  Laterality: Left;    TRANSFORAMINAL EPIDURAL INJECTION OF STEROID Left 08/01/2022    Procedure:  Injection,steroid,epidural,transforaminal approach L3/4;  Surgeon: Ronnell Baeza MD;  Location: [facility] OR;  Service: Pain Management;  Laterality: Left;    TUBAL LIGATION      UMBILICAL HERNIA REPAIR         Family History:   Family History   Problem Relation Name Age of Onset    Cancer Sister          uterine    Cancer Sister          Uterine cancer     Diabetes Brother      Breast cancer Other niece 42    Glaucoma Neg Hx      Macular degeneration Neg Hx         Social History:  reports that she quit smoking about 2 years ago. Her smoking use included cigarettes. She started smoking about 55 years ago. She has a 26.5 pack-year smoking history. She has never used smokeless tobacco. She reports that she does not drink alcohol and does not use drugs.    Allergies:  Review of patient's allergies indicates:   Allergen Reactions    Opioids - morphine analogues Other (See Comments)     Hypotension       Medications:  No current facility-administered medications for this visit.     Current Outpatient Medications   Medication Sig Dispense Refill    albuterol (ACCUNEB) 1.25 mg/3 mL Nebu Take 1.25 mg by nebulization 2 (two) times a day.      albuterol (VENTOLIN HFA) 90 mcg/actuation inhaler Inhale 2 puffs into the lungs every 6 (six) hours as needed for Wheezing or Shortness of Breath (or before exercise). Rescue 18 g 2    apixaban (ELIQUIS) 2.5 mg Tab Take 1 tablet (2.5 mg total) by mouth 2 (two) times daily.      budesonide (PULMICORT) 0.5 mg/2 mL nebulizer solution Take 2 mLs (0.5 mg total) by nebulization 2 (two) times a day. 120 mL 11    cetirizine (ZYRTEC) 10 MG tablet Take 10 mg by mouth once daily.      ergocalciferol (ERGOCALCIFEROL) 50,000 unit Cap Take 50,000 Units by mouth every Tuesday.      levothyroxine (SYNTHROID) 100 MCG tablet Take 1 tablet (100 mcg total) by mouth once daily. 90 tablet 3    mucus clearing device (ACAPELLA, FLUTTER) by Misc.(Non-Drug; Combo Route) route once daily. 100  "each 0    multivitamin (THERAGRAN) per tablet Take 1 tablet by mouth once daily.      NIFEdipine (ADALAT CC) 60 MG TbSR TAKE 1 TABLET BY MOUTH ONCE  DAILY 90 tablet 3    omeprazole (PRILOSEC) 40 MG capsule Take 1 capsule (40 mg total) by mouth once daily. 90 capsule 4    OXYGEN-AIR DELIVERY SYSTEMS MISC 2-3 L by Misc.(Non-Drug; Combo Route) route as needed.      sodium chloride 3% 3 % nebulizer solution Take 4 mLs by nebulization 2 (two) times a day. 240 mL 3    temazepam (RESTORIL) 30 mg capsule Take 1 capsule (30 mg total) by mouth every evening. 30 capsule 5       Review of Systems   Constitutional:  Positive for appetite change and fever. Negative for chills, diaphoresis, fatigue and unexpected weight change.   Eyes:  Negative for visual disturbance.   Respiratory:  Negative for cough and shortness of breath.    Cardiovascular:  Negative for chest pain and palpitations.   Gastrointestinal:  Positive for constipation and diarrhea. Negative for abdominal pain, nausea and vomiting.   Genitourinary:  Negative for frequency.   Musculoskeletal:  Negative for back pain.   Skin:  Negative for rash.   Neurological:  Positive for weakness. Negative for headaches.   Hematological:  Negative for adenopathy. Does not bruise/bleed easily.   Psychiatric/Behavioral:  The patient is not nervous/anxious.        ECOG Performance Status: 2   Objective:      Vitals:   Vitals:    06/20/24 1057   BP: 106/80   BP Location: Right arm   Patient Position: Sitting   BP Method: Large (Manual)   Pulse: 87   Resp: 20   Temp: 97 °F (36.1 °C)   TempSrc: Temporal   SpO2: 95%   Weight: 91.5 kg (201 lb 11.5 oz)   Height: 5' 4" (1.626 m)                   Physical Exam  Constitutional:       General: She is not in acute distress.     Appearance: She is well-developed. She is not diaphoretic.   HENT:      Head: Normocephalic and atraumatic.   Cardiovascular:      Rate and Rhythm: Normal rate and regular rhythm.      Heart sounds: Normal heart " sounds. No murmur heard.     No friction rub. No gallop.   Pulmonary:      Effort: Pulmonary effort is normal. No respiratory distress.      Breath sounds: Normal breath sounds. No wheezing or rales.   Chest:      Chest wall: No tenderness.   Abdominal:      General: Bowel sounds are normal. There is no distension.      Palpations: Abdomen is soft. There is no mass.      Tenderness: There is no abdominal tenderness. There is no guarding or rebound.   Musculoskeletal:      Comments: Right flank pain   Lymphadenopathy:      Cervical: No cervical adenopathy.      Upper Body:      Right upper body: No supraclavicular or axillary adenopathy.      Left upper body: No supraclavicular or axillary adenopathy.   Skin:     Findings: No erythema or rash.   Neurological:      Mental Status: She is alert and oriented to person, place, and time.   Psychiatric:         Behavior: Behavior normal.       Laboratory Data:  Admission on 06/20/2024   Component Date Value Ref Range Status    WBC 06/20/2024 10.29  3.90 - 12.70 K/uL Final    RBC 06/20/2024 3.24 (L)  4.00 - 5.40 M/uL Final    Hemoglobin 06/20/2024 9.1 (L)  12.0 - 16.0 g/dL Final    Hematocrit 06/20/2024 29.1 (L)  37.0 - 48.5 % Final    MCV 06/20/2024 90  82 - 98 fL Final    MCH 06/20/2024 28.1  27.0 - 31.0 pg Final    MCHC 06/20/2024 31.3 (L)  32.0 - 36.0 g/dL Final    RDW 06/20/2024 14.8 (H)  11.5 - 14.5 % Final    Platelets 06/20/2024 278  150 - 450 K/uL Final    MPV 06/20/2024 9.9  9.2 - 12.9 fL Final    Immature Granulocytes 06/20/2024 1.2 (H)  0.0 - 0.5 % Final    Gran # (ANC) 06/20/2024 8.6 (H)  1.8 - 7.7 K/uL Final    Immature Grans (Abs) 06/20/2024 0.12 (H)  0.00 - 0.04 K/uL Final    Comment: Mild elevation in immature granulocytes is non specific and   can be seen in a variety of conditions including stress response,   acute inflammation, trauma and pregnancy. Correlation with other   laboratory and clinical findings is essential.      Lymph #  06/20/2024 0.8 (L)  1.0 - 4.8 K/uL Final    Mono # 06/20/2024 0.7  0.3 - 1.0 K/uL Final    Eos # 06/20/2024 0.1  0.0 - 0.5 K/uL Final    Baso # 06/20/2024 0.03  0.00 - 0.20 K/uL Final    nRBC 06/20/2024 0  0 /100 WBC Final    Gran % 06/20/2024 83.5 (H)  38.0 - 73.0 % Final    Lymph % 06/20/2024 7.4 (L)  18.0 - 48.0 % Final    Mono % 06/20/2024 6.6  4.0 - 15.0 % Final    Eosinophil % 06/20/2024 1.0  0.0 - 8.0 % Final    Basophil % 06/20/2024 0.3  0.0 - 1.9 % Final    Differential Method 06/20/2024 Automated   Final    Sodium 06/20/2024 136  136 - 145 mmol/L Final    Potassium 06/20/2024 3.5  3.5 - 5.1 mmol/L Final    Anion Gap reference range revised on 4/28/2023    Chloride 06/20/2024 105  95 - 110 mmol/L Final    CO2 06/20/2024 18 (L)  22 - 31 mmol/L Final    Glucose 06/20/2024 125 (H)  70 - 110 mg/dL Final    Comment: The ADA recommends the following guidelines for fasting glucose:    Normal:       less than 100 mg/dL    Prediabetes:  100 mg/dL to 125 mg/dL    Diabetes:     126 mg/dL or higher      BUN 06/20/2024 33 (H)  7 - 18 mg/dL Final    Creatinine 06/20/2024 1.83 (H)  0.50 - 1.40 mg/dL Final    Calcium 06/20/2024 8.5  8.4 - 10.2 mg/dL Final    Total Protein 06/20/2024 7.2  6.0 - 8.4 g/dL Final    Albumin 06/20/2024 3.8  3.5 - 5.2 g/dL Final    Total Bilirubin 06/20/2024 0.4  0.2 - 1.3 mg/dL Final    Alkaline Phosphatase 06/20/2024 112  38 - 145 U/L Final    AST 06/20/2024 22  14 - 36 U/L Final    ALT 06/20/2024 13  0 - 35 U/L Final    Anion Gap 06/20/2024 13 (H)  5 - 12 mmol/L Final    Anion Gap reference range revised on 4/28/2023    eGFR 06/20/2024 27 (A)  >60 mL/min/1.73 m^2 Final    Specimen UA 06/20/2024 Urine, Clean Catch   Final    Color, UA 06/20/2024 Yellow  Yellow, Straw, Caty Final    Appearance, UA 06/20/2024 Hazy (A)  Clear Final    pH, UA 06/20/2024 5.5  5.0 - 8.0 Final    Specific [place], UA 06/20/2024 1.015  1.005 - 1.030 Final    Protein, UA 06/20/2024 Trace  (A)  Negative Final    Comment: Recommend a 24 hour urine protein or a urine   protein/creatinine ratio if globulin induced proteinuria is  clinically suspected.      Glucose, UA 06/20/2024 Negative  Negative Final    Ketones, UA 06/20/2024 Negative  Negative Final    Bilirubin (UA) 06/20/2024 Negative  Negative Final    Occult Blood UA 06/20/2024 1+ (A)  Negative Final    Nitrite, UA 06/20/2024 Negative  Negative Final    Urobilinogen, UA 06/20/2024 0.2  <2.0 EU/dL Final    Leukocytes, UA 06/20/2024 3+ (A)  Negative Final    RBC, UA 06/20/2024 5 (H)  0 - 4 /hpf Final    WBC, UA 06/20/2024 61 (H)  0 - 5 /hpf Final    Bacteria 06/20/2024 Many (A)  Negative /hpf Final    Squam Epithel, UA 06/20/2024 22  /hpf Final    Hyaline Casts, UA 06/20/2024 3 (A)  0 - 1 /lpf Final    Microscopic Comment 06/20/2024 SEE COMMENT   Final    Comment: Other formed elements not mentioned in the report are not   present in the microscopic examination.       Lactate (Lactic Acid) 06/20/2024 1.4  0.5 - 2.2 mmol/L Final    Lactate (Lactic Acid) 06/20/2024 1.1  0.5 - 2.2 mmol/L Final    Specimen UA 06/20/2024 Urine, Clean Catch   Final    Color, UA 06/20/2024 Yellow  Yellow, Straw, Caty Final    Appearance, UA 06/20/2024 Clear  Clear Final    pH, UA 06/20/2024 6.0  5.0 - 8.0 Final    Specific [place], UA 06/20/2024 1.015  1.005 - 1.030 Final    Protein, UA 06/20/2024 Trace (A)  Negative Final    Comment: Recommend a 24 hour urine protein or a urine   protein/creatinine ratio if globulin induced proteinuria is  clinically suspected.      Glucose, UA 06/20/2024 Negative  Negative Final    Ketones, UA 06/20/2024 Negative  Negative Final    Bilirubin (UA) 06/20/2024 Negative  Negative Final    Occult Blood UA 06/20/2024 Negative  Negative Final    Nitrite, UA 06/20/2024 Negative  Negative Final    Urobilinogen, UA 06/20/2024 0.2  <2.0 EU/dL Final    Leukocytes, UA 06/20/2024 Negative  Negative Final   Lab Visit on  06/20/2024   Component Date Value Ref Range Status    WBC 06/20/2024 13.59 (H)  3.90 - 12.70 K/uL Final    RBC 06/20/2024 3.37 (L)  4.00 - 5.40 M/uL Final    Hemoglobin 06/20/2024 9.5 (L)  12.0 - 16.0 g/dL Final    Hematocrit 06/20/2024 30.0 (L)  37.0 - 48.5 % Final    MCV 06/20/2024 89  82 - 98 fL Final    MCH 06/20/2024 28.2  27.0 - 31.0 pg Final    MCHC 06/20/2024 31.7 (L)  32.0 - 36.0 g/dL Final    RDW 06/20/2024 15.0 (H)  11.5 - 14.5 % Final    Platelets 06/20/2024 310  150 - 450 K/uL Final    MPV 06/20/2024 9.5  9.2 - 12.9 fL Final    Immature Granulocytes 06/20/2024 1.3 (H)  0.0 - 0.5 % Final    Gran # (ANC) 06/20/2024 11.0 (H)  1.8 - 7.7 K/uL Final    Immature Grans (Abs) 06/20/2024 0.18 (H)  0.00 - 0.04 K/uL Final    Comment: Mild elevation in immature granulocytes is non specific and   can be seen in a variety of conditions including stress response,   acute inflammation, trauma and pregnancy. Correlation with other   laboratory and clinical findings is essential.      Lymph # 06/20/2024 1.3  1.0 - 4.8 K/uL Final    Mono # 06/20/2024 0.9  0.3 - 1.0 K/uL Final    Eos # 06/20/2024 0.1  0.0 - 0.5 K/uL Final    Baso # 06/20/2024 0.07  0.00 - 0.20 K/uL Final    nRBC 06/20/2024 0  0 /100 WBC Final    Gran % 06/20/2024 81.1 (H)  38.0 - 73.0 % Final    Lymph % 06/20/2024 9.2 (L)  18.0 - 48.0 % Final    Mono % 06/20/2024 6.9  4.0 - 15.0 % Final    Eosinophil % 06/20/2024 1.0  0.0 - 8.0 % Final    Basophil % 06/20/2024 0.5  0.0 - 1.9 % Final    Differential Method 06/20/2024 Automated   Final    Sodium 06/20/2024 137  136 - 145 mmol/L Final    Potassium 06/20/2024 3.8  3.5 - 5.1 mmol/L Final    Chloride 06/20/2024 106  95 - 110 mmol/L Final    CO2 06/20/2024 17 (L)  23 - 29 mmol/L Final    Glucose 06/20/2024 116 (H)  70 - 110 mg/dL Final    BUN 06/20/2024 31 (H)  8 - 23 mg/dL Final    Creatinine 06/20/2024 1.9 (H)  0.5 - 1.4 mg/dL Final    Calcium 06/20/2024 9.0  8.7 - 10.5 mg/dL  Final    Total Protein 06/20/2024 7.0  6.0 - 8.4 g/dL Final    Albumin 06/20/2024 2.8 (L)  3.5 - 5.2 g/dL Final    Total Bilirubin 06/20/2024 0.3  0.1 - 1.0 mg/dL Final    Comment: For infants and newborns, interpretation of results should be based  on gestational age, weight and in agreement with clinical  observations.    Premature Infant recommended reference ranges:  Up to 24 hours.............<8.0 mg/dL  Up to 48 hours............<12.0 mg/dL  3-5 days..................<15.0 mg/dL  6-29 days.................<15.0 mg/dL      Alkaline Phosphatase 06/20/2024 99  55 - 135 U/L Final    AST 06/20/2024 13  10 - 40 U/L Final    ALT 06/20/2024 <5 (L)  10 - 44 U/L Final    eGFR 06/20/2024 26.2 (A)  >60 mL/min/1.73 m^2 Final    Anion Gap 06/20/2024 14  8 - 16 mmol/L Final    LD 06/20/2024 206  110 - 260 U/L Final    Results are increased in hemolyzed samples.    Uric Acid 06/20/2024 6.8 (H)  2.4 - 5.7 mg/dL Final    Specimen UA 06/20/2024 Urine, Clean Catch   Final    Color, UA 06/20/2024 Yellow  Yellow, Straw, Caty Final    Appearance, UA 06/20/2024 Clear  Clear Final    pH, UA 06/20/2024 6.0  5.0 - 8.0 Final    Specific Gravity, UA 06/20/2024 1.010  1.005 - 1.030 Final    Protein, UA 06/20/2024 Negative  Negative Final    Comment: Recommend a 24 hour urine protein or a urine   protein/creatinine ratio if globulin induced proteinuria is  clinically suspected.      Glucose, UA 06/20/2024 Negative  Negative Final    Ketones, UA 06/20/2024 Negative  Negative Final    Bilirubin (UA) 06/20/2024 Negative  Negative Final    Occult Blood UA 06/20/2024 1+ (A)  Negative Final    Nitrite, UA 06/20/2024 Negative  Negative Final    Leukocytes, UA 06/20/2024 2+ (A)  Negative Final    RBC, UA 06/20/2024 10 (H)  0 - 4 /hpf Final    WBC, UA 06/20/2024 80 (H)  0 - 5 /hpf Final    Bacteria 06/20/2024 Many (A)  None-Occ /hpf Final    Squam Epithel, UA 06/20/2024 10  /hpf Final    Non-Squam Epith 06/20/2024 10 (A)   <1/hpf /hpf Final    10 Transitional Epithelial cells    Microscopic Comment 06/20/2024 SEE COMMENT   Final    Comment: Other formed elements not mentioned in the report are not   present in the microscopic examination.      Hospital Outpatient Visit on 06/18/2024   Component Date Value Ref Range Status    POC Glucose 06/18/2024 116 (A)  70 - 110 MG/DL Final         Imaging:    PET/CT 6/18/24  Head and Neck:     Brain demonstrates normal metabolism. However, FDG-PET has an approximate 60% sensitivity for brain metastases which are best detected by MRI with gadolinium. Physiologic uptake is in the neck.     Chest:     Within the right upper lobe adjacent to the right lung apex hypermetabolic lesions identified with maximum SUV of 6.7. This best identified on image number 70 the soft tissue component is best identified on image number 73 and measures 1.5 by 1.6 cm with superimposed pulmonary fibrosis. This is increased in size when compared to the prior examination as well as degree of FDG avidity with a prior maximum SUV of 6.0. No definitive mediastinal hilar adenopathy. Normal physiologic activity identified throughout the mediastinum.     Abdomen and Pelvis:     Normal physiologic activity in the liver. Post treatment effect is identified of the right kidney from prior ablation peripheral hypermetabolic activity identified and may be post treatment effect given recent ablation dated 05/22/2024 hyperdense material is identified consistent with known a lipiodol. Hypermetabolic activity identified in the rectum with maximum SUV of 5.0.. Diffuse hypermetabolic activity identified with of the retroperitoneum extending into the bilateral iliac regions greater on left than right as well as inguinal regions consistent with known lymphoma. Hypermetabolic activity of the left inguinal lymph nodes has a maximum SUV of 7.2. Overall size is not significantly decreased, however, FDG avidity is decreased from previous maximum  SUV of 9.4. . Adrenal glands are normal. Abdominal aortic aneurysm is noted.     Musculoskeletal:     No FDG avid osseous lesions are present.        Assessment:       1. Pyelonephritis    2. Malignant neoplasm of right upper lobe of lung    3. Renal cell carcinoma, unspecified laterality    4. Rectal lesion    5. Thrombophilia    6. 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 Hyperlipidemia, unspecified hyperlipidemia type    8. Iron deficiency anemia, unspecified iron deficiency anemia type    9. 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  -Pt with prior radiation to a RUL nodule  -PD-L1 was 86% on 6/18/21  -Pt was on Keytruda with 6 week cycles and completed 6 tx's and then developed pneumonitis  -PET/CT on 9/23/22 showed possible pseudoprogression  -PET/CT on 11/29/22 showed stable disease with RLL infiltrate concerning for resolving PNA  -PET-CT on 03/08/2023 showed resolution of prior disease   -CT Chest 4/14/23 and 7/10/23 with stable findings  -PET/CT 10/09/23 showed avidity in the RUL mass, inguinal LAD and subcutaneous nodule in the lumbar region  -Treatment with paclitaxel discussed; however, pt is hesitant to undergo chemotherapy  -Biopsy of lumbar subcutaneous nodule showed fibroadipose tissue  -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  -Ct chest 12/12/23 stable  -PET/CT 2/15/24 showed continued nodular focus of hypermetabolic activity in the right apex with a new adjacent ground-glass area  -No  clear evidence of progression on scans  -Repeat PET/CT on 06/18/2024 shows enlarging soft tissue component of right upper lobe nodule  Patient will need outpatient IR biopsy with consideration of SBRT if lesion positive    Renal Cell Carcinoma - 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 based on biopsy 3/28/24  -Pt not a surgical candidate per Urology  -Patient underwent embolization 04/29/2024 and cryoablation on 05/22/2024  -Will monitor    Rectal Lesion - seen on colonoscopy 03/25/2024   Rectal tissue biopsy on 03/27/2024 with path showing mucosal prolapse with ulceration and granulation tissue  -MRI rectum on 03/28/2024 showed the suspected perianal mass at the invasion to adjacent structures and enlarged pelvic lymph nodes  -LAD likely form follicular lymphoma  -Continued avidity seen on PET-CT 06/18/2024    Saddle Pulmonary Embolus - seen on CTA 03/18/2023   -PT on prophylactic ELiquis 2.5mg BID given prior GI bleed    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 for 9/12 cycles with good response  -PT with recurrent inguinal LAD which could be lymphoma  -LN's increasing in size on CT 12/12/23  -PET-CT on 02/15/2024 showed continued increasing size of left inguinal lymph node with SUV max now 9.2  -Left inguinal LN biopsy 3/08/24 showed Follicular Lymphoma Grade 3a  -Pt saw Dr Hansen on 4/18/24 whom recommended repeating scans in 8 weeks with consideration of bendamustine and rituximab if patient progresses  -repeat PET-CT 06/18/2024 showed stable hypermetabolic activity in the retroperitoneum, bilateral iliac regions and inguinal regions    Hypothyroidism - pt on levothyroxine  -Management per PCP    Iron Deficiency Anemia - Hemoglobin improved to 12.8g/dL on 12/12/23  -Ferritin 316ng/mL 12/12/23  -Ferritin was 22ng/mL on 9/05/23  -Pt received 4 doses of venofer  -PT also received 2 units of PRBC's during recent hospital stay  -Hemoglobin 9.1g/dL on  6/20/24  -iron studies show normal ferritin and TIBC 05/06/2024 suggesting chronic disease  -Will monitor    Route Chart for Scheduling    Med Onc Chart Routing      Follow up with physician Other. PT needs a CBC, CMP, LDH, Uric acid, Urinalysis.  Her follow up will be based on the results.   Follow up with DAYAMI    Infusion scheduling note    Injection scheduling note    Labs    Imaging    Pharmacy appointment    Other referrals          Treatment Plan Information   OP PEMBROLIZUMAB 400MG Q6W   Chad Mills MD   Upcoming Treatment Dates - OP PEMBROLIZUMAB 400MG Q6W    5/12/2023       Pre-Medications       acetaminophen tablet 1,000 mg       diphenhydrAMINE (BENADRYL) 25 mg in NS 50 mL IVPB       Chemotherapy       pembrolizumab (KEYTRUDA) 400 mg in sodium chloride 0.9% SolP 116 mL infusion  6/23/2023       Pre-Medications       acetaminophen tablet 1,000 mg       diphenhydrAMINE (BENADRYL) 25 mg in NS 50 mL IVPB       Chemotherapy       pembrolizumab (KEYTRUDA) 400 mg in sodium chloride 0.9% SolP 116 mL infusion  8/4/2023       Pre-Medications       acetaminophen tablet 1,000 mg       diphenhydrAMINE (BENADRYL) 25 mg in NS 50 mL IVPB       Chemotherapy       pembrolizumab (KEYTRUDA) 400 mg in sodium chloride 0.9% SolP 116 mL infusion  9/15/2023       Pre-Medications       acetaminophen tablet 1,000 mg       diphenhydrAMINE (BENADRYL) 25 mg in NS 50 mL IVPB       Chemotherapy       pembrolizumab (KEYTRUDA) 400 mg in sodium chloride 0.9% SolP 116 mL infusion    Supportive Plan Information  OP IRON SUCROSE IVPB TWICE WEEKLY   Chad Mills MD   Upcoming Treatment Dates - OP IRON SUCROSE IVPB TWICE WEEKLY    No upcoming days in selected categories.    Therapy Plan Information  PORT FLUSH  Flushes  heparin, porcine (PF) 100 unit/mL injection flush 500 Units  500 Units, Intravenous, Every visit  sodium chloride 0.9% flush 10 mL  10 mL, Intravenous, Every visit    INF PEGFILGRASTIM (NEULASTA)  pegfilgrastim  (NEULASTA) injection 6 mg  6 mg, Subcutaneous, Every visit        Chad Mills MD  [facility]  Hematology and Oncology  [facility]   [address] [place] [address]   O: [phone]  F: [phone]

## 2024-06-21 PROBLEM — C82.90 NHL (NODULAR HISTIOCYTIC LYMPHOMA): Status: ACTIVE | Noted: 2024-06-21

## 2024-06-21 PROBLEM — D68.59 THROMBOPHILIA: Status: ACTIVE | Noted: 2024-06-21

## 2024-06-21 PROBLEM — N13.30 HYDRONEPHROSIS OF RIGHT KIDNEY: Status: ACTIVE | Noted: 2024-06-21

## 2024-06-21 PROBLEM — C64.1 CLEAR CELL CARCINOMA OF RIGHT KIDNEY: Status: ACTIVE | Noted: 2024-06-21

## 2024-06-21 LAB
BACTERIA UR CULT: NORMAL
BACTERIA UR CULT: NORMAL

## 2024-06-22 PROBLEM — R50.9 FEVER: Status: ACTIVE | Noted: 2024-06-22

## 2024-06-23 PROBLEM — Z75.8 DISCHARGE PLANNING ISSUES: Status: ACTIVE | Noted: 2024-06-23

## 2024-06-24 PROBLEM — K92.2 LOWER GI BLEED: Status: RESOLVED | Noted: 2024-03-24 | Resolved: 2024-06-24

## 2024-06-24 PROBLEM — Z73.6 LIMITATION OF ACTIVITIES DUE TO DISABILITY: Status: ACTIVE | Noted: 2024-06-24

## 2024-06-25 PROBLEM — K92.1 BLOOD IN STOOL: Status: ACTIVE | Noted: 2024-06-25

## 2024-06-26 PROBLEM — R06.00 DYSPNEA: Status: RESOLVED | Noted: 2023-01-16 | Resolved: 2024-06-26

## 2024-07-03 ENCOUNTER — LAB VISIT (OUTPATIENT)
Dept: LAB | Facility: HOSPITAL | Age: 84
End: 2024-07-03
Attending: NURSE PRACTITIONER
Payer: MEDICARE

## 2024-07-03 ENCOUNTER — OFFICE VISIT (OUTPATIENT)
Dept: FAMILY MEDICINE | Facility: CLINIC | Age: 84
End: 2024-07-03
Payer: MEDICARE

## 2024-07-03 VITALS
WEIGHT: 199.31 LBS | OXYGEN SATURATION: 97 % | HEIGHT: 64 IN | TEMPERATURE: 98 F | SYSTOLIC BLOOD PRESSURE: 132 MMHG | HEART RATE: 83 BPM | BODY MASS INDEX: 34.03 KG/M2 | DIASTOLIC BLOOD PRESSURE: 72 MMHG

## 2024-07-03 DIAGNOSIS — D64.9 ANEMIA, UNSPECIFIED TYPE: ICD-10-CM

## 2024-07-03 DIAGNOSIS — N13.30 HYDRONEPHROSIS OF RIGHT KIDNEY: ICD-10-CM

## 2024-07-03 DIAGNOSIS — N17.9 AKI (ACUTE KIDNEY INJURY): ICD-10-CM

## 2024-07-03 DIAGNOSIS — N18.32 CHRONIC KIDNEY DISEASE, STAGE 3B: ICD-10-CM

## 2024-07-03 DIAGNOSIS — Z09 HOSPITAL DISCHARGE FOLLOW-UP: Primary | ICD-10-CM

## 2024-07-03 LAB
ALBUMIN SERPL BCP-MCNC: 3 G/DL (ref 3.5–5.2)
ALP SERPL-CCNC: 87 U/L (ref 55–135)
ALT SERPL W/O P-5'-P-CCNC: 8 U/L (ref 10–44)
ANION GAP SERPL CALC-SCNC: 12 MMOL/L (ref 8–16)
AST SERPL-CCNC: 21 U/L (ref 10–40)
BASOPHILS # BLD AUTO: 0.06 K/UL (ref 0–0.2)
BASOPHILS NFR BLD: 0.7 % (ref 0–1.9)
BILIRUB SERPL-MCNC: 0.2 MG/DL (ref 0.1–1)
BUN SERPL-MCNC: 29 MG/DL (ref 8–23)
CALCIUM SERPL-MCNC: 8.4 MG/DL (ref 8.7–10.5)
CHLORIDE SERPL-SCNC: 105 MMOL/L (ref 95–110)
CO2 SERPL-SCNC: 19 MMOL/L (ref 23–29)
CREAT SERPL-MCNC: 1.4 MG/DL (ref 0.5–1.4)
DIFFERENTIAL METHOD BLD: ABNORMAL
EOSINOPHIL # BLD AUTO: 0.2 K/UL (ref 0–0.5)
EOSINOPHIL NFR BLD: 2.1 % (ref 0–8)
ERYTHROCYTE [DISTWIDTH] IN BLOOD BY AUTOMATED COUNT: 16.1 % (ref 11.5–14.5)
EST. GFR  (NO RACE VARIABLE): 37.8 ML/MIN/1.73 M^2
GLUCOSE SERPL-MCNC: 115 MG/DL (ref 70–110)
HCT VFR BLD AUTO: 31.4 % (ref 37–48.5)
HGB BLD-MCNC: 9.6 G/DL (ref 12–16)
IMM GRANULOCYTES # BLD AUTO: 0.15 K/UL (ref 0–0.04)
IMM GRANULOCYTES NFR BLD AUTO: 1.9 % (ref 0–0.5)
LYMPHOCYTES # BLD AUTO: 1.1 K/UL (ref 1–4.8)
LYMPHOCYTES NFR BLD: 13.6 % (ref 18–48)
MCH RBC QN AUTO: 28.2 PG (ref 27–31)
MCHC RBC AUTO-ENTMCNC: 30.6 G/DL (ref 32–36)
MCV RBC AUTO: 92 FL (ref 82–98)
MONOCYTES # BLD AUTO: 0.7 K/UL (ref 0.3–1)
MONOCYTES NFR BLD: 9 % (ref 4–15)
NEUTROPHILS # BLD AUTO: 5.8 K/UL (ref 1.8–7.7)
NEUTROPHILS NFR BLD: 72.7 % (ref 38–73)
NRBC BLD-RTO: 0 /100 WBC
PLATELET # BLD AUTO: 224 K/UL (ref 150–450)
PMV BLD AUTO: 11.6 FL (ref 9.2–12.9)
POTASSIUM SERPL-SCNC: 4.3 MMOL/L (ref 3.5–5.1)
PROT SERPL-MCNC: 6.8 G/DL (ref 6–8.4)
RBC # BLD AUTO: 3.4 M/UL (ref 4–5.4)
SODIUM SERPL-SCNC: 136 MMOL/L (ref 136–145)
WBC # BLD AUTO: 8.03 K/UL (ref 3.9–12.7)

## 2024-07-03 PROCEDURE — 85025 COMPLETE CBC W/AUTO DIFF WBC: CPT | Performed by: NURSE PRACTITIONER

## 2024-07-03 PROCEDURE — 36415 COLL VENOUS BLD VENIPUNCTURE: CPT | Mod: PO | Performed by: NURSE PRACTITIONER

## 2024-07-03 PROCEDURE — 80053 COMPREHEN METABOLIC PANEL: CPT | Performed by: NURSE PRACTITIONER

## 2024-07-03 PROCEDURE — 99999 PR PBB SHADOW E&M-EST. PATIENT-LVL IV: CPT | Mod: PBBFAC,,, | Performed by: NURSE PRACTITIONER

## 2024-07-03 NOTE — PROGRESS NOTES
SUBJECTIVE:    Patient ID: Shandra Velez is a 83 y.o. female.    Chief Complaint: Hospital Follow Up    HPI admitted to [facility] for UTI and hydronephrosis of the right kidney.    Per discharge summary:    Ms. Devi was referred to the ED from the oncology clinic today after reporting that she hasn't felt well since undergoing right renal mass ablation procedure on 05/22/2024. She complains of low-grade fever (T Max 101), right flank pain, decreased appetite, N/V/D (loose stools 3-6x daily), night sweats, and chills. Denies CP, SOB, leg swelling. No known sick contacts. Of note, she states that her urine looks normal.     Workup in the ED shows: anemia (9.1/29.1), ZAINA Cre 1.83 (baseline 1.2), and hyperuricemia (6.8). CC urinalysis shows presence of microscopic hematuria, 3+ leukocyte esterase, and many bacteria. Cefepime 1g IV given in the ED.     [facility] consulted for inpatient admission.       Hospital Course:   The patient was admitted for further evaluation and treatment of UTI following recent TACE 4/29/2024 with subsequent cryoablation with IR 5/22/2024. Broad antibiotics were started and urology was consulted. Dr. Priest preformed cystoscopy with ureteral stent placement on 6/21. Fevers eventually resolved. Cultures remain negative.  The patient completed her antibiotic course in-house.  The patient developed rectal bleeding. Anticoagulation was held and GI consulted. Bleeding resolved with treatment of hemorrhoids. No further workup was recommended. GI recommended surgical consultation if bleeding recurs, but it remained resolved. Decrement in Hb required PRBC, but Hb is stable at discharge.  Patient's Eliquis was held for approximately 48 hours.  Patient has not had any bleeding for 48 hours prior to discharge.  We will restart Eliquis at previous dose upon discharge.  The patient developed acute onset of L great toe MTP pain on 6/25. Acute gout flare was suspected. Pain resolved quickly with initiation  of colchicine.     Currently, pt reports she is still having elevated heartrate with any exertion. Does not notice elevated heartrate at rest. Reports SpO2 has been 95-96% on RA. Reports SpO2 detected heartrate of 128 yesterday while walking around. Has supplemental O2 via NC at 3 lpm and uses only with activity/exertion. Pt states she feels tired. Denies any urinary symptoms fever or pain.    Has follow up appt with urology on 8/12/24 after repeat urogram is done to determine what will be done with renal stent.    Follow up appt with oncology next week.    Currently receiving home health and OT with [facility].    Admit Date: 6/20/24  Discharge Date: 6/27/24  Discharge Facility: Hospital      Family and/or Caretaker present at visit? Yes  Medication Reconciliation:  Medications changed/added/deleted.   New Prescriptions filled after discharge: yes  Discharge summary reviewed:  yes  Diagnostic tests reviewed/disposition: No diagnosic tests pending after this hospitalization  Disease/illness education: UTI with renal stent  Follow up appointments scheduled:  yes  Follow up labs/tests ordered:   yes  Home Health ordered on discharge: Patient has home health established at with [facility]  once weekly.  Home Health company name: [facility]  Establishment or re-establishment of referral orders for community resources: No other necessary community resources  DME ordered at discharge:   no. Has a walker to use when needed. Does not use the walker at home.  How patient is feeling since discharge from the hospital?  tired     Discussion with other health care providers: No discussion with other health care providers necessary  Patient follow up phone call documented on separate encounter.    Lab Visit on 07/03/2024   Component Date Value Ref Range Status    WBC 07/03/2024 8.03  3.90 - 12.70 K/uL Final    RBC 07/03/2024 3.40 (L)  4.00 - 5.40 M/uL Final    Hemoglobin 07/03/2024 9.6 (L)  12.0 - 16.0 g/dL Final    Hematocrit  07/03/2024 31.4 (L)  37.0 - 48.5 % Final    MCV 07/03/2024 92  82 - 98 fL Final    MCH 07/03/2024 28.2  27.0 - 31.0 pg Final    MCHC 07/03/2024 30.6 (L)  32.0 - 36.0 g/dL Final    RDW 07/03/2024 16.1 (H)  11.5 - 14.5 % Final    Platelets 07/03/2024 224  150 - 450 K/uL Final    MPV 07/03/2024 11.6  9.2 - 12.9 fL Final    Immature Granulocytes 07/03/2024 1.9 (H)  0.0 - 0.5 % Final    Gran # (ANC) 07/03/2024 5.8  1.8 - 7.7 K/uL Final    Immature Grans (Abs) 07/03/2024 0.15 (H)  0.00 - 0.04 K/uL Final    Lymph # 07/03/2024 1.1  1.0 - 4.8 K/uL Final    Mono # 07/03/2024 0.7  0.3 - 1.0 K/uL Final    Eos # 07/03/2024 0.2  0.0 - 0.5 K/uL Final    Baso # 07/03/2024 0.06  0.00 - 0.20 K/uL Final    nRBC 07/03/2024 0  0 /100 WBC Final    Gran % 07/03/2024 72.7  38.0 - 73.0 % Final    Lymph % 07/03/2024 13.6 (L)  18.0 - 48.0 % Final    Mono % 07/03/2024 9.0  4.0 - 15.0 % Final    Eosinophil % 07/03/2024 2.1  0.0 - 8.0 % Final    Basophil % 07/03/2024 0.7  0.0 - 1.9 % Final    Differential Method 07/03/2024 Automated   Final    Sodium 07/03/2024 136  136 - 145 mmol/L Final    Potassium 07/03/2024 4.3  3.5 - 5.1 mmol/L Final    Chloride 07/03/2024 105  95 - 110 mmol/L Final    CO2 07/03/2024 19 (L)  23 - 29 mmol/L Final    Glucose 07/03/2024 115 (H)  70 - 110 mg/dL Final    BUN 07/03/2024 29 (H)  8 - 23 mg/dL Final    Creatinine 07/03/2024 1.4  0.5 - 1.4 mg/dL Final    Calcium 07/03/2024 8.4 (L)  8.7 - 10.5 mg/dL Final    Total Protein 07/03/2024 6.8  6.0 - 8.4 g/dL Final    Albumin 07/03/2024 3.0 (L)  3.5 - 5.2 g/dL Final    Total Bilirubin 07/03/2024 0.2  0.1 - 1.0 mg/dL Final    Alkaline Phosphatase 07/03/2024 87  55 - 135 U/L Final    AST 07/03/2024 21  10 - 40 U/L Final    ALT 07/03/2024 8 (L)  10 - 44 U/L Final    eGFR 07/03/2024 37.8 (A)  >60 mL/min/1.73 m^2 Final    Anion Gap 07/03/2024 12  8 - 16 mmol/L Final   No results displayed because visit has over 200 results.      Lab Visit on 06/20/2024   Component Date Value Ref  Range Status    WBC 06/20/2024 13.59 (H)  3.90 - 12.70 K/uL Final    RBC 06/20/2024 3.37 (L)  4.00 - 5.40 M/uL Final    Hemoglobin 06/20/2024 9.5 (L)  12.0 - 16.0 g/dL Final    Hematocrit 06/20/2024 30.0 (L)  37.0 - 48.5 % Final    MCV 06/20/2024 89  82 - 98 fL Final    MCH 06/20/2024 28.2  27.0 - 31.0 pg Final    MCHC 06/20/2024 31.7 (L)  32.0 - 36.0 g/dL Final    RDW 06/20/2024 15.0 (H)  11.5 - 14.5 % Final    Platelets 06/20/2024 310  150 - 450 K/uL Final    MPV 06/20/2024 9.5  9.2 - 12.9 fL Final    Immature Granulocytes 06/20/2024 1.3 (H)  0.0 - 0.5 % Final    Gran # (ANC) 06/20/2024 11.0 (H)  1.8 - 7.7 K/uL Final    Immature Grans (Abs) 06/20/2024 0.18 (H)  0.00 - 0.04 K/uL Final    Lymph # 06/20/2024 1.3  1.0 - 4.8 K/uL Final    Mono # 06/20/2024 0.9  0.3 - 1.0 K/uL Final    Eos # 06/20/2024 0.1  0.0 - 0.5 K/uL Final    Baso # 06/20/2024 0.07  0.00 - 0.20 K/uL Final    nRBC 06/20/2024 0  0 /100 WBC Final    Gran % 06/20/2024 81.1 (H)  38.0 - 73.0 % Final    Lymph % 06/20/2024 9.2 (L)  18.0 - 48.0 % Final    Mono % 06/20/2024 6.9  4.0 - 15.0 % Final    Eosinophil % 06/20/2024 1.0  0.0 - 8.0 % Final    Basophil % 06/20/2024 0.5  0.0 - 1.9 % Final    Differential Method 06/20/2024 Automated   Final    Sodium 06/20/2024 137  136 - 145 mmol/L Final    Potassium 06/20/2024 3.8  3.5 - 5.1 mmol/L Final    Chloride 06/20/2024 106  95 - 110 mmol/L Final    CO2 06/20/2024 17 (L)  23 - 29 mmol/L Final    Glucose 06/20/2024 116 (H)  70 - 110 mg/dL Final    BUN 06/20/2024 31 (H)  8 - 23 mg/dL Final    Creatinine 06/20/2024 1.9 (H)  0.5 - 1.4 mg/dL Final    Calcium 06/20/2024 9.0  8.7 - 10.5 mg/dL Final    Total Protein 06/20/2024 7.0  6.0 - 8.4 g/dL Final    Albumin 06/20/2024 2.8 (L)  3.5 - 5.2 g/dL Final    Total Bilirubin 06/20/2024 0.3  0.1 - 1.0 mg/dL Final    Alkaline Phosphatase 06/20/2024 99  55 - 135 U/L Final    AST 06/20/2024 13  10 - 40 U/L Final    ALT 06/20/2024 <5 (L)  10 - 44 U/L Final    eGFR 06/20/2024  26.2 (A)  >60 mL/min/1.73 m^2 Final    Anion Gap 06/20/2024 14  8 - 16 mmol/L Final    LD 06/20/2024 206  110 - 260 U/L Final    Uric Acid 06/20/2024 6.8 (H)  2.4 - 5.7 mg/dL Final    Specimen UA 06/20/2024 Urine, Clean Catch   Final    Color, UA 06/20/2024 Yellow  Yellow, Straw, Caty Final    Appearance, UA 06/20/2024 Clear  Clear Final    pH, UA 06/20/2024 6.0  5.0 - 8.0 Final    Specific Gravity, UA 06/20/2024 1.010  1.005 - 1.030 Final    Protein, UA 06/20/2024 Negative  Negative Final    Glucose, UA 06/20/2024 Negative  Negative Final    Ketones, UA 06/20/2024 Negative  Negative Final    Bilirubin (UA) 06/20/2024 Negative  Negative Final    Occult Blood UA 06/20/2024 1+ (A)  Negative Final    Nitrite, UA 06/20/2024 Negative  Negative Final    Leukocytes, UA 06/20/2024 2+ (A)  Negative Final    RBC, UA 06/20/2024 10 (H)  0 - 4 /hpf Final    WBC, UA 06/20/2024 80 (H)  0 - 5 /hpf Final    Bacteria 06/20/2024 Many (A)  None-Occ /hpf Final    Squam Epithel, UA 06/20/2024 10  /hpf Final    Non-Squam Epith 06/20/2024 10 (A)  <1/hpf /hpf Final    Microscopic Comment 06/20/2024 SEE COMMENT   Final    Urine Culture, Routine 06/20/2024 Multiple organisms isolated. None in predominance.  Repeat if   Final    Urine Culture, Routine 06/20/2024 clinically necessary.   Final   Hospital Outpatient Visit on 06/18/2024   Component Date Value Ref Range Status    POC Glucose 06/18/2024 116 (A)  70 - 110 MG/DL Final   Admission on 05/22/2024, Discharged on 05/23/2024   Component Date Value Ref Range Status    WBC 05/23/2024 6.56  3.90 - 12.70 K/uL Final    RBC 05/23/2024 3.41 (L)  4.00 - 5.40 M/uL Final    Hemoglobin 05/23/2024 9.9 (L)  12.0 - 16.0 g/dL Final    Hematocrit 05/23/2024 31.9 (L)  37.0 - 48.5 % Final    MCV 05/23/2024 94  82 - 98 fL Final    MCH 05/23/2024 29.0  27.0 - 31.0 pg Final    MCHC 05/23/2024 31.0 (L)  32.0 - 36.0 g/dL Final    RDW 05/23/2024 15.3 (H)  11.5 - 14.5 % Final    Platelets 05/23/2024 78 (L)  150 -  450 K/uL Final    MPV 05/23/2024 11.3  9.2 - 12.9 fL Final    Immature Granulocytes 05/23/2024 0.6 (H)  0.0 - 0.5 % Final    Gran # (ANC) 05/23/2024 5.0  1.8 - 7.7 K/uL Final    Immature Grans (Abs) 05/23/2024 0.04  0.00 - 0.04 K/uL Final    Lymph # 05/23/2024 0.7 (L)  1.0 - 4.8 K/uL Final    Mono # 05/23/2024 0.7  0.3 - 1.0 K/uL Final    Eos # 05/23/2024 0.1  0.0 - 0.5 K/uL Final    Baso # 05/23/2024 0.01  0.00 - 0.20 K/uL Final    nRBC 05/23/2024 0  0 /100 WBC Final    Gran % 05/23/2024 76.5 (H)  38.0 - 73.0 % Final    Lymph % 05/23/2024 10.1 (L)  18.0 - 48.0 % Final    Mono % 05/23/2024 10.5  4.0 - 15.0 % Final    Eosinophil % 05/23/2024 2.1  0.0 - 8.0 % Final    Basophil % 05/23/2024 0.2  0.0 - 1.9 % Final    Differential Method 05/23/2024 Automated   Final    Sodium 05/23/2024 138  136 - 145 mmol/L Final    Potassium 05/23/2024 4.4  3.5 - 5.1 mmol/L Final    Chloride 05/23/2024 108  95 - 110 mmol/L Final    CO2 05/23/2024 27  22 - 31 mmol/L Final    Glucose 05/23/2024 103  70 - 110 mg/dL Final    BUN 05/23/2024 25 (H)  7 - 18 mg/dL Final    Creatinine 05/23/2024 1.72 (H)  0.50 - 1.40 mg/dL Final    Calcium 05/23/2024 7.6 (L)  8.4 - 10.2 mg/dL Final    Total Protein 05/23/2024 5.6 (L)  6.0 - 8.4 g/dL Final    Albumin 05/23/2024 3.3 (L)  3.5 - 5.2 g/dL Final    Total Bilirubin 05/23/2024 0.5  0.2 - 1.3 mg/dL Final    Alkaline Phosphatase 05/23/2024 72  38 - 145 U/L Final    AST 05/23/2024 200 (H)  14 - 36 U/L Final    ALT 05/23/2024 50 (H)  0 - 35 U/L Final    Anion Gap 05/23/2024 3 (L)  5 - 12 mmol/L Final    eGFR 05/23/2024 30 (A)  >60 mL/min/1.73 m^2 Final   Hospital Outpatient Visit on 05/22/2024   Component Date Value Ref Range Status    WBC 05/22/2024 6.73  3.90 - 12.70 K/uL Final    RBC 05/22/2024 4.13  4.00 - 5.40 M/uL Final    Hemoglobin 05/22/2024 12.0  12.0 - 16.0 g/dL Final    Hematocrit 05/22/2024 37.8  37.0 - 48.5 % Final    MCV 05/22/2024 92  82 - 98 fL Final    MCH 05/22/2024 29.1  27.0 - 31.0 pg  Final    MCHC 05/22/2024 31.7 (L)  32.0 - 36.0 g/dL Final    RDW 05/22/2024 15.3 (H)  11.5 - 14.5 % Final    Platelets 05/22/2024 118 (L)  150 - 450 K/uL Final    MPV 05/22/2024 11.3  9.2 - 12.9 fL Final    Hemoglobin A1C 05/22/2024 5.2  0.0 - 5.6 % Final    Estimated Avg Glucose 05/22/2024 103  68 - 131 mg/dL Final    PT 05/22/2024 12.8  11.8 - 14.7 sec Final    INR 05/22/2024 1.0   Final   Lab Visit on 05/06/2024   Component Date Value Ref Range Status    WBC 05/06/2024 5.48  3.90 - 12.70 K/uL Final    RBC 05/06/2024 3.47 (L)  4.00 - 5.40 M/uL Final    Hemoglobin 05/06/2024 10.2 (L)  12.0 - 16.0 g/dL Final    Hematocrit 05/06/2024 32.0 (L)  37.0 - 48.5 % Final    MCV 05/06/2024 92  82 - 98 fL Final    MCH 05/06/2024 29.4  27.0 - 31.0 pg Final    MCHC 05/06/2024 31.9 (L)  32.0 - 36.0 g/dL Final    RDW 05/06/2024 15.2 (H)  11.5 - 14.5 % Final    Platelets 05/06/2024 126 (L)  150 - 450 K/uL Final    MPV 05/06/2024 10.2  9.2 - 12.9 fL Final    Immature Granulocytes 05/06/2024 0.7 (H)  0.0 - 0.5 % Final    Gran # (ANC) 05/06/2024 3.5  1.8 - 7.7 K/uL Final    Immature Grans (Abs) 05/06/2024 0.04  0.00 - 0.04 K/uL Final    Lymph # 05/06/2024 0.9 (L)  1.0 - 4.8 K/uL Final    Mono # 05/06/2024 0.7  0.3 - 1.0 K/uL Final    Eos # 05/06/2024 0.3  0.0 - 0.5 K/uL Final    Baso # 05/06/2024 0.05  0.00 - 0.20 K/uL Final    nRBC 05/06/2024 0  0 /100 WBC Final    Gran % 05/06/2024 64.4  38.0 - 73.0 % Final    Lymph % 05/06/2024 16.8 (L)  18.0 - 48.0 % Final    Mono % 05/06/2024 12.6  4.0 - 15.0 % Final    Eosinophil % 05/06/2024 4.6  0.0 - 8.0 % Final    Basophil % 05/06/2024 0.9  0.0 - 1.9 % Final    Differential Method 05/06/2024 Automated   Final    Sodium 05/06/2024 141  136 - 145 mmol/L Final    Potassium 05/06/2024 4.1  3.5 - 5.1 mmol/L Final    Chloride 05/06/2024 109  95 - 110 mmol/L Final    CO2 05/06/2024 22 (L)  23 - 29 mmol/L Final    Glucose 05/06/2024 83  70 - 110 mg/dL Final    BUN 05/06/2024 21  8 - 23 mg/dL Final     Creatinine 05/06/2024 1.4  0.5 - 1.4 mg/dL Final    Calcium 05/06/2024 8.9  8.7 - 10.5 mg/dL Final    Total Protein 05/06/2024 6.1  6.0 - 8.4 g/dL Final    Albumin 05/06/2024 3.5  3.5 - 5.2 g/dL Final    Total Bilirubin 05/06/2024 0.3  0.1 - 1.0 mg/dL Final    Alkaline Phosphatase 05/06/2024 88  55 - 135 U/L Final    AST 05/06/2024 13  10 - 40 U/L Final    ALT 05/06/2024 6 (L)  10 - 44 U/L Final    eGFR 05/06/2024 37.8 (A)  >60 mL/min/1.73 m^2 Final    Anion Gap 05/06/2024 10  8 - 16 mmol/L Final    Iron 05/06/2024 40  30 - 160 ug/dL Final    Transferrin 05/06/2024 223  200 - 375 mg/dL Final    TIBC 05/06/2024 330  250 - 450 ug/dL Final    Saturated Iron 05/06/2024 12 (L)  20 - 50 % Final    Ferritin 05/06/2024 136  20.0 - 300.0 ng/mL Final   Admission on 04/29/2024, Discharged on 04/29/2024   Component Date Value Ref Range Status    WBC 04/29/2024 6.20  3.90 - 12.70 K/uL Final    RBC 04/29/2024 4.24  4.00 - 5.40 M/uL Final    Hemoglobin 04/29/2024 12.4  12.0 - 16.0 g/dL Final    Hematocrit 04/29/2024 39.8  37.0 - 48.5 % Final    MCV 04/29/2024 94  82 - 98 fL Final    MCH 04/29/2024 29.2  27.0 - 31.0 pg Final    MCHC 04/29/2024 31.2 (L)  32.0 - 36.0 g/dL Final    RDW 04/29/2024 15.2 (H)  11.5 - 14.5 % Final    Platelets 04/29/2024 152  150 - 450 K/uL Final    MPV 04/29/2024 10.3  9.2 - 12.9 fL Final    Sodium 04/29/2024 140  136 - 145 mmol/L Final    Potassium 04/29/2024 4.0  3.5 - 5.1 mmol/L Final    Chloride 04/29/2024 109  95 - 110 mmol/L Final    CO2 04/29/2024 22  22 - 31 mmol/L Final    Glucose 04/29/2024 112 (H)  70 - 110 mg/dL Final    BUN 04/29/2024 26 (H)  7 - 18 mg/dL Final    Creatinine 04/29/2024 1.23  0.50 - 1.40 mg/dL Final    Calcium 04/29/2024 9.1  8.4 - 10.2 mg/dL Final    Total Protein 04/29/2024 6.8  6.0 - 8.4 g/dL Final    Albumin 04/29/2024 4.1  3.5 - 5.2 g/dL Final    Total Bilirubin 04/29/2024 0.6  0.2 - 1.3 mg/dL Final    Alkaline Phosphatase 04/29/2024 112  38 - 145 U/L Final    AST  04/29/2024 31  14 - 36 U/L Final    ALT 04/29/2024 14  0 - 35 U/L Final    Anion Gap 04/29/2024 9  5 - 12 mmol/L Final    eGFR 04/29/2024 44 (A)  >60 mL/min/1.73 m^2 Final    PT 04/29/2024 12.9  11.8 - 14.7 sec Final    INR 04/29/2024 1.0   Final   Lab Visit on 04/18/2024   Component Date Value Ref Range Status    Uric Acid 04/18/2024 6.7 (H)  2.4 - 5.7 mg/dL Final    Beta-2 Microglobulin 04/18/2024 5.0 (H)  0.0 - 2.5 ug/mL Final    LD 04/18/2024 233  110 - 260 U/L Final   Lab Visit on 04/16/2024   Component Date Value Ref Range Status    WBC 04/16/2024 7.14  3.90 - 12.70 K/uL Final    RBC 04/16/2024 3.90 (L)  4.00 - 5.40 M/uL Final    Hemoglobin 04/16/2024 11.4 (L)  12.0 - 16.0 g/dL Final    Hematocrit 04/16/2024 36.7 (L)  37.0 - 48.5 % Final    MCV 04/16/2024 94  82 - 98 fL Final    MCH 04/16/2024 29.2  27.0 - 31.0 pg Final    MCHC 04/16/2024 31.1 (L)  32.0 - 36.0 g/dL Final    RDW 04/16/2024 16.3 (H)  11.5 - 14.5 % Final    Platelets 04/16/2024 162  150 - 450 K/uL Final    MPV 04/16/2024 10.6  9.2 - 12.9 fL Final    Immature Granulocytes 04/16/2024 0.7 (H)  0.0 - 0.5 % Final    Gran # (ANC) 04/16/2024 4.7  1.8 - 7.7 K/uL Final    Immature Grans (Abs) 04/16/2024 0.05 (H)  0.00 - 0.04 K/uL Final    Lymph # 04/16/2024 1.4  1.0 - 4.8 K/uL Final    Mono # 04/16/2024 0.7  0.3 - 1.0 K/uL Final    Eos # 04/16/2024 0.3  0.0 - 0.5 K/uL Final    Baso # 04/16/2024 0.06  0.00 - 0.20 K/uL Final    nRBC 04/16/2024 0  0 /100 WBC Final    Gran % 04/16/2024 65.9  38.0 - 73.0 % Final    Lymph % 04/16/2024 19.6  18.0 - 48.0 % Final    Mono % 04/16/2024 9.5  4.0 - 15.0 % Final    Eosinophil % 04/16/2024 3.5  0.0 - 8.0 % Final    Basophil % 04/16/2024 0.8  0.0 - 1.9 % Final    Differential Method 04/16/2024 Automated   Final    Sodium 04/16/2024 141  136 - 145 mmol/L Final    Potassium 04/16/2024 4.0  3.5 - 5.1 mmol/L Final    Chloride 04/16/2024 105  95 - 110 mmol/L Final    CO2 04/16/2024 24  23 - 29 mmol/L Final    Glucose  04/16/2024 104  70 - 110 mg/dL Final    BUN 04/16/2024 20  8 - 23 mg/dL Final    Creatinine 04/16/2024 1.2  0.5 - 1.4 mg/dL Final    Calcium 04/16/2024 9.8  8.7 - 10.5 mg/dL Final    Total Protein 04/16/2024 6.7  6.0 - 8.4 g/dL Final    Albumin 04/16/2024 3.7  3.5 - 5.2 g/dL Final    Total Bilirubin 04/16/2024 0.4  0.1 - 1.0 mg/dL Final    Alkaline Phosphatase 04/16/2024 216 (H)  55 - 135 U/L Final    AST 04/16/2024 24  10 - 40 U/L Final    ALT 04/16/2024 15  10 - 44 U/L Final    eGFR 04/16/2024 45.5 (A)  >60 mL/min/1.73 m^2 Final    Anion Gap 04/16/2024 12  8 - 16 mmol/L Final    Ferritin 04/16/2024 152  20.0 - 300.0 ng/mL Final    Iron 04/16/2024 57  30 - 160 ug/dL Final    Transferrin 04/16/2024 268  200 - 375 mg/dL Final    TIBC 04/16/2024 397  250 - 450 ug/dL Final    Saturated Iron 04/16/2024 14 (L)  20 - 50 % Final   There may be more visits with results that are not included.       Past Medical History:   Diagnosis Date    Breast cancer 1985    right mastectomy    Digestive disorder     Encounter for blood transfusion     History of pneumonia     Hypercholesterolemia     Hypertension     Hyperuricemia 06/20/2024    Lab work in the ED revealed a uric acid level of 6.8.  Pt is asymptomatic for gouty arthritis.  Monitor.      Indigestion     Lumbar spondylosis     Lung cancer     2022 - currently in treatment as of 7/27/22    Lymphoma     Osteopenia     Pulmonary nodule     Renal cancer     Renal disorder     renal cancer    Retinal detachment     Smoker 06/11/2022    Thyroid disease      Past Surgical History:   Procedure Laterality Date    ANGIOGRAPHY  4/29/2024    Procedure: Right Renal Angiogram;  Surgeon: Steve Weaver MD;  Location: [facility] CATH;  Service: Interventional Radiology;;    APPENDECTOMY      BONE MARROW BIOPSY Bilateral 06/24/2020    Procedure: Biopsy-bone marrow;  Surgeon: Rod Montero MD;  Location: [facility] OR;  Service: Oncology;  Laterality: Bilateral;    BREAST RECONSTRUCTION  1990     right    BRONCHOSCOPY N/A 01/18/2023    Procedure: BRONCHOSCOPY;  Surgeon: Gregorio Kinney MD;  Location: [facility] ENDO;  Service: Pulmonary;  Laterality: N/A;    BRONCHOSCOPY Bilateral 02/10/2023    Procedure: Bronchoscopy;  Surgeon: Gregorio Kinney MD;  Location: [facility];  Service: Pulmonary;  Laterality: Bilateral;  for airway clearance. Purple top tube for cell count diff    BRONCHOSCOPY N/A 03/13/2023    Procedure: Bronchoscopy;  Surgeon: Gregorio Kinney MD;  Location: [facility];  Service: Pulmonary;  Laterality: N/A;  therapeutic scope. Please have purple top tube and iced saline    CATARACT EXTRACTION W/  INTRAOCULAR LENS IMPLANT Bilateral     CHOLECYSTECTOMY      COLONOSCOPY N/A 3/25/2024    Procedure: COLONOSCOPY;  Surgeon: Brigido Calvillo Jr., MD;  Location: [facility];  Service: Endoscopy;  Laterality: N/A;    CYSTOURETEROSCOPY WITH RETROGRADE PYELOGRAPHY AND INSERTION OF STENT INTO URETER Right 6/21/2024    Procedure: CYSTOURETEROSCOPY, WITH RETROGRADE PYELOGRAM AND URETERAL STENT INSERTION;  Surgeon: Noel Priest MD;  Location: [facility] OR;  Service: Urology;  Laterality: Right;    EMBOLIZATION  4/29/2024    Procedure: Rt. Renal Embolization;  Surgeon: Steve Weaver MD;  Location: [facility] CATH;  Service: Interventional Radiology;;    ENDOBRONCHIAL ULTRASOUND Bilateral 07/09/2021    Procedure: ENDOBRONCHIAL ULTRASOUND (EBUS);  Surgeon: Gregorio Kinney MD;  Location: [facility];  Service: Pulmonary;  Laterality: Bilateral;  NEED LMA to  eval right upper paratracheal LN.     ENDOBRONCHIAL ULTRASOUND Bilateral 06/16/2022    Procedure: ENDOBRONCHIAL ULTRASOUND (EBUS);  Surgeon: Gregorio Kinney MD;  Location: [facility];  Service: Pulmonary;  Laterality: Bilateral;    INJECTION OF FACET JOINT Left 06/30/2022    Procedure: FACET JOINT Cyst Aspiration L3/4;  Surgeon: Ronnell Baeza MD;  Location: [facility] OR;  Service: Pain Management;  Laterality: Left;    INSERTION OF TUNNELED CENTRAL VENOUS CATHETER (CVC) WITH  SUBCUTANEOUS PORT Left 11/04/2020    Procedure: [id number];  Surgeon: Kody Pretty MD;  Location: [facility] OR;  Service: General;  Laterality: Left;    JOINT REPLACEMENT  2008    right knee     LUMBAR LAMINECTOMY      LYMPH NODE BIOPSY      MASTECTOMY Right 1985    NEEDLE LOCALIZATION N/A 05/25/2020    Procedure: NEEDLE LOCALIZATION - lymph node bx;  Surgeon: Steve Weaver MD;  Location: [facility] CATH;  Service: Interventional Radiology;  Laterality: N/A;    NEEDLE LOCALIZATION N/A 5/22/2024    Procedure: cryoablation renal in CT with anesthesia argon IAM rep;  Surgeon: Steve Weaver MD;  Location: [facility] CATH;  Service: Interventional Radiology;  Laterality: N/A;  cryoablation renal in CT with anesthesia argon Iam rep    RECTAL BIOPSY N/A 3/27/2024    Procedure: BIOPSY, RECTUM;  Surgeon: Isabel Choi MD;  Location: [facility] OR;  Service: General;  Laterality: N/A;    RETINAL DETACHMENT SURGERY  1989    RIGHT HEART CATHETERIZATION Right 03/18/2023    Procedure: INSERTION, CATHETER, RIGHT HEART;  Surgeon: Rukhsana Lang MD;  Location: [facility] CATH;  Service: Cardiology;  Laterality: Right;    SELECTIVE UNILATERAL PULMONARY ANGIOGRAPHY  03/18/2023    Procedure: Pumonary angiography - unilateral;  Surgeon: Rukhsana Lang MD;  Location: [facility] CATH;  Service: Cardiology;;    THROMBECTOMY N/A 03/18/2023    Procedure: THROMBECTOMY;  Surgeon: Rukhsana Lang MD;  Location: [facility] CATH;  Service: Cardiology;  Laterality: N/A;    TRANSFORAMINAL EPIDURAL INJECTION OF STEROID Left 05/14/2020    Procedure: Injection,steroid,epidural,transforaminal approach, l3/4;  Surgeon: Ronnell Baeza MD;  Location: [facility] OR;  Service: Pain Management;  Laterality: Left;    TRANSFORAMINAL EPIDURAL INJECTION OF STEROID Left 03/23/2022    Procedure: Injection,steroid,epidural,transforaminal approach L3/4;  Surgeon: Ronnell Baeza MD;  Location: [facility] OR;  Service: Pain Management;  Laterality: Left;    TRANSFORAMINAL EPIDURAL  INJECTION OF STEROID Left 08/01/2022    Procedure: Injection,steroid,epidural,transforaminal approach L3/4;  Surgeon: Ronnell Baeza MD;  Location: [facility];  Service: Pain Management;  Laterality: Left;    TUBAL LIGATION      UMBILICAL HERNIA REPAIR       Family History   Problem Relation Name Age of Onset    Cancer Sister          uterine    Cancer Sister          Uterine cancer     Diabetes Brother      Breast cancer Other niece 42    Glaucoma Neg Hx      Macular degeneration Neg Hx         Marital Status:   Alcohol History:  reports no history of alcohol use.  Tobacco History:  reports that she quit smoking about 2 years ago. Her smoking use included cigarettes. She started smoking about 55 years ago. She has a 26.5 pack-year smoking history. She has never used smokeless tobacco.  Drug History:  reports no history of drug use.    Review of patient's allergies indicates:   Allergen Reactions    Opioids - morphine analogues Other (See Comments)     Hypotension       Current Outpatient Medications:     acetaminophen (TYLENOL) 325 MG tablet, Take 2 tablets (650 mg total) by mouth every 6 (six) hours as needed for Pain., Disp: , Rfl:     albuterol (ACCUNEB) 1.25 mg/3 mL Nebu, Take 1.25 mg by nebulization 2 (two) times a day., Disp: , Rfl:     albuterol (VENTOLIN HFA) 90 mcg/actuation inhaler, Inhale 2 puffs into the lungs every 6 (six) hours as needed for Wheezing or Shortness of Breath (or before exercise). Rescue, Disp: 18 g, Rfl: 2    apixaban (ELIQUIS) 2.5 mg Tab, Take 1 tablet (2.5 mg total) by mouth 2 (two) times daily., Disp: , Rfl:     budesonide (PULMICORT) 0.5 mg/2 mL nebulizer solution, Take 2 mLs (0.5 mg total) by nebulization 2 (two) times a day., Disp: 120 mL, Rfl: 11    cetirizine (ZYRTEC) 10 MG tablet, Take 10 mg by mouth once daily., Disp: , Rfl:     ergocalciferol (ERGOCALCIFEROL) 50,000 unit Cap, Take 50,000 Units by mouth every Tuesday., Disp: , Rfl:     hydrocortisone (ANUSOL-HC) 2.5 %  "rectal cream, Place rectally 2 (two) times daily as needed for Hemorrhoids., Disp: 28 g, Rfl: 0    hydrocortisone (ANUSOL-HC) 25 mg suppository, Place 1 suppository (25 mg total) rectally 2 (two) times daily as needed for Hemorrhoids., Disp: 20 suppository, Rfl: 0    levothyroxine (SYNTHROID) 100 MCG tablet, Take 1 tablet (100 mcg total) by mouth once daily., Disp: 90 tablet, Rfl: 3    mucus clearing device (ACAPELLA, FLUTTER), by Misc.(Non-Drug; Combo Route) route once daily., Disp: 100 each, Rfl: 0    multivitamin (THERAGRAN) per tablet, Take 1 tablet by mouth once daily., Disp: , Rfl:     NIFEdipine (ADALAT CC) 60 MG TbSR, Take 1 tablet (60 mg total) by mouth once daily., Disp: , Rfl:     omeprazole (PRILOSEC) 40 MG capsule, Take 1 capsule (40 mg total) by mouth once daily., Disp: 90 capsule, Rfl: 4    OXYGEN-AIR DELIVERY SYSTEMS MISC, 2-3 L by Misc.(Non-Drug; Combo Route) route as needed., Disp: , Rfl:     sodium chloride 3% 3 % nebulizer solution, Take 4 mLs by nebulization 2 (two) times a day., Disp: 240 mL, Rfl: 3    temazepam (RESTORIL) 30 mg capsule, Take 1 capsule (30 mg total) by mouth every evening., Disp: 30 capsule, Rfl: 5    Review of Systems   Constitutional:  Positive for activity change, appetite change and fatigue.        Objective:      Vitals:    07/03/24 1346   BP: 132/72   Pulse: 83   Temp: 97.7 °F (36.5 °C)   SpO2: 97%   Weight: 90.4 kg (199 lb 4.7 oz)   Height: 5' 4" (1.626 m)     Physical Exam  Vitals and nursing note reviewed.   Constitutional:       General: She is not in acute distress.     Appearance: Normal appearance. She is well-groomed.   HENT:      Head: Normocephalic.      Right Ear: Tympanic membrane, ear canal and external ear normal.      Left Ear: Tympanic membrane, ear canal and external ear normal.      Nose: Nose normal.      Mouth/Throat:      Lips: Pink.      Mouth: Mucous membranes are moist.      Pharynx: Oropharynx is clear. No posterior oropharyngeal erythema.   Eyes: "      Extraocular Movements: Extraocular movements intact.      Conjunctiva/sclera: Conjunctivae normal.      Pupils: Pupils are equal, round, and reactive to light.   Cardiovascular:      Rate and Rhythm: Normal rate and regular rhythm.      Heart sounds: Normal heart sounds.   Pulmonary:      Effort: Pulmonary effort is normal.      Breath sounds: Normal breath sounds.   Chest:      Chest wall: No tenderness.   Abdominal:      General: Bowel sounds are normal.      Palpations: Abdomen is soft.      Tenderness: There is no abdominal tenderness. There is no right CVA tenderness or left CVA tenderness.   Musculoskeletal:         General: No swelling or tenderness. Normal range of motion.      Cervical back: Normal range of motion and neck supple.      Right lower leg: No edema.      Left lower leg: No edema.   Lymphadenopathy:      Cervical: No cervical adenopathy.   Skin:     General: Skin is warm and dry.   Neurological:      General: No focal deficit present.      Mental Status: She is alert and oriented to person, place, and time. Mental status is at baseline.      Gait: Abnormal gait: unsteady; assisted by walker.   Psychiatric:         Mood and Affect: Mood normal.         Behavior: Behavior normal.         Assessment:       1. Hospital discharge follow-up    2. Chronic kidney disease, stage 3b    3. Anemia, unspecified type    4. Hydronephrosis of right kidney    5. ZAINA (acute kidney injury)         Plan:       Hospital discharge follow-up    Chronic kidney disease, stage 3b  -     Comprehensive Metabolic Panel; Future; Expected date: 07/03/2024    Anemia, unspecified type  -     CBC W/ AUTO DIFFERENTIAL; Future; Expected date: 07/03/2024    Hydronephrosis of right kidney    ZAINA (acute kidney injury)      No follow-ups on file.

## 2024-07-14 ENCOUNTER — PATIENT MESSAGE (OUTPATIENT)
Dept: FAMILY MEDICINE | Facility: CLINIC | Age: 84
End: 2024-07-14
Payer: MEDICARE

## 2024-07-14 DIAGNOSIS — K64.9 HEMORRHOIDS, UNSPECIFIED HEMORRHOID TYPE: Primary | ICD-10-CM

## 2024-07-15 ENCOUNTER — PATIENT MESSAGE (OUTPATIENT)
Dept: FAMILY MEDICINE | Facility: CLINIC | Age: 84
End: 2024-07-15
Payer: MEDICARE

## 2024-07-15 PROBLEM — J96.11 CHRONIC RESPIRATORY FAILURE WITH HYPOXIA: Status: RESOLVED | Noted: 2024-04-10 | Resolved: 2024-07-15

## 2024-07-15 RX ORDER — HYDROCORTISONE 25 MG/G
CREAM TOPICAL 2 TIMES DAILY PRN
Qty: 28 G | Refills: 0 | Status: SHIPPED | OUTPATIENT
Start: 2024-07-15

## 2024-07-15 RX ORDER — TEMAZEPAM 30 MG/1
30 CAPSULE ORAL NIGHTLY
Qty: 30 CAPSULE | Refills: 5 | Status: SHIPPED | OUTPATIENT
Start: 2024-07-15 | End: 2024-07-15 | Stop reason: SDUPTHER

## 2024-07-15 NOTE — TELEPHONE ENCOUNTER
Care Due:                  Date            Visit Type   Department     Provider  --------------------------------------------------------------------------------                                EP [facility]  Last Visit: 06-      CARE ([facility])   AVIS EPPERSON                              EP -                              PRIMARY      [facility]  Next Visit: 10-      CARE ([facility])   AVIS EPPERSON                                                            Last  Test          Frequency    Reason                     Performed    Due Date  --------------------------------------------------------------------------------    TSH.........  12 months..  levothyroxine............  08-   08-    Health [facility] Embedded Care Due Messages. Reference number: [id number].   7/14/2024 9:23:47 PM CDT

## 2024-07-15 NOTE — TELEPHONE ENCOUNTER
No care due was identified.  Health [facility] Embedded Care Due Messages. Reference number: [id number].   7/15/2024 5:13:06 PM CDT

## 2024-07-15 NOTE — TELEPHONE ENCOUNTER
No care due was identified.  [facility] Embedded Care Due Messages. Reference number: [id number].   7/15/2024 1:47:26 PM CDT

## 2024-07-15 NOTE — TELEPHONE ENCOUNTER
Refill Routing Note   Medication(s) are not appropriate for processing by [facility] for the following reason(s):        Outside of protocol:     [facility] action(s):  Route      Medication Therapy Plan:         Appointments  past 12m or future 3m with PCP    Date Provider   Last Visit   6/20/2024 Akil Chavez MD   Next Visit   10/9/2024 Akil Chavez MD   ED visits in past 90 days: 0        Note composed:1:52 PM 07/15/2024

## 2024-07-16 RX ORDER — TEMAZEPAM 30 MG/1
30 CAPSULE ORAL NIGHTLY
Qty: 30 CAPSULE | Refills: 5 | Status: SHIPPED | OUTPATIENT
Start: 2024-07-16 | End: 2025-01-12

## 2024-07-18 ENCOUNTER — PATIENT MESSAGE (OUTPATIENT)
Dept: HEMATOLOGY/ONCOLOGY | Facility: CLINIC | Age: 84
End: 2024-07-18

## 2024-07-18 ENCOUNTER — OFFICE VISIT (OUTPATIENT)
Dept: HEMATOLOGY/ONCOLOGY | Facility: CLINIC | Age: 84
End: 2024-07-18
Payer: MEDICARE

## 2024-07-18 ENCOUNTER — LAB VISIT (OUTPATIENT)
Dept: LAB | Facility: HOSPITAL | Age: 84
End: 2024-07-18
Attending: INTERNAL MEDICINE
Payer: MEDICARE

## 2024-07-18 VITALS
WEIGHT: 195.31 LBS | SYSTOLIC BLOOD PRESSURE: 130 MMHG | DIASTOLIC BLOOD PRESSURE: 60 MMHG | BODY MASS INDEX: 33.34 KG/M2 | TEMPERATURE: 98 F | RESPIRATION RATE: 18 BRPM | OXYGEN SATURATION: 99 % | HEIGHT: 64 IN | HEART RATE: 88 BPM

## 2024-07-18 DIAGNOSIS — D50.9 IRON DEFICIENCY ANEMIA, UNSPECIFIED IRON DEFICIENCY ANEMIA TYPE: ICD-10-CM

## 2024-07-18 DIAGNOSIS — C82.25 GRADE 3 FOLLICULAR LYMPHOMA OF LYMPH NODES OF INGUINAL REGION: ICD-10-CM

## 2024-07-18 DIAGNOSIS — C34.11 MALIGNANT NEOPLASM OF RIGHT UPPER LOBE OF LUNG: ICD-10-CM

## 2024-07-18 DIAGNOSIS — C82.38 GRADE 3A FOLLICULAR LYMPHOMA OF LYMPH NODES OF MULTIPLE REGIONS: ICD-10-CM

## 2024-07-18 DIAGNOSIS — R53.83 OTHER FATIGUE: ICD-10-CM

## 2024-07-18 DIAGNOSIS — R94.2 ABNORMAL PET SCAN OF LUNG: Primary | ICD-10-CM

## 2024-07-18 DIAGNOSIS — Z86.711 HISTORY OF PULMONARY EMBOLISM: ICD-10-CM

## 2024-07-18 DIAGNOSIS — C77.9 SECONDARY AND UNSPECIFIED MALIGNANT NEOPLASM OF LYMPH NODE, UNSPECIFIED: ICD-10-CM

## 2024-07-18 DIAGNOSIS — R27.9 UNSPECIFIED LACK OF COORDINATION: ICD-10-CM

## 2024-07-18 DIAGNOSIS — N28.89 RIGHT RENAL MASS: ICD-10-CM

## 2024-07-18 DIAGNOSIS — R00.0 TACHYCARDIA: ICD-10-CM

## 2024-07-18 LAB
ALBUMIN SERPL BCP-MCNC: 3 G/DL (ref 3.5–5.2)
ALP SERPL-CCNC: 65 U/L (ref 55–135)
ALT SERPL W/O P-5'-P-CCNC: 5 U/L (ref 10–44)
ANION GAP SERPL CALC-SCNC: 12 MMOL/L (ref 8–16)
AST SERPL-CCNC: 11 U/L (ref 10–40)
BASOPHILS # BLD AUTO: 0.03 K/UL (ref 0–0.2)
BASOPHILS NFR BLD: 0.3 % (ref 0–1.9)
BILIRUB SERPL-MCNC: 0.2 MG/DL (ref 0.1–1)
BUN SERPL-MCNC: 50 MG/DL (ref 8–23)
CALCIUM SERPL-MCNC: 8.8 MG/DL (ref 8.7–10.5)
CHLORIDE SERPL-SCNC: 109 MMOL/L (ref 95–110)
CO2 SERPL-SCNC: 18 MMOL/L (ref 23–29)
CREAT SERPL-MCNC: 1.9 MG/DL (ref 0.5–1.4)
DIFFERENTIAL METHOD BLD: ABNORMAL
EOSINOPHIL # BLD AUTO: 0.2 K/UL (ref 0–0.5)
EOSINOPHIL NFR BLD: 1.8 % (ref 0–8)
ERYTHROCYTE [DISTWIDTH] IN BLOOD BY AUTOMATED COUNT: 17 % (ref 11.5–14.5)
EST. GFR  (NO RACE VARIABLE): 26.2 ML/MIN/1.73 M^2
GLUCOSE SERPL-MCNC: 149 MG/DL (ref 70–110)
HCT VFR BLD AUTO: 24.3 % (ref 37–48.5)
HGB BLD-MCNC: 7.6 G/DL (ref 12–16)
IMM GRANULOCYTES # BLD AUTO: 0.08 K/UL (ref 0–0.04)
IMM GRANULOCYTES NFR BLD AUTO: 0.9 % (ref 0–0.5)
LDH SERPL L TO P-CCNC: 157 U/L (ref 110–260)
LYMPHOCYTES # BLD AUTO: 1.1 K/UL (ref 1–4.8)
LYMPHOCYTES NFR BLD: 11.4 % (ref 18–48)
MCH RBC QN AUTO: 28.6 PG (ref 27–31)
MCHC RBC AUTO-ENTMCNC: 31.3 G/DL (ref 32–36)
MCV RBC AUTO: 91 FL (ref 82–98)
MONOCYTES # BLD AUTO: 0.7 K/UL (ref 0.3–1)
MONOCYTES NFR BLD: 7.6 % (ref 4–15)
NEUTROPHILS # BLD AUTO: 7.3 K/UL (ref 1.8–7.7)
NEUTROPHILS NFR BLD: 78 % (ref 38–73)
NRBC BLD-RTO: 0 /100 WBC
PLATELET # BLD AUTO: 176 K/UL (ref 150–450)
PMV BLD AUTO: 10.3 FL (ref 9.2–12.9)
POTASSIUM SERPL-SCNC: 4 MMOL/L (ref 3.5–5.1)
PROT SERPL-MCNC: 6.1 G/DL (ref 6–8.4)
RBC # BLD AUTO: 2.66 M/UL (ref 4–5.4)
SODIUM SERPL-SCNC: 139 MMOL/L (ref 136–145)
URATE SERPL-MCNC: 7.5 MG/DL (ref 2.4–5.7)
WBC # BLD AUTO: 9.33 K/UL (ref 3.9–12.7)

## 2024-07-18 PROCEDURE — 83615 LACTATE (LD) (LDH) ENZYME: CPT | Mod: PN | Performed by: INTERNAL MEDICINE

## 2024-07-18 PROCEDURE — 1126F AMNT PAIN NOTED NONE PRSNT: CPT | Mod: CPTII,S$GLB,, | Performed by: INTERNAL MEDICINE

## 2024-07-18 PROCEDURE — 99214 OFFICE O/P EST MOD 30 MIN: CPT | Mod: S$GLB,,, | Performed by: INTERNAL MEDICINE

## 2024-07-18 PROCEDURE — 3078F DIAST BP <80 MM HG: CPT | Mod: CPTII,S$GLB,, | Performed by: INTERNAL MEDICINE

## 2024-07-18 PROCEDURE — 36415 COLL VENOUS BLD VENIPUNCTURE: CPT | Mod: PN | Performed by: INTERNAL MEDICINE

## 2024-07-18 PROCEDURE — 3075F SYST BP GE 130 - 139MM HG: CPT | Mod: CPTII,S$GLB,, | Performed by: INTERNAL MEDICINE

## 2024-07-18 PROCEDURE — 84550 ASSAY OF BLOOD/URIC ACID: CPT | Mod: PN | Performed by: INTERNAL MEDICINE

## 2024-07-18 PROCEDURE — 1111F DSCHRG MED/CURRENT MED MERGE: CPT | Mod: CPTII,S$GLB,, | Performed by: INTERNAL MEDICINE

## 2024-07-18 PROCEDURE — 99999 PR PBB SHADOW E&M-EST. PATIENT-LVL III: CPT | Mod: PBBFAC,,, | Performed by: INTERNAL MEDICINE

## 2024-07-18 PROCEDURE — 1101F PT FALLS ASSESS-DOCD LE1/YR: CPT | Mod: CPTII,S$GLB,, | Performed by: INTERNAL MEDICINE

## 2024-07-18 PROCEDURE — 80053 COMPREHEN METABOLIC PANEL: CPT | Mod: PN | Performed by: INTERNAL MEDICINE

## 2024-07-18 PROCEDURE — 3288F FALL RISK ASSESSMENT DOCD: CPT | Mod: CPTII,S$GLB,, | Performed by: INTERNAL MEDICINE

## 2024-07-18 PROCEDURE — 85025 COMPLETE CBC W/AUTO DIFF WBC: CPT | Mod: PN | Performed by: INTERNAL MEDICINE

## 2024-07-18 RX ORDER — HYDROCORTISONE ACETATE 25 MG/1
25 SUPPOSITORY RECTAL 2 TIMES DAILY
Qty: 20 SUPPOSITORY | Refills: 0 | Status: SHIPPED | OUTPATIENT
Start: 2024-07-18 | End: 2024-07-28

## 2024-07-18 NOTE — PROGRESS NOTES
PATIENT: Shandra Velez  MRN: [id number]  DATE: 7/18/2024      Diagnosis:   No diagnosis found.          Chief Complaint: Follow-up    Oncologic History:     Pt initially underwent right inguinal LN biopsy 5/25/22 showing grade 1 follicular lymphoma.  Bone marrow biopsy done 06/24/2020 showed involvement with follicular lymphoma.  PET/CT 7/07/20 showed lymphadenopathy above and below the diaphragm as well as the spleen.  Also seen wasa RUL lesion.  The patient was started on CVP-R followed by maintenance rituxan every 8 weeks.  Biopsy of the RUL nodule on 6/18/21 showed SCC with PD-L1 at 86%.  EBUS 7/09/21 showed no malignant cells at station 7, 4R or 11RS.  The patient was treated with SBRT under the care of Dr Barnes with 50Gy in 4 fractions completed 10/28/21.  The patient then developed new pulmonary nodules on CT scan 6/02/22.  EBUS 6/16/22 showed positive SCC in 4R LN.  Pt was discussed at tumor board on 6/21/22 with recommendation for Keytruda.  The patient underwent PET/CT on 9/23/22 after 2 doses of treatment showing a 1.5 cm precarinal lymph node; stable right apical mass measuring 1.8 x 1.5 cm; stable 8 mm right apical lung nodule with adjacent post radiation change measuring 2 x 2.7 cm; stable 4 mm subpleural nodule in the lateral right upper lobe; fluid/mucus in the right lower lobe bronchus tree; 6 mm anterior left upper lobe nodule which is new; 2.3 cm simple cyst in the midpole of the left kidney; 3.1 x 2.3 cm rim enhancing complex cystic and solid mass in the mid to lower pole the right kidney; infrarenal abdominal aortic aneurysm measuring 3.8 x 3.5 cm; and stable a left para-aortic lymph node measuring 11 mm. 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   The patient underwent PET-CT on 11/29/2022 showing an irregular hypermetabolic right upper lobe tumor which is stable measuring 1.6 x 1.2 cm; hypermetabolic subpleural  right upper lobe consolidation diminished in extent; unchanged linear zone of hypermetabolic atelectasis in the superior segment of the left lower lobe; new poorly metabolic ill-defined infiltrate in the posterior right lower lobe; 4 mm left upper lobe nodule which is stable; hypermetabolic precarinal mediastinal lymph node stable measuring 1 cm; and hypermetabolic aortopulmonary lymph node stable measuring 1.1 x 1.1 cm.   The patient was admitted to the hospital from 1/16/23 to 1/23/23 for pneumonia.  She underwent bronchoscopy with Dr Kinney on 1/18/23 showing significant mucus plug impaction int the pharynx, left mainstem bronchus and right and left lower lobes.  The patient was discharged on 3L O2.   The patient underwent PET-CT on 03/08/2023 showing thickening of the pleura; new ground-glass opacities within the left upper lobe; patchy airspace opacities in the right lower lobe; right lower lobe pulmonary artery hypermetabolic activity possibly secondary to bronchial wall thickening or thrombus in the pulmonary artery; diffuse hypermetabolic activity identified throughout the axial skeleton.  The patient was then admitted to the hospital on 03/18/23-4/3/23 after presenting with shortness of breath and being found to have a saddle pulmonary embolus on CTA 03/18/2023.  The patient underwent treatment with a heparin drip and thrombectomy on 03/18/2023 with eventual transition to Eliquis.   The patient underwent CT chest on 04/14/2023 showing areas of air trapping in subpleural bleb formation in both jarek thoraces with areas of interstitial scarring and bibasilar bronchiectasis; stable solid nodule in left upper lobe measuring 6 mm; masslike area measuring 3 x 1.3 x 1.5 cm in the right upper lobe stable.   The patient underwent CT chest, abdomen, and pelvis on 07/10/2023 showing more consolidative appearance of previously described right apical pulmonary nodules without discretely measurable nodule on today's exam,  8 mm left apical nodule, stable consolidation and superior segment left lower lobe; 3 mm right lower lobe pulmonary nodule; heterogeneously enhancing mass in the inferior pole of the right kidney measuring 3.9 x 3.7 x 3.9 cm; unchanged left periaortic lymph nodes.   The patient underwent a PET-CT on 10/09/2023 showing a new oval-shaped hypermetabolic nodular mass measuring 1.6 x 1 cm in the right upper lobe consistent with tumor recurrence; disappearance of ground-glass opacities anterior left upper lobe; disappearance of subpleural posterior right lower lobe consolidation; new hypermetabolic bilateral inguinal lymph nodes; new rounded nodular hypermetabolic nodule within the posterior right lumbar subcutaneous fat measuring 1.5 x 1.5 cm and a 3.8cm abdominal aortic aneurysm.    The patient was discussed at Thoracic tumor board on 10/25/23 with recommendation to proceed with biopsy of the lesion in the back via IR.  Biopsy done on 11/03/23 showed fibroadipose tissue.   The patient underwent CT of the chest, abdomen, and pelvis on 12/12/2023 showing pleural parenchymal thickening in the right lung apex; 6 mm left upper lobe nodule stable; solid mass inferior right kidney measuring 4.3 x 4.3 cm; enlarged bilateral periaortic lymphadenopathy measuring 17 x 14 mm; enlarged bilateral inguinal hemipelvic lymphadenopathy with left hemipelvic lymph node measuring 4.4 x 1.8 cm and left inguinal lymph node measuring 3.5 x 1.7 cm.   The patient underwent PET-CT on 02/15/2024 showing a stable nodular focus of hypermetabolic activity in the right apical soft tissue area of consolidation; new area of ground-glass opacity more anterior in the right lung apex measuring 2.6 x 2.2 cm; multiple metastatic periaortic and bilateral iliac lymph nodes with progression from prior with a new left para-aortic lymph node measuring 19 x 17 mm SUV max 6.5, left hemipelvic node measuring 4.1 x 1.5 cm with SUV max of 9.4 enlarged inguinal lymph  node measuring 3 x 2.2 with SUV max of 9.2.      The patient underwent biopsy of left inguinal lymph node with path showing follicular lymphoma grade 3A.  Patient was discussed at  Tumor Board on 3/14/24 with recommendation for biopsy of the right renal lesion followed by potential ablation.    Subjective:    Interval History: Ms. Velez is a 83 y.o. female with HLD, HTN, osteopenia, follicular lymphoma who presents for follow up of NSCLC.  Since since the last clinic visit the patient was admitted to the hospital from 03/23/2024 until 03/28/2024 for acute on chronic anemia.  Hemoglobin on admission was 7.3 grams/deciliter.  The patient received 2 units of PRBC's on 03/24/2024.  Colonoscopy on 03/25/2024 showed a circumferential mass posterior to the anal canal found on perianal exam which looked ulcerated and arising from the anus; one 2-3 mm polyp in the mid rectum; and moderate colonic spasm consistent with irritable bowel syndrome.  Patient underwent rectal tissue biopsy on 03/27/2024 with path showing mucosal prolapse with ulceration and granulation tissue.  MRI rectum on 03/28/2024 showed the suspected perianal mass at the invasion to adjacent structures and enlarged pelvic lymph nodes.  The patient underwent biopsy of the right renal lesion showing clear cell carcinoma on pathology.    Past Medical History:     Social History:  reports that she quit smoking about 2 years ago. Her smoking use included cigarettes. She started smoking about 55 years ago. She has a 26.5 pack-year smoking history. She has never used smokeless tobacco. She reports that she does not drink alcohol and does not use drugs.    Allergies:  Review of patient's allergies indicates:   Allergen Reactions    Opioids - morphine analogues Other (See Comments)     Hypotension       Medications:  Current Outpatient Medications   Medication Sig Dispense Refill    acetaminophen (TYLENOL) 325 MG tablet Take 2 tablets (650 mg total) by mouth  every 6 (six) hours as needed for Pain.      albuterol (VENTOLIN HFA) 90 mcg/actuation inhaler Inhale 2 puffs into the lungs every 6 (six) hours as needed for Wheezing or Shortness of Breath (or before exercise). Rescue 18 g 2    apixaban (ELIQUIS) 2.5 mg Tab Take 1 tablet (2.5 mg total) by mouth 2 (two) times daily.      budesonide (PULMICORT) 0.5 mg/2 mL nebulizer solution Take 2 mLs (0.5 mg total) by nebulization 2 (two) times a day. 120 mL 11    cetirizine (ZYRTEC) 10 MG tablet Take 10 mg by mouth once daily.      ergocalciferol (ERGOCALCIFEROL) 50,000 unit Cap Take 50,000 Units by mouth every Tuesday.      hydrocortisone (ANUSOL-HC) 2.5 % rectal cream Place rectally 2 (two) times daily as needed for Hemorrhoids. 28 g 0    iron-vitamin C 100-250 mg, ICAR-C, 100-250 mg Tab Take 1 tablet by mouth 2 (two) times a day. 60 tablet 3    levalbuterol (XOPENEX) 0.63 mg/3 mL nebulizer solution Take 3 mLs (0.63 mg total) by nebulization 2 (two) times a day. Rescue 60 each 6    levothyroxine (SYNTHROID) 100 MCG tablet Take 1 tablet (100 mcg total) by mouth once daily. 90 tablet 3    mucus clearing device (ACAPELLA, FLUTTER) by Misc.(Non-Drug; Combo Route) route once daily. 100 each 0    multivitamin (THERAGRAN) per tablet Take 1 tablet by mouth once daily.      NIFEdipine (ADALAT CC) 60 MG TbSR Take 1 tablet (60 mg total) by mouth once daily.      omeprazole (PRILOSEC) 40 MG capsule Take 1 capsule (40 mg total) by mouth once daily. 90 capsule 4    OXYGEN-AIR DELIVERY SYSTEMS MISC 2-3 L by Misc.(Non-Drug; Combo Route) route as needed.      sodium chloride 3% 3 % nebulizer solution Take 4 mLs by nebulization 2 (two) times a day. 240 mL 3    temazepam (RESTORIL) 30 mg capsule Take 1 capsule (30 mg total) by mouth every evening. 30 capsule 5     No current facility-administered medications for this visit.       Review of Systems   Constitutional:  Negative for appetite change, chills, diaphoresis, fatigue, fever  and unexpected weight change.   HENT:  Negative for mouth sores.    Eyes:  Negative for visual disturbance.   Respiratory:  Positive for shortness of breath. Negative for cough.    Cardiovascular:  Negative for chest pain and palpitations.   Gastrointestinal:  Negative for abdominal pain, constipation, diarrhea, nausea and vomiting.   Genitourinary:  Negative for frequency.   Musculoskeletal:  Negative for back pain.   Skin:  Negative for rash.   Neurological:  Negative for headaches.   Hematological:  Negative for adenopathy. Does not bruise/bleed easily.   Psychiatric/Behavioral:  The patient is not nervous/anxious.        ECOG Performance Status: 2   Objective:      Vitals:       Vitals:    07/18/24 1027   BP: 130/60   Pulse: 88   Resp: 18   Temp: 97.8 °F (36.6 °C)            Physical Exam  Constitutional:       General: She is not in acute distress.     Appearance: She is well-developed. She is not diaphoretic.   HENT:      Head: Normocephalic and atraumatic.   Cardiovascular:      Rate and Rhythm: Regular rhythm. Tachycardia present.      Heart sounds: Normal heart sounds. No murmur heard.     No friction rub. No gallop.   Pulmonary:      Effort: Pulmonary effort is normal. No respiratory distress.      Breath sounds: Normal breath sounds. No wheezing or rales.   Chest:      Chest wall: No tenderness.   Abdominal:      General: Bowel sounds are normal. There is no distension.      Palpations: Abdomen is soft. There is no mass.      Tenderness: There is no abdominal tenderness. There is no guarding or rebound.   Lymphadenopathy:      Cervical: No cervical adenopathy.      Upper Body:      Right upper body: No supraclavicular or axillary adenopathy.      Left upper body: No supraclavicular or axillary adenopathy.   Skin:     Findings: No erythema or rash.   Neurological:      Mental Status: She is alert and oriented to person, place, and time.   Psychiatric:         Behavior: Behavior normal.          Imaging:    PET/CT 2/15/24  Head and neck: There is physiologic distribution of radiotracer throughout the included brain. There is no hemorrhage, hydrocephalus, or midline shift. There is no FDG avid cervical lymphadenopathy.     Chest: There is a left subclavian port in place. There is no FDG avid mediastinal or hilar lymphadenopathy. The nodular focus of hypermetabolic activity within the right apical soft tissue area of consolidation is grossly unchanged. This demonstrates an SUV max of 6.3 compared with 5.9 previously. There is a new area of ground-glass opacity more anterior in the right lung apex best visualized on image 67 which measures 2.6 x 2.2 cm with an SUV max of 4.0. This was not present on the prior exam. There is no pleural effusion.     Abdomen and pelvis: There is physiologic radiotracer throughout the liver, spleen, and collecting system. There are multiple metastatic periaortic and bilateral iliac lymph nodes which have progressed from the prior exam. The inguinal adenopathy has progressed. A representative new left periaortic lymph node best visualized on image 157 measures 19 x 17 mm with an SUV max 6.5. A left hemipelvic node best visualized on image 233 measures 4.1 x 1.5 cm. This demonstrates an SUV max 9.4. Enlarged left inguinal node best visualized on image 232 measures 3.0 x 2.2 cm compared with 2.7 x 1.1 cm previously. There is an SUV max of 9.2 compared with 6.7 previously.     Musculoskeletal: There is no FDG avid lytic or blastic osseous lesion.      6/18/24 PET CT        COMPARISON:  02/15/2024     FINDINGS:  Head and Neck:     Brain demonstrates normal metabolism.  However, FDG-PET has an approximate 60% sensitivity for brain metastases which are best detected by MRI with gadolinium.  Physiologic uptake is in the neck.     Chest:     Within the right upper lobe adjacent to the right lung apex hypermetabolic lesions identified with maximum SUV of 6.7.  This best identified  on image number 70 the soft tissue component is best identified on image number 73 and measures 1.5 by 1.6 cm with superimposed pulmonary fibrosis.  This is increased in size when compared to the prior examination as well as degree of FDG avidity with a prior maximum SUV of 6.0.  No definitive mediastinal hilar adenopathy.  Normal physiologic activity identified throughout the mediastinum.     Abdomen and Pelvis:     Normal physiologic activity in the liver.  Post treatment effect is identified of the right kidney from prior ablation peripheral hypermetabolic activity identified and may be post treatment effect given recent ablation dated 05/22/2024 hyperdense material is identified consistent with known a lipiodol.  Hypermetabolic activity identified in the rectum with maximum SUV of 5.0..  Diffuse hypermetabolic activity identified with of the retroperitoneum extending into the bilateral iliac regions greater on left than right as well as inguinal regions consistent with known lymphoma.  Hypermetabolic activity of the left inguinal lymph nodes has a maximum SUV of 7.2.  Overall size is not significantly decreased, however, FDG avidity is decreased from previous maximum SUV of 9.4.  .  Adrenal glands are normal.  Abdominal aortic aneurysm is noted.     Musculoskeletal:     No FDG avid osseous lesions are present.     Impression:     1. Post treatment effect of the right kidney recommend attention on follow-up imaging.  2. Overall decreased hypermetabolic activity is identified of known retroperitoneal adenopathy as well as inguinal adenopathy and pelvic sidewall adenopathy.  Size and distribution is not significantly changed.  3. Persistent hypermetabolic activity of the rectum consistent with known primary rectal cancer.  Overall degree of FDG activity is not significantly changed.  4. Increased hypermetabolic activity the pulmonary finding the right lung apex.       Component      Latest Ref Rng 7/18/2024   WBC       3.90 - 12.70 K/uL 9.33    RBC      4.00 - 5.40 M/uL 2.66 (L)    Hemoglobin      12.0 - 16.0 g/dL 7.6 (L)    Hematocrit      37.0 - 48.5 % 24.3 (L)    MCV      82 - 98 fL 91    MCH      27.0 - 31.0 pg 28.6    MCHC      32.0 - 36.0 g/dL 31.3 (L)    RDW      11.5 - 14.5 % 17.0 (H)    Platelet Count      150 - 450 K/uL 176    MPV      9.2 - 12.9 fL 10.3    Immature Granulocytes      0.0 - 0.5 % 0.9 (H)    Gran # (ANC)      1.8 - 7.7 K/uL 7.3    Immature Grans (Abs)      0.00 - 0.04 K/uL 0.08 (H)    Lymph #      1.0 - 4.8 K/uL 1.1    Mono #      0.3 - 1.0 K/uL 0.7    Eos #      0.0 - 0.5 K/uL 0.2    Baso #      0.00 - 0.20 K/uL 0.03    nRBC      0 /100 WBC 0    Gran %      38.0 - 73.0 % 78.0 (H)    Lymph %      18.0 - 48.0 % 11.4 (L)    Mono %      4.0 - 15.0 % 7.6    Eos %      0.0 - 8.0 % 1.8    Basophil %      0.0 - 1.9 % 0.3    Differential Method Automated    Sodium      136 - 145 mmol/L 139    Potassium      3.5 - 5.1 mmol/L 4.0    Chloride      95 - 110 mmol/L 109    CO2      23 - 29 mmol/L 18 (L)    Glucose      70 - 110 mg/dL 149 (H)    BUN      8 - 23 mg/dL 50 (H)    Creatinine      0.5 - 1.4 mg/dL 1.9 (H)    Calcium      8.7 - 10.5 mg/dL 8.8    PROTEIN TOTAL      6.0 - 8.4 g/dL 6.1    Albumin      3.5 - 5.2 g/dL 3.0 (L)    BILIRUBIN TOTAL      0.1 - 1.0 mg/dL 0.2    ALP      55 - 135 U/L 65    AST      10 - 40 U/L 11    ALT      10 - 44 U/L 5 (L)    eGFR      >60 mL/min/1.73 m^2 26.2 !    Anion Gap      8 - 16 mmol/L 12    Lactate Dehydrogenase      110 - 260 U/L 157    Uric Acid      2.4 - 5.7 mg/dL 7.5 (H)       Legend:  (L) Low  (H) High  ! Abnormal       Assessment:       No diagnosis found.       Plan:     1. NSCLC - She has NSCLC SCC with involvement of the right lung and mediastinum  -Pt with prior radiation to a RUL nodule  -PD-L1 was 86% on 6/18/21  -Pt was on Keytruda with 6 week cycles and completed 6 tx's and then developed pneumonitis  -PET/CT on 9/23/22 showed possible  pseudoprogression  -PET/CT on 11/29/22 showed stable disease with RLL infiltrate concerning for resolving PNA  -PET-CT on 03/08/2023 showed resolution of prior disease   -CT Chest 4/14/23 and 7/10/23 with stable findings  -PET/CT 10/09/23 showed avidity in the RUL mass, inguinal LAD and subcutaneous nodule in the lumbar region  -Treatment with paclitaxel discussed; however, pt is hesitant to undergo chemotherapy  -Biopsy of lumbar subcutaneous nodule showed fibroadipose tissue  -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 Renal Cell Carcinoma - 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 based on biopsy 3/28/24  -Pt not a surgical candidate per Urology  She is s/p ablation with IR      3. Rectal Lesion     - seen on colonoscopy 03/25/2024   Rectal tissue biopsy on 03/27/2024 with path showing mucosal prolapse with ulceration and granulation tissue  -MRI rectum on 03/28/2024 showed the suspected perianal mass at the invasion to adjacent structures and enlarged pelvic lymph nodes  -LAD likely from follicular lymphoma  -Referral to Colorectal Surgeon recommended but pt declined    4. Saddle pulmonary embolus - seen on CTA 03/18/2023   -She is on prophylactic eliquis 2.5mg BID given prior GI bleed    5. Follicular lymphoma     - She was previously treated with CVP-R  and then with maintenance Rituxan for 9/12 cycles with good response  --LN's increasing in size on CT 12/12/23  -PET-CT on 02/15/2024 showed continued increasing size of left inguinal lymph node with SUV max now 9.2  -Left inguinal LN biopsy 3/08/24 showed follicular lymphoma Grade 3a  -she has no B symptoms currently  -she has significant anemia, but no other  cytopenias today  -- PET CT on 6/18/24 showed  retroperitoneal adenopathy with decreased hypermetabolic activity compared to previous PET CT ,as well as  decreased metabolic activity in inguinal adenopathy and pelvic sidewall adenopathy.  Size and distribution is not significantly changed.  -she does not meet GELF criteria  at this time  -she will have repeat PET CT in 6 months        6. HTN - pt on nifedipine  -BP normal  -Will monitor    7. Hypothyroidism - pt on levothyroxine  -Management per PCP    8. Iron Deficiency Anemia - Hemoglobin improved to 12.8g/dL on 12/12/23    -Ferritin 316ng/mL 12/12/23  -Ferritin was 22ng/mL on 9/05/23  -Pt received 4 doses of venofer  -PT also received 2 units of PRBC's during recent hospital stay  -Hemoglobin 11g/dL on 3/28/24  --hemoglobin has decreased to 7.6g/dl  -she has hemorrhoidal bleeding   -no malignancy identified on colonoscopy done in March 2024  -Bilirubin normal; Cr 1.9mg/dl; LDH normal today  -Will check iron panel, b12, folate, retic count,  SPEP, LETTY

## 2024-07-18 NOTE — Clinical Note
B12, folate, retic count, spep, immunofixation, light chains, iron, tibc, ferritin today Cbc, cmp, ldh, iron, tibc, ferritin , retic in 3 months F/u in 3 motnhs with any hemo onc MD

## 2024-07-19 ENCOUNTER — PATIENT MESSAGE (OUTPATIENT)
Dept: HEMATOLOGY/ONCOLOGY | Facility: CLINIC | Age: 84
End: 2024-07-19
Payer: MEDICARE

## 2024-07-19 RX ORDER — DIPHENHYDRAMINE HYDROCHLORIDE 50 MG/ML
50 INJECTION INTRAMUSCULAR; INTRAVENOUS ONCE AS NEEDED
OUTPATIENT
Start: 2024-07-19

## 2024-07-19 RX ORDER — HEPARIN 100 UNIT/ML
500 SYRINGE INTRAVENOUS
OUTPATIENT
Start: 2024-07-19

## 2024-07-19 RX ORDER — EPINEPHRINE 0.3 MG/.3ML
0.3 INJECTION SUBCUTANEOUS ONCE AS NEEDED
OUTPATIENT
Start: 2024-07-19

## 2024-07-19 RX ORDER — SODIUM CHLORIDE 0.9 % (FLUSH) 0.9 %
10 SYRINGE (ML) INJECTION
OUTPATIENT
Start: 2024-07-19

## 2024-07-22 ENCOUNTER — PATIENT MESSAGE (OUTPATIENT)
Dept: HEMATOLOGY/ONCOLOGY | Facility: CLINIC | Age: 84
End: 2024-07-22
Payer: MEDICARE

## 2024-07-24 RX ORDER — EPINEPHRINE 0.3 MG/.3ML
0.3 INJECTION SUBCUTANEOUS ONCE AS NEEDED
OUTPATIENT
Start: 2024-07-24

## 2024-07-24 RX ORDER — EPINEPHRINE 0.3 MG/.3ML
0.3 INJECTION SUBCUTANEOUS ONCE AS NEEDED
OUTPATIENT
Start: 2024-07-31

## 2024-07-24 RX ORDER — HEPARIN 100 UNIT/ML
500 SYRINGE INTRAVENOUS
OUTPATIENT
Start: 2024-07-31

## 2024-07-24 RX ORDER — SODIUM CHLORIDE 0.9 % (FLUSH) 0.9 %
10 SYRINGE (ML) INJECTION
OUTPATIENT
Start: 2024-07-31

## 2024-07-24 RX ORDER — DIPHENHYDRAMINE HYDROCHLORIDE 50 MG/ML
50 INJECTION INTRAMUSCULAR; INTRAVENOUS ONCE AS NEEDED
OUTPATIENT
Start: 2024-07-24

## 2024-07-24 RX ORDER — SODIUM CHLORIDE 0.9 % (FLUSH) 0.9 %
10 SYRINGE (ML) INJECTION
OUTPATIENT
Start: 2024-07-24

## 2024-07-24 RX ORDER — DIPHENHYDRAMINE HYDROCHLORIDE 50 MG/ML
50 INJECTION INTRAMUSCULAR; INTRAVENOUS ONCE AS NEEDED
OUTPATIENT
Start: 2024-07-31

## 2024-07-24 RX ORDER — HEPARIN 100 UNIT/ML
500 SYRINGE INTRAVENOUS
OUTPATIENT
Start: 2024-07-24

## 2024-07-28 NOTE — PROGRESS NOTES
PATIENT: Shandra Velez  MRN: [id number]  DATE: 7/29/2024      Diagnosis:   1. 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 Symptomatic anemia    3. 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 Renal cell carcinoma, unspecified laterality    5. Rectal lesion    6. Thrombophilia    7. Hyperlipidemia, unspecified hyperlipidemia type    8. Iron deficiency anemia, unspecified iron deficiency anemia type    9. Acquired hypothyroidism    10. SOB (shortness of breath)                  Chief Complaint: Malignant neoplasm of right upper lobe of lung (Pt c/o felling sob and that her heart rate has increased )    Oncologic History:     Pt initially underwent right inguinal LN biopsy 5/25/22 showing grade 1 follicular lymphoma.  Bone marrow biopsy done 06/24/2020 showed involvement with follicular lymphoma.  PET/CT 7/07/20 showed lymphadenopathy above and below the diaphragm as well as the spleen.  Also seen wasa RUL lesion.  The patient was started on CVP-R followed by maintenance rituxan every 8 weeks.  Biopsy of the RUL nodule on 6/18/21 showed SCC with PD-L1 at 86%.  EBUS 7/09/21 showed no malignant cells at station 7, 4R or 11RS.  The patient was treated with SBRT under the care of Dr Barnes with 50Gy in 4 fractions completed 10/28/21.  The patient then developed new pulmonary nodules on CT scan 6/02/22.  EBUS 6/16/22 showed positive SCC in 4R LN.  Pt was discussed at tumor board on 6/21/22 with recommendation for Keytruda.  The patient underwent PET/CT on 9/23/22 after 2 doses of treatment showing a 1.5 cm precarinal lymph node; stable right apical mass measuring 1.8 x 1.5 cm; stable 8 mm right apical lung nodule with adjacent post radiation change measuring 2 x 2.7 cm; stable 4 mm subpleural nodule in the lateral right upper lobe; fluid/mucus in the right lower lobe bronchus tree; 6 mm anterior left upper lobe nodule which is new; 2.3 cm simple cyst in the midpole of the left kidney;  3.1 x 2.3 cm rim enhancing complex cystic and solid mass in the mid to lower pole the right kidney; infrarenal abdominal aortic aneurysm measuring 3.8 x 3.5 cm; and stable a left para-aortic lymph node measuring 11 mm. 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   The patient underwent PET-CT on 11/29/2022 showing an irregular hypermetabolic right upper lobe tumor which is stable measuring 1.6 x 1.2 cm; hypermetabolic subpleural right upper lobe consolidation diminished in extent; unchanged linear zone of hypermetabolic atelectasis in the superior segment of the left lower lobe; new poorly metabolic ill-defined infiltrate in the posterior right lower lobe; 4 mm left upper lobe nodule which is stable; hypermetabolic precarinal mediastinal lymph node stable measuring 1 cm; and hypermetabolic aortopulmonary lymph node stable measuring 1.1 x 1.1 cm.   The patient was admitted to the hospital from 1/16/23 to 1/23/23 for pneumonia.  She underwent bronchoscopy with Dr Kinney on 1/18/23 showing significant mucus plug impaction int the pharynx, left mainstem bronchus and right and left lower lobes.  The patient was discharged on 3L O2.   The patient underwent PET-CT on 03/08/2023 showing thickening of the pleura; new ground-glass opacities within the left upper lobe; patchy airspace opacities in the right lower lobe; right lower lobe pulmonary artery hypermetabolic activity possibly secondary to bronchial wall thickening or thrombus in the pulmonary artery; diffuse hypermetabolic activity identified throughout the axial skeleton.  The patient was then admitted to the hospital on 03/18/23-4/3/23 after presenting with shortness of breath and being found to have a saddle pulmonary embolus on CTA 03/18/2023.  The patient underwent treatment with a heparin drip and thrombectomy on 03/18/2023 with eventual transition to Eliquis.   The patient underwent  CT chest on 04/14/2023 showing areas of air trapping in subpleural bleb formation in both jarek thoraces with areas of interstitial scarring and bibasilar bronchiectasis; stable solid nodule in left upper lobe measuring 6 mm; masslike area measuring 3 x 1.3 x 1.5 cm in the right upper lobe stable.   The patient underwent CT chest, abdomen, and pelvis on 07/10/2023 showing more consolidative appearance of previously described right apical pulmonary nodules without discretely measurable nodule on today's exam, 8 mm left apical nodule, stable consolidation and superior segment left lower lobe; 3 mm right lower lobe pulmonary nodule; heterogeneously enhancing mass in the inferior pole of the right kidney measuring 3.9 x 3.7 x 3.9 cm; unchanged left periaortic lymph nodes.   The patient underwent a PET-CT on 10/09/2023 showing a new oval-shaped hypermetabolic nodular mass measuring 1.6 x 1 cm in the right upper lobe consistent with tumor recurrence; disappearance of ground-glass opacities anterior left upper lobe; disappearance of subpleural posterior right lower lobe consolidation; new hypermetabolic bilateral inguinal lymph nodes; new rounded nodular hypermetabolic nodule within the posterior right lumbar subcutaneous fat measuring 1.5 x 1.5 cm and a 3.8cm abdominal aortic aneurysm.    The patient was discussed at Thoracic tumor board on 10/25/23 with recommendation to proceed with biopsy of the lesion in the back via IR.  Biopsy done on 11/03/23 showed fibroadipose tissue.   The patient underwent CT of the chest, abdomen, and pelvis on 12/12/2023 showing pleural parenchymal thickening in the right lung apex; 6 mm left upper lobe nodule stable; solid mass inferior right kidney measuring 4.3 x 4.3 cm; enlarged bilateral periaortic lymphadenopathy measuring 17 x 14 mm; enlarged bilateral inguinal hemipelvic lymphadenopathy with left hemipelvic lymph node measuring 4.4 x 1.8 cm and left inguinal lymph node measuring 3.5 x  1.7 cm.   The patient underwent PET-CT on 02/15/2024 showing a stable nodular focus of hypermetabolic activity in the right apical soft tissue area of consolidation; new area of ground-glass opacity more anterior in the right lung apex measuring 2.6 x 2.2 cm; multiple metastatic periaortic and bilateral iliac lymph nodes with progression from prior with a new left para-aortic lymph node measuring 19 x 17 mm SUV max 6.5, left hemipelvic node measuring 4.1 x 1.5 cm with SUV max of 9.4 enlarged inguinal lymph node measuring 3 x 2.2 with SUV max of 9.2.      The patient underwent biopsy of left inguinal lymph node with path showing follicular lymphoma grade 3A.  Patient was discussed at  Tumor Board on 3/14/24 with recommendation for biopsy of the right renal lesion followed by potential ablation.   The patient was admitted to the hospital from 03/23/2024 until 03/28/2024 for acute on chronic anemia.  Hemoglobin on admission was 7.3 grams/deciliter.  The patient received 2 units of PRBC's on 03/24/2024.  Colonoscopy on 03/25/2024 showed a circumferential mass posterior to the anal canal found on perianal exam which looked ulcerated and arising from the anus; one 2-3 mm polyp in the mid rectum; and moderate colonic spasm consistent with irritable bowel syndrome.  Patient underwent rectal tissue biopsy on 03/27/2024 with path showing mucosal prolapse with ulceration and granulation tissue.  MRI rectum on 03/28/2024 showed the suspected perianal mass at the invasion to adjacent structures and enlarged pelvic lymph nodes.  The patient underwent biopsy of the right renal lesion showing clear cell carcinoma on pathology.   The patient saw Dr. Hansen 04/18/2024 whom felt the patient did not meet GELF criteria currently with plan on repeating PET-CT on 8 weeks and consideration of bendamustine and rituximab if lymph node size was increasing.  Patient underwent embolization to the right renal lesion on 04/29/2024.    The  patient underwent cryoablation to the right renal mass on 5/22/24.  PT underwent PET-CT on 06/18/2024 showing lesion in right upper lobe with soft tissue component measuring 1.5 x 1.6 cm increased in compared to prior; peripheral hypermetabolic activity in the right renal lesion with hyperdense material; hypermetabolic activity in the rectum; diffuse stable hypermetabolic activity in the retroperitoneum extending into the bilateral iliac regions greater on the left in the right.    Subjective:    Interval History: Ms. Velez is a 83 y.o. female with HLD, HTN, osteopenia, follicular lymphoma who presents for follow up of NSCLC.  Since since the last clinic visit the patient was admitted to the hospital from 06/20/2024 until 06/27/2024.  During admission the patient was treated for urinary tract infection was started on broad-spectrum antibiotics.  Cystoscopy was performed on 06/21/2024 with ureteral stent placement in the right ureter.  The patient also had rectal bleeding during admission and had Eliquis stopped briefly.  The patient also noted to have a gout flare in the left great toe during admission.  Currently the patient states she has shortness of breath with minimal activity with a drastic increase in her heart rate.  The patient denies CP, cough, abdominal pain, nausea, vomiting, constipation, diarrhea.  The patient denies fever, chills, night sweats, weight loss, new lumps or bumps, easy bruising or bleeding.    Past Medical History:   Past Medical History:   Diagnosis Date    Breast cancer 1985    right mastectomy    Digestive disorder     Encounter for blood transfusion     History of pneumonia     Hypercholesterolemia     Hypertension     Hyperuricemia 06/20/2024    Lab work in the ED revealed a uric acid level of 6.8.  Pt is asymptomatic for gouty arthritis.  Monitor.      Indigestion     Lumbar spondylosis     Lung cancer     2022 - currently in treatment as of 7/27/22    Lymphoma     Osteopenia      Pulmonary nodule     Renal cancer     Renal disorder     renal cancer    Retinal detachment     Smoker 06/11/2022    Thyroid disease        Past Surgical HIstory:   Past Surgical History:   Procedure Laterality Date    ANGIOGRAPHY  4/29/2024    Procedure: Right Renal Angiogram;  Surgeon: Steve Weaver MD;  Location: [facility] CATH;  Service: Interventional Radiology;;    APPENDECTOMY      BONE MARROW BIOPSY Bilateral 06/24/2020    Procedure: Biopsy-bone marrow;  Surgeon: Rod Montero MD;  Location: [facility] OR;  Service: Oncology;  Laterality: Bilateral;    BREAST RECONSTRUCTION  1990    right    BRONCHOSCOPY N/A 01/18/2023    Procedure: BRONCHOSCOPY;  Surgeon: Gregorio Kinney MD;  Location: [facility] ENDO;  Service: Pulmonary;  Laterality: N/A;    BRONCHOSCOPY Bilateral 02/10/2023    Procedure: Bronchoscopy;  Surgeon: Gregorio Kinney MD;  Location: [facility] ENDO;  Service: Pulmonary;  Laterality: Bilateral;  for airway clearance. Purple top tube for cell count diff    BRONCHOSCOPY N/A 03/13/2023    Procedure: Bronchoscopy;  Surgeon: Gregorio Kinney MD;  Location: [facility] ENDO;  Service: Pulmonary;  Laterality: N/A;  therapeutic scope. Please have purple top tube and iced saline    CATARACT EXTRACTION W/  INTRAOCULAR LENS IMPLANT Bilateral     CHOLECYSTECTOMY      COLONOSCOPY N/A 3/25/2024    Procedure: COLONOSCOPY;  Surgeon: Brigido Calvillo Jr., MD;  Location: [facility] ENDO;  Service: Endoscopy;  Laterality: N/A;    CYSTOURETEROSCOPY WITH RETROGRADE PYELOGRAPHY AND INSERTION OF STENT INTO URETER Right 6/21/2024    Procedure: CYSTOURETEROSCOPY, WITH RETROGRADE PYELOGRAM AND URETERAL STENT INSERTION;  Surgeon: Noel Priest MD;  Location: [facility] OR;  Service: Urology;  Laterality: Right;    EMBOLIZATION  4/29/2024    Procedure: Rt. Renal Embolization;  Surgeon: Steve Weaver MD;  Location: [facility] CATH;  Service: Interventional Radiology;;    ENDOBRONCHIAL ULTRASOUND Bilateral 07/09/2021    Procedure: ENDOBRONCHIAL ULTRASOUND (EBUS);   Surgeon: Gregorio Kinney MD;  Location: [facility];  Service: Pulmonary;  Laterality: Bilateral;  NEED LMA to  eval right upper paratracheal LN.     ENDOBRONCHIAL ULTRASOUND Bilateral 06/16/2022    Procedure: ENDOBRONCHIAL ULTRASOUND (EBUS);  Surgeon: Gregorio Kinney MD;  Location: [facility];  Service: Pulmonary;  Laterality: Bilateral;    INJECTION OF FACET JOINT Left 06/30/2022    Procedure: FACET JOINT Cyst Aspiration L3/4;  Surgeon: Ronnell Baeza MD;  Location: [facility] OR;  Service: Pain Management;  Laterality: Left;    INSERTION OF TUNNELED CENTRAL VENOUS CATHETER (CVC) WITH SUBCUTANEOUS PORT Left 11/04/2020    Procedure: [id number];  Surgeon: Kody Pretty MD;  Location: [facility] OR;  Service: General;  Laterality: Left;    JOINT REPLACEMENT  2008    right knee     LUMBAR LAMINECTOMY      LYMPH NODE BIOPSY      MASTECTOMY Right 1985    NEEDLE LOCALIZATION N/A 05/25/2020    Procedure: NEEDLE LOCALIZATION - lymph node bx;  Surgeon: Steve Weaver MD;  Location: [facility] CATH;  Service: Interventional Radiology;  Laterality: N/A;    NEEDLE LOCALIZATION N/A 5/22/2024    Procedure: cryoablation renal in CT with anesthesia argon IAM rep;  Surgeon: Steve Weaver MD;  Location: [facility] CATH;  Service: Interventional Radiology;  Laterality: N/A;  cryoablation renal in CT with anesthesia argon Iam rep    RECTAL BIOPSY N/A 3/27/2024    Procedure: BIOPSY, RECTUM;  Surgeon: Isabel Choi MD;  Location: [facility] OR;  Service: General;  Laterality: N/A;    RETINAL DETACHMENT SURGERY  1989    RIGHT HEART CATHETERIZATION Right 03/18/2023    Procedure: INSERTION, CATHETER, RIGHT HEART;  Surgeon: Rukhsana Lang MD;  Location: [facility] CATH;  Service: Cardiology;  Laterality: Right;    SELECTIVE UNILATERAL PULMONARY ANGIOGRAPHY  03/18/2023    Procedure: Pumonary angiography - unilateral;  Surgeon: Rukhsana Lang MD;  Location: [facility] CATH;  Service: Cardiology;;    THROMBECTOMY N/A 03/18/2023    Procedure: THROMBECTOMY;   Surgeon: Rukhsana Lang MD;  Location: [facility];  Service: Cardiology;  Laterality: N/A;    TRANSFORAMINAL EPIDURAL INJECTION OF STEROID Left 05/14/2020    Procedure: Injection,steroid,epidural,transforaminal approach, l3/4;  Surgeon: Ronnell Baeza MD;  Location: [facility] OR;  Service: Pain Management;  Laterality: Left;    TRANSFORAMINAL EPIDURAL INJECTION OF STEROID Left 03/23/2022    Procedure: Injection,steroid,epidural,transforaminal approach L3/4;  Surgeon: Ronnell Baeza MD;  Location: [facility] OR;  Service: Pain Management;  Laterality: Left;    TRANSFORAMINAL EPIDURAL INJECTION OF STEROID Left 08/01/2022    Procedure: Injection,steroid,epidural,transforaminal approach L3/4;  Surgeon: Ronnell Baeza MD;  Location: [facility] OR;  Service: Pain Management;  Laterality: Left;    TUBAL LIGATION      UMBILICAL HERNIA REPAIR         Family History:   Family History   Problem Relation Name Age of Onset    Cancer Sister          uterine    Cancer Sister          Uterine cancer     Diabetes Brother      Breast cancer Other niece 42    Glaucoma Neg Hx      Macular degeneration Neg Hx         Social History:  reports that she quit smoking about 2 years ago. Her smoking use included cigarettes. She started smoking about 55 years ago. She has a 26.5 pack-year smoking history. She has never used smokeless tobacco. She reports that she does not drink alcohol and does not use drugs.    Allergies:  Review of patient's allergies indicates:   Allergen Reactions    Opioids - morphine analogues Other (See Comments)     Hypotension       Medications:  Current Outpatient Medications   Medication Sig Dispense Refill    acetaminophen (TYLENOL) 325 MG tablet Take 2 tablets (650 mg total) by mouth every 6 (six) hours as needed for Pain.      albuterol (VENTOLIN HFA) 90 mcg/actuation inhaler Inhale 2 puffs into the lungs every 6 (six) hours as needed for Wheezing or Shortness of Breath (or before exercise). Rescue 18 g 2     apixaban (ELIQUIS) 2.5 mg Tab Take 1 tablet (2.5 mg total) by mouth 2 (two) times daily.      budesonide (PULMICORT) 0.5 mg/2 mL nebulizer solution Take 2 mLs (0.5 mg total) by nebulization 2 (two) times a day. 120 mL 11    cetirizine (ZYRTEC) 10 MG tablet Take 10 mg by mouth once daily.      ergocalciferol (ERGOCALCIFEROL) 50,000 unit Cap Take 50,000 Units by mouth every Tuesday.      hydrocortisone (ANUSOL-HC) 2.5 % rectal cream Place rectally 2 (two) times daily as needed for Hemorrhoids. 28 g 0    hydrocortisone (ANUSOL-HC) 25 mg suppository Place 1 suppository (25 mg total) rectally 2 (two) times daily. for 10 days 20 suppository 0    iron-vitamin C 100-250 mg, ICAR-C, 100-250 mg Tab Take 1 tablet by mouth 2 (two) times a day. 60 tablet 3    levalbuterol (XOPENEX) 0.63 mg/3 mL nebulizer solution Take 3 mLs (0.63 mg total) by nebulization 2 (two) times a day. Rescue 60 each 6    levothyroxine (SYNTHROID) 100 MCG tablet Take 1 tablet (100 mcg total) by mouth once daily. 90 tablet 3    mucus clearing device (ACAPELLA, FLUTTER) by Misc.(Non-Drug; Combo Route) route once daily. 100 each 0    multivitamin (THERAGRAN) per tablet Take 1 tablet by mouth once daily.      NIFEdipine (ADALAT CC) 60 MG TbSR Take 1 tablet (60 mg total) by mouth once daily.      omeprazole (PRILOSEC) 40 MG capsule Take 1 capsule (40 mg total) by mouth once daily. 90 capsule 4    OXYGEN-AIR DELIVERY SYSTEMS MISC 2-3 L by Misc.(Non-Drug; Combo Route) route as needed.      sodium chloride 3% 3 % nebulizer solution Take 4 mLs by nebulization 2 (two) times a day. 240 mL 3    temazepam (RESTORIL) 30 mg capsule Take 1 capsule (30 mg total) by mouth every evening. 30 capsule 5     No current facility-administered medications for this visit.       Review of Systems   Constitutional:  Negative for chills, fatigue, fever and unexpected weight change.   Respiratory:  Positive for shortness of breath. Negative for cough.    Cardiovascular:  Positive for  "palpitations. Negative for chest pain.   Gastrointestinal:  Negative for abdominal pain, constipation, diarrhea, nausea and vomiting.   Skin:  Negative for rash.   Neurological:  Negative for headaches.   Hematological:  Negative for adenopathy. Does not bruise/bleed easily.       ECOG Performance Status: 2   Objective:      Vitals:   Vitals:    07/29/24 1403   BP: 108/66   BP Location: Left arm   Patient Position: Sitting   BP Method: Large (Manual)   Pulse: 93   Resp: 20   Temp: 96.7 °F (35.9 °C)   TempSrc: Temporal   SpO2: 98%   Weight: 89.2 kg (196 lb 10.4 oz)   Height: 5' 4" (1.626 m)                     Physical Exam  Constitutional:       General: She is not in acute distress.     Appearance: She is well-developed. She is not diaphoretic.   HENT:      Head: Normocephalic and atraumatic.   Cardiovascular:      Rate and Rhythm: Normal rate and regular rhythm.      Heart sounds: Normal heart sounds. No murmur heard.     No friction rub. No gallop.   Pulmonary:      Effort: Pulmonary effort is normal. No respiratory distress.      Breath sounds: Normal breath sounds. No wheezing or rales.   Chest:      Chest wall: No tenderness.   Abdominal:      General: Bowel sounds are normal. There is no distension.      Palpations: Abdomen is soft. There is no mass.      Tenderness: There is no abdominal tenderness. There is no guarding or rebound.   Musculoskeletal:      Comments: Right flank pain   Lymphadenopathy:      Cervical: No cervical adenopathy.      Upper Body:      Right upper body: Supraclavicular adenopathy present. No axillary adenopathy.      Left upper body: No supraclavicular or axillary adenopathy.   Skin:     Findings: No erythema or rash.   Neurological:      Mental Status: She is alert and oriented to person, place, and time.   Psychiatric:         Behavior: Behavior normal.         Laboratory Data:  Lab Visit on 07/29/2024   Component Date Value Ref Range Status    WBC 07/29/2024 8.06  3.90 - 12.70 " K/uL Final    RBC 07/29/2024 2.74 (L)  4.00 - 5.40 M/uL Final    Hemoglobin 07/29/2024 7.6 (L)  12.0 - 16.0 g/dL Final    Hematocrit 07/29/2024 24.9 (L)  37.0 - 48.5 % Final    MCV 07/29/2024 91  82 - 98 fL Final    MCH 07/29/2024 27.7  27.0 - 31.0 pg Final    MCHC 07/29/2024 30.5 (L)  32.0 - 36.0 g/dL Final    RDW 07/29/2024 16.6 (H)  11.5 - 14.5 % Final    Platelets 07/29/2024 228  150 - 450 K/uL Final    MPV 07/29/2024 9.6  9.2 - 12.9 fL Final    Immature Granulocytes 07/29/2024 0.7 (H)  0.0 - 0.5 % Final    Gran # (ANC) 07/29/2024 5.9  1.8 - 7.7 K/uL Final    Immature Grans (Abs) 07/29/2024 0.06 (H)  0.00 - 0.04 K/uL Final    Comment: Mild elevation in immature granulocytes is non specific and   can be seen in a variety of conditions including stress response,   acute inflammation, trauma and pregnancy. Correlation with other   laboratory and clinical findings is essential.      Lymph # 07/29/2024 1.3  1.0 - 4.8 K/uL Final    Mono # 07/29/2024 0.6  0.3 - 1.0 K/uL Final    Eos # 07/29/2024 0.2  0.0 - 0.5 K/uL Final    Baso # 07/29/2024 0.03  0.00 - 0.20 K/uL Final    nRBC 07/29/2024 0  0 /100 WBC Final    Gran % 07/29/2024 73.7 (H)  38.0 - 73.0 % Final    Lymph % 07/29/2024 15.6 (L)  18.0 - 48.0 % Final    Mono % 07/29/2024 7.4  4.0 - 15.0 % Final    Eosinophil % 07/29/2024 2.2  0.0 - 8.0 % Final    Basophil % 07/29/2024 0.4  0.0 - 1.9 % Final    Differential Method 07/29/2024 Automated   Final         Imaging:    PET/CT 6/18/24  Head and Neck:     Brain demonstrates normal metabolism. However, FDG-PET has an approximate 60% sensitivity for brain metastases which are best detected by MRI with gadolinium. Physiologic uptake is in the neck.     Chest:     Within the right upper lobe adjacent to the right lung apex hypermetabolic lesions identified with maximum SUV of 6.7. This best identified on image number 70 the soft tissue component is best identified on image number 73 and measures 1.5 by 1.6 cm with  superimposed pulmonary fibrosis. This is increased in size when compared to the prior examination as well as degree of FDG avidity with a prior maximum SUV of 6.0. No definitive mediastinal hilar adenopathy. Normal physiologic activity identified throughout the mediastinum.     Abdomen and Pelvis:     Normal physiologic activity in the liver. Post treatment effect is identified of the right kidney from prior ablation peripheral hypermetabolic activity identified and may be post treatment effect given recent ablation dated 05/22/2024 hyperdense material is identified consistent with known a lipiodol. Hypermetabolic activity identified in the rectum with maximum SUV of 5.0.. Diffuse hypermetabolic activity identified with of the retroperitoneum extending into the bilateral iliac regions greater on left than right as well as inguinal regions consistent with known lymphoma. Hypermetabolic activity of the left inguinal lymph nodes has a maximum SUV of 7.2. Overall size is not significantly decreased, however, FDG avidity is decreased from previous maximum SUV of 9.4. . Adrenal glands are normal. Abdominal aortic aneurysm is noted.     Musculoskeletal:     No FDG avid osseous lesions are present.        Assessment:       1. 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 Symptomatic anemia    3. 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 Renal cell carcinoma, unspecified laterality    5. Rectal lesion    6. Thrombophilia    7. Hyperlipidemia, unspecified hyperlipidemia type    8. Iron deficiency anemia, unspecified iron deficiency anemia type    9. Acquired hypothyroidism    10. SOB (shortness of breath)               Plan:     NSCLC - Pt has NSCLC SCC with involvement of the right lung and mediastinum  -Pt with prior radiation to a RUL nodule  -PD-L1 was 86% on 6/18/21  -Pt was on Keytruda with 6 week cycles and completed 6 tx's and then developed pneumonitis  -PET/CT on 9/23/22 showed  possible pseudoprogression  -PET/CT on 11/29/22 showed stable disease with RLL infiltrate concerning for resolving PNA  -PET-CT on 03/08/2023 showed resolution of prior disease   -CT Chest 4/14/23 and 7/10/23 with stable findings  -PET/CT 10/09/23 showed avidity in the RUL mass, inguinal LAD and subcutaneous nodule in the lumbar region  -Treatment with paclitaxel discussed; however, pt is hesitant to undergo chemotherapy  -Biopsy of lumbar subcutaneous nodule showed fibroadipose tissue  -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  -Ct chest 12/12/23 stable  -PET/CT 2/15/24 showed continued nodular focus of hypermetabolic activity in the right apex with a new adjacent ground-glass area  -No clear evidence of progression on scans  -Repeat PET/CT on 06/18/2024 showed enlarging soft tissue component of right upper lobe nodule  -Pt was felt to have superimposed infection  -Pt seen by Dr Kinney on 7/16/24 with recommendation for repeat CT chest in 4 months    Renal Cell Carcinoma - 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 based on biopsy 3/28/24  -Pt not a surgical candidate per Urology  -Patient underwent embolization 04/29/2024 and cryoablation on 05/22/2024  -CT Urogram to be done 8/05/24  -Will monitor    Shortness of breath - likely form anemia of chronic disease  -Echo to be done 8/01/24 to assess for PHTN    Rectal Lesion - seen on colonoscopy 03/25/2024   Rectal tissue biopsy on 03/27/2024 with path showing mucosal prolapse with ulceration and granulation tissue  -MRI rectum on 03/28/2024 showed the suspected perianal mass at the invasion to adjacent structures and enlarged pelvic lymph nodes  -LAD likely form follicular lymphoma  -Continued avidity seen on PET-CT 06/18/2024    Saddle Pulmonary Embolus - seen on CTA 03/18/2023   -PT on prophylactic ELiquis 2.5mg BID   -Will continue    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 for 9/12 cycles with  good response  -PT with recurrent inguinal LAD which could be lymphoma  -LN's increasing in size on CT 12/12/23  -PET-CT on 02/15/2024 showed continued increasing size of left inguinal lymph node with SUV max now 9.2  -Left inguinal LN biopsy 3/08/24 showed Follicular Lymphoma Grade 3a  -Pt saw Dr Hansen on 4/18/24 whom recommended repeating scans in 8 weeks with consideration of bendamustine and rituximab if patient progresses  -Repeat PET-CT 06/18/2024 showed stable hypermetabolic activity in the retroperitoneum, bilateral iliac regions and inguinal regions  -Pt seen by Dr Hansen on 7/16/24 and recommended PET/CT in 6 months    Hypothyroidism - pt on levothyroxine  -Management per PCP    Iron Deficiency Anemia - Pt received 4 doses of venofer in the past with last dose 11/24/23  -Hemoglobin 7/6g/dL on 7/29/24  -Iron studies 7/18/24 showed a ferritin of 227ng/mL  -Unlikely iron deficiency  -Will obtain STFR    Symptomatic Anemia - pt with SOB with minimal exertion  -Hemoglobin 7.6g/dL on 7/29/24  -Will transfuse one unit of PRBC's    Route Chart for Scheduling    Med Onc Chart Routing      Follow up with physician 1 week. Pt needs STFR, CBC and type and screen today.  PT needs a blood transfusion scheduled at [facility] tomorrow.  Pt needs a CBC and CMP with appt with me next week.   Follow up with DAYAMI    Infusion scheduling note    Injection scheduling note    Labs    Imaging    Pharmacy appointment    Other referrals              Treatment Plan Information   OP PEMBROLIZUMAB 400MG Q6W   Chad Mills MD   Upcoming Treatment Dates - OP PEMBROLIZUMAB 400MG Q6W    5/12/2023       Pre-Medications       acetaminophen tablet 1,000 mg       diphenhydrAMINE (BENADRYL) 25 mg in NS 50 mL IVPB       Chemotherapy       pembrolizumab (KEYTRUDA) 400 mg in sodium chloride 0.9% SolP 116 mL infusion  6/23/2023       Pre-Medications       acetaminophen tablet 1,000 mg       diphenhydrAMINE (BENADRYL) 25 mg in NS 50 mL IVPB        Chemotherapy       pembrolizumab (KEYTRUDA) 400 mg in sodium chloride 0.9% SolP 116 mL infusion  8/4/2023       Pre-Medications       acetaminophen tablet 1,000 mg       diphenhydrAMINE (BENADRYL) 25 mg in NS 50 mL IVPB       Chemotherapy       pembrolizumab (KEYTRUDA) 400 mg in sodium chloride 0.9% SolP 116 mL infusion  9/15/2023       Pre-Medications       acetaminophen tablet 1,000 mg       diphenhydrAMINE (BENADRYL) 25 mg in NS 50 mL IVPB       Chemotherapy       pembrolizumab (KEYTRUDA) 400 mg in sodium chloride 0.9% SolP 116 mL infusion    Supportive Plan Information  OP FERRIC CARBOXYMALTOSE Q2W   Rukhsana Hansen MD   Upcoming Treatment Dates - OP FERRIC CARBOXYMALTOSE Q2W    7/24/2024       Supportive Care       ferric carboxymaltose (INJECTAFER) 750 mg in sodium chloride 0.9% 265 mL IVPB (ready to mix)  7/31/2024       Supportive Care       ferric carboxymaltose (INJECTAFER) 750 mg in sodium chloride 0.9% 265 mL IVPB (ready to mix)    Therapy Plan Information  PORT FLUSH  Flushes  heparin, porcine (PF) 100 unit/mL injection flush 500 Units  500 Units, Intravenous, Every visit  sodium chloride 0.9% flush 10 mL  10 mL, Intravenous, Every visit    INF PEGFILGRASTIM (NEULASTA)  pegfilgrastim (NEULASTA) injection 6 mg  6 mg, Subcutaneous, Every visit        Chad Mills MD  [facility]  Hematology and Oncology  [facility] [address] [place] [address]   O: [phone]  F: [phone]

## 2024-07-29 ENCOUNTER — LAB VISIT (OUTPATIENT)
Dept: LAB | Facility: HOSPITAL | Age: 84
End: 2024-07-29
Attending: INTERNAL MEDICINE
Payer: MEDICARE

## 2024-07-29 ENCOUNTER — OFFICE VISIT (OUTPATIENT)
Dept: HEMATOLOGY/ONCOLOGY | Facility: CLINIC | Age: 84
End: 2024-07-29
Payer: MEDICARE

## 2024-07-29 VITALS
RESPIRATION RATE: 20 BRPM | WEIGHT: 196.63 LBS | HEIGHT: 64 IN | OXYGEN SATURATION: 98 % | HEART RATE: 93 BPM | TEMPERATURE: 97 F | BODY MASS INDEX: 33.57 KG/M2 | SYSTOLIC BLOOD PRESSURE: 108 MMHG | DIASTOLIC BLOOD PRESSURE: 66 MMHG

## 2024-07-29 DIAGNOSIS — D50.9 IRON DEFICIENCY ANEMIA, UNSPECIFIED IRON DEFICIENCY ANEMIA TYPE: ICD-10-CM

## 2024-07-29 DIAGNOSIS — D64.9 SYMPTOMATIC ANEMIA: Primary | ICD-10-CM

## 2024-07-29 DIAGNOSIS — E03.9 ACQUIRED HYPOTHYROIDISM: ICD-10-CM

## 2024-07-29 DIAGNOSIS — K62.9 RECTAL LESION: ICD-10-CM

## 2024-07-29 DIAGNOSIS — C34.11 MALIGNANT NEOPLASM OF RIGHT UPPER LOBE OF LUNG: ICD-10-CM

## 2024-07-29 DIAGNOSIS — C64.9 RENAL CELL CARCINOMA, UNSPECIFIED LATERALITY: ICD-10-CM

## 2024-07-29 DIAGNOSIS — C82.25 GRADE 3 FOLLICULAR LYMPHOMA OF LYMPH NODES OF INGUINAL REGION: ICD-10-CM

## 2024-07-29 DIAGNOSIS — E78.5 HYPERLIPIDEMIA, UNSPECIFIED HYPERLIPIDEMIA TYPE: ICD-10-CM

## 2024-07-29 DIAGNOSIS — D64.9 SYMPTOMATIC ANEMIA: ICD-10-CM

## 2024-07-29 DIAGNOSIS — R06.02 SOB (SHORTNESS OF BREATH): ICD-10-CM

## 2024-07-29 DIAGNOSIS — D68.59 THROMBOPHILIA: ICD-10-CM

## 2024-07-29 DIAGNOSIS — C82.25 GRADE 3 FOLLICULAR LYMPHOMA OF LYMPH NODES OF INGUINAL REGION: Primary | ICD-10-CM

## 2024-07-29 LAB
ABO + RH BLD: NORMAL
BASOPHILS # BLD AUTO: 0.03 K/UL (ref 0–0.2)
BASOPHILS NFR BLD: 0.4 % (ref 0–1.9)
BLD GP AB SCN CELLS X3 SERPL QL: NORMAL
DIFFERENTIAL METHOD BLD: ABNORMAL
EOSINOPHIL # BLD AUTO: 0.2 K/UL (ref 0–0.5)
EOSINOPHIL NFR BLD: 2.2 % (ref 0–8)
ERYTHROCYTE [DISTWIDTH] IN BLOOD BY AUTOMATED COUNT: 16.6 % (ref 11.5–14.5)
HCT VFR BLD AUTO: 24.9 % (ref 37–48.5)
HGB BLD-MCNC: 7.6 G/DL (ref 12–16)
IMM GRANULOCYTES # BLD AUTO: 0.06 K/UL (ref 0–0.04)
IMM GRANULOCYTES NFR BLD AUTO: 0.7 % (ref 0–0.5)
LYMPHOCYTES # BLD AUTO: 1.3 K/UL (ref 1–4.8)
LYMPHOCYTES NFR BLD: 15.6 % (ref 18–48)
MCH RBC QN AUTO: 27.7 PG (ref 27–31)
MCHC RBC AUTO-ENTMCNC: 30.5 G/DL (ref 32–36)
MCV RBC AUTO: 91 FL (ref 82–98)
MONOCYTES # BLD AUTO: 0.6 K/UL (ref 0.3–1)
MONOCYTES NFR BLD: 7.4 % (ref 4–15)
NEUTROPHILS # BLD AUTO: 5.9 K/UL (ref 1.8–7.7)
NEUTROPHILS NFR BLD: 73.7 % (ref 38–73)
NRBC BLD-RTO: 0 /100 WBC
PLATELET # BLD AUTO: 228 K/UL (ref 150–450)
PMV BLD AUTO: 9.6 FL (ref 9.2–12.9)
RBC # BLD AUTO: 2.74 M/UL (ref 4–5.4)
SPECIMEN OUTDATE: NORMAL
WBC # BLD AUTO: 8.06 K/UL (ref 3.9–12.7)

## 2024-07-29 PROCEDURE — 1159F MED LIST DOCD IN RCRD: CPT | Mod: CPTII,S$GLB,, | Performed by: INTERNAL MEDICINE

## 2024-07-29 PROCEDURE — 86923 COMPATIBILITY TEST ELECTRIC: CPT | Performed by: INTERNAL MEDICINE

## 2024-07-29 PROCEDURE — 3078F DIAST BP <80 MM HG: CPT | Mod: CPTII,S$GLB,, | Performed by: INTERNAL MEDICINE

## 2024-07-29 PROCEDURE — 3288F FALL RISK ASSESSMENT DOCD: CPT | Mod: CPTII,S$GLB,, | Performed by: INTERNAL MEDICINE

## 2024-07-29 PROCEDURE — 86850 RBC ANTIBODY SCREEN: CPT | Performed by: INTERNAL MEDICINE

## 2024-07-29 PROCEDURE — 3074F SYST BP LT 130 MM HG: CPT | Mod: CPTII,S$GLB,, | Performed by: INTERNAL MEDICINE

## 2024-07-29 PROCEDURE — 86923 COMPATIBILITY TEST ELECTRIC: CPT | Mod: PN | Performed by: INTERNAL MEDICINE

## 2024-07-29 PROCEDURE — 1101F PT FALLS ASSESS-DOCD LE1/YR: CPT | Mod: CPTII,S$GLB,, | Performed by: INTERNAL MEDICINE

## 2024-07-29 PROCEDURE — 86850 RBC ANTIBODY SCREEN: CPT | Mod: PN | Performed by: INTERNAL MEDICINE

## 2024-07-29 PROCEDURE — 84238 ASSAY NONENDOCRINE RECEPTOR: CPT | Performed by: INTERNAL MEDICINE

## 2024-07-29 PROCEDURE — 86900 BLOOD TYPING SEROLOGIC ABO: CPT | Mod: PN | Performed by: INTERNAL MEDICINE

## 2024-07-29 PROCEDURE — 99215 OFFICE O/P EST HI 40 MIN: CPT | Mod: S$GLB,,, | Performed by: INTERNAL MEDICINE

## 2024-07-29 PROCEDURE — 86900 BLOOD TYPING SEROLOGIC ABO: CPT | Performed by: INTERNAL MEDICINE

## 2024-07-29 PROCEDURE — 36415 COLL VENOUS BLD VENIPUNCTURE: CPT | Mod: PN | Performed by: INTERNAL MEDICINE

## 2024-07-29 PROCEDURE — 1126F AMNT PAIN NOTED NONE PRSNT: CPT | Mod: CPTII,S$GLB,, | Performed by: INTERNAL MEDICINE

## 2024-07-29 PROCEDURE — 85025 COMPLETE CBC W/AUTO DIFF WBC: CPT | Mod: PN | Performed by: INTERNAL MEDICINE

## 2024-07-29 PROCEDURE — 99999 PR PBB SHADOW E&M-EST. PATIENT-LVL IV: CPT | Mod: PBBFAC,,, | Performed by: INTERNAL MEDICINE

## 2024-07-29 RX ORDER — HYDROCODONE BITARTRATE AND ACETAMINOPHEN 500; 5 MG/1; MG/1
TABLET ORAL ONCE
Status: CANCELLED | OUTPATIENT
Start: 2024-07-29 | End: 2024-07-29

## 2024-07-29 RX ORDER — ACETAMINOPHEN 325 MG/1
650 TABLET ORAL
Status: CANCELLED | OUTPATIENT
Start: 2024-07-29

## 2024-07-29 RX ORDER — DIPHENHYDRAMINE HCL 25 MG
25 CAPSULE ORAL
Status: CANCELLED | OUTPATIENT
Start: 2024-07-29

## 2024-07-31 ENCOUNTER — DOCUMENT SCAN (OUTPATIENT)
Dept: HOME HEALTH SERVICES | Facility: HOSPITAL | Age: 84
End: 2024-07-31
Payer: MEDICARE

## 2024-07-31 LAB
BLD PROD TYP BPU: NORMAL
BLOOD UNIT EXPIRATION DATE: NORMAL
BLOOD UNIT TYPE CODE: 6200
BLOOD UNIT TYPE: NORMAL
CODING SYSTEM: NORMAL
CROSSMATCH INTERPRETATION: NORMAL
DISPENSE STATUS: NORMAL
NUM UNITS TRANS PACKED RBC: NORMAL
STFR SERPL-MCNC: 9.4 MG/L (ref 1.8–4.6)

## 2024-08-01 RX ORDER — SODIUM CHLORIDE 0.9 % (FLUSH) 0.9 %
10 SYRINGE (ML) INJECTION
OUTPATIENT
Start: 2024-08-26

## 2024-08-01 RX ORDER — SODIUM CHLORIDE 0.9 % (FLUSH) 0.9 %
10 SYRINGE (ML) INJECTION
Status: CANCELLED | OUTPATIENT
Start: 2024-08-05

## 2024-08-01 RX ORDER — ACETAMINOPHEN 325 MG/1
650 TABLET ORAL
Status: CANCELLED | OUTPATIENT
Start: 2024-08-05

## 2024-08-01 RX ORDER — DIPHENHYDRAMINE HYDROCHLORIDE 50 MG/ML
50 INJECTION INTRAMUSCULAR; INTRAVENOUS ONCE AS NEEDED
Status: CANCELLED | OUTPATIENT
Start: 2024-08-05

## 2024-08-01 RX ORDER — HEPARIN 100 UNIT/ML
500 SYRINGE INTRAVENOUS
OUTPATIENT
Start: 2024-08-19

## 2024-08-01 RX ORDER — SODIUM CHLORIDE 0.9 % (FLUSH) 0.9 %
10 SYRINGE (ML) INJECTION
OUTPATIENT
Start: 2024-08-12

## 2024-08-01 RX ORDER — EPINEPHRINE 0.3 MG/.3ML
0.3 INJECTION SUBCUTANEOUS ONCE AS NEEDED
Status: CANCELLED | OUTPATIENT
Start: 2024-08-05

## 2024-08-01 RX ORDER — HEPARIN 100 UNIT/ML
500 SYRINGE INTRAVENOUS
OUTPATIENT
Start: 2024-09-02

## 2024-08-01 RX ORDER — HEPARIN 100 UNIT/ML
500 SYRINGE INTRAVENOUS
OUTPATIENT
Start: 2024-08-05

## 2024-08-01 RX ORDER — HEPARIN 100 UNIT/ML
500 SYRINGE INTRAVENOUS
Status: CANCELLED | OUTPATIENT
Start: 2024-08-05

## 2024-08-01 RX ORDER — SODIUM CHLORIDE 0.9 % (FLUSH) 0.9 %
10 SYRINGE (ML) INJECTION
OUTPATIENT
Start: 2024-08-05

## 2024-08-01 RX ORDER — SODIUM CHLORIDE 0.9 % (FLUSH) 0.9 %
10 SYRINGE (ML) INJECTION
OUTPATIENT
Start: 2024-09-09

## 2024-08-01 RX ORDER — SODIUM CHLORIDE 0.9 % (FLUSH) 0.9 %
10 SYRINGE (ML) INJECTION
OUTPATIENT
Start: 2024-09-02

## 2024-08-01 RX ORDER — HEPARIN 100 UNIT/ML
500 SYRINGE INTRAVENOUS
OUTPATIENT
Start: 2024-08-12

## 2024-08-01 RX ORDER — HEPARIN 100 UNIT/ML
500 SYRINGE INTRAVENOUS
OUTPATIENT
Start: 2024-09-09

## 2024-08-01 RX ORDER — HEPARIN 100 UNIT/ML
500 SYRINGE INTRAVENOUS
OUTPATIENT
Start: 2024-08-26

## 2024-08-01 RX ORDER — SODIUM CHLORIDE 0.9 % (FLUSH) 0.9 %
10 SYRINGE (ML) INJECTION
OUTPATIENT
Start: 2024-08-19

## 2024-08-02 ENCOUNTER — DOCUMENT SCAN (OUTPATIENT)
Dept: HOME HEALTH SERVICES | Facility: HOSPITAL | Age: 84
End: 2024-08-02
Payer: MEDICARE

## 2024-08-02 ENCOUNTER — PATIENT MESSAGE (OUTPATIENT)
Dept: HEMATOLOGY/ONCOLOGY | Facility: CLINIC | Age: 84
End: 2024-08-02
Payer: MEDICARE

## 2024-08-02 DIAGNOSIS — N13.30 HYDRONEPHROSIS: Primary | ICD-10-CM

## 2024-08-02 RX ORDER — SODIUM CHLORIDE 0.9 % (FLUSH) 0.9 %
10 SYRINGE (ML) INJECTION
OUTPATIENT
Start: 2024-09-23

## 2024-08-02 RX ORDER — HEPARIN 100 UNIT/ML
500 SYRINGE INTRAVENOUS
OUTPATIENT
Start: 2024-09-16

## 2024-08-02 RX ORDER — SODIUM CHLORIDE 0.9 % (FLUSH) 0.9 %
10 SYRINGE (ML) INJECTION
OUTPATIENT
Start: 2024-09-16

## 2024-08-02 RX ORDER — HEPARIN 100 UNIT/ML
500 SYRINGE INTRAVENOUS
OUTPATIENT
Start: 2024-09-23

## 2024-08-05 ENCOUNTER — HOSPITAL ENCOUNTER (OUTPATIENT)
Dept: RADIOLOGY | Facility: HOSPITAL | Age: 84
Discharge: HOME | End: 2024-08-05
Attending: STUDENT IN AN ORGANIZED HEALTH CARE EDUCATION/TRAINING PROGRAM
Payer: MEDICARE

## 2024-08-05 ENCOUNTER — TELEPHONE (OUTPATIENT)
Dept: INFUSION THERAPY | Facility: HOSPITAL | Age: 84
End: 2024-08-05
Payer: MEDICARE

## 2024-08-05 DIAGNOSIS — N13.30 HYDRONEPHROSIS OF RIGHT KIDNEY: ICD-10-CM

## 2024-08-05 PROCEDURE — 25500020 PHARM REV CODE 255: Mod: PO | Performed by: STUDENT IN AN ORGANIZED HEALTH CARE EDUCATION/TRAINING PROGRAM

## 2024-08-05 PROCEDURE — 74178 CT ABD&PLV WO CNTR FLWD CNTR: CPT | Mod: TC,PO

## 2024-08-05 PROCEDURE — 74178 CT ABD&PLV WO CNTR FLWD CNTR: CPT | Mod: 26,,, | Performed by: STUDENT IN AN ORGANIZED HEALTH CARE EDUCATION/TRAINING PROGRAM

## 2024-08-05 RX ADMIN — IOHEXOL 100 ML: 350 INJECTION, SOLUTION INTRAVENOUS at 12:08

## 2024-08-06 ENCOUNTER — EXTERNAL HOME HEALTH (OUTPATIENT)
Dept: HOME HEALTH SERVICES | Facility: HOSPITAL | Age: 84
End: 2024-08-06
Payer: MEDICARE

## 2024-08-08 ENCOUNTER — DOCUMENT SCAN (OUTPATIENT)
Dept: HOME HEALTH SERVICES | Facility: HOSPITAL | Age: 84
End: 2024-08-08
Payer: MEDICARE

## 2024-08-08 DIAGNOSIS — C82.00 FOLLICULAR LYMPHOMA GRADE I, UNSPECIFIED BODY REGION: ICD-10-CM

## 2024-08-08 RX ORDER — OMEPRAZOLE 40 MG/1
40 CAPSULE, DELAYED RELEASE ORAL DAILY
Qty: 90 CAPSULE | Refills: 4 | Status: SHIPPED | OUTPATIENT
Start: 2024-08-08

## 2024-08-09 ENCOUNTER — INFUSION (OUTPATIENT)
Dept: INFUSION THERAPY | Facility: HOSPITAL | Age: 84
End: 2024-08-09
Attending: INTERNAL MEDICINE
Payer: MEDICARE

## 2024-08-09 VITALS
HEART RATE: 78 BPM | RESPIRATION RATE: 16 BRPM | DIASTOLIC BLOOD PRESSURE: 56 MMHG | TEMPERATURE: 98 F | HEIGHT: 64 IN | SYSTOLIC BLOOD PRESSURE: 123 MMHG | WEIGHT: 196.63 LBS | BODY MASS INDEX: 33.57 KG/M2

## 2024-08-09 DIAGNOSIS — D50.0 IRON DEFICIENCY ANEMIA DUE TO CHRONIC BLOOD LOSS: Primary | ICD-10-CM

## 2024-08-09 PROCEDURE — 63600175 PHARM REV CODE 636 W HCPCS: Mod: PN

## 2024-08-09 PROCEDURE — 25000003 PHARM REV CODE 250: Mod: PN

## 2024-08-09 PROCEDURE — A4216 STERILE WATER/SALINE, 10 ML: HCPCS | Mod: PN

## 2024-08-09 PROCEDURE — 96365 THER/PROPH/DIAG IV INF INIT: CPT | Mod: PN

## 2024-08-09 RX ORDER — HEPARIN 100 UNIT/ML
500 SYRINGE INTRAVENOUS
Status: DISCONTINUED | OUTPATIENT
Start: 2024-08-09 | End: 2024-08-09 | Stop reason: HOSPADM

## 2024-08-09 RX ORDER — SODIUM CHLORIDE 0.9 % (FLUSH) 0.9 %
10 SYRINGE (ML) INJECTION
Status: DISCONTINUED | OUTPATIENT
Start: 2024-08-09 | End: 2024-08-09 | Stop reason: HOSPADM

## 2024-08-09 RX ADMIN — Medication 10 ML: at 01:08

## 2024-08-09 RX ADMIN — SODIUM CHLORIDE 125 MG: 9 INJECTION, SOLUTION INTRAVENOUS at 11:08

## 2024-08-09 RX ADMIN — SODIUM CHLORIDE: 9 INJECTION, SOLUTION INTRAVENOUS at 11:08

## 2024-08-09 RX ADMIN — Medication 500 UNITS: at 01:08

## 2024-08-11 NOTE — PROGRESS NOTES
PATIENT: Shandra Velez  MRN: [id number]  DATE: 8/12/2024      Diagnosis:   1. Follicular lymphoma grade I, unspecified body region    2. Shortness of breath    3. 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 Renal cell carcinoma, unspecified laterality    5. Rectal lesion    6. Thrombophilia    7. Hyperlipidemia, unspecified hyperlipidemia type    8. Iron deficiency anemia, unspecified iron deficiency anemia type    9. Acquired hypothyroidism                    Chief Complaint: Grade 3 follicular lymphoma of lymph nodes of inguinal cara (3 week follow up)    Oncologic History:     Pt initially underwent right inguinal LN biopsy 5/25/22 showing grade 1 follicular lymphoma.  Bone marrow biopsy done 06/24/2020 showed involvement with follicular lymphoma.  PET/CT 7/07/20 showed lymphadenopathy above and below the diaphragm as well as the spleen.  Also seen wasa RUL lesion.  The patient was started on CVP-R followed by maintenance rituxan every 8 weeks.  Biopsy of the RUL nodule on 6/18/21 showed SCC with PD-L1 at 86%.  EBUS 7/09/21 showed no malignant cells at station 7, 4R or 11RS.  The patient was treated with SBRT under the care of Dr Barnes with 50Gy in 4 fractions completed 10/28/21.  The patient then developed new pulmonary nodules on CT scan 6/02/22.  EBUS 6/16/22 showed positive SCC in 4R LN.  Pt was discussed at tumor board on 6/21/22 with recommendation for Keytruda.  The patient underwent PET/CT on 9/23/22 after 2 doses of treatment showing a 1.5 cm precarinal lymph node; stable right apical mass measuring 1.8 x 1.5 cm; stable 8 mm right apical lung nodule with adjacent post radiation change measuring 2 x 2.7 cm; stable 4 mm subpleural nodule in the lateral right upper lobe; fluid/mucus in the right lower lobe bronchus tree; 6 mm anterior left upper lobe nodule which is new; 2.3 cm simple cyst in the midpole of the left kidney; 3.1 x 2.3 cm rim enhancing complex cystic and solid mass in the mid  to lower pole the right kidney; infrarenal abdominal aortic aneurysm measuring 3.8 x 3.5 cm; and stable a left para-aortic lymph node measuring 11 mm. 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   The patient underwent PET-CT on 11/29/2022 showing an irregular hypermetabolic right upper lobe tumor which is stable measuring 1.6 x 1.2 cm; hypermetabolic subpleural right upper lobe consolidation diminished in extent; unchanged linear zone of hypermetabolic atelectasis in the superior segment of the left lower lobe; new poorly metabolic ill-defined infiltrate in the posterior right lower lobe; 4 mm left upper lobe nodule which is stable; hypermetabolic precarinal mediastinal lymph node stable measuring 1 cm; and hypermetabolic aortopulmonary lymph node stable measuring 1.1 x 1.1 cm.   The patient was admitted to the hospital from 1/16/23 to 1/23/23 for pneumonia.  She underwent bronchoscopy with Dr Kinney on 1/18/23 showing significant mucus plug impaction int the pharynx, left mainstem bronchus and right and left lower lobes.  The patient was discharged on 3L O2.   The patient underwent PET-CT on 03/08/2023 showing thickening of the pleura; new ground-glass opacities within the left upper lobe; patchy airspace opacities in the right lower lobe; right lower lobe pulmonary artery hypermetabolic activity possibly secondary to bronchial wall thickening or thrombus in the pulmonary artery; diffuse hypermetabolic activity identified throughout the axial skeleton.  The patient was then admitted to the hospital on 03/18/23-4/3/23 after presenting with shortness of breath and being found to have a saddle pulmonary embolus on CTA 03/18/2023.  The patient underwent treatment with a heparin drip and thrombectomy on 03/18/2023 with eventual transition to Eliquis.   The patient underwent CT chest on 04/14/2023 showing areas of air trapping in subpleural  bleb formation in both jarek thoraces with areas of interstitial scarring and bibasilar bronchiectasis; stable solid nodule in left upper lobe measuring 6 mm; masslike area measuring 3 x 1.3 x 1.5 cm in the right upper lobe stable.   The patient underwent CT chest, abdomen, and pelvis on 07/10/2023 showing more consolidative appearance of previously described right apical pulmonary nodules without discretely measurable nodule on today's exam, 8 mm left apical nodule, stable consolidation and superior segment left lower lobe; 3 mm right lower lobe pulmonary nodule; heterogeneously enhancing mass in the inferior pole of the right kidney measuring 3.9 x 3.7 x 3.9 cm; unchanged left periaortic lymph nodes.   The patient underwent a PET-CT on 10/09/2023 showing a new oval-shaped hypermetabolic nodular mass measuring 1.6 x 1 cm in the right upper lobe consistent with tumor recurrence; disappearance of ground-glass opacities anterior left upper lobe; disappearance of subpleural posterior right lower lobe consolidation; new hypermetabolic bilateral inguinal lymph nodes; new rounded nodular hypermetabolic nodule within the posterior right lumbar subcutaneous fat measuring 1.5 x 1.5 cm and a 3.8cm abdominal aortic aneurysm.    The patient was discussed at Thoracic tumor board on 10/25/23 with recommendation to proceed with biopsy of the lesion in the back via IR.  Biopsy done on 11/03/23 showed fibroadipose tissue.   The patient underwent CT of the chest, abdomen, and pelvis on 12/12/2023 showing pleural parenchymal thickening in the right lung apex; 6 mm left upper lobe nodule stable; solid mass inferior right kidney measuring 4.3 x 4.3 cm; enlarged bilateral periaortic lymphadenopathy measuring 17 x 14 mm; enlarged bilateral inguinal hemipelvic lymphadenopathy with left hemipelvic lymph node measuring 4.4 x 1.8 cm and left inguinal lymph node measuring 3.5 x 1.7 cm.   The patient underwent PET-CT on 02/15/2024 showing a  stable nodular focus of hypermetabolic activity in the right apical soft tissue area of consolidation; new area of ground-glass opacity more anterior in the right lung apex measuring 2.6 x 2.2 cm; multiple metastatic periaortic and bilateral iliac lymph nodes with progression from prior with a new left para-aortic lymph node measuring 19 x 17 mm SUV max 6.5, left hemipelvic node measuring 4.1 x 1.5 cm with SUV max of 9.4 enlarged inguinal lymph node measuring 3 x 2.2 with SUV max of 9.2.      The patient underwent biopsy of left inguinal lymph node with path showing follicular lymphoma grade 3A.  Patient was discussed at  Tumor Board on 3/14/24 with recommendation for biopsy of the right renal lesion followed by potential ablation.   The patient was admitted to the hospital from 03/23/2024 until 03/28/2024 for acute on chronic anemia.  Hemoglobin on admission was 7.3 grams/deciliter.  The patient received 2 units of PRBC's on 03/24/2024.  Colonoscopy on 03/25/2024 showed a circumferential mass posterior to the anal canal found on perianal exam which looked ulcerated and arising from the anus; one 2-3 mm polyp in the mid rectum; and moderate colonic spasm consistent with irritable bowel syndrome.  Patient underwent rectal tissue biopsy on 03/27/2024 with path showing mucosal prolapse with ulceration and granulation tissue.  MRI rectum on 03/28/2024 showed the suspected perianal mass at the invasion to adjacent structures and enlarged pelvic lymph nodes.  The patient underwent biopsy of the right renal lesion showing clear cell carcinoma on pathology.   The patient saw Dr. Hansen 04/18/2024 whom felt the patient did not meet GELF criteria currently with plan on repeating PET-CT on 8 weeks and consideration of bendamustine and rituximab if lymph node size was increasing.  Patient underwent embolization to the right renal lesion on 04/29/2024.    The patient underwent cryoablation to the right renal mass on 5/22/24.   PT underwent PET-CT on 06/18/2024 showing lesion in right upper lobe with soft tissue component measuring 1.5 x 1.6 cm increased in compared to prior; peripheral hypermetabolic activity in the right renal lesion with hyperdense material; hypermetabolic activity in the rectum; diffuse stable hypermetabolic activity in the retroperitoneum extending into the bilateral iliac regions greater on the left in the right.    The patient was admitted to the hospital from 06/20/2024 until 06/27/2024.  During admission the patient was treated for urinary tract infection was started on broad-spectrum antibiotics.  Cystoscopy was performed on 06/21/2024 with ureteral stent placement in the right ureter.    Subjective:    Interval History: Ms. Velez is a 83 y.o. female with HLD, HTN, osteopenia, follicular lymphoma who presents for follow up of NSCLC.  Since since the last clinic visit labs from 07/29/2024 showed an elevated STFR at 9.4.  Pt received PRBC transfusion 7/31/24.  Pt received ferric gluconate 125mg on 8/09/24.  The patient states that she felt much better after the blood infusion and iron infusion.  The patient denies CP, cough, SOB, abdominal pain, nausea, vomiting, constipation, diarrhea.  The patient denies fever, chills, night sweats, weight loss, new lumps or bumps, easy bruising or bleeding.    Past Medical History:   Past Medical History:   Diagnosis Date    Breast cancer 1985    right mastectomy    Digestive disorder     Encounter for blood transfusion     History of pneumonia     Hypercholesterolemia     Hypertension     Hyperuricemia 06/20/2024    Lab work in the ED revealed a uric acid level of 6.8.  Pt is asymptomatic for gouty arthritis.  Monitor.      Indigestion     Lumbar spondylosis     Lung cancer     2022 - currently in treatment as of 7/27/22    Lymphoma     Osteopenia     Pulmonary nodule     Renal cancer     Renal disorder     renal cancer    Retinal detachment     Smoker 06/11/2022    Thyroid  disease        Past Surgical HIstory:   Past Surgical History:   Procedure Laterality Date    ANGIOGRAPHY  4/29/2024    Procedure: Right Renal Angiogram;  Surgeon: Steve Weaver MD;  Location: [facility] CATH;  Service: Interventional Radiology;;    APPENDECTOMY      BONE MARROW BIOPSY Bilateral 06/24/2020    Procedure: Biopsy-bone marrow;  Surgeon: Rod Montero MD;  Location: [facility] OR;  Service: Oncology;  Laterality: Bilateral;    BREAST RECONSTRUCTION  1990    right    BRONCHOSCOPY N/A 01/18/2023    Procedure: BRONCHOSCOPY;  Surgeon: Gregorio Kinney MD;  Location: [facility] ENDO;  Service: Pulmonary;  Laterality: N/A;    BRONCHOSCOPY Bilateral 02/10/2023    Procedure: Bronchoscopy;  Surgeon: Gregorio Kinney MD;  Location: [facility] ENDO;  Service: Pulmonary;  Laterality: Bilateral;  for airway clearance. Purple top tube for cell count diff    BRONCHOSCOPY N/A 03/13/2023    Procedure: Bronchoscopy;  Surgeon: Gregorio Kinney MD;  Location: [facility] ENDO;  Service: Pulmonary;  Laterality: N/A;  therapeutic scope. Please have purple top tube and iced saline    CATARACT EXTRACTION W/  INTRAOCULAR LENS IMPLANT Bilateral     CHOLECYSTECTOMY      COLONOSCOPY N/A 3/25/2024    Procedure: COLONOSCOPY;  Surgeon: Brigido Calvillo Jr., MD;  Location: [facility] ENDO;  Service: Endoscopy;  Laterality: N/A;    CYSTOURETEROSCOPY WITH RETROGRADE PYELOGRAPHY AND INSERTION OF STENT INTO URETER Right 6/21/2024    Procedure: CYSTOURETEROSCOPY, WITH RETROGRADE PYELOGRAM AND URETERAL STENT INSERTION;  Surgeon: Noel Priest MD;  Location: [facility] OR;  Service: Urology;  Laterality: Right;    EMBOLIZATION  4/29/2024    Procedure: Rt. Renal Embolization;  Surgeon: Steve Weaver MD;  Location: [facility] CATH;  Service: Interventional Radiology;;    ENDOBRONCHIAL ULTRASOUND Bilateral 07/09/2021    Procedure: ENDOBRONCHIAL ULTRASOUND (EBUS);  Surgeon: Gregorio Kinney MD;  Location: [facility];  Service: Pulmonary;  Laterality: Bilateral;  NEED LMA to  eval right upper  paratracheal LN.     ENDOBRONCHIAL ULTRASOUND Bilateral 06/16/2022    Procedure: ENDOBRONCHIAL ULTRASOUND (EBUS);  Surgeon: Gregorio Kinney MD;  Location: [facility];  Service: Pulmonary;  Laterality: Bilateral;    INJECTION OF FACET JOINT Left 06/30/2022    Procedure: FACET JOINT Cyst Aspiration L3/4;  Surgeon: Ronnell Baeza MD;  Location: [facility] OR;  Service: Pain Management;  Laterality: Left;    INSERTION OF TUNNELED CENTRAL VENOUS CATHETER (CVC) WITH SUBCUTANEOUS PORT Left 11/04/2020    Procedure: [id number];  Surgeon: Kody Pretty MD;  Location: [facility] OR;  Service: General;  Laterality: Left;    JOINT REPLACEMENT  2008    right knee     LUMBAR LAMINECTOMY      LYMPH NODE BIOPSY      MASTECTOMY Right 1985    NEEDLE LOCALIZATION N/A 05/25/2020    Procedure: NEEDLE LOCALIZATION - lymph node bx;  Surgeon: Steve Weaver MD;  Location: [facility];  Service: Interventional Radiology;  Laterality: N/A;    NEEDLE LOCALIZATION N/A 5/22/2024    Procedure: cryoablation renal in CT with anesthesia argon IAM rep;  Surgeon: Steve Weaver MD;  Location: [facility] CATH;  Service: Interventional Radiology;  Laterality: N/A;  cryoablation renal in CT with anesthesia argon Aim rep    RECTAL BIOPSY N/A 3/27/2024    Procedure: BIOPSY, RECTUM;  Surgeon: Isabel Choi MD;  Location: [facility];  Service: General;  Laterality: N/A;    RETINAL DETACHMENT SURGERY  1989    RIGHT HEART CATHETERIZATION Right 03/18/2023    Procedure: INSERTION, CATHETER, RIGHT HEART;  Surgeon: Rukhsana Lang MD;  Location: [facility] CATH;  Service: Cardiology;  Laterality: Right;    SELECTIVE UNILATERAL PULMONARY ANGIOGRAPHY  03/18/2023    Procedure: Pumonary angiography - unilateral;  Surgeon: Rukhsana Lang MD;  Location: [facility] CATH;  Service: Cardiology;;    THROMBECTOMY N/A 03/18/2023    Procedure: THROMBECTOMY;  Surgeon: Rukhsana Lang MD;  Location: [facility] CATH;  Service: Cardiology;  Laterality: N/A;    TRANSFORAMINAL EPIDURAL INJECTION OF  STEROID Left 05/14/2020    Procedure: Injection,steroid,epidural,transforaminal approach, l3/4;  Surgeon: Ronnell Baeza MD;  Location: [facility] OR;  Service: Pain Management;  Laterality: Left;    TRANSFORAMINAL EPIDURAL INJECTION OF STEROID Left 03/23/2022    Procedure: Injection,steroid,epidural,transforaminal approach L3/4;  Surgeon: Ronnell Baeza MD;  Location: [facility] OR;  Service: Pain Management;  Laterality: Left;    TRANSFORAMINAL EPIDURAL INJECTION OF STEROID Left 08/01/2022    Procedure: Injection,steroid,epidural,transforaminal approach L3/4;  Surgeon: Ronnell Baeza MD;  Location: [facility] OR;  Service: Pain Management;  Laterality: Left;    TUBAL LIGATION      UMBILICAL HERNIA REPAIR         Family History:   Family History   Problem Relation Name Age of Onset    Cancer Sister          uterine    Cancer Sister          Uterine cancer     Diabetes Brother      Breast cancer Other niece 42    Glaucoma Neg Hx      Macular degeneration Neg Hx         Social History:  reports that she quit smoking about 2 years ago. Her smoking use included cigarettes. She started smoking about 55 years ago. She has a 26.5 pack-year smoking history. She has never used smokeless tobacco. She reports that she does not drink alcohol and does not use drugs.    Allergies:  Review of patient's allergies indicates:   Allergen Reactions    Opioids - morphine analogues Other (See Comments)     Hypotension       Medications:  Current Outpatient Medications   Medication Sig Dispense Refill    acetaminophen (TYLENOL) 325 MG tablet Take 2 tablets (650 mg total) by mouth every 6 (six) hours as needed for Pain.      albuterol (VENTOLIN HFA) 90 mcg/actuation inhaler Inhale 2 puffs into the lungs every 6 (six) hours as needed for Wheezing or Shortness of Breath (or before exercise). Rescue 18 g 2    apixaban (ELIQUIS) 2.5 mg Tab Take 1 tablet (2.5 mg total) by mouth 2 (two) times daily.      budesonide (PULMICORT) 0.5 mg/2 mL  nebulizer solution Take 2 mLs (0.5 mg total) by nebulization 2 (two) times a day. 120 mL 11    cetirizine (ZYRTEC) 10 MG tablet Take 10 mg by mouth once daily.      ergocalciferol (ERGOCALCIFEROL) 50,000 unit Cap Take 50,000 Units by mouth every Tuesday.      hydrocortisone (ANUSOL-HC) 2.5 % rectal cream Place rectally 2 (two) times daily as needed for Hemorrhoids. 28 g 0    iron-vitamin C 100-250 mg, ICAR-C, 100-250 mg Tab Take 1 tablet by mouth 2 (two) times a day. 60 tablet 3    levalbuterol (XOPENEX) 0.63 mg/3 mL nebulizer solution Take 3 mLs (0.63 mg total) by nebulization 2 (two) times a day. Rescue 60 each 6    levothyroxine (SYNTHROID) 100 MCG tablet Take 1 tablet (100 mcg total) by mouth once daily. 90 tablet 3    mucus clearing device (ACAPELLA, FLUTTER) by Misc.(Non-Drug; Combo Route) route once daily. 100 each 0    multivitamin (THERAGRAN) per tablet Take 1 tablet by mouth once daily.      NIFEdipine (ADALAT CC) 60 MG TbSR Take 1 tablet (60 mg total) by mouth once daily.      omeprazole (PRILOSEC) 40 MG capsule Take 1 capsule (40 mg total) by mouth once daily. 90 capsule 4    OXYGEN-AIR DELIVERY SYSTEMS MISC 2-3 L by Misc.(Non-Drug; Combo Route) route as needed.      sodium chloride 3% 3 % nebulizer solution Take 4 mLs by nebulization 2 (two) times a day. 240 mL 3    temazepam (RESTORIL) 30 mg capsule Take 1 capsule (30 mg total) by mouth every evening. 30 capsule 5     Current Facility-Administered Medications   Medication Dose Route Frequency Provider Last Rate Last Admin    sodium chloride 0.9% flush 10 mL  10 mL Intravenous PRN Chad Mills MD   10 mL at 07/31/24 1306       Review of Systems   Constitutional:  Negative for appetite change, chills, fatigue, fever and unexpected weight change.   Eyes:  Negative for visual disturbance.   Respiratory:  Negative for cough and shortness of breath.    Cardiovascular:  Negative for chest pain and palpitations.   Gastrointestinal:  Negative for abdominal  "pain, constipation, diarrhea, nausea and vomiting.   Musculoskeletal:  Negative for back pain.   Skin:  Negative for rash.   Neurological:  Negative for headaches.   Hematological:  Negative for adenopathy. Does not bruise/bleed easily.   Psychiatric/Behavioral:  The patient is not nervous/anxious.        ECOG Performance Status: 2   Objective:      Vitals:   Vitals:    08/12/24 1313   BP: 128/80   BP Location: Right arm   Patient Position: Sitting   BP Method: Large (Manual)   Pulse: 84   Resp: 16   Temp: 97.3 °F (36.3 °C)   TempSrc: Temporal   SpO2: 97%   Weight: 88.9 kg (195 lb 15.8 oz)   Height: 5' 4" (1.626 m)                       Physical Exam  Constitutional:       General: She is not in acute distress.     Appearance: She is well-developed. She is not diaphoretic.   HENT:      Head: Normocephalic and atraumatic.   Cardiovascular:      Rate and Rhythm: Normal rate and regular rhythm.      Heart sounds: Normal heart sounds. No murmur heard.     No friction rub. No gallop.   Pulmonary:      Effort: Pulmonary effort is normal. No respiratory distress.      Breath sounds: Normal breath sounds. No wheezing or rales.   Chest:      Chest wall: No tenderness.   Abdominal:      General: Bowel sounds are normal. There is no distension.      Palpations: Abdomen is soft. There is no mass.      Tenderness: There is no abdominal tenderness. There is no guarding or rebound.   Lymphadenopathy:      Cervical: No cervical adenopathy.      Upper Body:      Right upper body: Supraclavicular adenopathy present. No axillary adenopathy.      Left upper body: No supraclavicular or axillary adenopathy.   Skin:     Findings: No erythema or rash.   Neurological:      Mental Status: She is alert and oriented to person, place, and time.   Psychiatric:         Behavior: Behavior normal.         Laboratory Data:  Lab Visit on 08/12/2024   Component Date Value Ref Range Status    Sodium 08/12/2024 140  136 - 145 mmol/L Final    Potassium " 08/12/2024 3.7  3.5 - 5.1 mmol/L Final    Chloride 08/12/2024 110  95 - 110 mmol/L Final    CO2 08/12/2024 17 (L)  23 - 29 mmol/L Final    Glucose 08/12/2024 128 (H)  70 - 110 mg/dL Final    BUN 08/12/2024 46 (H)  8 - 23 mg/dL Final    Creatinine 08/12/2024 1.8 (H)  0.5 - 1.4 mg/dL Final    Calcium 08/12/2024 9.3  8.7 - 10.5 mg/dL Final    Total Protein 08/12/2024 7.0  6.0 - 8.4 g/dL Final    Albumin 08/12/2024 3.4 (L)  3.5 - 5.2 g/dL Final    Total Bilirubin 08/12/2024 0.2  0.1 - 1.0 mg/dL Final    Comment: For infants and newborns, interpretation of results should be based  on gestational age, weight and in agreement with clinical  observations.    Premature Infant recommended reference ranges:  Up to 24 hours.............<8.0 mg/dL  Up to 48 hours............<12.0 mg/dL  3-5 days..................<15.0 mg/dL  6-29 days.................<15.0 mg/dL      Alkaline Phosphatase 08/12/2024 76  55 - 135 U/L Final    AST 08/12/2024 14  10 - 40 U/L Final    ALT 08/12/2024 7 (L)  10 - 44 U/L Final    eGFR 08/12/2024 28.0 (A)  >60 mL/min/1.73 m^2 Final    Anion Gap 08/12/2024 13  8 - 16 mmol/L Final    WBC 08/12/2024 7.39  3.90 - 12.70 K/uL Final    RBC 08/12/2024 3.38 (L)  4.00 - 5.40 M/uL Final    Hemoglobin 08/12/2024 9.5 (L)  12.0 - 16.0 g/dL Final    Hematocrit 08/12/2024 30.1 (L)  37.0 - 48.5 % Final    MCV 08/12/2024 89  82 - 98 fL Final    MCH 08/12/2024 28.1  27.0 - 31.0 pg Final    MCHC 08/12/2024 31.6 (L)  32.0 - 36.0 g/dL Final    RDW 08/12/2024 15.7 (H)  11.5 - 14.5 % Final    Platelets 08/12/2024 208  150 - 450 K/uL Final    MPV 08/12/2024 10.2  9.2 - 12.9 fL Final    Immature Granulocytes 08/12/2024 0.7 (H)  0.0 - 0.5 % Final    Gran # (ANC) 08/12/2024 5.6  1.8 - 7.7 K/uL Final    Immature Grans (Abs) 08/12/2024 0.05 (H)  0.00 - 0.04 K/uL Final    Comment: Mild elevation in immature granulocytes is non specific and   can be seen in a variety of conditions including stress response,   acute inflammation, trauma  and pregnancy. Correlation with other   laboratory and clinical findings is essential.      Lymph # 08/12/2024 1.0  1.0 - 4.8 K/uL Final    Mono # 08/12/2024 0.5  0.3 - 1.0 K/uL Final    Eos # 08/12/2024 0.1  0.0 - 0.5 K/uL Final    Baso # 08/12/2024 0.04  0.00 - 0.20 K/uL Final    nRBC 08/12/2024 0  0 /100 WBC Final    Gran % 08/12/2024 75.9 (H)  38.0 - 73.0 % Final    Lymph % 08/12/2024 14.1 (L)  18.0 - 48.0 % Final    Mono % 08/12/2024 7.2  4.0 - 15.0 % Final    Eosinophil % 08/12/2024 1.6  0.0 - 8.0 % Final    Basophil % 08/12/2024 0.5  0.0 - 1.9 % Final    Differential Method 08/12/2024 Automated   Final    RBC, UA 08/12/2024 >100 (H)  0 - 4 /hpf Final    WBC, UA 08/12/2024 >100 (H)  0 - 5 /hpf Final    Bacteria 08/12/2024 Many (A)  None-Occ /hpf Final    Microscopic Comment 08/12/2024 SEE COMMENT   Final    Comment: Other formed elements not mentioned in the report are not   present in the microscopic examination.            Imaging:    CT Urogram 8/05/24    Heart is normal size.  Calcification of the mitral annulus.  Mild chronic peripheral interstitial reticulation in the bilateral lung bases.     Liver is enlarged.  No focal hepatic lesion.  Gallbladder is surgically absent.  No biliary ductal dilatation.     Spleen and adrenal glands are unremarkable.  Severe atrophy of the pancreas.     Post treatment changes of right renal inferior pole cryoablation.  Grossly stable appearance of the treated lesion with calcification.  No suspicious enhancement.  Decreased size of a right renal subcapsular collection.  Decreased fluid and stranding about the right inferior renal pole and renal pelvis.  Persistent moderate right hydronephrosis.  Minimal contrast excretion in the right renal collecting system suggesting obstructive uropathy and renal impairment.  Double-J ureteral stent in place.  No right ureteral dilatation.  Left renal cyst and subcentimeter renal hypodensities too small to characterize.  No left  hydronephrosis.     Right distal ureteral stent terminates in the bladder.  There is possible wall thickening or intraluminal filling defect in the right posterior bladder surrounding the distal aspect of the stent (series 6, image [id number]).  Uterus and bilateral adnexa are unremarkable.     Stomach is unremarkable.  No evidence of bowel obstruction or inflammation.  Colonic diverticulosis.  No free intraperitoneal fluid or air.     Grossly stable lymphadenopathy throughout the retroperitoneum and pelvis.  Left para-aortic lymph node at the level of the kidneys measures 1.8 cm (series 4, image 58), previously 1.9 cm.  Distal aortocaval lymph node measures 1.8 cm (series 4, image 82), previously 1.8 cm.  Right external iliac chain lymph node measures 1.5 cm (series 4, image 133), previously 1.5 cm.  Left inguinal lymph node measures 1.6 cm (series 4, image 144), previously 1.5 cm.     Stable infrarenal abdominal aortic aneurysm measuring 4.2 cm.  Moderate to severe aortoiliac atherosclerosis.     Moderate fat containing left paramidline ventral abdominal wall hernia.     Degenerative changes of the osseous structures.  No acute fracture.  L4-S1 posterior instrumented fusion.        Assessment:       1. Follicular lymphoma grade I, unspecified body region    2. Shortness of breath    3. 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 Renal cell carcinoma, unspecified laterality    5. Rectal lesion    6. Thrombophilia    7. Hyperlipidemia, unspecified hyperlipidemia type    8. Iron deficiency anemia, unspecified iron deficiency anemia type    9. Acquired hypothyroidism                 Plan:     NSCLC - Pt has NSCLC SCC with involvement of the right lung and mediastinum  -Pt with prior radiation to a RUL nodule  -PD-L1 was 86% on 6/18/21  -Pt was on Keytruda with 6 week cycles and completed 6 tx's and then developed pneumonitis  -PET/CT on 9/23/22 showed possible pseudoprogression  -PET/CT on 11/29/22 showed  stable disease with RLL infiltrate concerning for resolving PNA  -PET-CT on 03/08/2023 showed resolution of prior disease   -CT Chest 4/14/23 and 7/10/23 with stable findings  -PET/CT 10/09/23 showed avidity in the RUL mass, inguinal LAD and subcutaneous nodule in the lumbar region  -Treatment with paclitaxel discussed; however, pt is hesitant to undergo chemotherapy  -Biopsy of lumbar subcutaneous nodule showed fibroadipose tissue  -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  -Ct chest 12/12/23 stable  -PET/CT 2/15/24 showed continued nodular focus of hypermetabolic activity in the right apex with a new adjacent ground-glass area  -No clear evidence of progression on scans  -Repeat PET/CT on 06/18/2024 showed enlarging soft tissue component of right upper lobe nodule  -Pt was felt to have superimposed infection  -Pt seen by Dr Kinney on 7/16/24 with recommendation for repeat CT chest in 4 months    Renal Cell Carcinoma - 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 based on biopsy 3/28/24  -Pt not a surgical candidate per Urology  -Patient underwent embolization 04/29/2024 and cryoablation on 05/22/2024  -CT Urogram 8/05/24 showed treated lesion  -Will monitor    Shortness of breath - improved s/p transfusion  -Will monitor    Rectal Lesion - seen on colonoscopy 03/25/2024   Rectal tissue biopsy on 03/27/2024 with path showing mucosal prolapse with ulceration and granulation tissue  -MRI rectum on 03/28/2024 showed the suspected perianal mass at the invasion to adjacent structures and enlarged pelvic lymph nodes  -LAD likely form follicular lymphoma  -Continued avidity seen on PET-CT 06/18/2024    Saddle Pulmonary Embolus - seen on CTA 03/18/2023   -PT on prophylactic ELiquis 2.5mg BID   -Will continue    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 for 9/12 cycles with good response  -PT with recurrent inguinal LAD which could be lymphoma  -LN's increasing  in size on CT 12/12/23  -PET-CT on 02/15/2024 showed continued increasing size of left inguinal lymph node with SUV max now 9.2  -Left inguinal LN biopsy 3/08/24 showed Follicular Lymphoma Grade 3a  -Pt saw Dr Hansen on 4/18/24 whom recommended repeating scans in 8 weeks with consideration of bendamustine and rituximab if patient progresses  -Repeat PET-CT 06/18/2024 showed stable hypermetabolic activity in the retroperitoneum, bilateral iliac regions and inguinal regions  -Pt seen by Dr Hansen on 7/16/24 and recommended PET/CT in 6 months    Hypothyroidism - pt on levothyroxine  -Management per PCP    Iron Deficiency Anemia - Pt received 4 doses of venofer in the past with last dose 11/24/23  -Hemoglobin 7/6g/dL on 7/29/24  -Iron studies 7/18/24 showed a ferritin of 227ng/mL  -STFR on 7/29/24 elevated  -Pt receiving IV iron    Route Chart for Scheduling    Med Onc Chart Routing      Follow up with physician 4 weeks. The patient needs a CBC, CMP, Ferritin, STFR, iron/TIBC ion 4 weeks with an aptp with me at least a day after the labs.   Follow up with DAYAMI    Infusion scheduling note    Injection scheduling note    Labs    Imaging    Pharmacy appointment    Other referrals              Treatment Plan Information   OP PEMBROLIZUMAB 400MG Q6W   Chad iMlls MD   Upcoming Treatment Dates - OP PEMBROLIZUMAB 400MG Q6W    5/12/2023       Pre-Medications       acetaminophen tablet 1,000 mg       diphenhydrAMINE (BENADRYL) 25 mg in NS 50 mL IVPB       Chemotherapy       pembrolizumab (KEYTRUDA) 400 mg in sodium chloride 0.9% SolP 116 mL infusion  6/23/2023       Pre-Medications       acetaminophen tablet 1,000 mg       diphenhydrAMINE (BENADRYL) 25 mg in NS 50 mL IVPB       Chemotherapy       pembrolizumab (KEYTRUDA) 400 mg in sodium chloride 0.9% SolP 116 mL infusion  8/4/2023       Pre-Medications       acetaminophen tablet 1,000 mg       diphenhydrAMINE (BENADRYL) 25 mg in NS 50 mL IVPB       Chemotherapy        pembrolizumab (KEYTRUDA) 400 mg in sodium chloride 0.9% SolP 116 mL infusion  9/15/2023       Pre-Medications       acetaminophen tablet 1,000 mg       diphenhydrAMINE (BENADRYL) 25 mg in NS 50 mL IVPB       Chemotherapy       pembrolizumab (KEYTRUDA) 400 mg in sodium chloride 0.9% SolP 116 mL infusion    Supportive Plan Information  OP FERRIC GLUCONATE   Naomi Green NP   Upcoming Treatment Dates - OP FERRIC GLUCONATE    8/12/2024       Medications       ferric gluconate (FERRLECIT) 125 mg in 0.9% NaCl 100 mL IVPB  8/19/2024       Medications       ferric gluconate (FERRLECIT) 125 mg in 0.9% NaCl 100 mL IVPB  8/26/2024       Medications       ferric gluconate (FERRLECIT) 125 mg in 0.9% NaCl 100 mL IVPB  9/2/2024       Medications       ferric gluconate (FERRLECIT) 125 mg in 0.9% NaCl 100 mL IVPB    Therapy Plan Information  PORT FLUSH  Flushes  heparin, porcine (PF) 100 unit/mL injection flush 500 Units  500 Units, Intravenous, Every visit  sodium chloride 0.9% flush 10 mL  10 mL, Intravenous, Every visit    INF PEGFILGRASTIM (NEULASTA)  pegfilgrastim (NEULASTA) injection 6 mg  6 mg, Subcutaneous, Every visit        Chad Mills MD  [facility]  Hematology and Oncology  [facility]   [address] [place] [address]   O: [phone]  F: [phone]

## 2024-08-12 ENCOUNTER — OFFICE VISIT (OUTPATIENT)
Dept: UROLOGY | Facility: CLINIC | Age: 84
End: 2024-08-12
Payer: MEDICARE

## 2024-08-12 ENCOUNTER — OFFICE VISIT (OUTPATIENT)
Dept: HEMATOLOGY/ONCOLOGY | Facility: CLINIC | Age: 84
End: 2024-08-12
Payer: MEDICARE

## 2024-08-12 ENCOUNTER — LAB VISIT (OUTPATIENT)
Dept: LAB | Facility: HOSPITAL | Age: 84
End: 2024-08-12
Attending: INTERNAL MEDICINE
Payer: MEDICARE

## 2024-08-12 VITALS
HEART RATE: 84 BPM | SYSTOLIC BLOOD PRESSURE: 128 MMHG | DIASTOLIC BLOOD PRESSURE: 80 MMHG | WEIGHT: 196 LBS | BODY MASS INDEX: 33.46 KG/M2 | TEMPERATURE: 97 F | OXYGEN SATURATION: 97 % | HEIGHT: 64 IN | RESPIRATION RATE: 16 BRPM

## 2024-08-12 VITALS — HEIGHT: 64 IN | BODY MASS INDEX: 33.31 KG/M2 | WEIGHT: 195.13 LBS

## 2024-08-12 DIAGNOSIS — D68.59 THROMBOPHILIA: ICD-10-CM

## 2024-08-12 DIAGNOSIS — E78.5 HYPERLIPIDEMIA, UNSPECIFIED HYPERLIPIDEMIA TYPE: ICD-10-CM

## 2024-08-12 DIAGNOSIS — C64.9 RENAL CELL CARCINOMA, UNSPECIFIED LATERALITY: ICD-10-CM

## 2024-08-12 DIAGNOSIS — C64.1 RENAL CELL CARCINOMA OF RIGHT KIDNEY: ICD-10-CM

## 2024-08-12 DIAGNOSIS — N13.30 HYDRONEPHROSIS OF RIGHT KIDNEY: ICD-10-CM

## 2024-08-12 DIAGNOSIS — E03.9 ACQUIRED HYPOTHYROIDISM: ICD-10-CM

## 2024-08-12 DIAGNOSIS — N13.30 HYDRONEPHROSIS OF RIGHT KIDNEY: Primary | ICD-10-CM

## 2024-08-12 DIAGNOSIS — C82.25 GRADE 3 FOLLICULAR LYMPHOMA OF LYMPH NODES OF INGUINAL REGION: ICD-10-CM

## 2024-08-12 DIAGNOSIS — R82.90 ABNORMAL URINALYSIS: ICD-10-CM

## 2024-08-12 DIAGNOSIS — K62.9 RECTAL LESION: ICD-10-CM

## 2024-08-12 DIAGNOSIS — C34.11 MALIGNANT NEOPLASM OF RIGHT UPPER LOBE OF LUNG: ICD-10-CM

## 2024-08-12 DIAGNOSIS — R06.02 SHORTNESS OF BREATH: ICD-10-CM

## 2024-08-12 DIAGNOSIS — D50.9 IRON DEFICIENCY ANEMIA, UNSPECIFIED IRON DEFICIENCY ANEMIA TYPE: ICD-10-CM

## 2024-08-12 DIAGNOSIS — C82.00 FOLLICULAR LYMPHOMA GRADE I, UNSPECIFIED BODY REGION: Primary | ICD-10-CM

## 2024-08-12 LAB
ALBUMIN SERPL BCP-MCNC: 3.4 G/DL (ref 3.5–5.2)
ALP SERPL-CCNC: 76 U/L (ref 55–135)
ALT SERPL W/O P-5'-P-CCNC: 7 U/L (ref 10–44)
ANION GAP SERPL CALC-SCNC: 13 MMOL/L (ref 8–16)
AST SERPL-CCNC: 14 U/L (ref 10–40)
BACTERIA #/AREA URNS HPF: ABNORMAL /HPF
BASOPHILS # BLD AUTO: 0.04 K/UL (ref 0–0.2)
BASOPHILS NFR BLD: 0.5 % (ref 0–1.9)
BILIRUB SERPL-MCNC: 0.2 MG/DL (ref 0.1–1)
BUN SERPL-MCNC: 46 MG/DL (ref 8–23)
CALCIUM SERPL-MCNC: 9.3 MG/DL (ref 8.7–10.5)
CHLORIDE SERPL-SCNC: 110 MMOL/L (ref 95–110)
CO2 SERPL-SCNC: 17 MMOL/L (ref 23–29)
CREAT SERPL-MCNC: 1.8 MG/DL (ref 0.5–1.4)
DIFFERENTIAL METHOD BLD: ABNORMAL
EOSINOPHIL # BLD AUTO: 0.1 K/UL (ref 0–0.5)
EOSINOPHIL NFR BLD: 1.6 % (ref 0–8)
ERYTHROCYTE [DISTWIDTH] IN BLOOD BY AUTOMATED COUNT: 15.7 % (ref 11.5–14.5)
EST. GFR  (NO RACE VARIABLE): 28 ML/MIN/1.73 M^2
GLUCOSE SERPL-MCNC: 128 MG/DL (ref 70–110)
HCT VFR BLD AUTO: 30.1 % (ref 37–48.5)
HGB BLD-MCNC: 9.5 G/DL (ref 12–16)
IMM GRANULOCYTES # BLD AUTO: 0.05 K/UL (ref 0–0.04)
IMM GRANULOCYTES NFR BLD AUTO: 0.7 % (ref 0–0.5)
LYMPHOCYTES # BLD AUTO: 1 K/UL (ref 1–4.8)
LYMPHOCYTES NFR BLD: 14.1 % (ref 18–48)
MCH RBC QN AUTO: 28.1 PG (ref 27–31)
MCHC RBC AUTO-ENTMCNC: 31.6 G/DL (ref 32–36)
MCV RBC AUTO: 89 FL (ref 82–98)
MICROSCOPIC COMMENT: ABNORMAL
MONOCYTES # BLD AUTO: 0.5 K/UL (ref 0.3–1)
MONOCYTES NFR BLD: 7.2 % (ref 4–15)
NEUTROPHILS # BLD AUTO: 5.6 K/UL (ref 1.8–7.7)
NEUTROPHILS NFR BLD: 75.9 % (ref 38–73)
NRBC BLD-RTO: 0 /100 WBC
PLATELET # BLD AUTO: 208 K/UL (ref 150–450)
PMV BLD AUTO: 10.2 FL (ref 9.2–12.9)
POTASSIUM SERPL-SCNC: 3.7 MMOL/L (ref 3.5–5.1)
PROT SERPL-MCNC: 7 G/DL (ref 6–8.4)
RBC # BLD AUTO: 3.38 M/UL (ref 4–5.4)
RBC #/AREA URNS HPF: >100 /HPF (ref 0–4)
SODIUM SERPL-SCNC: 140 MMOL/L (ref 136–145)
WBC # BLD AUTO: 7.39 K/UL (ref 3.9–12.7)
WBC #/AREA URNS HPF: >100 /HPF (ref 0–5)

## 2024-08-12 PROCEDURE — 1159F MED LIST DOCD IN RCRD: CPT | Mod: CPTII,S$GLB,, | Performed by: INTERNAL MEDICINE

## 2024-08-12 PROCEDURE — 99214 OFFICE O/P EST MOD 30 MIN: CPT | Mod: S$GLB,,, | Performed by: STUDENT IN AN ORGANIZED HEALTH CARE EDUCATION/TRAINING PROGRAM

## 2024-08-12 PROCEDURE — 99214 OFFICE O/P EST MOD 30 MIN: CPT | Mod: S$GLB,,, | Performed by: INTERNAL MEDICINE

## 2024-08-12 PROCEDURE — 1160F RVW MEDS BY RX/DR IN RCRD: CPT | Mod: CPTII,S$GLB,, | Performed by: STUDENT IN AN ORGANIZED HEALTH CARE EDUCATION/TRAINING PROGRAM

## 2024-08-12 PROCEDURE — 1101F PT FALLS ASSESS-DOCD LE1/YR: CPT | Mod: CPTII,S$GLB,, | Performed by: INTERNAL MEDICINE

## 2024-08-12 PROCEDURE — 81000 URINALYSIS NONAUTO W/SCOPE: CPT | Mod: PN | Performed by: STUDENT IN AN ORGANIZED HEALTH CARE EDUCATION/TRAINING PROGRAM

## 2024-08-12 PROCEDURE — 87086 URINE CULTURE/COLONY COUNT: CPT | Performed by: STUDENT IN AN ORGANIZED HEALTH CARE EDUCATION/TRAINING PROGRAM

## 2024-08-12 PROCEDURE — 99999 PR PBB SHADOW E&M-EST. PATIENT-LVL III: CPT | Mod: PBBFAC,,, | Performed by: STUDENT IN AN ORGANIZED HEALTH CARE EDUCATION/TRAINING PROGRAM

## 2024-08-12 PROCEDURE — 3079F DIAST BP 80-89 MM HG: CPT | Mod: CPTII,S$GLB,, | Performed by: INTERNAL MEDICINE

## 2024-08-12 PROCEDURE — 1126F AMNT PAIN NOTED NONE PRSNT: CPT | Mod: CPTII,S$GLB,, | Performed by: INTERNAL MEDICINE

## 2024-08-12 PROCEDURE — G2211 COMPLEX E/M VISIT ADD ON: HCPCS | Mod: S$GLB,,, | Performed by: STUDENT IN AN ORGANIZED HEALTH CARE EDUCATION/TRAINING PROGRAM

## 2024-08-12 PROCEDURE — 3074F SYST BP LT 130 MM HG: CPT | Mod: CPTII,S$GLB,, | Performed by: INTERNAL MEDICINE

## 2024-08-12 PROCEDURE — 1159F MED LIST DOCD IN RCRD: CPT | Mod: CPTII,S$GLB,, | Performed by: STUDENT IN AN ORGANIZED HEALTH CARE EDUCATION/TRAINING PROGRAM

## 2024-08-12 PROCEDURE — 85025 COMPLETE CBC W/AUTO DIFF WBC: CPT | Mod: PN | Performed by: INTERNAL MEDICINE

## 2024-08-12 PROCEDURE — 99999 PR PBB SHADOW E&M-EST. PATIENT-LVL IV: CPT | Mod: PBBFAC,,, | Performed by: INTERNAL MEDICINE

## 2024-08-12 PROCEDURE — 3288F FALL RISK ASSESSMENT DOCD: CPT | Mod: CPTII,S$GLB,, | Performed by: INTERNAL MEDICINE

## 2024-08-12 PROCEDURE — 1126F AMNT PAIN NOTED NONE PRSNT: CPT | Mod: CPTII,S$GLB,, | Performed by: STUDENT IN AN ORGANIZED HEALTH CARE EDUCATION/TRAINING PROGRAM

## 2024-08-12 PROCEDURE — 80053 COMPREHEN METABOLIC PANEL: CPT | Mod: PN | Performed by: INTERNAL MEDICINE

## 2024-08-12 PROCEDURE — 36415 COLL VENOUS BLD VENIPUNCTURE: CPT | Mod: PN | Performed by: INTERNAL MEDICINE

## 2024-08-12 NOTE — PROGRESS NOTES
"[facility]   Clinic Note    Subjective:     Chief Complaint: Follow-up (6-8 week )    History of Present Illness:  Shandra Velez is a 83 y.o. female who presents to clinic for evaluation and management of a right renal mass. She is established to our clinic.     He had an extensive medical history and follows with Oncology for multiple malignancies.  She is seen here for a right renal mass.  It was noted to be enlarging on serial imaging and given her significant medical comorbidities it was recommended that she undergo chemo embolization followed by subsequent a cryoablation. Biopsy confirmed ccRCC. Post procedure complicated was by right hydronephrosis and a UTI requiring ureteral stent insertion on 6/21/24.  She presents today with a CT urogram and to discuss further management.  She has had resolution of her flank pain and symptoms.  She has no significant LUTS from the stent.  The CT urogram shows delayed contrast excretion on the right with moderate hydronephrosis despite the ureteral stent.  There was no evidence of enhancement of the treated renal mass.    CT 12/2023 showed the right renal mass measuring 4.3 cm. Progressive bilateral periaortic LAD measuring 1.7 cm, and inguinal LAD with 4.4 cm and 3.5 cm nodes.     Past medical, family, surgical and social history reviewed as documented in chart with pertinent positive medical, family, surgical and social history detailed in HPI.    A review systems was conducted with pertinent positive and negative findings documented in HPI.    Anticoagulation/Antiplatelets:  Yes eliquis    Objective:     Estimated body mass index is 33.49 kg/m² as calculated from the following:    Height as of this encounter: 5' 4" (1.626 m).    Weight as of this encounter: 88.5 kg (195 lb 1.7 oz).    Vital Signs (Most Recent)       Physical Exam  Constitutional:       General: She is not in acute distress.     Appearance: She is well-developed. She is not ill-appearing or " toxic-appearing.   Pulmonary:      Effort: Pulmonary effort is normal. No accessory muscle usage or respiratory distress.   Neurological:      Mental Status: She is alert.         Labs reviewed below:  Lab Results   Component Value Date    BUN 50 (H) 07/18/2024    CREATININE 1.5 (H) 08/05/2024    WBC 8.06 07/29/2024    HGB 7.6 (L) 07/29/2024    HCT 24.9 (L) 07/29/2024     07/29/2024    AST 11 07/18/2024    ALT 5 (L) 07/18/2024    ALKPHOS 65 07/18/2024    ALBUMIN 3.0 (L) 07/18/2024    HGBA1C 5.2 05/22/2024      Assessment:     1. Hydronephrosis of right kidney    2. Renal cell carcinoma of right kidney    3. Abnormal urinalysis        Plan:     Right RCC now status post chemo embolization and ablation complicated by right hydronephrosis.  There is persistent hydronephrosis despite the stent.  There was a significant inflammatory reaction around the treatment area .  Symptoms have largely improved.    Renal mass shows no evidence of enhancement or recurrence  I recommend cystoscopy with resonance stent placement and right RPG  Urine micro and culture    Noel Priest MD

## 2024-08-13 LAB — BACTERIA UR CULT: NO GROWTH

## 2024-08-14 ENCOUNTER — PATIENT MESSAGE (OUTPATIENT)
Dept: UROLOGY | Facility: CLINIC | Age: 84
End: 2024-08-14
Payer: MEDICARE

## 2024-08-16 ENCOUNTER — DOCUMENT SCAN (OUTPATIENT)
Dept: HOME HEALTH SERVICES | Facility: HOSPITAL | Age: 84
End: 2024-08-16
Payer: MEDICARE

## 2024-08-16 ENCOUNTER — TELEPHONE (OUTPATIENT)
Dept: UROLOGY | Facility: CLINIC | Age: 84
End: 2024-08-16
Payer: MEDICARE

## 2024-08-16 ENCOUNTER — INFUSION (OUTPATIENT)
Dept: INFUSION THERAPY | Facility: HOSPITAL | Age: 84
End: 2024-08-16
Attending: INTERNAL MEDICINE
Payer: MEDICARE

## 2024-08-16 VITALS
RESPIRATION RATE: 16 BRPM | HEART RATE: 80 BPM | SYSTOLIC BLOOD PRESSURE: 100 MMHG | HEIGHT: 64 IN | DIASTOLIC BLOOD PRESSURE: 66 MMHG | BODY MASS INDEX: 33.31 KG/M2 | TEMPERATURE: 98 F | WEIGHT: 195.13 LBS

## 2024-08-16 DIAGNOSIS — D50.0 IRON DEFICIENCY ANEMIA DUE TO CHRONIC BLOOD LOSS: Primary | ICD-10-CM

## 2024-08-16 PROCEDURE — 25000003 PHARM REV CODE 250: Mod: PN

## 2024-08-16 PROCEDURE — 63600175 PHARM REV CODE 636 W HCPCS: Mod: PN

## 2024-08-16 PROCEDURE — A4216 STERILE WATER/SALINE, 10 ML: HCPCS | Mod: PN

## 2024-08-16 RX ORDER — HEPARIN 100 UNIT/ML
500 SYRINGE INTRAVENOUS
Status: DISCONTINUED | OUTPATIENT
Start: 2024-08-16 | End: 2024-08-16 | Stop reason: HOSPADM

## 2024-08-16 RX ORDER — SODIUM CHLORIDE 0.9 % (FLUSH) 0.9 %
10 SYRINGE (ML) INJECTION
Status: DISCONTINUED | OUTPATIENT
Start: 2024-08-16 | End: 2024-08-16 | Stop reason: HOSPADM

## 2024-08-16 RX ADMIN — Medication 500 UNITS: at 03:08

## 2024-08-16 RX ADMIN — Medication 10 ML: at 03:08

## 2024-08-16 RX ADMIN — SODIUM CHLORIDE 125 MG: 9 INJECTION, SOLUTION INTRAVENOUS at 02:08

## 2024-08-16 RX ADMIN — SODIUM CHLORIDE: 9 INJECTION, SOLUTION INTRAVENOUS at 01:08

## 2024-08-16 NOTE — TELEPHONE ENCOUNTER
----- Message from Melodie Osorio sent at 5/29/2020  9:57 AM CDT -----  Contact: Shandra lynn  Type:  Same Day Appointment Request    Caller is requesting a same day appointment.  Caller declined first available appointment listed below.      Name of Caller:  Shandra  When is the first available appointment?  07/20  Symptoms:  Pt seen her results on my chart. She thinks she see's something wrong, she wants to see dr Chavez today so he can explain  Best Call Back Number:  [phone]  Additional Information:   Pls call pt to adv if she can be seen today.,l she does not want to wait until july       GI Discharge Instructions Endoscopy      8/16/2024    During your exam, the physician:    Completed a thorough exam, no specimens were obtained.  Other Dilated pyloric stricture.    DIET INSTRUCTIONS:  Other: Full Liquid Diet today. Advance diet tomorrow on Saturday 8/17/24.    PRESCRIPTIONS/MEDICATIONS  No new prescriptions given today    A RESPONSIBLE ADULT MUST ACCOMPANY YOU AND DRIVE YOU HOME    You had the following procedure(s) today:   Upper Endoscopy      There is no need to hold any aspirin or anti-inflammatory medications.     Following sedation, your judgement, perception and coordination are impaired for a minimum of 24 hours.      Therefore:  Do not drive.  Do not return to work today.  Do not operate appliances or machinery that require quick reaction time  Do not sign legal documents or be involved in work decisions  Do not smoke or drink alcoholic beverages for 24 hours  Plan to spend a few hours resting before resuming your normal routine    Please call your physician in the event that you experience any of the following or proceed to the nearest hospital in the event of an emergency:     UPPER ENDOSCOPY:    Difficulty swallowing or breathing  Neck swelling  Excessive pain, you may have mild chest pain or discomfort which should pass within 1-2 hours with the passage of air.  Nausea or Vomiting  Abdominal distention  Fever  A mild sore throat may follow this procedure.  Warm salt-water gargle or lozenges may relieve your discomfort.  ..    If you have any questions or concerns, contact Dr. Marti, [phone]    ADDITIONAL INSTRUCTIONS: none

## 2024-08-19 ENCOUNTER — OFFICE VISIT (OUTPATIENT)
Dept: PAIN MEDICINE | Facility: CLINIC | Age: 84
End: 2024-08-19
Payer: MEDICARE

## 2024-08-19 ENCOUNTER — PATIENT MESSAGE (OUTPATIENT)
Dept: PAIN MEDICINE | Facility: CLINIC | Age: 84
End: 2024-08-19

## 2024-08-19 VITALS
HEART RATE: 73 BPM | DIASTOLIC BLOOD PRESSURE: 58 MMHG | SYSTOLIC BLOOD PRESSURE: 99 MMHG | BODY MASS INDEX: 33.31 KG/M2 | HEIGHT: 64 IN | WEIGHT: 195.13 LBS

## 2024-08-19 DIAGNOSIS — M51.36 DDD (DEGENERATIVE DISC DISEASE), LUMBAR: ICD-10-CM

## 2024-08-19 DIAGNOSIS — M48.061 SPINAL STENOSIS OF LUMBAR REGION WITHOUT NEUROGENIC CLAUDICATION: ICD-10-CM

## 2024-08-19 DIAGNOSIS — M54.16 LUMBAR RADICULOPATHY, CHRONIC: Primary | ICD-10-CM

## 2024-08-19 PROCEDURE — 1125F AMNT PAIN NOTED PAIN PRSNT: CPT | Mod: CPTII,S$GLB,, | Performed by: PHYSICIAN ASSISTANT

## 2024-08-19 PROCEDURE — 3288F FALL RISK ASSESSMENT DOCD: CPT | Mod: CPTII,S$GLB,, | Performed by: PHYSICIAN ASSISTANT

## 2024-08-19 PROCEDURE — 1159F MED LIST DOCD IN RCRD: CPT | Mod: CPTII,S$GLB,, | Performed by: PHYSICIAN ASSISTANT

## 2024-08-19 PROCEDURE — 99999 PR PBB SHADOW E&M-EST. PATIENT-LVL IV: CPT | Mod: PBBFAC,,, | Performed by: PHYSICIAN ASSISTANT

## 2024-08-19 PROCEDURE — 3074F SYST BP LT 130 MM HG: CPT | Mod: CPTII,S$GLB,, | Performed by: PHYSICIAN ASSISTANT

## 2024-08-19 PROCEDURE — 96372 THER/PROPH/DIAG INJ SC/IM: CPT | Mod: S$GLB,,, | Performed by: PHYSICIAN ASSISTANT

## 2024-08-19 PROCEDURE — 1101F PT FALLS ASSESS-DOCD LE1/YR: CPT | Mod: CPTII,S$GLB,, | Performed by: PHYSICIAN ASSISTANT

## 2024-08-19 PROCEDURE — 99214 OFFICE O/P EST MOD 30 MIN: CPT | Mod: 25,S$GLB,, | Performed by: PHYSICIAN ASSISTANT

## 2024-08-19 PROCEDURE — 3078F DIAST BP <80 MM HG: CPT | Mod: CPTII,S$GLB,, | Performed by: PHYSICIAN ASSISTANT

## 2024-08-19 PROCEDURE — 1160F RVW MEDS BY RX/DR IN RCRD: CPT | Mod: CPTII,S$GLB,, | Performed by: PHYSICIAN ASSISTANT

## 2024-08-19 RX ORDER — METHYLPREDNISOLONE ACETATE 40 MG/ML
40 INJECTION, SUSPENSION INTRA-ARTICULAR; INTRALESIONAL; INTRAMUSCULAR; SOFT TISSUE
Status: COMPLETED | OUTPATIENT
Start: 2024-08-19 | End: 2024-08-19

## 2024-08-19 RX ADMIN — METHYLPREDNISOLONE ACETATE 40 MG: 40 INJECTION, SUSPENSION INTRA-ARTICULAR; INTRALESIONAL; INTRAMUSCULAR; SOFT TISSUE at 10:08

## 2024-08-19 NOTE — TELEPHONE ENCOUNTER
If she can I would suggest giving this a little more time but if she can not tolerate the pain I recommend going to the emergency department due to the severity.  We briefly discussed this today when she was here.

## 2024-08-19 NOTE — PROGRESS NOTES
This note was completed with dictation software and grammatical errors may exist.    CC:  Low back and right leg pain    HPI: The patient is a 83-year-old female with past medical history significant for hypertension and hyperlipidemia who presents in referral from Lucita Gonzalez PA-C for low back and right leg pain.  She returns in follow-up today for severe right low back and right leg pain.  It has been about 2 years since her last visit.  In terms of her back she had been doing well until this past Friday when she mopping her floor.  The next morning she woke up with severe pain.  This feels like an electric knife.  It is located in the right low back radiating to the right groin, right anterior thigh, right knee and right calf.  She feels that her leg is weak due to pain, reports that it is very difficult to walk because of her symptoms.  She presents in a wheelchair today.  She denies numbness or incontinence.    Pain intervention history:   She is status post right L4/5 transforaminal epidural steroid injection on 11/17/17 with mild relief lasting a week, now reporting 0% relief.   She is status post caudal epidural steroid injection on 1/8/18 with 40% relief. She is status post left L3/4 transforaminal injection on 05/14/2020 with almost complete relief of her pain.   She is status post left L3/4 transforaminal epidural steroid injection on 03/23/2022 with 0% relief.    She is status post left L3/4 facet joint cyst aspiration on 06/30/2022 with mild relief.  She is status post left L3/4 transforaminal epidural steroid injection on 08/01/2022 with 30-40% relief.     Spine surgeries: She underwent left L4/5 laminectomy for severe canal stenosis with Dr. Malloy in May 2017. PSIF L4-5 on 04/13/2018 Dr. Miller.    Antineuropathics:  NSAIDs:  Physical therapy: [facility]  Antidepressants:  Muscle relaxers:  Opioids:  Antiplatelets/Anticoagulants:    ROS:  The patient reports back pain only.  Balance of review of  systems is negative.    Past Medical History:   Diagnosis Date    Breast cancer 1985    right mastectomy    Digestive disorder     Encounter for blood transfusion     History of pneumonia     Hypercholesterolemia     Hypertension     Hyperuricemia 06/20/2024    Lab work in the ED revealed a uric acid level of 6.8.  Pt is asymptomatic for gouty arthritis.  Monitor.      Indigestion     Lumbar spondylosis     Lung cancer     2022 - currently in treatment as of 7/27/22    Lymphoma     Osteopenia     Pulmonary nodule     Renal cancer     Renal disorder     renal cancer    Retinal detachment     Smoker 06/11/2022    Thyroid disease        Past Surgical History:   Procedure Laterality Date    ANGIOGRAPHY  4/29/2024    Procedure: Right Renal Angiogram;  Surgeon: Steve Weaver MD;  Location: [facility] CATH;  Service: Interventional Radiology;;    APPENDECTOMY      BONE MARROW BIOPSY Bilateral 06/24/2020    Procedure: Biopsy-bone marrow;  Surgeon: Rod Montero MD;  Location: [facility] OR;  Service: Oncology;  Laterality: Bilateral;    BREAST RECONSTRUCTION  1990    right    BRONCHOSCOPY N/A 01/18/2023    Procedure: BRONCHOSCOPY;  Surgeon: Gregorio Kinney MD;  Location: [facility] ENDO;  Service: Pulmonary;  Laterality: N/A;    BRONCHOSCOPY Bilateral 02/10/2023    Procedure: Bronchoscopy;  Surgeon: Gregorio Kinney MD;  Location: [facility];  Service: Pulmonary;  Laterality: Bilateral;  for airway clearance. Purple top tube for cell count diff    BRONCHOSCOPY N/A 03/13/2023    Procedure: Bronchoscopy;  Surgeon: Gregorio Kinney MD;  Location: [facility] ENDO;  Service: Pulmonary;  Laterality: N/A;  therapeutic scope. Please have purple top tube and iced saline    CATARACT EXTRACTION W/  INTRAOCULAR LENS IMPLANT Bilateral     CHOLECYSTECTOMY      COLONOSCOPY N/A 3/25/2024    Procedure: COLONOSCOPY;  Surgeon: Brigido Calvillo Jr., MD;  Location: [facility];  Service: Endoscopy;  Laterality: N/A;    CYSTOURETEROSCOPY WITH RETROGRADE PYELOGRAPHY AND  INSERTION OF STENT INTO URETER Right 6/21/2024    Procedure: CYSTOURETEROSCOPY, WITH RETROGRADE PYELOGRAM AND URETERAL STENT INSERTION;  Surgeon: Noel Priest MD;  Location: [facility] OR;  Service: Urology;  Laterality: Right;    EMBOLIZATION  4/29/2024    Procedure: Rt. Renal Embolization;  Surgeon: Steve Weaver MD;  Location: [facility] CATH;  Service: Interventional Radiology;;    ENDOBRONCHIAL ULTRASOUND Bilateral 07/09/2021    Procedure: ENDOBRONCHIAL ULTRASOUND (EBUS);  Surgeon: Gregorio Kinney MD;  Location: [facility];  Service: Pulmonary;  Laterality: Bilateral;  NEED LMA to  eval right upper paratracheal LN.     ENDOBRONCHIAL ULTRASOUND Bilateral 06/16/2022    Procedure: ENDOBRONCHIAL ULTRASOUND (EBUS);  Surgeon: Grgeorio Kinney MD;  Location: [facility];  Service: Pulmonary;  Laterality: Bilateral;    INJECTION OF FACET JOINT Left 06/30/2022    Procedure: FACET JOINT Cyst Aspiration L3/4;  Surgeon: Ronnell Baeza MD;  Location: [facility] OR;  Service: Pain Management;  Laterality: Left;    INSERTION OF TUNNELED CENTRAL VENOUS CATHETER (CVC) WITH SUBCUTANEOUS PORT Left 11/04/2020    Procedure: [id number];  Surgeon: Kody Pretty MD;  Location: [facility] OR;  Service: General;  Laterality: Left;    JOINT REPLACEMENT  2008    right knee     LUMBAR LAMINECTOMY      LYMPH NODE BIOPSY      MASTECTOMY Right 1985    NEEDLE LOCALIZATION N/A 05/25/2020    Procedure: NEEDLE LOCALIZATION - lymph node bx;  Surgeon: Steve Weaver MD;  Location: [facility] CATH;  Service: Interventional Radiology;  Laterality: N/A;    NEEDLE LOCALIZATION N/A 5/22/2024    Procedure: cryoablation renal in CT with anesthesia argon IAM rep;  Surgeon: Steve Weaver MD;  Location: [facility] CATH;  Service: Interventional Radiology;  Laterality: N/A;  cryoablation renal in CT with anesthesia argon Iam rep    RECTAL BIOPSY N/A 3/27/2024    Procedure: BIOPSY, RECTUM;  Surgeon: Isabel Choi MD;  Location: [facility] OR;  Service: General;  Laterality:  N/A;    RETINAL DETACHMENT SURGERY      RIGHT HEART CATHETERIZATION Right 2023    Procedure: INSERTION, CATHETER, RIGHT HEART;  Surgeon: Rukhsana Lang MD;  Location: [facility] CATH;  Service: Cardiology;  Laterality: Right;    SELECTIVE UNILATERAL PULMONARY ANGIOGRAPHY  2023    Procedure: Pumonary angiography - unilateral;  Surgeon: Rukhsana Lang MD;  Location: [facility] CATH;  Service: Cardiology;;    THROMBECTOMY N/A 2023    Procedure: THROMBECTOMY;  Surgeon: Rukhsana Lang MD;  Location: [facility] CATH;  Service: Cardiology;  Laterality: N/A;    TRANSFORAMINAL EPIDURAL INJECTION OF STEROID Left 2020    Procedure: Injection,steroid,epidural,transforaminal approach, l3/4;  Surgeon: Ronnell Baeza MD;  Location: [facility] OR;  Service: Pain Management;  Laterality: Left;    TRANSFORAMINAL EPIDURAL INJECTION OF STEROID Left 2022    Procedure: Injection,steroid,epidural,transforaminal approach L3/4;  Surgeon: Ronnell Baeza MD;  Location: [facility] OR;  Service: Pain Management;  Laterality: Left;    TRANSFORAMINAL EPIDURAL INJECTION OF STEROID Left 2022    Procedure: Injection,steroid,epidural,transforaminal approach L3/4;  Surgeon: Ronnell Baeza MD;  Location: [facility] OR;  Service: Pain Management;  Laterality: Left;    TUBAL LIGATION      UMBILICAL HERNIA REPAIR         Social History     Socioeconomic History    Marital status:     Number of children: 3   Occupational History    Occupation: retired  for [employer]   Tobacco Use    Smoking status: Former     Current packs/day: 0.00     Average packs/day: 0.5 packs/day for 53.0 years (26.5 ttl pk-yrs)     Types: Cigarettes     Start date: 1969     Quit date: 2022     Years since quittin.1    Smokeless tobacco: Never   Substance and Sexual Activity    Alcohol use: No    Drug use: No    Sexual activity: Not Currently   Social History Narrative    Lives alone        Hobbies: skyler        From [place]     "     Social Determinants of Health     Financial Resource Strain: Low Risk  (2023)    Overall Financial Resource Strain (CARDIA)     Difficulty of Paying Living Expenses: Not hard at all   Food Insecurity: No Food Insecurity (2024)    Hunger Vital Sign     Worried About Running Out of Food in the Last Year: Never true     Ran Out of Food in the Last Year: Never true   Transportation Needs: No Transportation Needs (2024)    TRANSPORTATION NEEDS     Transportation : No   Physical Activity: Insufficiently Active (2023)    Exercise Vital Sign     Days of Exercise per Week: 3 days     Minutes of Exercise per Session: 20 min   Stress: No Stress Concern Present (2023)    [employer] [place] [employer] - Occupational Stress Questionnaire     Feeling of Stress : Not at all   Housing Stability: Low Risk  (2024)    Housing Stability Vital Sign     Unable to Pay for Housing in the Last Year: No     Homeless in the Last Year: No         Medications/Allergies: See med card    Vitals:    24 0955   BP: (!) 99/58   Pulse: 73   Weight: 88.5 kg (195 lb 1.7 oz)   Height: 5' 4" (1.626 m)   PainSc: 10-Worst pain ever   PainLoc: Back         Physical exam:  Gen: A and O x3, pleasant, well-groomed  Skin: No rashes or obvious lesions  HEENT: PERRLA, no obvious deformities on ears or in canals. Trachea midline.  CVS: Regular rate and rhythm, normal palpable pulses.  Resp:No increased work of breathing, symmetrical chest rise.  Abdomen: Soft, NT/ND.  Musculoskeletal:  Presents in a wheelchair, leaning over in severe pain, examination deferred.      Imagin20 MRI L-spine:  There is extensive multilevel degenerative change in addition to postsurgical changes.  There are postsurgical changes of posterior instrumented fusion and decompression of L4 through S1.  There is some degree of disc space narrowing, disc bulge with osteophytic ridging with a without superimposed disc protrusion or " disc extrusion in addition to facet joint arthropathy.  Also, there is 5 mm anterolisthesis of L4 on L5 which is without definite change compared to plain films dated 06/27/2018 but has progressed compared to prior preoperative MRI.  The pertinent findings are summarized below.  2. There is retroperitoneal lymphadenopathy which is incompletely included and evaluated.  Further evaluation with CT abdomen and pelvis is recommended.  3. At the L3-4 level there is a disc bulge with superimposed left-sided disc extrusion with cephalad extension contributing to severe left lateral recess and foraminal stenosis, severe spinal stenosis and mild right foraminal stenosis.  The findings have progressed.  4. At the T11-12 level there is moderate-to-marked right foraminal stenosis without spinal stenosis.  5. At the L2-3 level there has been interval progression of degenerative change resulting in moderate left lateral recess stenosis and severe left foraminal stenosis.  6. At the postoperative L4-5 level there is mild-to-moderate right foraminal stenosis without spinal stenosis status post decompression.  7. At the L5-S1 level there is mild crowding of the left lateral recess with mild interval improvement.  There is moderate left and mild-to-moderate right foraminal stenosis without spinal stenosis.    06/06/2022 MRI lumbar spine   There are postoperative changes of posterior instrumented fusion at L4-S1 utilizing bilateral pedicle screws and vertical stabilization rods.  There are postoperative changes of posterior decompression at L4 and L5.  There is a grade I retrolisthesis of L2 on L3 and L3 on L4.  There is a grade I anterolisthesis of L4 on L5.  No acute lumbar compression fracture or pathologic marrow replacement process.  There is a mild dextroconvex curvature of the thoracolumbar spine.  There is degenerative disc desiccation and disc space narrowing at every lumbar level.  There are prominent degenerative endplate  changes observed at L2-L3.     The conus medullaris terminates at L1-L2.  The visualized lower thoracic spinal cord is normal in signal intensity with no evidence of cord edema, myelomalacia, or cord syrinx.  No epidural fluid collections or masses.  The paraspinous musculature is unremarkable.     The distal descending thoracic aorta measures up to 30 x 36 mm in maximal dimension on series 6, image 1.  There is a fusiform infrarenal abdominal aortic aneurysm present which measures up to 3.5 x 3.4 cm on the provided images (series 5, image 21 and series 2, image 9).  There is a 24 mm focus of T2 hyperintense signal present within the anterior cortex of the lower pole of the right kidney on series 6, image 10.  This was noted at the time of a recent CT of the chest, abdomen, and pelvis, and a solid mass cannot be excluded.  Previously noted retroperitoneal lymphadenopathy has resolved in the interim since the prior study.     T11-T12: There is right neuroforaminal stenosis as a result of the patient's scoliotic curvature and also due to right facet arthropathy and a possible small right foraminal disc protrusion (series 2 and 3, image 6).     T12-L1: There is ligamentum flavum thickening and minor left facet arthropathy.  No disc protrusion or extrusion. No central canal stenosis or neuroforaminal stenosis.     L1-L2: There is a minimal broad left paracentral/foraminal disc protrusion.  There is ligamentum flavum thickening and facet arthropathy.  No disc protrusion or extrusion. No central canal stenosis or neuroforaminal stenosis.     L2-L3: There is a grade I retrolisthesis of L2 on L3.  There is a left paracentral/foraminal/extraforaminal posterior disc osteophyte complex.  No definite encroachment upon the exited left L2 nerve.  There is ligamentum flavum thickening and bilateral hypertrophic facet arthropathy.  There is moderate-severe left neuroforaminal stenosis present.  No right neuroforaminal stenosis.   There is mild overall central canal stenosis.     L3-L4: There is a grade I retrolisthesis of L3 on L4 with associated uncovering of the intervertebral disc and a superimposed broad disc bulge which extends into both neural foramina.  There is ligamentum flavum thickening and bilateral hypertrophic facet arthropathy.  There is a new 3 x 10 mm lentiform subligamentous cystic structure present in association with the left facet joint on series 6, image 12, most likely a developing intraspinal synovial cyst.  This results in prominent left lateral recess stenosis and probable encroachment on the descending left L4 nerve in the left lateral recess.  There is moderate-severe central canal stenosis.  There is prominent right lateral recess stenosis which could produce symptoms referable to the right L4 nerve.  There is bilateral moderate neuroforaminal stenosis.     L4-L5: There are postoperative changes of posterior decompression.  There is a grade I anterolisthesis of L4 on L5.  No definite central canal stenosis or neuroforaminal stenosis.  There is bilateral hypertrophic facet arthropathy.  There are postoperative changes of posterior fusion.     L5-S1: There is a grade I retrolisthesis of L5 on S1.  There is a broad left paracentral disc protrusion present superimposed upon a broad disc bulge.  There is bilateral hypertrophic facet arthropathy.  No neuroforaminal stenosis.  No overall central canal stenosis.    Assessment: The patient is a 83-year-old female with past medical history significant for hypertension and hyperlipidemia who presents in referral from Lucita Gonzalez PA-C for low back and right leg pain.    1. Lumbar radiculopathy, chronic  MRI Lumbar Spine Without Contrast      2. DDD (degenerative disc disease), lumbar        3. Spinal stenosis of lumbar region without neurogenic claudication              Plan:  1. For her severe pain today we have provided Depo-Medrol 80 mg IM.  If she does not have  relief with this we discussed that she may need to go to the emergency department due to the degree of pain she is experiencing.  2. I have ordered a lumbar spine MRI and I will have her follow-up to review the images and discuss interventional procedures if necessary.

## 2024-08-20 ENCOUNTER — TELEPHONE (OUTPATIENT)
Dept: UROLOGY | Facility: CLINIC | Age: 84
End: 2024-08-20
Payer: MEDICARE

## 2024-08-20 ENCOUNTER — TELEPHONE (OUTPATIENT)
Dept: PAIN MEDICINE | Facility: CLINIC | Age: 84
End: 2024-08-20
Payer: MEDICARE

## 2024-08-20 NOTE — TELEPHONE ENCOUNTER
ED is still the suggestion. She was in so much pain I could not examine her. With that level of pain and steroids not helping, I dont believe we are equipped to control her pain.

## 2024-08-20 NOTE — TELEPHONE ENCOUNTER
Pt refuse to take suggestions from provider and go to ER. Pt continues to ask if provider can give her a shot even after the suggestions from provider

## 2024-08-22 ENCOUNTER — HOSPITAL ENCOUNTER (OUTPATIENT)
Dept: RADIOLOGY | Facility: HOSPITAL | Age: 84
Discharge: HOME | End: 2024-08-22
Attending: PHYSICIAN ASSISTANT
Payer: MEDICARE

## 2024-08-22 ENCOUNTER — PATIENT MESSAGE (OUTPATIENT)
Dept: HEMATOLOGY/ONCOLOGY | Facility: CLINIC | Age: 84
End: 2024-08-22
Payer: MEDICARE

## 2024-08-22 DIAGNOSIS — M54.16 LUMBAR RADICULOPATHY, CHRONIC: ICD-10-CM

## 2024-08-22 NOTE — TELEPHONE ENCOUNTER
She went to her appointment today for a MRI but because of her back pain, they couldnt perform the procedure.    She is immobile and needs help getting to each appointment.    Please contact me or have José Manuel Farnsworth call me at [phone] to discuss options to help her gain some relief please.    She is in a lot of pain and the steroid shot nor the pain medication is giving her any relief.          Please advise

## 2024-08-23 ENCOUNTER — PATIENT MESSAGE (OUTPATIENT)
Dept: FAMILY MEDICINE | Facility: CLINIC | Age: 84
End: 2024-08-23
Payer: MEDICARE

## 2024-08-23 ENCOUNTER — TELEPHONE (OUTPATIENT)
Dept: PAIN MEDICINE | Facility: CLINIC | Age: 84
End: 2024-08-23
Payer: MEDICARE

## 2024-08-23 DIAGNOSIS — M54.16 LUMBAR RADICULOPATHY, CHRONIC: ICD-10-CM

## 2024-08-23 DIAGNOSIS — M54.9 DORSALGIA, UNSPECIFIED: Primary | ICD-10-CM

## 2024-08-26 ENCOUNTER — PATIENT MESSAGE (OUTPATIENT)
Dept: FAMILY MEDICINE | Facility: CLINIC | Age: 84
End: 2024-08-26

## 2024-08-26 ENCOUNTER — OFFICE VISIT (OUTPATIENT)
Dept: FAMILY MEDICINE | Facility: CLINIC | Age: 84
End: 2024-08-26
Payer: MEDICARE

## 2024-08-26 VITALS
OXYGEN SATURATION: 94 % | WEIGHT: 195.13 LBS | SYSTOLIC BLOOD PRESSURE: 128 MMHG | HEIGHT: 64 IN | HEART RATE: 78 BPM | BODY MASS INDEX: 33.31 KG/M2 | DIASTOLIC BLOOD PRESSURE: 78 MMHG

## 2024-08-26 DIAGNOSIS — M54.31 RIGHT SIDED SCIATICA: Primary | ICD-10-CM

## 2024-08-26 PROCEDURE — 1125F AMNT PAIN NOTED PAIN PRSNT: CPT | Mod: CPTII,S$GLB,, | Performed by: NURSE PRACTITIONER

## 2024-08-26 PROCEDURE — 3074F SYST BP LT 130 MM HG: CPT | Mod: CPTII,S$GLB,, | Performed by: NURSE PRACTITIONER

## 2024-08-26 PROCEDURE — 99999 PR PBB SHADOW E&M-EST. PATIENT-LVL IV: CPT | Mod: PBBFAC,,, | Performed by: NURSE PRACTITIONER

## 2024-08-26 PROCEDURE — 99213 OFFICE O/P EST LOW 20 MIN: CPT | Mod: S$GLB,,, | Performed by: NURSE PRACTITIONER

## 2024-08-26 PROCEDURE — 1160F RVW MEDS BY RX/DR IN RCRD: CPT | Mod: CPTII,S$GLB,, | Performed by: NURSE PRACTITIONER

## 2024-08-26 PROCEDURE — 1159F MED LIST DOCD IN RCRD: CPT | Mod: CPTII,S$GLB,, | Performed by: NURSE PRACTITIONER

## 2024-08-26 PROCEDURE — 3288F FALL RISK ASSESSMENT DOCD: CPT | Mod: CPTII,S$GLB,, | Performed by: NURSE PRACTITIONER

## 2024-08-26 PROCEDURE — 1101F PT FALLS ASSESS-DOCD LE1/YR: CPT | Mod: CPTII,S$GLB,, | Performed by: NURSE PRACTITIONER

## 2024-08-26 PROCEDURE — 3078F DIAST BP <80 MM HG: CPT | Mod: CPTII,S$GLB,, | Performed by: NURSE PRACTITIONER

## 2024-08-26 NOTE — PROGRESS NOTES
Subjective:       Patient ID: Shadnra Velez is a 83 y.o. female.    Chief Complaint: Hospital Follow Up    HPI  Right hip/right buttock pain radiating to right calf X 2 weeks. Saw pain management on 8/19/24 and had IM steroid with no relief. MRI L spine was ordered but could not complete the test because she could not lie flat. Pt states she was told she would need to do the MRI with sedation. Went to ER on 8/21/24 and 8/23/24 for the pain. Was prescribed medrol dosepack, gabapentin 100mg tid and methocarbamol with some relief. No relief with norco.  Past Medical History:   Diagnosis Date    Breast cancer 1985    right mastectomy    Digestive disorder     Encounter for blood transfusion     History of pneumonia     Hypercholesterolemia     Hypertension     Hyperuricemia 06/20/2024    Lab work in the ED revealed a uric acid level of 6.8.  Pt is asymptomatic for gouty arthritis.  Monitor.      Indigestion     Lumbar spondylosis     Lung cancer     2022 - currently in treatment as of 7/27/22    Lymphoma     Osteopenia     Pulmonary nodule     Renal cancer     Renal disorder     renal cancer    Retinal detachment     Smoker 06/11/2022    Thyroid disease        Past Surgical History:   Procedure Laterality Date    ANGIOGRAPHY  4/29/2024    Procedure: Right Renal Angiogram;  Surgeon: Steve Weaver MD;  Location: [facility] CATH;  Service: Interventional Radiology;;    APPENDECTOMY      BONE MARROW BIOPSY Bilateral 06/24/2020    Procedure: Biopsy-bone marrow;  Surgeon: Rod Montero MD;  Location: [facility] OR;  Service: Oncology;  Laterality: Bilateral;    BREAST RECONSTRUCTION  1990    right    BRONCHOSCOPY N/A 01/18/2023    Procedure: BRONCHOSCOPY;  Surgeon: Gregorio Kinney MD;  Location: [facility] ENDO;  Service: Pulmonary;  Laterality: N/A;    BRONCHOSCOPY Bilateral 02/10/2023    Procedure: Bronchoscopy;  Surgeon: Gregorio Kinney MD;  Location: [facility] ENDO;  Service: Pulmonary;  Laterality: Bilateral;  for airway clearance.  Purple top tube for cell count diff    BRONCHOSCOPY N/A 03/13/2023    Procedure: Bronchoscopy;  Surgeon: Gregorio Kinney MD;  Location: [facility];  Service: Pulmonary;  Laterality: N/A;  therapeutic scope. Please have purple top tube and iced saline    CATARACT EXTRACTION W/  INTRAOCULAR LENS IMPLANT Bilateral     CHOLECYSTECTOMY      COLONOSCOPY N/A 3/25/2024    Procedure: COLONOSCOPY;  Surgeon: Brigido Calvillo Jr., MD;  Location: [facility] ENDO;  Service: Endoscopy;  Laterality: N/A;    CYSTOURETEROSCOPY WITH RETROGRADE PYELOGRAPHY AND INSERTION OF STENT INTO URETER Right 6/21/2024    Procedure: CYSTOURETEROSCOPY, WITH RETROGRADE PYELOGRAM AND URETERAL STENT INSERTION;  Surgeon: Noel Priest MD;  Location: [facility] OR;  Service: Urology;  Laterality: Right;    EMBOLIZATION  4/29/2024    Procedure: Rt. Renal Embolization;  Surgeon: Steve Weaver MD;  Location: [facility] CATH;  Service: Interventional Radiology;;    ENDOBRONCHIAL ULTRASOUND Bilateral 07/09/2021    Procedure: ENDOBRONCHIAL ULTRASOUND (EBUS);  Surgeon: Gregorio Kinney MD;  Location: [facility];  Service: Pulmonary;  Laterality: Bilateral;  NEED LMA to  eval right upper paratracheal LN.     ENDOBRONCHIAL ULTRASOUND Bilateral 06/16/2022    Procedure: ENDOBRONCHIAL ULTRASOUND (EBUS);  Surgeon: Gregorio Kinney MD;  Location: [facility];  Service: Pulmonary;  Laterality: Bilateral;    INJECTION OF FACET JOINT Left 06/30/2022    Procedure: FACET JOINT Cyst Aspiration L3/4;  Surgeon: Ronnell Baeza MD;  Location: [facility] OR;  Service: Pain Management;  Laterality: Left;    INSERTION OF TUNNELED CENTRAL VENOUS CATHETER (CVC) WITH SUBCUTANEOUS PORT Left 11/04/2020    Procedure: [id number];  Surgeon: Kody Pretty MD;  Location: [facility] OR;  Service: General;  Laterality: Left;    JOINT REPLACEMENT  2008    right knee     LUMBAR LAMINECTOMY      LYMPH NODE BIOPSY      MASTECTOMY Right 1985    NEEDLE LOCALIZATION N/A 05/25/2020    Procedure: NEEDLE LOCALIZATION -  lymph node bx;  Surgeon: Steve Weaver MD;  Location: [facility] CATH;  Service: Interventional Radiology;  Laterality: N/A;    NEEDLE LOCALIZATION N/A 5/22/2024    Procedure: cryoablation renal in CT with anesthesia argon IAM rep;  Surgeon: Steve Weaver MD;  Location: [facility] CATH;  Service: Interventional Radiology;  Laterality: N/A;  cryoablation renal in CT with anesthesia argon Iam rep    RECTAL BIOPSY N/A 3/27/2024    Procedure: BIOPSY, RECTUM;  Surgeon: Isabel Choi MD;  Location: [facility] OR;  Service: General;  Laterality: N/A;    RETINAL DETACHMENT SURGERY  1989    RIGHT HEART CATHETERIZATION Right 03/18/2023    Procedure: INSERTION, CATHETER, RIGHT HEART;  Surgeon: Rukhsana Lang MD;  Location: [facility] CATH;  Service: Cardiology;  Laterality: Right;    SELECTIVE UNILATERAL PULMONARY ANGIOGRAPHY  03/18/2023    Procedure: Pumonary angiography - unilateral;  Surgeon: Rukhsana Lang MD;  Location: [facility] CATH;  Service: Cardiology;;    THROMBECTOMY N/A 03/18/2023    Procedure: THROMBECTOMY;  Surgeon: Rukhsana Lang MD;  Location: [facility] CATH;  Service: Cardiology;  Laterality: N/A;    TRANSFORAMINAL EPIDURAL INJECTION OF STEROID Left 05/14/2020    Procedure: Injection,steroid,epidural,transforaminal approach, l3/4;  Surgeon: Ronnell Baeza MD;  Location: [facility] OR;  Service: Pain Management;  Laterality: Left;    TRANSFORAMINAL EPIDURAL INJECTION OF STEROID Left 03/23/2022    Procedure: Injection,steroid,epidural,transforaminal approach L3/4;  Surgeon: Ronnell Baeza MD;  Location: [facility] OR;  Service: Pain Management;  Laterality: Left;    TRANSFORAMINAL EPIDURAL INJECTION OF STEROID Left 08/01/2022    Procedure: Injection,steroid,epidural,transforaminal approach L3/4;  Surgeon: Ronnell Baeza MD;  Location: [facility] OR;  Service: Pain Management;  Laterality: Left;    TUBAL LIGATION      UMBILICAL HERNIA REPAIR         Review of patient's allergies indicates:   Allergen Reactions    Opioids - morphine  analogues Other (See Comments)     Hypotension       Social History     Socioeconomic History    Marital status:     Number of children: 3   Occupational History    Occupation: retired  for [employer]   Tobacco Use    Smoking status: Former     Current packs/day: 0.00     Average packs/day: 0.5 packs/day for 53.0 years (26.5 ttl pk-yrs)     Types: Cigarettes     Start date: 1969     Quit date: 2022     Years since quittin.2    Smokeless tobacco: Never   Substance and Sexual Activity    Alcohol use: No    Drug use: No    Sexual activity: Not Currently   Social History Narrative    Lives alone        Hobbies: skyler        From [place]         Social Determinants of Health     Financial Resource Strain: Low Risk  (2023)    Overall Financial Resource Strain (CARDIA)     Difficulty of Paying Living Expenses: Not hard at all   Food Insecurity: No Food Insecurity (2024)    Hunger Vital Sign     Worried About Running Out of Food in the Last Year: Never true     Ran Out of Food in the Last Year: Never true   Transportation Needs: No Transportation Needs (2024)    TRANSPORTATION NEEDS     Transportation : No   Physical Activity: Insufficiently Active (2023)    Exercise Vital Sign     Days of Exercise per Week: 3 days     Minutes of Exercise per Session: 20 min   Stress: No Stress Concern Present (2023)    [employer] - Occupational Stress Questionnaire     Feeling of Stress : Not at all   Housing Stability: Low Risk  (2024)    Housing Stability Vital Sign     Unable to Pay for Housing in the Last Year: No     Homeless in the Last Year: No       Current Outpatient Medications on File Prior to Visit   Medication Sig Dispense Refill    acetaminophen (TYLENOL) 325 MG tablet Take 2 tablets (650 mg total) by mouth every 6 (six) hours as needed for Pain.      albuterol (VENTOLIN HFA) 90 mcg/actuation inhaler Inhale 2 puffs into the  lungs every 6 (six) hours as needed for Wheezing or Shortness of Breath (or before exercise). Rescue 18 g 2    apixaban (ELIQUIS) 2.5 mg Tab Take 1 tablet (2.5 mg total) by mouth 2 (two) times daily.      budesonide (PULMICORT) 0.5 mg/2 mL nebulizer solution Take 2 mLs (0.5 mg total) by nebulization 2 (two) times a day. 120 mL 11    cetirizine (ZYRTEC) 10 MG tablet Take 10 mg by mouth once daily.      ergocalciferol (ERGOCALCIFEROL) 50,000 unit Cap Take 50,000 Units by mouth every Tuesday.      gabapentin (NEURONTIN) 100 MG capsule Take 1 capsule (100 mg total) by mouth 3 (three) times daily. 90 capsule 11    HYDROcodone-acetaminophen (NORCO) 5-325 mg per tablet Take 1 tablet by mouth every 6 (six) hours as needed for Pain. 12 tablet 0    hydrocortisone (ANUSOL-HC) 2.5 % rectal cream Place rectally 2 (two) times daily as needed for Hemorrhoids. 28 g 0    iron-vitamin C 100-250 mg, ICAR-C, 100-250 mg Tab Take 1 tablet by mouth 2 (two) times a day. 60 tablet 3    levalbuterol (XOPENEX) 0.63 mg/3 mL nebulizer solution Take 3 mLs (0.63 mg total) by nebulization 2 (two) times a day. Rescue 60 each 6    levothyroxine (SYNTHROID) 100 MCG tablet Take 1 tablet (100 mcg total) by mouth once daily. 90 tablet 3    LIDOcaine (LIDODERM) 5 % Place 1 patch onto the skin once daily. Remove & Discard patch within 12 hours or as directed by MD 30 patch 0    methocarbamoL (ROBAXIN) 750 MG Tab Take 2 tablets (1,500 mg total) by mouth 3 (three) times daily. for 5 days 30 tablet 0    methylPREDNISolone (MEDROL DOSEPACK) 4 mg tablet Use as directed 1 each 0    mucus clearing device (ACAPELLA, FLUTTER) by Misc.(Non-Drug; Combo Route) route once daily. 100 each 0    multivitamin (THERAGRAN) per tablet Take 1 tablet by mouth once daily.      NIFEdipine (ADALAT CC) 60 MG TbSR Take 1 tablet (60 mg total) by mouth once daily.      omeprazole (PRILOSEC) 40 MG capsule Take 1 capsule (40 mg total) by mouth once daily. 90 capsule 4    OXYGEN-AIR  "DELIVERY SYSTEMS MISC 2-3 L by Misc.(Non-Drug; Combo Route) route as needed.      sodium chloride 3% 3 % nebulizer solution Take 4 mLs by nebulization 2 (two) times a day. 240 mL 3    temazepam (RESTORIL) 30 mg capsule Take 1 capsule (30 mg total) by mouth every evening. 30 capsule 5     Current Facility-Administered Medications on File Prior to Visit   Medication Dose Route Frequency Provider Last Rate Last Admin    sodium chloride 0.9% flush 10 mL  10 mL Intravenous PRN Chad Mills MD   10 mL at 07/31/24 1306       Family History   Problem Relation Name Age of Onset    Cancer Sister          uterine    Cancer Sister          Uterine cancer     Diabetes Brother      Breast cancer Other niece 42    Glaucoma Neg Hx      Macular degeneration Neg Hx         Review of Systems   Constitutional: Negative.    HENT: Negative.     Eyes: Negative.    Respiratory: Negative.     Cardiovascular: Negative.    Gastrointestinal: Negative.    Endocrine: Negative.    Genitourinary: Negative.    Musculoskeletal:  Positive for back pain and gait problem.   Skin: Negative.    Psychiatric/Behavioral: Negative.         Objective:      /78   Pulse 78   Ht 5' 4" (1.626 m)   Wt 88.5 kg (195 lb 1.7 oz)   SpO2 (!) 94%   BMI 33.49 kg/m²   Physical Exam  Vitals and nursing note reviewed.   Constitutional:       General: She is not in acute distress.     Appearance: Normal appearance. She is well-groomed.   HENT:      Head: Normocephalic.      Right Ear: External ear normal.      Left Ear: External ear normal.      Nose: Nose normal.      Mouth/Throat:      Lips: Pink.      Mouth: Mucous membranes are moist.      Pharynx: Oropharynx is clear.   Eyes:      Extraocular Movements: Extraocular movements intact.      Conjunctiva/sclera: Conjunctivae normal.      Pupils: Pupils are equal, round, and reactive to light.   Cardiovascular:      Rate and Rhythm: Normal rate and regular rhythm.      Heart sounds: Normal heart sounds. "   Pulmonary:      Effort: Pulmonary effort is normal.      Breath sounds: Normal breath sounds.   Chest:      Chest wall: No tenderness.   Abdominal:      General: Bowel sounds are normal.      Palpations: Abdomen is soft.      Tenderness: There is no abdominal tenderness.   Musculoskeletal:         General: Tenderness (right buttock) present. No swelling. Normal range of motion.      Cervical back: Normal range of motion and neck supple.      Right lower leg: No edema.      Left lower leg: No edema.   Lymphadenopathy:      Cervical: No cervical adenopathy.   Skin:     General: Skin is warm and dry.   Neurological:      General: No focal deficit present.      Mental Status: She is alert and oriented to person, place, and time. Mental status is at baseline.      Gait: Gait abnormal (wheelchair).   Psychiatric:         Mood and Affect: Mood normal.         Behavior: Behavior normal.         Assessment:       1. Right sided sciatica        Plan:       Right sided sciatica        Increase gabapentin to 200mg qhs. Can continue 100mg gabapentin during the day. Intermittent ice prn pain. Will contact pain management to to discuss MRI L spine with sedation.

## 2024-08-27 NOTE — TELEPHONE ENCOUNTER
Please get this pt set up with an MRI with anesthesia asap.  When a provider is out of clinic, please send urgent messages to other providers who are present so things like this get addressed same day. Thanks

## 2024-08-27 NOTE — TELEPHONE ENCOUNTER
Spoke with patient, states she is awaiting a call back from [facility] at this time.

## 2024-08-27 NOTE — TELEPHONE ENCOUNTER
Attempted to call patient's son, no answer, voice message left with callback number. My chart message sent as well.

## 2024-08-28 ENCOUNTER — PATIENT MESSAGE (OUTPATIENT)
Dept: PAIN MEDICINE | Facility: CLINIC | Age: 84
End: 2024-08-28
Payer: MEDICARE

## 2024-08-28 ENCOUNTER — PATIENT MESSAGE (OUTPATIENT)
Dept: FAMILY MEDICINE | Facility: CLINIC | Age: 84
End: 2024-08-28
Payer: MEDICARE

## 2024-08-28 RX ORDER — METHOCARBAMOL 750 MG/1
1500 TABLET, FILM COATED ORAL 3 TIMES DAILY
Qty: 30 TABLET | Refills: 0 | Status: CANCELLED | OUTPATIENT
Start: 2024-08-28 | End: 2024-09-02

## 2024-08-28 NOTE — TELEPHONE ENCOUNTER
Spoke with patient, explained that she may need to have it done at [facility]. Provided patient with the phone number for radiology at [facility], she states she will call to make the appointment.

## 2024-08-28 NOTE — TELEPHONE ENCOUNTER
I have placed additional orders for her to have MRI with sedation.  Patient will need to call the number on the orders to schedule.

## 2024-08-28 NOTE — TELEPHONE ENCOUNTER
Handled in another encounter. Patient scheduling with [facility] for MRI with sedation. Called [facility] to ensure the patient was contacted and scheduled.

## 2024-08-29 ENCOUNTER — TELEPHONE (OUTPATIENT)
Dept: INFUSION THERAPY | Facility: HOSPITAL | Age: 84
End: 2024-08-29
Payer: MEDICARE

## 2024-08-29 ENCOUNTER — OFFICE VISIT (OUTPATIENT)
Dept: FAMILY MEDICINE | Facility: CLINIC | Age: 84
End: 2024-08-29
Payer: MEDICARE

## 2024-08-29 VITALS
HEART RATE: 81 BPM | WEIGHT: 193.56 LBS | HEIGHT: 64 IN | DIASTOLIC BLOOD PRESSURE: 76 MMHG | TEMPERATURE: 98 F | BODY MASS INDEX: 33.05 KG/M2 | SYSTOLIC BLOOD PRESSURE: 136 MMHG | OXYGEN SATURATION: 95 %

## 2024-08-29 DIAGNOSIS — M54.31 RIGHT SIDED SCIATICA: Primary | ICD-10-CM

## 2024-08-29 PROCEDURE — 3075F SYST BP GE 130 - 139MM HG: CPT | Mod: CPTII,S$GLB,, | Performed by: NURSE PRACTITIONER

## 2024-08-29 PROCEDURE — 99213 OFFICE O/P EST LOW 20 MIN: CPT | Mod: S$GLB,,, | Performed by: NURSE PRACTITIONER

## 2024-08-29 PROCEDURE — 1101F PT FALLS ASSESS-DOCD LE1/YR: CPT | Mod: CPTII,S$GLB,, | Performed by: NURSE PRACTITIONER

## 2024-08-29 PROCEDURE — 3078F DIAST BP <80 MM HG: CPT | Mod: CPTII,S$GLB,, | Performed by: NURSE PRACTITIONER

## 2024-08-29 PROCEDURE — 3288F FALL RISK ASSESSMENT DOCD: CPT | Mod: CPTII,S$GLB,, | Performed by: NURSE PRACTITIONER

## 2024-08-29 PROCEDURE — 1160F RVW MEDS BY RX/DR IN RCRD: CPT | Mod: CPTII,S$GLB,, | Performed by: NURSE PRACTITIONER

## 2024-08-29 PROCEDURE — 1125F AMNT PAIN NOTED PAIN PRSNT: CPT | Mod: CPTII,S$GLB,, | Performed by: NURSE PRACTITIONER

## 2024-08-29 PROCEDURE — 99999 PR PBB SHADOW E&M-EST. PATIENT-LVL V: CPT | Mod: PBBFAC,,, | Performed by: NURSE PRACTITIONER

## 2024-08-29 PROCEDURE — 1159F MED LIST DOCD IN RCRD: CPT | Mod: CPTII,S$GLB,, | Performed by: NURSE PRACTITIONER

## 2024-08-29 RX ORDER — METHOCARBAMOL 750 MG/1
750 TABLET, FILM COATED ORAL 3 TIMES DAILY PRN
Qty: 30 TABLET | Refills: 0 | Status: SHIPPED | OUTPATIENT
Start: 2024-08-29 | End: 2024-08-30 | Stop reason: SDUPTHER

## 2024-08-29 RX ORDER — SENNOSIDES 8.6 MG/1
1 TABLET ORAL DAILY
Qty: 30 TABLET | Refills: 11 | Status: SHIPPED | OUTPATIENT
Start: 2024-08-29

## 2024-08-29 RX ORDER — GABAPENTIN 100 MG/1
200 CAPSULE ORAL 3 TIMES DAILY
Qty: 180 CAPSULE | Refills: 2 | Status: SHIPPED | OUTPATIENT
Start: 2024-08-29

## 2024-08-29 NOTE — PROGRESS NOTES
Subjective:       Patient ID: Shandra Velez is a 83 y.o. female.    Chief Complaint: Hip Pain and Leg Pain    HPI follow up for sciatica of right side. Following with pain management for sciatica and needs MRI done before anything else can be done. Has increased gabapentin to 200mg at night which has been helping. Would like to increase gabapentin to 200mg during the day since she is tolerating it better. Reports she is no longer having drowsiness with gabapentin. Pt has spoken to [facility] to schedule MRI spine with anesthesia and soonest appt is 1 month away. Pt states she cannot wait that long.    Past Medical History:   Diagnosis Date    Breast cancer 1985    right mastectomy    Digestive disorder     Encounter for blood transfusion     History of pneumonia     Hypercholesterolemia     Hypertension     Hyperuricemia 06/20/2024    Lab work in the ED revealed a uric acid level of 6.8.  Pt is asymptomatic for gouty arthritis.  Monitor.      Indigestion     Lumbar spondylosis     Lung cancer     2022 - currently in treatment as of 7/27/22    Lymphoma     Osteopenia     Pulmonary nodule     Renal cancer     Renal disorder     renal cancer    Retinal detachment     Smoker 06/11/2022    Thyroid disease        Past Surgical History:   Procedure Laterality Date    ANGIOGRAPHY  4/29/2024    Procedure: Right Renal Angiogram;  Surgeon: Steve Weaver MD;  Location: [facility] CATH;  Service: Interventional Radiology;;    APPENDECTOMY      BONE MARROW BIOPSY Bilateral 06/24/2020    Procedure: Biopsy-bone marrow;  Surgeon: Rod Montero MD;  Location: [facility] OR;  Service: Oncology;  Laterality: Bilateral;    BREAST RECONSTRUCTION  1990    right    BRONCHOSCOPY N/A 01/18/2023    Procedure: BRONCHOSCOPY;  Surgeon: Gregorio Kinney MD;  Location: [facility] ENDO;  Service: Pulmonary;  Laterality: N/A;    BRONCHOSCOPY Bilateral 02/10/2023    Procedure: Bronchoscopy;  Surgeon: Gregorio Kinney MD;  Location: [facility] ENDO;  Service:  Pulmonary;  Laterality: Bilateral;  for airway clearance. Purple top tube for cell count diff    BRONCHOSCOPY N/A 03/13/2023    Procedure: Bronchoscopy;  Surgeon: Gregorio Kinney MD;  Location: [facility];  Service: Pulmonary;  Laterality: N/A;  therapeutic scope. Please have purple top tube and iced saline    CATARACT EXTRACTION W/  INTRAOCULAR LENS IMPLANT Bilateral     CHOLECYSTECTOMY      COLONOSCOPY N/A 3/25/2024    Procedure: COLONOSCOPY;  Surgeon: Brigido Calvillo Jr., MD;  Location: [facility];  Service: Endoscopy;  Laterality: N/A;    CYSTOURETEROSCOPY WITH RETROGRADE PYELOGRAPHY AND INSERTION OF STENT INTO URETER Right 6/21/2024    Procedure: CYSTOURETEROSCOPY, WITH RETROGRADE PYELOGRAM AND URETERAL STENT INSERTION;  Surgeon: Noel Priest MD;  Location: [facility] OR;  Service: Urology;  Laterality: Right;    EMBOLIZATION  4/29/2024    Procedure: Rt. Renal Embolization;  Surgeon: Steve Weaver MD;  Location: [facility] CATH;  Service: Interventional Radiology;;    ENDOBRONCHIAL ULTRASOUND Bilateral 07/09/2021    Procedure: ENDOBRONCHIAL ULTRASOUND (EBUS);  Surgeon: Gregorio Kinney MD;  Location: [facility];  Service: Pulmonary;  Laterality: Bilateral;  NEED LMA to  eval right upper paratracheal LN.     ENDOBRONCHIAL ULTRASOUND Bilateral 06/16/2022    Procedure: ENDOBRONCHIAL ULTRASOUND (EBUS);  Surgeon: Gregorio Kinney MD;  Location: [facility];  Service: Pulmonary;  Laterality: Bilateral;    INJECTION OF FACET JOINT Left 06/30/2022    Procedure: FACET JOINT Cyst Aspiration L3/4;  Surgeon: Ronnell Baeza MD;  Location: [facility] OR;  Service: Pain Management;  Laterality: Left;    INSERTION OF TUNNELED CENTRAL VENOUS CATHETER (CVC) WITH SUBCUTANEOUS PORT Left 11/04/2020    Procedure: [id number];  Surgeon: Kody Pretty MD;  Location: [facility] OR;  Service: General;  Laterality: Left;    JOINT REPLACEMENT  2008    right knee     LUMBAR LAMINECTOMY      LYMPH NODE BIOPSY      MASTECTOMY Right 1985    NEEDLE  LOCALIZATION N/A 05/25/2020    Procedure: NEEDLE LOCALIZATION - lymph node bx;  Surgeon: Steve Weaver MD;  Location: [facility] CATH;  Service: Interventional Radiology;  Laterality: N/A;    NEEDLE LOCALIZATION N/A 5/22/2024    Procedure: cryoablation renal in CT with anesthesia argon IAM rep;  Surgeon: Steve Weaver MD;  Location: [facility] CATH;  Service: Interventional Radiology;  Laterality: N/A;  cryoablation renal in CT with anesthesia argon Iam rep    RECTAL BIOPSY N/A 3/27/2024    Procedure: BIOPSY, RECTUM;  Surgeon: Isabel Choi MD;  Location: [facility] OR;  Service: General;  Laterality: N/A;    RETINAL DETACHMENT SURGERY  1989    RIGHT HEART CATHETERIZATION Right 03/18/2023    Procedure: INSERTION, CATHETER, RIGHT HEART;  Surgeon: Rukhsana Lang MD;  Location: [facility] CATH;  Service: Cardiology;  Laterality: Right;    SELECTIVE UNILATERAL PULMONARY ANGIOGRAPHY  03/18/2023    Procedure: Pumonary angiography - unilateral;  Surgeon: Rukhsana Lang MD;  Location: [facility] CATH;  Service: Cardiology;;    THROMBECTOMY N/A 03/18/2023    Procedure: THROMBECTOMY;  Surgeon: Rukhsana Lang MD;  Location: [facility] CATH;  Service: Cardiology;  Laterality: N/A;    TRANSFORAMINAL EPIDURAL INJECTION OF STEROID Left 05/14/2020    Procedure: Injection,steroid,epidural,transforaminal approach, l3/4;  Surgeon: Ronnell Baeza MD;  Location: [facility] OR;  Service: Pain Management;  Laterality: Left;    TRANSFORAMINAL EPIDURAL INJECTION OF STEROID Left 03/23/2022    Procedure: Injection,steroid,epidural,transforaminal approach L3/4;  Surgeon: Ronnell Baeza MD;  Location: [facility] OR;  Service: Pain Management;  Laterality: Left;    TRANSFORAMINAL EPIDURAL INJECTION OF STEROID Left 08/01/2022    Procedure: Injection,steroid,epidural,transforaminal approach L3/4;  Surgeon: Ronnell Baeza MD;  Location: [facility] OR;  Service: Pain Management;  Laterality: Left;    TUBAL LIGATION      UMBILICAL HERNIA REPAIR         Review of patient's  allergies indicates:   Allergen Reactions    Opioids - morphine analogues Other (See Comments)     Hypotension       Social History     Socioeconomic History    Marital status:     Number of children: 3   Occupational History    Occupation: retired  for [employer]   Tobacco Use    Smoking status: Former     Current packs/day: 0.00     Average packs/day: 0.5 packs/day for 53.0 years (26.5 ttl pk-yrs)     Types: Cigarettes     Start date: 1969     Quit date: 2022     Years since quittin.2    Smokeless tobacco: Never   Substance and Sexual Activity    Alcohol use: No    Drug use: No    Sexual activity: Not Currently   Social History Narrative    Lives alone        Hobbies: skyler        From [place]         Social Determinants of Health     Financial Resource Strain: Low Risk  (2023)    Overall Financial Resource Strain (CARDIA)     Difficulty of Paying Living Expenses: Not hard at all   Food Insecurity: No Food Insecurity (2024)    Hunger Vital Sign     Worried About Running Out of Food in the Last Year: Never true     Ran Out of Food in the Last Year: Never true   Transportation Needs: No Transportation Needs (2024)    TRANSPORTATION NEEDS     Transportation : No   Physical Activity: Insufficiently Active (2023)    Exercise Vital Sign     Days of Exercise per Week: 3 days     Minutes of Exercise per Session: 20 min   Stress: No Stress Concern Present (2023)    [employer] - Occupational Stress Questionnaire     Feeling of Stress : Not at all   Housing Stability: Low Risk  (2024)    Housing Stability Vital Sign     Unable to Pay for Housing in the Last Year: No     Homeless in the Last Year: No       Current Outpatient Medications on File Prior to Visit   Medication Sig Dispense Refill    acetaminophen (TYLENOL) 325 MG tablet Take 2 tablets (650 mg total) by mouth every 6 (six) hours as needed for Pain.      albuterol  (VENTOLIN HFA) 90 mcg/actuation inhaler Inhale 2 puffs into the lungs every 6 (six) hours as needed for Wheezing or Shortness of Breath (or before exercise). Rescue 18 g 2    apixaban (ELIQUIS) 2.5 mg Tab Take 1 tablet (2.5 mg total) by mouth 2 (two) times daily.      budesonide (PULMICORT) 0.5 mg/2 mL nebulizer solution Take 2 mLs (0.5 mg total) by nebulization 2 (two) times a day. 120 mL 11    cetirizine (ZYRTEC) 10 MG tablet Take 10 mg by mouth once daily.      ergocalciferol (ERGOCALCIFEROL) 50,000 unit Cap Take 50,000 Units by mouth every Tuesday.      hydrocortisone (ANUSOL-HC) 2.5 % rectal cream Place rectally 2 (two) times daily as needed for Hemorrhoids. 28 g 0    iron-vitamin C 100-250 mg, ICAR-C, 100-250 mg Tab Take 1 tablet by mouth 2 (two) times a day. 60 tablet 3    levalbuterol (XOPENEX) 0.63 mg/3 mL nebulizer solution Take 3 mLs (0.63 mg total) by nebulization 2 (two) times a day. Rescue 60 each 6    levothyroxine (SYNTHROID) 100 MCG tablet Take 1 tablet (100 mcg total) by mouth once daily. 90 tablet 3    LIDOcaine (LIDODERM) 5 % Place 1 patch onto the skin once daily. Remove & Discard patch within 12 hours or as directed by MD 30 patch 0    methocarbamoL (ROBAXIN) 750 MG Tab Take 1 tablet (750 mg total) by mouth 3 (three) times daily as needed (muscle spasm/tightness). 30 tablet 0    methylPREDNISolone (MEDROL DOSEPACK) 4 mg tablet Use as directed 1 each 0    mucus clearing device (ACAPELLA, FLUTTER) by Misc.(Non-Drug; Combo Route) route once daily. 100 each 0    multivitamin (THERAGRAN) per tablet Take 1 tablet by mouth once daily.      NIFEdipine (ADALAT CC) 60 MG TbSR Take 1 tablet (60 mg total) by mouth once daily.      omeprazole (PRILOSEC) 40 MG capsule Take 1 capsule (40 mg total) by mouth once daily. 90 capsule 4    OXYGEN-AIR DELIVERY SYSTEMS [facility] 2-3 L by Misc.(Non-Drug; Combo Route) route as needed.      SENNA 8.6 mg tablet Take 1 tablet by mouth once daily. 30 tablet 11    sodium  "chloride 3% 3 % nebulizer solution Take 4 mLs by nebulization 2 (two) times a day. 240 mL 3    temazepam (RESTORIL) 30 mg capsule Take 1 capsule (30 mg total) by mouth every evening. 30 capsule 5    [DISCONTINUED] gabapentin (NEURONTIN) 100 MG capsule Take 1 capsule (100 mg total) by mouth 3 (three) times daily. 90 capsule 11    [DISCONTINUED] HYDROcodone-acetaminophen (NORCO) 5-325 mg per tablet Take 1 tablet by mouth every 6 (six) hours as needed for Pain. 12 tablet 0    [DISCONTINUED] methocarbamoL (ROBAXIN) 750 MG Tab Take 2 tablets (1,500 mg total) by mouth 3 (three) times daily. for 5 days 30 tablet 0     Current Facility-Administered Medications on File Prior to Visit   Medication Dose Route Frequency Provider Last Rate Last Admin    sodium chloride 0.9% flush 10 mL  10 mL Intravenous PRN Chad Mills MD   10 mL at 07/31/24 1306       Family History   Problem Relation Name Age of Onset    Cancer Sister          uterine    Cancer Sister          Uterine cancer     Diabetes Brother      Breast cancer Other niece 42    Glaucoma Neg Hx      Macular degeneration Neg Hx         Review of Systems   Constitutional: Negative.    HENT: Negative.     Eyes: Negative.    Respiratory: Negative.     Cardiovascular: Negative.    Gastrointestinal: Negative.    Endocrine: Negative.    Genitourinary: Negative.    Musculoskeletal:  Positive for back pain and gait problem.   Skin: Negative.    Psychiatric/Behavioral: Negative.         Objective:      /76   Pulse 81   Temp 97.6 °F (36.4 °C)   Ht 5' 4" (1.626 m)   Wt 87.8 kg (193 lb 9 oz)   SpO2 95%   BMI 33.23 kg/m²   Physical Exam  Vitals and nursing note reviewed.   Constitutional:       General: She is not in acute distress.     Appearance: Normal appearance. She is well-groomed.   HENT:      Head: Normocephalic.      Right Ear: External ear normal.      Left Ear: External ear normal.      Nose: Nose normal.      Mouth/Throat:      Lips: Pink.      Mouth: " Mucous membranes are moist.      Pharynx: Oropharynx is clear.   Eyes:      Extraocular Movements: Extraocular movements intact.      Conjunctiva/sclera: Conjunctivae normal.      Pupils: Pupils are equal, round, and reactive to light.   Cardiovascular:      Rate and Rhythm: Normal rate and regular rhythm.      Heart sounds: Normal heart sounds. No murmur heard.  Pulmonary:      Effort: Pulmonary effort is normal.      Breath sounds: Normal breath sounds.   Chest:      Chest wall: No tenderness.   Abdominal:      General: Bowel sounds are normal.      Palpations: Abdomen is soft.      Tenderness: There is no abdominal tenderness.   Musculoskeletal:         General: Tenderness (right low back) present. No swelling. Normal range of motion.      Cervical back: Normal range of motion and neck supple.      Right lower leg: No edema.      Left lower leg: No edema.      Comments: Limited assessment of L spine due to pain   Skin:     General: Skin is warm and dry.   Neurological:      General: No focal deficit present.      Mental Status: She is alert and oriented to person, place, and time. Mental status is at baseline.      Gait: Gait abnormal (wheelchair).   Psychiatric:         Mood and Affect: Mood normal.         Behavior: Behavior normal.         Assessment:       1. Right sided sciatica        Plan:       Right sided sciatica    Other orders  -     gabapentin (NEURONTIN) 100 MG capsule; Take 2 capsules (200 mg total) by mouth 3 (three) times daily.  Dispense: 180 capsule; Refill: 2        Increase gabapentin to 200mg tid. Lumbar stretches. Contacted [facility] and was told that a lady named Binta will call pt today or tomorrow to schedule MRI with anesthesia.

## 2024-08-29 NOTE — TELEPHONE ENCOUNTER
Pt hurt her back and would like to cancel tomorrow but have the missed infusions added to the end of her cycle when she is able to come in.

## 2024-08-29 NOTE — TELEPHONE ENCOUNTER
----- Message from Marie Matos, Patient Care Assistant sent at 8/29/2024 12:19 PM CDT -----  Type: Needs Medical Advice  Who Called:  leigh Escobar Call Back Number: [phone]      Additional Information: leigh states she needs to talk to someone about her 8/30 appointment ,, didn't tell me what  with her appointment  please  call to further discuss thank you .

## 2024-08-29 NOTE — TELEPHONE ENCOUNTER
I'm not sure if Eve will do this but if difficult for her to get in at main campus please try. Thanks

## 2024-08-30 RX ORDER — METHOCARBAMOL 750 MG/1
750 TABLET, FILM COATED ORAL 3 TIMES DAILY PRN
Qty: 30 TABLET | Refills: 0 | Status: SHIPPED | OUTPATIENT
Start: 2024-08-30

## 2024-09-09 ENCOUNTER — PATIENT MESSAGE (OUTPATIENT)
Dept: FAMILY MEDICINE | Facility: CLINIC | Age: 84
End: 2024-09-09
Payer: MEDICARE

## 2024-09-09 ENCOUNTER — PATIENT MESSAGE (OUTPATIENT)
Dept: UROLOGY | Facility: CLINIC | Age: 84
End: 2024-09-09
Payer: MEDICARE

## 2024-09-09 RX ORDER — METHOCARBAMOL 750 MG/1
750 TABLET, FILM COATED ORAL 3 TIMES DAILY PRN
Qty: 30 TABLET | Refills: 0 | Status: SHIPPED | OUTPATIENT
Start: 2024-09-09

## 2024-09-09 RX ORDER — METHOCARBAMOL 750 MG/1
750 TABLET, FILM COATED ORAL 3 TIMES DAILY PRN
Qty: 30 TABLET | Refills: 0 | Status: CANCELLED | OUTPATIENT
Start: 2024-09-09

## 2024-09-13 ENCOUNTER — DOCUMENTATION ONLY (OUTPATIENT)
Dept: INFUSION THERAPY | Facility: HOSPITAL | Age: 84
End: 2024-09-13
Payer: MEDICARE

## 2024-09-13 ENCOUNTER — INFUSION (OUTPATIENT)
Dept: INFUSION THERAPY | Facility: HOSPITAL | Age: 84
End: 2024-09-13
Attending: INTERNAL MEDICINE
Payer: MEDICARE

## 2024-09-13 ENCOUNTER — LAB VISIT (OUTPATIENT)
Dept: LAB | Facility: HOSPITAL | Age: 84
End: 2024-09-13
Attending: INTERNAL MEDICINE
Payer: MEDICARE

## 2024-09-13 VITALS
OXYGEN SATURATION: 97 % | DIASTOLIC BLOOD PRESSURE: 81 MMHG | HEIGHT: 64 IN | RESPIRATION RATE: 16 BRPM | TEMPERATURE: 98 F | WEIGHT: 193.56 LBS | HEART RATE: 75 BPM | SYSTOLIC BLOOD PRESSURE: 156 MMHG | BODY MASS INDEX: 33.05 KG/M2

## 2024-09-13 DIAGNOSIS — D50.0 IRON DEFICIENCY ANEMIA DUE TO CHRONIC BLOOD LOSS: Primary | ICD-10-CM

## 2024-09-13 DIAGNOSIS — R06.02 SHORTNESS OF BREATH: ICD-10-CM

## 2024-09-13 LAB
ALBUMIN SERPL BCP-MCNC: 3.3 G/DL (ref 3.5–5.2)
ALP SERPL-CCNC: 84 U/L (ref 55–135)
ALT SERPL W/O P-5'-P-CCNC: 10 U/L (ref 10–44)
ANION GAP SERPL CALC-SCNC: 13 MMOL/L (ref 8–16)
AST SERPL-CCNC: 24 U/L (ref 10–40)
BASOPHILS # BLD AUTO: 0.03 K/UL (ref 0–0.2)
BASOPHILS NFR BLD: 0.5 % (ref 0–1.9)
BILIRUB SERPL-MCNC: 0.2 MG/DL (ref 0.1–1)
BUN SERPL-MCNC: 48 MG/DL (ref 8–23)
CALCIUM SERPL-MCNC: 9.3 MG/DL (ref 8.7–10.5)
CHLORIDE SERPL-SCNC: 110 MMOL/L (ref 95–110)
CO2 SERPL-SCNC: 19 MMOL/L (ref 23–29)
CREAT SERPL-MCNC: 1.9 MG/DL (ref 0.5–1.4)
DIFFERENTIAL METHOD BLD: ABNORMAL
EOSINOPHIL # BLD AUTO: 0.2 K/UL (ref 0–0.5)
EOSINOPHIL NFR BLD: 2.7 % (ref 0–8)
ERYTHROCYTE [DISTWIDTH] IN BLOOD BY AUTOMATED COUNT: 16.2 % (ref 11.5–14.5)
EST. GFR  (NO RACE VARIABLE): 26.2 ML/MIN/1.73 M^2
FERRITIN SERPL-MCNC: 102 NG/ML (ref 20–300)
GLUCOSE SERPL-MCNC: 109 MG/DL (ref 70–110)
HCT VFR BLD AUTO: 31.2 % (ref 37–48.5)
HGB BLD-MCNC: 9.9 G/DL (ref 12–16)
IMM GRANULOCYTES # BLD AUTO: 0.04 K/UL (ref 0–0.04)
IMM GRANULOCYTES NFR BLD AUTO: 0.6 % (ref 0–0.5)
IRON SERPL-MCNC: 29 UG/DL (ref 30–160)
LYMPHOCYTES # BLD AUTO: 1.3 K/UL (ref 1–4.8)
LYMPHOCYTES NFR BLD: 21.2 % (ref 18–48)
MCH RBC QN AUTO: 27.8 PG (ref 27–31)
MCHC RBC AUTO-ENTMCNC: 31.7 G/DL (ref 32–36)
MCV RBC AUTO: 88 FL (ref 82–98)
MONOCYTES # BLD AUTO: 0.6 K/UL (ref 0.3–1)
MONOCYTES NFR BLD: 9 % (ref 4–15)
NEUTROPHILS # BLD AUTO: 4.1 K/UL (ref 1.8–7.7)
NEUTROPHILS NFR BLD: 66 % (ref 38–73)
NRBC BLD-RTO: 0 /100 WBC
PLATELET # BLD AUTO: 145 K/UL (ref 150–450)
PMV BLD AUTO: 10.1 FL (ref 9.2–12.9)
POTASSIUM SERPL-SCNC: 3.9 MMOL/L (ref 3.5–5.1)
PROT SERPL-MCNC: 6.6 G/DL (ref 6–8.4)
RBC # BLD AUTO: 3.56 M/UL (ref 4–5.4)
SATURATED IRON: 10 % (ref 20–50)
SODIUM SERPL-SCNC: 142 MMOL/L (ref 136–145)
TOTAL IRON BINDING CAPACITY: 305 UG/DL (ref 250–450)
TRANSFERRIN SERPL-MCNC: 206 MG/DL (ref 200–375)
WBC # BLD AUTO: 6.19 K/UL (ref 3.9–12.7)

## 2024-09-13 PROCEDURE — 80053 COMPREHEN METABOLIC PANEL: CPT | Mod: PN | Performed by: INTERNAL MEDICINE

## 2024-09-13 PROCEDURE — A4216 STERILE WATER/SALINE, 10 ML: HCPCS | Mod: PN

## 2024-09-13 PROCEDURE — 83540 ASSAY OF IRON: CPT | Performed by: INTERNAL MEDICINE

## 2024-09-13 PROCEDURE — 84466 ASSAY OF TRANSFERRIN: CPT | Performed by: INTERNAL MEDICINE

## 2024-09-13 PROCEDURE — 96365 THER/PROPH/DIAG IV INF INIT: CPT | Mod: PN

## 2024-09-13 PROCEDURE — 84238 ASSAY NONENDOCRINE RECEPTOR: CPT | Performed by: INTERNAL MEDICINE

## 2024-09-13 PROCEDURE — 25000003 PHARM REV CODE 250: Mod: PN

## 2024-09-13 PROCEDURE — 82728 ASSAY OF FERRITIN: CPT | Performed by: INTERNAL MEDICINE

## 2024-09-13 PROCEDURE — 63600175 PHARM REV CODE 636 W HCPCS: Mod: PN

## 2024-09-13 PROCEDURE — 85025 COMPLETE CBC W/AUTO DIFF WBC: CPT | Mod: PN | Performed by: INTERNAL MEDICINE

## 2024-09-13 PROCEDURE — 36415 COLL VENOUS BLD VENIPUNCTURE: CPT | Mod: PN | Performed by: INTERNAL MEDICINE

## 2024-09-13 RX ORDER — SODIUM CHLORIDE 0.9 % (FLUSH) 0.9 %
10 SYRINGE (ML) INJECTION
Status: DISCONTINUED | OUTPATIENT
Start: 2024-09-13 | End: 2024-09-13 | Stop reason: HOSPADM

## 2024-09-13 RX ORDER — HEPARIN 100 UNIT/ML
500 SYRINGE INTRAVENOUS
Status: DISCONTINUED | OUTPATIENT
Start: 2024-09-13 | End: 2024-09-13 | Stop reason: HOSPADM

## 2024-09-13 RX ADMIN — SODIUM CHLORIDE 125 MG: 9 INJECTION, SOLUTION INTRAVENOUS at 01:09

## 2024-09-13 RX ADMIN — SODIUM CHLORIDE: 9 INJECTION, SOLUTION INTRAVENOUS at 01:09

## 2024-09-13 RX ADMIN — Medication 500 UNITS: at 03:09

## 2024-09-13 RX ADMIN — Medication 10 ML: at 03:09

## 2024-09-13 NOTE — PROGRESS NOTES
Oncology Nutrition   Chemotherapy Infusion Visit    Nutrition Follow Up   RD met with patient at chairside during infusion tx. Pt reports continues to do well nutritionally- eating without difficulty, maintaining weight, and denies nutrition related side effects.      Wt Readings from Last 10 Encounters:   09/13/24 87.8 kg (193 lb 9 oz)   08/29/24 87.8 kg (193 lb 9 oz)   08/26/24 88.5 kg (195 lb 1.7 oz)   08/23/24 88.5 kg (195 lb)   08/21/24 88.5 kg (195 lb)   08/19/24 88.5 kg (195 lb 1.7 oz)   08/16/24 88.5 kg (195 lb 1.7 oz)   08/12/24 88.9 kg (195 lb 15.8 oz)   08/12/24 88.5 kg (195 lb 1.7 oz)   08/09/24 89.2 kg (196 lb 10.4 oz)       All other nutrition questions/concerns addressed as appropriate. Will continue to follow and monitor throughout treatment PRN.     Belen Lyons, MS, RD, LDN  09/13/2024  2:45 PM

## 2024-09-13 NOTE — PLAN OF CARE
Pt tolerated Ferrlicet infusion well.  Pt stayed for 30 minutes for observation post infusion.  No s/s of infusion reaction noted.  Instructed to call MD with any problems

## 2024-09-13 NOTE — PROGRESS NOTES
LCSW met with patient at the chairside during infusion. Patient denied any emotional or practical needs at this time.  encouraged the patient to reach out if any needs arise.

## 2024-09-15 NOTE — PROGRESS NOTES
PATIENT: Shandra Velez  MRN: [id number]  DATE: 9/16/2024      Diagnosis:   1. Follicular lymphoma grade I, unspecified body region    2. 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 Renal cell carcinoma, unspecified laterality    4. Rectal lesion    5. Thrombophilia    6. Hyperlipidemia, unspecified hyperlipidemia type    7. Iron deficiency anemia, unspecified iron deficiency anemia type    8. Acquired hypothyroidism                      Chief Complaint: Follicular lymphoma grade I, unspecified body region (1 month follow up )    Oncologic History:     Pt initially underwent right inguinal LN biopsy 5/25/22 showing grade 1 follicular lymphoma.  Bone marrow biopsy done 06/24/2020 showed involvement with follicular lymphoma.  PET/CT 7/07/20 showed lymphadenopathy above and below the diaphragm as well as the spleen.  Also seen wasa RUL lesion.  The patient was started on CVP-R followed by maintenance rituxan every 8 weeks.  Biopsy of the RUL nodule on 6/18/21 showed SCC with PD-L1 at 86%.  EBUS 7/09/21 showed no malignant cells at station 7, 4R or 11RS.  The patient was treated with SBRT under the care of Dr Barnes with 50Gy in 4 fractions completed 10/28/21.  The patient then developed new pulmonary nodules on CT scan 6/02/22.  EBUS 6/16/22 showed positive SCC in 4R LN.  Pt was discussed at tumor board on 6/21/22 with recommendation for Keytruda.  The patient underwent PET/CT on 9/23/22 after 2 doses of treatment showing a 1.5 cm precarinal lymph node; stable right apical mass measuring 1.8 x 1.5 cm; stable 8 mm right apical lung nodule with adjacent post radiation change measuring 2 x 2.7 cm; stable 4 mm subpleural nodule in the lateral right upper lobe; fluid/mucus in the right lower lobe bronchus tree; 6 mm anterior left upper lobe nodule which is new; 2.3 cm simple cyst in the midpole of the left kidney; 3.1 x 2.3 cm rim enhancing complex cystic and solid mass in the mid to lower pole the right  kidney; infrarenal abdominal aortic aneurysm measuring 3.8 x 3.5 cm; and stable a left para-aortic lymph node measuring 11 mm. 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   The patient underwent PET-CT on 11/29/2022 showing an irregular hypermetabolic right upper lobe tumor which is stable measuring 1.6 x 1.2 cm; hypermetabolic subpleural right upper lobe consolidation diminished in extent; unchanged linear zone of hypermetabolic atelectasis in the superior segment of the left lower lobe; new poorly metabolic ill-defined infiltrate in the posterior right lower lobe; 4 mm left upper lobe nodule which is stable; hypermetabolic precarinal mediastinal lymph node stable measuring 1 cm; and hypermetabolic aortopulmonary lymph node stable measuring 1.1 x 1.1 cm.   The patient was admitted to the hospital from 1/16/23 to 1/23/23 for pneumonia.  She underwent bronchoscopy with Dr Kinney on 1/18/23 showing significant mucus plug impaction int the pharynx, left mainstem bronchus and right and left lower lobes.  The patient was discharged on 3L O2.   The patient underwent PET-CT on 03/08/2023 showing thickening of the pleura; new ground-glass opacities within the left upper lobe; patchy airspace opacities in the right lower lobe; right lower lobe pulmonary artery hypermetabolic activity possibly secondary to bronchial wall thickening or thrombus in the pulmonary artery; diffuse hypermetabolic activity identified throughout the axial skeleton.  The patient was then admitted to the hospital on 03/18/23-4/3/23 after presenting with shortness of breath and being found to have a saddle pulmonary embolus on CTA 03/18/2023.  The patient underwent treatment with a heparin drip and thrombectomy on 03/18/2023 with eventual transition to Eliquis.   The patient underwent CT chest on 04/14/2023 showing areas of air trapping in subpleural bleb formation in both  jarek thoraces with areas of interstitial scarring and bibasilar bronchiectasis; stable solid nodule in left upper lobe measuring 6 mm; masslike area measuring 3 x 1.3 x 1.5 cm in the right upper lobe stable.   The patient underwent CT chest, abdomen, and pelvis on 07/10/2023 showing more consolidative appearance of previously described right apical pulmonary nodules without discretely measurable nodule on today's exam, 8 mm left apical nodule, stable consolidation and superior segment left lower lobe; 3 mm right lower lobe pulmonary nodule; heterogeneously enhancing mass in the inferior pole of the right kidney measuring 3.9 x 3.7 x 3.9 cm; unchanged left periaortic lymph nodes.   The patient underwent a PET-CT on 10/09/2023 showing a new oval-shaped hypermetabolic nodular mass measuring 1.6 x 1 cm in the right upper lobe consistent with tumor recurrence; disappearance of ground-glass opacities anterior left upper lobe; disappearance of subpleural posterior right lower lobe consolidation; new hypermetabolic bilateral inguinal lymph nodes; new rounded nodular hypermetabolic nodule within the posterior right lumbar subcutaneous fat measuring 1.5 x 1.5 cm and a 3.8cm abdominal aortic aneurysm.    The patient was discussed at Thoracic tumor board on 10/25/23 with recommendation to proceed with biopsy of the lesion in the back via IR.  Biopsy done on 11/03/23 showed fibroadipose tissue.   The patient underwent CT of the chest, abdomen, and pelvis on 12/12/2023 showing pleural parenchymal thickening in the right lung apex; 6 mm left upper lobe nodule stable; solid mass inferior right kidney measuring 4.3 x 4.3 cm; enlarged bilateral periaortic lymphadenopathy measuring 17 x 14 mm; enlarged bilateral inguinal hemipelvic lymphadenopathy with left hemipelvic lymph node measuring 4.4 x 1.8 cm and left inguinal lymph node measuring 3.5 x 1.7 cm.   The patient underwent PET-CT on 02/15/2024 showing a stable nodular focus of  hypermetabolic activity in the right apical soft tissue area of consolidation; new area of ground-glass opacity more anterior in the right lung apex measuring 2.6 x 2.2 cm; multiple metastatic periaortic and bilateral iliac lymph nodes with progression from prior with a new left para-aortic lymph node measuring 19 x 17 mm SUV max 6.5, left hemipelvic node measuring 4.1 x 1.5 cm with SUV max of 9.4 enlarged inguinal lymph node measuring 3 x 2.2 with SUV max of 9.2.      The patient underwent biopsy of left inguinal lymph node with path showing follicular lymphoma grade 3A.  Patient was discussed at  Tumor Board on 3/14/24 with recommendation for biopsy of the right renal lesion followed by potential ablation.   The patient was admitted to the hospital from 03/23/2024 until 03/28/2024 for acute on chronic anemia.  Hemoglobin on admission was 7.3 grams/deciliter.  The patient received 2 units of PRBC's on 03/24/2024.  Colonoscopy on 03/25/2024 showed a circumferential mass posterior to the anal canal found on perianal exam which looked ulcerated and arising from the anus; one 2-3 mm polyp in the mid rectum; and moderate colonic spasm consistent with irritable bowel syndrome.  Patient underwent rectal tissue biopsy on 03/27/2024 with path showing mucosal prolapse with ulceration and granulation tissue.  MRI rectum on 03/28/2024 showed the suspected perianal mass at the invasion to adjacent structures and enlarged pelvic lymph nodes.  The patient underwent biopsy of the right renal lesion showing clear cell carcinoma on pathology.   The patient saw Dr. Hansen 04/18/2024 whom felt the patient did not meet GELF criteria currently with plan on repeating PET-CT on 8 weeks and consideration of bendamustine and rituximab if lymph node size was increasing.  Patient underwent embolization to the right renal lesion on 04/29/2024.    The patient underwent cryoablation to the right renal mass on 5/22/24.  PT underwent PET-CT on  06/18/2024 showing lesion in right upper lobe with soft tissue component measuring 1.5 x 1.6 cm increased in compared to prior; peripheral hypermetabolic activity in the right renal lesion with hyperdense material; hypermetabolic activity in the rectum; diffuse stable hypermetabolic activity in the retroperitoneum extending into the bilateral iliac regions greater on the left in the right.    The patient was admitted to the hospital from 06/20/2024 until 06/27/2024.  During admission the patient was treated for urinary tract infection was started on broad-spectrum antibiotics.  Cystoscopy was performed on 06/21/2024 with ureteral stent placement in the right ureter.   labs from 07/29/2024 showed an elevated STFR at 9.4.  Pt received PRBC transfusion 7/31/24.  Pt received ferric gluconate 125mg on 8/09/24.    Subjective:    Interval History: Ms. Velez is a 83 y.o. female with HLD, HTN, osteopenia, follicular lymphoma who presents for follow up of NSCLC.  Since since the last clinic visit the patient received IV iron on 8/16/24 and 9/13/24.  Ferritin on 9/13/24 which was drawn before IV iron given was 102ng/mL.  The patient endorses left hip pain which radiates down the leg.  The patient denies CP, cough, SOB, abdominal pain, nausea, vomiting, constipation, diarrhea.  The patient denies fever, chills, night sweats, weight loss, new lumps or bumps, easy bruising or bleeding.      Past Medical History:   Past Medical History:   Diagnosis Date    Breast cancer 1985    right mastectomy    Digestive disorder     Encounter for blood transfusion     History of pneumonia     Hypercholesterolemia     Hypertension     Hyperuricemia 06/20/2024    Lab work in the ED revealed a uric acid level of 6.8.  Pt is asymptomatic for gouty arthritis.  Monitor.      Indigestion     Lumbar spondylosis     Lung cancer     2022 - currently in treatment as of 7/27/22    Lymphoma     Osteopenia     Pulmonary nodule     Renal cancer     Renal  disorder     renal cancer    Retinal detachment     Smoker 06/11/2022    Thyroid disease        Past Surgical HIstory:   Past Surgical History:   Procedure Laterality Date    ANGIOGRAPHY  4/29/2024    Procedure: Right Renal Angiogram;  Surgeon: Steve Weaver MD;  Location: [facility] CATH;  Service: Interventional Radiology;;    APPENDECTOMY      BONE MARROW BIOPSY Bilateral 06/24/2020    Procedure: Biopsy-bone marrow;  Surgeon: Rod Montero MD;  Location: [facility] OR;  Service: Oncology;  Laterality: Bilateral;    BREAST RECONSTRUCTION  1990    right    BRONCHOSCOPY N/A 01/18/2023    Procedure: BRONCHOSCOPY;  Surgeon: Gregorio Kinney MD;  Location: [facility] ENDO;  Service: Pulmonary;  Laterality: N/A;    BRONCHOSCOPY Bilateral 02/10/2023    Procedure: Bronchoscopy;  Surgeon: Gregorio Kinney MD;  Location: [facility] ENDO;  Service: Pulmonary;  Laterality: Bilateral;  for airway clearance. Purple top tube for cell count diff    BRONCHOSCOPY N/A 03/13/2023    Procedure: Bronchoscopy;  Surgeon: Gregorio Kinney MD;  Location: [facility] ENDO;  Service: Pulmonary;  Laterality: N/A;  therapeutic scope. Please have purple top tube and iced saline    CATARACT EXTRACTION W/  INTRAOCULAR LENS IMPLANT Bilateral     CHOLECYSTECTOMY      COLONOSCOPY N/A 3/25/2024    Procedure: COLONOSCOPY;  Surgeon: Brigido Calvillo Jr., MD;  Location: [facility];  Service: Endoscopy;  Laterality: N/A;    CYSTOURETEROSCOPY WITH RETROGRADE PYELOGRAPHY AND INSERTION OF STENT INTO URETER Right 6/21/2024    Procedure: CYSTOURETEROSCOPY, WITH RETROGRADE PYELOGRAM AND URETERAL STENT INSERTION;  Surgeon: Noel Priest MD;  Location: [facility] OR;  Service: Urology;  Laterality: Right;    EMBOLIZATION  4/29/2024    Procedure: Rt. Renal Embolization;  Surgeon: Steve Weaver MD;  Location: [facility] CATH;  Service: Interventional Radiology;;    ENDOBRONCHIAL ULTRASOUND Bilateral 07/09/2021    Procedure: ENDOBRONCHIAL ULTRASOUND (EBUS);  Surgeon: Gregorio Kinney MD;  Location: [facility]  [facility];  Service: Pulmonary;  Laterality: Bilateral;  NEED LMA to  eval right upper paratracheal LN.     ENDOBRONCHIAL ULTRASOUND Bilateral 06/16/2022    Procedure: ENDOBRONCHIAL ULTRASOUND (EBUS);  Surgeon: Gregorio Kinney MD;  Location: [facility];  Service: Pulmonary;  Laterality: Bilateral;    INJECTION OF FACET JOINT Left 06/30/2022    Procedure: FACET JOINT Cyst Aspiration L3/4;  Surgeon: Ronnell Baeza MD;  Location: [facility] OR;  Service: Pain Management;  Laterality: Left;    INSERTION OF TUNNELED CENTRAL VENOUS CATHETER (CVC) WITH SUBCUTANEOUS PORT Left 11/04/2020    Procedure: [id number];  Surgeon: Kody Pretty MD;  Location: [facility] OR;  Service: General;  Laterality: Left;    JOINT REPLACEMENT  2008    right knee     LUMBAR LAMINECTOMY      LYMPH NODE BIOPSY      MASTECTOMY Right 1985    NEEDLE LOCALIZATION N/A 05/25/2020    Procedure: NEEDLE LOCALIZATION - lymph node bx;  Surgeon: Steve Weaver MD;  Location: [facility];  Service: Interventional Radiology;  Laterality: N/A;    NEEDLE LOCALIZATION N/A 5/22/2024    Procedure: cryoablation renal in CT with anesthesia argon IAM rep;  Surgeon: Steve Weaver MD;  Location: [facility] CATH;  Service: Interventional Radiology;  Laterality: N/A;  cryoablation renal in CT with anesthesia argon Iam rep    RECTAL BIOPSY N/A 3/27/2024    Procedure: BIOPSY, RECTUM;  Surgeon: Isabel Choi MD;  Location: [facility] OR;  Service: General;  Laterality: N/A;    RETINAL DETACHMENT SURGERY  1989    RIGHT HEART CATHETERIZATION Right 03/18/2023    Procedure: INSERTION, CATHETER, RIGHT HEART;  Surgeon: Rukhsana Lang MD;  Location: [facility] CATH;  Service: Cardiology;  Laterality: Right;    SELECTIVE UNILATERAL PULMONARY ANGIOGRAPHY  03/18/2023    Procedure: Pumonary angiography - unilateral;  Surgeon: Rukhsana Lang MD;  Location: [facility] CATH;  Service: Cardiology;;    THROMBECTOMY N/A 03/18/2023    Procedure: THROMBECTOMY;  Surgeon: Rukhsana Lang MD;  Location: [facility] CATH;   Service: Cardiology;  Laterality: N/A;    TRANSFORAMINAL EPIDURAL INJECTION OF STEROID Left 05/14/2020    Procedure: Injection,steroid,epidural,transforaminal approach, l3/4;  Surgeon: Ronnell Baeza MD;  Location: [facility] OR;  Service: Pain Management;  Laterality: Left;    TRANSFORAMINAL EPIDURAL INJECTION OF STEROID Left 03/23/2022    Procedure: Injection,steroid,epidural,transforaminal approach L3/4;  Surgeon: Ronnell Baeza MD;  Location: [facility] OR;  Service: Pain Management;  Laterality: Left;    TRANSFORAMINAL EPIDURAL INJECTION OF STEROID Left 08/01/2022    Procedure: Injection,steroid,epidural,transforaminal approach L3/4;  Surgeon: Ronnell Baeza MD;  Location: [facility] OR;  Service: Pain Management;  Laterality: Left;    TUBAL LIGATION      UMBILICAL HERNIA REPAIR         Family History:   Family History   Problem Relation Name Age of Onset    Cancer Sister          uterine    Cancer Sister          Uterine cancer     Diabetes Brother      Breast cancer Other niece 42    Glaucoma Neg Hx      Macular degeneration Neg Hx         Social History:  reports that she quit smoking about 2 years ago. Her smoking use included cigarettes. She started smoking about 55 years ago. She has a 26.5 pack-year smoking history. She has never used smokeless tobacco. She reports that she does not drink alcohol and does not use drugs.    Allergies:  Review of patient's allergies indicates:   Allergen Reactions    Opioids - morphine analogues Other (See Comments)     Hypotension       Medications:  Current Outpatient Medications   Medication Sig Dispense Refill    acetaminophen (TYLENOL) 325 MG tablet Take 2 tablets (650 mg total) by mouth every 6 (six) hours as needed for Pain.      albuterol (VENTOLIN HFA) 90 mcg/actuation inhaler Inhale 2 puffs into the lungs every 6 (six) hours as needed for Wheezing or Shortness of Breath (or before exercise). Rescue 18 g 2    apixaban (ELIQUIS) 2.5 mg Tab Take 1 tablet (2.5 mg  total) by mouth 2 (two) times daily.      budesonide (PULMICORT) 0.5 mg/2 mL nebulizer solution Take 2 mLs (0.5 mg total) by nebulization 2 (two) times a day. 120 mL 11    cetirizine (ZYRTEC) 10 MG tablet Take 10 mg by mouth once daily.      docusate sodium (COLACE) 50 MG capsule Take 50 mg by mouth as needed for Constipation.      ergocalciferol (ERGOCALCIFEROL) 50,000 unit Cap Take 50,000 Units by mouth every Tuesday.      gabapentin (NEURONTIN) 100 MG capsule Take 2 capsules (200 mg total) by mouth 3 (three) times daily. 180 capsule 2    hydrocortisone (ANUSOL-HC) 2.5 % rectal cream Place rectally 2 (two) times daily as needed for Hemorrhoids. 28 g 0    iron-vitamin C 100-250 mg, ICAR-C, 100-250 mg Tab Take 1 tablet by mouth 2 (two) times a day. 60 tablet 3    levalbuterol (XOPENEX) 0.63 mg/3 mL nebulizer solution Take 3 mLs (0.63 mg total) by nebulization 2 (two) times a day. Rescue 60 each 6    levothyroxine (SYNTHROID) 100 MCG tablet Take 1 tablet (100 mcg total) by mouth once daily. 90 tablet 3    LIDOcaine (LIDODERM) 5 % Place 1 patch onto the skin once daily. Remove & Discard patch within 12 hours or as directed by MD 30 patch 0    methocarbamoL (ROBAXIN) 750 MG Tab Take 1 tablet (750 mg total) by mouth 3 (three) times daily as needed (muscle spasm/tightness). 30 tablet 0    mucus clearing device (ACAPELLA, FLUTTER) by Misc.(Non-Drug; Combo Route) route once daily. 100 each 0    multivitamin (THERAGRAN) per tablet Take 1 tablet by mouth once daily.      NIFEdipine (ADALAT CC) 60 MG TbSR Take 1 tablet (60 mg total) by mouth once daily.      omeprazole (PRILOSEC) 40 MG capsule Take 1 capsule (40 mg total) by mouth once daily. 90 capsule 4    OXYGEN-AIR DELIVERY SYSTEMS MISC 2-3 L by Misc.(Non-Drug; Combo Route) route as needed.      SENNA 8.6 mg tablet Take 1 tablet by mouth once daily. 30 tablet 11    sodium chloride 3% 3 % nebulizer solution Take 4 mLs by nebulization 2 (two) times a day. 240 mL 3     "temazepam (RESTORIL) 30 mg capsule Take 1 capsule (30 mg total) by mouth every evening. 30 capsule 5     Current Facility-Administered Medications   Medication Dose Route Frequency Provider Last Rate Last Admin    sodium chloride 0.9% flush 10 mL  10 mL Intravenous PRN Chad Mills MD   10 mL at 07/31/24 1306       Review of Systems   Constitutional:  Negative for appetite change, chills, fatigue, fever and unexpected weight change.   HENT:  Negative for mouth sores.    Eyes:  Negative for visual disturbance.   Respiratory:  Negative for cough and shortness of breath.    Cardiovascular:  Negative for chest pain and palpitations.   Gastrointestinal:  Negative for abdominal pain, constipation, diarrhea, nausea and vomiting.   Genitourinary:  Negative for frequency.   Musculoskeletal:  Positive for arthralgias (left hip pain). Negative for back pain.   Skin:  Negative for rash.   Neurological:  Negative for headaches.   Hematological:  Negative for adenopathy. Does not bruise/bleed easily.   Psychiatric/Behavioral:  The patient is not nervous/anxious.        ECOG Performance Status: 2   Objective:      Vitals:   Vitals:    09/16/24 1319   BP: (!) 142/88   BP Location: Right arm   Patient Position: Sitting   BP Method: Large (Manual)   Pulse: 79   Resp: 16   Temp: 97.2 °F (36.2 °C)   TempSrc: Temporal   SpO2: 97%   Weight: 90.3 kg (199 lb 1.2 oz)   Height: 5' 4" (1.626 m)       Physical Exam  Constitutional:       General: She is not in acute distress.     Appearance: She is well-developed. She is not diaphoretic.   HENT:      Head: Normocephalic and atraumatic.   Cardiovascular:      Rate and Rhythm: Normal rate and regular rhythm.      Heart sounds: Normal heart sounds. No murmur heard.     No friction rub. No gallop.   Pulmonary:      Effort: Pulmonary effort is normal. No respiratory distress.      Breath sounds: Normal breath sounds. No wheezing or rales.   Chest:      Chest wall: No tenderness.   Abdominal: "      General: Bowel sounds are normal. There is no distension.      Palpations: Abdomen is soft. There is no mass.      Tenderness: There is no abdominal tenderness. There is no guarding or rebound.   Lymphadenopathy:      Cervical: No cervical adenopathy.      Upper Body:      Right upper body: No supraclavicular or axillary adenopathy.      Left upper body: No supraclavicular or axillary adenopathy.   Skin:     Findings: No erythema or rash.   Neurological:      Mental Status: She is alert and oriented to person, place, and time.   Psychiatric:         Behavior: Behavior normal.         Laboratory Data:  Lab Visit on 09/13/2024   Component Date Value Ref Range Status    WBC 09/13/2024 6.19  3.90 - 12.70 K/uL Final    RBC 09/13/2024 3.56 (L)  4.00 - 5.40 M/uL Final    Hemoglobin 09/13/2024 9.9 (L)  12.0 - 16.0 g/dL Final    Hematocrit 09/13/2024 31.2 (L)  37.0 - 48.5 % Final    MCV 09/13/2024 88  82 - 98 fL Final    MCH 09/13/2024 27.8  27.0 - 31.0 pg Final    MCHC 09/13/2024 31.7 (L)  32.0 - 36.0 g/dL Final    RDW 09/13/2024 16.2 (H)  11.5 - 14.5 % Final    Platelets 09/13/2024 145 (L)  150 - 450 K/uL Final    MPV 09/13/2024 10.1  9.2 - 12.9 fL Final    Immature Granulocytes 09/13/2024 0.6 (H)  0.0 - 0.5 % Final    Gran # (ANC) 09/13/2024 4.1  1.8 - 7.7 K/uL Final    Immature Grans (Abs) 09/13/2024 0.04  0.00 - 0.04 K/uL Final    Comment: Mild elevation in immature granulocytes is non specific and   can be seen in a variety of conditions including stress response,   acute inflammation, trauma and pregnancy. Correlation with other   laboratory and clinical findings is essential.      Lymph # 09/13/2024 1.3  1.0 - 4.8 K/uL Final    Mono # 09/13/2024 0.6  0.3 - 1.0 K/uL Final    Eos # 09/13/2024 0.2  0.0 - 0.5 K/uL Final    Baso # 09/13/2024 0.03  0.00 - 0.20 K/uL Final    nRBC 09/13/2024 0  0 /100 WBC Final    Gran % 09/13/2024 66.0  38.0 - 73.0 % Final    Lymph % 09/13/2024 21.2  18.0 - 48.0 % Final    Mono %  09/13/2024 9.0  4.0 - 15.0 % Final    Eosinophil % 09/13/2024 2.7  0.0 - 8.0 % Final    Basophil % 09/13/2024 0.5  0.0 - 1.9 % Final    Differential Method 09/13/2024 Automated   Final    Sodium 09/13/2024 142  136 - 145 mmol/L Final    Potassium 09/13/2024 3.9  3.5 - 5.1 mmol/L Final    Chloride 09/13/2024 110  95 - 110 mmol/L Final    CO2 09/13/2024 19 (L)  23 - 29 mmol/L Final    Glucose 09/13/2024 109  70 - 110 mg/dL Final    BUN 09/13/2024 48 (H)  8 - 23 mg/dL Final    Creatinine 09/13/2024 1.9 (H)  0.5 - 1.4 mg/dL Final    Calcium 09/13/2024 9.3  8.7 - 10.5 mg/dL Final    Total Protein 09/13/2024 6.6  6.0 - 8.4 g/dL Final    Albumin 09/13/2024 3.3 (L)  3.5 - 5.2 g/dL Final    Total Bilirubin 09/13/2024 0.2  0.1 - 1.0 mg/dL Final    Comment: For infants and newborns, interpretation of results should be based  on gestational age, weight and in agreement with clinical  observations.    Premature Infant recommended reference ranges:  Up to 24 hours.............<8.0 mg/dL  Up to 48 hours............<12.0 mg/dL  3-5 days..................<15.0 mg/dL  6-29 days.................<15.0 mg/dL      Alkaline Phosphatase 09/13/2024 84  55 - 135 U/L Final    AST 09/13/2024 24  10 - 40 U/L Final    ALT 09/13/2024 10  10 - 44 U/L Final    eGFR 09/13/2024 26.2 (A)  >60 mL/min/1.73 m^2 Final    Anion Gap 09/13/2024 13  8 - 16 mmol/L Final    Ferritin 09/13/2024 102  20.0 - 300.0 ng/mL Final    Iron 09/13/2024 29 (L)  30 - 160 ug/dL Final    Transferrin 09/13/2024 206  200 - 375 mg/dL Final    TIBC 09/13/2024 305  250 - 450 ug/dL Final    Saturated Iron 09/13/2024 10 (L)  20 - 50 % Final    Sol. Transferrin Receptor 09/13/2024 6.6 (H)  1.8 - 4.6 mg/L Final    Comment: -------------------ADDITIONAL INFORMATION-------------------  It is reported that    Americans may   have slightly   higher values.    Test Performed by:  [facility] - [address] [address]  Lab  Director: Tim Zavala Ph.D.; [facility]# [id number]           Imaging:    CT Urogram 8/05/24    Heart is normal size.  Calcification of the mitral annulus.  Mild chronic peripheral interstitial reticulation in the bilateral lung bases.     Liver is enlarged.  No focal hepatic lesion.  Gallbladder is surgically absent.  No biliary ductal dilatation.     Spleen and adrenal glands are unremarkable.  Severe atrophy of the pancreas.     Post treatment changes of right renal inferior pole cryoablation.  Grossly stable appearance of the treated lesion with calcification.  No suspicious enhancement.  Decreased size of a right renal subcapsular collection.  Decreased fluid and stranding about the right inferior renal pole and renal pelvis.  Persistent moderate right hydronephrosis.  Minimal contrast excretion in the right renal collecting system suggesting obstructive uropathy and renal impairment.  Double-J ureteral stent in place.  No right ureteral dilatation.  Left renal cyst and subcentimeter renal hypodensities too small to characterize.  No left hydronephrosis.     Right distal ureteral stent terminates in the bladder.  There is possible wall thickening or intraluminal filling defect in the right posterior bladder surrounding the distal aspect of the stent (series 6, image 60-62).  Uterus and bilateral adnexa are unremarkable.     Stomach is unremarkable.  No evidence of bowel obstruction or inflammation.  Colonic diverticulosis.  No free intraperitoneal fluid or air.     Grossly stable lymphadenopathy throughout the retroperitoneum and pelvis.  Left para-aortic lymph node at the level of the kidneys measures 1.8 cm (series 4, image 58), previously 1.9 cm.  Distal aortocaval lymph node measures 1.8 cm (series 4, image 82), previously 1.8 cm.  Right external iliac chain lymph node measures 1.5 cm (series 4, image 133), previously 1.5 cm.  Left inguinal lymph node measures 1.6 cm (series 4, image 144), previously 1.5  cm.     Stable infrarenal abdominal aortic aneurysm measuring 4.2 cm.  Moderate to severe aortoiliac atherosclerosis.     Moderate fat containing left paramidline ventral abdominal wall hernia.     Degenerative changes of the osseous structures.  No acute fracture.  L4-S1 posterior instrumented fusion.        Assessment:       1. Follicular lymphoma grade I, unspecified body region    2. 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 Renal cell carcinoma, unspecified laterality    4. Rectal lesion    5. Thrombophilia    6. Hyperlipidemia, unspecified hyperlipidemia type    7. Iron deficiency anemia, unspecified iron deficiency anemia type    8. Acquired hypothyroidism                   Plan:     NSCLC - Pt has NSCLC SCC with involvement of the right lung and mediastinum  -Pt with prior radiation to a RUL nodule  -PD-L1 was 86% on 6/18/21  -Pt was on Keytruda with 6 week cycles and completed 6 tx's and then developed pneumonitis  -PET/CT on 9/23/22 showed possible pseudoprogression  -PET/CT on 11/29/22 showed stable disease with RLL infiltrate concerning for resolving PNA  -PET-CT on 03/08/2023 showed resolution of prior disease   -CT Chest 4/14/23 and 7/10/23 with stable findings  -PET/CT 10/09/23 showed avidity in the RUL mass, inguinal LAD and subcutaneous nodule in the lumbar region  -Treatment with paclitaxel discussed; however, pt is hesitant to undergo chemotherapy  -Biopsy of lumbar subcutaneous nodule showed fibroadipose tissue  -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  -Ct chest 12/12/23 stable  -PET/CT 2/15/24 showed continued nodular focus of hypermetabolic activity in the right apex with a new adjacent ground-glass area  -No clear evidence of progression on scans  -Repeat PET/CT on 06/18/2024 showed enlarging soft tissue component of right upper lobe nodule  -Pt was felt to have superimposed infection  -Pt seen by Dr Kinney on 7/16/24 with recommendation for repeat CT chest in 4 months  -CT  chest to be done 10/03/24  -Pt to return in 1 months with labs    Renal Cell Carcinoma - 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 based on biopsy 3/28/24  -Pt not a surgical candidate per Urology  -Patient underwent embolization 04/29/2024 and cryoablation on 05/22/2024  -CT Urogram 8/05/24 showed treated lesion  -Will monitor    Rectal Lesion - seen on colonoscopy 03/25/2024   Rectal tissue biopsy on 03/27/2024 with path showing mucosal prolapse with ulceration and granulation tissue  -MRI rectum on 03/28/2024 showed the suspected perianal mass at the invasion to adjacent structures and enlarged pelvic lymph nodes  -LAD likely form follicular lymphoma  -Continued avidity seen on PET-CT 06/18/2024    Saddle Pulmonary Embolus - seen on CTA 03/18/2023   -PT on prophylactic ELiquis 2.5mg BID   -Will continue    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 for 9/12 cycles with good response  -PT with recurrent inguinal LAD which could be lymphoma  -LN's increasing in size on CT 12/12/23  -PET-CT on 02/15/2024 showed continued increasing size of left inguinal lymph node with SUV max now 9.2  -Left inguinal LN biopsy 3/08/24 showed Follicular Lymphoma Grade 3a  -Pt saw Dr Hansen on 4/18/24 whom recommended repeating scans in 8 weeks with consideration of bendamustine and rituximab if patient progresses  -Repeat PET-CT 06/18/2024 showed stable hypermetabolic activity in the retroperitoneum, bilateral iliac regions and inguinal regions  -Pt seen by Dr Hansen on 7/16/24 and recommended PET/CT in 6 months  -Will repeat PET/CT 1/2025    Hypothyroidism - pt on levothyroxine  -Management per PCP    Iron Deficiency Anemia - Pt received 4 doses of venofer in the past with last dose 11/24/23  -PT now s/p three doses of ferric gluconate 8/09/254, 8/16/24 and 9/13/24  -Ferritin 102ng/mL on 9/13/24  -Will monitor in 3 months    Route Chart for Scheduling    Med Onc Chart  Routing      Follow up with physician 1 month. Pt needs a CBC and CMP in 1 month with return appt.   Follow up with DAYAMI    Infusion scheduling note    Injection scheduling note    Labs    Imaging    Pharmacy appointment    Other referrals              Treatment Plan Information   OP PEMBROLIZUMAB 400MG Q6W Chad Mills MD   Associated diagnosis: Follicular lymphoma   noted on 10/7/2020  Associated diagnosis: Primary malignant neoplasm of left lower lobe of lung   noted on 10/7/2021   Line of treatment: First Line  Treatment Goal: Palliative     Upcoming Treatment Dates - OP PEMBROLIZUMAB 400MG Q6W    5/12/2023       Pre-Medications       acetaminophen tablet 1,000 mg       diphenhydrAMINE (BENADRYL) 25 mg in NS 50 mL IVPB       Chemotherapy       pembrolizumab (KEYTRUDA) 400 mg in sodium chloride 0.9% SolP 116 mL infusion  6/23/2023       Pre-Medications       acetaminophen tablet 1,000 mg       diphenhydrAMINE (BENADRYL) 25 mg in NS 50 mL IVPB       Chemotherapy       pembrolizumab (KEYTRUDA) 400 mg in sodium chloride 0.9% SolP 116 mL infusion  8/4/2023       Pre-Medications       acetaminophen tablet 1,000 mg       diphenhydrAMINE (BENADRYL) 25 mg in NS 50 mL IVPB       Chemotherapy       pembrolizumab (KEYTRUDA) 400 mg in sodium chloride 0.9% SolP 116 mL infusion  9/15/2023       Pre-Medications       acetaminophen tablet 1,000 mg       diphenhydrAMINE (BENADRYL) 25 mg in NS 50 mL IVPB       Chemotherapy       pembrolizumab (KEYTRUDA) 400 mg in sodium chloride 0.9% SolP 116 mL infusion    Supportive Plan Information  OP FERRIC GLUCONATE Naomi Green NP   Associated Diagnosis: Iron deficiency anemia   noted on 10/2/2023   Line of treatment: Supportive Care   Treatment goal: Supportive     Upcoming Treatment Dates - OP FERRIC GLUCONATE    9/14/2024       Medications       ferric gluconate (FERRLECIT) 125 mg in 0.9% NaCl 100 mL IVPB  9/21/2024       Medications       ferric gluconate (FERRLECIT) 125 mg  in 0.9% NaCl 100 mL IVPB  9/28/2024       Medications       ferric gluconate (FERRLECIT) 125 mg in 0.9% NaCl 100 mL IVPB  10/5/2024       Medications       ferric gluconate (FERRLECIT) 125 mg in 0.9% NaCl 100 mL IVPB    Therapy Plan Information  PORT FLUSH for Spinal stenosis of lumbar region without neurogenic claudication, noted on 2/15/2018  PORT FLUSH for NHL (non-Hodgkin's lymphoma), noted on 6/24/2020  Flushes  heparin, porcine (PF) 100 unit/mL injection flush 500 Units  500 Units, Intravenous, Every visit  sodium chloride 0.9% flush 10 mL  10 mL, Intravenous, Every visit    INF PEGFILGRASTIM (NEULASTA) for Leukopenia due to antineoplastic chemotherapy, noted on 5/11/2022  pegfilgrastim (NEULASTA) injection 6 mg  6 mg, Subcutaneous, Every visit      No therapy plan of the specified type found.        Chad Mills MD  [facility]  Hematology and Oncology  [facility] [address] [place] [address]   O: [phone]  F: [phone]

## 2024-09-16 ENCOUNTER — OFFICE VISIT (OUTPATIENT)
Dept: HEMATOLOGY/ONCOLOGY | Facility: CLINIC | Age: 84
End: 2024-09-16
Payer: MEDICARE

## 2024-09-16 VITALS
HEART RATE: 79 BPM | DIASTOLIC BLOOD PRESSURE: 88 MMHG | BODY MASS INDEX: 33.98 KG/M2 | SYSTOLIC BLOOD PRESSURE: 142 MMHG | HEIGHT: 64 IN | RESPIRATION RATE: 16 BRPM | TEMPERATURE: 97 F | OXYGEN SATURATION: 97 % | WEIGHT: 199.06 LBS

## 2024-09-16 DIAGNOSIS — D50.9 IRON DEFICIENCY ANEMIA, UNSPECIFIED IRON DEFICIENCY ANEMIA TYPE: ICD-10-CM

## 2024-09-16 DIAGNOSIS — C34.11 MALIGNANT NEOPLASM OF RIGHT UPPER LOBE OF LUNG: ICD-10-CM

## 2024-09-16 DIAGNOSIS — D68.59 THROMBOPHILIA: ICD-10-CM

## 2024-09-16 DIAGNOSIS — C82.00 FOLLICULAR LYMPHOMA GRADE I, UNSPECIFIED BODY REGION: Primary | ICD-10-CM

## 2024-09-16 DIAGNOSIS — E78.5 HYPERLIPIDEMIA, UNSPECIFIED HYPERLIPIDEMIA TYPE: ICD-10-CM

## 2024-09-16 DIAGNOSIS — K62.9 RECTAL LESION: ICD-10-CM

## 2024-09-16 DIAGNOSIS — C64.9 RENAL CELL CARCINOMA, UNSPECIFIED LATERALITY: ICD-10-CM

## 2024-09-16 DIAGNOSIS — E03.9 ACQUIRED HYPOTHYROIDISM: ICD-10-CM

## 2024-09-16 LAB — STFR SERPL-MCNC: 6.6 MG/L (ref 1.8–4.6)

## 2024-09-16 PROCEDURE — 99213 OFFICE O/P EST LOW 20 MIN: CPT | Mod: S$GLB,,, | Performed by: INTERNAL MEDICINE

## 2024-09-16 PROCEDURE — 3079F DIAST BP 80-89 MM HG: CPT | Mod: CPTII,S$GLB,, | Performed by: INTERNAL MEDICINE

## 2024-09-16 PROCEDURE — 1159F MED LIST DOCD IN RCRD: CPT | Mod: CPTII,S$GLB,, | Performed by: INTERNAL MEDICINE

## 2024-09-16 PROCEDURE — 3077F SYST BP >= 140 MM HG: CPT | Mod: CPTII,S$GLB,, | Performed by: INTERNAL MEDICINE

## 2024-09-16 PROCEDURE — 1101F PT FALLS ASSESS-DOCD LE1/YR: CPT | Mod: CPTII,S$GLB,, | Performed by: INTERNAL MEDICINE

## 2024-09-16 PROCEDURE — 1125F AMNT PAIN NOTED PAIN PRSNT: CPT | Mod: CPTII,S$GLB,, | Performed by: INTERNAL MEDICINE

## 2024-09-16 PROCEDURE — 3288F FALL RISK ASSESSMENT DOCD: CPT | Mod: CPTII,S$GLB,, | Performed by: INTERNAL MEDICINE

## 2024-09-16 PROCEDURE — 99999 PR PBB SHADOW E&M-EST. PATIENT-LVL IV: CPT | Mod: PBBFAC,,, | Performed by: INTERNAL MEDICINE

## 2024-09-20 ENCOUNTER — INFUSION (OUTPATIENT)
Dept: INFUSION THERAPY | Facility: HOSPITAL | Age: 84
End: 2024-09-20
Attending: INTERNAL MEDICINE
Payer: MEDICARE

## 2024-09-20 VITALS
WEIGHT: 199.06 LBS | BODY MASS INDEX: 33.98 KG/M2 | DIASTOLIC BLOOD PRESSURE: 72 MMHG | HEIGHT: 64 IN | RESPIRATION RATE: 16 BRPM | TEMPERATURE: 99 F | SYSTOLIC BLOOD PRESSURE: 108 MMHG | OXYGEN SATURATION: 96 % | HEART RATE: 77 BPM

## 2024-09-20 DIAGNOSIS — D50.0 IRON DEFICIENCY ANEMIA DUE TO CHRONIC BLOOD LOSS: Primary | ICD-10-CM

## 2024-09-20 PROCEDURE — 96365 THER/PROPH/DIAG IV INF INIT: CPT | Mod: PN

## 2024-09-20 PROCEDURE — A4216 STERILE WATER/SALINE, 10 ML: HCPCS | Mod: PN

## 2024-09-20 PROCEDURE — 25000003 PHARM REV CODE 250: Mod: PN

## 2024-09-20 PROCEDURE — 63600175 PHARM REV CODE 636 W HCPCS: Mod: PN

## 2024-09-20 RX ORDER — SODIUM CHLORIDE 0.9 % (FLUSH) 0.9 %
10 SYRINGE (ML) INJECTION
Status: DISCONTINUED | OUTPATIENT
Start: 2024-09-20 | End: 2024-09-20 | Stop reason: HOSPADM

## 2024-09-20 RX ORDER — HEPARIN 100 UNIT/ML
500 SYRINGE INTRAVENOUS
Status: DISCONTINUED | OUTPATIENT
Start: 2024-09-20 | End: 2024-09-20 | Stop reason: HOSPADM

## 2024-09-20 RX ADMIN — SODIUM CHLORIDE: 9 INJECTION, SOLUTION INTRAVENOUS at 02:09

## 2024-09-20 RX ADMIN — SODIUM CHLORIDE 125 MG: 9 INJECTION, SOLUTION INTRAVENOUS at 02:09

## 2024-09-20 RX ADMIN — Medication 10 ML: at 04:09

## 2024-09-20 RX ADMIN — Medication 500 UNITS: at 04:09

## 2024-09-20 NOTE — PLAN OF CARE
Pt tolerated Ferrlicet infusion well.  Pt stayed for 20 minutes for observation following infusion.  No s/s of infusion reaction noted.  Instructed to call MD with any problems

## 2024-09-21 ENCOUNTER — HOSPITAL ENCOUNTER (OUTPATIENT)
Dept: RADIOLOGY | Facility: HOSPITAL | Age: 84
Discharge: HOME | End: 2024-09-21
Payer: MEDICARE

## 2024-09-21 DIAGNOSIS — M54.16 LUMBAR RADICULOPATHY, CHRONIC: ICD-10-CM

## 2024-09-21 DIAGNOSIS — M54.9 DORSALGIA, UNSPECIFIED: ICD-10-CM

## 2024-09-21 PROCEDURE — 72148 MRI LUMBAR SPINE W/O DYE: CPT | Mod: 26,,, | Performed by: STUDENT IN AN ORGANIZED HEALTH CARE EDUCATION/TRAINING PROGRAM

## 2024-09-21 PROCEDURE — 72148 MRI LUMBAR SPINE W/O DYE: CPT | Mod: TC,PO

## 2024-09-23 PROBLEM — N17.9 AKI (ACUTE KIDNEY INJURY): Status: RESOLVED | Noted: 2023-01-16 | Resolved: 2024-09-23

## 2024-09-24 ENCOUNTER — TELEPHONE (OUTPATIENT)
Dept: PAIN MEDICINE | Facility: CLINIC | Age: 84
End: 2024-09-24
Payer: MEDICARE

## 2024-09-24 NOTE — TELEPHONE ENCOUNTER
Please let patient know I have reviewed her updated lumbar MRI, she has some worsening right-sided narrowing at L3/4 that is likely contributing to her pain.  Please schedule her for the following procedure:    Physician - Dr Baeza    Type of Procedure/Injection - Lumbar Transforaminal Epidural  L3/4           Laterality - Right      Anxiolysis- Local      Need to hold medication - Yes      Aspirin for 7 days and Eliquis for 3 days please confirm she does not take aspirin or NSAIDs.      Clearance needed - Yes      Follow up - 4 week

## 2024-09-25 ENCOUNTER — TELEPHONE (OUTPATIENT)
Dept: PAIN MEDICINE | Facility: CLINIC | Age: 84
End: 2024-09-25
Payer: MEDICARE

## 2024-09-25 DIAGNOSIS — M54.16 LUMBAR RADICULOPATHY, CHRONIC: Primary | ICD-10-CM

## 2024-09-25 DIAGNOSIS — M54.16 LUMBAR RADICULOPATHY: ICD-10-CM

## 2024-09-25 RX ORDER — ALPRAZOLAM 1 MG/1
1 TABLET, ORALLY DISINTEGRATING ORAL ONCE AS NEEDED
OUTPATIENT
Start: 2024-09-25 | End: 2036-02-22

## 2024-09-25 NOTE — TELEPHONE ENCOUNTER
Spoke with patient and discussed providers review and recommendation based off recent MRI. Patient is agreeable to plan and scheduled for a lumbar SILVERIO with Dr. Baeza. Per provider patient to hold Eliquis x 3 and NSAIDs x 2 days prior. Patient confirmed she is not taking ASA. Message for clearance sent to Dr. Kinney who patient states prescribes her Eliquis. Pre op information given and follow up appointment scheduled.

## 2024-09-25 NOTE — TELEPHONE ENCOUNTER
Hi! Patient is scheduled for a lumbar SILVERIO on 10/7 with Dr. Baeza. Provider requests patient fold Eliquis x 2 days prior to injection. Is patient cleared to hold Eliquis for scheduled epidural injection? Thanks.

## 2024-09-27 ENCOUNTER — INFUSION (OUTPATIENT)
Dept: INFUSION THERAPY | Facility: HOSPITAL | Age: 84
End: 2024-09-27
Attending: INTERNAL MEDICINE
Payer: MEDICARE

## 2024-09-27 VITALS
HEART RATE: 73 BPM | DIASTOLIC BLOOD PRESSURE: 72 MMHG | BODY MASS INDEX: 35.08 KG/M2 | HEIGHT: 64 IN | TEMPERATURE: 98 F | SYSTOLIC BLOOD PRESSURE: 170 MMHG | WEIGHT: 205.5 LBS | RESPIRATION RATE: 16 BRPM

## 2024-09-27 DIAGNOSIS — D50.0 IRON DEFICIENCY ANEMIA DUE TO CHRONIC BLOOD LOSS: Primary | ICD-10-CM

## 2024-09-27 PROCEDURE — 96365 THER/PROPH/DIAG IV INF INIT: CPT | Mod: PN

## 2024-09-27 PROCEDURE — 25000003 PHARM REV CODE 250: Mod: PN

## 2024-09-27 PROCEDURE — A4216 STERILE WATER/SALINE, 10 ML: HCPCS | Mod: PN

## 2024-09-27 PROCEDURE — 63600175 PHARM REV CODE 636 W HCPCS: Mod: PN

## 2024-09-27 RX ORDER — HEPARIN 100 UNIT/ML
500 SYRINGE INTRAVENOUS
Status: DISCONTINUED | OUTPATIENT
Start: 2024-09-27 | End: 2024-09-27 | Stop reason: HOSPADM

## 2024-09-27 RX ORDER — SODIUM CHLORIDE 0.9 % (FLUSH) 0.9 %
10 SYRINGE (ML) INJECTION
Status: DISCONTINUED | OUTPATIENT
Start: 2024-09-27 | End: 2024-09-27 | Stop reason: HOSPADM

## 2024-09-27 RX ADMIN — SODIUM CHLORIDE: 9 INJECTION, SOLUTION INTRAVENOUS at 01:09

## 2024-09-27 RX ADMIN — Medication 500 UNITS: at 03:09

## 2024-09-27 RX ADMIN — Medication 10 ML: at 03:09

## 2024-09-27 RX ADMIN — SODIUM CHLORIDE 125 MG: 9 INJECTION, SOLUTION INTRAVENOUS at 01:09

## 2024-09-27 NOTE — PLAN OF CARE
Pt tolerated Ferrlicet infusion well.  Pt stayed for observation for 30 minutes post infusion.  No s/s of infusion reaction noted.  Instructed to call MD with any problems

## 2024-10-04 ENCOUNTER — INFUSION (OUTPATIENT)
Dept: INFUSION THERAPY | Facility: HOSPITAL | Age: 84
End: 2024-10-04
Attending: INTERNAL MEDICINE
Payer: MEDICARE

## 2024-10-04 VITALS
TEMPERATURE: 98 F | DIASTOLIC BLOOD PRESSURE: 66 MMHG | WEIGHT: 202.81 LBS | SYSTOLIC BLOOD PRESSURE: 134 MMHG | OXYGEN SATURATION: 95 % | BODY MASS INDEX: 34.62 KG/M2 | HEART RATE: 54 BPM | RESPIRATION RATE: 16 BRPM | HEIGHT: 64 IN

## 2024-10-04 DIAGNOSIS — D50.0 IRON DEFICIENCY ANEMIA DUE TO CHRONIC BLOOD LOSS: Primary | ICD-10-CM

## 2024-10-04 PROCEDURE — 63600175 PHARM REV CODE 636 W HCPCS: Mod: PN

## 2024-10-04 PROCEDURE — 25000003 PHARM REV CODE 250: Mod: PN

## 2024-10-04 PROCEDURE — 96365 THER/PROPH/DIAG IV INF INIT: CPT | Mod: PN

## 2024-10-04 RX ORDER — HEPARIN 100 UNIT/ML
500 SYRINGE INTRAVENOUS
Status: DISCONTINUED | OUTPATIENT
Start: 2024-10-04 | End: 2024-10-04 | Stop reason: HOSPADM

## 2024-10-04 RX ORDER — SODIUM CHLORIDE 0.9 % (FLUSH) 0.9 %
10 SYRINGE (ML) INJECTION
Status: DISCONTINUED | OUTPATIENT
Start: 2024-10-04 | End: 2024-10-04 | Stop reason: HOSPADM

## 2024-10-04 RX ADMIN — Medication 500 UNITS: at 03:10

## 2024-10-04 RX ADMIN — SODIUM CHLORIDE 125 MG: 9 INJECTION, SOLUTION INTRAVENOUS at 02:10

## 2024-10-04 RX ADMIN — SODIUM CHLORIDE: 9 INJECTION, SOLUTION INTRAVENOUS at 01:10

## 2024-10-07 ENCOUNTER — HOSPITAL ENCOUNTER (OUTPATIENT)
Facility: HOSPITAL | Age: 84
Discharge: HOME | End: 2024-10-07
Attending: ANESTHESIOLOGY | Admitting: ANESTHESIOLOGY
Payer: MEDICARE

## 2024-10-07 ENCOUNTER — HOSPITAL ENCOUNTER (OUTPATIENT)
Dept: RADIOLOGY | Facility: HOSPITAL | Age: 84
Discharge: HOME | End: 2024-10-07
Attending: ANESTHESIOLOGY | Admitting: ANESTHESIOLOGY
Payer: MEDICARE

## 2024-10-07 DIAGNOSIS — M54.16 LUMBAR RADICULOPATHY: ICD-10-CM

## 2024-10-07 DIAGNOSIS — M54.16 LUMBAR RADICULOPATHY, CHRONIC: ICD-10-CM

## 2024-10-07 DIAGNOSIS — M54.50 LOWER BACK PAIN: ICD-10-CM

## 2024-10-07 PROCEDURE — 25500020 PHARM REV CODE 255: Mod: PO | Performed by: ANESTHESIOLOGY

## 2024-10-07 PROCEDURE — 64483 NJX AA&/STRD TFRM EPI L/S 1: CPT | Mod: PO,RT | Performed by: ANESTHESIOLOGY

## 2024-10-07 PROCEDURE — 64483 NJX AA&/STRD TFRM EPI L/S 1: CPT | Mod: RT,,, | Performed by: ANESTHESIOLOGY

## 2024-10-07 PROCEDURE — 25000003 PHARM REV CODE 250: Mod: PO | Performed by: ANESTHESIOLOGY

## 2024-10-07 PROCEDURE — 63600175 PHARM REV CODE 636 W HCPCS: Mod: PO | Performed by: ANESTHESIOLOGY

## 2024-10-07 RX ORDER — ALPRAZOLAM 0.5 MG/1
1 TABLET, ORALLY DISINTEGRATING ORAL ONCE AS NEEDED
Status: COMPLETED | OUTPATIENT
Start: 2024-10-07 | End: 2024-10-07

## 2024-10-07 RX ORDER — LIDOCAINE HYDROCHLORIDE 10 MG/ML
INJECTION, SOLUTION EPIDURAL; INFILTRATION; INTRACAUDAL; PERINEURAL
Status: DISCONTINUED | OUTPATIENT
Start: 2024-10-07 | End: 2024-10-07 | Stop reason: HOSPADM

## 2024-10-07 RX ORDER — METHYLPREDNISOLONE ACETATE 40 MG/ML
INJECTION, SUSPENSION INTRA-ARTICULAR; INTRALESIONAL; INTRAMUSCULAR; SOFT TISSUE
Status: DISCONTINUED | OUTPATIENT
Start: 2024-10-07 | End: 2024-10-07 | Stop reason: HOSPADM

## 2024-10-07 RX ORDER — BUPIVACAINE HYDROCHLORIDE 2.5 MG/ML
INJECTION, SOLUTION EPIDURAL; INFILTRATION; INTRACAUDAL
Status: DISCONTINUED | OUTPATIENT
Start: 2024-10-07 | End: 2024-10-07 | Stop reason: HOSPADM

## 2024-10-07 RX ADMIN — ALPRAZOLAM 0.5 MG: 0.5 TABLET, ORALLY DISINTEGRATING ORAL at 03:10

## 2024-10-07 NOTE — OP NOTE
PROCEDURE DATE: 10/7/2024    PROCEDURE: Right L3/4 transforaminal epidural steroid injection under fluoroscopy    DIAGNOSIS: Lumbar  Radiculopathy    Post op diagnosis: Same    PHYSICIAN: Ronnell Baeza MD    MEDICATIONS INJECTED:  Methylprednisolone 40mg (1ml) and 1ml 0.25% bupivicaine at each nerve root.     LOCAL ANESTHETIC INJECTED:  Lidocaine 1%. 4 ml per site.    SEDATION MEDICATIONS: none    ESTIMATED BLOOD LOSS:  none    COMPLICATIONS:  none    TECHNIQUE:   A time-out was taken to identify patient and procedure side prior to starting the procedure. The patient was placed in a prone position, prepped and draped in the usual sterile fashion using ChloraPrep and sterile towels.  The area to be injected was determined under fluoroscopic guidance in AP and oblique view.  Local anesthetic was given by raising a wheal and going down to the hub of a 25-gauge 1.5 inch needle.  In oblique view, a 5 inch 22-gauge bent-tip spinal needle was introduced towards 6 oclock position of the pedicle of each above named nerve root level.  The needle was walked medially then hinged into the neural foramen and position was confirmed in AP and lateral views.  Omnipaque contrast dye was injected to confirm appropriate placement and that there was no vascular uptake.  After negative aspiration for blood or CSF, the medication was then injected. This was performed at the right L3/4 level(s). The patient tolerated the procedure well.    The patient was monitored after the procedure.  Patient was given post procedure and discharge instructions to follow at home. The patient was discharged in a stable condition.

## 2024-10-07 NOTE — DISCHARGE SUMMARY
[facility] - Surgery  Discharge Note  Short Stay    Procedure(s) (LRB):  Injection,steroid,epidural,transforaminal approach    L3/4 (Right)      OUTCOME: Patient tolerated treatment/procedure well without complication and is now ready for discharge.    DISPOSITION: Home or Self Care    FINAL DIAGNOSIS:  <principal problem not specified>    FOLLOWUP: In clinic    DISCHARGE INSTRUCTIONS:    Discharge Procedure Orders   Diet Adult Regular     No dressing needed     Notify your health care provider if you experience any of the following:  temperature >100.4     Activity as tolerated

## 2024-10-07 NOTE — H&P
CC: Back pain    HPI: The patient is a 82yo woman with a history of lumbar radiculopathy here for right L3/4 TFESI. There are no major changes in history and physical from 8/19/24.    Past Medical History:   Diagnosis Date    Breast cancer 1985    right mastectomy    Digestive disorder     Encounter for blood transfusion     History of pneumonia     Hypercholesterolemia     Hypertension     Hyperuricemia 06/20/2024    Lab work in the ED revealed a uric acid level of 6.8.  Pt is asymptomatic for gouty arthritis.  Monitor.      Indigestion     Lumbar spondylosis     Lung cancer     2022 - currently in treatment as of 7/27/22    Lymphoma     Osteopenia     Pulmonary nodule     Renal cancer     Renal disorder     renal cancer    Retinal detachment     Smoker 06/11/2022    Thyroid disease        Past Surgical History:   Procedure Laterality Date    ANGIOGRAPHY  4/29/2024    Procedure: Right Renal Angiogram;  Surgeon: Steve Weaver MD;  Location: [facility] CATH;  Service: Interventional Radiology;;    APPENDECTOMY      BONE MARROW BIOPSY Bilateral 06/24/2020    Procedure: Biopsy-bone marrow;  Surgeon: Rod Montero MD;  Location: [facility] OR;  Service: Oncology;  Laterality: Bilateral;    BREAST RECONSTRUCTION  1990    right    BRONCHOSCOPY N/A 01/18/2023    Procedure: BRONCHOSCOPY;  Surgeon: Gregorio Kinney MD;  Location: [facility] ENDO;  Service: Pulmonary;  Laterality: N/A;    BRONCHOSCOPY Bilateral 02/10/2023    Procedure: Bronchoscopy;  Surgeon: Gregorio Kinney MD;  Location: [facility] ENDO;  Service: Pulmonary;  Laterality: Bilateral;  for airway clearance. Purple top tube for cell count diff    BRONCHOSCOPY N/A 03/13/2023    Procedure: Bronchoscopy;  Surgeon: Gregorio Kinney MD;  Location: [facility] ENDO;  Service: Pulmonary;  Laterality: N/A;  therapeutic scope. Please have purple top tube and iced saline    CATARACT EXTRACTION W/  INTRAOCULAR LENS IMPLANT Bilateral     CHOLECYSTECTOMY      COLONOSCOPY N/A 3/25/2024    Procedure:  COLONOSCOPY;  Surgeon: Brigido Calvillo Jr., MD;  Location: [facility] ENDO;  Service: Endoscopy;  Laterality: N/A;    CYSTOURETEROSCOPY WITH RETROGRADE PYELOGRAPHY AND INSERTION OF STENT INTO URETER Right 6/21/2024    Procedure: CYSTOURETEROSCOPY, WITH RETROGRADE PYELOGRAM AND URETERAL STENT INSERTION;  Surgeon: Noel Priest MD;  Location: [facility] OR;  Service: Urology;  Laterality: Right;    EMBOLIZATION  4/29/2024    Procedure: Rt. Renal Embolization;  Surgeon: Steve Weaver MD;  Location: [facility] CATH;  Service: Interventional Radiology;;    ENDOBRONCHIAL ULTRASOUND Bilateral 07/09/2021    Procedure: ENDOBRONCHIAL ULTRASOUND (EBUS);  Surgeon: Gregorio Kinney MD;  Location: [facility];  Service: Pulmonary;  Laterality: Bilateral;  NEED LMA to  eval right upper paratracheal LN.     ENDOBRONCHIAL ULTRASOUND Bilateral 06/16/2022    Procedure: ENDOBRONCHIAL ULTRASOUND (EBUS);  Surgeon: Gregorio Kinney MD;  Location: [facility];  Service: Pulmonary;  Laterality: Bilateral;    INJECTION OF FACET JOINT Left 06/30/2022    Procedure: FACET JOINT Cyst Aspiration L3/4;  Surgeon: Ronnell Baeza MD;  Location: [facility] OR;  Service: Pain Management;  Laterality: Left;    INSERTION OF TUNNELED CENTRAL VENOUS CATHETER (CVC) WITH SUBCUTANEOUS PORT Left 11/04/2020    Procedure: [id number]-[id number];  Surgeon: Kody Pretty MD;  Location: [facility] OR;  Service: General;  Laterality: Left;    JOINT REPLACEMENT  2008    right knee     LUMBAR LAMINECTOMY      LYMPH NODE BIOPSY      MASTECTOMY Right 1985    NEEDLE LOCALIZATION N/A 05/25/2020    Procedure: NEEDLE LOCALIZATION - lymph node bx;  Surgeon: Steve Weaver MD;  Location: [facility] CATH;  Service: Interventional Radiology;  Laterality: N/A;    NEEDLE LOCALIZATION N/A 5/22/2024    Procedure: cryoablation renal in CT with anesthesia marv lipscomb;  Surgeon: Steve Weaver MD;  Location: [facility] CATH;  Service: Interventional Radiology;  Laterality: N/A;  cryoablation renal in CT with anesthesia  argon Iam rep    RECTAL BIOPSY N/A 3/27/2024    Procedure: BIOPSY, RECTUM;  Surgeon: Isabel Choi MD;  Location: [facility] OR;  Service: General;  Laterality: N/A;    RETINAL DETACHMENT SURGERY  1989    RIGHT HEART CATHETERIZATION Right 03/18/2023    Procedure: INSERTION, CATHETER, RIGHT HEART;  Surgeon: Rukhsana Lang MD;  Location: [facility] CATH;  Service: Cardiology;  Laterality: Right;    SELECTIVE UNILATERAL PULMONARY ANGIOGRAPHY  03/18/2023    Procedure: Pumonary angiography - unilateral;  Surgeon: Rukhsana Lang MD;  Location: [facility] CATH;  Service: Cardiology;;    THROMBECTOMY N/A 03/18/2023    Procedure: THROMBECTOMY;  Surgeon: Rukhsana Lang MD;  Location: [facility] CATH;  Service: Cardiology;  Laterality: N/A;    TRANSFORAMINAL EPIDURAL INJECTION OF STEROID Left 05/14/2020    Procedure: Injection,steroid,epidural,transforaminal approach, l3/4;  Surgeon: Ronnell Baeza MD;  Location: [facility] OR;  Service: Pain Management;  Laterality: Left;    TRANSFORAMINAL EPIDURAL INJECTION OF STEROID Left 03/23/2022    Procedure: Injection,steroid,epidural,transforaminal approach L3/4;  Surgeon: Ronnell Baeza MD;  Location: [facility] OR;  Service: Pain Management;  Laterality: Left;    TRANSFORAMINAL EPIDURAL INJECTION OF STEROID Left 08/01/2022    Procedure: Injection,steroid,epidural,transforaminal approach L3/4;  Surgeon: Ronnell Baeza MD;  Location: [facility] OR;  Service: Pain Management;  Laterality: Left;    TUBAL LIGATION      UMBILICAL HERNIA REPAIR         Family History   Problem Relation Name Age of Onset    Cancer Sister          uterine    Cancer Sister          Uterine cancer     Diabetes Brother      Breast cancer Other niece 42    Glaucoma Neg Hx      Macular degeneration Neg Hx         Social History     Socioeconomic History    Marital status:     Number of children: 3   Occupational History    Occupation: retired  for [employer]   Tobacco Use    Smoking status: Former     Current  packs/day: 0.00     Average packs/day: 0.5 packs/day for 53.0 years (26.5 ttl pk-yrs)     Types: Cigarettes     Start date: 1969     Quit date: 2022     Years since quittin.3    Smokeless tobacco: Never   Substance and Sexual Activity    Alcohol use: No    Drug use: No    Sexual activity: Not Currently   Social History Narrative    Lives alone        Hobbies: skyler        From [place]         Social Drivers of Health     Financial Resource Strain: Low Risk  (2023)    Overall Financial Resource Strain (CARDIA)     Difficulty of Paying Living Expenses: Not hard at all   Food Insecurity: No Food Insecurity (2024)    Hunger Vital Sign     Worried About Running Out of Food in the Last Year: Never true     Ran Out of Food in the Last Year: Never true   Transportation Needs: No Transportation Needs (2024)    TRANSPORTATION NEEDS     Transportation : No   Physical Activity: Insufficiently Active (2023)    Exercise Vital Sign     Days of Exercise per Week: 3 days     Minutes of Exercise per Session: 20 min   Stress: No Stress Concern Present (2023)    [employer] [employer] - Occupational Stress Questionnaire     Feeling of Stress : Not at all   Housing Stability: Low Risk  (2024)    Housing Stability Vital Sign     Unable to Pay for Housing in the Last Year: No     Homeless in the Last Year: No       Current Facility-Administered Medications   Medication Dose Route Frequency Provider Last Rate Last Admin    alprazolam ODT dissolvable tablet 1 mg  1 mg Oral Once PRN Ronnell Baeza MD           Review of patient's allergies indicates:   Allergen Reactions    Opioids - morphine analogues Other (See Comments)     Hypotension       Vitals:    10/02/24 1114 10/07/24 1351 10/07/24 1353   BP:  130/84 130/84   Pulse:  80 80   Resp:  17 17   Temp:  98.5 °F (36.9 °C) 98.5 °F (36.9 °C)   TempSrc:  Skin Skin   SpO2:  96% 96%   Weight: 93 kg (205 lb)     Height:  "5' 4" (1.626 m)         ASA 2, Mallampati 2    REVIEW OF SYSTEMS:     GENERAL: No weight loss, malaise or fevers.  HEENT:  No recent changes in vision or hearing  NECK: Negative for lumps, no difficulty with swallowing.  RESPIRATORY: Negative for cough, wheezing or shortness of breath, patient denies any recent URI.  CARDIOVASCULAR: Negative for chest pain, leg swelling or palpitations.  GI: Negative for abdominal discomfort, blood in stools or black stools or change in bowel habits.  MUSCULOSKELETAL: See HPI.  SKIN: Negative for lesions, rash, and itching.  PSYCH: No suicidal or homicidal ideations, no current mood disturbances.  HEMATOLOGY/LYMPHOLOGY: Negative for prolonged bleeding, bruising easily or swollen nodes. Patient is not currently taking any anti-coagulants  ENDO: No history of diabetes or thyroid dysfunction  NEURO: No history of syncope, paralysis, seizures or tremors.All other reviewed and negative other than HPI.    Physical exam:  Gen: A and O x3, pleasant, well-groomed  Skin: No rashes or obvious lesions  HEENT: PERRLA, no obvious deformities on ears or in canals. No thyroid masses, trachea midline, no palpable lymph nodes in neck, axilla.  CVS: Regular rate and rhythm, normal S1 and S2, no murmurs.  Resp: Clear to auscultation bilaterally.  Abdomen: Soft, NT/ND, normal bowel sounds present.  Musculoskeletal/Neuro: Moving all extremities    Assessment:  Lumbar radiculopathy, chronic  -     Place in Outpatient; Standing  -     Vital signs; Standing  -     Verify informed consent; Standing  -     Notify physician ; Standing  -     Notify physician ; Standing  -     Notify physician (specify); Standing  -     Diet NPO; Standing  -     alprazolam ODT dissolvable tablet 1 mg    Lumbar radiculopathy    Other orders  -     Cancel: IP VTE HIGH RISK PATIENT; Standing  -     IP VTE LOW RISK PATIENT; Standing          "

## 2024-10-08 ENCOUNTER — PATIENT MESSAGE (OUTPATIENT)
Dept: UROLOGY | Facility: CLINIC | Age: 84
End: 2024-10-08
Payer: MEDICARE

## 2024-10-08 VITALS
TEMPERATURE: 99 F | WEIGHT: 205 LBS | HEART RATE: 86 BPM | OXYGEN SATURATION: 97 % | DIASTOLIC BLOOD PRESSURE: 72 MMHG | RESPIRATION RATE: 17 BRPM | BODY MASS INDEX: 35 KG/M2 | SYSTOLIC BLOOD PRESSURE: 139 MMHG | HEIGHT: 64 IN

## 2024-10-08 NOTE — TELEPHONE ENCOUNTER
Cystoscopy with right ureteral stent exchange (Resonance Stent placement 6fr x 24 cm), retrograde pyelogram  They may be able to use the previous case request that was scheduled and canceled. May be in the depot.

## 2024-10-09 ENCOUNTER — PATIENT MESSAGE (OUTPATIENT)
Dept: UROLOGY | Facility: CLINIC | Age: 84
End: 2024-10-09
Payer: MEDICARE

## 2024-10-09 ENCOUNTER — OFFICE VISIT (OUTPATIENT)
Dept: FAMILY MEDICINE | Facility: CLINIC | Age: 84
End: 2024-10-09
Payer: MEDICARE

## 2024-10-09 VITALS
OXYGEN SATURATION: 94 % | DIASTOLIC BLOOD PRESSURE: 72 MMHG | SYSTOLIC BLOOD PRESSURE: 128 MMHG | HEART RATE: 90 BPM | RESPIRATION RATE: 18 BRPM | BODY MASS INDEX: 33.46 KG/M2 | HEIGHT: 64 IN | WEIGHT: 196 LBS

## 2024-10-09 DIAGNOSIS — I10 HYPERTENSION, UNSPECIFIED TYPE: ICD-10-CM

## 2024-10-09 DIAGNOSIS — E03.9 HYPOTHYROIDISM, UNSPECIFIED TYPE: ICD-10-CM

## 2024-10-09 DIAGNOSIS — Z00.00 PREVENTATIVE HEALTH CARE: ICD-10-CM

## 2024-10-09 DIAGNOSIS — G47.00 INSOMNIA, UNSPECIFIED TYPE: Primary | ICD-10-CM

## 2024-10-09 PROCEDURE — 99999 PR PBB SHADOW E&M-EST. PATIENT-LVL III: CPT | Mod: PBBFAC,,, | Performed by: FAMILY MEDICINE

## 2024-10-09 PROCEDURE — 1160F RVW MEDS BY RX/DR IN RCRD: CPT | Mod: CPTII,S$GLB,, | Performed by: FAMILY MEDICINE

## 2024-10-09 PROCEDURE — 1101F PT FALLS ASSESS-DOCD LE1/YR: CPT | Mod: CPTII,S$GLB,, | Performed by: FAMILY MEDICINE

## 2024-10-09 PROCEDURE — 90653 IIV ADJUVANT VACCINE IM: CPT | Mod: S$GLB,,, | Performed by: FAMILY MEDICINE

## 2024-10-09 PROCEDURE — 3074F SYST BP LT 130 MM HG: CPT | Mod: CPTII,S$GLB,, | Performed by: FAMILY MEDICINE

## 2024-10-09 PROCEDURE — 1159F MED LIST DOCD IN RCRD: CPT | Mod: CPTII,S$GLB,, | Performed by: FAMILY MEDICINE

## 2024-10-09 PROCEDURE — G0008 ADMIN INFLUENZA VIRUS VAC: HCPCS | Mod: S$GLB,,, | Performed by: FAMILY MEDICINE

## 2024-10-09 PROCEDURE — 99214 OFFICE O/P EST MOD 30 MIN: CPT | Mod: 25,S$GLB,, | Performed by: FAMILY MEDICINE

## 2024-10-09 PROCEDURE — 1126F AMNT PAIN NOTED NONE PRSNT: CPT | Mod: CPTII,S$GLB,, | Performed by: FAMILY MEDICINE

## 2024-10-09 PROCEDURE — 3078F DIAST BP <80 MM HG: CPT | Mod: CPTII,S$GLB,, | Performed by: FAMILY MEDICINE

## 2024-10-09 PROCEDURE — 3288F FALL RISK ASSESSMENT DOCD: CPT | Mod: CPTII,S$GLB,, | Performed by: FAMILY MEDICINE

## 2024-10-09 NOTE — PROGRESS NOTES
Subjective:       Patient ID: Shandra Velez is a 84 y.o. female.    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  Trying IB Guard OTC after she thought she had some IBS  Non-Hodgkins lymphoma, renal mass, lung cancer - following with oncology; in remission presently  S/p PE - taking Eliquis 2.5mg BID for life  HTN, CKD - tolerating nifedipine Xl 60mg daily; stopped HCTZ and lisinopril due to renal functions;   HLD - stopped Zocor 10mg daily  Allergies: taking Zyrtec 10mg daily  Insomnia - using restoril 30mg at bedtime  Hypothyroidism - taking levothyroxine 100mcg daily  Renal mass - following with urology    Past Medical History:    Hypertension                                                  Pulmonary nodule                                              Smoker                                                        Osteopenia                                                    Hypertension                                                  Hypercholesterolemia                                          Breast cancer                                                 Lumbar spondylosis                                            Past Surgical History:    MASTECTOMY                                       1985            Comment:right    CHOLECYSTECTOMY                                                APPENDECTOMY                                                   UMBILICAL HERNIA REPAIR                                        TUBAL LIGATION                                                 BREAST RECONSTRUCTION                                            Comment:right    Allergies:   -- No Known Drug Allergies     Social History    Marital Status:               Spouse Name:                       Years of Education:                 Number of children: 3             Occupational History  Occupation          Employer            Comment               retired marketing *                         Social History Main Topics    Smoking Status: Current Every Day Smoker        Packs/Day: 0.50  Years: 53         Types: Cigarettes    Smokeless Status: Never Used                        Alcohol Use: Yes             Drug Use: No              Sexual Activity: Not Currently        Other Topics            Concern    None on file    Social History Narrative    Lives alone      Hobbies: skyler      From [place]    Current Outpatient Medications on File Prior to Visit:  acetaminophen (TYLENOL) 325 MG tablet, Take 2 tablets (650 mg total) by mouth every 6 (six) hours as needed for Pain., Disp: , Rfl:   albuterol (VENTOLIN HFA) 90 mcg/actuation inhaler, Inhale 2 puffs into the lungs every 6 (six) hours as needed for Wheezing or Shortness of Breath (or before exercise). Rescue, Disp: 18 g, Rfl: 2  apixaban (ELIQUIS) 2.5 mg Tab, Take 1 tablet (2.5 mg total) by mouth 2 (two) times daily., Disp: , Rfl:   budesonide (PULMICORT) 0.5 mg/2 mL nebulizer solution, Take 2 mLs (0.5 mg total) by nebulization 2 (two) times a day., Disp: 120 mL, Rfl: 11  cetirizine (ZYRTEC) 10 MG tablet, Take 10 mg by mouth once daily., Disp: , Rfl:   ergocalciferol (ERGOCALCIFEROL) 50,000 unit Cap, Take 50,000 Units by mouth every Tuesday., Disp: , Rfl:   hydrocortisone (ANUSOL-HC) 2.5 % rectal cream, Place rectally 2 (two) times daily as needed for Hemorrhoids., Disp: 28 g, Rfl: 0  levothyroxine (SYNTHROID) 100 MCG tablet, Take 1 tablet (100 mcg total) by mouth once daily., Disp: 90 tablet, Rfl: 3  LIDOcaine (LIDODERM) 5 %, Place 1 patch onto the skin once daily. Remove & Discard patch within 12 hours or as directed by MD, Disp: 30 patch, Rfl: 0  mucus clearing device (ACAPELLA, FLUTTER), by Misc.(Non-Drug; Combo  Route) route once daily., Disp: 100 each, Rfl: 0  multivitamin (THERAGRAN) per tablet, Take 1 tablet by mouth once daily., Disp: , Rfl:   NIFEdipine (ADALAT CC) 60 MG TbSR, Take 1 tablet (60 mg total) by mouth once daily., Disp: , Rfl:   omeprazole (PRILOSEC) 40 MG capsule, Take 1 capsule (40 mg total) by mouth once daily., Disp: 90 capsule, Rfl: 4  OXYGEN-AIR DELIVERY SYSTEMS MISC, 2-3 L by Misc.(Non-Drug; Combo Route) route as needed., Disp: , Rfl:   SENNA 8.6 mg tablet, Take 1 tablet by mouth once daily., Disp: 30 tablet, Rfl: 11  sodium chloride 3% 3 % nebulizer solution, Take 4 mLs by nebulization 2 (two) times a day., Disp: 240 mL, Rfl: 3  temazepam (RESTORIL) 30 mg capsule, Take 1 capsule (30 mg total) by mouth every evening., Disp: 30 capsule, Rfl: 5  docusate sodium (COLACE) 50 MG capsule, Take 50 mg by mouth as needed for Constipation. (Patient not taking: Reported on 10/9/2024), Disp: , Rfl:   [DISCONTINUED] gabapentin (NEURONTIN) 100 MG capsule, Take 2 capsules (200 mg total) by mouth 3 (three) times daily. (Patient not taking: Reported on 10/9/2024), Disp: 180 capsule, Rfl: 2  [DISCONTINUED] iron-vitamin C 100-250 mg, ICAR-C, 100-250 mg Tab, Take 1 tablet by mouth 2 (two) times a day. (Patient not taking: Reported on 10/9/2024), Disp: 60 tablet, Rfl: 3  [DISCONTINUED] levalbuterol (XOPENEX) 0.63 mg/3 mL nebulizer solution, Take 3 mLs (0.63 mg total) by nebulization 2 (two) times a day. Rescue, Disp: 60 each, Rfl: 6  [DISCONTINUED] methocarbamoL (ROBAXIN) 750 MG Tab, Take 1 tablet (750 mg total) by mouth 3 (three) times daily as needed (muscle spasm/tightness). (Patient not taking: Reported on 10/9/2024), Disp: 30 tablet, Rfl: 0    Current Facility-Administered Medications on File Prior to Visit:  sodium chloride 0.9% flush 10 mL, 10 mL, Intravenous, PRN, Chad Mills MD, 10 mL at 07/31/24 8306                    Review of patient's family history indicates:    Cancer                         Sister   "                    Comment: uterine    Diabetes                       Brother                       Fatigue  Associated symptoms include nausea. Pertinent negatives include no arthralgias, chest pain, chills, coughing, fatigue, fever, headaches, neck pain, numbness, rash, sore throat, vomiting or weakness.   Follow-up  Associated symptoms include nausea. Pertinent negatives include no arthralgias, chest pain, chills, coughing, fatigue, fever, headaches, neck pain, numbness, rash, sore throat, vomiting or weakness.   Hyperlipidemia  Pertinent negatives include no chest pain or shortness of breath.     Review of Systems   Constitutional:  Positive for appetite change. Negative for chills, fatigue, fever and unexpected weight change.   HENT:  Negative for sore throat and trouble swallowing.    Eyes:  Negative for pain and visual disturbance.   Respiratory:  Negative for cough, shortness of breath and wheezing.    Cardiovascular:  Negative for chest pain and palpitations.   Gastrointestinal:  Positive for diarrhea and nausea. Negative for abdominal distention, blood in stool and vomiting.   Genitourinary:  Positive for flank pain. Negative for difficulty urinating, dysuria and hematuria.   Musculoskeletal:  Negative for arthralgias, back pain, gait problem and neck pain.   Skin:  Negative for rash and wound.   Neurological:  Positive for tremors (left hand). Negative for dizziness, weakness, numbness and headaches.   Hematological:  Negative for adenopathy.   Psychiatric/Behavioral:  Negative for dysphoric mood.        Objective:       /72   Pulse 90   Resp 18   Ht 5' 4" (1.626 m)   Wt 88.9 kg (195 lb 15.8 oz)   SpO2 (!) 94%   BMI 33.64 kg/m²     Physical Exam  Constitutional:       Appearance: Normal appearance. She is well-developed.   HENT:      Head: Normocephalic.      Mouth/Throat:      Pharynx: No oropharyngeal exudate or posterior oropharyngeal erythema.   Eyes:      Conjunctiva/sclera: Conjunctivae " normal.      Pupils: Pupils are equal, round, and reactive to light.   Neck:      Thyroid: No thyromegaly.   Cardiovascular:      Rate and Rhythm: Normal rate and regular rhythm.      Heart sounds: Normal heart sounds, S1 normal and S2 normal. No murmur heard.     No friction rub. No gallop.   Pulmonary:      Effort: Pulmonary effort is normal.      Breath sounds: Normal breath sounds. No wheezing or rales.   Musculoskeletal:      Cervical back: Normal range of motion and neck supple.      Right lower leg: No edema.      Left lower leg: No edema.   Lymphadenopathy:      Cervical: No cervical adenopathy.   Skin:     General: Skin is warm.      Findings: No rash.   Neurological:      Mental Status: She is alert and oriented to person, place, and time.      Cranial Nerves: No cranial nerve deficit.      Gait: Gait normal.             Results for orders placed or performed in visit on 09/13/24   CBC w/ DIFF    Collection Time: 09/13/24  1:12 PM   Result Value Ref Range    WBC 6.19 3.90 - 12.70 K/uL    RBC 3.56 (L) 4.00 - 5.40 M/uL    Hemoglobin 9.9 (L) 12.0 - 16.0 g/dL    Hematocrit 31.2 (L) 37.0 - 48.5 %    MCV 88 82 - 98 fL    MCH 27.8 27.0 - 31.0 pg    MCHC 31.7 (L) 32.0 - 36.0 g/dL    RDW 16.2 (H) 11.5 - 14.5 %    Platelets 145 (L) 150 - 450 K/uL    MPV 10.1 9.2 - 12.9 fL    Immature Granulocytes 0.6 (H) 0.0 - 0.5 %    Gran # (ANC) 4.1 1.8 - 7.7 K/uL    Immature Grans (Abs) 0.04 0.00 - 0.04 K/uL    Lymph # 1.3 1.0 - 4.8 K/uL    Mono # 0.6 0.3 - 1.0 K/uL    Eos # 0.2 0.0 - 0.5 K/uL    Baso # 0.03 0.00 - 0.20 K/uL    nRBC 0 0 /100 WBC    Gran % 66.0 38.0 - 73.0 %    Lymph % 21.2 18.0 - 48.0 %    Mono % 9.0 4.0 - 15.0 %    Eosinophil % 2.7 0.0 - 8.0 %    Basophil % 0.5 0.0 - 1.9 %    Differential Method Automated    CMP    Collection Time: 09/13/24  1:12 PM   Result Value Ref Range    Sodium 142 136 - 145 mmol/L    Potassium 3.9 3.5 - 5.1 mmol/L    Chloride 110 95 - 110 mmol/L    CO2 19 (L) 23 - 29 mmol/L    Glucose  109 70 - 110 mg/dL    BUN 48 (H) 8 - 23 mg/dL    Creatinine 1.9 (H) 0.5 - 1.4 mg/dL    Calcium 9.3 8.7 - 10.5 mg/dL    Total Protein 6.6 6.0 - 8.4 g/dL    Albumin 3.3 (L) 3.5 - 5.2 g/dL    Total Bilirubin 0.2 0.1 - 1.0 mg/dL    Alkaline Phosphatase 84 55 - 135 U/L    AST 24 10 - 40 U/L    ALT 10 10 - 44 U/L    eGFR 26.2 (A) >60 mL/min/1.73 m^2    Anion Gap 13 8 - 16 mmol/L   FERRITIN    Collection Time: 09/13/24  1:12 PM   Result Value Ref Range    Ferritin 102 20.0 - 300.0 ng/mL   Iron and TIBC    Collection Time: 09/13/24  1:12 PM   Result Value Ref Range    Iron 29 (L) 30 - 160 ug/dL    Transferrin 206 200 - 375 mg/dL    TIBC 305 250 - 450 ug/dL    Saturated Iron 10 (L) 20 - 50 %   STFR    Collection Time: 09/13/24  1:12 PM   Result Value Ref Range    Sol. Transferrin Receptor 6.6 (H) 1.8 - 4.6 mg/L     *Note: Due to a large number of results and/or encounters for the requested time period, some results have not been displayed. A complete set of results can be found in Results Review.         Assessment:       1. Insomnia, unspecified type    2. Preventative health care    3. Hypertension, unspecified type    4. Hypothyroidism, unspecified type                    Plan:       Insomnia, unspecified type    Preventative health care  -     influenza (adjuvanted) (Fluad) 45 mcg/0.5 mL IM vaccine (> or = 64 yo) 0.5 mL    Hypertension, unspecified type    Hypothyroidism, unspecified type                    Overall stable  Continue present meds  F/u with specialists as planned        Visit today included increased complexity associated with the care of the episodic problems listed above addressed and managing the longitudinal care of the patient due to the serious and/or complex managed problem(s) listed above.

## 2024-10-13 NOTE — PROGRESS NOTES
PATIENT: Shandra Velez  MRN: [id number]  DATE: 10/14/2024      Diagnosis:   1. Follicular lymphoma grade I, unspecified body region    2. 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 Renal cell carcinoma, unspecified laterality    4. Rectal lesion    5. Thrombophilia    6. Hyperlipidemia, unspecified hyperlipidemia type    7. Iron deficiency anemia, unspecified iron deficiency anemia type    8. Acquired hypothyroidism                        Chief Complaint: Follicular lymphoma grade I, unspecified body region (1 month follow up )    Oncologic History:     Pt initially underwent right inguinal LN biopsy 5/25/22 showing grade 1 follicular lymphoma.  Bone marrow biopsy done 06/24/2020 showed involvement with follicular lymphoma.  PET/CT 7/07/20 showed lymphadenopathy above and below the diaphragm as well as the spleen.  Also seen wasa RUL lesion.  The patient was started on CVP-R followed by maintenance rituxan every 8 weeks.  Biopsy of the RUL nodule on 6/18/21 showed SCC with PD-L1 at 86%.  EBUS 7/09/21 showed no malignant cells at station 7, 4R or 11RS.  The patient was treated with SBRT under the care of Dr Barnes with 50Gy in 4 fractions completed 10/28/21.  The patient then developed new pulmonary nodules on CT scan 6/02/22.  EBUS 6/16/22 showed positive SCC in 4R LN.  Pt was discussed at tumor board on 6/21/22 with recommendation for Keytruda.  The patient underwent PET/CT on 9/23/22 after 2 doses of treatment showing a 1.5 cm precarinal lymph node; stable right apical mass measuring 1.8 x 1.5 cm; stable 8 mm right apical lung nodule with adjacent post radiation change measuring 2 x 2.7 cm; stable 4 mm subpleural nodule in the lateral right upper lobe; fluid/mucus in the right lower lobe bronchus tree; 6 mm anterior left upper lobe nodule which is new; 2.3 cm simple cyst in the midpole of the left kidney; 3.1 x 2.3 cm rim enhancing complex cystic and solid mass in the mid to lower pole the right  kidney; infrarenal abdominal aortic aneurysm measuring 3.8 x 3.5 cm; and stable a left para-aortic lymph node measuring 11 mm. 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   The patient underwent PET-CT on 11/29/2022 showing an irregular hypermetabolic right upper lobe tumor which is stable measuring 1.6 x 1.2 cm; hypermetabolic subpleural right upper lobe consolidation diminished in extent; unchanged linear zone of hypermetabolic atelectasis in the superior segment of the left lower lobe; new poorly metabolic ill-defined infiltrate in the posterior right lower lobe; 4 mm left upper lobe nodule which is stable; hypermetabolic precarinal mediastinal lymph node stable measuring 1 cm; and hypermetabolic aortopulmonary lymph node stable measuring 1.1 x 1.1 cm.   The patient was admitted to the hospital from 1/16/23 to 1/23/23 for pneumonia.  She underwent bronchoscopy with Dr Kinney on 1/18/23 showing significant mucus plug impaction int the pharynx, left mainstem bronchus and right and left lower lobes.  The patient was discharged on 3L O2.   The patient underwent PET-CT on 03/08/2023 showing thickening of the pleura; new ground-glass opacities within the left upper lobe; patchy airspace opacities in the right lower lobe; right lower lobe pulmonary artery hypermetabolic activity possibly secondary to bronchial wall thickening or thrombus in the pulmonary artery; diffuse hypermetabolic activity identified throughout the axial skeleton.  The patient was then admitted to the hospital on 03/18/23-4/3/23 after presenting with shortness of breath and being found to have a saddle pulmonary embolus on CTA 03/18/2023.  The patient underwent treatment with a heparin drip and thrombectomy on 03/18/2023 with eventual transition to Eliquis.   The patient underwent CT chest on 04/14/2023 showing areas of air trapping in subpleural bleb formation in both  jarek thoraces with areas of interstitial scarring and bibasilar bronchiectasis; stable solid nodule in left upper lobe measuring 6 mm; masslike area measuring 3 x 1.3 x 1.5 cm in the right upper lobe stable.   The patient underwent CT chest, abdomen, and pelvis on 07/10/2023 showing more consolidative appearance of previously described right apical pulmonary nodules without discretely measurable nodule on today's exam, 8 mm left apical nodule, stable consolidation and superior segment left lower lobe; 3 mm right lower lobe pulmonary nodule; heterogeneously enhancing mass in the inferior pole of the right kidney measuring 3.9 x 3.7 x 3.9 cm; unchanged left periaortic lymph nodes.   The patient underwent a PET-CT on 10/09/2023 showing a new oval-shaped hypermetabolic nodular mass measuring 1.6 x 1 cm in the right upper lobe consistent with tumor recurrence; disappearance of ground-glass opacities anterior left upper lobe; disappearance of subpleural posterior right lower lobe consolidation; new hypermetabolic bilateral inguinal lymph nodes; new rounded nodular hypermetabolic nodule within the posterior right lumbar subcutaneous fat measuring 1.5 x 1.5 cm and a 3.8cm abdominal aortic aneurysm.    The patient was discussed at Thoracic tumor board on 10/25/23 with recommendation to proceed with biopsy of the lesion in the back via IR.  Biopsy done on 11/03/23 showed fibroadipose tissue.   The patient underwent CT of the chest, abdomen, and pelvis on 12/12/2023 showing pleural parenchymal thickening in the right lung apex; 6 mm left upper lobe nodule stable; solid mass inferior right kidney measuring 4.3 x 4.3 cm; enlarged bilateral periaortic lymphadenopathy measuring 17 x 14 mm; enlarged bilateral inguinal hemipelvic lymphadenopathy with left hemipelvic lymph node measuring 4.4 x 1.8 cm and left inguinal lymph node measuring 3.5 x 1.7 cm.   The patient underwent PET-CT on 02/15/2024 showing a stable nodular focus of  hypermetabolic activity in the right apical soft tissue area of consolidation; new area of ground-glass opacity more anterior in the right lung apex measuring 2.6 x 2.2 cm; multiple metastatic periaortic and bilateral iliac lymph nodes with progression from prior with a new left para-aortic lymph node measuring 19 x 17 mm SUV max 6.5, left hemipelvic node measuring 4.1 x 1.5 cm with SUV max of 9.4 enlarged inguinal lymph node measuring 3 x 2.2 with SUV max of 9.2.      The patient underwent biopsy of left inguinal lymph node with path showing follicular lymphoma grade 3A.  Patient was discussed at  Tumor Board on 3/14/24 with recommendation for biopsy of the right renal lesion followed by potential ablation.   The patient was admitted to the hospital from 03/23/2024 until 03/28/2024 for acute on chronic anemia.  Hemoglobin on admission was 7.3 grams/deciliter.  The patient received 2 units of PRBC's on 03/24/2024.  Colonoscopy on 03/25/2024 showed a circumferential mass posterior to the anal canal found on perianal exam which looked ulcerated and arising from the anus; one 2-3 mm polyp in the mid rectum; and moderate colonic spasm consistent with irritable bowel syndrome.  Patient underwent rectal tissue biopsy on 03/27/2024 with path showing mucosal prolapse with ulceration and granulation tissue.  MRI rectum on 03/28/2024 showed the suspected perianal mass at the invasion to adjacent structures and enlarged pelvic lymph nodes.  The patient underwent biopsy of the right renal lesion showing clear cell carcinoma on pathology.   The patient saw Dr. Hansen 04/18/2024 whom felt the patient did not meet GELF criteria currently with plan on repeating PET-CT on 8 weeks and consideration of bendamustine and rituximab if lymph node size was increasing.  Patient underwent embolization to the right renal lesion on 04/29/2024.    The patient underwent cryoablation to the right renal mass on 5/22/24.  PT underwent PET-CT on  06/18/2024 showing lesion in right upper lobe with soft tissue component measuring 1.5 x 1.6 cm increased in compared to prior; peripheral hypermetabolic activity in the right renal lesion with hyperdense material; hypermetabolic activity in the rectum; diffuse stable hypermetabolic activity in the retroperitoneum extending into the bilateral iliac regions greater on the left in the right.    The patient was admitted to the hospital from 06/20/2024 until 06/27/2024.  During admission the patient was treated for urinary tract infection was started on broad-spectrum antibiotics.  Cystoscopy was performed on 06/21/2024 with ureteral stent placement in the right ureter.   Labs from 07/29/2024 showed an elevated STFR at 9.4.  Pt received PRBC transfusion 7/31/24.  Pt received ferric gluconate 125mg on 8/09/24.   The patient received IV iron on 8/16/24 and 9/13/24.  Ferritin on 9/13/24 which was drawn before IV iron given was 102ng/mL.    Subjective:    Interval History: Ms. Velez is a 83 y.o. female with HLD, HTN, osteopenia, follicular lymphoma who presents for follow up of NSCLC.  Since since the last clinic visit the patient underwent CT chest on 10/03/2024 showing spiculated mass in the right lung apex enlarging now measuring 52 x 27 x 30 mm; new 5 mm nodule in the superior segment of the left lower lobe; stable 6 mm noncalcified nodule laterally in the left upper lobe; and a solid 3 cm mass in the anterior cortex of the right kidney.  The patient denies CP, cough, SOB, abdominal pain, nausea, vomiting, constipation, diarrhea.  The patient denies fever, chills, night sweats, weight loss, new lumps or bumps, easy bruising or bleeding.    Past Medical History:   Past Medical History:   Diagnosis Date    Breast cancer 1985    right mastectomy    Digestive disorder     Encounter for blood transfusion     History of pneumonia     Hypercholesterolemia     Hypertension     Hyperuricemia 06/20/2024    Lab work in the ED  revealed a uric acid level of 6.8.  Pt is asymptomatic for gouty arthritis.  Monitor.      Indigestion     Lumbar spondylosis     Lung cancer     2022 - currently in treatment as of 7/27/22    Lymphoma     Osteopenia     Pulmonary nodule     Renal cancer     Renal disorder     renal cancer    Retinal detachment     Smoker 06/11/2022    Thyroid disease        Past Surgical HIstory:   Past Surgical History:   Procedure Laterality Date    ANGIOGRAPHY  4/29/2024    Procedure: Right Renal Angiogram;  Surgeon: Steve Weaver MD;  Location: [facility] CATH;  Service: Interventional Radiology;;    APPENDECTOMY      BONE MARROW BIOPSY Bilateral 06/24/2020    Procedure: Biopsy-bone marrow;  Surgeon: Rod Montero MD;  Location: [facility] OR;  Service: Oncology;  Laterality: Bilateral;    BREAST RECONSTRUCTION  1990    right    BRONCHOSCOPY N/A 01/18/2023    Procedure: BRONCHOSCOPY;  Surgeon: Gregorio Kinney MD;  Location: [facility] ENDO;  Service: Pulmonary;  Laterality: N/A;    BRONCHOSCOPY Bilateral 02/10/2023    Procedure: Bronchoscopy;  Surgeon: Gregorio Kinney MD;  Location: [facility] ENDO;  Service: Pulmonary;  Laterality: Bilateral;  for airway clearance. Purple top tube for cell count diff    BRONCHOSCOPY N/A 03/13/2023    Procedure: Bronchoscopy;  Surgeon: Gregorio Kinney MD;  Location: [facility] ENDO;  Service: Pulmonary;  Laterality: N/A;  therapeutic scope. Please have purple top tube and iced saline    CATARACT EXTRACTION W/  INTRAOCULAR LENS IMPLANT Bilateral     CHOLECYSTECTOMY      COLONOSCOPY N/A 3/25/2024    Procedure: COLONOSCOPY;  Surgeon: Brigido Calvillo Jr., MD;  Location: [facility] ENDO;  Service: Endoscopy;  Laterality: N/A;    CYSTOURETEROSCOPY WITH RETROGRADE PYELOGRAPHY AND INSERTION OF STENT INTO URETER Right 6/21/2024    Procedure: CYSTOURETEROSCOPY, WITH RETROGRADE PYELOGRAM AND URETERAL STENT INSERTION;  Surgeon: Noel Priest MD;  Location: [facility] OR;  Service: Urology;  Laterality: Right;    EMBOLIZATION  4/29/2024     Procedure: Rt. Renal Embolization;  Surgeon: Steve Weaver MD;  Location: [facility] CATH;  Service: Interventional Radiology;;    ENDOBRONCHIAL ULTRASOUND Bilateral 07/09/2021    Procedure: ENDOBRONCHIAL ULTRASOUND (EBUS);  Surgeon: Gregorio Kinney MD;  Location: [facility];  Service: Pulmonary;  Laterality: Bilateral;  NEED LMA to  eval right upper paratracheal LN.     ENDOBRONCHIAL ULTRASOUND Bilateral 06/16/2022    Procedure: ENDOBRONCHIAL ULTRASOUND (EBUS);  Surgeon: Gregorio Kinney MD;  Location: [facility];  Service: Pulmonary;  Laterality: Bilateral;    INJECTION OF FACET JOINT Left 06/30/2022    Procedure: FACET JOINT Cyst Aspiration L3/4;  Surgeon: Ronnell Baeza MD;  Location: [facility] OR;  Service: Pain Management;  Laterality: Left;    INSERTION OF TUNNELED CENTRAL VENOUS CATHETER (CVC) WITH SUBCUTANEOUS PORT Left 11/04/2020    Procedure: [id number];  Surgeon: Kody Pretty MD;  Location: [facility] OR;  Service: General;  Laterality: Left;    JOINT REPLACEMENT  2008    right knee     LUMBAR LAMINECTOMY      LYMPH NODE BIOPSY      MASTECTOMY Right 1985    NEEDLE LOCALIZATION N/A 05/25/2020    Procedure: NEEDLE LOCALIZATION - lymph node bx;  Surgeon: Steve Weaver MD;  Location: [facility] CATH;  Service: Interventional Radiology;  Laterality: N/A;    NEEDLE LOCALIZATION N/A 5/22/2024    Procedure: cryoablation renal in CT with anesthesia argon IAM rep;  Surgeon: Steve Weaver MD;  Location: [facility] CATH;  Service: Interventional Radiology;  Laterality: N/A;  cryoablation renal in CT with anesthesia argon Iam rep    RECTAL BIOPSY N/A 3/27/2024    Procedure: BIOPSY, RECTUM;  Surgeon: Isabel Choi MD;  Location: [facility] OR;  Service: General;  Laterality: N/A;    RETINAL DETACHMENT SURGERY  1989    RIGHT HEART CATHETERIZATION Right 03/18/2023    Procedure: INSERTION, CATHETER, RIGHT HEART;  Surgeon: Rukhsana Lang MD;  Location: [facility] CATH;  Service: Cardiology;  Laterality: Right;    SELECTIVE UNILATERAL  PULMONARY ANGIOGRAPHY  03/18/2023    Procedure: Pumonary angiography - unilateral;  Surgeon: Rukhsana Lang MD;  Location: [facility] CATH;  Service: Cardiology;;    THROMBECTOMY N/A 03/18/2023    Procedure: THROMBECTOMY;  Surgeon: Rukhsana Lang MD;  Location: [facility] CATH;  Service: Cardiology;  Laterality: N/A;    TRANSFORAMINAL EPIDURAL INJECTION OF STEROID Left 05/14/2020    Procedure: Injection,steroid,epidural,transforaminal approach, l3/4;  Surgeon: Ronnell Baeza MD;  Location: [facility] OR;  Service: Pain Management;  Laterality: Left;    TRANSFORAMINAL EPIDURAL INJECTION OF STEROID Left 03/23/2022    Procedure: Injection,steroid,epidural,transforaminal approach L3/4;  Surgeon: Ronnell Baeza MD;  Location: [facility] OR;  Service: Pain Management;  Laterality: Left;    TRANSFORAMINAL EPIDURAL INJECTION OF STEROID Left 08/01/2022    Procedure: Injection,steroid,epidural,transforaminal approach L3/4;  Surgeon: Ronnell Baeza MD;  Location: [facility] OR;  Service: Pain Management;  Laterality: Left;    TRANSFORAMINAL EPIDURAL INJECTION OF STEROID Right 10/7/2024    Procedure: Injection,steroid,epidural,transforaminal approach    L3/4;  Surgeon: Ronnell Baeza MD;  Location: [facility] OR;  Service: Pain Management;  Laterality: Right;    TUBAL LIGATION      UMBILICAL HERNIA REPAIR         Family History:   Family History   Problem Relation Name Age of Onset    Cancer Sister          uterine    Cancer Sister          Uterine cancer     Diabetes Brother      Breast cancer Other niece 42    Glaucoma Neg Hx      Macular degeneration Neg Hx         Social History:  reports that she quit smoking about 2 years ago. Her smoking use included cigarettes. She started smoking about 55 years ago. She has a 26.5 pack-year smoking history. She has never used smokeless tobacco. She reports that she does not drink alcohol and does not use drugs.    Allergies:  Review of patient's allergies indicates:   Allergen Reactions    Opioids -  morphine analogues Other (See Comments)     Hypotension       Medications:  Current Outpatient Medications   Medication Sig Dispense Refill    acetaminophen (TYLENOL) 325 MG tablet Take 2 tablets (650 mg total) by mouth every 6 (six) hours as needed for Pain.      albuterol (VENTOLIN HFA) 90 mcg/actuation inhaler Inhale 2 puffs into the lungs every 6 (six) hours as needed for Wheezing or Shortness of Breath (or before exercise). Rescue 18 g 2    apixaban (ELIQUIS) 2.5 mg Tab Take 1 tablet (2.5 mg total) by mouth 2 (two) times daily.      budesonide (PULMICORT) 0.5 mg/2 mL nebulizer solution Take 2 mLs (0.5 mg total) by nebulization 2 (two) times a day. 120 mL 11    cetirizine (ZYRTEC) 10 MG tablet Take 10 mg by mouth once daily.      ergocalciferol (ERGOCALCIFEROL) 50,000 unit Cap Take 50,000 Units by mouth every Tuesday.      hydrocortisone (ANUSOL-HC) 2.5 % rectal cream Place rectally 2 (two) times daily as needed for Hemorrhoids. 28 g 0    levalbuterol (XOPENEX) 0.63 mg/3 mL nebulizer solution Take 3 mLs (0.63 mg total) by nebulization 2 (two) times a day. 180 each 3    levothyroxine (SYNTHROID) 100 MCG tablet Take 1 tablet (100 mcg total) by mouth once daily. 90 tablet 3    LIDOcaine (LIDODERM) 5 % Place 1 patch onto the skin once daily. Remove & Discard patch within 12 hours or as directed by MD 30 patch 0    mucus clearing device (ACAPELLA, FLUTTER) by Misc.(Non-Drug; Combo Route) route once daily. 100 each 0    multivitamin (THERAGRAN) per tablet Take 1 tablet by mouth once daily.      NIFEdipine (ADALAT CC) 60 MG TbSR Take 1 tablet (60 mg total) by mouth once daily.      omeprazole (PRILOSEC) 40 MG capsule Take 1 capsule (40 mg total) by mouth once daily. 90 capsule 4    SENNA 8.6 mg tablet Take 1 tablet by mouth once daily. 30 tablet 11    sodium chloride 3% 3 % nebulizer solution Take 4 mLs by nebulization 2 (two) times a day. 240 mL 3    temazepam (RESTORIL) 30 mg capsule Take 1 capsule (30 mg total) by  "mouth every evening. 30 capsule 5    docusate sodium (COLACE) 50 MG capsule Take 50 mg by mouth as needed for Constipation. (Patient not taking: Reported on 10/14/2024)      OXYGEN-AIR DELIVERY SYSTEMS MISC 2-3 L by Misc.(Non-Drug; Combo Route) route as needed. (Patient not taking: Reported on 10/14/2024)       Current Facility-Administered Medications   Medication Dose Route Frequency Provider Last Rate Last Admin    sodium chloride 0.9% flush 10 mL  10 mL Intravenous PRN Chad Mills MD   10 mL at 07/31/24 1306       Review of Systems   Constitutional:  Negative for appetite change, chills, fatigue, fever and unexpected weight change.   HENT:  Negative for mouth sores.    Eyes:  Negative for visual disturbance.   Respiratory:  Negative for cough and shortness of breath.    Cardiovascular:  Negative for chest pain and palpitations.   Gastrointestinal:  Negative for abdominal pain, constipation, diarrhea, nausea and vomiting.   Genitourinary:  Negative for frequency.   Musculoskeletal:  Positive for arthralgias (left hip pain). Negative for back pain.   Skin:  Negative for rash.   Neurological:  Negative for headaches.   Hematological:  Negative for adenopathy. Does not bruise/bleed easily.   Psychiatric/Behavioral:  The patient is not nervous/anxious.        ECOG Performance Status: 2   Objective:      Vitals:   Vitals:    10/14/24 1436   BP: 130/74   Pulse: (!) 58   Resp: 18   Temp: 97 °F (36.1 °C)   TempSrc: Temporal   SpO2: 98%   Weight: 91.3 kg (201 lb 4.5 oz)   Height: 5' 4" (1.626 m)         Physical Exam  Constitutional:       General: She is not in acute distress.     Appearance: She is well-developed. She is not diaphoretic.   HENT:      Head: Normocephalic and atraumatic.   Cardiovascular:      Rate and Rhythm: Normal rate and regular rhythm.      Heart sounds: Normal heart sounds. No murmur heard.     No friction rub. No gallop.   Pulmonary:      Effort: Pulmonary effort is normal. No respiratory " distress.      Breath sounds: Normal breath sounds. No wheezing or rales.   Chest:      Chest wall: No tenderness.   Abdominal:      General: Bowel sounds are normal. There is no distension.      Palpations: Abdomen is soft. There is no mass.      Tenderness: There is no abdominal tenderness. There is no guarding or rebound.   Lymphadenopathy:      Cervical: No cervical adenopathy.      Upper Body:      Right upper body: No supraclavicular or axillary adenopathy.      Left upper body: No supraclavicular or axillary adenopathy.   Skin:     Findings: No erythema or rash.   Neurological:      Mental Status: She is alert and oriented to person, place, and time.   Psychiatric:         Behavior: Behavior normal.         Laboratory Data:  Lab Visit on 10/14/2024   Component Date Value Ref Range Status    WBC 10/14/2024 5.78  3.90 - 12.70 K/uL Final    RBC 10/14/2024 3.35 (L)  4.00 - 5.40 M/uL Final    Hemoglobin 10/14/2024 9.4 (L)  12.0 - 16.0 g/dL Final    Hematocrit 10/14/2024 30.9 (L)  37.0 - 48.5 % Final    MCV 10/14/2024 92  82 - 98 fL Final    MCH 10/14/2024 28.1  27.0 - 31.0 pg Final    MCHC 10/14/2024 30.4 (L)  32.0 - 36.0 g/dL Final    RDW 10/14/2024 17.3 (H)  11.5 - 14.5 % Final    Platelets 10/14/2024 170  150 - 450 K/uL Final    MPV 10/14/2024 10.1  9.2 - 12.9 fL Final    Immature Granulocytes 10/14/2024 0.9 (H)  0.0 - 0.5 % Final    Gran # (ANC) 10/14/2024 3.8  1.8 - 7.7 K/uL Final    Immature Grans (Abs) 10/14/2024 0.05 (H)  0.00 - 0.04 K/uL Final    Comment: Mild elevation in immature granulocytes is non specific and   can be seen in a variety of conditions including stress response,   acute inflammation, trauma and pregnancy. Correlation with other   laboratory and clinical findings is essential.      Lymph # 10/14/2024 1.2  1.0 - 4.8 K/uL Final    Mono # 10/14/2024 0.6  0.3 - 1.0 K/uL Final    Eos # 10/14/2024 0.2  0.0 - 0.5 K/uL Final    Baso # 10/14/2024 0.05  0.00 - 0.20 K/uL Final    nRBC 10/14/2024 0   0 /100 WBC Final    Gran % 10/14/2024 65.7  38.0 - 73.0 % Final    Lymph % 10/14/2024 20.4  18.0 - 48.0 % Final    Mono % 10/14/2024 9.5  4.0 - 15.0 % Final    Eosinophil % 10/14/2024 2.6  0.0 - 8.0 % Final    Basophil % 10/14/2024 0.9  0.0 - 1.9 % Final    Differential Method 10/14/2024 Automated   Final    Sodium 10/14/2024 141  136 - 145 mmol/L Final    Potassium 10/14/2024 4.1  3.5 - 5.1 mmol/L Final    Chloride 10/14/2024 112 (H)  95 - 110 mmol/L Final    CO2 10/14/2024 17 (L)  23 - 29 mmol/L Final    Glucose 10/14/2024 108  70 - 110 mg/dL Final    BUN 10/14/2024 42 (H)  8 - 23 mg/dL Final    Creatinine 10/14/2024 2.0 (H)  0.5 - 1.4 mg/dL Final    Calcium 10/14/2024 8.5 (L)  8.7 - 10.5 mg/dL Final    Total Protein 10/14/2024 6.7  6.0 - 8.4 g/dL Final    Albumin 10/14/2024 3.4 (L)  3.5 - 5.2 g/dL Final    Total Bilirubin 10/14/2024 0.2  0.1 - 1.0 mg/dL Final    Comment: For infants and newborns, interpretation of results should be based  on gestational age, weight and in agreement with clinical  observations.    Premature Infant recommended reference ranges:  Up to 24 hours.............<8.0 mg/dL  Up to 48 hours............<12.0 mg/dL  3-5 days..................<15.0 mg/dL  6-29 days.................<15.0 mg/dL      Alkaline Phosphatase 10/14/2024 117  55 - 135 U/L Final    AST 10/14/2024 19  10 - 40 U/L Final    ALT 10/14/2024 9 (L)  10 - 44 U/L Final    eGFR 10/14/2024 24.6 (A)  >60 mL/min/1.73 m^2 Final    Anion Gap 10/14/2024 12  8 - 16 mmol/L Final         Imaging:    CT Chest 10/03/24  The spiculated mass in the right lung apex measures 52 x 27 x 30 mm (transverse, AP, craniocaudal) on the current study compared to 44 x 23 x 23 mm (transverse, AP, craniocaudal) on the prior study.     There is a new 5 mm nodule in the superior segment of the left lower lobe (series 15, image 371).     There is nonspecific bandlike septal thickening in the superior segment of the left lower lobe (series 15, image 280) which  is unchanged.     There is an unchanged 6 mm noncalcified nodule laterally in the left upper lobe (series 15, image 186).     Severe COPD is again noted.     There is no pleural or pericardial fluid. There are no enlarged intrathoracic lymph nodes. The pulmonary trunk has a transverse diameter of 34 mm. The ascending aorta has a maximum diameter of 38 mm. Coronary calcific atherosclerosis is noted.     There is no acute finding in the included abdomen, included body wall or included base of the neck. There is a solid 3 cm mass on the anterior cortex of the right kidney.        Assessment:       1. Follicular lymphoma grade I, unspecified body region    2. 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 Renal cell carcinoma, unspecified laterality    4. Rectal lesion    5. Thrombophilia    6. Hyperlipidemia, unspecified hyperlipidemia type    7. Iron deficiency anemia, unspecified iron deficiency anemia type    8. Acquired hypothyroidism                     Plan:     NSCLC - Pt has NSCLC SCC with involvement of the right lung and mediastinum  -Pt with prior radiation to a RUL nodule  -PD-L1 was 86% on 6/18/21  -Pt was on Keytruda with 6 week cycles and completed 6 tx's and then developed pneumonitis  -PET/CT on 9/23/22 showed possible pseudoprogression  -PET/CT on 11/29/22 showed stable disease with RLL infiltrate concerning for resolving PNA  -PET-CT on 03/08/2023 showed resolution of prior disease   -CT Chest 4/14/23 and 7/10/23 with stable findings  -PET/CT 10/09/23 showed avidity in the RUL mass, inguinal LAD and subcutaneous nodule in the lumbar region  -Treatment with paclitaxel discussed; however, pt is hesitant to undergo chemotherapy  -Biopsy of lumbar subcutaneous nodule showed fibroadipose tissue  -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  -Ct chest 12/12/23 stable  -PET/CT 2/15/24 showed continued nodular focus of hypermetabolic activity in the right apex with a new adjacent ground-glass  area  -No clear evidence of progression on scans  -Repeat PET/CT on 06/18/2024 showed enlarging soft tissue component of right upper lobe nodule  -Pt was felt to have superimposed infection  -Pt seen by Dr Kinney on 7/16/24 with recommendation for repeat CT chest in 4 months  -CT chest  10/03/24 shows an enlarging spiculated RUL mass  -Pt to undergo biopsy 10/17/24  -Pt to follow up a week after the biopsy    Renal Cell Carcinoma - 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 based on biopsy 3/28/24  -Pt not a surgical candidate per Urology  -Patient underwent embolization 04/29/2024 and cryoablation on 05/22/2024  -CT Urogram 8/05/24 showed treated lesion  -Stable on CT chest 10/03/24  -Will monitor    Rectal Lesion - seen on colonoscopy 03/25/2024   Rectal tissue biopsy on 03/27/2024 with path showing mucosal prolapse with ulceration and granulation tissue  -MRI rectum on 03/28/2024 showed the suspected perianal mass at the invasion to adjacent structures and enlarged pelvic lymph nodes  -LAD likely form follicular lymphoma  -Continued avidity seen on PET-CT 06/18/2024    Saddle Pulmonary Embolus - seen on CTA 03/18/2023   -PT on prophylactic ELiquis 2.5mg BID   -Will continue    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 for 9/12 cycles with good response  -PT with recurrent inguinal LAD which could be lymphoma  -LN's increasing in size on CT 12/12/23  -PET-CT on 02/15/2024 showed continued increasing size of left inguinal lymph node with SUV max now 9.2  -Left inguinal LN biopsy 3/08/24 showed Follicular Lymphoma Grade 3a  -Pt saw Dr Hansen on 4/18/24 whom recommended repeating scans in 8 weeks with consideration of bendamustine and rituximab if patient progresses  -Repeat PET-CT 06/18/2024 showed stable hypermetabolic activity in the retroperitoneum, bilateral iliac regions and inguinal regions  -Pt seen by Dr Hansen on 7/16/24 and recommended PET/CT  in 6 months  -Will repeat PET/CT 1/2025    Hypothyroidism - pt on levothyroxine  -Management per PCP    Iron Deficiency Anemia - Pt received 4 doses of venofer in the past with last dose 11/24/23  -PT now s/p three doses of ferric gluconate 8/09/254, 8/16/24 and 9/13/24  -Ferritin 102ng/mL on 9/13/24  -Will monitor 12/2024    Route Chart for Scheduling    Med Onc Chart Routing      Follow up with physician 1 week. Pt needs an appt with me on 10/25/24 at 1:40 PM.  OK to double book.   Follow up with DAYAMI    Infusion scheduling note    Injection scheduling note    Labs    Imaging    Pharmacy appointment    Other referrals              Treatment Plan Information   OP PEMBROLIZUMAB 400MG Q6W Chad Mills MD   Associated diagnosis: Follicular lymphoma   noted on 10/7/2020  Associated diagnosis: Primary malignant neoplasm of left lower lobe of lung   noted on 10/7/2021   Line of treatment: First Line  Treatment Goal: Palliative     Upcoming Treatment Dates - OP PEMBROLIZUMAB 400MG Q6W    5/12/2023       Pre-Medications       acetaminophen tablet 1,000 mg       diphenhydrAMINE (BENADRYL) 25 mg in NS 50 mL IVPB       Chemotherapy       pembrolizumab (KEYTRUDA) 400 mg in sodium chloride 0.9% SolP 116 mL infusion  6/23/2023       Pre-Medications       acetaminophen tablet 1,000 mg       diphenhydrAMINE (BENADRYL) 25 mg in NS 50 mL IVPB       Chemotherapy       pembrolizumab (KEYTRUDA) 400 mg in sodium chloride 0.9% SolP 116 mL infusion  8/4/2023       Pre-Medications       acetaminophen tablet 1,000 mg       diphenhydrAMINE (BENADRYL) 25 mg in NS 50 mL IVPB       Chemotherapy       pembrolizumab (KEYTRUDA) 400 mg in sodium chloride 0.9% SolP 116 mL infusion  9/15/2023       Pre-Medications       acetaminophen tablet 1,000 mg       diphenhydrAMINE (BENADRYL) 25 mg in NS 50 mL IVPB       Chemotherapy       pembrolizumab (KEYTRUDA) 400 mg in sodium chloride 0.9% SolP 116 mL infusion    Supportive Plan Information  OP FERRIC  GLUCONATE Naomi Green NP   Associated Diagnosis: Iron deficiency anemia   noted on 10/2/2023   Line of treatment: Supportive Care   Treatment goal: Supportive     Upcoming Treatment Dates - OP FERRIC GLUCONATE    10/5/2024       Medications       ferric gluconate (FERRLECIT) 125 mg in 0.9% NaCl 100 mL IVPB  10/12/2024       Medications       ferric gluconate (FERRLECIT) 125 mg in 0.9% NaCl 100 mL IVPB    Therapy Plan Information  PORT FLUSH for Spinal stenosis of lumbar region without neurogenic claudication, noted on 2/15/2018  PORT FLUSH for NHL (non-Hodgkin's lymphoma), noted on 6/24/2020  Flushes  heparin, porcine (PF) 100 unit/mL injection flush 500 Units  500 Units, Intravenous, Every visit  sodium chloride 0.9% flush 10 mL  10 mL, Intravenous, Every visit    INF PEGFILGRASTIM (NEULASTA) for Leukopenia due to antineoplastic chemotherapy, noted on 5/11/2022  pegfilgrastim (NEULASTA) injection 6 mg  6 mg, Subcutaneous, Every visit      No therapy plan of the specified type found.        Chad Mills MD  [facility]  Hematology and Oncology  [facility]   [address] [place] [address]   O: [phone]  F: [phone]

## 2024-10-14 ENCOUNTER — LAB VISIT (OUTPATIENT)
Dept: LAB | Facility: HOSPITAL | Age: 84
End: 2024-10-14
Attending: INTERNAL MEDICINE
Payer: MEDICARE

## 2024-10-14 ENCOUNTER — OFFICE VISIT (OUTPATIENT)
Dept: HEMATOLOGY/ONCOLOGY | Facility: CLINIC | Age: 84
End: 2024-10-14
Payer: MEDICARE

## 2024-10-14 VITALS
OXYGEN SATURATION: 98 % | BODY MASS INDEX: 34.36 KG/M2 | TEMPERATURE: 97 F | HEIGHT: 64 IN | RESPIRATION RATE: 18 BRPM | HEART RATE: 58 BPM | WEIGHT: 201.25 LBS | SYSTOLIC BLOOD PRESSURE: 130 MMHG | DIASTOLIC BLOOD PRESSURE: 74 MMHG

## 2024-10-14 DIAGNOSIS — D50.9 IRON DEFICIENCY ANEMIA, UNSPECIFIED IRON DEFICIENCY ANEMIA TYPE: ICD-10-CM

## 2024-10-14 DIAGNOSIS — C34.11 MALIGNANT NEOPLASM OF RIGHT UPPER LOBE OF LUNG: ICD-10-CM

## 2024-10-14 DIAGNOSIS — D68.59 THROMBOPHILIA: ICD-10-CM

## 2024-10-14 DIAGNOSIS — E78.5 HYPERLIPIDEMIA, UNSPECIFIED HYPERLIPIDEMIA TYPE: ICD-10-CM

## 2024-10-14 DIAGNOSIS — C64.9 RENAL CELL CARCINOMA, UNSPECIFIED LATERALITY: ICD-10-CM

## 2024-10-14 DIAGNOSIS — C82.00 FOLLICULAR LYMPHOMA GRADE I, UNSPECIFIED BODY REGION: Primary | ICD-10-CM

## 2024-10-14 DIAGNOSIS — C82.00 FOLLICULAR LYMPHOMA GRADE I, UNSPECIFIED BODY REGION: ICD-10-CM

## 2024-10-14 DIAGNOSIS — K62.9 RECTAL LESION: ICD-10-CM

## 2024-10-14 DIAGNOSIS — E03.9 ACQUIRED HYPOTHYROIDISM: ICD-10-CM

## 2024-10-14 LAB
ALBUMIN SERPL BCP-MCNC: 3.4 G/DL (ref 3.5–5.2)
ALP SERPL-CCNC: 117 U/L (ref 55–135)
ALT SERPL W/O P-5'-P-CCNC: 9 U/L (ref 10–44)
ANION GAP SERPL CALC-SCNC: 12 MMOL/L (ref 8–16)
AST SERPL-CCNC: 19 U/L (ref 10–40)
BASOPHILS # BLD AUTO: 0.05 K/UL (ref 0–0.2)
BASOPHILS NFR BLD: 0.9 % (ref 0–1.9)
BILIRUB SERPL-MCNC: 0.2 MG/DL (ref 0.1–1)
BUN SERPL-MCNC: 42 MG/DL (ref 8–23)
CALCIUM SERPL-MCNC: 8.5 MG/DL (ref 8.7–10.5)
CHLORIDE SERPL-SCNC: 112 MMOL/L (ref 95–110)
CO2 SERPL-SCNC: 17 MMOL/L (ref 23–29)
CREAT SERPL-MCNC: 2 MG/DL (ref 0.5–1.4)
DIFFERENTIAL METHOD BLD: ABNORMAL
EOSINOPHIL # BLD AUTO: 0.2 K/UL (ref 0–0.5)
EOSINOPHIL NFR BLD: 2.6 % (ref 0–8)
ERYTHROCYTE [DISTWIDTH] IN BLOOD BY AUTOMATED COUNT: 17.3 % (ref 11.5–14.5)
EST. GFR  (NO RACE VARIABLE): 24.6 ML/MIN/1.73 M^2
GLUCOSE SERPL-MCNC: 108 MG/DL (ref 70–110)
HCT VFR BLD AUTO: 30.9 % (ref 37–48.5)
HGB BLD-MCNC: 9.4 G/DL (ref 12–16)
IMM GRANULOCYTES # BLD AUTO: 0.05 K/UL (ref 0–0.04)
IMM GRANULOCYTES NFR BLD AUTO: 0.9 % (ref 0–0.5)
LYMPHOCYTES # BLD AUTO: 1.2 K/UL (ref 1–4.8)
LYMPHOCYTES NFR BLD: 20.4 % (ref 18–48)
MCH RBC QN AUTO: 28.1 PG (ref 27–31)
MCHC RBC AUTO-ENTMCNC: 30.4 G/DL (ref 32–36)
MCV RBC AUTO: 92 FL (ref 82–98)
MONOCYTES # BLD AUTO: 0.6 K/UL (ref 0.3–1)
MONOCYTES NFR BLD: 9.5 % (ref 4–15)
NEUTROPHILS # BLD AUTO: 3.8 K/UL (ref 1.8–7.7)
NEUTROPHILS NFR BLD: 65.7 % (ref 38–73)
NRBC BLD-RTO: 0 /100 WBC
PLATELET # BLD AUTO: 170 K/UL (ref 150–450)
PMV BLD AUTO: 10.1 FL (ref 9.2–12.9)
POTASSIUM SERPL-SCNC: 4.1 MMOL/L (ref 3.5–5.1)
PROT SERPL-MCNC: 6.7 G/DL (ref 6–8.4)
RBC # BLD AUTO: 3.35 M/UL (ref 4–5.4)
SODIUM SERPL-SCNC: 141 MMOL/L (ref 136–145)
WBC # BLD AUTO: 5.78 K/UL (ref 3.9–12.7)

## 2024-10-14 PROCEDURE — 99999 PR PBB SHADOW E&M-EST. PATIENT-LVL IV: CPT | Mod: PBBFAC,,, | Performed by: INTERNAL MEDICINE

## 2024-10-14 PROCEDURE — 1126F AMNT PAIN NOTED NONE PRSNT: CPT | Mod: CPTII,S$GLB,, | Performed by: INTERNAL MEDICINE

## 2024-10-14 PROCEDURE — 85025 COMPLETE CBC W/AUTO DIFF WBC: CPT | Mod: PN | Performed by: INTERNAL MEDICINE

## 2024-10-14 PROCEDURE — 80053 COMPREHEN METABOLIC PANEL: CPT | Mod: PN | Performed by: INTERNAL MEDICINE

## 2024-10-14 PROCEDURE — 99213 OFFICE O/P EST LOW 20 MIN: CPT | Mod: S$GLB,,, | Performed by: INTERNAL MEDICINE

## 2024-10-14 PROCEDURE — 36415 COLL VENOUS BLD VENIPUNCTURE: CPT | Mod: PN | Performed by: INTERNAL MEDICINE

## 2024-10-14 PROCEDURE — 3288F FALL RISK ASSESSMENT DOCD: CPT | Mod: CPTII,S$GLB,, | Performed by: INTERNAL MEDICINE

## 2024-10-14 PROCEDURE — 1101F PT FALLS ASSESS-DOCD LE1/YR: CPT | Mod: CPTII,S$GLB,, | Performed by: INTERNAL MEDICINE

## 2024-10-14 PROCEDURE — 3075F SYST BP GE 130 - 139MM HG: CPT | Mod: CPTII,S$GLB,, | Performed by: INTERNAL MEDICINE

## 2024-10-14 PROCEDURE — 3078F DIAST BP <80 MM HG: CPT | Mod: CPTII,S$GLB,, | Performed by: INTERNAL MEDICINE

## 2024-10-14 PROCEDURE — 1159F MED LIST DOCD IN RCRD: CPT | Mod: CPTII,S$GLB,, | Performed by: INTERNAL MEDICINE

## 2024-10-25 ENCOUNTER — LAB VISIT (OUTPATIENT)
Dept: LAB | Facility: HOSPITAL | Age: 84
End: 2024-10-25
Attending: INTERNAL MEDICINE
Payer: MEDICARE

## 2024-10-25 ENCOUNTER — OFFICE VISIT (OUTPATIENT)
Dept: HEMATOLOGY/ONCOLOGY | Facility: CLINIC | Age: 84
End: 2024-10-25
Payer: MEDICARE

## 2024-10-25 VITALS
TEMPERATURE: 98 F | WEIGHT: 199.94 LBS | HEART RATE: 92 BPM | HEIGHT: 63 IN | SYSTOLIC BLOOD PRESSURE: 118 MMHG | DIASTOLIC BLOOD PRESSURE: 66 MMHG | BODY MASS INDEX: 35.43 KG/M2 | OXYGEN SATURATION: 98 % | RESPIRATION RATE: 20 BRPM

## 2024-10-25 DIAGNOSIS — E78.5 HYPERLIPIDEMIA, UNSPECIFIED HYPERLIPIDEMIA TYPE: ICD-10-CM

## 2024-10-25 DIAGNOSIS — E03.9 ACQUIRED HYPOTHYROIDISM: ICD-10-CM

## 2024-10-25 DIAGNOSIS — C64.9 RENAL CELL CARCINOMA, UNSPECIFIED LATERALITY: ICD-10-CM

## 2024-10-25 DIAGNOSIS — K62.9 RECTAL LESION: ICD-10-CM

## 2024-10-25 DIAGNOSIS — C34.11 MALIGNANT NEOPLASM OF RIGHT UPPER LOBE OF LUNG: Primary | ICD-10-CM

## 2024-10-25 DIAGNOSIS — D50.9 IRON DEFICIENCY ANEMIA, UNSPECIFIED IRON DEFICIENCY ANEMIA TYPE: ICD-10-CM

## 2024-10-25 DIAGNOSIS — C82.00 FOLLICULAR LYMPHOMA GRADE I, UNSPECIFIED BODY REGION: ICD-10-CM

## 2024-10-25 DIAGNOSIS — D68.59 THROMBOPHILIA: ICD-10-CM

## 2024-10-25 LAB
ALBUMIN SERPL BCP-MCNC: 3.5 G/DL (ref 3.5–5.2)
ALP SERPL-CCNC: 104 U/L (ref 40–150)
ALT SERPL W/O P-5'-P-CCNC: 9 U/L (ref 10–44)
ANION GAP SERPL CALC-SCNC: 11 MMOL/L (ref 8–16)
AST SERPL-CCNC: 20 U/L (ref 10–40)
BASOPHILS # BLD AUTO: 0.04 K/UL (ref 0–0.2)
BASOPHILS NFR BLD: 0.5 % (ref 0–1.9)
BILIRUB SERPL-MCNC: 0.2 MG/DL (ref 0.1–1)
BUN SERPL-MCNC: 52 MG/DL (ref 8–23)
CALCIUM SERPL-MCNC: 9.2 MG/DL (ref 8.7–10.5)
CHLORIDE SERPL-SCNC: 108 MMOL/L (ref 95–110)
CO2 SERPL-SCNC: 22 MMOL/L (ref 23–29)
CREAT SERPL-MCNC: 1.9 MG/DL (ref 0.5–1.4)
DIFFERENTIAL METHOD BLD: ABNORMAL
EOSINOPHIL # BLD AUTO: 0.2 K/UL (ref 0–0.5)
EOSINOPHIL NFR BLD: 2.1 % (ref 0–8)
ERYTHROCYTE [DISTWIDTH] IN BLOOD BY AUTOMATED COUNT: 15.9 % (ref 11.5–14.5)
EST. GFR  (NO RACE VARIABLE): 26 ML/MIN/1.73 M^2
FERRITIN SERPL-MCNC: 102 NG/ML (ref 20–300)
GLUCOSE SERPL-MCNC: 124 MG/DL (ref 70–110)
HCT VFR BLD AUTO: 32.2 % (ref 37–48.5)
HGB BLD-MCNC: 9.7 G/DL (ref 12–16)
IMM GRANULOCYTES # BLD AUTO: 0.04 K/UL (ref 0–0.04)
IMM GRANULOCYTES NFR BLD AUTO: 0.5 % (ref 0–0.5)
IRON SERPL-MCNC: 30 UG/DL (ref 30–160)
LYMPHOCYTES # BLD AUTO: 1.3 K/UL (ref 1–4.8)
LYMPHOCYTES NFR BLD: 16 % (ref 18–48)
MCH RBC QN AUTO: 27.4 PG (ref 27–31)
MCHC RBC AUTO-ENTMCNC: 30.1 G/DL (ref 32–36)
MCV RBC AUTO: 91 FL (ref 82–98)
MONOCYTES # BLD AUTO: 0.7 K/UL (ref 0.3–1)
MONOCYTES NFR BLD: 8.3 % (ref 4–15)
NEUTROPHILS # BLD AUTO: 5.9 K/UL (ref 1.8–7.7)
NEUTROPHILS NFR BLD: 72.6 % (ref 38–73)
NRBC BLD-RTO: 0 /100 WBC
PLATELET # BLD AUTO: 161 K/UL (ref 150–450)
PMV BLD AUTO: 10 FL (ref 9.2–12.9)
POTASSIUM SERPL-SCNC: 4.4 MMOL/L (ref 3.5–5.1)
PROT SERPL-MCNC: 6.7 G/DL (ref 6–8.4)
RBC # BLD AUTO: 3.54 M/UL (ref 4–5.4)
SATURATED IRON: 9 % (ref 20–50)
SODIUM SERPL-SCNC: 141 MMOL/L (ref 136–145)
TOTAL IRON BINDING CAPACITY: 351 UG/DL (ref 250–450)
TRANSFERRIN SERPL-MCNC: 237 MG/DL (ref 200–375)
WBC # BLD AUTO: 8.1 K/UL (ref 3.9–12.7)

## 2024-10-25 PROCEDURE — 83540 ASSAY OF IRON: CPT | Performed by: INTERNAL MEDICINE

## 2024-10-25 PROCEDURE — 80053 COMPREHEN METABOLIC PANEL: CPT | Mod: PN | Performed by: INTERNAL MEDICINE

## 2024-10-25 PROCEDURE — 84466 ASSAY OF TRANSFERRIN: CPT | Performed by: INTERNAL MEDICINE

## 2024-10-25 PROCEDURE — 85025 COMPLETE CBC W/AUTO DIFF WBC: CPT | Mod: PN | Performed by: INTERNAL MEDICINE

## 2024-10-25 PROCEDURE — 82728 ASSAY OF FERRITIN: CPT | Performed by: INTERNAL MEDICINE

## 2024-10-25 PROCEDURE — 36415 COLL VENOUS BLD VENIPUNCTURE: CPT | Mod: PN | Performed by: INTERNAL MEDICINE

## 2024-10-25 PROCEDURE — 99999 PR PBB SHADOW E&M-EST. PATIENT-LVL IV: CPT | Mod: PBBFAC,,, | Performed by: INTERNAL MEDICINE

## 2024-10-25 NOTE — PROGRESS NOTES
PATIENT: Shandra Velez  MRN: [id number]  DATE: 10/25/2024      Diagnosis:   1. Squamous Cell Carcinoma of right upper lobe of lung    2. Iron deficiency anemia, unspecified iron deficiency anemia type    3. Follicular lymphoma grade I, unspecified body region    4. Renal cell carcinoma, unspecified laterality    5. Rectal lesion    6. Thrombophilia    7. Hyperlipidemia, unspecified hyperlipidemia type    8. Acquired hypothyroidism                          Chief Complaint: Follow-up (NSCLC)    Oncologic History:     Pt initially underwent right inguinal LN biopsy 5/25/22 showing grade 1 follicular lymphoma.  Bone marrow biopsy done 06/24/2020 showed involvement with follicular lymphoma.  PET/CT 7/07/20 showed lymphadenopathy above and below the diaphragm as well as the spleen.  Also seen wasa RUL lesion.  The patient was started on CVP-R followed by maintenance rituxan every 8 weeks.  Biopsy of the RUL nodule on 6/18/21 showed SCC with PD-L1 at 86%.  EBUS 7/09/21 showed no malignant cells at station 7, 4R or 11RS.  The patient was treated with SBRT under the care of Dr Barnes with 50Gy in 4 fractions completed 10/28/21.  The patient then developed new pulmonary nodules on CT scan 6/02/22.  EBUS 6/16/22 showed positive SCC in 4R LN.  Pt was discussed at tumor board on 6/21/22 with recommendation for Keytruda.  The patient underwent PET/CT on 9/23/22 after 2 doses of treatment showing a 1.5 cm precarinal lymph node; stable right apical mass measuring 1.8 x 1.5 cm; stable 8 mm right apical lung nodule with adjacent post radiation change measuring 2 x 2.7 cm; stable 4 mm subpleural nodule in the lateral right upper lobe; fluid/mucus in the right lower lobe bronchus tree; 6 mm anterior left upper lobe nodule which is new; 2.3 cm simple cyst in the midpole of the left kidney; 3.1 x 2.3 cm rim enhancing complex cystic and solid mass in the mid to lower pole the right kidney; infrarenal abdominal aortic aneurysm measuring  3.8 x 3.5 cm; and stable a left para-aortic lymph node measuring 11 mm. 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   The patient underwent PET-CT on 11/29/2022 showing an irregular hypermetabolic right upper lobe tumor which is stable measuring 1.6 x 1.2 cm; hypermetabolic subpleural right upper lobe consolidation diminished in extent; unchanged linear zone of hypermetabolic atelectasis in the superior segment of the left lower lobe; new poorly metabolic ill-defined infiltrate in the posterior right lower lobe; 4 mm left upper lobe nodule which is stable; hypermetabolic precarinal mediastinal lymph node stable measuring 1 cm; and hypermetabolic aortopulmonary lymph node stable measuring 1.1 x 1.1 cm.   The patient was admitted to the hospital from 1/16/23 to 1/23/23 for pneumonia.  She underwent bronchoscopy with Dr Kinney on 1/18/23 showing significant mucus plug impaction int the pharynx, left mainstem bronchus and right and left lower lobes.  The patient was discharged on 3L O2.   The patient underwent PET-CT on 03/08/2023 showing thickening of the pleura; new ground-glass opacities within the left upper lobe; patchy airspace opacities in the right lower lobe; right lower lobe pulmonary artery hypermetabolic activity possibly secondary to bronchial wall thickening or thrombus in the pulmonary artery; diffuse hypermetabolic activity identified throughout the axial skeleton.  The patient was then admitted to the hospital on 03/18/23-4/3/23 after presenting with shortness of breath and being found to have a saddle pulmonary embolus on CTA 03/18/2023.  The patient underwent treatment with a heparin drip and thrombectomy on 03/18/2023 with eventual transition to Eliquis.   The patient underwent CT chest on 04/14/2023 showing areas of air trapping in subpleural bleb formation in both jarek thoraces with areas of interstitial scarring and  bibasilar bronchiectasis; stable solid nodule in left upper lobe measuring 6 mm; masslike area measuring 3 x 1.3 x 1.5 cm in the right upper lobe stable.   The patient underwent CT chest, abdomen, and pelvis on 07/10/2023 showing more consolidative appearance of previously described right apical pulmonary nodules without discretely measurable nodule on today's exam, 8 mm left apical nodule, stable consolidation and superior segment left lower lobe; 3 mm right lower lobe pulmonary nodule; heterogeneously enhancing mass in the inferior pole of the right kidney measuring 3.9 x 3.7 x 3.9 cm; unchanged left periaortic lymph nodes.   The patient underwent a PET-CT on 10/09/2023 showing a new oval-shaped hypermetabolic nodular mass measuring 1.6 x 1 cm in the right upper lobe consistent with tumor recurrence; disappearance of ground-glass opacities anterior left upper lobe; disappearance of subpleural posterior right lower lobe consolidation; new hypermetabolic bilateral inguinal lymph nodes; new rounded nodular hypermetabolic nodule within the posterior right lumbar subcutaneous fat measuring 1.5 x 1.5 cm and a 3.8cm abdominal aortic aneurysm.    The patient was discussed at Thoracic tumor board on 10/25/23 with recommendation to proceed with biopsy of the lesion in the back via IR.  Biopsy done on 11/03/23 showed fibroadipose tissue.   The patient underwent CT of the chest, abdomen, and pelvis on 12/12/2023 showing pleural parenchymal thickening in the right lung apex; 6 mm left upper lobe nodule stable; solid mass inferior right kidney measuring 4.3 x 4.3 cm; enlarged bilateral periaortic lymphadenopathy measuring 17 x 14 mm; enlarged bilateral inguinal hemipelvic lymphadenopathy with left hemipelvic lymph node measuring 4.4 x 1.8 cm and left inguinal lymph node measuring 3.5 x 1.7 cm.   The patient underwent PET-CT on 02/15/2024 showing a stable nodular focus of hypermetabolic activity in the right apical soft tissue  area of consolidation; new area of ground-glass opacity more anterior in the right lung apex measuring 2.6 x 2.2 cm; multiple metastatic periaortic and bilateral iliac lymph nodes with progression from prior with a new left para-aortic lymph node measuring 19 x 17 mm SUV max 6.5, left hemipelvic node measuring 4.1 x 1.5 cm with SUV max of 9.4 enlarged inguinal lymph node measuring 3 x 2.2 with SUV max of 9.2.      The patient underwent biopsy of left inguinal lymph node with path showing follicular lymphoma grade 3A.  Patient was discussed at  Tumor Board on 3/14/24 with recommendation for biopsy of the right renal lesion followed by potential ablation.   The patient was admitted to the hospital from 03/23/2024 until 03/28/2024 for acute on chronic anemia.  Hemoglobin on admission was 7.3 grams/deciliter.  The patient received 2 units of PRBC's on 03/24/2024.  Colonoscopy on 03/25/2024 showed a circumferential mass posterior to the anal canal found on perianal exam which looked ulcerated and arising from the anus; one 2-3 mm polyp in the mid rectum; and moderate colonic spasm consistent with irritable bowel syndrome.  Patient underwent rectal tissue biopsy on 03/27/2024 with path showing mucosal prolapse with ulceration and granulation tissue.  MRI rectum on 03/28/2024 showed the suspected perianal mass at the invasion to adjacent structures and enlarged pelvic lymph nodes.  The patient underwent biopsy of the right renal lesion showing clear cell carcinoma on pathology.   The patient saw Dr. Hansen 04/18/2024 whom felt the patient did not meet GELF criteria currently with plan on repeating PET-CT on 8 weeks and consideration of bendamustine and rituximab if lymph node size was increasing.  Patient underwent embolization to the right renal lesion on 04/29/2024.    The patient underwent cryoablation to the right renal mass on 5/22/24.  PT underwent PET-CT on 06/18/2024 showing lesion in right upper lobe with soft  tissue component measuring 1.5 x 1.6 cm increased in compared to prior; peripheral hypermetabolic activity in the right renal lesion with hyperdense material; hypermetabolic activity in the rectum; diffuse stable hypermetabolic activity in the retroperitoneum extending into the bilateral iliac regions greater on the left in the right.    The patient was admitted to the hospital from 06/20/2024 until 06/27/2024.  During admission the patient was treated for urinary tract infection was started on broad-spectrum antibiotics.  Cystoscopy was performed on 06/21/2024 with ureteral stent placement in the right ureter.   Labs from 07/29/2024 showed an elevated STFR at 9.4.  Pt received PRBC transfusion 7/31/24.  Pt received ferric gluconate 125mg on 8/09/24.   The patient received IV iron on 8/16/24 and 9/13/24.  Ferritin on 9/13/24 which was drawn before IV iron given was 102ng/mL.    The patient underwent CT chest on 10/03/2024 showing spiculated mass in the right lung apex enlarging now measuring 52 x 27 x 30 mm; new 5 mm nodule in the superior segment of the left lower lobe; stable 6 mm noncalcified nodule laterally in the left upper lobe; and a solid 3 cm mass in the anterior cortex of the right kidney.    Subjective:    Interval History: Ms. Velez is a 84 y.o. female with HLD, HTN, osteopenia, follicular lymphoma who presents for follow up of NSCLC.  Since since the last clinic visit the patient underwent biopsy right upper lung nodule on 10/17/2024 with path showing poorly differentiated non-small-cell carcinoma compatible with poorly-differentiated squamous cell carcinoma.  The patient currently endorses fatigue and shortness of Breath.  The patient denies CP, cough, abdominal pain, nausea, vomiting, constipation, diarrhea.  The patient denies fever, chills, night sweats, weight loss, new lumps or bumps, easy bruising or bleeding.    Past Medical History:   Past Medical History:   Diagnosis Date    Anticoagulant  long-term use     Breast cancer 1985    right mastectomy    Digestive disorder     Encounter for blood transfusion     History of pneumonia     Hypercholesterolemia     Hypertension     Hyperuricemia 06/20/2024    Lab work in the ED revealed a uric acid level of 6.8.  Pt is asymptomatic for gouty arthritis.  Monitor.      Indigestion     Lumbar spondylosis     Lung cancer     2022 - currently in treatment as of 7/27/22    Lymphoma     Osteopenia     Pulmonary nodule     Renal cancer     Renal disorder     renal cancer    Retinal detachment     Smoker 06/11/2022    Thyroid disease        Past Surgical HIstory:   Past Surgical History:   Procedure Laterality Date    ANGIOGRAPHY  4/29/2024    Procedure: Right Renal Angiogram;  Surgeon: Steve Weaver MD;  Location: [facility] CATH;  Service: Interventional Radiology;;    APPENDECTOMY      BONE MARROW BIOPSY Bilateral 06/24/2020    Procedure: Biopsy-bone marrow;  Surgeon: Rod Montero MD;  Location: [facility] OR;  Service: Oncology;  Laterality: Bilateral;    BREAST RECONSTRUCTION  1990    right    BRONCHOSCOPY N/A 01/18/2023    Procedure: BRONCHOSCOPY;  Surgeon: Gregorio Kinney MD;  Location: [facility] ENDO;  Service: Pulmonary;  Laterality: N/A;    BRONCHOSCOPY Bilateral 02/10/2023    Procedure: Bronchoscopy;  Surgeon: Gregorio Kinney MD;  Location: [facility] ENDO;  Service: Pulmonary;  Laterality: Bilateral;  for airway clearance. Purple top tube for cell count diff    BRONCHOSCOPY N/A 03/13/2023    Procedure: Bronchoscopy;  Surgeon: Gregorio Kinney MD;  Location: [facility] ENDO;  Service: Pulmonary;  Laterality: N/A;  therapeutic scope. Please have purple top tube and iced saline    CATARACT EXTRACTION W/  INTRAOCULAR LENS IMPLANT Bilateral     CHOLECYSTECTOMY      COLONOSCOPY N/A 3/25/2024    Procedure: COLONOSCOPY;  Surgeon: Brigido Calvillo Jr., MD;  Location: [facility];  Service: Endoscopy;  Laterality: N/A;    CYSTOURETEROSCOPY WITH RETROGRADE PYELOGRAPHY AND INSERTION OF STENT  INTO URETER Right 6/21/2024    Procedure: CYSTOURETEROSCOPY, WITH RETROGRADE PYELOGRAM AND URETERAL STENT INSERTION;  Surgeon: Noel Priest MD;  Location: [facility] OR;  Service: Urology;  Laterality: Right;    EMBOLIZATION  4/29/2024    Procedure: Rt. Renal Embolization;  Surgeon: Steve Weaver MD;  Location: [facility] CATH;  Service: Interventional Radiology;;    ENDOBRONCHIAL ULTRASOUND Bilateral 07/09/2021    Procedure: ENDOBRONCHIAL ULTRASOUND (EBUS);  Surgeon: Gregorio Kinney MD;  Location: [facility];  Service: Pulmonary;  Laterality: Bilateral;  NEED LMA to  eval right upper paratracheal LN.     ENDOBRONCHIAL ULTRASOUND Bilateral 06/16/2022    Procedure: ENDOBRONCHIAL ULTRASOUND (EBUS);  Surgeon: Gregorio Kinney MD;  Location: [facility];  Service: Pulmonary;  Laterality: Bilateral;    INJECTION OF FACET JOINT Left 06/30/2022    Procedure: FACET JOINT Cyst Aspiration L3/4;  Surgeon: Ronnell Baeza MD;  Location: [facility] OR;  Service: Pain Management;  Laterality: Left;    INSERTION OF TUNNELED CENTRAL VENOUS CATHETER (CVC) WITH SUBCUTANEOUS PORT Left 11/04/2020    Procedure: [id number];  Surgeon: Kody Pretty MD;  Location: [facility] OR;  Service: General;  Laterality: Left;    JOINT REPLACEMENT  2008    right knee     LUMBAR LAMINECTOMY      LYMPH NODE BIOPSY      MASTECTOMY Right 1985    NEEDLE LOCALIZATION N/A 05/25/2020    Procedure: NEEDLE LOCALIZATION - lymph node bx;  Surgeon: Steve Weaver MD;  Location: [facility] CATH;  Service: Interventional Radiology;  Laterality: N/A;    NEEDLE LOCALIZATION N/A 5/22/2024    Procedure: cryoablation renal in CT with anesthesia argon IAM rep;  Surgeon: Steve Weaver MD;  Location: [facility] CATH;  Service: Interventional Radiology;  Laterality: N/A;  cryoablation renal in CT with anesthesia argon Iam rep    RECTAL BIOPSY N/A 3/27/2024    Procedure: BIOPSY, RECTUM;  Surgeon: Isabel Choi MD;  Location: [facility] OR;  Service: General;  Laterality: N/A;    RETINAL  DETACHMENT SURGERY  1989    RIGHT HEART CATHETERIZATION Right 03/18/2023    Procedure: INSERTION, CATHETER, RIGHT HEART;  Surgeon: Rukhsana Lang MD;  Location: [facility] CATH;  Service: Cardiology;  Laterality: Right;    SELECTIVE UNILATERAL PULMONARY ANGIOGRAPHY  03/18/2023    Procedure: Pumonary angiography - unilateral;  Surgeon: Rukhsana Lang MD;  Location: [facility] CATH;  Service: Cardiology;;    THROMBECTOMY N/A 03/18/2023    Procedure: THROMBECTOMY;  Surgeon: Rukhsana Lang MD;  Location: [facility] CATH;  Service: Cardiology;  Laterality: N/A;    TRANSFORAMINAL EPIDURAL INJECTION OF STEROID Left 05/14/2020    Procedure: Injection,steroid,epidural,transforaminal approach, l3/4;  Surgeon: Ronnell Baeza MD;  Location: [facility] OR;  Service: Pain Management;  Laterality: Left;    TRANSFORAMINAL EPIDURAL INJECTION OF STEROID Left 03/23/2022    Procedure: Injection,steroid,epidural,transforaminal approach L3/4;  Surgeon: Ronnell Baeza MD;  Location: [facility] OR;  Service: Pain Management;  Laterality: Left;    TRANSFORAMINAL EPIDURAL INJECTION OF STEROID Left 08/01/2022    Procedure: Injection,steroid,epidural,transforaminal approach L3/4;  Surgeon: Ronnell Baeza MD;  Location: [facility] OR;  Service: Pain Management;  Laterality: Left;    TRANSFORAMINAL EPIDURAL INJECTION OF STEROID Right 10/7/2024    Procedure: Injection,steroid,epidural,transforaminal approach    L3/4;  Surgeon: Ronnell Baeza MD;  Location: [facility] OR;  Service: Pain Management;  Laterality: Right;    TUBAL LIGATION      UMBILICAL HERNIA REPAIR         Family History:   Family History   Problem Relation Name Age of Onset    Cancer Sister          uterine    Cancer Sister          Uterine cancer     Diabetes Brother      Breast cancer Other niece 42    Glaucoma Neg Hx      Macular degeneration Neg Hx         Social History:  reports that she quit smoking about 2 years ago. Her smoking use included cigarettes. She started smoking about 55 years ago.  She has a 26.5 pack-year smoking history. She has never used smokeless tobacco. She reports that she does not drink alcohol and does not use drugs.    Allergies:  Review of patient's allergies indicates:   Allergen Reactions    Opioids - morphine analogues Nausea Only and Other (See Comments)     Hypotension       Medications:  Current Outpatient Medications   Medication Sig Dispense Refill    acetaminophen (TYLENOL) 325 MG tablet Take 2 tablets (650 mg total) by mouth every 6 (six) hours as needed for Pain.      albuterol (VENTOLIN HFA) 90 mcg/actuation inhaler Inhale 2 puffs into the lungs every 6 (six) hours as needed for Wheezing or Shortness of Breath (or before exercise). Rescue 18 g 2    apixaban (ELIQUIS) 2.5 mg Tab Take 1 tablet (2.5 mg total) by mouth 2 (two) times daily.      budesonide (PULMICORT) 0.5 mg/2 mL nebulizer solution Take 2 mLs (0.5 mg total) by nebulization 2 (two) times a day. 120 mL 11    cetirizine (ZYRTEC) 10 MG tablet Take 10 mg by mouth once daily.      docusate sodium (COLACE) 50 MG capsule Take 50 mg by mouth as needed for Constipation.      ergocalciferol (ERGOCALCIFEROL) 50,000 unit Cap Take 50,000 Units by mouth every Tuesday.      hydrocortisone (ANUSOL-HC) 2.5 % rectal cream Place rectally 2 (two) times daily as needed for Hemorrhoids. 28 g 0    levalbuterol (XOPENEX) 0.63 mg/3 mL nebulizer solution Take 3 mLs (0.63 mg total) by nebulization 2 (two) times a day. 180 each 3    levothyroxine (SYNTHROID) 100 MCG tablet Take 1 tablet (100 mcg total) by mouth once daily. 90 tablet 3    LIDOcaine (LIDODERM) 5 % Place 1 patch onto the skin once daily. Remove & Discard patch within 12 hours or as directed by MD 30 patch 0    mucus clearing device (ACAPELLA, FLUTTER) by Misc.(Non-Drug; Combo Route) route once daily. 100 each 0    multivitamin (THERAGRAN) per tablet Take 1 tablet by mouth once daily.      NIFEdipine (ADALAT CC) 60 MG TbSR Take 1 tablet (60 mg total) by mouth once daily.    "   omeprazole (PRILOSEC) 40 MG capsule Take 1 capsule (40 mg total) by mouth once daily. 90 capsule 4    SENNA 8.6 mg tablet Take 1 tablet by mouth once daily. 30 tablet 11    sodium chloride 3% 3 % nebulizer solution Take 4 mLs by nebulization 2 (two) times a day. 240 mL 3    temazepam (RESTORIL) 30 mg capsule Take 1 capsule (30 mg total) by mouth every evening. 30 capsule 5    OXYGEN-AIR DELIVERY SYSTEMS MISC 2-3 L by Misc.(Non-Drug; Combo Route) route as needed. (Patient not taking: Reported on 10/25/2024)       No current facility-administered medications for this visit.       Review of Systems   Constitutional:  Positive for fatigue. Negative for chills, fever and unexpected weight change.   Respiratory:  Positive for shortness of breath. Negative for cough.    Cardiovascular:  Negative for chest pain and palpitations.   Gastrointestinal:  Negative for abdominal pain, constipation, diarrhea, nausea and vomiting.   Skin:  Negative for rash.   Neurological:  Negative for headaches.   Hematological:  Negative for adenopathy. Does not bruise/bleed easily.       ECOG Performance Status: 2   Objective:      Vitals:   Vitals:    10/25/24 1335   BP: 118/66   BP Location: Left arm   Patient Position: Sitting   Pulse: 92   Resp: 20   Temp: 98.1 °F (36.7 °C)   TempSrc: Temporal   SpO2: 98%   Weight: 90.7 kg (199 lb 15.3 oz)   Height: 5' 3" (1.6 m)           Physical Exam  Constitutional:       General: She is not in acute distress.     Appearance: She is well-developed. She is not diaphoretic.   HENT:      Head: Normocephalic and atraumatic.   Cardiovascular:      Rate and Rhythm: Normal rate and regular rhythm.      Heart sounds: Normal heart sounds. No murmur heard.     No friction rub. No gallop.   Pulmonary:      Effort: Pulmonary effort is normal. No respiratory distress.      Breath sounds: Normal breath sounds. No wheezing or rales.   Chest:      Chest wall: No tenderness.   Abdominal:      General: Bowel sounds " are normal. There is no distension.      Palpations: Abdomen is soft. There is no mass.      Tenderness: There is no abdominal tenderness. There is no guarding or rebound.   Lymphadenopathy:      Cervical: No cervical adenopathy.      Upper Body:      Right upper body: No supraclavicular or axillary adenopathy.      Left upper body: No supraclavicular or axillary adenopathy.   Skin:     Findings: No erythema or rash.   Neurological:      Mental Status: She is alert and oriented to person, place, and time.   Psychiatric:         Behavior: Behavior normal.         Laboratory Data:  Lab Visit on 10/25/2024   Component Date Value Ref Range Status    WBC 10/25/2024 8.10  3.90 - 12.70 K/uL Final    RBC 10/25/2024 3.54 (L)  4.00 - 5.40 M/uL Final    Hemoglobin 10/25/2024 9.7 (L)  12.0 - 16.0 g/dL Final    Hematocrit 10/25/2024 32.2 (L)  37.0 - 48.5 % Final    MCV 10/25/2024 91  82 - 98 fL Final    MCH 10/25/2024 27.4  27.0 - 31.0 pg Final    MCHC 10/25/2024 30.1 (L)  32.0 - 36.0 g/dL Final    RDW 10/25/2024 15.9 (H)  11.5 - 14.5 % Final    Platelets 10/25/2024 161  150 - 450 K/uL Final    MPV 10/25/2024 10.0  9.2 - 12.9 fL Final    Immature Granulocytes 10/25/2024 0.5  0.0 - 0.5 % Final    Gran # (ANC) 10/25/2024 5.9  1.8 - 7.7 K/uL Final    Immature Grans (Abs) 10/25/2024 0.04  0.00 - 0.04 K/uL Final    Comment: Mild elevation in immature granulocytes is non specific and   can be seen in a variety of conditions including stress response,   acute inflammation, trauma and pregnancy. Correlation with other   laboratory and clinical findings is essential.      Lymph # 10/25/2024 1.3  1.0 - 4.8 K/uL Final    Mono # 10/25/2024 0.7  0.3 - 1.0 K/uL Final    Eos # 10/25/2024 0.2  0.0 - 0.5 K/uL Final    Baso # 10/25/2024 0.04  0.00 - 0.20 K/uL Final    nRBC 10/25/2024 0  0 /100 WBC Final    Gran % 10/25/2024 72.6  38.0 - 73.0 % Final    Lymph % 10/25/2024 16.0 (L)  18.0 - 48.0 % Final    Mono % 10/25/2024 8.3  4.0 - 15.0 % Final     Eosinophil % 10/25/2024 2.1  0.0 - 8.0 % Final    Basophil % 10/25/2024 0.5  0.0 - 1.9 % Final    Differential Method 10/25/2024 Automated   Final    Sodium 10/25/2024 141  136 - 145 mmol/L Final    Potassium 10/25/2024 4.4  3.5 - 5.1 mmol/L Final    Chloride 10/25/2024 108  95 - 110 mmol/L Final    CO2 10/25/2024 22 (L)  23 - 29 mmol/L Final    Glucose 10/25/2024 124 (H)  70 - 110 mg/dL Final    BUN 10/25/2024 52 (H)  8 - 23 mg/dL Final    Creatinine 10/25/2024 1.9 (H)  0.5 - 1.4 mg/dL Final    Calcium 10/25/2024 9.2  8.7 - 10.5 mg/dL Final    Total Protein 10/25/2024 6.7  6.0 - 8.4 g/dL Final    Albumin 10/25/2024 3.5  3.5 - 5.2 g/dL Final    Total Bilirubin 10/25/2024 0.2  0.1 - 1.0 mg/dL Final    Comment: For infants and newborns, interpretation of results should be based  on gestational age, weight and in agreement with clinical  observations.    Premature Infant recommended reference ranges:  Up to 24 hours.............<8.0 mg/dL  Up to 48 hours............<12.0 mg/dL  3-5 days..................<15.0 mg/dL  6-29 days.................<15.0 mg/dL      Alkaline Phosphatase 10/25/2024 104  40 - 150 U/L Final    AST 10/25/2024 20  10 - 40 U/L Final    ALT 10/25/2024 9 (L)  10 - 44 U/L Final    eGFR 10/25/2024 26.0 (A)  >60 mL/min/1.73 m^2 Final    Anion Gap 10/25/2024 11  8 - 16 mmol/L Final   Hospital Outpatient Visit on 10/22/2024   Component Date Value Ref Range Status    Hemoglobin A1C 10/22/2024 5.1  0.0 - 5.6 % Final    Comment: Reference Interval:  5.0 - 5.6 Normal   5.7 - 6.4 High Risk   > 6.5 Diabetic       Hgb A1c results are standardized based on the (NGSP) National   Glycohemoglobin Standardization Program.      Hemoglobin A1C levels are related to mean serum/plasma glucose   during the preceding 2-3 months.          Estimated Avg Glucose 10/22/2024 100  68 - 131 mg/dL Final    Sodium 10/22/2024 141  136 - 145 mmol/L Final    Potassium 10/22/2024 4.7  3.5 - 5.1 mmol/L Final    Anion Gap reference  range revised on 4/28/2023    Chloride 10/22/2024 107  95 - 110 mmol/L Final    CO2 10/22/2024 25  22 - 31 mmol/L Final    Glucose 10/22/2024 91  70 - 110 mg/dL Final    Comment: The ADA recommends the following guidelines for fasting glucose:    Normal:       less than 100 mg/dL    Prediabetes:  100 mg/dL to 125 mg/dL    Diabetes:     126 mg/dL or higher      BUN 10/22/2024 45 (H)  7 - 18 mg/dL Final    Creatinine 10/22/2024 1.84 (H)  0.50 - 1.40 mg/dL Final    Calcium 10/22/2024 9.1  8.4 - 10.2 mg/dL Final    Anion Gap 10/22/2024 9  5 - 12 mmol/L Final    Anion Gap reference range revised on 4/28/2023    eGFR 10/22/2024 27 (A)  >60 mL/min/1.73 m^2 Final         Imaging:    CT Chest 10/03/24  The spiculated mass in the right lung apex measures 52 x 27 x 30 mm (transverse, AP, craniocaudal) on the current study compared to 44 x 23 x 23 mm (transverse, AP, craniocaudal) on the prior study.     There is a new 5 mm nodule in the superior segment of the left lower lobe (series 15, image 371).     There is nonspecific bandlike septal thickening in the superior segment of the left lower lobe (series 15, image 280) which is unchanged.     There is an unchanged 6 mm noncalcified nodule laterally in the left upper lobe (series 15, image 186).     Severe COPD is again noted.     There is no pleural or pericardial fluid. There are no enlarged intrathoracic lymph nodes. The pulmonary trunk has a transverse diameter of 34 mm. The ascending aorta has a maximum diameter of 38 mm. Coronary calcific atherosclerosis is noted.     There is no acute finding in the included abdomen, included body wall or included base of the neck. There is a solid 3 cm mass on the anterior cortex of the right kidney.        Assessment:       1. Squamous Cell Carcinoma of right upper lobe of lung    2. Iron deficiency anemia, unspecified iron deficiency anemia type    3. Follicular lymphoma grade I, unspecified body region    4. Renal cell carcinoma,  unspecified laterality    5. Rectal lesion    6. Thrombophilia    7. Hyperlipidemia, unspecified hyperlipidemia type    8. Acquired hypothyroidism                       Plan:     NSCLC - Pt has NSCLC SCC with involvement of the right lung and mediastinum  -Pt with prior radiation to a RUL nodule  -PD-L1 was 86% on 6/18/21  -Pt was on Keytruda with 6 week cycles and completed 6 tx's and then developed pneumonitis  -PET/CT on 9/23/22 showed possible pseudoprogression  -PET/CT on 11/29/22 showed stable disease with RLL infiltrate concerning for resolving PNA  -PET-CT on 03/08/2023 showed resolution of prior disease   -CT Chest 4/14/23 and 7/10/23 with stable findings  -PET/CT 10/09/23 showed avidity in the RUL mass, inguinal LAD and subcutaneous nodule in the lumbar region  -Treatment with paclitaxel discussed; however, pt is hesitant to undergo chemotherapy  -Biopsy of lumbar subcutaneous nodule showed fibroadipose tissue  -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 based on biopsy 3/28/24  -Pt not a surgical candidate per Urology  -Patient underwent embolization 04/29/2024 and cryoablation on 05/22/2024  -CT Urogram 8/05/24 showed treated lesion  -Stable on CT chest  10/03/24  -Will monitor    Rectal Lesion - seen on colonoscopy 03/25/2024   Rectal tissue biopsy on 03/27/2024 with path showing mucosal prolapse with ulceration and granulation tissue  -MRI rectum on 03/28/2024 showed the suspected perianal mass at the invasion to adjacent structures and enlarged pelvic lymph nodes  -LAD likely form follicular lymphoma  -Continued avidity seen on PET-CT 06/18/2024    Saddle Pulmonary Embolus - seen on CTA 03/18/2023   -PT on prophylactic ELiquis 2.5mg BID   -Will continue    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 for 9/12 cycles with good response  -PT with recurrent inguinal LAD which could be lymphoma  -LN's increasing in size on CT 12/12/23  -PET-CT on 02/15/2024 showed continued increasing size of left inguinal lymph node with SUV max now 9.2  -Left inguinal LN biopsy 3/08/24 showed Follicular Lymphoma Grade 3a  -Pt saw Dr Hansen on 4/18/24 whom recommended repeating scans in 8 weeks with consideration of bendamustine and rituximab if patient progresses  -Repeat PET-CT 06/18/2024 showed stable hypermetabolic activity in the retroperitoneum, bilateral iliac regions and inguinal regions  -Pt seen by Dr Hansen on 7/16/24 and recommended PET/CT in 6 months    Hypothyroidism - pt on levothyroxine  -Management per PCP    Iron Deficiency Anemia - Pt received 4 doses of venofer in the past with last dose 11/24/23  -PT now s/p three doses of ferric gluconate 8/09/254, 8/16/24 and 9/13/24  -Ferritin 102ng/mL on 9/13/24  -Hemoglobin 9.7g/dL today  -Will recheck iron studies    Route Chart for Scheduling    Med Onc Chart Routing      Follow up with physician Other. PT needs blood work today with CBC, CMP, ferritin, iron/TIBC today.  Pt needs an appt with Dr rutherford in radiation oncology ASAP.  Pt needs to see me after sh has bee seen by Dr Rutherford.   Follow up with DAYAMI    Infusion scheduling note    Injection scheduling note    Labs    Imaging     Pharmacy appointment    Other referrals              Treatment Plan Information   OP PEMBROLIZUMAB 400MG Q6W Chad Mills MD   Associated diagnosis: Follicular lymphoma   noted on 10/7/2020  Associated diagnosis: Primary malignant neoplasm of left lower lobe of lung   noted on 10/7/2021   Line of treatment: First Line  Treatment Goal: Palliative     Upcoming Treatment Dates - OP PEMBROLIZUMAB 400MG Q6W    5/12/2023       Pre-Medications       acetaminophen tablet 1,000 mg       diphenhydrAMINE (BENADRYL) 25 mg in NS 50 mL IVPB       Chemotherapy       pembrolizumab (KEYTRUDA) 400 mg in sodium chloride 0.9% SolP 116 mL infusion  6/23/2023       Pre-Medications       acetaminophen tablet 1,000 mg       diphenhydrAMINE (BENADRYL) 25 mg in NS 50 mL IVPB       Chemotherapy       pembrolizumab (KEYTRUDA) 400 mg in sodium chloride 0.9% SolP 116 mL infusion  8/4/2023       Pre-Medications       acetaminophen tablet 1,000 mg       diphenhydrAMINE (BENADRYL) 25 mg in NS 50 mL IVPB       Chemotherapy       pembrolizumab (KEYTRUDA) 400 mg in sodium chloride 0.9% SolP 116 mL infusion  9/15/2023       Pre-Medications       acetaminophen tablet 1,000 mg       diphenhydrAMINE (BENADRYL) 25 mg in NS 50 mL IVPB       Chemotherapy       pembrolizumab (KEYTRUDA) 400 mg in sodium chloride 0.9% SolP 116 mL infusion    Supportive Plan Information  OP FERRIC GLUCONATE Naomi Green NP   Associated Diagnosis: Iron deficiency anemia   noted on 10/2/2023   Line of treatment: Supportive Care   Treatment goal: Supportive     Upcoming Treatment Dates - OP FERRIC GLUCONATE    10/5/2024       Medications       ferric gluconate (FERRLECIT) 125 mg in 0.9% NaCl 100 mL IVPB  10/12/2024       Medications       ferric gluconate (FERRLECIT) 125 mg in 0.9% NaCl 100 mL IVPB    Therapy Plan Information  PORT FLUSH for Spinal stenosis of lumbar region without neurogenic claudication, noted on 2/15/2018  PORT FLUSH for NHL (non-Hodgkin's  lymphoma), noted on 6/24/2020  Flushes  heparin, porcine (PF) 100 unit/mL injection flush 500 Units  500 Units, Intravenous, Every visit  sodium chloride 0.9% flush 10 mL  10 mL, Intravenous, Every visit    INF PEGFILGRASTIM (NEULASTA) for Leukopenia due to antineoplastic chemotherapy, noted on 5/11/2022  pegfilgrastim (NEULASTA) injection 6 mg  6 mg, Subcutaneous, Every visit      No therapy plan of the specified type found.        Chad Mills MD  [facility]  Hematology and Oncology  [facility]   [address] [place] [address]   O: [phone]  F: [phone]

## 2024-10-28 ENCOUNTER — DOCUMENTATION ONLY (OUTPATIENT)
Dept: HEMATOLOGY/ONCOLOGY | Facility: CLINIC | Age: 84
End: 2024-10-28
Payer: MEDICARE

## 2024-10-29 ENCOUNTER — TUMOR BOARD CONFERENCE (OUTPATIENT)
Dept: HEMATOLOGY/ONCOLOGY | Facility: CLINIC | Age: 84
End: 2024-10-29
Payer: MEDICARE

## 2024-10-31 ENCOUNTER — TELEPHONE (OUTPATIENT)
Dept: HEMATOLOGY/ONCOLOGY | Facility: CLINIC | Age: 84
End: 2024-10-31
Payer: MEDICARE

## 2024-11-01 ENCOUNTER — PATIENT MESSAGE (OUTPATIENT)
Dept: UROLOGY | Facility: CLINIC | Age: 84
End: 2024-11-01
Payer: MEDICARE

## 2024-11-12 NOTE — PROGRESS NOTES
PATIENT: Shandra Velez  MRN: [id number]  DATE: 11/13/2024      Diagnosis:   1. Malignant neoplasm of unspecified part of unspecified bronchus or lung    2. 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 Follicular lymphoma grade I, unspecified body region    4. Renal cell carcinoma, unspecified laterality    5. Thrombophilia    6. Hyperlipidemia, unspecified hyperlipidemia type    7. Acquired hypothyroidism    8. Iron deficiency anemia, unspecified iron deficiency anemia type        Chief Complaint: Follow-up (NSCLC)    Oncologic History:     Pt initially underwent right inguinal LN biopsy 5/25/22 showing grade 1 follicular lymphoma.  Bone marrow biopsy done 06/24/2020 showed involvement with follicular lymphoma.  PET/CT 7/07/20 showed lymphadenopathy above and below the diaphragm as well as the spleen.  Also seen wasa RUL lesion.  The patient was started on CVP-R followed by maintenance rituxan every 8 weeks.  Biopsy of the RUL nodule on 6/18/21 showed SCC with PD-L1 at 86%.  EBUS 7/09/21 showed no malignant cells at station 7, 4R or 11RS.  The patient was treated with SBRT under the care of Dr Barnes with 50Gy in 4 fractions completed 10/28/21.  The patient then developed new pulmonary nodules on CT scan 6/02/22.  EBUS 6/16/22 showed positive SCC in 4R LN.  Pt was discussed at tumor board on 6/21/22 with recommendation for Keytruda.  The patient underwent PET/CT on 9/23/22 after 2 doses of treatment showing a 1.5 cm precarinal lymph node; stable right apical mass measuring 1.8 x 1.5 cm; stable 8 mm right apical lung nodule with adjacent post radiation change measuring 2 x 2.7 cm; stable 4 mm subpleural nodule in the lateral right upper lobe; fluid/mucus in the right lower lobe bronchus tree; 6 mm anterior left upper lobe nodule which is new; 2.3 cm simple cyst in the midpole of the left kidney; 3.1 x 2.3 cm rim enhancing complex cystic and solid mass in the mid to lower pole the right kidney; infrarenal  abdominal aortic aneurysm measuring 3.8 x 3.5 cm; and stable a left para-aortic lymph node measuring 11 mm. 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   The patient underwent PET-CT on 11/29/2022 showing an irregular hypermetabolic right upper lobe tumor which is stable measuring 1.6 x 1.2 cm; hypermetabolic subpleural right upper lobe consolidation diminished in extent; unchanged linear zone of hypermetabolic atelectasis in the superior segment of the left lower lobe; new poorly metabolic ill-defined infiltrate in the posterior right lower lobe; 4 mm left upper lobe nodule which is stable; hypermetabolic precarinal mediastinal lymph node stable measuring 1 cm; and hypermetabolic aortopulmonary lymph node stable measuring 1.1 x 1.1 cm.   The patient was admitted to the hospital from 1/16/23 to 1/23/23 for pneumonia.  She underwent bronchoscopy with Dr Kinney on 1/18/23 showing significant mucus plug impaction int the pharynx, left mainstem bronchus and right and left lower lobes.  The patient was discharged on 3L O2.   The patient underwent PET-CT on 03/08/2023 showing thickening of the pleura; new ground-glass opacities within the left upper lobe; patchy airspace opacities in the right lower lobe; right lower lobe pulmonary artery hypermetabolic activity possibly secondary to bronchial wall thickening or thrombus in the pulmonary artery; diffuse hypermetabolic activity identified throughout the axial skeleton.  The patient was then admitted to the hospital on 03/18/23-4/3/23 after presenting with shortness of breath and being found to have a saddle pulmonary embolus on CTA 03/18/2023.  The patient underwent treatment with a heparin drip and thrombectomy on 03/18/2023 with eventual transition to Eliquis.   The patient underwent CT chest on 04/14/2023 showing areas of air trapping in subpleural bleb formation in both jarek thoraces with  areas of interstitial scarring and bibasilar bronchiectasis; stable solid nodule in left upper lobe measuring 6 mm; masslike area measuring 3 x 1.3 x 1.5 cm in the right upper lobe stable.   The patient underwent CT chest, abdomen, and pelvis on 07/10/2023 showing more consolidative appearance of previously described right apical pulmonary nodules without discretely measurable nodule on today's exam, 8 mm left apical nodule, stable consolidation and superior segment left lower lobe; 3 mm right lower lobe pulmonary nodule; heterogeneously enhancing mass in the inferior pole of the right kidney measuring 3.9 x 3.7 x 3.9 cm; unchanged left periaortic lymph nodes.   The patient underwent a PET-CT on 10/09/2023 showing a new oval-shaped hypermetabolic nodular mass measuring 1.6 x 1 cm in the right upper lobe consistent with tumor recurrence; disappearance of ground-glass opacities anterior left upper lobe; disappearance of subpleural posterior right lower lobe consolidation; new hypermetabolic bilateral inguinal lymph nodes; new rounded nodular hypermetabolic nodule within the posterior right lumbar subcutaneous fat measuring 1.5 x 1.5 cm and a 3.8cm abdominal aortic aneurysm.    The patient was discussed at Thoracic tumor board on 10/25/23 with recommendation to proceed with biopsy of the lesion in the back via IR.  Biopsy done on 11/03/23 showed fibroadipose tissue.   The patient underwent CT of the chest, abdomen, and pelvis on 12/12/2023 showing pleural parenchymal thickening in the right lung apex; 6 mm left upper lobe nodule stable; solid mass inferior right kidney measuring 4.3 x 4.3 cm; enlarged bilateral periaortic lymphadenopathy measuring 17 x 14 mm; enlarged bilateral inguinal hemipelvic lymphadenopathy with left hemipelvic lymph node measuring 4.4 x 1.8 cm and left inguinal lymph node measuring 3.5 x 1.7 cm.   The patient underwent PET-CT on 02/15/2024 showing a stable nodular focus of hypermetabolic  activity in the right apical soft tissue area of consolidation; new area of ground-glass opacity more anterior in the right lung apex measuring 2.6 x 2.2 cm; multiple metastatic periaortic and bilateral iliac lymph nodes with progression from prior with a new left para-aortic lymph node measuring 19 x 17 mm SUV max 6.5, left hemipelvic node measuring 4.1 x 1.5 cm with SUV max of 9.4 enlarged inguinal lymph node measuring 3 x 2.2 with SUV max of 9.2.      The patient underwent biopsy of left inguinal lymph node with path showing follicular lymphoma grade 3A.  Patient was discussed at  Tumor Board on 3/14/24 with recommendation for biopsy of the right renal lesion followed by potential ablation.   The patient was admitted to the hospital from 03/23/2024 until 03/28/2024 for acute on chronic anemia.  Hemoglobin on admission was 7.3 grams/deciliter.  The patient received 2 units of PRBC's on 03/24/2024.  Colonoscopy on 03/25/2024 showed a circumferential mass posterior to the anal canal found on perianal exam which looked ulcerated and arising from the anus; one 2-3 mm polyp in the mid rectum; and moderate colonic spasm consistent with irritable bowel syndrome.  Patient underwent rectal tissue biopsy on 03/27/2024 with path showing mucosal prolapse with ulceration and granulation tissue.  MRI rectum on 03/28/2024 showed the suspected perianal mass at the invasion to adjacent structures and enlarged pelvic lymph nodes.  The patient underwent biopsy of the right renal lesion showing clear cell carcinoma on pathology.   The patient saw Dr. Hansen 04/18/2024 whom felt the patient did not meet GELF criteria currently with plan on repeating PET-CT on 8 weeks and consideration of bendamustine and rituximab if lymph node size was increasing.  Patient underwent embolization to the right renal lesion on 04/29/2024.    The patient underwent cryoablation to the right renal mass on 5/22/24.  PT underwent PET-CT on 06/18/2024  showing lesion in right upper lobe with soft tissue component measuring 1.5 x 1.6 cm increased in compared to prior; peripheral hypermetabolic activity in the right renal lesion with hyperdense material; hypermetabolic activity in the rectum; diffuse stable hypermetabolic activity in the retroperitoneum extending into the bilateral iliac regions greater on the left in the right.    The patient was admitted to the hospital from 06/20/2024 until 06/27/2024.  During admission the patient was treated for urinary tract infection was started on broad-spectrum antibiotics.  Cystoscopy was performed on 06/21/2024 with ureteral stent placement in the right ureter.   Labs from 07/29/2024 showed an elevated STFR at 9.4.  Pt received PRBC transfusion 7/31/24.  Pt received ferric gluconate 125mg on 8/09/24.   The patient received IV iron on 8/16/24 and 9/13/24.  Ferritin on 9/13/24 which was drawn before IV iron given was 102ng/mL.    The patient underwent CT chest on 10/03/2024 showing spiculated mass in the right lung apex enlarging now measuring 52 x 27 x 30 mm; new 5 mm nodule in the superior segment of the left lower lobe; stable 6 mm noncalcified nodule laterally in the left upper lobe; and a solid 3 cm mass in the anterior cortex of the right kidney.   The patient underwent biopsy right upper lung nodule on 10/17/2024 with path showing poorly differentiated non-small-cell carcinoma compatible with poorly-differentiated squamous cell carcinoma    Subjective:    Interval History: Ms. Velez is a 84 y.o. female with HLD, HTN, osteopenia, follicular lymphoma who presents for follow up of NSCLC.  Since since the last clinic visit the patient's case was discussed at tumor board and she was felt not to be a candidate for radiation to the lung given development of multiple nodules on CT chest.  IT was recommended she consider systemic therapy versus best supportive care.  Pt underwent cystoscopy with placement of right ureteral  stent on 10/30/24.  Pt endorses soreness on her right side.  Pt states she continues to have SOB with exertion.  The patient denies CP, cough, abdominal pain, nausea, vomiting, constipation, diarrhea.  The patient denies fever, chills, night sweats, weight loss, new lumps or bumps, easy bruising or bleeding.    Past Medical History:   Past Medical History:   Diagnosis Date    Anticoagulant long-term use     Breast cancer 1985    right mastectomy    Digestive disorder     Encounter for blood transfusion     History of pneumonia     Hypercholesterolemia     Hypertension     Hyperuricemia 06/20/2024    Lab work in the ED revealed a uric acid level of 6.8.  Pt is asymptomatic for gouty arthritis.  Monitor.      Indigestion     Lumbar spondylosis     Lung cancer     2022 - currently in treatment as of 7/27/22    Lymphoma     Osteopenia     Pulmonary nodule     Renal cancer     Renal disorder     renal cancer    Retinal detachment     Smoker 06/11/2022    Thyroid disease        Past Surgical HIstory:   Past Surgical History:   Procedure Laterality Date    ANGIOGRAPHY  4/29/2024    Procedure: Right Renal Angiogram;  Surgeon: Steve Weaver MD;  Location: [facility] CATH;  Service: Interventional Radiology;;    APPENDECTOMY      BONE MARROW BIOPSY Bilateral 06/24/2020    Procedure: Biopsy-bone marrow;  Surgeon: Rod Montero MD;  Location: [facility] OR;  Service: Oncology;  Laterality: Bilateral;    BREAST RECONSTRUCTION  1990    right    BRONCHOSCOPY N/A 01/18/2023    Procedure: BRONCHOSCOPY;  Surgeon: Gregorio Kinney MD;  Location: [facility] ENDO;  Service: Pulmonary;  Laterality: N/A;    BRONCHOSCOPY Bilateral 02/10/2023    Procedure: Bronchoscopy;  Surgeon: Gregorio Kinney MD;  Location: [facility] ENDO;  Service: Pulmonary;  Laterality: Bilateral;  for airway clearance. Purple top tube for cell count diff    BRONCHOSCOPY N/A 03/13/2023    Procedure: Bronchoscopy;  Surgeon: Gregorio Kinney MD;  Location: [facility] ENDO;  Service: Pulmonary;   Laterality: N/A;  therapeutic scope. Please have purple top tube and iced saline    CATARACT EXTRACTION W/  INTRAOCULAR LENS IMPLANT Bilateral     CHOLECYSTECTOMY      COLONOSCOPY N/A 3/25/2024    Procedure: COLONOSCOPY;  Surgeon: Brigido Calvillo Jr., MD;  Location: [facility];  Service: Endoscopy;  Laterality: N/A;    CYSTOSCOPY WITH URETEROSCOPY, RETROGRADE PYELOGRAPHY, AND INSERTION OF STENT Right 10/30/2024    Procedure: CYSTOSCOPY, WITH RETROGRADE PYELOGRAM AND URETERAL STENT INSERTION, Exchange for Resonance stent;  Surgeon: Noel Priest MD;  Location: [facility] OR;  Service: Urology;  Laterality: Right;    CYSTOURETEROSCOPY WITH RETROGRADE PYELOGRAPHY AND INSERTION OF STENT INTO URETER Right 6/21/2024    Procedure: CYSTOURETEROSCOPY, WITH RETROGRADE PYELOGRAM AND URETERAL STENT INSERTION;  Surgeon: Noel Priest MD;  Location: [facility] OR;  Service: Urology;  Laterality: Right;    CYSTOURETEROSCOPY WITH RETROGRADE PYELOGRAPHY AND INSERTION OF STENT INTO URETER Right 10/30/2024    Procedure: CYSTOURETEROSCOPY,;  Surgeon: Noel Priest MD;  Location: [facility] OR;  Service: Urology;  Laterality: Right;    EMBOLIZATION  4/29/2024    Procedure: Rt. Renal Embolization;  Surgeon: Steve Weaver MD;  Location: [facility] CATH;  Service: Interventional Radiology;;    ENDOBRONCHIAL ULTRASOUND Bilateral 07/09/2021    Procedure: ENDOBRONCHIAL ULTRASOUND (EBUS);  Surgeon: Gregorio Kinney MD;  [facility];  Service: Pulmonary;  Laterality: Bilateral;  NEED LMA to  eval right upper paratracheal LN.     ENDOBRONCHIAL ULTRASOUND Bilateral 06/16/2022    Procedure: ENDOBRONCHIAL ULTRASOUND (EBUS);  Surgeon: Gregorio Kinney MD;  Location: [facility];  Service: Pulmonary;  Laterality: Bilateral;    INJECTION OF FACET JOINT Left 06/30/2022    Procedure: FACET JOINT Cyst Aspiration L3/4;  Surgeon: Ronnell Baeza MD;  Location: [facility];  Service: Pain Management;  Laterality: Left;    INSERTION OF TUNNELED CENTRAL VENOUS CATHETER (CVC)  WITH SUBCUTANEOUS PORT Left 11/04/2020    Procedure: [id number];  Surgeon: Kody Pretty MD;  Location: [facility] OR;  Service: General;  Laterality: Left;    JOINT REPLACEMENT  2008    right knee     LUMBAR LAMINECTOMY      LYMPH NODE BIOPSY      MASTECTOMY Right 1985    NEEDLE LOCALIZATION N/A 05/25/2020    Procedure: NEEDLE LOCALIZATION - lymph node bx;  Surgeon: Steve Weaver MD;  Location: [facility] CATH;  Service: Interventional Radiology;  Laterality: N/A;    NEEDLE LOCALIZATION N/A 5/22/2024    Procedure: cryoablation renal in CT with anesthesia argon IAM rep;  Surgeon: Steve Weaver MD;  Location: [facility] CATH;  Service: Interventional Radiology;  Laterality: N/A;  cryoablation renal in CT with anesthesia argon Iam rep    RECTAL BIOPSY N/A 3/27/2024    Procedure: BIOPSY, RECTUM;  Surgeon: Isabel Choi MD;  Location: [facility] OR;  Service: General;  Laterality: N/A;    RETINAL DETACHMENT SURGERY  1989    RIGHT HEART CATHETERIZATION Right 03/18/2023    Procedure: INSERTION, CATHETER, RIGHT HEART;  Surgeon: Rukhsana Lang MD;  Location: [facility] CATH;  Service: Cardiology;  Laterality: Right;    SELECTIVE UNILATERAL PULMONARY ANGIOGRAPHY  03/18/2023    Procedure: Pumonary angiography - unilateral;  Surgeon: Rukhsana Lang MD;  Location: [facility] CATH;  Service: Cardiology;;    THROMBECTOMY N/A 03/18/2023    Procedure: THROMBECTOMY;  Surgeon: Rukhsana Lang MD;  Location: [facility] CATH;  Service: Cardiology;  Laterality: N/A;    TRANSFORAMINAL EPIDURAL INJECTION OF STEROID Left 05/14/2020    Procedure: Injection,steroid,epidural,transforaminal approach, l3/4;  Surgeon: Ronnell Baeza MD;  Location: [facility] OR;  Service: Pain Management;  Laterality: Left;    TRANSFORAMINAL EPIDURAL INJECTION OF STEROID Left 03/23/2022    Procedure: Injection,steroid,epidural,transforaminal approach L3/4;  Surgeon: Ronnell Baeza MD;  Location: [facility] OR;  Service: Pain Management;  Laterality: Left;    TRANSFORAMINAL EPIDURAL  INJECTION OF STEROID Left 08/01/2022    Procedure: Injection,steroid,epidural,transforaminal approach L3/4;  Surgeon: Ronnell Baeza MD;  Location: [facility] OR;  Service: Pain Management;  Laterality: Left;    TRANSFORAMINAL EPIDURAL INJECTION OF STEROID Right 10/7/2024    Procedure: Injection,steroid,epidural,transforaminal approach    L3/4;  Surgeon: Ronnell Baeza MD;  Location: [facility] OR;  Service: Pain Management;  Laterality: Right;    TUBAL LIGATION      UMBILICAL HERNIA REPAIR         Family History:   Family History   Problem Relation Name Age of Onset    Cancer Sister          uterine    Cancer Sister          Uterine cancer     Diabetes Brother      Breast cancer Other niece 42    Glaucoma Neg Hx      Macular degeneration Neg Hx         Social History:  reports that she quit smoking about 2 years ago. Her smoking use included cigarettes. She started smoking about 55 years ago. She has a 26.5 pack-year smoking history. She has never used smokeless tobacco. She reports that she does not drink alcohol and does not use drugs.    Allergies:  Review of patient's allergies indicates:   Allergen Reactions    Opioids - morphine analogues Nausea Only and Other (See Comments)     Hypotension       Medications:  Current Outpatient Medications   Medication Sig Dispense Refill    acetaminophen (TYLENOL) 325 MG tablet Take 2 tablets (650 mg total) by mouth every 6 (six) hours as needed for Pain.      albuterol (VENTOLIN HFA) 90 mcg/actuation inhaler Inhale 2 puffs into the lungs every 6 (six) hours as needed for Wheezing or Shortness of Breath (or before exercise). Rescue 18 g 2    budesonide (PULMICORT) 0.5 mg/2 mL nebulizer solution Take 2 mLs (0.5 mg total) by nebulization 2 (two) times a day. 120 mL 11    cetirizine (ZYRTEC) 10 MG tablet Take 10 mg by mouth once daily.      docusate sodium (COLACE) 50 MG capsule Take 50 mg by mouth as needed for Constipation.      ELIQUIS 2.5 mg Tab TAKE 1 TABLET BY MOUTH TWICE   DAILY 180 tablet 3    ergocalciferol (ERGOCALCIFEROL) 50,000 unit Cap Take 50,000 Units by mouth every Tuesday.      hydrocortisone (ANUSOL-HC) 2.5 % rectal cream Place rectally 2 (two) times daily as needed for Hemorrhoids. 28 g 0    levalbuterol (XOPENEX) 0.63 mg/3 mL nebulizer solution Take 3 mLs (0.63 mg total) by nebulization 2 (two) times a day. 180 each 3    levothyroxine (SYNTHROID) 100 MCG tablet Take 1 tablet (100 mcg total) by mouth once daily. 90 tablet 3    LIDOcaine (LIDODERM) 5 % Place 1 patch onto the skin once daily. Remove & Discard patch within 12 hours or as directed by MD 30 patch 0    mucus clearing device (ACAPELLA, FLUTTER) by Misc.(Non-Drug; Combo Route) route once daily. 100 each 0    multivitamin (THERAGRAN) per tablet Take 1 tablet by mouth once daily.      NIFEdipine (ADALAT CC) 60 MG TbSR Take 1 tablet (60 mg total) by mouth once daily.      omeprazole (PRILOSEC) 40 MG capsule Take 1 capsule (40 mg total) by mouth once daily. 90 capsule 4    OXYGEN-AIR DELIVERY SYSTEMS MISC 2-3 L by Misc.(Non-Drug; Combo Route) route as needed.      SENNA 8.6 mg tablet Take 1 tablet by mouth once daily. 30 tablet 11    sodium chloride 3% 3 % nebulizer solution Take 4 mLs by nebulization 2 (two) times a day. 240 mL 3    temazepam (RESTORIL) 30 mg capsule Take 1 capsule (30 mg total) by mouth every evening. 30 capsule 5     No current facility-administered medications for this visit.       Review of Systems   Constitutional:  Negative for chills, fatigue, fever and unexpected weight change.   Respiratory:  Positive for shortness of breath. Negative for cough.    Cardiovascular:  Negative for chest pain and palpitations.   Gastrointestinal:  Negative for abdominal pain, constipation, diarrhea, nausea and vomiting.   Musculoskeletal:         Soreness on right side   Skin:  Negative for rash.   Neurological:  Negative for headaches.   Hematological:  Negative for adenopathy. Does not bruise/bleed easily.  "      ECOG Performance Status: 2   Objective:      Vitals:   Vitals:    11/13/24 0914   BP: 128/66   BP Location: Left arm   Patient Position: Sitting   Pulse: 75   Resp: 16   Temp: 97.6 °F (36.4 °C)   TempSrc: Temporal   SpO2: 95%   Weight: 92.4 kg (203 lb 11.3 oz)   Height: 5' 3" (1.6 m)             Physical Exam  Constitutional:       General: She is not in acute distress.     Appearance: She is well-developed. She is not diaphoretic.   HENT:      Head: Normocephalic and atraumatic.   Cardiovascular:      Rate and Rhythm: Normal rate and regular rhythm.      Heart sounds: Normal heart sounds. No murmur heard.     No friction rub. No gallop.   Pulmonary:      Effort: Pulmonary effort is normal. No respiratory distress.      Breath sounds: Normal breath sounds. No wheezing or rales.   Chest:      Chest wall: No tenderness.   Abdominal:      General: Bowel sounds are normal. There is no distension.      Palpations: Abdomen is soft. There is no mass.      Tenderness: There is no abdominal tenderness. There is no guarding or rebound.   Lymphadenopathy:      Cervical: No cervical adenopathy.      Upper Body:      Right upper body: No supraclavicular or axillary adenopathy.      Left upper body: No supraclavicular or axillary adenopathy.   Skin:     Findings: No erythema or rash.   Neurological:      Mental Status: She is alert and oriented to person, place, and time.   Psychiatric:         Behavior: Behavior normal.         Laboratory Data:  No visits with results within 1 Week(s) from this visit.   Latest known visit with results is:   Admission on 10/30/2024, Discharged on 10/30/2024   Component Date Value Ref Range Status    POCT Glucose 10/30/2024 104  70 - 110 mg/dL Final         Imaging:    CT Chest 10/03/24  The spiculated mass in the right lung apex measures 52 x 27 x 30 mm (transverse, AP, craniocaudal) on the current study compared to 44 x 23 x 23 mm (transverse, AP, craniocaudal) on the prior study.   "   There is a new 5 mm nodule in the superior segment of the left lower lobe (series 15, image 371).     There is nonspecific bandlike septal thickening in the superior segment of the left lower lobe (series 15, image 280) which is unchanged.     There is an unchanged 6 mm noncalcified nodule laterally in the left upper lobe (series 15, image 186).     Severe COPD is again noted.     There is no pleural or pericardial fluid. There are no enlarged intrathoracic lymph nodes. The pulmonary trunk has a transverse diameter of 34 mm. The ascending aorta has a maximum diameter of 38 mm. Coronary calcific atherosclerosis is noted.     There is no acute finding in the included abdomen, included body wall or included base of the neck. There is a solid 3 cm mass on the anterior cortex of the right kidney.        Assessment:       1. Malignant neoplasm of unspecified part of unspecified bronchus or lung    2. 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 Follicular lymphoma grade I, unspecified body region    4. Renal cell carcinoma, unspecified laterality    5. Thrombophilia    6. Hyperlipidemia, unspecified hyperlipidemia type    7. Acquired hypothyroidism    8. Iron deficiency anemia, unspecified iron deficiency anemia type                         Plan:     NSCLC - Pt has NSCLC SCC with involvement of the right lung and mediastinum  -Pt with prior radiation to a RUL nodule  -PD-L1 was 86% on 6/18/21  -Pt was on Keytruda with 6 week cycles and completed 6 tx's and then developed pneumonitis  -PET/CT on 9/23/22 showed possible pseudoprogression  -PET/CT on 11/29/22 showed stable disease with RLL infiltrate concerning for resolving PNA  -PET-CT on 03/08/2023 showed resolution of prior disease   -CT Chest 4/14/23 and 7/10/23 with stable findings  -PET/CT 10/09/23 showed avidity in the RUL mass, inguinal LAD and subcutaneous nodule in the lumbar region  -Treatment with paclitaxel discussed; however, pt is hesitant to  undergo chemotherapy  -Biopsy of lumbar subcutaneous nodule showed fibroadipose tissue  -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  -Ct chest 12/12/23 stable  -PET/CT 2/15/24 showed continued nodular focus of hypermetabolic activity in the right apex with a new adjacent ground-glass area  -No clear evidence of progression on scans  -Repeat PET/CT on 06/18/2024 showed enlarging soft tissue component of right upper lobe nodule  -Pt was felt to have superimposed infection  -Pt seen by Dr Kinney on 7/16/24 with recommendation for repeat CT chest in 4 months  -CT chest  10/03/24 showed an enlarging spiculated RUL mass  -Biopsy 10/17/24 positive for SCC  -Patient's case was discussed at tumor board and she was felt not to be a candidate for radiation to the lung given development of multiple nodules on CT chest  -It was recommended she consider systemic therapy versus best supportive care  -PT states she does not want to undergo chemo and would prefer better quality of life  -Pt's son stated they would seek a second opinion  -PT to follow up in 3 months with scans per patient request    Renal Cell Carcinoma - 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 based on biopsy 3/28/24  -Pt not a surgical candidate per Urology  -Patient underwent embolization 04/29/2024 and cryoablation on 05/22/2024  -CT Urogram 8/05/24 showed treated lesion  -Stable on CT chest 10/03/24  -Will monitor on repeat PET/CT    Rectal Lesion - seen on colonoscopy 03/25/2024   Rectal tissue biopsy on 03/27/2024 with path showing mucosal prolapse with ulceration and granulation tissue  -MRI rectum on 03/28/2024 showed the suspected perianal mass at the invasion to adjacent structures and enlarged pelvic lymph nodes  -LAD likely form follicular lymphoma  -Continued avidity seen on PET-CT 06/18/2024  -Will repeat PET/CT in 3 months    Saddle Pulmonary Embolus - seen on CTA 03/18/2023   -PT on prophylactic Eliquis 2.5mg BID   -Will  continue    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 for 9/12 cycles with good response  -PT with recurrent inguinal LAD which could be lymphoma  -LN's increasing in size on CT 12/12/23  -PET-CT on 02/15/2024 showed continued increasing size of left inguinal lymph node with SUV max now 9.2  -Left inguinal LN biopsy 3/08/24 showed Follicular Lymphoma Grade 3a  -Pt saw Dr Hansen on 4/18/24 whom recommended repeating scans in 8 weeks with consideration of bendamustine and rituximab if patient progresses  -Repeat PET-CT 06/18/2024 showed stable hypermetabolic activity in the retroperitoneum, bilateral iliac regions and inguinal regions  -Pt seen by Dr Hansen on 7/16/24 with recommendation for repeat PET/CT in 6 months    Hypothyroidism - pt on levothyroxine  -Management per PCP    Iron Deficiency Anemia - Pt received 4 doses of venofer in the past with last dose 11/24/23  -PT now s/p three doses of ferric gluconate 8/09/254, 8/16/24 and 9/13/24  -Ferritin 102ng/mL on 9/13/24  -Hemoglobin 9.7g/dL 10/25/24  -Iron studies 10/25/24 with normal ferritin  -Will monitro    Route Chart for Scheduling    Med Onc Chart Routing      Follow up with physician 3 months. PT needs a CBC, CMP, PET/CT, ferritin and iron/TIBC in 3 months with an appt with me the day after labs and scans.   Follow up with DAYAMI    Infusion scheduling note    Injection scheduling note    Labs    Imaging    Pharmacy appointment    Other referrals                Supportive Plan Information  OP FERRIC GLUCONATE Naomi Green NP   Associated Diagnosis: Iron deficiency anemia   noted on 10/2/2023   Line of treatment: Supportive Care   Treatment goal: Supportive     Upcoming Treatment Dates - OP FERRIC GLUCONATE    10/5/2024       Medications       ferric gluconate (FERRLECIT) 125 mg in 0.9% NaCl 100 mL IVPB  10/12/2024       Medications       ferric gluconate (FERRLECIT) 125 mg in 0.9% NaCl 100 mL IVPB    Therapy Plan  Information  PORT FLUSH for Spinal stenosis of lumbar region without neurogenic claudication, noted on 2/15/2018  PORT FLUSH for NHL (non-Hodgkin's lymphoma), noted on 6/24/2020  Flushes  heparin, porcine (PF) 100 unit/mL injection flush 500 Units  500 Units, Intravenous, Every visit  sodium chloride 0.9% flush 10 mL  10 mL, Intravenous, Every visit    INF PEGFILGRASTIM (NEULASTA) for Leukopenia due to antineoplastic chemotherapy, noted on 5/11/2022  pegfilgrastim (NEULASTA) injection 6 mg  6 mg, Subcutaneous, Every visit      No therapy plan of the specified type found.        Chad Mills MD  [facility]  Hematology and Oncology  [facility] [address] [place] [address]   O: [phone]  F: [phone]

## 2024-11-13 ENCOUNTER — PATIENT MESSAGE (OUTPATIENT)
Dept: HEMATOLOGY/ONCOLOGY | Facility: CLINIC | Age: 84
End: 2024-11-13

## 2024-11-13 ENCOUNTER — OFFICE VISIT (OUTPATIENT)
Dept: HEMATOLOGY/ONCOLOGY | Facility: CLINIC | Age: 84
End: 2024-11-13
Payer: MEDICARE

## 2024-11-13 VITALS
OXYGEN SATURATION: 95 % | WEIGHT: 203.69 LBS | DIASTOLIC BLOOD PRESSURE: 66 MMHG | RESPIRATION RATE: 16 BRPM | HEART RATE: 75 BPM | TEMPERATURE: 98 F | SYSTOLIC BLOOD PRESSURE: 128 MMHG | HEIGHT: 63 IN | BODY MASS INDEX: 36.09 KG/M2

## 2024-11-13 DIAGNOSIS — E78.5 HYPERLIPIDEMIA, UNSPECIFIED HYPERLIPIDEMIA TYPE: ICD-10-CM

## 2024-11-13 DIAGNOSIS — C34.11 MALIGNANT NEOPLASM OF RIGHT UPPER LOBE OF LUNG: ICD-10-CM

## 2024-11-13 DIAGNOSIS — D50.9 IRON DEFICIENCY ANEMIA, UNSPECIFIED IRON DEFICIENCY ANEMIA TYPE: ICD-10-CM

## 2024-11-13 DIAGNOSIS — C64.9 RENAL CELL CARCINOMA, UNSPECIFIED LATERALITY: ICD-10-CM

## 2024-11-13 DIAGNOSIS — D68.59 THROMBOPHILIA: ICD-10-CM

## 2024-11-13 DIAGNOSIS — E03.9 ACQUIRED HYPOTHYROIDISM: ICD-10-CM

## 2024-11-13 DIAGNOSIS — C34.90 MALIGNANT NEOPLASM OF UNSPECIFIED PART OF UNSPECIFIED BRONCHUS OR LUNG: Primary | ICD-10-CM

## 2024-11-13 DIAGNOSIS — C82.00 FOLLICULAR LYMPHOMA GRADE I, UNSPECIFIED BODY REGION: ICD-10-CM

## 2024-11-13 PROCEDURE — 99999 PR PBB SHADOW E&M-EST. PATIENT-LVL IV: CPT | Mod: PBBFAC,,, | Performed by: INTERNAL MEDICINE

## 2024-11-14 ENCOUNTER — DOCUMENTATION ONLY (OUTPATIENT)
Dept: HEMATOLOGY/ONCOLOGY | Facility: CLINIC | Age: 84
End: 2024-11-14
Payer: MEDICARE

## 2024-11-14 NOTE — NURSING
Chart reviewed. Ms. Velez is not a candidate for radiation therapy per tumor board recommendation. Dr. Mills has recommended systemic therapy. Ms. Velez desires to avoid chemotherapy in favor of better quality of life. She is planning on seeking a 2nd opinion.     Repeat PET scan scheduled 2/13/25. F/u visit with Dr. Mills scheduled 2/17/25.     No current [facility] navigation needs identified.

## 2024-11-27 ENCOUNTER — TELEPHONE (OUTPATIENT)
Dept: HEMATOLOGY/ONCOLOGY | Facility: CLINIC | Age: 84
End: 2024-11-27
Payer: MEDICARE

## 2024-11-27 NOTE — TELEPHONE ENCOUNTER
----- Message from Wander sent at 11/27/2024  9:48 AM CST -----  Type: Needs Medical Advice  Who Called:  Sofy with Dr. Hess   Symptoms (please be specific):    How long has patient had these symptoms:    Pharmacy name and phone #:    Best Call Back Number:   Additional Information: sofy from Dr. Hess called to inform that the patient will be seeing Dr. Hess going forward. Please call Dr. Hess office for any questions.

## 2024-12-03 ENCOUNTER — TELEPHONE (OUTPATIENT)
Facility: CLINIC | Age: 84
End: 2024-12-03
Payer: MEDICARE

## 2024-12-03 NOTE — TELEPHONE ENCOUNTER
----- Message from Brigitte Ruff MD sent at 12/3/2024  5:06 AM CST -----  Regarding: FW: Appt  Contact: Pt  [phone]    ----- Message -----  From: Valentine Wiley LPN  Sent: 12/2/2024   5:20 PM CST  To: Brigitte Ruff MD  Subject: FW: Appt                                           ----- Message -----  From: Addie Vides  Sent: 12/2/2024   2:26 PM CST  To: [facility] Dermatology Clinical Staff  Subject: Appt                                             Pt is calling to schedule a appt for a spot on right arm; no dates avail please call

## 2024-12-03 NOTE — TELEPHONE ENCOUNTER
Subjective     Patient ID: Shandra Velez is a 84 y.o. female.    Chief Complaint: No chief complaint on file.    HPI  Review of Systems       Objective     Physical Exam       Assessment and Plan     {There are no diagnoses linked to this encounter. (Refresh or delete this SmartLink)}    ***

## 2024-12-03 NOTE — TELEPHONE ENCOUNTER
Offered patient next available new patient appt, patient declines.  Encouraged scheduling at alternate campus.

## 2024-12-10 PROBLEM — N18.32 CHRONIC KIDNEY DISEASE, STAGE 3B: Status: RESOLVED | Noted: 2024-04-10 | Resolved: 2024-12-10

## 2024-12-10 PROBLEM — D68.59 THROMBOPHILIA: Status: RESOLVED | Noted: 2024-06-21 | Resolved: 2024-12-10

## 2024-12-10 PROBLEM — D64.9 SYMPTOMATIC ANEMIA: Status: ACTIVE | Noted: 2024-12-10

## 2024-12-10 PROBLEM — R26.89 IMBALANCE: Status: RESOLVED | Noted: 2023-05-15 | Resolved: 2024-12-10

## 2024-12-10 PROBLEM — D64.9 NORMOCYTIC ANEMIA: Status: RESOLVED | Noted: 2023-03-19 | Resolved: 2024-12-10

## 2024-12-10 PROBLEM — R55 SYNCOPE: Status: RESOLVED | Noted: 2023-01-18 | Resolved: 2024-12-10

## 2024-12-10 PROBLEM — Z73.6 LIMITATION OF ACTIVITIES DUE TO DISABILITY: Status: RESOLVED | Noted: 2024-06-24 | Resolved: 2024-12-10

## 2024-12-10 PROBLEM — C77.9 SECONDARY AND UNSPECIFIED MALIGNANT NEOPLASM OF LYMPH NODE, UNSPECIFIED: Status: RESOLVED | Noted: 2024-04-10 | Resolved: 2024-12-10

## 2024-12-10 PROBLEM — E79.0 HYPERURICEMIA: Status: RESOLVED | Noted: 2024-06-20 | Resolved: 2024-12-10

## 2024-12-10 PROBLEM — R42 VERTIGO: Status: RESOLVED | Noted: 2023-05-15 | Resolved: 2024-12-10

## 2024-12-10 PROBLEM — J18.9 PNEUMONIA OF RIGHT UPPER LOBE DUE TO INFECTIOUS ORGANISM: Status: RESOLVED | Noted: 2022-05-25 | Resolved: 2024-12-10

## 2024-12-10 PROBLEM — J47.9 BRONCHIECTASIS WITHOUT COMPLICATION: Status: RESOLVED | Noted: 2023-03-18 | Resolved: 2024-12-10

## 2024-12-10 PROBLEM — Z79.899 HIGH RISK MEDICATION USE: Status: RESOLVED | Noted: 2023-01-18 | Resolved: 2024-12-10

## 2024-12-10 PROBLEM — M71.38 SYNOVIAL CYST OF LUMBAR FACET JOINT: Status: RESOLVED | Noted: 2022-06-30 | Resolved: 2024-12-10

## 2024-12-10 PROBLEM — R94.31 PROLONGED Q-T INTERVAL ON ECG: Status: RESOLVED | Noted: 2023-01-18 | Resolved: 2024-12-10

## 2024-12-10 PROBLEM — Z75.8 DISCHARGE PLANNING ISSUES: Status: RESOLVED | Noted: 2024-06-23 | Resolved: 2024-12-10

## 2024-12-10 PROBLEM — T17.800A MULTIPLE TRACHEOBRONCHIAL MUCUS PLUGS: Status: RESOLVED | Noted: 2023-01-18 | Resolved: 2024-12-10

## 2024-12-10 PROBLEM — N13.30 HYDRONEPHROSIS OF RIGHT KIDNEY: Status: RESOLVED | Noted: 2024-06-21 | Resolved: 2024-12-10

## 2024-12-10 PROBLEM — R06.09 DYSPNEA ON EXERTION: Status: ACTIVE | Noted: 2024-12-10

## 2024-12-12 PROBLEM — K64.9 BLEEDING HEMORRHOID: Status: ACTIVE | Noted: 2024-03-24

## 2024-12-13 ENCOUNTER — TELEPHONE (OUTPATIENT)
Dept: SURGERY | Facility: CLINIC | Age: 84
End: 2024-12-13
Payer: MEDICARE

## 2024-12-13 NOTE — TELEPHONE ENCOUNTER
Returned patient's call and discussed post op appointment time and location. Pt voiced understanding.      ----- Message from Siri sent at 12/13/2024  1:08 PM CST -----  Type: Needs Medical Advice  Who Called:  ELINA  Best Call Back Number: [phone]   Additional Information: requesting a call back to schedule hospital follow up please call back asap

## 2024-12-18 PROBLEM — R79.89 TROPONIN LEVEL ELEVATED: Status: ACTIVE | Noted: 2024-12-18

## 2024-12-19 PROBLEM — R79.89 ELEVATED TROPONIN: Status: ACTIVE | Noted: 2024-12-19

## 2024-12-19 PROBLEM — N10 ACUTE PYELONEPHRITIS: Status: ACTIVE | Noted: 2024-12-19

## 2024-12-19 PROBLEM — R53.1 GENERALIZED WEAKNESS: Status: ACTIVE | Noted: 2020-10-07

## 2024-12-20 PROBLEM — R33.9 INCOMPLETE BLADDER EMPTYING: Status: ACTIVE | Noted: 2024-12-20

## 2024-12-20 PROBLEM — Z74.09 OTHER REDUCED MOBILITY: Status: ACTIVE | Noted: 2024-12-20

## 2024-12-26 PROBLEM — M19.031 ARTHRITIS OF RIGHT WRIST: Status: ACTIVE | Noted: 2024-12-26

## 2024-12-27 PROBLEM — K92.2 GASTROINTESTINAL HEMORRHAGE: Status: ACTIVE | Noted: 2024-12-27

## 2024-12-29 PROBLEM — K92.2 GASTROINTESTINAL HEMORRHAGE: Status: RESOLVED | Noted: 2024-12-27 | Resolved: 2024-12-29

## 2025-01-03 ENCOUNTER — DOCUMENTATION ONLY (OUTPATIENT)
Dept: HEMATOLOGY/ONCOLOGY | Facility: CLINIC | Age: 85
End: 2025-01-03
Payer: MEDICARE

## 2025-01-03 NOTE — NURSING
Chart reviewed.     Ms. Velze has been d/c from the hospital Per d/c notes, she will f/u with Dr. Foster / Dr. Gay at [facility]. Apt with Dr. Mills 2/17/25 remains as scheduled.

## 2025-01-06 DIAGNOSIS — G47.00 INSOMNIA, UNSPECIFIED TYPE: Primary | ICD-10-CM

## 2025-01-06 PROCEDURE — G0180 MD CERTIFICATION HHA PATIENT: HCPCS | Mod: ,,, | Performed by: FAMILY MEDICINE

## 2025-01-06 NOTE — TELEPHONE ENCOUNTER
Care Due:                  Date            Visit Type   Department     Provider  --------------------------------------------------------------------------------                                EP [facility]  Last Visit: 10-      CARE ([facility])   AVIS EPPERSON                              EP -                              PRIMARY      [facility]  Next Visit: 04-      CARE ([facility])   AVIS EPPERSON                                                            Last  Test          Frequency    Reason                     Performed    Due Date  --------------------------------------------------------------------------------    TSH.........  12 months..  levothyroxine............  08-   08-    Health [facility] Embedded Care Due Messages. Reference number: [id number].   1/06/2025 10:13:19 AM CST

## 2025-01-07 RX ORDER — TEMAZEPAM 30 MG/1
30 CAPSULE ORAL NIGHTLY
Qty: 30 CAPSULE | Refills: 5 | Status: SHIPPED | OUTPATIENT
Start: 2025-01-07 | End: 2025-07-06

## 2025-01-10 ENCOUNTER — OFFICE VISIT (OUTPATIENT)
Dept: CARDIOLOGY | Facility: CLINIC | Age: 85
End: 2025-01-10
Payer: MEDICARE

## 2025-01-10 VITALS
BODY MASS INDEX: 34.5 KG/M2 | HEART RATE: 88 BPM | HEIGHT: 63 IN | DIASTOLIC BLOOD PRESSURE: 80 MMHG | SYSTOLIC BLOOD PRESSURE: 132 MMHG | WEIGHT: 194.69 LBS

## 2025-01-10 DIAGNOSIS — I27.20 PULMONARY HYPERTENSION, UNSPECIFIED: ICD-10-CM

## 2025-01-10 DIAGNOSIS — R06.09 DYSPNEA ON EXERTION: ICD-10-CM

## 2025-01-10 DIAGNOSIS — Z95.828 S/P INSERTION OF IVC (INFERIOR VENA CAVAL) FILTER: Primary | ICD-10-CM

## 2025-01-10 DIAGNOSIS — E66.01 MORBID (SEVERE) OBESITY DUE TO EXCESS CALORIES: ICD-10-CM

## 2025-01-10 PROCEDURE — 99999 PR PBB SHADOW E&M-EST. PATIENT-LVL III: CPT | Mod: PBBFAC,,, | Performed by: INTERNAL MEDICINE

## 2025-01-10 NOTE — PROGRESS NOTES
Subjective:    Patient ID:  Shandra Velez is a 84 y.o. female who presents for follow-up of inferior vena cava filter    HPI  She was admitted to [facility] last month.  Initially presented with significant lower GI bleed.  She was found to have large hemorrhoids.  She underwent hemorrhoidectomy with Dr. Vasquez.    After the hemorrhoidectomy she continues having significant bleed and she was recommended to stop the anticoagulation completely for 2 weeks.  At that time I was consulted for the insertion of an inferior vena cava filter due to the history of DVTs and massive pulmonary emboli in the past.    The IVC filter was placed with no complications.  She comes back today to figure out what to do with the filter and with the anticoagulation.    She has been doing fine with no further bleeding .  Actually, she looks way much better than when she was in the hospital.        Review of Systems   Constitutional: Negative for decreased appetite, malaise/fatigue, weight gain and weight loss.   Cardiovascular:  Negative for chest pain, dyspnea on exertion, leg swelling, palpitations and syncope.   Respiratory:  Negative for cough and shortness of breath.    Gastrointestinal: Negative.    Neurological:  Negative for weakness.   All other systems reviewed and are negative.       Objective:      Physical Exam  Vitals and nursing note reviewed.   Constitutional:       Appearance: Normal appearance. She is well-developed.   HENT:      Head: Normocephalic.   Eyes:      Pupils: Pupils are equal, round, and reactive to light.   Neck:      Thyroid: No thyromegaly.      Vascular: No carotid bruit or JVD.   Cardiovascular:      Rate and Rhythm: Normal rate and regular rhythm.      Chest Wall: PMI is not displaced.      Pulses: Normal pulses and intact distal pulses.      Heart sounds: Normal heart sounds. No murmur heard.     No gallop.   Pulmonary:      Effort: Pulmonary effort is normal.      Breath sounds: Normal breath  sounds.   Abdominal:      Palpations: Abdomen is soft. There is no mass.      Tenderness: There is no abdominal tenderness.   Musculoskeletal:         General: Normal range of motion.      Cervical back: Normal range of motion and neck supple.   Skin:     General: Skin is warm.   Neurological:      Mental Status: She is alert and oriented to person, place, and time.      Sensory: No sensory deficit.      Deep Tendon Reflexes: Reflexes are normal and symmetric.             Most Recent EKG Results  Results for orders placed or performed during the hospital encounter of 12/18/24   EKG 12-lead    Collection Time: 12/18/24 11:39 AM   Result Value Ref Range    QRS Duration 146 ms    OHS QTC Calculation 470 ms    Narrative    Test Reason : R06.02,    Vent. Rate :  72 BPM     Atrial Rate :  72 BPM     P-R Int : 190 ms          QRS Dur : 146 ms      QT Int : 430 ms       P-R-T Axes :  38 -25 -19 degrees    QTcB Int : 470 ms    Normal sinus rhythm  Right bundle branch block  Abnormal ECG  When compared with ECG of 10-Dec-2024 10:36,  T wave inversion now evident in Anterior leads  QT has shortened  Confirmed by Jurgen Veras ([id number]) on 12/31/2024 5:50:56 AM    Referred By: AAAREFERRAL SELF           Confirmed By: Jurgen Veras       Most Recent Echocardiogram Results  Results for orders placed during the hospital encounter of 12/18/24    Echo    Interpretation Summary    Left Ventricle: The left ventricle is normal in size. Normal wall thickness. There is eccentric hypertrophy. There is normal systolic function. Ejection fraction is approximately 65%. There is normal diastolic function.    Right Ventricle: Normal right ventricular cavity size. Wall thickness is normal. Systolic function is normal.    Mitral Valve: There is moderate mitral annular calcification present.    Tricuspid Valve: There is mild regurgitation.    Pulmonary Artery: The estimated pulmonary artery systolic pressure is 32 mmHg.    IVC/SVC: Normal venous  pressure at 3 mmHg.      Most Recent Nuclear Stress Test Results  Results for orders placed during the hospital encounter of 01/16/23    Nuclear Stress - Cardiology Interpreted    Interpretation Summary    Normal myocardial perfusion scan. There is no evidence of myocardial ischemia or infarction.    The ECG portion of the study is negative for ischemia.    The patient reported no chest pain during the stress test.      Most Recent Cardiac PET Stress Test Results  No results found for this or any previous visit.      Most Recent Cardiovascular Angiogram results  Results for orders placed during the hospital encounter of 12/18/24    Cardiac catheterization    Conclusion    Successful placement of an inferior vena cava filter via the right femoral vein    The procedure log was documented by Documenter: Kody Meza RN and verified by Chucho Cummings MD.    Date: 12/27/2024  Time: 11:16 AM    Procedure:  The patient was brought to the catheterization lab after premedication with p.o. Benadryl.  Additional sedation was obtained in the lab with IV Versed and fentanyl.  Both groins were prepped and draped in the usual sterile fashion.  After local anesthesia was obtained with 2% lidocaine, the right femoral vein was entered percutaneously with a wire.  That wire passed advanced into the inferior vena cava under fluoroscopic guidance.  An IVC filter sheath was inserted over the wire and angiography of the inferior vena cava was performed depicting the renal veins.  The IVC filter was inserted over the sheath and deployed below the renal veins under fluoroscopic guidance.  At this time the procedure was terminated with the catheters and wires were removed.  Hemostasis was obtained with manual compression and the patient was taken back to room in stable conditions.      Other Most Recent Cardiology Results  Results for orders placed during the hospital encounter of 12/18/24    Cardiac monitoring strips      Labs  reviewed    Assessment:       1. S/P insertion of IVC (inferior vena caval) filter    2. Morbid (severe) obesity due to excess calories    3. Pulmonary hypertension, unspecified    4. Dyspnea on exertion      I have had a long discussion with her and her son regarding the long term brisk and benefits of the inferior vena cava filter.    At this time, given the patient's age and other comorbidities including other malignancies and the fact that she has not been challenged with NOAC yet, I would not recommend to retrieve the IVC filter until she has a long discussion with her pulmonologist (Dr. Kinney) with the 1 who put her on NOAC in the 1st place.     Plan:     Continue:  Calcium blocker  Regular exercise program  Low cholesterol diet    Follow-up with me on a p.r.n. basis

## 2025-01-17 ENCOUNTER — PATIENT MESSAGE (OUTPATIENT)
Dept: FAMILY MEDICINE | Facility: CLINIC | Age: 85
End: 2025-01-17
Payer: MEDICARE

## 2025-01-17 ENCOUNTER — TELEPHONE (OUTPATIENT)
Dept: FAMILY MEDICINE | Facility: CLINIC | Age: 85
End: 2025-01-17
Payer: MEDICARE

## 2025-01-17 NOTE — TELEPHONE ENCOUNTER
----- Message from Bill sent at 1/17/2025  1:59 PM CST -----  Regarding: home health  Contact: Astrid with home Health  Type:  Patient Returning Call    Who Called:Astrid with [facility]  Who Left Message for Patient:nurse  Does the patient know what this is regarding?:patient has a rash with open area under breast looks like a yeast infection  Would the patient rather a call back or a response via [facility]? Please advise  Best Call Back Number:[phone]  Additional Information:

## 2025-01-22 RX ORDER — KETOCONAZOLE 20 MG/G
CREAM TOPICAL 2 TIMES DAILY
Qty: 30 G | Refills: 0 | Status: SHIPPED | OUTPATIENT
Start: 2025-01-22

## 2025-01-28 ENCOUNTER — PATIENT MESSAGE (OUTPATIENT)
Facility: CLINIC | Age: 85
End: 2025-01-28
Payer: MEDICARE

## 2025-01-30 ENCOUNTER — HOSPITAL ENCOUNTER (OUTPATIENT)
Dept: RADIOLOGY | Facility: HOSPITAL | Age: 85
Discharge: HOME | End: 2025-01-30
Attending: STUDENT IN AN ORGANIZED HEALTH CARE EDUCATION/TRAINING PROGRAM
Payer: MEDICARE

## 2025-01-30 DIAGNOSIS — N13.30 HYDRONEPHROSIS OF RIGHT KIDNEY: ICD-10-CM

## 2025-01-30 PROCEDURE — 74018 RADEX ABDOMEN 1 VIEW: CPT | Mod: 26,,, | Performed by: STUDENT IN AN ORGANIZED HEALTH CARE EDUCATION/TRAINING PROGRAM

## 2025-01-30 PROCEDURE — 76770 US EXAM ABDO BACK WALL COMP: CPT | Mod: TC,PO

## 2025-01-30 PROCEDURE — 76770 US EXAM ABDO BACK WALL COMP: CPT | Mod: 26,,, | Performed by: RADIOLOGY

## 2025-01-30 PROCEDURE — 74018 RADEX ABDOMEN 1 VIEW: CPT | Mod: TC,FY,PO

## 2025-02-01 NOTE — Clinical Note
Pet ct in 2 months Ldh, uric acid, [id number] today Cbc, cmp, ldh, uric acid in 2 months F/u after pet Pt arrives to ED via EMS from home with cc of SOB. EMS reports pt was found in the 60s. Pt placed on 6L and given a breathing treatment en route. On arrival pt verbalizes SOB, denies cp. A&Ox3. Hx COPD     Past Medical History:   Diagnosis Date    Acute respiratory failure with hypoxia and hypercapnia  (CMD)     Tobacco abuse

## 2025-02-03 ENCOUNTER — OFFICE VISIT (OUTPATIENT)
Dept: UROLOGY | Facility: CLINIC | Age: 85
End: 2025-02-03
Payer: MEDICARE

## 2025-02-03 VITALS — BODY MASS INDEX: 34.76 KG/M2 | HEIGHT: 63 IN | WEIGHT: 196.19 LBS

## 2025-02-03 DIAGNOSIS — R82.90 ABNORMAL URINALYSIS: ICD-10-CM

## 2025-02-03 DIAGNOSIS — C64.1 RENAL CELL CARCINOMA OF RIGHT KIDNEY: ICD-10-CM

## 2025-02-03 DIAGNOSIS — N13.30 HYDRONEPHROSIS OF RIGHT KIDNEY: Primary | ICD-10-CM

## 2025-02-03 LAB
BACTERIA #/AREA URNS HPF: ABNORMAL /HPF
BILIRUBIN, UA POC OHS: ABNORMAL
BLOOD, UA POC OHS: ABNORMAL
CLARITY, UA POC OHS: ABNORMAL
COLOR, UA POC OHS: YELLOW
GLUCOSE, UA POC OHS: NEGATIVE
KETONES, UA POC OHS: NEGATIVE
LEUKOCYTES, UA POC OHS: ABNORMAL
MICROSCOPIC COMMENT: ABNORMAL
NITRITE, UA POC OHS: NEGATIVE
PH, UA POC OHS: 8
PROTEIN, UA POC OHS: NEGATIVE
RBC #/AREA URNS HPF: >100 /HPF (ref 0–4)
SPECIFIC GRAVITY, UA POC OHS: 1.02
SQUAMOUS #/AREA URNS HPF: 6 /HPF
UROBILINOGEN, UA POC OHS: 0.2
WBC #/AREA URNS HPF: >100 /HPF (ref 0–5)
WBC CLUMPS URNS QL MICRO: ABNORMAL

## 2025-02-03 PROCEDURE — 3288F FALL RISK ASSESSMENT DOCD: CPT | Mod: CPTII,S$GLB,, | Performed by: STUDENT IN AN ORGANIZED HEALTH CARE EDUCATION/TRAINING PROGRAM

## 2025-02-03 PROCEDURE — 1126F AMNT PAIN NOTED NONE PRSNT: CPT | Mod: CPTII,S$GLB,, | Performed by: STUDENT IN AN ORGANIZED HEALTH CARE EDUCATION/TRAINING PROGRAM

## 2025-02-03 PROCEDURE — 99214 OFFICE O/P EST MOD 30 MIN: CPT | Mod: S$GLB,,, | Performed by: STUDENT IN AN ORGANIZED HEALTH CARE EDUCATION/TRAINING PROGRAM

## 2025-02-03 PROCEDURE — 81003 URINALYSIS AUTO W/O SCOPE: CPT | Mod: QW,S$GLB,, | Performed by: STUDENT IN AN ORGANIZED HEALTH CARE EDUCATION/TRAINING PROGRAM

## 2025-02-03 PROCEDURE — 1101F PT FALLS ASSESS-DOCD LE1/YR: CPT | Mod: CPTII,S$GLB,, | Performed by: STUDENT IN AN ORGANIZED HEALTH CARE EDUCATION/TRAINING PROGRAM

## 2025-02-03 PROCEDURE — 1160F RVW MEDS BY RX/DR IN RCRD: CPT | Mod: CPTII,S$GLB,, | Performed by: STUDENT IN AN ORGANIZED HEALTH CARE EDUCATION/TRAINING PROGRAM

## 2025-02-03 PROCEDURE — 81000 URINALYSIS NONAUTO W/SCOPE: CPT | Mod: PO | Performed by: STUDENT IN AN ORGANIZED HEALTH CARE EDUCATION/TRAINING PROGRAM

## 2025-02-03 PROCEDURE — 87086 URINE CULTURE/COLONY COUNT: CPT | Performed by: STUDENT IN AN ORGANIZED HEALTH CARE EDUCATION/TRAINING PROGRAM

## 2025-02-03 PROCEDURE — 99999 PR PBB SHADOW E&M-EST. PATIENT-LVL III: CPT | Mod: PBBFAC,,, | Performed by: STUDENT IN AN ORGANIZED HEALTH CARE EDUCATION/TRAINING PROGRAM

## 2025-02-03 PROCEDURE — G2211 COMPLEX E/M VISIT ADD ON: HCPCS | Mod: S$GLB,,, | Performed by: STUDENT IN AN ORGANIZED HEALTH CARE EDUCATION/TRAINING PROGRAM

## 2025-02-03 PROCEDURE — 1159F MED LIST DOCD IN RCRD: CPT | Mod: CPTII,S$GLB,, | Performed by: STUDENT IN AN ORGANIZED HEALTH CARE EDUCATION/TRAINING PROGRAM

## 2025-02-03 NOTE — PROGRESS NOTES
[facility] - Urology   Clinic Note    Subjective:     Chief Complaint: Follow-up    History of Present Illness:  Shandra Velez is a 84 y.o. female who presents to clinic for evaluation and management of a right renal mass. She is established to our clinic.     He had an extensive medical history and follows with oncology for multiple malignancies.  Presents today for follow up for management of her ccRCC and right hydronephrosis after treatment..  Her right renal mass was enlarging on serial imaging and given her significant medical comorbidities it was recommended that she undergo chemo embolization followed by subsequent a cryoablation. Biopsy confirmed ccRCC. Post procedure complicated was by right hydronephrosis and a UTI requiring ureteral stent insertion on 6/21/24. This was then exchanged to a resonance stent 10/30/2024.  She presents today with repeat renal ultrasound and KUB as well as BMP.  KUB shows stent in good position.  Renal ultrasound suggest mild hydronephrosis with debris.  Her most recent creatinine is 1.8 up from during most recent hospitalization creatinine was around 1.4. She has no significant LUTS from the stent.    During the recent stent placement there appeared to be communication between the collecting system and a cavity from the previously treated mass.  The stent and wire had to be replaced using a flexible ureteroscope.    She has had progressive bilateral periaortic LAD and inguinal LAD.  Recent lung mass biopsy from 10/17/2024 was positive for poorly differentiated squamous cell carcinoma.  She is not proceeding with chemotherapy, but is seeking a 2nd opinion.  PET-CT ordered for this week.     Past medical, family, surgical and social history reviewed as documented in chart with pertinent positive medical, family, surgical and social history detailed in HPI.    A review systems was conducted with pertinent positive and negative findings documented in  "HPI.    Anticoagulation/Antiplatelets:  Yes eliquis    Objective:     Estimated body mass index is 34.76 kg/m² as calculated from the following:    Height as of this encounter: 5' 3" (1.6 m).    Weight as of this encounter: 89 kg (196 lb 3.4 oz).    Vital Signs (Most Recent)       Physical Exam  Constitutional:       General: She is not in acute distress.     Appearance: She is well-developed. She is not ill-appearing or toxic-appearing.   Pulmonary:      Effort: Pulmonary effort is normal. No accessory muscle usage or respiratory distress.   Neurological:      Mental Status: She is alert.         Labs reviewed below:  Lab Results   Component Value Date    BUN 29 (H) 01/30/2025    CREATININE 1.8 (H) 01/30/2025    WBC 5.20 12/30/2024    HGB 8.4 (L) 12/30/2024    HCT 27.3 (L) 12/30/2024     12/30/2024    AST 18 12/30/2024    ALT <7 (L) 12/30/2024    ALKPHOS 111 12/30/2024    ALBUMIN 2.3 (L) 12/30/2024    HGBA1C 5.1 10/22/2024     Urine dipstick today showed large blood, moderate leukocyte esterase, and negative nitrite.      Assessment:     1. Hydronephrosis of right kidney    2. Renal cell carcinoma of right kidney    3. Abnormal urinalysis      Plan:     Right RCC now status post chemo embolization and ablation complicated by right hydronephrosis.  There is persistent hydronephrosis despite the stent.  There was a significant inflammatory reaction around the treatment area .  She has improved symptomatically, however renal function is worse than previous.    We discussed the possibility of failing the stent and other options including a nephrostomy tube which he is not interested in given the quality of life implications  Recommend repeat imaging.  She has a upcoming PET[facility] at [facility].  We will try to obtain the results once available.  We discussed further evaluation with renal scan renal function panel    At least plan for follow up in 3 months with a BMP prior.  She would need resonance stent " exchange at around 1 year from her most recent placement, however above imaging may necessitate a change in management    The visit today included increased complexity associated with the care of the episodic problem addressed above as well as managing the longitudinal care of the patient due to the serious and/or complex managed problem(s).      Noel Priest MD

## 2025-02-04 LAB
BACTERIA UR CULT: NORMAL
BACTERIA UR CULT: NORMAL

## 2025-02-11 ENCOUNTER — TELEPHONE (OUTPATIENT)
Dept: UROLOGY | Facility: CLINIC | Age: 85
End: 2025-02-11
Payer: MEDICARE

## 2025-02-11 NOTE — TELEPHONE ENCOUNTER
----- Message from Lourdes sent at 2/11/2025  2:40 PM CST -----  Regarding: PT Disc Drop off  PT's son came and dropped off disc for his [facility] Priest.  Leaving in the provider drop box.

## 2025-02-11 NOTE — TELEPHONE ENCOUNTER
----- Message from Noel Priest MD sent at 2/11/2025  7:32 AM CST -----  Can y'all please try to either request imaging from [facility] or have the patient bring in her most recent PET CT from there? Thanks

## 2025-02-18 ENCOUNTER — DOCUMENTATION ONLY (OUTPATIENT)
Dept: HEMATOLOGY/ONCOLOGY | Facility: CLINIC | Age: 85
End: 2025-02-18
Payer: MEDICARE

## 2025-02-18 NOTE — NURSING
Chart reviewed. Ms. Velez will now follow up at [facility] for medical oncology needs. She remains established with Dr. Priest.     [facility].

## 2025-02-25 ENCOUNTER — EXTERNAL HOME HEALTH (OUTPATIENT)
Dept: HOME HEALTH SERVICES | Facility: HOSPITAL | Age: 85
End: 2025-02-25
Payer: MEDICARE

## 2025-03-17 ENCOUNTER — DOCUMENT SCAN (OUTPATIENT)
Dept: HOME HEALTH SERVICES | Facility: HOSPITAL | Age: 85
End: 2025-03-17
Payer: MEDICARE

## 2025-04-09 ENCOUNTER — OFFICE VISIT (OUTPATIENT)
Dept: FAMILY MEDICINE | Facility: CLINIC | Age: 85
End: 2025-04-09
Payer: MEDICARE

## 2025-04-09 ENCOUNTER — PATIENT MESSAGE (OUTPATIENT)
Dept: FAMILY MEDICINE | Facility: CLINIC | Age: 85
End: 2025-04-09
Payer: MEDICARE

## 2025-04-09 VITALS
OXYGEN SATURATION: 97 % | SYSTOLIC BLOOD PRESSURE: 138 MMHG | WEIGHT: 190.94 LBS | DIASTOLIC BLOOD PRESSURE: 76 MMHG | HEART RATE: 86 BPM | HEIGHT: 63 IN | BODY MASS INDEX: 33.83 KG/M2

## 2025-04-09 DIAGNOSIS — J47.9 BRONCHIECTASIS WITHOUT COMPLICATION: ICD-10-CM

## 2025-04-09 DIAGNOSIS — J96.11 CHRONIC RESPIRATORY FAILURE WITH HYPOXIA, ON HOME O2 THERAPY: ICD-10-CM

## 2025-04-09 DIAGNOSIS — G47.00 INSOMNIA, UNSPECIFIED TYPE: Primary | ICD-10-CM

## 2025-04-09 DIAGNOSIS — C34.11 MALIGNANT NEOPLASM OF RIGHT UPPER LOBE OF LUNG: ICD-10-CM

## 2025-04-09 DIAGNOSIS — Z99.81 CHRONIC RESPIRATORY FAILURE WITH HYPOXIA, ON HOME O2 THERAPY: ICD-10-CM

## 2025-04-09 DIAGNOSIS — N18.4 CHRONIC KIDNEY DISEASE (CKD), STAGE IV (SEVERE): ICD-10-CM

## 2025-04-09 DIAGNOSIS — E55.9 VITAMIN D DEFICIENCY: ICD-10-CM

## 2025-04-09 DIAGNOSIS — L30.4 INTERTRIGO: ICD-10-CM

## 2025-04-09 DIAGNOSIS — I10 HYPERTENSION, UNSPECIFIED TYPE: ICD-10-CM

## 2025-04-09 PROCEDURE — 1125F AMNT PAIN NOTED PAIN PRSNT: CPT | Mod: CPTII,S$GLB,, | Performed by: FAMILY MEDICINE

## 2025-04-09 PROCEDURE — G2211 COMPLEX E/M VISIT ADD ON: HCPCS | Mod: S$GLB,,, | Performed by: FAMILY MEDICINE

## 2025-04-09 PROCEDURE — 3075F SYST BP GE 130 - 139MM HG: CPT | Mod: CPTII,S$GLB,, | Performed by: FAMILY MEDICINE

## 2025-04-09 PROCEDURE — 1159F MED LIST DOCD IN RCRD: CPT | Mod: CPTII,S$GLB,, | Performed by: FAMILY MEDICINE

## 2025-04-09 PROCEDURE — 3078F DIAST BP <80 MM HG: CPT | Mod: CPTII,S$GLB,, | Performed by: FAMILY MEDICINE

## 2025-04-09 PROCEDURE — 3288F FALL RISK ASSESSMENT DOCD: CPT | Mod: CPTII,S$GLB,, | Performed by: FAMILY MEDICINE

## 2025-04-09 PROCEDURE — 99214 OFFICE O/P EST MOD 30 MIN: CPT | Mod: S$GLB,,, | Performed by: FAMILY MEDICINE

## 2025-04-09 PROCEDURE — 1101F PT FALLS ASSESS-DOCD LE1/YR: CPT | Mod: CPTII,S$GLB,, | Performed by: FAMILY MEDICINE

## 2025-04-09 PROCEDURE — 99999 PR PBB SHADOW E&M-EST. PATIENT-LVL III: CPT | Mod: PBBFAC,,, | Performed by: FAMILY MEDICINE

## 2025-04-09 PROCEDURE — 1160F RVW MEDS BY RX/DR IN RCRD: CPT | Mod: CPTII,S$GLB,, | Performed by: FAMILY MEDICINE

## 2025-04-09 RX ORDER — ESZOPICLONE 1 MG/1
1 TABLET, FILM COATED ORAL NIGHTLY PRN
Qty: 30 TABLET | Refills: 5 | Status: SHIPPED | OUTPATIENT
Start: 2025-04-09 | End: 2025-05-09

## 2025-04-09 RX ORDER — MUPIROCIN 20 MG/G
OINTMENT TOPICAL 2 TIMES DAILY
COMMUNITY
Start: 2025-03-06

## 2025-04-09 RX ORDER — ERGOCALCIFEROL 1.25 MG/1
50000 CAPSULE ORAL
Qty: 84 CAPSULE | Refills: 3 | Status: SHIPPED | OUTPATIENT
Start: 2025-04-09

## 2025-04-09 RX ORDER — KETOCONAZOLE 20 MG/G
CREAM TOPICAL 2 TIMES DAILY
Qty: 30 G | Refills: 2 | Status: SHIPPED | OUTPATIENT
Start: 2025-04-09

## 2025-04-09 NOTE — PROGRESS NOTES
Subjective:       Patient ID: Shandra Velez is a 84 y.o. female.    Chief Complaint: Insomnia (6 month follow up)    Patient presents with:  Insomnia: 6 month follow up    Using walker some when going out.    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  Trying IB Guard OTC after she thought she had some IBS  Non-Hodgkins lymphoma, renal mass, lung cancer - following with oncology and awaiting treatment decision de to recurrence in right lung; recent brain MRI showed no metastasis  S/p PE - taking Eliquis 2.5mg BID for life  HTN, CKD - tolerating nifedipine Xl 60mg daily  HLD - stopped Zocor 10mg daily  Allergies: taking Zyrtec 10mg daily  Insomnia - using restoril 30mg at bedtime; some issues falling asleep; no napping; getting about 6 hours of sleep  Hypothyroidism - taking levothyroxine 100mcg daily  Renal mass - following with urology    Past Medical History:    Hypertension                                                  Pulmonary nodule                                              Smoker                                                        Osteopenia                                                    Hypertension                                                  Hypercholesterolemia                                          Breast cancer                                                 Lumbar spondylosis                                            Past Surgical History:    MASTECTOMY                                       1985            Comment:right    CHOLECYSTECTOMY                                                APPENDECTOMY                                                   UMBILICAL HERNIA REPAIR                                        TUBAL LIGATION                                                 BREAST RECONSTRUCTION                                             Comment:right    Allergies:   -- No Known Drug Allergies     Social History    Marital Status:              Spouse Name:                       Years of Education:                 Number of children: 3             Occupational History  Occupation          Employer            Comment               retired marketing *                         Social History Main Topics    Smoking Status: Current Every Day Smoker        Packs/Day: 0.50  Years: 53         Types: Cigarettes    Smokeless Status: Never Used                        Alcohol Use: Yes             Drug Use: No              Sexual Activity: Not Currently        Other Topics            Concern    None on file    Social History Narrative    Lives alone      Hobbies: skyler      From [place]    Current Outpatient Medications on File Prior to Visit:  acetaminophen (TYLENOL) 500 MG tablet, Take 1,000 mg by mouth daily as needed (for headache)., Disp: , Rfl:   albuterol (VENTOLIN HFA) 90 mcg/actuation inhaler, Inhale 2 puffs into the lungs every 6 (six) hours as needed for Wheezing or Shortness of Breath (or before exercise). Rescue, Disp: 18 g, Rfl: 2  budesonide (PULMICORT) 0.5 mg/2 mL nebulizer solution, Take 2 mLs (0.5 mg total) by nebulization 2 (two) times a day., Disp: 120 mL, Rfl: 11  cetirizine (ZYRTEC) 10 MG tablet, Take 10 mg by mouth once daily., Disp: , Rfl:   docusate sodium (COLACE) 100 MG capsule, Take 1 capsule (100 mg total) by mouth 2 (two) times daily., Disp: 30 capsule, Rfl: 0  ergocalciferol (ERGOCALCIFEROL) 50,000 unit Cap, Take 50,000 Units by mouth every Tuesday., Disp: , Rfl:   levalbuterol (XOPENEX) 0.63 mg/3 mL nebulizer solution, Take 3 mLs (0.63 mg total) by nebulization every 6 (six) hours. Rescue, Disp: 150 mL, Rfl: 3  levothyroxine (SYNTHROID) 100 MCG tablet, Take 1 tablet (100 mcg total) by mouth once daily., Disp: 90 tablet, Rfl: 3  MULTIVITAMIN ORAL, Take 1 tablet by mouth once daily., Disp: , Rfl:    mupirocin (BACTROBAN) 2 % ointment, Apply topically 2 (two) times daily., Disp: , Rfl:   NIFEdipine (ADALAT CC) 60 MG TbSR, Take 1 tablet (60 mg total) by mouth once daily., Disp: , Rfl:   omeprazole (PRILOSEC) 40 MG capsule, Take 1 capsule (40 mg total) by mouth once daily., Disp: 90 capsule, Rfl: 4  OXYGEN-AIR DELIVERY SYSTEMS MISC, 2-3 L by Misc.(Non-Drug; Combo Route) route as needed (SOB)., Disp: , Rfl:   sodium chloride (SALINE NASAL NASL), 1 spray by Nasal route once as needed for Congestion., Disp: , Rfl:   temazepam (RESTORIL) 30 mg capsule, Take 1 capsule (30 mg total) by mouth every evening., Disp: 30 capsule, Rfl: 5  [DISCONTINUED] ketoconazole (NIZORAL) 2 % cream, Apply topically 2 (two) times daily. (Patient not taking: Reported on 2/3/2025), Disp: 30 g, Rfl: 0    No current facility-administered medications on file prior to visit.                      Review of patient's family history indicates:    Cancer                         Sister                      Comment: uterine    Diabetes                       Brother                       Fatigue  Associated symptoms include nausea. Pertinent negatives include no arthralgias, chest pain, chills, coughing, fatigue, fever, headaches, neck pain, numbness, rash, sore throat, vomiting or weakness.   Follow-up  Associated symptoms include nausea. Pertinent negatives include no arthralgias, chest pain, chills, coughing, fatigue, fever, headaches, neck pain, numbness, rash, sore throat, vomiting or weakness.   Hyperlipidemia  Pertinent negatives include no chest pain or shortness of breath.     Review of Systems   Constitutional:  Positive for appetite change. Negative for chills, fatigue, fever and unexpected weight change.   HENT:  Negative for sore throat and trouble swallowing.    Eyes:  Negative for pain and visual disturbance.   Respiratory:  Negative for cough, shortness of breath and wheezing.    Cardiovascular:  Negative for chest pain and  "palpitations.   Gastrointestinal:  Positive for diarrhea and nausea. Negative for abdominal distention, blood in stool and vomiting.   Genitourinary:  Positive for flank pain. Negative for difficulty urinating, dysuria and hematuria.   Musculoskeletal:  Negative for arthralgias, back pain, gait problem and neck pain.   Skin:  Negative for rash and wound.   Neurological:  Positive for tremors (left hand). Negative for dizziness, weakness, numbness and headaches.   Hematological:  Negative for adenopathy.   Psychiatric/Behavioral:  Negative for dysphoric mood.        Objective:       /76   Pulse 86   Ht 5' 3" (1.6 m)   Wt 86.6 kg (190 lb 14.7 oz)   SpO2 97%   BMI 33.82 kg/m²     Physical Exam  Constitutional:       Appearance: Normal appearance. She is well-developed.   HENT:      Head: Normocephalic.      Mouth/Throat:      Pharynx: No oropharyngeal exudate or posterior oropharyngeal erythema.   Eyes:      Conjunctiva/sclera: Conjunctivae normal.      Pupils: Pupils are equal, round, and reactive to light.   Neck:      Thyroid: No thyromegaly.   Cardiovascular:      Rate and Rhythm: Normal rate and regular rhythm.      Heart sounds: Normal heart sounds, S1 normal and S2 normal. No murmur heard.     No friction rub. No gallop.   Pulmonary:      Effort: Pulmonary effort is normal.      Breath sounds: Normal breath sounds. No wheezing or rales.   Musculoskeletal:      Cervical back: Normal range of motion and neck supple.      Right lower leg: No edema.      Left lower leg: No edema.   Lymphadenopathy:      Cervical: No cervical adenopathy.   Skin:     General: Skin is warm.      Findings: No rash.   Neurological:      Mental Status: She is alert and oriented to person, place, and time.      Cranial Nerves: No cranial nerve deficit.      Gait: Gait normal.             Results for orders placed or performed in visit on 02/03/25   POCT Urinalysis(Instrument)    Collection Time: 02/03/25  1:42 PM   Result Value " Ref Range    Color, POC UA Yellow Yellow, Straw, Colorless    Clarity, POC UA Cloudy (A) Clear    Glucose, POC UA Negative Negative    Bilirubin, POC UA Small (A) Negative    Ketones, POC UA Negative Negative    Spec Grav POC UA 1.020 1.005 - 1.030    Blood, POC UA Large (A) Negative    pH, POC UA 8.0 5.0 - 8.0    Protein, POC UA Negative Negative    Urobilinogen, POC UA 0.2 <=1.0    Nitrite, POC UA Negative Negative    WBC, POC UA Small (A) Negative   Urine culture    Collection Time: 02/03/25  2:41 PM    Specimen: Urine, Clean Catch   Result Value Ref Range    Urine Culture, Routine       Multiple organisms isolated. None in predominance.  Repeat if    Urine Culture, Routine clinically necessary.    Urinalysis Microscopic    Collection Time: 02/03/25  2:42 PM   Result Value Ref Range    RBC, UA >100 (H) 0 - 4 /hpf    WBC, UA >100 (H) 0 - 5 /hpf    WBC Clumps, UA Marked (A) None-Rare    Bacteria Many (A) None-Occ /hpf    Squam Epithel, UA 6 /hpf    Microscopic Comment SEE COMMENT      *Note: Due to a large number of results and/or encounters for the requested time period, some results have not been displayed. A complete set of results can be found in Results Review.     Recent labs and imaging reviewed    Assessment:       1. Insomnia, unspecified type    2. Intertrigo    3. Vitamin D deficiency    4. Chronic kidney disease (CKD), stage IV (severe)    5. Malignant neoplasm of right upper lobe of lung    6. Bronchiectasis without complication    7. Chronic respiratory failure with hypoxia, on home O2 therapy    8. Hypertension, unspecified type                      Plan:       Insomnia, unspecified type  -     eszopiclone (LUNESTA) 1 MG Tab; Take 1 tablet (1 mg total) by mouth nightly as needed (insomnia).  Dispense: 30 tablet; Refill: 5    Intertrigo  -     ketoconazole (NIZORAL) 2 % cream; Apply topically 2 (two) times daily.  Dispense: 30 g; Refill: 2    Vitamin D deficiency  -     ergocalciferol (ERGOCALCIFEROL)  50,000 unit Cap; Take 1 capsule (50,000 Units total) by mouth every Tuesday.  Dispense: 84 capsule; Refill: 3    Chronic kidney disease (CKD), stage IV (severe)    Malignant neoplasm of right upper lobe of lung    Bronchiectasis without complication    Chronic respiratory failure with hypoxia, on home O2 therapy    Hypertension, unspecified type              Add Lunesta 1mg QHS to temazepam 30mg QHS; cautioned about oversedation and fall risk      Overall stable  BP controlled  Continue present meds  F/u with specialists as planned        Visit today included increased complexity associated with the care of the episodic problems listed above addressed and managing the longitudinal care of the patient due to the serious and/or complex managed problem(s) listed above.

## 2025-04-10 RX ORDER — ACETAMINOPHEN 500 MG
1000 TABLET ORAL DAILY PRN
Qty: 90 TABLET | Refills: 3 | Status: SHIPPED | OUTPATIENT
Start: 2025-04-10

## 2025-04-13 PROBLEM — Z99.81 CHRONIC RESPIRATORY FAILURE WITH HYPOXIA, ON HOME O2 THERAPY: Status: ACTIVE | Noted: 2024-04-10

## 2025-04-13 PROBLEM — N18.4 CHRONIC KIDNEY DISEASE (CKD), STAGE IV (SEVERE): Status: ACTIVE | Noted: 2025-04-13

## 2025-04-18 DIAGNOSIS — I10 ESSENTIAL HYPERTENSION: ICD-10-CM

## 2025-04-18 DIAGNOSIS — C34.11 MALIGNANT NEOPLASM OF RIGHT UPPER LOBE OF LUNG: ICD-10-CM

## 2025-04-18 NOTE — TELEPHONE ENCOUNTER
Care Due:                  Date            Visit Type   Department     Provider  --------------------------------------------------------------------------------                                EP -                              [facility]  Last Visit: 04-      CARE ([facility])   AVIS Chavez                              EP -                              PRIMARY      [facility]  Next Visit: 10-      CARE ([facility])   AVIS Chavez                                                            Last  Test          Frequency    Reason                     Performed    Due Date  --------------------------------------------------------------------------------    TSH.........  12 months..  levothyroxine............  08-   08-    Vitamin D...  12 months..  ergocalciferol...........  06- 06-    [employer] Embedded Care Due Messages. Reference number: [id number].   4/18/2025 3:58:49 AM CDT

## 2025-04-19 RX ORDER — NIFEDIPINE 60 MG/1
60 TABLET, EXTENDED RELEASE ORAL
Qty: 90 TABLET | Refills: 3 | Status: ON HOLD | OUTPATIENT
Start: 2025-04-19

## 2025-04-19 RX ORDER — LEVOTHYROXINE SODIUM 100 UG/1
100 TABLET ORAL
Qty: 90 TABLET | Refills: 3 | Status: ON HOLD | OUTPATIENT
Start: 2025-04-19

## 2025-04-19 NOTE — TELEPHONE ENCOUNTER
Refill Routing Note   Medication(s) are not appropriate for processing by [facility] for the following reason(s):        Required labs outdated    [facility] action(s):  Defer  Approve   Requires labs : Yes             Appointments  past 12m or future 3m with PCP    Date Provider   Last Visit   4/9/2025 Akil Chavez MD   Next Visit   10/9/2025 Akil Chavez MD   ED visits in past 90 days: 0        Note composed:7:10 AM 04/19/2025

## 2025-04-24 PROBLEM — I26.92 ACUTE SADDLE PULMONARY EMBOLISM WITHOUT ACUTE COR PULMONALE: Status: ACTIVE | Noted: 2023-03-18

## 2025-04-26 PROBLEM — J96.01 ACUTE HYPOXEMIC RESPIRATORY FAILURE: Status: ACTIVE | Noted: 2025-04-26

## 2025-04-26 PROBLEM — R06.00 DYSPNEA: Status: ACTIVE | Noted: 2025-04-26

## 2025-05-06 PROBLEM — Z75.8 DISCHARGE PLANNING ISSUES: Status: RESOLVED | Noted: 2024-06-23 | Resolved: 2025-05-06

## 2025-05-07 PROBLEM — I26.02 ACUTE SADDLE PULMONARY EMBOLISM WITH ACUTE COR PULMONALE: Status: RESOLVED | Noted: 2023-03-18 | Resolved: 2025-05-07

## 2025-05-07 PROBLEM — J96.01 ACUTE HYPOXEMIC RESPIRATORY FAILURE: Status: RESOLVED | Noted: 2025-04-26 | Resolved: 2025-05-07

## 2025-05-14 PROBLEM — I31.39 PERICARDIAL EFFUSION: Status: ACTIVE | Noted: 2025-05-14

## 2025-05-14 PROBLEM — J96.01 ACUTE HYPOXIC RESPIRATORY FAILURE: Status: ACTIVE | Noted: 2025-05-14

## 2025-05-15 ENCOUNTER — PATIENT MESSAGE (OUTPATIENT)
Dept: UROLOGY | Facility: CLINIC | Age: 85
End: 2025-05-15
Payer: MEDICARE

## 2025-05-15 PROBLEM — D63.8 ANEMIA OF CHRONIC DISEASE: Status: ACTIVE | Noted: 2025-05-15

## 2025-05-17 PROBLEM — I48.91 ATRIAL FIBRILLATION WITH RAPID VENTRICULAR RESPONSE: Status: ACTIVE | Noted: 2025-05-17

## 2025-05-18 PROBLEM — E87.8 ELECTROLYTE ABNORMALITY: Status: ACTIVE | Noted: 2025-05-18

## 2025-05-19 PROBLEM — I26.99 PULMONARY EMBOLISM: Status: ACTIVE | Noted: 2023-03-18

## 2025-05-19 PROBLEM — J90 PLEURAL EFFUSION: Status: ACTIVE | Noted: 2025-05-19

## 2025-05-19 PROBLEM — I48.0 PAF (PAROXYSMAL ATRIAL FIBRILLATION): Status: ACTIVE | Noted: 2025-05-19

## 2025-05-22 ENCOUNTER — TELEPHONE (OUTPATIENT)
Dept: UROLOGY | Facility: CLINIC | Age: 85
End: 2025-05-22
Payer: MEDICARE

## 2025-05-22 NOTE — TELEPHONE ENCOUNTER
Called and spoke with pt alejandro Barnard regarding pt appt on 05/26, pt is on hospice and family is okay with cancelling appt. QR

## 2025-06-04 ENCOUNTER — DOCUMENTATION ONLY (OUTPATIENT)
Dept: HEMATOLOGY/ONCOLOGY | Facility: CLINIC | Age: 85
End: 2025-06-04
Payer: MEDICARE

## 2025-06-24 ENCOUNTER — TELEPHONE (OUTPATIENT)
Dept: CARDIOLOGY | Facility: CLINIC | Age: 85
End: 2025-06-24
Payer: MEDICARE

## 2025-06-24 ENCOUNTER — PATIENT MESSAGE (OUTPATIENT)
Dept: CARDIOLOGY | Facility: CLINIC | Age: 85
End: 2025-06-24
Payer: MEDICARE

## 2025-06-24 NOTE — TELEPHONE ENCOUNTER
Copied from CRM #[id number]. Topic: General Inquiry - Patient Advice  >> Jun 24, 2025 10:06 AM Nelda wrote:  Type:  Test Results from Ultrasound     Who Called: pt   Name of Test (Lab/Mammo/Etc): Ultrasound of leg     Date of Test:     Ordering Provider: doctor     Where the test was performed:     Would the patient rather a call back or a response via [facility]? Call     Best Call Back Number: [phone]     Additional Information: pt had an ultrasound and wants to discuss findings with MD. Pt states she was informed of a blood clot and is requesting a call back also

## 2025-06-24 NOTE — TELEPHONE ENCOUNTER
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 Ordering physician advised her to take aspirin due to her being on hospice. She told him she is getting a second opinion.   She would like your input on the best treatment. She says she will get off hospice if need be. She is very anxious about the results

## 2025-08-03 DIAGNOSIS — G47.00 INSOMNIA, UNSPECIFIED TYPE: ICD-10-CM

## 2025-08-03 NOTE — TELEPHONE ENCOUNTER
Care Due:                  Date            Visit Type   Department     Provider  --------------------------------------------------------------------------------                                EP -                              [facility]  Last Visit: 04-      CARE ([facility])   AVIS Chavez                              EP -                              PRIMARY      [facility]  Next Visit: 10-      CARE ([facility])   AVIS Chavez                                                            Last  Test          Frequency    Reason                     Performed    Due Date  --------------------------------------------------------------------------------    Vitamin D...  12 months..  ergocalciferol...........  Not Found    Overdue    [employer] Embedded Care Due Messages. Reference number: [id number].   8/03/2025 9:59:39 AM CDT

## 2025-08-04 ENCOUNTER — PATIENT MESSAGE (OUTPATIENT)
Dept: FAMILY MEDICINE | Facility: CLINIC | Age: 85
End: 2025-08-04
Payer: MEDICARE

## 2025-08-04 RX ORDER — TEMAZEPAM 30 MG/1
30 CAPSULE ORAL NIGHTLY
Qty: 30 CAPSULE | Refills: 5 | Status: SHIPPED | OUTPATIENT
Start: 2025-08-04

## 2025-08-23 ENCOUNTER — PATIENT MESSAGE (OUTPATIENT)
Dept: FAMILY MEDICINE | Facility: CLINIC | Age: 85
End: 2025-08-23
Payer: MEDICARE

## 2025-08-28 RX ORDER — NIFEDIPINE 30 MG/1
30 TABLET, EXTENDED RELEASE ORAL DAILY
Qty: 30 TABLET | Refills: 11 | Status: SHIPPED | OUTPATIENT
Start: 2025-08-28 | End: 2026-08-28

## (undated) DEVICE — MARKER SKIN STND TIP BLUE BARR

## (undated) DEVICE — PORT POWER SLIM: Type: IMPLANTABLE DEVICE | Site: CHEST | Status: NON-FUNCTIONAL

## (undated) DEVICE — SEE MEDLINE ITEM 152622

## (undated) DEVICE — PENCIL ROCKER SWITCH 10FT CORD

## (undated) DEVICE — TRAY BONE MARROW BEK3411

## (undated) DEVICE — TRAY NERVE BLOCK

## (undated) DEVICE — GLOVE SENSICARE PI MICRO 7

## (undated) DEVICE — APPLICATOR CHLORAPREP CLR 10.5

## (undated) DEVICE — SEE L#120831

## (undated) DEVICE — SYR 10CC LUER LOCK

## (undated) DEVICE — NDL SPINAL SPINOCAN 22GX3.5

## (undated) DEVICE — GLOVE SURGICAL LATEX SZ 7

## (undated) DEVICE — MARKER SKIN RULER STERILE

## (undated) DEVICE — SUT 2-0 VICRYL / SH (J417)

## (undated) DEVICE — CLOSURE SKIN STERI STRIP 1/2X4

## (undated) DEVICE — SYR SLIP TIP 10ML SHIELD

## (undated) DEVICE — PACK BASIC

## (undated) DEVICE — GAUZE SPONGE 4X4 12PLY

## (undated) DEVICE — ELECTRODE REM PLYHSV RETURN 9

## (undated) DEVICE — SPONGE DERMACEA 4X4IN 12PLY

## (undated) DEVICE — DRAPE C ARM 42 X 120 10/BX

## (undated) DEVICE — SUT 3-0 VICRYL / SH (J416)

## (undated) DEVICE — TOWEL OR DISP STRL BLUE 4/PK

## (undated) DEVICE — SEE MEDLINE ITEM 157128

## (undated) DEVICE — PACK SET UP 190 OMC-NS

## (undated) DEVICE — DRAPE LAP TIBURON 77X122IN

## (undated) DEVICE — APPLICATOR CHLORAPREP ORN 26ML

## (undated) DEVICE — CHLORAPREP 10.5 ML APPLICATOR

## (undated) DEVICE — GLOVE SURG BIOGEL LATEX SZ 7.5

## (undated) DEVICE — DRESSING TRANS 4X4 TEGADERM

## (undated) DEVICE — SUT MONOCRYL 4-0 PS-2

## (undated) DEVICE — DEVICE CARESITE LUER ACCESS